# Patient Record
Sex: MALE | Race: WHITE | NOT HISPANIC OR LATINO | Employment: FULL TIME | URBAN - METROPOLITAN AREA
[De-identification: names, ages, dates, MRNs, and addresses within clinical notes are randomized per-mention and may not be internally consistent; named-entity substitution may affect disease eponyms.]

---

## 2021-11-22 ENCOUNTER — TRANSCRIBE ORDERS (OUTPATIENT)
Dept: PHYSICAL THERAPY | Facility: CLINIC | Age: 56
End: 2021-11-22

## 2021-11-22 DIAGNOSIS — S76.011A HIP STRAIN, RIGHT, INITIAL ENCOUNTER: Primary | ICD-10-CM

## 2021-11-23 ENCOUNTER — TREATMENT (OUTPATIENT)
Dept: PHYSICAL THERAPY | Facility: CLINIC | Age: 56
End: 2021-11-23

## 2021-11-23 DIAGNOSIS — S76.011D STRAIN OF RIGHT HIP, SUBSEQUENT ENCOUNTER: Primary | ICD-10-CM

## 2021-11-23 PROCEDURE — 97161 PT EVAL LOW COMPLEX 20 MIN: CPT | Performed by: PHYSICAL THERAPIST

## 2021-11-23 PROCEDURE — 97110 THERAPEUTIC EXERCISES: CPT | Performed by: PHYSICAL THERAPIST

## 2021-11-23 NOTE — PROGRESS NOTES
Physical Therapy Initial Evaluation and Plan of Care      Subjective Evaluation    History of Present Illness  Date of onset: 2021  Mechanism of injury: Patient injured his right hip at work.  He was doing deliveries and using a lift gate.  Was standing on the gate, unloading pipe, stepped off with his right LE and slipped on gravel.  Patient came down on his right side with the load he was lifting and felt a pop in the right hip.  No history of right hip issues.      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    Palpation     Additional Palpation Details  Minimal TTP to the right lateral hip region.    Active Range of Motion     Right Hip   Flexion: Right hip active flexion: WNL.   Abduction: Right hip active abduction: WNL.   Adduction: Right hip active adduction: WNL.     Additional Active Range of Motion Details  AROM of the lumbar spine WNL    Strength/Myotome Testing     Right Hip   Planes of Motion   Flexion: 5  Abduction: 5  Adduction: 5    Additional Strength Details  Right Knee Extension MMT 5/5  Right Ankle DF 5/5    Tests     Right Hip   Negative ZACHARY.           Assessment & Plan     Assessment    Assessment details: Patient presents with c/o pain, TTP and an antalgic gait pattern which is limiting his ability to perform full job duties.    Barriers to therapy: none  Prognosis: good  Prognosis details: STG's   1)  Independent with HEP  2)  Decrease pain by 50% or more  3)  No TTP present    LTG's   1)  Independent with HEP progression  2)  Decrease pain by 75% or more  3)  Patient to ambulate with a normal gait  pattern  4)  Patient to lift floor to waist up to 50 lbs      Plan  Therapy options: will be seen for skilled therapy services  Planned therapy interventions: strengthening, stretching, therapeutic activities and home exercise program  Treatment plan discussed with: patient        Manual Therapy:    0     mins  16260;  Therapeutic Exercise:    17     mins  76974;    Neuromuscular Beni:    0    mins  01079;    Therapeutic Activity:     0     mins  90084;     Gait Trainin     mins  63190;     Ultrasound:     0     mins  15586;    Work Hardening           0      mins 81000  Iontophoresis               0   mins 61529    Timed Treatment:   17   mins   Total Treatment:     30   mins    PT SIGNATURE: Flaquito Pearce, PT   DATE TREATMENT INITIATED: 2021    Initial Certification  Certification Period: 2022  I certify that the therapy services are furnished while this patient is under my care.  The services outlined above are required by this patient, and will be reviewed every 90 days.     PHYSICIAN: Alix Lawson PA-C      DATE:     Please sign and return via fax to [phone].. Thank you, [facility].

## 2021-11-29 ENCOUNTER — TREATMENT (OUTPATIENT)
Dept: PHYSICAL THERAPY | Facility: CLINIC | Age: 56
End: 2021-11-29

## 2021-11-29 DIAGNOSIS — S76.011D STRAIN OF RIGHT HIP, SUBSEQUENT ENCOUNTER: Primary | ICD-10-CM

## 2021-11-29 PROCEDURE — 97110 THERAPEUTIC EXERCISES: CPT | Performed by: PHYSICAL THERAPIST

## 2021-11-29 PROCEDURE — 97530 THERAPEUTIC ACTIVITIES: CPT | Performed by: PHYSICAL THERAPIST

## 2021-11-29 NOTE — PROGRESS NOTES
Physical Therapy Daily Progress Note              Subjective  Patient reports that the hip is doing ok.  Continues to feel some pressure when he walks.  Currently denies pain.    Objective   See Exercise, Manual, and Modality Logs for complete treatment.       Assessment/Plan  Subjective reports continue to be good with regard to pain.  Added open and closed chain strengthening exercises, which the patient tolerated without a significant increase in pain.  No significant deficits persist at this time (AROM is WNL for the hip and strength is 5/5).                   Manual Therapy:    0     mins  56071;  Therapeutic Exercise:    23     mins  80670;     Neuromuscular Beni:    0    mins  17014;    Therapeutic Activity:     10     mins  53120;     Gait Trainin     mins  27857;     Ultrasound:     0     mins  43341;    Work Hardening           0      mins 12196  Iontophoresis               0   mins 32006    Timed Treatment:   33   mins   Total Treatment:     33   mins    Flaquito Pearce, PT  Physical Therapist

## 2021-12-28 ENCOUNTER — DOCUMENTATION (OUTPATIENT)
Dept: PHYSICAL THERAPY | Facility: CLINIC | Age: 56
End: 2021-12-28

## 2022-10-14 ENCOUNTER — TRANSFERRED RECORDS (OUTPATIENT)
Dept: HEALTH INFORMATION MANAGEMENT | Facility: CLINIC | Age: 57
End: 2022-10-14

## 2022-12-18 ENCOUNTER — HOSPITAL ENCOUNTER (EMERGENCY)
Facility: HOSPITAL | Age: 57
Discharge: HOME | End: 2022-12-18
Attending: EMERGENCY MEDICINE | Admitting: EMERGENCY MEDICINE

## 2022-12-18 ENCOUNTER — APPOINTMENT (OUTPATIENT)
Dept: GENERAL RADIOLOGY | Facility: HOSPITAL | Age: 57
End: 2022-12-18

## 2022-12-18 VITALS
DIASTOLIC BLOOD PRESSURE: 75 MMHG | SYSTOLIC BLOOD PRESSURE: 109 MMHG | OXYGEN SATURATION: 96 % | TEMPERATURE: 98.6 F | HEART RATE: 59 BPM | RESPIRATION RATE: 18 BRPM

## 2022-12-18 DIAGNOSIS — S39.012A STRAIN OF LUMBAR REGION, INITIAL ENCOUNTER: Primary | ICD-10-CM

## 2022-12-18 PROCEDURE — 96374 THER/PROPH/DIAG INJ IV PUSH: CPT

## 2022-12-18 PROCEDURE — 96375 TX/PRO/DX INJ NEW DRUG ADDON: CPT

## 2022-12-18 PROCEDURE — 72110 X-RAY EXAM L-2 SPINE 4/>VWS: CPT

## 2022-12-18 PROCEDURE — 25010000002 ONDANSETRON PER 1 MG: Performed by: EMERGENCY MEDICINE

## 2022-12-18 PROCEDURE — 99283 EMERGENCY DEPT VISIT LOW MDM: CPT

## 2022-12-18 PROCEDURE — 72170 X-RAY EXAM OF PELVIS: CPT

## 2022-12-18 PROCEDURE — 25010000002 HYDROMORPHONE 1 MG/ML SOLUTION: Performed by: EMERGENCY MEDICINE

## 2022-12-18 RX ORDER — ONDANSETRON 2 MG/ML
4 INJECTION INTRAMUSCULAR; INTRAVENOUS ONCE
Status: COMPLETED | OUTPATIENT
Start: 2022-12-18 | End: 2022-12-18

## 2022-12-18 RX ORDER — METHYLPREDNISOLONE 4 MG/1
TABLET ORAL
Qty: 21 TABLET | Refills: 0 | Status: SHIPPED | OUTPATIENT
Start: 2022-12-18

## 2022-12-18 RX ORDER — SODIUM CHLORIDE 0.9 % (FLUSH) 0.9 %
10 SYRINGE (ML) INJECTION AS NEEDED
Status: DISCONTINUED | OUTPATIENT
Start: 2022-12-18 | End: 2022-12-19 | Stop reason: HOSPADM

## 2022-12-18 RX ORDER — HYDROCODONE BITARTRATE AND ACETAMINOPHEN 5; 325 MG/1; MG/1
1 TABLET ORAL EVERY 6 HOURS PRN
Qty: 12 TABLET | Refills: 0 | Status: SHIPPED | OUTPATIENT
Start: 2022-12-18 | End: 2022-12-21

## 2022-12-18 RX ORDER — CYCLOBENZAPRINE HCL 5 MG
5 TABLET ORAL 3 TIMES DAILY PRN
Qty: 15 TABLET | Refills: 0 | Status: SHIPPED | OUTPATIENT
Start: 2022-12-18 | End: 2022-12-23

## 2022-12-18 RX ADMIN — ONDANSETRON 4 MG: 2 INJECTION INTRAMUSCULAR; INTRAVENOUS at 20:35

## 2022-12-18 RX ADMIN — HYDROMORPHONE HYDROCHLORIDE 1 MG: 1 INJECTION, SOLUTION INTRAMUSCULAR; INTRAVENOUS; SUBCUTANEOUS at 20:35

## 2022-12-19 ENCOUNTER — TRANSCRIBE ORDERS (OUTPATIENT)
Dept: PHYSICAL THERAPY | Facility: CLINIC | Age: 57
End: 2022-12-19

## 2022-12-19 DIAGNOSIS — M54.50 ACUTE LOW BACK PAIN, UNSPECIFIED BACK PAIN LATERALITY, UNSPECIFIED WHETHER SCIATICA PRESENT: Primary | ICD-10-CM

## 2022-12-19 NOTE — ED NOTES
Patient from home via [facility]. Patient reports lower back pain that radiates down left leg. Has history of sciatica. Patient bent over with a 60 lb box on Thursday, felt a pinch in his lower back. States he has been working through the pain, but today he was not able to walk. Patient denies numbness, tingling, or loss of bladder/ bowels.

## 2022-12-19 NOTE — ED PROVIDER NOTES
EMERGENCY DEPARTMENT ENCOUNTER    Room Number:  36/36  Date seen:  12/18/2022  PCP: Provider, No Known  Historian: Patient, wife      HPI:  Chief Complaint: Back pain  A complete HPI/ROS/PMH/PSH/SH/FH are unobtainable due to: None  Context: Tristin Sparrow is a 57 y.o. male who presents to the ED c/o back pain.  Onset Thursday.  He states that he was caring about 60 pounds on his shoulder.  He leaned over to put it down when he felt sudden onset pain to his back.  Pain is sharp and in the low back.  It is constant.  Significantly worsens with movement.  Nonradiating.    No fevers. No history of illicit IV drug use. No chronic steroid use. No anticoagulants. No history of malignancy. No saddle anesthesia or bowel or bladder changes.            PAST MEDICAL HISTORY  Active Ambulatory Problems     Diagnosis Date Noted   • No Active Ambulatory Problems     Resolved Ambulatory Problems     Diagnosis Date Noted   • No Resolved Ambulatory Problems     No Additional Past Medical History         PAST SURGICAL HISTORY  No past surgical history on file.      FAMILY HISTORY  No family history on file.      SOCIAL HISTORY  Social History     Socioeconomic History   • Marital status:          ALLERGIES  Patient has no known allergies.        REVIEW OF SYSTEMS  Review of Systems     All systems reviewed and negative except for those discussed in HPI.       PHYSICAL EXAM  ED Triage Vitals [12/18/22 1928]   Temp Heart Rate Resp BP SpO2   98.6 °F (37 °C) 74 18 133/76 98 %      Temp src Heart Rate Source Patient Position BP Location FiO2 (%)   -- -- -- -- --       Physical Exam      GENERAL: no acute distress  HENT: nares patent  EYES: no scleral icterus  CV: regular rhythm, normal rate  RESPIRATORY: normal effort  ABDOMEN: soft, nontender  MUSCULOSKELETAL: no deformity no T or L-spine tenderness  NEURO: alert, moves all extremities, follows commands  5/5 strength to hip flexion, knee extension/flexion, dorsiflexion,  plantarflexion, and EHL  2+ patellar reflexes bilaterally  SILT at bilateral superficial peroneal, deep peroneal, sural, and saphenous nerves  PSYCH:  calm, cooperative  SKIN: warm, dry, no rash to the back    Vital signs and nursing notes reviewed.          LAB RESULTS  No results found for this or any previous visit (from the past 24 hour(s)).    Ordered the above labs and reviewed the results.        RADIOLOGY  XR Spine Lumbar Complete 4+VW, XR Pelvis 1 or 2 View    Result Date: 12/18/2022  LUMBAR SPINE, PELVIS  HISTORY: Low back pain, pelvic pain.  LUMBAR SPINE: AP, LATERAL, BILATERAL OBLIQUE, SPOT LUMBOSACRAL: There is degenerative disc disease with mild endplate spurring. Vertebral body and disc space heights are preserved. There is facet arthritis greatest at L4-5 and L5-S1. There is no evidence for fracture or acute abnormality. A minimal scoliotic curvature is convex to the left at L2-3.  AP PELVIS: There are mild degenerative changes at both hips. Within the lateral aspect of the left acetabular roof there is an 8 mm degenerative subchondral cyst. Just above the right lesser trochanter there is a 1 cm chronic ossification associated with iliopsoas tendon insertion. There is no evidence for fracture or acute abnormality of either hip.      1. Mild changes of degenerative disc disease with endplate spurring in the lumbar spine. Facet arthritis lower lumbar spine. 2. Mild arthritic changes at both hips. No evidence for fracture or acute abnormality of the lumbar spine or pelvis.        Ordered the above noted radiological studies. Reviewed by me in PACS.            PROCEDURES  Procedures          MEDICATIONS GIVEN IN ER  Medications   sodium chloride 0.9 % flush 10 mL (has no administration in time range)   HYDROmorphone (DILAUDID) injection 1 mg (1 mg Intravenous Given 12/18/22 2035)   ondansetron (ZOFRAN) injection 4 mg (4 mg Intravenous Given 12/18/22 2035)               MEDICAL DECISION MAKING, PROGRESS,  and CONSULTS    All labs have been independently reviewed by me.  All radiology studies have been reviewed by me and discussed with radiologist dictating the report.   EKG's independently viewed and interpreted by me.  Discussion below represents my analysis of pertinent findings related to patient's condition, differential diagnosis, treatment plan and final disposition.      Additional sources:  - Discussed/ obtained information from independent historians:  wife        Orders placed during this visit:  Orders Placed This Encounter   Procedures   • XR Spine Lumbar Complete 4+VW   • XR Pelvis 1 or 2 View   • Insert Peripheral IV         Additional orders considered but not ordered:  I considered ordering MRI of the spine given his back pain.  However, he has no red flag features to his exam or history.  I also considered ordering CT scan of his abdomen.  However, he has no abdominal pain.  Abdomen is nontender.  I have low suspicion for ureteral stone.  Has no urinary symptoms.  Pain is very consistent with musculoskeletal etiology.        Differential diagnosis:    Lumbosacral strain, sciatica, spinal cord compression, cauda equina syndrome.  I have lower suspicion for AAA or ureteral stone or UTI.      Independent interpretation of labs, radiology studies, and discussions with consultants:       I interpreted x-rays myself.  X-ray of the spine and pelvis shows degenerative change.  No fracture.    Pain is much more well controlled after getting IV narcotic medication.  I discussed return precautions with the patient and wife.  We discussed follow-up and outpatient management strategies.           PPE: The patient wore a mask throughout the entire encounter. I wore a well-fitting mask.    DIAGNOSIS  Final diagnoses:   Strain of lumbar region, initial encounter         DISPOSITION  DISCHARGE    FOLLOW-UP  [facility] Emergency Department  [address] [facility]  [address] [phone]  Go to   If symptoms worsen    Rafi Vuong IV, MD  [address]  [place] [id number] [phone]    Schedule an appointment as soon as possible for a visit in 1 week           Medication List      New Prescriptions    cyclobenzaprine 5 MG tablet  Commonly known as: FLEXERIL  Take 1 tablet by mouth 3 (Three) Times a Day As Needed for Muscle Spasms for up to 5 days.     HYDROcodone-acetaminophen 5-325 MG per tablet  Commonly known as: NORCO  Take 1 tablet by mouth Every 6 (Six) Hours As Needed for Severe Pain for up to 3 days.     methylPREDNISolone 4 MG dose pack  Commonly known as: MEDROL  Take as directed on package instructions.           Where to Get Your Medications      These medications were sent to [id number] - [place], [place] - [address] AT [facility] - [phone]  - [phone]   [address] [address]    Phone: [phone]   · cyclobenzaprine 5 MG tablet  · HYDROcodone-acetaminophen 5-325 MG per tablet  · methylPREDNISolone 4 MG dose pack             Latest Documented Vital Signs:  As of 22:11 EST  BP- 133/76 HR- 74 Temp- 98.6 °F (37 °C) O2 sat- 98%      --    Please note that portions of this were completed with a voice recognition program.       Note Disclaimer: At [facility], we believe that sharing information builds trust and better relationships. You are receiving this note because you are receiving care at [facility] or recently visited. It is possible you will see health information before a provider has talked with you about it. This kind of information can be easy to misunderstand. To help you fully understand what it means for your health, we urge you to discuss this note with your provider.       Rayo Ambriz II, MD  12/18/22 8385

## 2022-12-21 ENCOUNTER — TREATMENT (OUTPATIENT)
Dept: PHYSICAL THERAPY | Facility: CLINIC | Age: 57
End: 2022-12-21

## 2022-12-21 DIAGNOSIS — S39.012D STRAIN OF LUMBAR REGION, SUBSEQUENT ENCOUNTER: Primary | ICD-10-CM

## 2022-12-21 PROCEDURE — 97112 NEUROMUSCULAR REEDUCATION: CPT | Performed by: PHYSICAL THERAPIST

## 2022-12-21 PROCEDURE — 97110 THERAPEUTIC EXERCISES: CPT | Performed by: PHYSICAL THERAPIST

## 2022-12-21 PROCEDURE — 97161 PT EVAL LOW COMPLEX 20 MIN: CPT | Performed by: PHYSICAL THERAPIST

## 2022-12-21 NOTE — PROGRESS NOTES
Physical Therapy Initial Evaluation and Plan of Care  [address] [place] [address], [address]  [place] [address]    Patient: Tristin Sparrow   : 1965  Diagnosis/ICD-10 Code:  Strain of lumbar region, subsequent encounter [S39.012D]  Referring practitioner: Lionel Mars PA-C  Date of Initial Visit: 2022  Today's Date: 2022  Patient seen for 1 session         Visit Diagnoses:    ICD-10-CM ICD-9-CM   1. 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  He bent over to set down some supplies.  Reports that he felt the pain as he stood up.  Went to the ER, then to [facility] the next day.      Patient Occupation: [employer]   Precautions and Work Restrictions: currently offPain  Current pain ratin  At worst pain rating: 10  Location: left lumbar spine  Quality: dull ache and sharp  Relieving factors: ice (home TENS unit)  Aggravating factors: movement and lifting  Progression: improved             Objective          Postural Observations    Additional Postural Observation Details  Guarded sit to stand.  Patient ambulates with a minimal antalgic gait pattern.    Palpation     Additional Palpation Details  No TTP present to the lumbar spine.    Active Range of Motion     Lumbar   Flexion: Active lumbar flexion: WNL.   Extension: Active lumbar extension: about 10. with pain  Left lateral flexion: Active left lumbar lateral flexion: about 10. with pain  Right lateral flexion: Active right lumbar lateral flexion: about 20.     Strength/Myotome Testing     Left Hip   Planes of Motion   Flexion: 4+  Abduction: 4+  Adduction: 4+    Right Hip   Planes of Motion   Flexion: 4-  Abduction: 4+  Adduction: 4+    Left Ankle/Foot   Dorsiflexion: 4+    Right Ankle/Foot    Dorsiflexion: 4+    Tests     Additional Tests Details  - ZACHARY bilaterally          Assessment & Plan     Assessment  Impairments: abnormal gait, abnormal or restricted ROM, activity intolerance, impaired physical strength, lacks appropriate home exercise program and pain with function    Assessment details: Patient presents with c/o pain, limited AROM, decreased strength and an antalgic gait pattern which is limiting his ability to perform full job duties.    Barriers to therapy: none  Prognosis: good  Prognosis details: STG's to be met by 2 weeks  1)  Independent with HEP  2)  Decrease pain by 50% or more  3)  AROM WNL for the lumbar spine without pain  4)  Normal sit to stand  5)  Normal gait patern    LTG's to be met by 4 weeks  1)  Independent with HEP progression  2)  Decrease pain by 75% or more  3)  Increase strength for the LE to 4+/5 or more  4)  No TTP present  5)  Patient to lift floor to waist up to 50 lbs      Plan  Therapy options: will be seen for skilled therapy services  Planned therapy interventions: strengthening, stretching, therapeutic activities, home exercise program, gait training and neuromuscular re-education  Frequency: 3x week  Duration in weeks: 4  Treatment plan discussed with: patient            Timed:         Manual Therapy:    0     mins  14024;     Therapeutic Exercise:    16     mins  71449;     Neuromuscular Beni:    8    mins  29416;    Therapeutic Activity:     0     mins  94368;     Gait Trainin     mins  48619;     Ultrasound:     0     mins  64953;          Un-Timed:  Electrical Stimulation:    0     mins  97453 ( );    Low Eval     14     Mins  67010  Mod Eval     0     Mins  10960  High Eval                       0     Mins  96752        Timed Treatment:   24   mins   Total Treatment:     38   mins          PT: Flaquito Pearce, PT     [place] License [id number]  Electronically signed by Flaquito Pearce PT, 22, 6:58 AM EST    Certification Period:  12/21/2022 thru 3/20/2023  I certify that the therapy services are furnished while this patient is under my care.  The services outlined above are required by this patient, and will be reviewed every 90 days.    Lionel Mars Pa-c  [address] [address]   NPI: [id number]      Flaquito Pearce, PT   License number: [id number]        Physician Signature:__________________________________________________    PHYSICIAN: Lionel Mars PA-C      DATE:     Please sign and return via fax to [email or web address] . Thank you, [facility].

## 2022-12-28 ENCOUNTER — TREATMENT (OUTPATIENT)
Dept: PHYSICAL THERAPY | Facility: CLINIC | Age: 57
End: 2022-12-28

## 2022-12-28 DIAGNOSIS — S39.012D STRAIN OF LUMBAR REGION, SUBSEQUENT ENCOUNTER: Primary | ICD-10-CM

## 2022-12-28 PROCEDURE — 97112 NEUROMUSCULAR REEDUCATION: CPT | Performed by: PHYSICAL THERAPIST

## 2022-12-28 PROCEDURE — 97110 THERAPEUTIC EXERCISES: CPT | Performed by: PHYSICAL THERAPIST

## 2022-12-28 NOTE — PROGRESS NOTES
Physical Therapy Daily Treatment Note  [address], [address]  [place] [address]      Patient: Tristin Sparrow   : 1965  Referring practitioner: No ref. provider found  Date of Initial Visit: Type: THERAPY  Noted: 2022  Today's Date: 2022  Patient seen for 2 sessions       Visit Diagnoses:    ICD-10-CM ICD-9-CM   1. Strain of lumbar region, subsequent encounter  S39.012D V58.89     847.2           Subjective   Patient reports that the back is a whole lot better, currently denies pain.    Objective   See Exercise, Manual, and Modality Logs for complete treatment.       Assessment/Plan  Subjective reports are improved from the previous visit.  Continued with the KT secondary to the patient noting an improvement with it on.  Patient tolerated the progression of ROM and strengthening exercises without visual or verbal reports of pain.      Timed:         Manual Therapy:    0     mins  41755;     Therapeutic Exercise:    27     mins  05436;    Neuromuscular Beni:    8    mins  38227;    Therapeutic Activity:     0     mins  44968;     Gait Training      0    mins  03709;  Work Conditioning     0   mins  43255       Untimed:  Electrical Stimulation:    0     mins  01369 ( );      Timed Treatment:   35   mins   Total Treatment:     35   mins    Flaquito Pearce, PT  [place] License: [id number]

## 2022-12-29 ENCOUNTER — TREATMENT (OUTPATIENT)
Dept: PHYSICAL THERAPY | Facility: CLINIC | Age: 57
End: 2022-12-29

## 2022-12-29 DIAGNOSIS — S39.012D STRAIN OF LUMBAR REGION, SUBSEQUENT ENCOUNTER: Primary | ICD-10-CM

## 2022-12-29 PROCEDURE — 97110 THERAPEUTIC EXERCISES: CPT | Performed by: PHYSICAL THERAPIST

## 2022-12-29 NOTE — PROGRESS NOTES
Physical Therapy Daily Treatment Note  [address], [address]  [place] [address]      Patient: Tristin Sparrow   : 1965  Referring practitioner: No ref. provider found  Date of Initial Visit: Type: THERAPY  Noted: 2022  Today's Date: 2022  Patient seen for 3 sessions       Visit Diagnoses:    ICD-10-CM ICD-9-CM   1. Strain of lumbar region, subsequent encounter  S39.012D V58.89     847.2         Subjective   Patient reports that the back is a little stiff, reports no pain.    Objective   See Exercise, Manual, and Modality Logs for complete treatment.       Assessment/Plan  Subjective reports of pain are improved with regard to pain.  Patient reported some discomfort with the DUYEN, but had on c/o pain otherwise.  Held the KT secondary to it still being on from the previous visit.      Timed:         Manual Therapy:    0     mins  59836;     Therapeutic Exercise:    29     mins  81723;     Neuromuscular Beni:    0    mins  55782;    Therapeutic Activity:     0     mins  71621;     Gait Training      0    mins  30276;  Work Conditioning     0   mins  54623       Untimed:  Electrical Stimulation:    0     mins  12576 ( );      Timed Treatment:   29   mins   Total Treatment:     29   mins    Flaquito Pearce, PT  [place] License: [id number]

## 2023-01-03 ENCOUNTER — TREATMENT (OUTPATIENT)
Dept: PHYSICAL THERAPY | Facility: CLINIC | Age: 58
End: 2023-01-03
Payer: OTHER MISCELLANEOUS

## 2023-01-03 DIAGNOSIS — S39.012D STRAIN OF LUMBAR REGION, SUBSEQUENT ENCOUNTER: Primary | ICD-10-CM

## 2023-01-03 PROCEDURE — 97110 THERAPEUTIC EXERCISES: CPT | Performed by: PHYSICAL THERAPIST

## 2023-01-03 PROCEDURE — 97112 NEUROMUSCULAR REEDUCATION: CPT | Performed by: PHYSICAL THERAPIST

## 2023-01-03 NOTE — PROGRESS NOTES
Physical Therapy Daily Treatment Note  [address] [facility], [address]  [place] [address]      Patient: Tristin Sparrow   : 1965  Referring practitioner: Lionel Mars PA-C  Date of Initial Visit: Type: THERAPY  Noted: 2022  Today's Date: 1/3/2023  Patient seen for 4 sessions       Visit Diagnoses:    ICD-10-CM ICD-9-CM   1. Strain of lumbar region, subsequent encounter  S39.012D V58.89     847.2           Subjective   Patient reports that the back is doing good, currently rates the pain at 1/10.    Objective   See Exercise, Manual, and Modality Logs for complete treatment.       Assessment/Plan  Subjective reports of pain remain low.  Continued with the KT secondary to the patient reporting an improvement with it on.  Patient reported some pain with DUYEN, but had no reports of pain otherwise.      Timed:         Manual Therapy:    0     mins  98039;     Therapeutic Exercise:    23     mins  58733;    Neuromuscular Beni:    8    mins  44925;    Therapeutic Activity:     0     mins  74126;     Gait Training      0    mins  16613;  Work Conditioning     0   mins  74422       Untimed:  Electrical Stimulation:    0     mins  46402 ( );      Timed Treatment:   31   mins   Total Treatment:     31   mins    Flaquito Pearce, PT  [place] License: [id number]

## 2023-01-31 ENCOUNTER — DOCUMENTATION (OUTPATIENT)
Dept: PHYSICAL THERAPY | Facility: CLINIC | Age: 58
End: 2023-01-31
Payer: COMMERCIAL

## 2023-05-25 ENCOUNTER — MEDICAL CORRESPONDENCE (OUTPATIENT)
Dept: HEALTH INFORMATION MANAGEMENT | Facility: CLINIC | Age: 58
End: 2023-05-25

## 2023-10-25 ENCOUNTER — TRANSFERRED RECORDS (OUTPATIENT)
Dept: HEALTH INFORMATION MANAGEMENT | Facility: CLINIC | Age: 58
End: 2023-10-25

## 2023-10-25 LAB — EJECTION FRACTION: 54 %

## 2024-04-26 ENCOUNTER — MEDICAL CORRESPONDENCE (OUTPATIENT)
Dept: HEALTH INFORMATION MANAGEMENT | Facility: CLINIC | Age: 59
End: 2024-04-26

## 2024-05-01 ENCOUNTER — TRANSCRIBE ORDERS (OUTPATIENT)
Dept: OTHER | Age: 59
End: 2024-05-01

## 2024-05-01 ENCOUNTER — TELEPHONE (OUTPATIENT)
Dept: TRANSPLANT | Facility: CLINIC | Age: 59
End: 2024-05-01

## 2024-05-01 DIAGNOSIS — J84.9 ILD (INTERSTITIAL LUNG DISEASE) (H): Primary | ICD-10-CM

## 2024-05-01 NOTE — TELEPHONE ENCOUNTER
Please call MALLORIE [facility] # [phone].  OK to leave voice message;  Malka also stated, ' she is leaving work soon, ok to call tomorrow.'

## 2024-05-01 NOTE — TELEPHONE ENCOUNTER
Returned call to Malka pulmonary RN Ana Delong.  Malka confirmed patient to be seen by transplant team only, Dr. Jules will continue to manage patient's ILD.  Patient is aware he is being referred to the lung transplant team at the [facility].  Verified transplant coordinator would send referral to [facility] intake team and have someone reach out to him within the next 1-2 business days.

## 2024-05-02 ENCOUNTER — REFERRAL (OUTPATIENT)
Dept: TRANSPLANT | Facility: CLINIC | Age: 59
End: 2024-05-02

## 2024-05-02 DIAGNOSIS — J84.9 ILD (INTERSTITIAL LUNG DISEASE) (H): Primary | ICD-10-CM

## 2024-05-02 NOTE — LETTER
Travon Cartwright  [address] [facility] [facility] [address]    Dear Travon,    Thank you for your interest in the Transplant Center at [facility]. We look forward to being a part of your care team and assisting you through the transplant process.    As we discussed, your transplant coordinator is Daxa Diego (Lung).  You may call your coordinator at any time with questions or concerns.  Your first scheduled call will be on 5/8 between 10:00-12:00.  If this needs to change, call [phone].    Please complete the following.    Fill out and return the enclosed forms  Authorization for Electronic Communication  Authorization to Discuss Protected Health Information  Authorization for Release of Protected Health Information    Sign up for:  re3Dt, access to your electronic medical record (see enclosed pamphlet)  [employer], a transplant education website       My Transplant Place   You can use these tools to learn more about your transplant, communicate with your care team, and track your medical details    Sincerely,  Solid Organ Transplant  [facility]    cc: Referring Physician

## 2024-05-06 VITALS — BODY MASS INDEX: 25.05 KG/M2 | WEIGHT: 175 LBS | HEIGHT: 70 IN

## 2024-05-06 NOTE — TELEPHONE ENCOUNTER
SOT LUNG INTAKE    Intake completed with Juan J  May 6, 2024    [facility]    Referring Provider:Prasanna Jules MD  Primary Care Provider: Enio Thomas PA-C    Specialist: none  Source/Facility:[facility]  Diagnosis: ILD    Critical History     Previous transplants no  Smoking/nicotine use history: not current  Alcohol use history: no misuse   Drug use history: no  Cancer history: no  Cardiac history: no  Diabetes: yes, on insulin  Biopsy: no  Oxygen use at rest: 4 lpm with activity: 4 lpm     BMI: 25    Comments    Is patient in a group home/assisted living? no  Does patient have a guardian? no    Referral intake process completed.  Patient is aware that after financial approval is received, medical records will be requested.   Patient confirmed for a callback from transplant coordinator on 5/8/24. (within 2 weeks)      Patient verbal consent given to access medical records and documents outside of [facility] through [facility]. yes    Confirmed coordinator will discuss evaluation process in more detail at the time of their call.   Patient is aware of the need to arrange age appropriate cancer screening, vaccinations, and dental care.  Reminded patient to complete questionnaire, complete medical records release, and review packet prior to evaluation visit .  Assessed patient for special needs (ie--wheelchair, assistance, guardian, and ):  no   Patient instructed to call [phone] with questions.      Surgery Consult Clinic Note      RE: Demi Narayan  : 1941  RICHIE: 2017      Chief Complaint:  Heart burn    History of Present Illness:  Mrs. Narayan is a very pleasant 76 year old year old female who I am seeing at the request of Dr. Mackay for evaluation of reflux and for consideration for EGD.  Long standing problem with reflux.  Admits to change in voice, difficulty swallowing pills and excessive phlegm.  Endorses a daily upper abdominal burning/cramping pain when she wakes in the morning that improves after she takes her PPI.  Drinks 3-4 cups of coffee/day.  1 glass a wine a night.  Denies tobacco products.  Doesn't lay down after meals and doesn't eat large meals.  Abdominal surgeries include partial colectomy for diverticulitis. She underwent CT abdomen pelvis for this abdominal pain and it was commented that there appeared to be gastric antral thickening.  She specifically denies fever, chills, nausea, vomiting, chest pain, shortness of breath or palpitations.      Medical history:  Past Medical History:   Diagnosis Date     Chronic/recurrent back pain 2011     Diverticulitis      Diverticulitis, recurrent 2002    bowel resection     Dyslipidemia 2006     Fibrocystic breast disease 2011     Gastro-oesophageal reflux disease      GERD 2/10/2012     Hiatal hernia 2/10/2012     Hormone replacement therapy, postmenopausal 2011     Osteoarthritis        Surgical history:  Past Surgical History:   Procedure Laterality Date     ------------OTHER-------------      colonoscopy with biopsy - diverticulitis, hemorrhoids     ------------OTHER-------------  1994    sigmoidoscopy - abdominal pain, diverticula     ABDOMEN SURGERY      colon removed 2nd to diverticulitis     APPENDECTOMY       ARTHROSCOPY SHOULDER  2013    Procedure: ARTHROSCOPY SHOULDER;  Right Shoulder Arthroscopy Sub-Acromial Decompression Rotator Cuff Repair;  Surgeon: Lenny Trammell MD;  Location:  [facility]     [facility]     Diverticulitis      bowel resection     EGD with biopsy  2011     ENDOSCOPY UPPER WITH PANCREATIC STIMULATION       ENDOSCOPY UPPER, COLONOSCOPY, COMBINED  7/18/2014    Procedure: COMBINED ENDOSCOPY UPPER, COLONOSCOPY;  Surgeon: Israel Feliciano MD;  Location: [facility] OR     ENT SURGERY      tonsilectomy     GYN SURGERY      hysterectomy with bladder and rectal repair     ORTHOPEDIC SURGERY  11/20/2013    right rotator cuff surgery     TVH with ovarian preservation         Family history:  Family History   Problem Relation Age of Onset     CEREBROVASCULAR DISEASE Father      CVA     HEART DISEASE Father      heart disease     Hypertension Father      Myocardial Infarction Father      myocardial infarction     CEREBROVASCULAR DISEASE Mother      CVA     DIABETES Mother      HEART DISEASE Mother      heart disease     Hypertension Mother      HEART DISEASE Brother      heart disease     Hypertension Brother        Medications:  Current Outpatient Prescriptions   Medication Sig Dispense Refill     traMADol (ULTRAM) 50 MG tablet TAKE ONE TO TWO TABLETS BY MOUTH EVERY 6 TO 8 HOURS AS NEEDED 120 tablet 0     estradiol (ESTRACE) 0.5 MG tablet Take 1 tablet (0.5 mg) by mouth daily 90 tablet 1     Pantoprazole Sodium (PROTONIX PO) Take 40 mg by mouth every morning (before breakfast)       niacin 500 MG tablet Take 1 tablet by mouth daily.       aspirin 325 MG tablet Take 325 mg by mouth At Bedtime.       GLUCOSAMINE SULFATE PO Take  by mouth daily.       Multiple Vitamins-Minerals (MULTIVITAL PO) Take 1 tablet by mouth daily.       Calcium Carbonate-Vitamin D (CALCIUM + D PO) Take 600 mg by mouth.       Omega-3 Fatty Acids (OMEGA-3 FISH OIL PO) Take  by mouth daily.       ciprofloxacin (CIPRO) 500 MG tablet TAKE TWO TABLETS BY MOUTH EVERY DAY AS NEEDED FRO DIVERTICULITIS (Patient not taking: Reported on 8/14/2017) 20 tablet 0     Allergies:  The  patienthas No Known Allergies.  .  Social history:  Social History   Substance Use Topics     Smoking status: Former Smoker     Types: Cigarettes     Quit date: 5/6/1968     Smokeless tobacco: Never Used      Comment: no passive smoke exposure     Alcohol use 0.5 oz/week     1 Glasses of wine per week      Comment: daily with dinner     Marital status: .    Review of Systems:    Constitutional: Negative for fever, chills and weight loss.   HENT: Negative for ear pain, nosebleeds, congestion, sore throat, tinnitus and ear discharge.    Eyes: Negative for blurred vision, double vision, photophobia and pain.   Respiratory: Negative for cough, hemoptysis, shortness of breath, wheezing and stridor.    Cardiovascular: Negative for chest pain, palpitations and orthopnea.   Gastrointestinal: Negative for heartburn, nausea, vomiting, abdominal pain and blood in stool.   Genitourinary: Negative for urgency, frequency and hematuria.   Musculoskeletal: Negative for myalgias, back pain and joint pain.   Neurological: Negative for tingling, speech change and headaches.   Endo/Heme/Allergies: Does not bruise/bleed easily.   Psychiatric/Behavioral: Negative for depression, suicidal ideas and hallucinations. The patient is not nervous/anxious.    Physical Examination:  /73 (BP Location: Right arm, Patient Position: Chair, Cuff Size: Adult Regular)  Pulse 79  Temp 96.8  F (36  C) (Tympanic)  Resp 18  Ht 5' (1.524 m)  Wt 114 lb (51.7 kg)  SpO2 97%  BMI 22.26 kg/m2  General: AAOx4, NAD, WN/WD, ambulating without assistance  HEENT:NCAT, EOMI, PERRL Sclerae anicteric; Trachea mideline, no JVD  Chest:   Clear to auscultation bilaterally.  Cardiac: S1S2 , regular rate and rhythm without additional sounds  Abdomen: Soft, ND/NT no rebound, no guarding  Extremities: Cursory exam unremarkable.  Skin: Warm, dry, < 2 sec cap refill  Neuro: CN 2-12 grossly intact, no focal deficit, GCS 15  Psych: happy, calm, asks appropriate  questions      Assessment/Plan:  #1 GERD  #2 Change in voice     The indications, risks, benefits and technical aspects of esophagogastroduodenoscopy were reviewed with her questions asked and answered.  Antral biopsy for histologic examination will be performed and the place of H. pylori in gastritis was discussed.  Preoperative preparation, npo after midnight preceding the date was discussed and a request made to hold aspirin containing agents one week prior to ameliorate antiplatelet effect.  Questions asked and answered - will proceed based on scheduling availability.          Dr Alvarado  [facility]  [address] [place] [address], [address]  [place] [address]    Referring Provider:  [place] [address]     Primary Care Provider:  Virgil Mackay

## 2024-05-08 ENCOUNTER — TELEPHONE (OUTPATIENT)
Dept: TRANSPLANT | Facility: CLINIC | Age: 59
End: 2024-05-08

## 2024-05-08 NOTE — TELEPHONE ENCOUNTER
Sent the following email to Urbano per their request:    Hi Urbano,    I apologize for the delay in getting this out to you! Attached are the following for your review:  Two documents that provide a good overview of lung transplant and are specific to our program,  What You Need to Know About Lung Transplant  and  Adult Guide to Lung Transplant   Document summarizing the major medications used to prevent rejection and infection in lung transplant recipients  Document with information about visiting the Clinics and Surgery Center in [place], where the Transplant Clinic is located  Link to pulmonary rehab exercises from the Pulmonary Fibrosis Foundation: [email or web address]    Please message me back with any questions here in email or via [employer] once set up. If you have any difficulties with [employer], our [employer] helpline is available to troubleshoot at [phone]. You can reach me for transplant-related questions at [phone], option 5.    The Transplant Clinic is located at [address] [place] [address] in [place], on the third floor. Your new patient appointment is scheduled with Dr. Tucker for 5/14 at 3:40 pm.  As we spoke about on the phone today, I will confirm with our finance team that the prior authorization is on file on our end and let you know if there are any issues between now and then. Otherwise, please reach out with any questions, and we look forward to meeting you next week at the Transplant Clinic!    Take care,    Daxa Diego RN BSN PHN  Lung Transplant Coordinator    [facility]  Solid Organ Transplant  [address] [place] [address]  [address]  [place] [address]  [email or web address]  Office: [phone]  Fax: [phone]  Gender pronouns: she/her  Employed by [facility] [employer]      CONFIDENTIALITY NOTICE: The information transmitted in this e-mail is  "intended only for the person or entity to which it is addressed and may contain confidential and/or privileged material, including \"protected health information.\" If you are not the intended recipient, you are hereby notified that any review, retransmission, dissemination, distribution, or copying of this message is strictly prohibited. If you have received this communication in error, please destroy and delete this message from any computer and contact us immediately by return e-mail.    Attachments included:  Adult Guide to Lung Transplant  What You Need to Know About Lung and Heart-Lung Transplant  Transplant Med Info    Health [facility] Map  "

## 2024-05-08 NOTE — TELEPHONE ENCOUNTER
"SOT LUNG INTAKE    May 8, 2024    Travon Cartwright  [id number]  Referring Provider: Prasanna Jules MD   Source/Facility: [facility]  Established with PCP? Enio Thomas PA-C   Referral packet and welcome letter received? Will check spam folder  Preferred communication medium? Email, will set up MyChart    Diagnosis: ILD, suspected IPF  59 year old    Height: 5'10\"  Weight: 176 lb  BMI: 25.37  Weight loss or gain past year? Steady, no major changes  Appetite: decreased lately  Swallowing difficulties? denies    Social History:    Social History     Socioeconomic History    Marital status: Single     Spouse name: Not on file    Number of children: Not on file    Years of education: Not on file    Highest education level: Not on file   Occupational History    Not on file   Tobacco Use    Smoking status: Former     Types: Cigarettes    Smokeless tobacco: Never   Substance and Sexual Activity    Alcohol use: Not Currently     Comment: not since 2017    Drug use: Never    Sexual activity: Not on file   Other Topics Concern    Not on file   Social History Narrative    Not on file     Social Determinants of Health     Financial Resource Strain: Not on File (8/26/2019)    Received from [employer]     Financial Resource Strain     Financial Resource Strain: 0   Food Insecurity: Not on File (8/26/2019)    Received from [employer]     Food Insecurity     Food: 0   Transportation Needs: Not on File (8/26/2019)    Received from [employer]     Transportation Needs     Transportation: 0   Physical Activity: Not on File (8/26/2019)    Received from [employer]     Physical Activity     Physical Activity: 0   Stress: Not on File (8/26/2019)    Received from [employer]     Stress     Stress: 0   Social Connections: Not on File (8/26/2019)    Received from [employer]     Social Connections     Social Connections and Isolation: 0   Interpersonal Safety: Not on file   Housing Stability: Not on File (8/26/2019)    Received from [employer]     Housing Stability     Housing: " "0       Smoking History: former, 20 years on and off, less than a pack per day  Last Used? 2019    Tobacco Use: none  Last Used? N/a    Cannabis Use: none  Last Used? N/a    Street Drug Use: none  Last Used? N/a    ETOH Use: no  Last Used? Many years ago  History of heavy use or abuse? no  Drink of choice? N/a    - Discussed transplant requirement of pre and post transplant abstinence with monitoring of peth testing with patient.    Second-hand Smoke exposure: none    Family History:    Mother- \"healthy as can be,\" high blood pressure   Father- lung cancer (smoker)  M Grandmother- PAD late 70s  M Grandfather- lung cancer (smoker)  P Grandmother- \"old age\"  P Grandfather- \"old age\"  Siblings- one sister, no medical problems  Children- no  Aunts/Uncles/Cousins- no  Did you updated family history in Epic? Yes     Past Medical History  Pulmonary Manifestations date: 2-2.5 years ago approximately   Details: increased SOB, reduced activity tolerance were initial symptoms. Saw a local [place] doctor initially who ordered a lung CT which is how ILD was diagnosed. Sent to Dr. Jules, no meds started as symptoms and imaging were stable. Between 2nd and 3rd follow-up, symptoms and imaging worsened so started medications and oxygen.      Diabetes: yes, on insulin. Diagnosed 3-4 years ago. Working on better BG control currently.  Coronary Artery Disease: no  Hypertension: no   HLD: no  Previous transfusion(s): no  History of Cancer: no   GERD: no  Bleeding/Clotting/HIT Disorders: no  GI/ history: no GI, no     Pain: no  Narcotic/pain med use: no    Other Past Medical History:   Hepatic steatosis: referred to [facility] for consult 6/2023, suspect NAFLD or FERRARI, low fib-4 score per notes. Intermittently, chronic, mildly elevated liver enzymes dating back to 2019; liver US 2017 showing hepatic steatosis        Surgical History   Lung Biopsy: no  Pneumothorax: no  Chest Surgery: no    No past surgical history on " file.    PRE-SCREENING MENTAL HEALTH QUESTIONNAIRE FOR LUNG TXP REFERRALS  1.) Have you ever received a mental health diagnosis? no   If so, what was that diagnosis? no  2.) Do you have any concerns about your mental health, such as    Depression: no   Anxiety: no  3.) Have you ever been prescribed medications to help treat a mental health diagnosis? no  4.) Are you currently taking medications? no   If not currently taking, when? N/a   What medications? N/a   Who prescribed these medications? N/a   Why was this medication prescribed? N/a  5.) Have you ever been hospitalized for any psychiatric reasons? no   If so, when? N/a   If so, why? N/a  6.) Have you ever been suicidal? no   If so, how long ago was that? N/a  7.) Are you currently seeing a therapist or other mental health provider? no  8.) Is there anything specific you do to help care for your mental health? Social support    Medications  No current outpatient medications on file.     No current facility-administered medications for this visit.     Blood thinner hx: none  Prednisone hx: none - has never tried   How many courses/bursts in the past year estimated? none  Antibiotic hx: no prophylactic dosing, infrequent use, no history of allergic reactions    Allergies  No known allergies      Pulmonary Tests and Status  PFT's  Date: 4/26/24  FVC 2.06, 47%  FEV1 1.58, 47%  DLCO 8.96, 32%  Date: 9/25/23  FVC 2.31, 50%  FEV1 2.07, 58%  DLCO 11.73, 42%  Date: 4/24/23  FVC 2.32, 49%  FEV1 2.01, 56%  DLCO 15.53, 56%    ABG's: none    6 Minute Walk: no results found  Recommended liter flow with activity? 4  Are they using the recommended liter flow? Only after activity, not during If not, why not? Inconvenience of wearing while doing activities    Oxygen use   At rest: 4L - low 90s at rest   Sleep: 4L   With Activity: 4L after activity but not during -gets down to low 80s with activity   Date of O2 initiation: 6 months ago    Oxygen Company: [employer]  [place]  [place] continuous or POC? POC    Sleep Study: no    BiPAP/CPAP: no    Pulmonary Rehab: not locally within 100 miles   Date: n/a  Location: n/a    Current activity: active with ADLs, enjoys taking walks  Exercise intentionally? Yes       Current activity:  Basic ADLs    Feeding/Swallowing: no concerns. Decreased appetite.  Maintaining continence: no concerns  Putting on clothing: independent, double the length of time d/t shortness of breath  Bathing: independent, double the length of time d/t shortness of breath    Instrumental ADLs    Managing Finances: independent  Handling Transportation: yes  Shopping: electric scooter cart or pushes cart but really slowly  Preparing meals: yes  Using telephone & communication devices: independent  Internet access in the house: yes  Email used: yes  Managing medications: independent     Can they...  Vacuum? no  Change bedsheets? no  Walk up and down flight of stairs? no  What is something that is particularly difficult in their day to day because of breathing? Bending over to pick anything up, walking      Hospitalizations in prior 12 months  No hospitalization      Diagnostic Tests/Imaging  Heart cath: none    Stress Test: 5/28/19, [facility]:   Interpretation Summary   Contrast stress echo is negative for inducible ischemia.   Treadmill stress echocardiogram was obtained for symptoms of atypical chest pain, dyspnea on exertion.   Above average exercise tolerance (exercised 9:31 min Zain protocol, stage III, 10.6 METS) and stopped due to fatigue. Duke treadmill score +9.5 (low risk).   Achieved adequate workload with normal hemodynamic response to stress.   No chest pain symptoms were noted with stress or recovery.   Baseline EKG with normal sinus rhythm and no ST-T changes with stress.   Baseline echo with normal left ventricular systolic function and normal hyperdynamic function with stress. No regional wall motion abnormalities with stress.   No evidence of  inducible ischemia.   Normal resting echocardiogram.   No prior for comparison.     ECHO: 10/25/23, [place]:  Final Impressions   1. Normal left ventricular chamber size, no regional wall motion abnormalities, calculated 2-D linear ejection fraction 54%.   2. Mildly enlarged right ventricular chamber size, normal systolic function, estimated right ventricular systolic pressure 28 mmHg (right atrial pressure of 5 mmHg).   3. No  significant valvular heart disease.   4. No  pericardial effusion.     Chest CT: 4/26/24, [facility]  IMPRESSION:   1. Increasing subpleural honeycombing and fibrotic changes since previous exam raising the possibility of UIP superimposed on IPF. No pleural effusion or pleural thickening.   2. Cardiomegaly with arteriosclerotic thoracic aorta.   3. Cholecystolithiasis.   4. Small hiatal hernia containing the cardia of the stomach.     Ct Abdomen: none  DEXA: none  Upper GI: none    MRI: none    Primary Care  Established with a PCP? Yes  PSA: up to date, 0.1, 5/2/24  Colonoscopy: 2/5/16 at [facility], no polyps, 10 year repeat recommended.  Dental: up to date, had a lot of work done recently  COVID vaccine: Due for booster. Moderna monovalent 4/12/22, 5/13/21, 4/14/21.  Prevnar 20: 7/13/22  Influenza: 11/14/23  TDAP: 6/2/17  Shingrix: not found  Hep A/B: not found    Labs:  Kidney function:  Liver function: Hx hepatic steatosis, suspect NAFLD or FERRARI, low fib-4 score per notes. Intermittently, chronic, mildly elevated liver enzymes dating back to 2019; liver US 2017 showing hepatic steatosis. Most recent labs 5/2/24: Alk phos 103, T bili 0.7, AST 39, ALT 61.  A1AT: no results found  Rheumatology: [facility]  -CK 73  -LEONORA Ab Negative; addended to be positive for Antinuclear Antibodies Cytoplasmic Pattern, specifically cytoplasmic reticular/AMA pattern  -PR3 negative  -MPO negative  -SS-A negative  -SS-B negative  -ANTONIA SM IgG negative  -RNP negative  -SCL70 negative  -DELIA-1 negative  -CCP negative  -RF  negative  -UA without protein or RBCs  -HP panel - weak positive to pigeon DE antigen  -MyoMarker panel - weakly positive anti-Ku antibodies     Cystic Fibrosis: no  Cultures: none  Produce daily sputum? Yes, in AM. Dry sinus blood in it sometimes, otherwise milky white.      Psycho-Social Assessment  Spouse/Significant Other/Partner: none    Support System: [employer]   Location: [place], [place]   Distance from [facility]: 2.5 hours  -Discussed caregiver requirement of 3 months post hospital discharge within 50 miles with patient briefly.  Do they anticipate difficulty with this requirement? Not likely - they feel they can likely pull together adequate support    Employment Status: disability  (Full-time, part-time, disabled, etc.)  Occupation: property management  Work history:   Toxic Substance Exposure: none  (Factory work, asbestos, pesticides, dust, etc.)    Home Environment: no known mold issues.   (Apartment, house, stairs, mold, etc.)  Can they live on one floor if need be? yes  Pets/Birds: no history of pet birds.     Home Health care utilized: none    Financial Concerns: costs have been manageable    Learning Assessment  Primary language: English   required: no  Preferred learning style: Listening, Reading, Demonstration, Pictures/video      Notes/Plan: NPT with Dr. Tucker 5/14 at 3:40pm pending insurance clearance   Eligible for NPT? yes  Images in PACS? No, requested    Misc Concerns: NAFLD/FERRARI

## 2024-05-10 PROBLEM — E11.9 TYPE 2 DIABETES MELLITUS WITHOUT COMPLICATIONS (H): Status: ACTIVE | Noted: 2019-05-28

## 2024-05-10 PROBLEM — R74.8 ABNORMAL LEVELS OF OTHER SERUM ENZYMES: Status: ACTIVE | Noted: 2017-06-02

## 2024-05-10 PROBLEM — J84.9 INTERSTITIAL PULMONARY DISEASE (H): Chronic | Status: ACTIVE | Noted: 2023-09-25

## 2024-05-10 PROBLEM — J43.8 OTHER EMPHYSEMA (H): Chronic | Status: ACTIVE | Noted: 2022-10-05

## 2024-05-13 PROCEDURE — 99358 PROLONG SERVICE W/O CONTACT: CPT | Performed by: INTERNAL MEDICINE

## 2024-05-13 NOTE — PROGRESS NOTES
[facility]  Transplant Clinic - Initial Visit -  May 14, 2024         Assessment and Plan:   Travon Cartwright is a 59 year old with history of ILD, suspected IPF  who is seen today for evaluation for lung transplantation.    1) Evaluation for lung transplantation: He currently is on RA at rest and uses about 3-4L with exertion, he has good family support, and his PFTs have fallen significantly in the last year with progression (based on reports) in his CT findings. He has evidence of mildly positive anti Ku antibodies but no e/o myopathy by symptom report and no evidence of elevated aldolase or CK. He was accompanied by his sister Mine today, who moved from [place] to help take care of him. He's been incredibly active in the past and still tries to remain so. He has been unable to participate in pulm rehab due to his location. He and his sister have read and watched several videos/testimonies about lung transplant already and were prepared for today's visit mentally and emotionally. HE is just about to start on an antifibrotic but has not trialed on previously. He has some mild weight loss occurring over 2 years but stable in the last 6 months. He does have diabetes which is somewhat controlled,  but he has been struggling to obtain a CGM for the last year. He and his sister are very motivated to start the transplant process.      In addition to a complete history and physical, I discussed transplant at length with the patient.  We reviewed the risks and benefits of transplantation.  Our discussion included the recent survival statistics.  I discussed rejection, including hyperacute, acute, chronic and antibody mediated.  I discussed the need for surveillance biopsies.  I reviewed the standard immunosuppression and typical side effects, including renal failure.  I discussed the increased risk of infection and the use of prophylactic antibiotics. I  reviewed the increased risk of malignancy. I discussed the listing system, including the Composite Allocation Score.  I discussed the typical operative and postoperative course.  I reviewed the usual clinic followup.  I explained to the patient that they are required to stay in the [facility] for three months following transplantation and that they will need to have someone accompanying them during that time.  I discussed the importance of strict medication and clinic adherence after transplantation. I discussed the requirement for abstinence from alcohol during evaluation, waiting time and following transplantation. The patient had a number of questions which were answered to their apparent satisfaction.          Concerns: lifelong tremors that have worsened, mild deconditioning, weight loss, hx of NAFLD?     ILD: Working on getting CT images over in PACS. He's starting his antifibrotic soon.   - Appropriate timing for referral and transplant evaluation  - Invited for full evaluation    Lifelong tremors: He's had a history of lifelong tremors which have worsened in the last 1-2 years. While it doesn't sound necessarily like a progressive motor disorder we would like to have him fully evaluated by neurology during his evaluation.   - Neuro referral- preferable a movement disorder specialist     DM II:  He was diagnosed with diabetes about 3-4 years ago and feels like it is not as well controlled as it could be. We discussed the risks associated with prolonged steroid use and the steroid burst used peritransplant. He thinks his most recent A1c was about 7.0 down from 12.1.   - Refer to endocrinology, interested in CGM    Hepatic Steatosis: He has had intermittently elevated LFTs since 2019, had a liver US in 2017 showing hepatic steatosis. GI felt he was low likelihood advanced fibrosis.   - Will refer to hepatology for evaluation in anticipation of transplant evaluation    Coordinator/MD: Johnathon/Garrett    RTC:    Influenza and other vaccinations:   Annual dermatology visit:  Colonoscopy: 2/5/16 at [facility], no polyps, 10 year repeat recommended  PSA: UTD 5/2/24 (0.1)  Dental: Up to date, significant work done recently  COVID: Due for booster  Prevnar 20: 7/13/22  Influenza: 11/14/23  TDAP: 6/2/17  Shingrix: not found  Hep A/B: not found      I personally spent 90 minutes in documentation, the interview and exam, and review of the chart/labs/imaging on May 14, 2024 not including time spent interpreting spirometry.    Approximately 40 minutes of non face-to-face time were spent in review of the patient's medical record on 5/13/24.  This included review of previous: clinic visits, hospital records, lab results, imaging studies, and procedural documentation.  The findings from this review are summarized in the above note.      Carey Tucker MD  [facility]   Pulmonary Transplant   Pre Transplant Coordinator:   Ph: [phone]          Problem List:     Evaluation for lung transplantation  ILD  4L at rest (low 90s), sleep 4L, with activity uses 4L after, but gets down to low 80s with activity, started on oxygen in late 2023/early 2024  Diabetes for last 3-4 years  On insulin, trulicity weekly, metformin  Hepatic Steatosis  Referred to [facility] for consult 6/2023, suspect NAFLD or FERRARI. Chronically intermittently elevatedl iver enzymes since 2019, liver US 2017 showing hepatic steatosis        History of Present Illness:   Travon Cartwright is a 59 year old with history of ILD (suspected IPF) who is seen today for evaluation for lung transplantation.  He was born 3 months premature.   He thinks he first started feeling dyspneic in the last 3-4 years. He noticed when chopping firewood, he would  a log and get more sob. He has not been able to tolerate any cold air for the last several years. He has noticed a significant increase in ALVARADO, has noticed his oxygen  "sats down to 80s. Usually uses 4L with sleep and 4L with activity. He can't do more than 5 steps without getting severely out of breath. He's lost about 15 lbs in the last year unintentionally, likely started even before his trulicity. His weight has not dropped significantly in the last 6 months. No pulmonary rehab within 100 miles of him. He does have a cough at baseline and with exertion with some occasional post tussive emesis. Inhalers do not seem to help with the cough. Phlegm is fairly minimal.   Denies fevers/chills/sweats. No history of any cancer or skin cancer. Has always had tremors, his whole life. He has noticed an increase in his tremors in the last 6 months, to the point where he spills his food.     No previous history of bleeding/clotting disorders. No history of allergic reactions to medications. No mental health issues in the past.             Review of Systems:   Please see HPI, otherwise the complete 10 point ROS is negative.           Past Medical and Surgical History:     Past Medical History:   Diagnosis Date    Hepatic steatosis 2017    Noted on ultrasound     No past surgical history on file.  No previous Lung biopsy, pneumothorax, or chest surgery      Family History:     Family History   Problem Relation Age of Onset    Hypertension Mother     Lung Cancer Father         former smoker    No Known Problems Sister     No Known Problems Maternal Grandmother     Lung Cancer Maternal Grandfather         former smoker    No Known Problems Paternal Grandmother     No Known Problems Paternal Grandfather      Mother- \"healthy as can be,\" high blood pressure   Father- lung cancer (smoker)  M Grandmother- PAD late 70s  M Grandfather- lung cancer (smoker)  P Grandmother- \"old age\"  P Grandfather- \"old age\"  Siblings- one sister, no medical problems  Children- no  Aunts/Uncles/Cousins- no       Social History:   Tobacco: Former, 20 years on and off, <1ppd. 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  Exposures: None to asbestos, birds, hot tubs, welding, sandblasting  Social Support:  Sister betty  Lives in [place], [place] (2.5 hours from [facility])      Social History     Socioeconomic History    Marital status: Single   Tobacco Use    Smoking status: Former     Types: Cigarettes    Smokeless tobacco: Never   Substance and Sexual Activity    Alcohol use: Not Currently     Comment: not since 2017    Drug use: Never     Social Determinants of Health     Financial Resource Strain: Not on File (2019)    Received from [facility]     Financial Resource Strain     Financial Resource Strain: 0   Food Insecurity: Not on File (2019)    Received from [facility]     Food Insecurity     Food: 0   Transportation Needs: Not on File (2019)    Received from [facility]     Transportation Needs     Transportation: 0   Physical Activity: Not on File (2019)    Received from [facility]     Physical Activity     Physical Activity: 0   Stress: Not on File (2019)    Received from [facility]     Stress     Stress: 0   Social Connections: Not on File (2019)    Received from [facility]     Social Connections     Social Connections and Isolation: 0   Housing Stability: Not on File (2019)    Received from [facility]     Housing Stability     Housin            Medications:   Meds were reviewed and updated with the patient at today's appointment  No outpatient encounter medications on file as of 2024.     No facility-administered encounter medications on file as of 2024.               Physical Exam:   /73 (BP Location: Right arm, Patient Position: Chair, Cuff Size: Adult Regular)   Pulse 80   Temp 97.6  F (36.4  C) (Oral)   Wt 77.2 kg (170 lb 3.2 oz)   SpO2 95%   BMI 24.42 kg/m   on room air    GENERAL: alert, NAD  HEENT: NCAT, EOMI, no scleral icterus, oral mucosa moist and without  lesions  Neck: no cervical or supraclavicular adenopathy  Lungs: equal air entry, fine inspiratory crackles in the bases bilaterally and laterally  CV: RRR, S1S2, no murmurs noted  Abdomen: normoactive BS, soft, non tender  Neuro: AAO X 3  Psychiatric: normal affect, good eye contact  Skin: no rash, jaundice or lesions on limited exam  Extremities: Slight clubbing, no cyanosis or edema.  No digital edema, no synovitis or joint swelling.  No ulcers, skin thickening or fissure.         Data:   All laboratory and imaging data reviewed.      No results found for this or any previous visit (from the past 168 hour(s)).    PFT's  Date: 4/26/24  FVC 2.06, 47%  FEV1 1.58, 47%  DLCO 8.96, 32%  Date: 9/25/23  FVC 2.31, 50%  FEV1 2.07, 58%  DLCO 11.73, 42%  Date: 4/24/23  FVC 2.32, 49%  FEV1 2.01, 56%  DLCO 15.53, 56%    Date Place TLC (%) FVC (%) FEV1 (%) FEV1/FVC DLCO (%) Note                                                             6MWT Distance:  Serology:  CMV:  EBV:  HSV:     Micro:    Labs  A1AT: no results found  Rheumatology: [facility]  -CK 73  -LEONORA Ab Negative; addended to be positive for Antinuclear Antibodies Cytoplasmic Pattern, specifically cytoplasmic reticular/AMA pattern  -PR3 negative  -MPO negative  -SS-A negative  -SS-B negative  -ANTONIA SM IgG negative  -RNP negative  -SCL70 negative  -DELIA-1 negative  -CCP negative  -RF negative  -UA without protein or RBCs  -HP panel - weak positive to pigeon DE antigen  -MyoMarker panel - weakly positive anti-Ku antibodies            Imaging:   [facility]  IMPRESSION:   1. Increasing subpleural honeycombing and fibrotic changes since previous exam raising the possibility of UIP superimposed on IPF. No pleural effusion or pleural thickening.   2. Cardiomegaly with arteriosclerotic thoracic aorta.   3. Cholecystolithiasis.   4. Small hiatal hernia containing the cardia of the stomach.              Cardiac:   Stress Test: 5/28/19, [facility]:   Interpretation  Summary   Contrast stress echo is negative for inducible ischemia.   Treadmill stress echocardiogram was obtained for symptoms of atypical chest pain, dyspnea on exertion.   Above average exercise tolerance (exercised 9:31 min Zain protocol, stage III, 10.6 METS) and stopped due to fatigue. Duke treadmill score +9.5 (low risk).   Achieved adequate workload with normal hemodynamic response to stress.   No chest pain symptoms were noted with stress or recovery.   Baseline EKG with normal sinus rhythm and no ST-T changes with stress.   Baseline echo with normal left ventricular systolic function and normal hyperdynamic function with stress. No regional wall motion abnormalities with stress.   No evidence of inducible ischemia.   Normal resting echocardiogram.   No prior for comparison.      ECHO: 10/25/23, [place]:  Final Impressions   1. Normal left ventricular chamber size, no regional wall motion abnormalities, calculated 2-D linear ejection fraction 54%.   2. Mildly enlarged right ventricular chamber size, normal systolic function, estimated right ventricular systolic pressure 28 mmHg (right atrial pressure of 5 mmHg).   3. No  significant valvular heart disease.   4. No  pericardial effusion.       Most Recent TTE:    Stress Test/Angiogram:    RHC:          GI:   EGD  [facility]   DEXA:

## 2024-05-14 ENCOUNTER — OFFICE VISIT (OUTPATIENT)
Dept: TRANSPLANT | Facility: CLINIC | Age: 59
End: 2024-05-14
Attending: INTERNAL MEDICINE
Payer: COMMERCIAL

## 2024-05-14 ENCOUNTER — TELEPHONE (OUTPATIENT)
Dept: TRANSPLANT | Facility: CLINIC | Age: 59
End: 2024-05-14
Payer: COMMERCIAL

## 2024-05-14 VITALS
OXYGEN SATURATION: 95 % | WEIGHT: 170.2 LBS | TEMPERATURE: 97.6 F | DIASTOLIC BLOOD PRESSURE: 73 MMHG | HEART RATE: 80 BPM | SYSTOLIC BLOOD PRESSURE: 112 MMHG | BODY MASS INDEX: 24.42 KG/M2

## 2024-05-14 DIAGNOSIS — R05.3 CHRONIC COUGH: Primary | ICD-10-CM

## 2024-05-14 DIAGNOSIS — J84.9 ILD (INTERSTITIAL LUNG DISEASE) (H): ICD-10-CM

## 2024-05-14 PROCEDURE — 99205 OFFICE O/P NEW HI 60 MIN: CPT | Performed by: INTERNAL MEDICINE

## 2024-05-14 PROCEDURE — 99417 PROLNG OP E/M EACH 15 MIN: CPT | Performed by: INTERNAL MEDICINE

## 2024-05-14 PROCEDURE — G0463 HOSPITAL OUTPT CLINIC VISIT: HCPCS | Performed by: INTERNAL MEDICINE

## 2024-05-14 RX ORDER — PIRFENIDONE 267 MG/1
267 CAPSULE ORAL 3 TIMES DAILY
COMMUNITY
End: 2024-06-07

## 2024-05-14 RX ORDER — BUDESONIDE AND FORMOTEROL FUMARATE DIHYDRATE 160; 4.5 UG/1; UG/1
2 AEROSOL RESPIRATORY (INHALATION)
Status: ON HOLD | COMMUNITY
End: 2024-08-15

## 2024-05-14 RX ORDER — BENZONATATE 100 MG/1
200 CAPSULE ORAL 3 TIMES DAILY PRN
Qty: 180 CAPSULE | Refills: 3 | Status: SHIPPED | OUTPATIENT
Start: 2024-05-14 | End: 2024-06-18

## 2024-05-14 RX ORDER — ALBUTEROL SULFATE 90 UG/1
2 AEROSOL, METERED RESPIRATORY (INHALATION) 4 TIMES DAILY
COMMUNITY
Start: 2022-10-05 | End: 2024-06-18

## 2024-05-14 RX ORDER — METFORMIN HCL 500 MG
500 TABLET, EXTENDED RELEASE 24 HR ORAL 2 TIMES DAILY WITH MEALS
Status: ON HOLD | COMMUNITY
Start: 2024-05-02 | End: 2024-08-28

## 2024-05-14 RX ORDER — DULAGLUTIDE 1.5 MG/.5ML
3 INJECTION, SOLUTION SUBCUTANEOUS
COMMUNITY
Start: 2023-11-17 | End: 2024-06-07

## 2024-05-14 RX ORDER — DULAGLUTIDE 0.75 MG/.5ML
0.75 INJECTION, SOLUTION SUBCUTANEOUS
COMMUNITY
Start: 2023-09-27 | End: 2024-06-07

## 2024-05-14 RX ORDER — ATORVASTATIN CALCIUM 20 MG/1
1 TABLET, FILM COATED ORAL DAILY
Status: ON HOLD | COMMUNITY
Start: 2023-04-25 | End: 2024-08-26

## 2024-05-14 RX ORDER — LOSARTAN POTASSIUM 50 MG/1
1 TABLET ORAL DAILY
Status: ON HOLD | COMMUNITY
Start: 2023-04-25 | End: 2024-08-26

## 2024-05-14 RX ORDER — DULAGLUTIDE 3 MG/.5ML
3 INJECTION, SOLUTION SUBCUTANEOUS WEEKLY
Status: ON HOLD | COMMUNITY
Start: 2024-04-22 | End: 2024-08-26

## 2024-05-14 ASSESSMENT — PAIN SCALES - GENERAL: PAINLEVEL: NO PAIN (0)

## 2024-05-14 NOTE — LETTER
5/14/2024         RE: Traovn Cartwright  [address] [facility] [address]        Dear Colleague,    Thank you for referring your patient, Travon Cartwright, to the [facility] TRANSPLANT [facility]IC. Please see a copy of my visit note below.    [facility]  Transplant Clinic - Initial Visit -  May 14, 2024         Assessment and Plan:   Travon Cartwright is a 59 year old with history of ILD, suspected IPF  who is seen today for evaluation for lung transplantation.    1) Evaluation for lung transplantation: He currently is on RA at rest and uses about 3-4L with exertion, he has good family support, and his PFTs have fallen significantly in the last year with progression (based on reports) in his CT findings. He has evidence of mildly positive anti Ku antibodies but no e/o myopathy by symptom report and no evidence of elevated aldolase or CK. He was accompanied by his sister Mine today, who moved from [place] to help take care of him. He's been incredibly active in the past and still tries to remain so. He has been unable to participate in pulm rehab due to his location. He and his sister have read and watched several videos/testimonies about lung transplant already and were prepared for today's visit mentally and emotionally. HE is just about to start on an antifibrotic but has not trialed on previously. He has some mild weight loss occurring over 2 years but stable in the last 6 months. He does have diabetes which is somewhat controlled,  but he has been struggling to obtain a CGM for the last year. He and his sister are very motivated to start the transplant process.      In addition to a complete history and physical, I discussed transplant at length with the patient.  We reviewed the risks and benefits of transplantation.  Our discussion included the recent survival statistics.  I discussed rejection, including hyperacute, acute,  chronic and antibody mediated.  I discussed the need for surveillance biopsies.  I reviewed the standard immunosuppression and typical side effects, including renal failure.  I discussed the increased risk of infection and the use of prophylactic antibiotics. I reviewed the increased risk of malignancy. I discussed the listing system, including the Composite Allocation Score.  I discussed the typical operative and postoperative course.  I reviewed the usual clinic followup.  I explained to the patient that they are required to stay in the [facility] for three months following transplantation and that they will need to have someone accompanying them during that time.  I discussed the importance of strict medication and clinic adherence after transplantation. I discussed the requirement for abstinence from alcohol during evaluation, waiting time and following transplantation. The patient had a number of questions which were answered to their apparent satisfaction.          Concerns: lifelong tremors that have worsened, mild deconditioning, weight loss, hx of NAFLD?     ILD: Working on getting CT images over in PACS. He's starting his antifibrotic soon.   - Appropriate timing for referral and transplant evaluation  - Invited for full evaluation    Lifelong tremors: He's had a history of lifelong tremors which have worsened in the last 1-2 years. While it doesn't sound necessarily like a progressive motor disorder we would like to have him fully evaluated by neurology during his evaluation.   - Neuro referral- preferable a movement disorder specialist     DM II:  He was diagnosed with diabetes about 3-4 years ago and feels like it is not as well controlled as it could be. We discussed the risks associated with prolonged steroid use and the steroid burst used peritransplant. He thinks his most recent A1c was about 7.0 down from 12.1.   - Refer to endocrinology, interested in CGM    Hepatic Steatosis: He has had  intermittently elevated LFTs since 2019, had a liver US in 2017 showing hepatic steatosis. GI felt he was low likelihood advanced fibrosis.   - Will refer to hepatology for evaluation in anticipation of transplant evaluation    Coordinator/MD: Johnathon/Garrett    RTC:   Influenza and other vaccinations:   Annual dermatology visit:  Colonoscopy: 2/5/16 at [facility], no polyps, 10 year repeat recommended  PSA: UTD 5/2/24 (0.1)  Dental: Up to date, significant work done recently  COVID: Due for booster  Prevnar 20: 7/13/22  Influenza: 11/14/23  TDAP: 6/2/17  Shingrix: not found  Hep A/B: not found      I personally spent 90 minutes in documentation, the interview and exam, and review of the chart/labs/imaging on May 14, 2024 not including time spent interpreting spirometry.    Approximately 40 minutes of non face-to-face time were spent in review of the patient's medical record on 5/13/24.  This included review of previous: clinic visits, hospital records, lab results, imaging studies, and procedural documentation.  The findings from this review are summarized in the above note.      Carey Tucker MD  [facility]  [facility]   Pulmonary Transplant   Pre Transplant Coordinator:   Ph: [phone]          Problem List:     Evaluation for lung transplantation  ILD  4L at rest (low 90s), sleep 4L, with activity uses 4L after, but gets down to low 80s with activity, started on oxygen in late 2023/early 2024  Diabetes for last 3-4 years  On insulin, trulicity weekly, metformin  Hepatic Steatosis  Referred to [facility] GI for consult 6/2023, suspect NAFLD or FERRARI. Chronically intermittently elevatedl iver enzymes since 2019, liver US 2017 showing hepatic steatosis        History of Present Illness:   Travon Cartwright is a 59 year old with history of ILD (suspected IPF) who is seen today for evaluation for lung transplantation.  He was born 3 months premature.   He thinks he first  "started feeling dyspneic in the last 3-4 years. He noticed when chopping firewood, he would  a log and get more sob. He has not been able to tolerate any cold air for the last several years. He has noticed a significant increase in ALVARADO, has noticed his oxygen sats down to 80s. Usually uses 4L with sleep and 4L with activity. He can't do more than 5 steps without getting severely out of breath. He's lost about 15 lbs in the last year unintentionally, likely started even before his trulicity. His weight has not dropped significantly in the last 6 months. No pulmonary rehab within 100 miles of him. He does have a cough at baseline and with exertion with some occasional post tussive emesis. Inhalers do not seem to help with the cough. Phlegm is fairly minimal.   Denies fevers/chills/sweats. No history of any cancer or skin cancer. Has always had tremors, his whole life. He has noticed an increase in his tremors in the last 6 months, to the point where he spills his food.     No previous history of bleeding/clotting disorders. No history of allergic reactions to medications. No mental health issues in the past.             Review of Systems:   Please see HPI, otherwise the complete 10 point ROS is negative.           Past Medical and Surgical History:     Past Medical History:   Diagnosis Date     Hepatic steatosis 2017    Noted on ultrasound     No past surgical history on file.  No previous Lung biopsy, pneumothorax, or chest surgery      Family History:     Family History   Problem Relation Age of Onset     Hypertension Mother      Lung Cancer Father         former smoker     No Known Problems Sister      No Known Problems Maternal Grandmother      Lung Cancer Maternal Grandfather         former smoker     No Known Problems Paternal Grandmother      No Known Problems Paternal Grandfather      Mother- \"healthy as can be,\" high blood pressure   Father- lung cancer (smoker)  M Grandmother- PAD late 70s  M " "Grandfather- lung cancer (smoker)  P Grandmother- \"old age\"  P Grandfather- \"old age\"  Siblings- one sister, no medical problems  Children- no  Aunts/Uncles/Cousins- no       Social History:   Tobacco: Former, 20 years on and off, <1ppd. 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  Exposures: None to asbestos, birds, hot tubs, welding, sandblasting  Social Support:  Sister betty  Lives in [place], [place] (2.5 hours from [facility])      Social History     Socioeconomic History     Marital status: Single   Tobacco Use     Smoking status: Former     Types: Cigarettes     Smokeless tobacco: Never   Substance and Sexual Activity     Alcohol use: Not Currently     Comment: not since 2017     Drug use: Never     Social Determinants of Health     Financial Resource Strain: Not on File (2019)    Received from [facility]     Financial Resource Strain      Financial Resource Strain: 0   Food Insecurity: Not on File (2019)    Received from [facility]     Food Insecurity      Food: 0   Transportation Needs: Not on File (2019)    Received from [facility]     Transportation Needs      Transportation: 0   Physical Activity: Not on File (2019)    Received from [facility]     Physical Activity      Physical Activity: 0   Stress: Not on File (2019)    Received from [facility]     Stress      Stress: 0   Social Connections: Not on File (2019)    Received from [facility]     Social Connections      Social Connections and Isolation: 0   Housing Stability: Not on File (2019)    Received from [facility]     Housing Stability      Housin            Medications:   Meds were reviewed and updated with the patient at today's appointment  No outpatient encounter medications on file as of 2024.     No facility-administered encounter medications on file as of 2024.               Physical Exam:   /73 " (BP Location: Right arm, Patient Position: Chair, Cuff Size: Adult Regular)   Pulse 80   Temp 97.6  F (36.4  C) (Oral)   Wt 77.2 kg (170 lb 3.2 oz)   SpO2 95%   BMI 24.42 kg/m   on room air    GENERAL: alert, NAD  HEENT: NCAT, EOMI, no scleral icterus, oral mucosa moist and without lesions  Neck: no cervical or supraclavicular adenopathy  Lungs: equal air entry, fine inspiratory crackles in the bases bilaterally and laterally  CV: RRR, S1S2, no murmurs noted  Abdomen: normoactive BS, soft, non tender  Neuro: AAO X 3  Psychiatric: normal affect, good eye contact  Skin: no rash, jaundice or lesions on limited exam  Extremities: Slight clubbing, no cyanosis or edema.  No digital edema, no synovitis or joint swelling.  No ulcers, skin thickening or fissure.         Data:   All laboratory and imaging data reviewed.      No results found for this or any previous visit (from the past 168 hour(s)).    PFT's  Date: 4/26/24  FVC 2.06, 47%  FEV1 1.58, 47%  DLCO 8.96, 32%  Date: 9/25/23  FVC 2.31, 50%  FEV1 2.07, 58%  DLCO 11.73, 42%  Date: 4/24/23  FVC 2.32, 49%  FEV1 2.01, 56%  DLCO 15.53, 56%    Date Place TLC (%) FVC (%) FEV1 (%) FEV1/FVC DLCO (%) Note                                                             6MWT Distance:  Serology:  CMV:  EBV:  HSV:     Micro:    Labs  A1AT: no results found  Rheumatology: [facility]  -CK 73  -LEONORA Ab Negative; addended to be positive for Antinuclear Antibodies Cytoplasmic Pattern, specifically cytoplasmic reticular/AMA pattern  -PR3 negative  -MPO negative  -SS-A negative  -SS-B negative  -ANTONIA SM IgG negative  -RNP negative  -SCL70 negative  -DELIA-1 negative  -CCP negative  -RF negative  -UA without protein or RBCs  -HP panel - weak positive to pigeon DE antigen  -MyoMarker panel - weakly positive anti-Ku antibodies            Imaging:   Chest CT: 4/26/24,   IMPRESSION:   1. Increasing subpleural honeycombing and fibrotic changes since previous exam raising the possibility of  UIP superimposed on IPF. No pleural effusion or pleural thickening.   2. Cardiomegaly with arteriosclerotic thoracic aorta.   3. Cholecystolithiasis.   4. Small hiatal hernia containing the cardia of the stomach.              Cardiac:   Stress Test: 5/28/19, [facility]:   Interpretation Summary   Contrast stress echo is negative for inducible ischemia.   Treadmill stress echocardiogram was obtained for symptoms of atypical chest pain, dyspnea on exertion.   Above average exercise tolerance (exercised 9:31 min Zain protocol, stage III, 10.6 METS) and stopped due to fatigue. Duke treadmill score +9.5 (low risk).   Achieved adequate workload with normal hemodynamic response to stress.   No chest pain symptoms were noted with stress or recovery.   Baseline EKG with normal sinus rhythm and no ST-T changes with stress.   Baseline echo with normal left ventricular systolic function and normal hyperdynamic function with stress. No regional wall motion abnormalities with stress.   No evidence of inducible ischemia.   Normal resting echocardiogram.   No prior for comparison.      ECHO: 10/25/23, [place]:  Final Impressions   1. Normal left ventricular chamber size, no regional wall motion abnormalities, calculated 2-D linear ejection fraction 54%.   2. Mildly enlarged right ventricular chamber size, normal systolic function, estimated right ventricular systolic pressure 28 mmHg (right atrial pressure of 5 mmHg).   3. No  significant valvular heart disease.   4. No  pericardial effusion.       Most Recent TTE:    Stress Test/Angiogram:    RHC:          GI:   EGD  [facility]   DEXA:                        Again, thank you for allowing me to participate in the care of your patient.        Sincerely,        Carey Tucker MD

## 2024-05-14 NOTE — NURSING NOTE
Chief Complaint   Patient presents with    RECHECK     Pre txp.      Vitals:    05/14/24 1539   BP: 112/73   BP Location: Right arm   Patient Position: Chair   Cuff Size: Adult Regular   Pulse: 80   Temp: 97.6  F (36.4  C)   TempSrc: Oral   SpO2: 95%   Weight: 77.2 kg (170 lb 3.2 oz)       BP Readings from Last 3 Encounters:   05/14/24 112/73       /73 (BP Location: Right arm, Patient Position: Chair, Cuff Size: Adult Regular)   Pulse 80   Temp 97.6  F (36.4  C) (Oral)   Wt 77.2 kg (170 lb 3.2 oz)   SpO2 95%   BMI 24.42 kg/m       Sunshine Heymalex

## 2024-05-14 NOTE — PATIENT INSTRUCTIONS
"Transplant Clinic Discharge Instructions    It was nice to see you today.     Your \"to do\" list following today's appointment:  Consider trying Ayr gel to help with dryness in nasal passages. This is a thick saline gel that helps coat the lining of your nose to protect it. You can find this at [employer], [employer], [employer], or most pharmacies.   Try out some different Boost or Ensure products to see which you like best. With a decreased appetite, these can be easy to get down and add a lot of calories and protein which will help with maintaining your strength.    Pulmonary Rehab: Please remember to stay active.  Continue exercises learned in pulmonary rehab or continue participating in pulmonary rehab, if able. We encourage you to try and be active on days that you are not in pulmonary rehab as well. Walking is a great form of exercise. We encourage you to continue light weights as well to maintain muscle mass. Your transplant coordinator will look into virtual options through [employer].     Pasted below is the website for some pulmonary rehab exercise and education videos produced by the pulmonary fibrosis foundation we highly recommend as a tool for some helpful exercises in improving and sustaining your physical conditioning.     [email or web address].org/understanding-pff/treatment-options/pulmonary-rehabilitation-toolkit    Weight Management: Body mass index is 24.42 kg/m .  Goal BMI greater than 18, less than 30 for lung transplant.     Medication changes: start tessalon perles to see if these help with cough    Follow Up/Next appointment: RNCC will contact you about evaluation schedul    Please remember to stay up to date with your primary care requirements including:                Annual check-ups with primary care physician   Mammogram   Pap smear (as appropriate for women)    PSA (for men over 50)   Dental visits   Annual flu shots/ immunizations as needed   Colonoscopy (patients over 50).     Thank-you for " allowing us to participate in your care.    Please contact your transplant coordinator with any questions, concerns or updates (change in medications, illness, hospital admission, change in oxygen needs, change in insurance coverage, change of phone number or address, etc).    Thoracic Transplant Office phone [phone], fax [phone]    Office Hours 8:30 - 5:00 pm  For after-hours urgent issues, please dial [phone] and ask the  to page the Thoracic Transplant Coordinator On-Call.      If assistance is needed with access to your [employer] account, please contact the [employer] help line at [phone]

## 2024-05-14 NOTE — TELEPHONE ENCOUNTER
LVM requesting call back regarding moving up appointment this afternoon. Provided call back number.    Addendum:  Travon returned my call. He is agreeable to earlier 3:00PM appointment time. Appointment updated, he will call with any questions or concerns prior.

## 2024-05-14 NOTE — NURSING NOTE
LUNG TRANSPLANT NEW PATIENT VISIT   Transplant Nurse Coordinator Note  Travon Cartwright  1965    170 lbs 3.2 oz  Body mass index is 24.42 kg/m .    Patient accompanied by: Patient came to NPT appointment with sister Mine.     Current activity level: limited by shortness of breath, walks around quite a bit at resort and when in [place] in winter     ADLs: independent, unable to do household tasks     Pulmonary Rehab: has not attended; no programs within 100 miles of patient's home.  Karnofsky Score: 70%    PFT:   Date: 4/26/24  FVC 2.06, 47%  FEV1 1.58, 47%  DLCO 8.96, 32%  Date: 9/25/23  FVC 2.31, 50%  FEV1 2.07, 58%  DLCO 11.73, 42%  Date: 4/24/23  FVC 2.32, 49%  FEV1 2.01, 56%  DLCO 15.53, 56%  6MW: has not completed previously  Labs: in care everywhere; note mildly positive anti-Ku antibodies, and intermittently elevated liver enzymes. NAFLD/FERRARI on imaging.  CT Scan: 4/26/24, [facility]  IMPRESSION:   1. Increasing subpleural honeycombing and fibrotic changes since previous exam raising the possibility of UIP superimposed on IPF. No pleural effusion or pleural thickening.   2. Cardiomegaly with arteriosclerotic thoracic aorta.   3. Cholecystolithiasis.   4. Small hiatal hernia containing the cardia of the stomach.   Echo: 10/25/23, [place]:  Final Impressions   1. Normal left ventricular chamber size, no regional wall motion abnormalities, calculated 2-D linear ejection fraction 54%.   2. Mildly enlarged right ventricular chamber size, normal systolic function, estimated right ventricular systolic pressure 28 mmHg (right atrial pressure of 5 mmHg).   3. No significant valvular heart disease.   4. No pericardial effusion.      Current oxygen use:   Rest room air up to 4L (leaves on sometimes at activity flow level around the home)  Activity 4L after activity but not during -gets down to low 80s with activity   NOC 4L       Assisted ventilation: none    Diabetic status:  yes, on insulin. Diagnosed 3-4 years  ago. Working on better BG control currently   Prednisone: none  GERD: denies  Smoking: former, quit 2019. 20 years on and off, less than a pack per day   Drug use: denies  ETOH: no current use or past heavy use, minimally drinking prio  Mental Health concerns: denies     Primary Care:   Established with a PCP provider: yes  Colonoscopy: up to date, 2016, repeat 10 years (2026)  Dental: up to date  PSA: up to date, 0.1, 5/2/24   Vaccinations:  Pneumococcal: not found  Prevnar 20: 7/13/22   Hep A/B: not found  Shingrix: not found  Tdap: 6/2/17  COVID: Due for booster. Moderna monovalent 4/12/22, 5/13/21, 4/14/21   Flu:11/14/23   RSV: not found    Patient status/transplant tab updated.     Misc/Potential Concerns: history of hepatic steatosis. Nutrition status/recent weight loss, conditioning.     MD Recommendations: proceed with evaluation. See motor specialist/neurologist with [facility]. See hepatology for transplant clearance with US abd prior.    Plan: schedule for evaluation.

## 2024-05-15 DIAGNOSIS — Z01.818 ENCOUNTER FOR PRE-TRANSPLANT EVALUATION FOR LUNG TRANSPLANT: ICD-10-CM

## 2024-05-15 DIAGNOSIS — K76.0 HEPATIC STEATOSIS: ICD-10-CM

## 2024-05-15 DIAGNOSIS — Z76.82 ORGAN TRANSPLANT CANDIDATE: ICD-10-CM

## 2024-05-15 DIAGNOSIS — R25.1 TREMOR: ICD-10-CM

## 2024-05-15 DIAGNOSIS — R07.9 CHEST PAIN, UNSPECIFIED: ICD-10-CM

## 2024-05-15 DIAGNOSIS — J84.9 ILD (INTERSTITIAL LUNG DISEASE) (H): Primary | ICD-10-CM

## 2024-05-15 DIAGNOSIS — R93.89 ABNORMAL CT SCAN: ICD-10-CM

## 2024-05-22 ENCOUNTER — TELEPHONE (OUTPATIENT)
Dept: TRANSPLANT | Facility: CLINIC | Age: 59
End: 2024-05-22
Payer: COMMERCIAL

## 2024-05-22 NOTE — TELEPHONE ENCOUNTER
Patient Call: General  Route to LPN    Reason for call: Catia patient's sister following up,with coordinator regarding some appointments that is needing to be scheduled.    Call back needed? Yes    Return Call Needed  Same as documented in contacts section  When to return call?: Same day: Route High Priority

## 2024-05-23 PROBLEM — R25.1 TREMOR: Status: ACTIVE | Noted: 2024-05-23

## 2024-05-23 RX ORDER — PIRFENIDONE 267 MG/1
TABLET, FILM COATED ORAL
COMMUNITY
Start: 2024-05-10 | End: 2024-06-07

## 2024-05-23 RX ORDER — PIRFENIDONE 801 MG/1
801 TABLET, FILM COATED ORAL 2 TIMES DAILY
Status: ON HOLD | COMMUNITY
Start: 2024-06-12 | End: 2024-08-26

## 2024-05-23 NOTE — PROGRESS NOTES
Diagnosis/Summary/Recommendations:    PATIENT: Travon Cartwright  59 year old male     : 1965    RL: 2024   MRN: [id number]  [address] [facility] [address]  Mobile Phone  [phone]  Email  [email or web address]    Sister Catia Crook granted proxy access  [phone]       Assessment:  (R25.1) Tremor  (primary encounter diagnosis)  Tremor since high school  Left handed  Mother and father were left handed.   Shoot with left hand  Tremor does not impact his ADLs  He has some problems threading nut on bolt  Has some problems with feeding.     Review of diagnosis    Essential tremor     Avoidance of dopamine blockers   Not taking    Motor complication review   N/a    Review of Impulse control disorders   N/a    Review of surgical or medication options   reviewed    Gait/Balance/Falls   No falls    Exercise/Therapy performed/offered   Tries to exercise    Cognitive/Driving   Denies     Mood   Working on disability   Lives by himself   Property management/retail  Grew up in [place]  Sister nearby in [place]  No mood issues.     Hallucinations/delusions   Denies     Sleep   Denies  No night time behavior    Bladder/Renal/Prostate/Gyn/Other   Nocturia 1/noc  Drinks a lot of water.     GI/Constipation/GERD   Will see mandy la to discuss liver status.   No constipation and may have diarrhea from metformin.     Hepatic Steatosis: He has had intermittently elevated LFTs since 2019, had a liver US in 2017 showing hepatic steatosis. GI felt he was low likelihood advanced fibrosis.   hepatology for evaluation in anticipation of transplant evaluation    ENDO/Lipid/DM/Bone density/Thyroid  Type 2 diabetes mellitus   TSH was normal today   A1c 8.7 on 2024  More recent was better than that possible 7.9    Cardio/heart/Hyper or Hypotensive   Hypertension  On medications    Vision/Dry Eyes/Cataracts/Glaucoma/Macular   No  problems    Heme/Anticoagulation/Antiplatelet/Anemia/Other  No problems.     ENT/Resp  No loss of smell  No covid   Interstitial lung disease  Emphysema  Awaiting lung transplant    June 18, 2024    FINDINGS:  The hemidiaphragms move symmetrically in all manners of tested  breathing. Diffuse interstitial opacities.  IMPRESSION:  Negative sniff test.    Skin/Cancer/Seborrhea/other  No skin cancer    Musculoskeletal/Pain/Headache  denies    Other:      Medications            Albuterol inhaler no       Atorvastatin lipitor 20mg  1       Benzonatate tessalon 100mg no       Dulaglutide trulicity 3mg  - dose not available below       Empagliflozin jardiance 10mg  1       Insulin glargine lantus 15u   15 u    Losartan cozaar 50mg  1       Metformin glucophage XR 500mg 24hr 1   1    Pirfenidone 801mg esbriet lungs antifibrotid 1 1  1    Symbicort 160 4.5 varies   varies    Trulicity 1.5  X2 wk                         Plan:    Essential tremor   He has no rigidity or bradykinesia and has normal voice volume  He has no rbd or loss of smell.   There is no family history of tremor  He has a mild to moderate postural and action tremor of his UEs and a bit in his leg with activity.   Discussed the diagnosis    Set up his sister for chiquis with his consent.     Coding statement:   Medical Decision Making:  #  Chronic progressive medical conditions addressed  - see above --   Review and/or interpretation of unique test or documentation from a provider outside of neurology yes   Independent historian provided additional details  yes I  Prescription drug management and review of potential side effects and/or monitoring for side effects  -- see above ---  Health impacted by social determinants of health  no    I have reviewed the note as documented above.  This accurately captures the substance of my conversation with the patient and total time spent preparing for visit, executing visit and completing visit on the day of the visit:   30 minutes.  The portion of this total time included face to face time 25 minutes    The longitudinal plan of care for Travon Cartwright was addressed during this visit. Due to the added complexity in care, I will continue to support Travon Cartwright in the subsequent management of this condition(s) and with the ongoing continuity of care of this condition(s).      Irving Fox MD     ______________________________________    Last visit date and details:         Travon Cartwright is a 59 year old with history of ILD, suspected IPF  who is seen today for evaluation for lung transplantation.     1) Evaluation for lung transplantation: He currently is on RA at rest and uses about 3-4L with exertion, he has good family support, and his PFTs have fallen significantly in the last year with progression (based on reports) in his CT findings. He has evidence of mildly positive anti Ku antibodies but no e/o myopathy by symptom report and no evidence of elevated aldolase or CK. He was accompanied by his sister Mine today, who moved from [place] to help take care of him. He's been incredibly active in the past and still tries to remain so. He has been unable to participate in pulm rehab due to his location. He and his sister have read and watched several videos/testimonies about lung transplant already and were prepared for today's visit mentally and emotionally. HE is just about to start on an antifibrotic but has not trialed on previously. He has some mild weight loss occurring over 2 years but stable in the last 6 months. He does have diabetes which is somewhat controlled,  but he has been struggling to obtain a CGM for the last year. He and his sister are very motivated to start the transplant process.       In addition to a complete history and physical, I discussed transplant at length with the patient.  We reviewed the risks and benefits of transplantation.  Our discussion included the recent survival statistics.  I  discussed rejection, including hyperacute, acute, chronic and antibody mediated.  I discussed the need for surveillance biopsies.  I reviewed the standard immunosuppression and typical side effects, including renal failure.  I discussed the increased risk of infection and the use of prophylactic antibiotics. I reviewed the increased risk of malignancy. I discussed the listing system, including the Composite Allocation Score.  I discussed the typical operative and postoperative course.  I reviewed the usual clinic followup.  I explained to the patient that they are required to stay in the [facility] for three months following transplantation and that they will need to have someone accompanying them during that time.  I discussed the importance of strict medication and clinic adherence after transplantation. I discussed the requirement for abstinence from alcohol during evaluation, waiting time and following transplantation. The patient had a number of questions which were answered to their apparent satisfaction.             Concerns: lifelong tremors that have worsened, mild deconditioning, weight loss, hx of NAFLD?      ILD: Working on getting CT images over in PACS. He's starting his antifibrotic soon.   - Appropriate timing for referral and transplant evaluation  - Invited for full evaluation     Lifelong tremors: He's had a history of lifelong tremors which have worsened in the last 1-2 years. While it doesn't sound necessarily like a progressive motor disorder we would like to have him fully evaluated by neurology during his evaluation.   - Neuro referral- preferable a movement disorder specialist      DM II:  He was diagnosed with diabetes about 3-4 years ago and feels like it is not as well controlled as it could be. We discussed the risks associated with prolonged steroid use and the steroid burst used peritransplant. He thinks his most recent A1c was about 7.0 down from 12.1.   - Refer to endocrinology,  interested in CGM     Hepatic Steatosis: He has had intermittently elevated LFTs since 2019, had a liver US in 2017 showing hepatic steatosis. GI felt he was low likelihood advanced fibrosis.   - Will refer to hepatology for evaluation in anticipation of transplant evaluation     Coordinator/MD: Johnathon/Garrett     RTC:   Influenza and other vaccinations:   Annual dermatology visit:  Colonoscopy: 2/5/16 at [facility], no polyps, 10 year repeat recommended  PSA: UTD 5/2/24 (0.1)  Dental: Up to date, significant work done recently  COVID: Due for booster  Prevnar 20: 7/13/22  Influenza: 11/14/23  TDAP: 6/2/17  Shingrix: not found  Hep A/B: not found        I personally spent 90 minutes in documentation, the interview and exam, and review of the chart/labs/imaging on May 14, 2024 not including time spent interpreting spirometry.    Approximately 40 minutes of non face-to-face time were spent in review of the patient's medical record on 5/13/24.  This included review of previous: clinic visits, hospital records, lab results, imaging studies, and procedural documentation.  The findings from this review are summarized in the above note.        Carey Tucker MD  [facility]   Pulmonary Transplant   Pre Transplant Coordinator:   Ph: [phone]            Problem List:      Evaluation for lung transplantation  ILD  4L at rest (low 90s), sleep 4L, with activity uses 4L after, but gets down to low 80s with activity, started on oxygen in late 2023/early 2024  Diabetes for last 3-4 years  On insulin, trulicity weekly, metformin  Hepatic Steatosis  Referred to [facility] for consult 6/2023, suspect NAFLD or FERRARI. Chronically intermittently elevatedl iver enzymes since 2019, liver US 2017 showing hepatic steatosis         History of Present Illness:   Travon Cartwright is a 59 year old with history of ILD (suspected IPF) who is seen today for evaluation for lung  transplantation.  He was born 3 months premature.   He thinks he first started feeling dyspneic in the last 3-4 years. He noticed when chopping firewood, he would  a log and get more sob. He has not been able to tolerate any cold air for the last several years. He has noticed a significant increase in ALVARADO, has noticed his oxygen sats down to 80s. Usually uses 4L with sleep and 4L with activity. He can't do more than 5 steps without getting severely out of breath. He's lost about 15 lbs in the last year unintentionally, likely started even before his trulicity. His weight has not dropped significantly in the last 6 months. No pulmonary rehab within 100 miles of him. He does have a cough at baseline and with exertion with some occasional post tussive emesis. Inhalers do not seem to help with the cough. Phlegm is fairly minimal.   Denies fevers/chills/sweats. No history of any cancer or skin cancer. Has always had tremors, his whole life. He has noticed an increase in his tremors in the last 6 months, to the point where he spills his food.      No previous history of bleeding/clotting disorders. No history of allergic reactions to medications. No mental health issues in the past.              Review of Systems:   Please see HPI, otherwise the complete 10 point ROS is negative.           Past Medical and Surgical History:      Past Medical History        Past Medical History:   Diagnosis Date    Hepatic steatosis 2017     Noted on ultrasound         Past Surgical History   No past surgical history on file.     No previous Lung biopsy, pneumothorax, or chest surgery      Family History:      Family History         Family History   Problem Relation Age of Onset    Hypertension Mother      Lung Cancer Father           former smoker    No Known Problems Sister      No Known Problems Maternal Grandmother      Lung Cancer Maternal Grandfather           former smoker    No Known Problems Paternal Grandmother      No  "Known Problems Paternal Grandfather           Mother- \"healthy as can be,\" high blood pressure   Father- lung cancer (smoker)  M Grandmother- PAD late 70s  M Grandfather- lung cancer (smoker)  P Grandmother- \"old age\"  P Grandfather- \"old age\"  Siblings- one sister, no medical problems  Children- no  Aunts/Uncles/Cousins- no       Social History:   Tobacco: Former, 20 years on and off, <1ppd. 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  Exposures: None to asbestos, birds, hot tubs, welding, sandblasting  Social Support:  Sister betty  Lives in [place], [place] (2.5 hours from [facility])        Social History            Socioeconomic History    Marital status: Single   Tobacco Use    Smoking status: Former       Types: Cigarettes    Smokeless tobacco: Never   Substance and Sexual Activity    Alcohol use: Not Currently       Comment: not since 2017    Drug use: Never      Social Determinants of Health           Financial Resource Strain: Not on File (2019)     Received from [facility]      Financial Resource Strain      Financial Resource Strain: 0   Food Insecurity: Not on File (2019)     Received from [facility]      Food Insecurity      Food: 0   Transportation Needs: Not on File (2019)     Received from [facility]      Transportation Needs      Transportation: 0   Physical Activity: Not on File (2019)     Received from [facility]      Physical Activity      Physical Activity: 0   Stress: Not on File (2019)     Received from [facility]      Stress      Stress: 0   Social Connections: Not on File (2019)     Received from [facility]      Social Connections      Social Connections and Isolation: 0   Housing Stability: Not on File (2019)     Received from [facility]      Housing Stability      Housin              Medications:   Meds were reviewed and updated with the patient at today's " appointment  Encounter Medications   No outpatient encounter medications on file as of 5/14/2024.      No facility-administered encounter medications on file as of 5/14/2024.                    Physical Exam:   /73 (BP Location: Right arm, Patient Position: Chair, Cuff Size: Adult Regular)   Pulse 80   Temp 97.6  F (36.4  C) (Oral)   Wt 77.2 kg (170 lb 3.2 oz)   SpO2 95%   BMI 24.42 kg/m   on room air     GENERAL: alert, NAD  HEENT: NCAT, EOMI, no scleral icterus, oral mucosa moist and without lesions  Neck: no cervical or supraclavicular adenopathy  Lungs: equal air entry, fine inspiratory crackles in the bases bilaterally and laterally  CV: RRR, S1S2, no murmurs noted  Abdomen: normoactive BS, soft, non tender  Neuro: AAO X 3  Psychiatric: normal affect, good eye contact  Skin: no rash, jaundice or lesions on limited exam  Extremities: Slight clubbing, no cyanosis or edema.  No digital edema, no synovitis or joint swelling.  No ulcers, skin thickening or fissure.          Data:   All laboratory and imaging data reviewed.       Recent Results   No results found for this or any previous visit (from the past 168 hour(s)).        PFT's  Date: 4/26/24  FVC 2.06, 47%  FEV1 1.58, 47%  DLCO 8.96, 32%  Date: 9/25/23  FVC 2.31, 50%  FEV1 2.07, 58%  DLCO 11.73, 42%  Date: 4/24/23  FVC 2.32, 49%  FEV1 2.01, 56%  DLCO 15.53, 56%     Date Place TLC (%) FVC (%) FEV1 (%) FEV1/FVC DLCO (%) Note                                                                                                              6MWT Distance:  Serology:  CMV:  EBV:  HSV:      Micro:     Labs  A1AT: no results found  Rheumatology: [facility]  -CK 73  -LEONORA Ab Negative; addended to be positive for Antinuclear Antibodies Cytoplasmic Pattern, specifically cytoplasmic reticular/AMA pattern  -PR3 negative  -MPO negative  -SS-A negative  -SS-B negative  -ANTONIA SM IgG negative  -RNP negative  -SCL70 negative  -DELIA-1 negative  -CCP negative  -RF negative  -UA  without protein or RBCs  -HP panel - weak positive to pigeon DE antigen  -MyoMarker panel - weakly positive anti-Ku antibodies              Imaging:   Chest CT: 4/26/24, [facility]  IMPRESSION:   1. Increasing subpleural honeycombing and fibrotic changes since previous exam raising the possibility of UIP superimposed on IPF. No pleural effusion or pleural thickening.   2. Cardiomegaly with arteriosclerotic thoracic aorta.   3. Cholecystolithiasis.   4. Small hiatal hernia containing the cardia of the stomach.                 Cardiac:   Stress Test: 5/28/19, [facility]:   Interpretation Summary   Contrast stress echo is negative for inducible ischemia.   Treadmill stress echocardiogram was obtained for symptoms of atypical chest pain, dyspnea on exertion.   Above average exercise tolerance (exercised 9:31 min Zain protocol, stage III, 10.6 METS) and stopped due to fatigue. Duke treadmill score +9.5 (low risk).   Achieved adequate workload with normal hemodynamic response to stress.   No chest pain symptoms were noted with stress or recovery.   Baseline EKG with normal sinus rhythm and no ST-T changes with stress.   Baseline echo with normal left ventricular systolic function and normal hyperdynamic function with stress. No regional wall motion abnormalities with stress.   No evidence of inducible ischemia.   Normal resting echocardiogram.   No prior for comparison.      ECHO: 10/25/23, [place]:  Final Impressions   1. Normal left ventricular chamber size, no regional wall motion abnormalities, calculated 2-D linear ejection fraction 54%.   2. Mildly enlarged right ventricular chamber size, normal systolic function, estimated right ventricular systolic pressure 28 mmHg (right atrial pressure of 5 mmHg).   3. No  significant valvular heart disease.   4. No  pericardial effusion.         Most Recent TTE:     Stress Test/Angiogram:     RHC:            GI:   EGD  [facility]   DEXA:                                       Nursing Note  Daxa Diego, RN (Registered Nurse)   LUNG TRANSPLANT NEW PATIENT VISIT   Transplant Nurse Coordinator Note  Travon Cartwright  1965     170 lbs 3.2 oz  Body mass index is 24.42 kg/m .     Patient accompanied by: Patient came to NPT appointment with sister Mine.     Current activity level: limited by shortness of breath, walks around quite a bit at resort and when in [place] in winter     ADLs: independent, unable to do household tasks     Pulmonary Rehab: has not attended; no programs within 100 miles of patient's home.  Karnofsky Score: 70%     PFT:   Date: 4/26/24  FVC 2.06, 47%  FEV1 1.58, 47%  DLCO 8.96, 32%  Date: 9/25/23  FVC 2.31, 50%  FEV1 2.07, 58%  DLCO 11.73, 42%  Date: 4/24/23  FVC 2.32, 49%  FEV1 2.01, 56%  DLCO 15.53, 56%  6MW: has not completed previously  Labs: in care everywhere; note mildly positive anti-Ku antibodies, and intermittently elevated liver enzymes. NAFLD/FERRARI on imaging.  CT Scan: 4/26/24, [facility]  IMPRESSION:   1. Increasing subpleural honeycombing and fibrotic changes since previous exam raising the possibility of UIP superimposed on IPF. No pleural effusion or pleural thickening.   2. Cardiomegaly with arteriosclerotic thoracic aorta.   3. Cholecystolithiasis.   4. Small hiatal hernia containing the cardia of the stomach.   Echo: 10/25/23, [place]:  Final Impressions   1. Normal left ventricular chamber size, no regional wall motion abnormalities, calculated 2-D linear ejection fraction 54%.   2. Mildly enlarged right ventricular chamber size, normal systolic function, estimated right ventricular systolic pressure 28 mmHg (right atrial pressure of 5 mmHg).   3. No significant valvular heart disease.   4. No pericardial effusion.      Current oxygen use:   Rest room air up to 4L (leaves on sometimes at activity flow level around the home)  Activity 4L after activity but not during -gets down to low 80s with activity   NOC 4L       Assisted ventilation:  none     Diabetic status:  yes, on insulin. Diagnosed 3-4 years ago. Working on better BG control currently   Prednisone: none  GERD: denies  Smoking: former, quit 2019. 20 years on and off, less than a pack per day   Drug use: denies  ETOH: no current use or past heavy use, minimally drinking prio  Mental Health concerns: denies     Primary Care:   Established with a PCP provider: yes  Colonoscopy: up to date, 2016, repeat 10 years (2026)  Dental: up to date  PSA: up to date, 0.1, 5/2/24   Vaccinations:  Pneumococcal: not found  Prevnar 20: 7/13/22   Hep A/B: not found  Shingrix: not found  Tdap: 6/2/17  COVID: Due for booster. Moderna monovalent 4/12/22, 5/13/21, 4/14/21   Flu:11/14/23   RSV: not found     Patient status/transplant tab updated.      Misc/Potential Concerns: history of hepatic steatosis. Nutrition status/recent weight loss, conditioning.     MD Recommendations: proceed with evaluation. See motor specialist/neurologist with [facility]. See hepatology for transplant clearance with US abd prior.     Plan: schedule for evaluation.        Nursing Note  Lizabeth Armenta (Technician)  Expand All Collapse All       Chief Complaint   Patient presents with    RECHECK       Pre txp.       Vitals       Vitals:     05/14/24 1539   BP: 112/73   BP Location: Right arm   Patient Position: Chair   Cuff Size: Adult Regular   Pulse: 80   Temp: 97.6  F (36.4  C)   TempSrc: Oral   SpO2: 95%   Weight: 77.2 kg (170 lb 3.2 oz)                BP Readings from Last 3 Encounters:   05/14/24 112/73         /73 (BP Location: Right arm, Patient Position: Chair, Cuff Size: Adult Regular)   Pulse 80   Temp 97.6  F (36.4  C) (Oral)   Wt 77.2 kg (170 lb 3.2 oz)   SpO2 95%   BMI 24.42 kg/m        Lizabeth Armenta                     ______________________________________      Patient was asked about 14 Review of systems including changes in vision (dry eyes, double vision), hearing, heart, lungs, musculoskeletal, depression,  anxiety, snoring, RBD, insomnia, urinary frequency, urinary urgency, constipation, swallowing problems, hematological, ID, allergies, skin problems: seborrhea, endocrinological: thyroid, diabetes, cholesterol; balance, weight changes, and other neurological problems and these were not significant at this time except for   Patient Active Problem List   Diagnosis    Abnormal levels of other serum enzymes    Interstitial pulmonary disease (H)    Other emphysema (H)    Other specified anxiety disorders    Type 2 diabetes mellitus without complications (H)    Tremor        No Known Allergies  No past surgical history on file.  Past Medical History:   Diagnosis Date    Hepatic steatosis 2017    Noted on ultrasound    Tremor 05/23/2024     Social History     Socioeconomic History    Marital status: Single     Spouse name: Not on file    Number of children: Not on file    Years of education: Not on file    Highest education level: Not on file   Occupational History    Not on file   Tobacco Use    Smoking status: Former     Types: Cigarettes    Smokeless tobacco: Never   Substance and Sexual Activity    Alcohol use: Not Currently     Comment: not since 2017    Drug use: Never    Sexual activity: Not on file   Other Topics Concern    Not on file   Social History Narrative    Not on file     Social Determinants of Health     Financial Resource Strain: Not on File (8/26/2019)    Received from [facility]     Financial Resource Strain     Financial Resource Strain: 0   Food Insecurity: Not on File (8/26/2019)    Received from [facility]     Food Insecurity     Food: 0   Transportation Needs: Not on File (8/26/2019)    Received from [facility]     Transportation Needs     Transportation: 0   Physical Activity: Not on File (8/26/2019)    Received from [facility]     Physical Activity     Physical Activity: 0   Stress: Not on File (8/26/2019)    Received from [facility]     Stress     Stress: 0   Social Connections: Not on File (8/26/2019)    [facility] from  [facility]     Social Connections     Social Connections and Isolation: 0   Interpersonal Safety: Not on file   Housing Stability: Not on File (2019)    Received from [facility]     Housing Stability     Housin       Drug and lactation database from the [place] [employer] of Medicine:  http://toxnet.nlm.nih.gov/cgi-bin/sis/htmlgen?LACT      B/P: Data Unavailable, T: Data Unavailable, P: Data Unavailable, R: Data Unavailable 0 lbs 0 oz  There were no vitals taken for this visit., There is no height or weight on file to calculate BMI.  Medications and Vitals not listed above were documented in the cart and reviewed by me.     Current Outpatient Medications   Medication Sig Dispense Refill    Pirfenidone 267 MG TABS Take 1 Tablet (267 mg) by mouth three times a day for 7 days, THEN 2 Tablets (534 mg) three times a day for 7 days, THEN 3 Tablets (801 mg) three times a day for 16 days.      albuterol (PROAIR HFA/PROVENTIL HFA/VENTOLIN HFA) 108 (90 Base) MCG/ACT inhaler Inhale 2 puffs into the lungs 4 times daily      atorvastatin (LIPITOR) 20 MG tablet Take 1 tablet by mouth daily      benzonatate (TESSALON) 100 MG capsule Take 2 capsules (200 mg) by mouth 3 times daily as needed for cough 180 capsule 3    Dulaglutide (TRULICITY) 3 MG/0.5ML SOPN Inject 3 mg Subcutaneous once a week      insulin aspart (NOVOPEN ECHO) 100 UNIT/ML cartridge Inject 90 Units Subcutaneous 3 times daily (with meals)      insulin glargine (LANTUS PEN) 100 UNIT/ML pen Inject 15 Units Subcutaneous at bedtime      losartan (COZAAR) 50 MG tablet Take 1 tablet by mouth daily      metFORMIN (GLUCOPHAGE XR) 500 MG 24 hr tablet Take 500 mg by mouth 2 times daily (with meals)      pirfenidone (ESBRIET) 267 MG capsule Take 267 mg by mouth 3 times daily Start with 267mg if tolerated then move to 534mg if tolerated then move to 801mg      [START ON 2024] Pirfenidone 801 MG TABS Take 801 mg by mouth 3 times daily      SYMBICORT 160-4.5  MCG/ACT Inhaler Inhale 2 puffs into the lungs two times daily      TRULICITY 0.75 MG/0.5ML pen Inject 0.75 mg Subcutaneous every 7 days      TRULICITY 1.5 MG/0.5ML pen Inject 1.5 mg Subcutaneous every 7 days           Irving Fox MD

## 2024-05-31 NOTE — TELEPHONE ENCOUNTER
RECORDS RECEIVED FROM: Internal    REASON FOR VISIT: J84.9 (ICD-10-CM) - ILD (interstitial lung disease) (H)  Z01.818 (ICD-10-CM) - Encounter for pre-transplant evaluation for lung transplant  Z76.82 (ICD-10-CM) - Organ transplant candidate  R25.1 (ICD-10-CM) - Tremor   PROVIDER: Irving Fox MD   DATE OF APPT: 6/18/24 @ 2:30 am    NOTES (FOR ALL VISITS) STATUS DETAILS   OFFICE NOTE from referring provider Internal 5/14/24 Carey Tucker MD @[facility]     MEDICATION LIST Internal    IMAGING  (FOR ALL VISITS)     CT (HEAD, NECK, SPINE) Received [facility]  4/24/24 CT Chest  8/24/23 CT Chest  4/19/23 CT Chest

## 2024-06-04 NOTE — CONFIDENTIAL NOTE
DIAGNOSIS:    ILD (interstitial lung disease) (H)   Encounter for pre-transplant evaluation for lung transplant   Organ transplant candidate      Appt Date:  06.25.2024     NOTES STATUS DETAILS   OFFICE NOTE from referring provider Internal 05.15.2024 Carey Tucker MD    OFFICE NOTES from other specialists     DISCHARGE SUMMARY from hospital     MEDICATION LIST Internal / CE    LIVER BIOSPY (IF APPLICABLE)      PATHOLOGY REPORTS      IMAGING     ENDOSCOPY (IF AVAILABLE)     COLONOSCOPY (IF AVAILABLE)     ULTRASOUND LIVER     CT OF ABDOMEN     MRI OF LIVER     FIBROSCAN, US ELASTOGRAPHY, FIBROSIS SCAN, MR ELASTOGRAPHY     LABS     HEPATIC PANEL (LIVER PANEL) Care Everywhere 04.26.2024   BASIC METABOLIC PANEL Care Everywhere 04.25.2023   COMPLETE METABOLIC PANEL Care Everywhere 07.13.2022   COMPLETE BLOOD COUNT (CBC) Care Everywhere 07.13.2022   INTERNATIONAL NORMALIZED RATIO (INR)     HEPATITIS C ANTIBODY     HEPATITIS C VIRAL LOAD/PCR     HEPATITIS C GENOTYPE     HEPATITIS B SURFACE ANTIGEN     HEPATITIS B SURFACE ANTIBODY     HEPATITIS B DNA QUANT LEVEL     HEPATITIS B CORE ANTIBODY

## 2024-06-05 ENCOUNTER — TRANSFERRED RECORDS (OUTPATIENT)
Dept: HEALTH INFORMATION MANAGEMENT | Facility: CLINIC | Age: 59
End: 2024-06-05
Payer: COMMERCIAL

## 2024-06-06 ENCOUNTER — TELEPHONE (OUTPATIENT)
Dept: ENDOCRINOLOGY | Facility: CLINIC | Age: 59
End: 2024-06-06
Payer: COMMERCIAL

## 2024-06-06 NOTE — TELEPHONE ENCOUNTER
According to appt note, pt is well controlled and just wanted a CGM, pt can see primary or Diabetes educator for this.

## 2024-06-07 ENCOUNTER — OFFICE VISIT (OUTPATIENT)
Dept: ENDOCRINOLOGY | Facility: CLINIC | Age: 59
End: 2024-06-07
Payer: COMMERCIAL

## 2024-06-07 VITALS
OXYGEN SATURATION: 93 % | HEART RATE: 110 BPM | DIASTOLIC BLOOD PRESSURE: 81 MMHG | SYSTOLIC BLOOD PRESSURE: 125 MMHG | BODY MASS INDEX: 24.42 KG/M2 | WEIGHT: 170.2 LBS

## 2024-06-07 DIAGNOSIS — E11.9 TYPE 2 DIABETES MELLITUS WITHOUT COMPLICATION, WITH LONG-TERM CURRENT USE OF INSULIN (H): Primary | ICD-10-CM

## 2024-06-07 DIAGNOSIS — Z79.4 TYPE 2 DIABETES MELLITUS WITHOUT COMPLICATION, WITH LONG-TERM CURRENT USE OF INSULIN (H): Primary | ICD-10-CM

## 2024-06-07 PROCEDURE — 99204 OFFICE O/P NEW MOD 45 MIN: CPT | Performed by: NURSE PRACTITIONER

## 2024-06-07 NOTE — PROGRESS NOTES
"[facility] ENDOCRINOLOGY    Diabetes Note 6/7/2024    Travon Cartwright, 1965, [id number]          Reason for visit      1. Type 2 diabetes mellitus without complication, with long-term current use of insulin (H)        HPI     Travon Cartwright is a very pleasant 59 year old old male who presents for follow up.  SUMMARY:    Juan J is here today to establish care for DM 2.  He is here with his sister, who is a retired NP. He is a rather complicated patient. He is awaiting a Lung transplant secondary to Interstitial Lung disease.     He was diagnosed in 2020 with Diabetes. His most recent A1c was 8.7.  He is using the Dexcom CGM, which he was unable to download today, however I was able to see his TIR, which was 38%, Above, 62%, no hypoglycemia was recorded and his ave BG was 200.     He has a hx of non-compliance and not watching his BG. However, it was his idea to get a CGM, for which he \"fought\" and is finding it helpful.     Postprandial readings are high.    His current medication regimen includes Trulicity, 3 mg, weekly.  He was taking Ozempic for a while, and likely had an availability issue. He is having no problems with the Trulicity. He is taking 30 units of Lantus daily. He was taking it in a split dose, but stopped doing that because it was easier to remember this way. He is also taking Metformin XR, 500 mg BID.         Blood glucose data:      Past Medical History     Patient Active Problem List   Diagnosis    Abnormal levels of other serum enzymes    Interstitial pulmonary disease (H)    Other emphysema (H)    Other specified anxiety disorders    Type 2 diabetes mellitus without complications (H)    Tremor        Family History       family history includes Hypertension in his mother; Lung Cancer in his father and maternal grandfather; No Known Problems in his maternal grandmother, paternal grandfather, paternal grandmother, and sister.    Social History      reports that he has quit smoking. " "His smoking use included cigarettes. He has never used smokeless tobacco. He reports that he does not currently use alcohol. He reports that he does not use drugs.      Review of Systems     Patient has no polyuria or polydipsia, no chest pain, dyspnea or TIA's, no numbness, tingling or pain in extremities  Remainder negative except as noted in HPI.    Vital Signs     /81 (BP Location: Left arm, Patient Position: Sitting, Cuff Size: Adult Regular)   Pulse 110   Wt 77.2 kg (170 lb 3.2 oz)   SpO2 93%   BMI 24.42 kg/m    Wt Readings from Last 3 Encounters:   06/07/24 77.2 kg (170 lb 3.2 oz)   05/14/24 77.2 kg (170 lb 3.2 oz)   05/06/24 79.4 kg (175 lb)       Physical Exam     Constitutional:  Well developed, Well nourished  HENT:  Normocephalic,   Neck: Thyroid normal, No lymph nodes, Supple  Eyes:  PERRL, Conjunctiva pink  Respiratory:  Normal breath sounds, mild respiratory distress  Cardiovascular: tachycardic , Normal rhythm, No murmurs  GI:  Bowel sounds normal, Soft, No tenderness  Musculoskeletal:  No gross deformity or lesions, normal dorsalis pedis pulses  Skin: No acanthosis nigricans, lipoatrophy or lipodystrophy  Neurologic:  Alert & oriented x 3, nonfocal  Psychiatric:  Affect, Mood, Insight appropriate      Assessment     1. Type 2 diabetes mellitus without complication, with long-term current use of insulin (H)        Plan       Juan J needs some Prandial support. I would like to avoid insulin as it just adds more complexity to the mix. His current Renal function is good, and Jardiance will be a good choice. Discussed method of action and expectations of control. He knows to continue the rest of his medications, and to decrease his Lantus if he is having lows.     I will see him back in 3 months. He is hopeful that he will have his new lungs by then.           Sara Reilly NP  [facility]  6/7/2024  1:19 PM        Lab Results     No results found for: \"HGBA1C\", \"CREATININE\", " "\"MICROALBUR\"    No results found for: \"CHOL\", \"HDL\", \"TRIG\", \"CHOLHDL\"    [unfilled]      Current Medications     Outpatient Medications Prior to Visit   Medication Sig Dispense Refill    atorvastatin (LIPITOR) 20 MG tablet Take 1 tablet by mouth daily      Dulaglutide (TRULICITY) 3 MG/0.5ML SOPN Inject 3 mg Subcutaneous once a week      insulin glargine (LANTUS PEN) 100 UNIT/ML pen Inject 15 Units Subcutaneous at bedtime      losartan (COZAAR) 50 MG tablet Take 1 tablet by mouth daily      metFORMIN (GLUCOPHAGE XR) 500 MG 24 hr tablet Take 500 mg by mouth 2 times daily (with meals)      [START ON 6/12/2024] Pirfenidone 801 MG TABS Take 801 mg by mouth 3 times daily      SYMBICORT 160-4.5 MCG/ACT Inhaler Inhale 2 puffs into the lungs two times daily      albuterol (PROAIR HFA/PROVENTIL HFA/VENTOLIN HFA) 108 (90 Base) MCG/ACT inhaler Inhale 2 puffs into the lungs 4 times daily (Patient not taking: Reported on 6/7/2024)      benzonatate (TESSALON) 100 MG capsule Take 2 capsules (200 mg) by mouth 3 times daily as needed for cough (Patient not taking: Reported on 6/7/2024) 180 capsule 3    insulin aspart (NOVOPEN ECHO) 100 UNIT/ML cartridge Inject 90 Units Subcutaneous 3 times daily (with meals)      pirfenidone (ESBRIET) 267 MG capsule Take 267 mg by mouth 3 times daily Start with 267mg if tolerated then move to 534mg if tolerated then move to 801mg      TRULICITY 0.75 MG/0.5ML pen Inject 0.75 mg Subcutaneous every 7 days      TRULICITY 1.5 MG/0.5ML pen Inject 1.5 mg Subcutaneous every 7 days       No facility-administered medications prior to visit.           "

## 2024-06-07 NOTE — LETTER
"6/7/2024      Travon Cartwright  [address] [facility] [address]      Dear Colleague,    Thank you for referring your patient, Travon Cartwright, to the [facility] [place]. Please see a copy of my visit note below.    [facility]    Diabetes Note 6/7/2024    Travon Cartwright, 1965, [id number]          Reason for visit      1. Type 2 diabetes mellitus without complication, with long-term current use of insulin (H)        HPI     Travon Cartwright is a very pleasant 59 year old old male who presents for follow up.  SUMMARY:    Juan J is here today to establish care for DM 2.  He is here with his sister, who is a retired NP. He is a rather complicated patient. He is awaiting a Lung transplant secondary to Interstitial Lung disease.     He was diagnosed in 2020 with Diabetes. His most recent A1c was 8.7.  He is using the Dexcom CGM, which he was unable to download today, however I was able to see his TIR, which was 38%, Above, 62%, no hypoglycemia was recorded and his ave BG was 200.     He has a hx of non-compliance and not watching his BG. However, it was his idea to get a CGM, for which he \"fought\" and is finding it helpful.     Postprandial readings are high.    His current medication regimen includes Trulicity, 3 mg, weekly.  He was taking Ozempic for a while, and likely had an availability issue. He is having no problems with the Trulicity. He is taking 30 units of Lantus daily. He was taking it in a split dose, but stopped doing that because it was easier to remember this way. He is also taking Metformin XR, 500 mg BID.         Blood glucose data:      Past Medical History     Patient Active Problem List   Diagnosis     Abnormal levels of other serum enzymes     Interstitial pulmonary disease (H)     Other emphysema (H)     Other specified anxiety disorders     Type 2 diabetes mellitus without complications (H)     Tremor        Family History "       family history includes Hypertension in his mother; Lung Cancer in his father and maternal grandfather; No Known Problems in his maternal grandmother, paternal grandfather, paternal grandmother, and sister.    Social History      reports that he has quit smoking. His smoking use included cigarettes. He has never used smokeless tobacco. He reports that he does not currently use alcohol. He reports that he does not use drugs.      Review of Systems     Patient has no polyuria or polydipsia, no chest pain, dyspnea or TIA's, no numbness, tingling or pain in extremities  Remainder negative except as noted in HPI.    Vital Signs     /81 (BP Location: Left arm, Patient Position: Sitting, Cuff Size: Adult Regular)   Pulse 110   Wt 77.2 kg (170 lb 3.2 oz)   SpO2 93%   BMI 24.42 kg/m    Wt Readings from Last 3 Encounters:   06/07/24 77.2 kg (170 lb 3.2 oz)   05/14/24 77.2 kg (170 lb 3.2 oz)   05/06/24 79.4 kg (175 lb)       Physical Exam     Constitutional:  Well developed, Well nourished  HENT:  Normocephalic,   Neck: Thyroid normal, No lymph nodes, Supple  Eyes:  PERRL, Conjunctiva pink  Respiratory:  Normal breath sounds, mild respiratory distress  Cardiovascular: tachycardic , Normal rhythm, No murmurs  GI:  Bowel sounds normal, Soft, No tenderness  Musculoskeletal:  No gross deformity or lesions, normal dorsalis pedis pulses  Skin: No acanthosis nigricans, lipoatrophy or lipodystrophy  Neurologic:  Alert & oriented x 3, nonfocal  Psychiatric:  Affect, Mood, Insight appropriate      Assessment     1. Type 2 diabetes mellitus without complication, with long-term current use of insulin (H)        Plan       Juan J needs some Prandial support. I would like to avoid insulin as it just adds more complexity to the mix. His current Renal function is good, and Jardiance will be a good choice. Discussed method of action and expectations of control. He knows to continue the rest of his medications, and to decrease his  "Lantus if he is having lows.     I will see him back in 3 months. He is hopeful that he will have his new lungs by then.           Sara Reilly NP  [facility]  6/7/2024  1:19 PM        Lab Results     No results found for: \"HGBA1C\", \"CREATININE\", \"MICROALBUR\"    No results found for: \"CHOL\", \"HDL\", \"TRIG\", \"CHOLHDL\"    [unfilled]      Current Medications     Outpatient Medications Prior to Visit   Medication Sig Dispense Refill     atorvastatin (LIPITOR) 20 MG tablet Take 1 tablet by mouth daily       Dulaglutide (TRULICITY) 3 MG/0.5ML SOPN Inject 3 mg Subcutaneous once a week       insulin glargine (LANTUS PEN) 100 UNIT/ML pen Inject 15 Units Subcutaneous at bedtime       losartan (COZAAR) 50 MG tablet Take 1 tablet by mouth daily       metFORMIN (GLUCOPHAGE XR) 500 MG 24 hr tablet Take 500 mg by mouth 2 times daily (with meals)       [START ON 6/12/2024] Pirfenidone 801 MG TABS Take 801 mg by mouth 3 times daily       SYMBICORT 160-4.5 MCG/ACT Inhaler Inhale 2 puffs into the lungs two times daily       albuterol (PROAIR HFA/PROVENTIL HFA/VENTOLIN HFA) 108 (90 Base) MCG/ACT inhaler Inhale 2 puffs into the lungs 4 times daily (Patient not taking: Reported on 6/7/2024)       benzonatate (TESSALON) 100 MG capsule Take 2 capsules (200 mg) by mouth 3 times daily as needed for cough (Patient not taking: Reported on 6/7/2024) 180 capsule 3     insulin aspart (NOVOPEN ECHO) 100 UNIT/ML cartridge Inject 90 Units Subcutaneous 3 times daily (with meals)       pirfenidone (ESBRIET) 267 MG capsule Take 267 mg by mouth 3 times daily Start with 267mg if tolerated then move to 534mg if tolerated then move to 801mg       TRULICITY 0.75 MG/0.5ML pen Inject 0.75 mg Subcutaneous every 7 days       TRULICITY 1.5 MG/0.5ML pen Inject 1.5 mg Subcutaneous every 7 days       No facility-administered medications prior to visit.           Again, thank you for allowing me to participate in the care of your patient.  "       Sincerely,        Sara Reilly NP

## 2024-06-13 ENCOUNTER — VIRTUAL VISIT (OUTPATIENT)
Dept: TRANSPLANT | Facility: CLINIC | Age: 59
End: 2024-06-13
Attending: INTERNAL MEDICINE
Payer: COMMERCIAL

## 2024-06-13 DIAGNOSIS — Z01.818 ENCOUNTER FOR PRE-TRANSPLANT EVALUATION FOR LUNG TRANSPLANT: ICD-10-CM

## 2024-06-13 DIAGNOSIS — J84.9 ILD (INTERSTITIAL LUNG DISEASE) (H): ICD-10-CM

## 2024-06-13 DIAGNOSIS — Z76.82 ORGAN TRANSPLANT CANDIDATE: ICD-10-CM

## 2024-06-13 NOTE — PROGRESS NOTES
Pre-procedure instructions - Coronary Angiogram  Patient Education    Your arrival time is 8:30am (lung perfusion scan prior to cath lab).  Location is [address] Waiting Room  Please plan on being at the hospital all day.  At any time, emergencies and/or urgent cases may come up which could delay the start of your procedure.    Pre-procedure instructions - Coronary Angiogram  Shower in the evening before or the morning of the procedure  No solid food for 8 hours prior and nothing to drink 2 hours prior to arrival time  You can take your morning medications (except for diabetic and blood thinners) with sips of water.  Take 325 mg of Aspirin the night before your procedure and 81mg the morning of procedure.  You will need to arrange a ride to drop you off and , as you will be unable to drive home. Prior to discharge you may be required to lay flat for approximately 2-6 hours in the recovery unit to ensure proper clotting of the artery. Please note: You cannot take an Uber/Taxi/etc unless you are accompanied by someone.              Diabetic Medication Instructions  Hold oral diabetic medication in morning of your procedure and for 48 hours after IV contrast is given  Typical instructions for insulin diabetic medication holding are below. However, please reach out to your Primary Care Provider or Endocrinologist for specific instructions  DO NOT take any oral diabetic medication, short-acting diabetes medications/insulin, humalog or regular insulin the morning of your test  Take   dose of long-acting insulin (Lantus, Levemir) the day of your test  Remember to bring your glucometer and insulin with you to take after your test if needed  GLP-1 Agonists Instructions  DO NOT take injectable GLP-1 agonists semaglutide (Ozempic, Wegovy), dulaglutide (Trulicity), exenatide ER (Bydureon), tirzepatide (Mounjaro), or oral  semaglutide (Rybelsus) for 7 days prior your procedure  Hold once daily injectable GLP-1 agonists exenatide (Byetta), liraglutide (Saxenda, Victoza), lixisenatide (Soligua) the day before and day of your procedure                  Anticoagulation Medication Instructions   NA  Write N/A if not currently taking    You will need to follow up with one of our cardiology APPs 1-2 weeks after your procedure. If you need help scheduling or rescheduling your appointment, please call [phone]

## 2024-06-13 NOTE — PROGRESS NOTES
"Patient, his mother, and sisters Mine and Tianna attended transplant class virtually, content per Pre-Lung Transplant Education videos. They were attentive, stated understanding, and asked good questions. Relevant handouts provided via [employer].   Verified that patient has received the following items:    \"Questions and Answers for Transplant Candidates and Families about Multiple Listing Waiting Time Transfer\"     One-Year Survival Rates for Heart and Lung Transplant  for [facility].      Reviewed the following documents with the patient:    \"Guidelines for Being on the Transplant List\".    \"What You Need to Know about a Lung and Heart-Lung Transplant .        Required signatures obtained via [employer] and forms will be scanned to EMR.  Addressed patient questions and concerns regarding transplant.    Discussed donor offers including donors who meet risk behavior criteria, hepatitis C-exposed donors, and DCD donors.     Discussed physician and coordinator management pre to post lung transplant.     HLA results to be obtained with evaluation lab draw next week.  Discussed PRA monitoring with patient.     Will review with Lung Transplant Team for transplant candidacy when evaluation complete.      Patient and family were encouraged to use the [employer] playlist to continue to view videos to reinforce education.      Learning Assessment  Primary language: English   required: no  Preferred learning style: Listening, Reading, Demonstration, Pictures/video  Barriers to learning: No barriers noted      "

## 2024-06-17 DIAGNOSIS — K76.0 METABOLIC DYSFUNCTION-ASSOCIATED STEATOTIC LIVER DISEASE (MASLD): Primary | ICD-10-CM

## 2024-06-17 LAB
ABO/RH(D): NORMAL
ANTIBODY SCREEN: NEGATIVE
SPECIMEN EXPIRATION DATE: NORMAL

## 2024-06-18 ENCOUNTER — ANCILLARY PROCEDURE (OUTPATIENT)
Dept: GENERAL RADIOLOGY | Facility: CLINIC | Age: 59
End: 2024-06-18
Attending: INTERNAL MEDICINE
Payer: COMMERCIAL

## 2024-06-18 ENCOUNTER — OFFICE VISIT (OUTPATIENT)
Dept: PULMONOLOGY | Facility: CLINIC | Age: 59
End: 2024-06-18
Attending: INTERNAL MEDICINE
Payer: COMMERCIAL

## 2024-06-18 ENCOUNTER — ANCILLARY PROCEDURE (OUTPATIENT)
Dept: CT IMAGING | Facility: CLINIC | Age: 59
End: 2024-06-18
Attending: INTERNAL MEDICINE
Payer: COMMERCIAL

## 2024-06-18 ENCOUNTER — LAB (OUTPATIENT)
Dept: LAB | Facility: CLINIC | Age: 59
End: 2024-06-18
Attending: INTERNAL MEDICINE
Payer: COMMERCIAL

## 2024-06-18 ENCOUNTER — PRE VISIT (OUTPATIENT)
Dept: NEUROLOGY | Facility: CLINIC | Age: 59
End: 2024-06-18

## 2024-06-18 ENCOUNTER — OFFICE VISIT (OUTPATIENT)
Dept: NEUROLOGY | Facility: CLINIC | Age: 59
End: 2024-06-18
Attending: INTERNAL MEDICINE
Payer: COMMERCIAL

## 2024-06-18 VITALS
RESPIRATION RATE: 16 BRPM | OXYGEN SATURATION: 95 % | HEART RATE: 84 BPM | SYSTOLIC BLOOD PRESSURE: 114 MMHG | DIASTOLIC BLOOD PRESSURE: 74 MMHG

## 2024-06-18 DIAGNOSIS — Z76.82 ORGAN TRANSPLANT CANDIDATE: ICD-10-CM

## 2024-06-18 DIAGNOSIS — J84.9 ILD (INTERSTITIAL LUNG DISEASE) (H): ICD-10-CM

## 2024-06-18 DIAGNOSIS — Z01.818 ENCOUNTER FOR PRE-TRANSPLANT EVALUATION FOR LUNG TRANSPLANT: ICD-10-CM

## 2024-06-18 DIAGNOSIS — K76.0 METABOLIC DYSFUNCTION-ASSOCIATED STEATOTIC LIVER DISEASE (MASLD): ICD-10-CM

## 2024-06-18 DIAGNOSIS — R25.1 TREMOR: Primary | ICD-10-CM

## 2024-06-18 LAB
6 MIN WALK (FT): 870 FT
6 MIN WALK (M): 265 M
A1 AB TITR SERPL: 256 {TITER}
A1 AB TITR SERPL: 64 {TITER}
ABO/RH(D): NORMAL
ALBUMIN SERPL BCG-MCNC: 4.3 G/DL (ref 3.5–5.2)
ALBUMIN UR-MCNC: NEGATIVE MG/DL
ALP SERPL-CCNC: 100 U/L (ref 40–150)
ALT SERPL W P-5'-P-CCNC: 29 U/L (ref 0–70)
AMYLASE SERPL-CCNC: 60 U/L (ref 28–100)
ANION GAP SERPL CALCULATED.3IONS-SCNC: 12 MMOL/L (ref 7–15)
ANTIBODY TITER IGM SCREEN: NEGATIVE
APPEARANCE UR: CLEAR
APTT PPP: 31 SECONDS (ref 22–38)
AST SERPL W P-5'-P-CCNC: 25 U/L (ref 0–45)
B IGG TITR SERPL: 64 {TITER}
B IGM TITR SERPL: 32 {TITER}
BACTERIA SPT CULT: NORMAL
BASE EXCESS BLDV CALC-SCNC: -0.2 MMOL/L (ref -3–3)
BASOPHILS # BLD AUTO: 0.1 10E3/UL (ref 0–0.2)
BASOPHILS NFR BLD AUTO: 1 %
BILIRUB SERPL-MCNC: 0.3 MG/DL
BILIRUB UR QL STRIP: NEGATIVE
BUN SERPL-MCNC: 10.8 MG/DL (ref 8–23)
CALCIUM SERPL-MCNC: 9.9 MG/DL (ref 8.8–10.2)
CHLORIDE SERPL-SCNC: 104 MMOL/L (ref 98–107)
CHOLEST SERPL-MCNC: 105 MG/DL
COLOR UR AUTO: ABNORMAL
CREAT SERPL-MCNC: 0.74 MG/DL (ref 0.67–1.17)
DEPRECATED HCO3 PLAS-SCNC: 25 MMOL/L (ref 22–29)
EGFRCR SERPLBLD CKD-EPI 2021: >90 ML/MIN/1.73M2
EOSINOPHIL # BLD AUTO: 0.4 10E3/UL (ref 0–0.7)
EOSINOPHIL NFR BLD AUTO: 5 %
ERYTHROCYTE [DISTWIDTH] IN BLOOD BY AUTOMATED COUNT: 12.9 % (ref 10–15)
FASTING STATUS PATIENT QL REPORTED: ABNORMAL
FASTING STATUS PATIENT QL REPORTED: ABNORMAL
GLUCOSE SERPL-MCNC: 126 MG/DL (ref 70–99)
GLUCOSE UR STRIP-MCNC: >=1000 MG/DL
GRAM STAIN RESULT: NORMAL
HAV AB SER QL IA: NONREACTIVE
HBA1C MFR BLD: 8.4 %
HBV CORE AB SERPL QL IA: NONREACTIVE
HBV SURFACE AB SERPL IA-ACNC: <3.5 M[IU]/ML
HBV SURFACE AB SERPL IA-ACNC: NONREACTIVE M[IU]/ML
HBV SURFACE AG SERPL QL IA: NONREACTIVE
HCO3 BLDV-SCNC: 26 MMOL/L (ref 21–28)
HCT VFR BLD AUTO: 50.4 % (ref 40–53)
HCV AB SERPL QL IA: NONREACTIVE
HDLC SERPL-MCNC: 50 MG/DL
HGB BLD-MCNC: 16.9 G/DL (ref 13.3–17.7)
HGB UR QL STRIP: NEGATIVE
HIV 1+2 AB+HIV1 P24 AG SERPL QL IA: NONREACTIVE
HOLD SPECIMEN: NORMAL
IMM GRANULOCYTES # BLD: 0.1 10E3/UL
IMM GRANULOCYTES NFR BLD: 1 %
INR PPP: 1.19 (ref 0.85–1.15)
KETONES UR STRIP-MCNC: NEGATIVE MG/DL
LDLC SERPL CALC-MCNC: 20 MG/DL
LEUKOCYTE ESTERASE UR QL STRIP: NEGATIVE
LYMPHOCYTES # BLD AUTO: 1.9 10E3/UL (ref 0.8–5.3)
LYMPHOCYTES NFR BLD AUTO: 23 %
Lab: NORMAL
MAGNESIUM SERPL-MCNC: 2.1 MG/DL (ref 1.7–2.3)
MCH RBC QN AUTO: 29.8 PG (ref 26.5–33)
MCHC RBC AUTO-ENTMCNC: 33.5 G/DL (ref 31.5–36.5)
MCV RBC AUTO: 89 FL (ref 78–100)
MONOCYTES # BLD AUTO: 0.7 10E3/UL (ref 0–1.3)
MONOCYTES NFR BLD AUTO: 9 %
NEUTROPHILS # BLD AUTO: 5.2 10E3/UL (ref 1.6–8.3)
NEUTROPHILS NFR BLD AUTO: 61 %
NITRATE UR QL: NEGATIVE
NONHDLC SERPL-MCNC: 55 MG/DL
NRBC # BLD AUTO: 0 10E3/UL
NRBC BLD AUTO-RTO: 0 /100
O2/TOTAL GAS SETTING VFR VENT: 21 %
OXYHGB MFR BLDV: 69 % (ref 70–75)
PCO2 BLDV: 44 MM HG (ref 40–50)
PERFORMING LABORATORY: NORMAL
PH BLDV: 7.37 [PH] (ref 7.32–7.43)
PH UR STRIP: 5.5 [PH] (ref 5–7)
PHOSPHATE SERPL-MCNC: 3.5 MG/DL (ref 2.5–4.5)
PLATELET # BLD AUTO: 275 10E3/UL (ref 150–450)
PO2 BLDV: 37 MM HG (ref 25–47)
POTASSIUM SERPL-SCNC: 4.1 MMOL/L (ref 3.4–5.3)
PREALB SERPL-MCNC: 23.2 MG/DL (ref 20–40)
PROT SERPL-MCNC: 7.5 G/DL (ref 6.4–8.3)
RBC # BLD AUTO: 5.68 10E6/UL (ref 4.4–5.9)
SAO2 % BLDV: 69.9 % (ref 70–75)
SODIUM SERPL-SCNC: 141 MMOL/L (ref 135–145)
SP GR UR STRIP: 1.02 (ref 1–1.03)
SPECIMEN EXPIRATION DATE: NORMAL
SPECIMEN EXPIRATION DATE: NORMAL
TEST NAME: NORMAL
TRANSFERRIN SERPL-MCNC: 239 MG/DL (ref 200–360)
TRIGL SERPL-MCNC: 174 MG/DL
TSH SERPL DL<=0.005 MIU/L-ACNC: 1.17 UIU/ML (ref 0.3–4.2)
UROBILINOGEN UR STRIP-MCNC: NORMAL MG/DL
VIT D+METAB SERPL-MCNC: 22 NG/ML (ref 20–50)
WBC # BLD AUTO: 8.3 10E3/UL (ref 4–11)

## 2024-06-18 PROCEDURE — G2211 COMPLEX E/M VISIT ADD ON: HCPCS | Performed by: PSYCHIATRY & NEUROLOGY

## 2024-06-18 PROCEDURE — 74177 CT ABD & PELVIS W/CONTRAST: CPT | Performed by: RADIOLOGY

## 2024-06-18 PROCEDURE — 82955 ASSAY OF G6PD ENZYME: CPT | Mod: 90 | Performed by: PATHOLOGY

## 2024-06-18 PROCEDURE — 84433 ASY THIOPURIN S-MTHYLTRNSFRS: CPT | Mod: 90 | Performed by: PATHOLOGY

## 2024-06-18 PROCEDURE — 80061 LIPID PANEL: CPT | Performed by: PATHOLOGY

## 2024-06-18 PROCEDURE — 84443 ASSAY THYROID STIM HORMONE: CPT | Performed by: PATHOLOGY

## 2024-06-18 PROCEDURE — 82784 ASSAY IGA/IGD/IGG/IGM EACH: CPT | Performed by: INTERNAL MEDICINE

## 2024-06-18 PROCEDURE — 85025 COMPLETE CBC W/AUTO DIFF WBC: CPT | Performed by: PATHOLOGY

## 2024-06-18 PROCEDURE — 94618 PULMONARY STRESS TESTING: CPT | Performed by: INTERNAL MEDICINE

## 2024-06-18 PROCEDURE — 87340 HEPATITIS B SURFACE AG IA: CPT | Performed by: INTERNAL MEDICINE

## 2024-06-18 PROCEDURE — 86665 EPSTEIN-BARR CAPSID VCA: CPT | Performed by: INTERNAL MEDICINE

## 2024-06-18 PROCEDURE — 76000 FLUOROSCOPY <1 HR PHYS/QHP: CPT | Mod: GC | Performed by: RADIOLOGY

## 2024-06-18 PROCEDURE — 86706 HEP B SURFACE ANTIBODY: CPT | Performed by: INTERNAL MEDICINE

## 2024-06-18 PROCEDURE — 71045 X-RAY EXAM CHEST 1 VIEW: CPT | Performed by: RADIOLOGY

## 2024-06-18 PROCEDURE — 82805 BLOOD GASES W/O2 SATURATION: CPT | Performed by: PATHOLOGY

## 2024-06-18 PROCEDURE — 81378 HLA I & II TYPING HR: CPT | Performed by: INTERNAL MEDICINE

## 2024-06-18 PROCEDURE — 36415 COLL VENOUS BLD VENIPUNCTURE: CPT | Performed by: PATHOLOGY

## 2024-06-18 PROCEDURE — 80307 DRUG TEST PRSMV CHEM ANLYZR: CPT | Mod: 90 | Performed by: PATHOLOGY

## 2024-06-18 PROCEDURE — 86803 HEPATITIS C AB TEST: CPT | Performed by: INTERNAL MEDICINE

## 2024-06-18 PROCEDURE — 84466 ASSAY OF TRANSFERRIN: CPT | Performed by: INTERNAL MEDICINE

## 2024-06-18 PROCEDURE — 87116 MYCOBACTERIA CULTURE: CPT | Mod: 90 | Performed by: PATHOLOGY

## 2024-06-18 PROCEDURE — 86704 HEP B CORE ANTIBODY TOTAL: CPT | Performed by: INTERNAL MEDICINE

## 2024-06-18 PROCEDURE — 86850 RBC ANTIBODY SCREEN: CPT

## 2024-06-18 PROCEDURE — 82306 VITAMIN D 25 HYDROXY: CPT | Performed by: INTERNAL MEDICINE

## 2024-06-18 PROCEDURE — 85610 PROTHROMBIN TIME: CPT | Performed by: PATHOLOGY

## 2024-06-18 PROCEDURE — 86833 HLA CLASS II HIGH DEFIN QUAL: CPT | Performed by: INTERNAL MEDICINE

## 2024-06-18 PROCEDURE — 86481 TB AG RESPONSE T-CELL SUSP: CPT | Performed by: INTERNAL MEDICINE

## 2024-06-18 PROCEDURE — 86708 HEPATITIS A ANTIBODY: CPT | Performed by: INTERNAL MEDICINE

## 2024-06-18 PROCEDURE — 86900 BLOOD TYPING SEROLOGIC ABO: CPT

## 2024-06-18 PROCEDURE — 84134 ASSAY OF PREALBUMIN: CPT | Performed by: INTERNAL MEDICINE

## 2024-06-18 PROCEDURE — 86832 HLA CLASS I HIGH DEFIN QUAL: CPT | Performed by: INTERNAL MEDICINE

## 2024-06-18 PROCEDURE — 82150 ASSAY OF AMYLASE: CPT | Performed by: PATHOLOGY

## 2024-06-18 PROCEDURE — 99203 OFFICE O/P NEW LOW 30 MIN: CPT | Performed by: PSYCHIATRY & NEUROLOGY

## 2024-06-18 PROCEDURE — 83735 ASSAY OF MAGNESIUM: CPT | Performed by: PATHOLOGY

## 2024-06-18 PROCEDURE — 86696 HERPES SIMPLEX TYPE 2 TEST: CPT | Performed by: INTERNAL MEDICINE

## 2024-06-18 PROCEDURE — 86644 CMV ANTIBODY: CPT | Performed by: INTERNAL MEDICINE

## 2024-06-18 PROCEDURE — 86886 COOMBS TEST INDIRECT TITER: CPT

## 2024-06-18 PROCEDURE — 91200 LIVER ELASTOGRAPHY: CPT | Mod: 26 | Performed by: PHYSICIAN ASSISTANT

## 2024-06-18 PROCEDURE — 85730 THROMBOPLASTIN TIME PARTIAL: CPT | Performed by: PATHOLOGY

## 2024-06-18 PROCEDURE — 73522 X-RAY EXAM HIPS BI 3-4 VIEWS: CPT | Performed by: RADIOLOGY

## 2024-06-18 PROCEDURE — 87206 SMEAR FLUORESCENT/ACID STAI: CPT | Mod: 90 | Performed by: PATHOLOGY

## 2024-06-18 PROCEDURE — 84100 ASSAY OF PHOSPHORUS: CPT | Performed by: PATHOLOGY

## 2024-06-18 PROCEDURE — 86777 TOXOPLASMA ANTIBODY: CPT | Performed by: INTERNAL MEDICINE

## 2024-06-18 PROCEDURE — 71047 X-RAY EXAM CHEST 3 VIEWS: CPT | Performed by: RADIOLOGY

## 2024-06-18 PROCEDURE — 82785 ASSAY OF IGE: CPT | Performed by: INTERNAL MEDICINE

## 2024-06-18 PROCEDURE — 83036 HEMOGLOBIN GLYCOSYLATED A1C: CPT | Performed by: INTERNAL MEDICINE

## 2024-06-18 PROCEDURE — 87205 SMEAR GRAM STAIN: CPT | Performed by: INTERNAL MEDICINE

## 2024-06-18 PROCEDURE — 72100 X-RAY EXAM L-S SPINE 2/3 VWS: CPT | Performed by: STUDENT IN AN ORGANIZED HEALTH CARE EDUCATION/TRAINING PROGRAM

## 2024-06-18 PROCEDURE — 99000 SPECIMEN HANDLING OFFICE-LAB: CPT | Performed by: PATHOLOGY

## 2024-06-18 PROCEDURE — 86787 VARICELLA-ZOSTER ANTIBODY: CPT | Performed by: INTERNAL MEDICINE

## 2024-06-18 PROCEDURE — 80053 COMPREHEN METABOLIC PANEL: CPT | Performed by: PATHOLOGY

## 2024-06-18 PROCEDURE — 82787 IGG 1 2 3 OR 4 EACH: CPT | Performed by: INTERNAL MEDICINE

## 2024-06-18 PROCEDURE — 71260 CT THORAX DX C+: CPT | Performed by: RADIOLOGY

## 2024-06-18 PROCEDURE — 72070 X-RAY EXAM THORAC SPINE 2VWS: CPT | Performed by: STUDENT IN AN ORGANIZED HEALTH CARE EDUCATION/TRAINING PROGRAM

## 2024-06-18 PROCEDURE — 81003 URINALYSIS AUTO W/O SCOPE: CPT | Mod: XU | Performed by: PATHOLOGY

## 2024-06-18 PROCEDURE — G0480 DRUG TEST DEF 1-7 CLASSES: HCPCS | Mod: 90 | Performed by: PATHOLOGY

## 2024-06-18 RX ORDER — IOPAMIDOL 755 MG/ML
87 INJECTION, SOLUTION INTRAVASCULAR ONCE
Status: COMPLETED | OUTPATIENT
Start: 2024-06-18 | End: 2024-06-18

## 2024-06-18 RX ORDER — DULAGLUTIDE 1.5 MG/.5ML
3 INJECTION, SOLUTION SUBCUTANEOUS
Status: ON HOLD | COMMUNITY
Start: 2024-06-18 | End: 2024-08-15

## 2024-06-18 RX ORDER — ASPIRIN 325 MG
TABLET, DELAYED RELEASE (ENTERIC COATED) ORAL
Status: ON HOLD | COMMUNITY
End: 2024-08-15

## 2024-06-18 RX ADMIN — IOPAMIDOL 87 ML: 755 INJECTION, SOLUTION INTRAVASCULAR at 15:52

## 2024-06-18 ASSESSMENT — PAIN SCALES - GENERAL: PAINLEVEL: NO PAIN (0)

## 2024-06-18 NOTE — LETTER
2024       RE: Travon Cartwright  [address]  [facility] [address]     Dear Colleague,    Thank you for referring your patient, Travon Cartwright, to the [facility]. Please see a copy of my visit note below.          Diagnosis/Summary/Recommendations:    PATIENT: Travon Cartwright  59 year old male     : 1965    RL: 2024   MRN: [id number]  [address]  [facility] [address]  Mobile Phone  [phone]  Email  [email or web address]    Assessment:  (R25.1) Tremor  (primary encounter diagnosis)  Tremor since high school  Left handed  Mother and father were left handed.   Shoot with left hand  Tremor does not impact his ADLs  He has some problems threading nut on bolt  Has some problems with feeding.     Review of diagnosis    Essential tremor     Avoidance of dopamine blockers   Not taking    Motor complication review   N/a    Review of Impulse control disorders   N/a    Review of surgical or medication options   reviewed    Gait/Balance/Falls   No falls    Exercise/Therapy performed/offered   Tries to exercise    Cognitive/Driving   Denies     Mood   Working on disability   Lives by himself   Property management/retail  Grew up in [place]  Sister nearby in [place]  No mood issues.     Hallucinations/delusions   Denies     Sleep   Denies  No night time behavior    Bladder/Renal/Prostate/Gyn/Other   Nocturia 1/noc  Drinks a lot of water.     GI/Constipation/GERD   Will see mandy la to discuss liver status.   No constipation and may have diarrhea from metformin.     Hepatic Steatosis: He has had intermittently elevated LFTs since 2019, had a liver US in 2017 showing hepatic steatosis. GI felt he was low likelihood advanced fibrosis.   hepatology for evaluation in anticipation of transplant evaluation    ENDO/Lipid/DM/Bone density/Thyroid  Type 2 diabetes mellitus   TSH was  normal today   A1c 8.7 on 4/22/2024  More recent was better than that possible 7.9    Cardio/heart/Hyper or Hypotensive   Hypertension  On medications    Vision/Dry Eyes/Cataracts/Glaucoma/Macular   No problems    Heme/Anticoagulation/Antiplatelet/Anemia/Other  No problems.     ENT/Resp  No loss of smell  No covid   Interstitial lung disease  Emphysema  Awaiting lung transplant    June 18, 2024    FINDINGS:  The hemidiaphragms move symmetrically in all manners of tested  breathing. Diffuse interstitial opacities.  IMPRESSION:  Negative sniff test.    Skin/Cancer/Seborrhea/other  No skin cancer    Musculoskeletal/Pain/Headache  denies    Other:      Medications            Albuterol inhaler no       Atorvastatin lipitor 20mg  1       Benzonatate tessalon 100mg no       Dulaglutide trulicity week       Empagliflozin jardiance 10mg  1       Insulin glargine lantus 15u   15 u    Losartan cozaar 50mg  1       Metformin glucophage XR 500mg 24hr 1   1    Pirfenidone 801mg esbriet lungs antifibrotid 1 1  1    Symbicort 160 4.5 varies   varies    Trulicity 1.5  X2 wk                         Plan:    Essential tremor   He has no rigidity or bradykinesia and has normal voice volume  He has no rbd or loss of smell.   There is no family history of tremor  He has a mild to moderate postural and action tremor of his UEs and a bit in his leg with activity.   Discussed the diagnosis    Set up his sister for chiquis with his consent.     Coding statement:   Medical Decision Making:  #  Chronic progressive medical conditions addressed  - see above --   Review and/or interpretation of unique test or documentation from a provider outside of neurology yes   Independent historian provided additional details  yes I  Prescription drug management and review of potential side effects and/or monitoring for side effects  -- see above ---  Health impacted by social determinants of health  no    I have reviewed the note as documented above.  This  accurately captures the substance of my conversation with the patient and total time spent preparing for visit, executing visit and completing visit on the day of the visit:  30 minutes.  The portion of this total time included face to face time 25 minutes    The longitudinal plan of care for Travon Cartwright was addressed during this visit. Due to the added complexity in care, I will continue to support Travon Cartwright in the subsequent management of this condition(s) and with the ongoing continuity of care of this condition(s).      Irving Fox MD     ______________________________________    Last visit date and details:         Travon Cartwright is a 59 year old with history of ILD, suspected IPF  who is seen today for evaluation for lung transplantation.     1) Evaluation for lung transplantation: He currently is on RA at rest and uses about 3-4L with exertion, he has good family support, and his PFTs have fallen significantly in the last year with progression (based on reports) in his CT findings. He has evidence of mildly positive anti Ku antibodies but no e/o myopathy by symptom report and no evidence of elevated aldolase or CK. He was accompanied by his sister Mine today, who moved from [place] to help take care of him. He's been incredibly active in the past and still tries to remain so. He has been unable to participate in pulm rehab due to his location. He and his sister have read and watched several videos/testimonies about lung transplant already and were prepared for today's visit mentally and emotionally. HE is just about to start on an antifibrotic but has not trialed on previously. He has some mild weight loss occurring over 2 years but stable in the last 6 months. He does have diabetes which is somewhat controlled,  but he has been struggling to obtain a CGM for the last year. He and his sister are very motivated to start the transplant process.       In addition to a complete history and  physical, I discussed transplant at length with the patient.  We reviewed the risks and benefits of transplantation.  Our discussion included the recent survival statistics.  I discussed rejection, including hyperacute, acute, chronic and antibody mediated.  I discussed the need for surveillance biopsies.  I reviewed the standard immunosuppression and typical side effects, including renal failure.  I discussed the increased risk of infection and the use of prophylactic antibiotics. I reviewed the increased risk of malignancy. I discussed the listing system, including the Composite Allocation Score.  I discussed the typical operative and postoperative course.  I reviewed the usual clinic followup.  I explained to the patient that they are required to stay in the [facility] for three months following transplantation and that they will need to have someone accompanying them during that time.  I discussed the importance of strict medication and clinic adherence after transplantation. I discussed the requirement for abstinence from alcohol during evaluation, waiting time and following transplantation. The patient had a number of questions which were answered to their apparent satisfaction.             Concerns: lifelong tremors that have worsened, mild deconditioning, weight loss, hx of NAFLD?      ILD: Working on getting CT images over in PACS. He's starting his antifibrotic soon.   - Appropriate timing for referral and transplant evaluation  - Invited for full evaluation     Lifelong tremors: He's had a history of lifelong tremors which have worsened in the last 1-2 years. While it doesn't sound necessarily like a progressive motor disorder we would like to have him fully evaluated by neurology during his evaluation.   - Neuro referral- preferable a movement disorder specialist      DM II:  He was diagnosed with diabetes about 3-4 years ago and feels like it is not as well controlled as it could be. We discussed the  risks associated with prolonged steroid use and the steroid burst used peritransplant. He thinks his most recent A1c was about 7.0 down from 12.1.   - Refer to endocrinology, interested in CGM     Hepatic Steatosis: He has had intermittently elevated LFTs since 2019, had a liver US in 2017 showing hepatic steatosis. GI felt he was low likelihood advanced fibrosis.   - Will refer to hepatology for evaluation in anticipation of transplant evaluation     Coordinator/MD: Johnathon/Garrett     RTC:   Influenza and other vaccinations:   Annual dermatology visit:  Colonoscopy: 2/5/16 at [facility], no polyps, 10 year repeat recommended  PSA: UTD 5/2/24 (0.1)  Dental: Up to date, significant work done recently  COVID: Due for booster  Prevnar 20: 7/13/22  Influenza: 11/14/23  TDAP: 6/2/17  Shingrix: not found  Hep A/B: not found        I personally spent 90 minutes in documentation, the interview and exam, and review of the chart/labs/imaging on May 14, 2024 not including time spent interpreting spirometry.    Approximately 40 minutes of non face-to-face time were spent in review of the patient's medical record on 5/13/24.  This included review of previous: clinic visits, hospital records, lab results, imaging studies, and procedural documentation.  The findings from this review are summarized in the above note.        Carey Tucker MD  [facility]   Pulmonary Transplant   Pre Transplant Coordinator:   Ph: [phone]            Problem List:      Evaluation for lung transplantation  ILD  4L at rest (low 90s), sleep 4L, with activity uses 4L after, but gets down to low 80s with activity, started on oxygen in late 2023/early 2024  Diabetes for last 3-4 years  On insulin, trulicity weekly, metformin  Hepatic Steatosis  Referred to [facility] for consult 6/2023, suspect NAFLD or FERRARI. Chronically intermittently elevatedl iver enzymes since 2019, liver US 2017 showing hepatic  steatosis         History of Present Illness:   Travon Cartwright is a 59 year old with history of ILD (suspected IPF) who is seen today for evaluation for lung transplantation.  He was born 3 months premature.   He thinks he first started feeling dyspneic in the last 3-4 years. He noticed when chopping firewood, he would  a log and get more sob. He has not been able to tolerate any cold air for the last several years. He has noticed a significant increase in ALVARADO, has noticed his oxygen sats down to 80s. Usually uses 4L with sleep and 4L with activity. He can't do more than 5 steps without getting severely out of breath. He's lost about 15 lbs in the last year unintentionally, likely started even before his trulicity. His weight has not dropped significantly in the last 6 months. No pulmonary rehab within 100 miles of him. He does have a cough at baseline and with exertion with some occasional post tussive emesis. Inhalers do not seem to help with the cough. Phlegm is fairly minimal.   Denies fevers/chills/sweats. No history of any cancer or skin cancer. Has always had tremors, his whole life. He has noticed an increase in his tremors in the last 6 months, to the point where he spills his food.      No previous history of bleeding/clotting disorders. No history of allergic reactions to medications. No mental health issues in the past.              Review of Systems:   Please see HPI, otherwise the complete 10 point ROS is negative.           Past Medical and Surgical History:      Past Medical History        Past Medical History:   Diagnosis Date    Hepatic steatosis 2017     Noted on ultrasound         Past Surgical History   No past surgical history on file.     No previous Lung biopsy, pneumothorax, or chest surgery      Family History:      Family History         Family History   Problem Relation Age of Onset    Hypertension Mother      Lung Cancer Father           former smoker    No Known Problems  "Sister      No Known Problems Maternal Grandmother      Lung Cancer Maternal Grandfather           former smoker    No Known Problems Paternal Grandmother      No Known Problems Paternal Grandfather           Mother- \"healthy as can be,\" high blood pressure   Father- lung cancer (smoker)  M Grandmother- PAD late 70s  M Grandfather- lung cancer (smoker)  P Grandmother- \"old age\"  P Grandfather- \"old age\"  Siblings- one sister, no medical problems  Children- no  Aunts/Uncles/Cousins- no       Social History:   Tobacco: Former, 20 years on and off, <1ppd. 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  Exposures: None to asbestos, birds, hot tubs, welding, sandblasting  Social Support:  Sister betty  Lives in [place], [place] ([place]5 hours from [facility])        Social History            Socioeconomic History    Marital status: Single   Tobacco Use    Smoking status: Former       Types: Cigarettes    Smokeless tobacco: Never   Substance and Sexual Activity    Alcohol use: Not Currently       Comment: not since 2017    Drug use: Never      Social Determinants of Health           Financial Resource Strain: Not on File (8/26/2019)     Received from [email or web address]      Financial Resource Strain      Financial Resource Strain: 0   Food Insecurity: Not on File (8/26/2019)     Received from [email or web address]      Food Insecurity      Food: 0   Transportation Needs: Not on File (8/26/2019)     Received from [email or web address]      Transportation Needs      Transportation: 0   Physical Activity: Not on File (8/26/2019)     Received from [email or web address]      Physical Activity      Physical Activity: 0   Stress: Not on File (8/26/2019)     Received from [email or web address]      Stress      Stress: 0   Social Connections: Not on File (8/26/2019)     Received from [email or web address]      Social Connections      Social Connections and Isolation: 0   Housing Stability: Not on File " (2019)     Received from [facility]      Housin              Medications:   Meds were reviewed and updated with the patient at today's appointment  Encounter Medications   No outpatient encounter medications on file as of 2024.      No facility-administered encounter medications on file as of 2024.                    Physical Exam:   /73 (BP Location: Right arm, Patient Position: Chair, Cuff Size: Adult Regular)   Pulse 80   Temp 97.6  F (36.4  C) (Oral)   Wt 77.2 kg (170 lb 3.2 oz)   SpO2 95%   BMI 24.42 kg/m   on room air     GENERAL: alert, NAD  HEENT: NCAT, EOMI, no scleral icterus, oral mucosa moist and without lesions  Neck: no cervical or supraclavicular adenopathy  Lungs: equal air entry, fine inspiratory crackles in the bases bilaterally and laterally  CV: RRR, S1S2, no murmurs noted  Abdomen: normoactive BS, soft, non tender  Neuro: AAO X 3  Psychiatric: normal affect, good eye contact  Skin: no rash, jaundice or lesions on limited exam  Extremities: Slight clubbing, no cyanosis or edema.  No digital edema, no synovitis or joint swelling.  No ulcers, skin thickening or fissure.          Data:   All laboratory and imaging data reviewed.       Recent Results   No results found for this or any previous visit (from the past 168 hour(s)).        PFT's  Date: 24  FVC 2.06, 47%  FEV1 1.58, 47%  DLCO 8.96, 32%  Date: 23  FVC 2.31, 50%  FEV1 2.07, 58%  DLCO 11.73, 42%  Date: 23  FVC 2.32, 49%  FEV1 2.01, 56%  DLCO 15.53, 56%     Date Place TLC (%) FVC (%) FEV1 (%) FEV1/FVC DLCO (%) Note                                                                                                              6MWT Distance:  Serology:  CMV:  EBV:  HSV:      Micro:     Labs  A1AT: no results found  Rheumatology: [facility]  -CK 73  -LEONORA Ab Negative; addended to be positive for Antinuclear Antibodies Cytoplasmic Pattern, specifically cytoplasmic reticular/AMA pattern  -PR3  negative  -MPO negative  -SS-A negative  -SS-B negative  -ANTONIA SM IgG negative  -RNP negative  -SCL70 negative  -DELIA-1 negative  -CCP negative  -RF negative  -UA without protein or RBCs  -HP panel - weak positive to pigeon DE antigen  -MyoMarker panel - weakly positive anti-Ku antibodies              Imaging:   Chest CT: 4/26/24, [facility]  IMPRESSION:   1. Increasing subpleural honeycombing and fibrotic changes since previous exam raising the possibility of UIP superimposed on IPF. No pleural effusion or pleural thickening.   2. Cardiomegaly with arteriosclerotic thoracic aorta.   3. Cholecystolithiasis.   4. Small hiatal hernia containing the cardia of the stomach.                 Cardiac:   Stress Test: 5/28/19, [facility]:   Interpretation Summary   Contrast stress echo is negative for inducible ischemia.   Treadmill stress echocardiogram was obtained for symptoms of atypical chest pain, dyspnea on exertion.   Above average exercise tolerance (exercised 9:31 min Zain protocol, stage III, 10.6 METS) and stopped due to fatigue. Duke treadmill score +9.5 (low risk).   Achieved adequate workload with normal hemodynamic response to stress.   No chest pain symptoms were noted with stress or recovery.   Baseline EKG with normal sinus rhythm and no ST-T changes with stress.   Baseline echo with normal left ventricular systolic function and normal hyperdynamic function with stress. No regional wall motion abnormalities with stress.   No evidence of inducible ischemia.   Normal resting echocardiogram.   No prior for comparison.      ECHO: 10/25/23, [facility]:  Final Impressions   1. Normal left ventricular chamber size, no regional wall motion abnormalities, calculated 2-D linear ejection fraction 54%.   2. Mildly enlarged right ventricular chamber size, normal systolic function, estimated right ventricular systolic pressure 28 mmHg (right atrial pressure of 5 mmHg).   3. No  significant valvular heart disease.   4. No   pericardial effusion.         Most Recent TTE:     Stress Test/Angiogram:     RHC:            GI:   EGD  [facility]   DEXA:                                      Nursing Note  Daxa Diego, RN (Registered Nurse)   LUNG TRANSPLANT NEW PATIENT VISIT   Transplant Nurse Coordinator Note  Travon Cartwright  1965     170 lbs 3.2 oz  Body mass index is 24.42 kg/m .     Patient accompanied by: Patient came to NPT appointment with sister Mine.     Current activity level: limited by shortness of breath, walks around quite a bit at resort and when in [place] in winter     ADLs: independent, unable to do household tasks     Pulmonary Rehab: has not attended; no programs within 100 miles of patient's home.  Karnofsky Score: 70%     PFT:   Date: 4/26/24  FVC 2.06, 47%  FEV1 1.58, 47%  DLCO 8.96, 32%  Date: 9/25/23  FVC 2.31, 50%  FEV1 2.07, 58%  DLCO 11.73, 42%  Date: 4/24/23  FVC 2.32, 49%  FEV1 2.01, 56%  DLCO 15.53, 56%  6MW: has not completed previously  Labs: in care everywhere; note mildly positive anti-Ku antibodies, and intermittently elevated liver enzymes. NAFLD/FERRARI on imaging.  CT Scan: 4/26/24, [facility]  IMPRESSION:   1. Increasing subpleural honeycombing and fibrotic changes since previous exam raising the possibility of UIP superimposed on IPF. No pleural effusion or pleural thickening.   2. Cardiomegaly with arteriosclerotic thoracic aorta.   3. Cholecystolithiasis.   4. Small hiatal hernia containing the cardia of the stomach.   Echo: 10/25/23, [facility]:  Final Impressions   1. Normal left ventricular chamber size, no regional wall motion abnormalities, calculated 2-D linear ejection fraction 54%.   2. Mildly enlarged right ventricular chamber size, normal systolic function, estimated right ventricular systolic pressure 28 mmHg (right atrial pressure of 5 mmHg).   3. No significant valvular heart disease.   4. No pericardial effusion.      Current oxygen use:   Rest room air up to 4L (leaves  on sometimes at activity flow level around the home)  Activity 4L after activity but not during -gets down to low 80s with activity   NOC 4L       Assisted ventilation: none     Diabetic status:  yes, on insulin. Diagnosed 3-4 years ago. Working on better BG control currently   Prednisone: none  GERD: denies  Smoking: former, quit 2019. 20 years on and off, less than a pack per day   Drug use: denies  ETOH: no current use or past heavy use, minimally drinking prio  Mental Health concerns: denies     Primary Care:   Established with a PCP provider: yes  Colonoscopy: up to date, 2016, repeat 10 years (2026)  Dental: up to date  PSA: up to date, 0.1, 5/2/24   Vaccinations:  Pneumococcal: not found  Prevnar 20: 7/13/22   Hep A/B: not found  Shingrix: not found  Tdap: 6/2/17  COVID: Due for booster. Moderna monovalent 4/12/22, 5/13/21, 4/14/21   Flu:11/14/23   RSV: not found     Patient status/transplant tab updated.      Misc/Potential Concerns: history of hepatic steatosis. Nutrition status/recent weight loss, conditioning.     MD Recommendations: proceed with evaluation. See motor specialist/neurologist with eval. See hepatology for transplant clearance with US abd prior.     Plan: schedule for evaluation.        Nursing Note  Lizabeth Armenta (Technician)  Expand All Collapse All       Chief Complaint   Patient presents with    RECHECK       Pre txp.       Vitals       Vitals:     05/14/24 1539   BP: 112/73   BP Location: Right arm   Patient Position: Chair   Cuff Size: Adult Regular   Pulse: 80   Temp: 97.6  F (36.4  C)   TempSrc: Oral   SpO2: 95%   Weight: 77.2 kg (170 lb 3.2 oz)                BP Readings from Last 3 Encounters:   05/14/24 112/73         /73 (BP Location: Right arm, Patient Position: Chair, Cuff Size: Adult Regular)   Pulse 80   Temp 97.6  F (36.4  C) (Oral)   Wt 77.2 kg (170 lb 3.2 oz)   SpO2 95%   BMI 24.42 kg/m        Lizabeth Armenta                      ______________________________________      Patient was asked about 14 Review of systems including changes in vision (dry eyes, double vision), hearing, heart, lungs, musculoskeletal, depression, anxiety, snoring, RBD, insomnia, urinary frequency, urinary urgency, constipation, swallowing problems, hematological, ID, allergies, skin problems: seborrhea, endocrinological: thyroid, diabetes, cholesterol; balance, weight changes, and other neurological problems and these were not significant at this time except for   Patient Active Problem List   Diagnosis    Abnormal levels of other serum enzymes    Interstitial pulmonary disease (H)    Other emphysema (H)    Other specified anxiety disorders    Type 2 diabetes mellitus without complications (H)    Tremor        No Known Allergies  No past surgical history on file.  Past Medical History:   Diagnosis Date    Hepatic steatosis 2017    Noted on ultrasound    Tremor 05/23/2024     Social History     Socioeconomic History    Marital status: Single     Spouse name: Not on file    Number of children: Not on file    Years of education: Not on file    Highest education level: Not on file   Occupational History    Not on file   Tobacco Use    Smoking status: Former     Types: Cigarettes    Smokeless tobacco: Never   Substance and Sexual Activity    Alcohol use: Not Currently     Comment: not since 2017    Drug use: Never    Sexual activity: Not on file   Other Topics Concern    Not on file   Social History Narrative    Not on file     Social Determinants of Health     Financial Resource Strain: Not on File (8/26/2019)    Received from [employer]     Financial Resource Strain     Financial Resource Strain: 0   Food Insecurity: Not on File (8/26/2019)    Received from [employer] [facility] [employer] [facility] [employer]     Food Insecurity     Food: 0   Transportation Needs: Not on File (8/26/2019)    Received from [employer] [facility] [employer]     Transportation Needs     Transportation: 0   Physical Activity: Not on File (8/26/2019)     Received from [facility]     Physical Activity     Physical Activity: 0   Stress: Not on File (2019)    Received from [employer]     Stress     Stress: 0   Social Connections: Not on File (2019)    Received from [facility]     Social Connections     Social Connections and Isolation: 0   Interpersonal Safety: Not on file   Housing Stability: Not on File (2019)    Received from [facility]     Housing Stability     Housin       Drug and lactation database from the [place] [employer] of Medicine:  http://toxnet.nlm.nih.gov/cgi-bin/sis/htmlgen?LACT      B/P: Data Unavailable, T: Data Unavailable, P: Data Unavailable, R: Data Unavailable 0 lbs 0 oz  There were no vitals taken for this visit., There is no height or weight on file to calculate BMI.  Medications and Vitals not listed above were documented in the cart and reviewed by me.     Current Outpatient Medications   Medication Sig Dispense Refill    Pirfenidone 267 MG TABS Take 1 Tablet (267 mg) by mouth three times a day for 7 days, THEN 2 Tablets (534 mg) three times a day for 7 days, THEN 3 Tablets (801 mg) three times a day for 16 days.      albuterol (PROAIR HFA/PROVENTIL HFA/VENTOLIN HFA) 108 (90 Base) MCG/ACT inhaler Inhale 2 puffs into the lungs 4 times daily      atorvastatin (LIPITOR) 20 MG tablet Take 1 tablet by mouth daily      benzonatate (TESSALON) 100 MG capsule Take 2 capsules (200 mg) by mouth 3 times daily as needed for cough 180 capsule 3    Dulaglutide (TRULICITY) 3 MG/0.5ML SOPN Inject 3 mg Subcutaneous once a week      insulin aspart (NOVOPEN ECHO) 100 UNIT/ML cartridge Inject 90 Units Subcutaneous 3 times daily (with meals)      insulin glargine (LANTUS PEN) 100 UNIT/ML pen Inject 15 Units Subcutaneous at bedtime      losartan (COZAAR) 50 MG tablet Take 1 tablet by mouth daily      metFORMIN (GLUCOPHAGE XR) 500 MG 24 hr tablet Take 500 mg by mouth 2 times daily (with meals)      pirfenidone (ESBRIET) 267 MG capsule Take 267 mg by  mouth 3 times daily Start with 267mg if tolerated then move to 534mg if tolerated then move to 801mg      [START ON 6/12/2024] Pirfenidone 801 MG TABS Take 801 mg by mouth 3 times daily      SYMBICORT 160-4.5 MCG/ACT Inhaler Inhale 2 puffs into the lungs two times daily      TRULICITY 0.75 MG/0.5ML pen Inject 0.75 mg Subcutaneous every 7 days      TRULICITY 1.5 MG/0.5ML pen Inject 1.5 mg Subcutaneous every 7 days           Irving Fox MD

## 2024-06-18 NOTE — PROGRESS NOTES
Travon Cartwright comes into clinic today at the request of Dr. Carey Tucker Ordering Provider for 6 minute walk      Antonino Katz RRT

## 2024-06-18 NOTE — DISCHARGE INSTRUCTIONS

## 2024-06-18 NOTE — PATIENT INSTRUCTIONS
ment:  (R25.1) Tremor  (primary encounter diagnosis)  Tremor since high school  Left handed  Mother and father were left handed.   Shoot with left hand  Tremor does not impact his ADLs  He has some problems threading nut on bolt  Has some problems with feeding.     Review of diagnosis    Essential tremor     Avoidance of dopamine blockers   Not taking    Motor complication review   N/a    Review of Impulse control disorders   N/a    Review of surgical or medication options   reviewed    Gait/Balance/Falls   No falls    Exercise/Therapy performed/offered   Tries to exercise    Cognitive/Driving   Denies     Mood   Working on disability   Lives by himself   Property management/retail  Grew up in [place]  Sister nearby in [place]  No mood issues.     Hallucinations/delusions   Denies     Sleep   Denies  No night time behavior    Bladder/Renal/Prostate/Gyn/Other   Nocturia 1/noc  Drinks a lot of water.     GI/Constipation/GERD   Will see mandy la to discuss liver status.   No constipation and may have diarrhea from metformin.     Hepatic Steatosis: He has had intermittently elevated LFTs since 2019, had a liver US in 2017 showing hepatic steatosis. GI felt he was low likelihood advanced fibrosis.   hepatology for evaluation in anticipation of transplant evaluation    ENDO/Lipid/DM/Bone density/Thyroid  Type 2 diabetes mellitus   TSH was normal today   A1c 8.7 on 4/22/2024  More recent was better than that possible 7.9    Cardio/heart/Hyper or Hypotensive   Hypertension  On medications    Vision/Dry Eyes/Cataracts/Glaucoma/Macular   No problems    Heme/Anticoagulation/Antiplatelet/Anemia/Other  No problems.     ENT/Resp  No loss of smell  No covid   Interstitial lung disease  Emphysema  Awaiting lung transplant    June 18, 2024    FINDINGS:  The hemidiaphragms move symmetrically in all manners of tested  breathing. Diffuse interstitial opacities.  IMPRESSION:  Negative sniff  test.    Skin/Cancer/Seborrhea/other  No skin cancer    Musculoskeletal/Pain/Headache  denies    Other:      Medications            Albuterol inhaler no       Atorvastatin lipitor 20mg  1       Benzonatate tessalon 100mg no       Dulaglutide trulicity week       Empagliflozin jardiance 10mg  1       Insulin glargine lantus 15u   15 u    Losartan cozaar 50mg  1       Metformin glucophage XR 500mg 24hr 1   1    Pirfenidone 801mg esbriet lungs antifibrotid 1 1  1    Symbicort 160 4.5 varies   varies    Trulicity 1.5  X2 wk                         Plan:    Essential tremor   He has no rigidity or bradykinesia and has normal voice volume  He has no rbd or loss of smell.   There is no family history of tremor  He has a mild to moderate postural and action tremor of his UEs and a bit in his leg with activity.   Discussed the diagnosis    Set up his sister for chiquis with his consent.

## 2024-06-19 LAB
CMV IGG SERPL IA-ACNC: >10 U/ML
CMV IGG SERPL IA-ACNC: ABNORMAL
EBV VCA IGG SER IA-ACNC: >750 U/ML
EBV VCA IGG SER IA-ACNC: POSITIVE
FIO2-PRE: 52 %
G6PD RBC-CCNT: 12.7 U/G HB
GAMMA INTERFERON BACKGROUND BLD IA-ACNC: 0 IU/ML
HSV1 IGG SERPL QL IA: 46.8 INDEX
HSV1 IGG SERPL QL IA: ABNORMAL
HSV2 IGG SERPL QL IA: 0.07 INDEX
HSV2 IGG SERPL QL IA: ABNORMAL
IGA SERPL-MCNC: 207 MG/DL (ref 84–499)
IGE SERPL-ACNC: 47 KU/L (ref 0–114)
IGG SERPL-MCNC: 1009 MG/DL (ref 610–1616)
IGM SERPL-MCNC: 116 MG/DL (ref 35–242)
M TB IFN-G BLD-IMP: NEGATIVE
M TB IFN-G CD4+ BCKGRND COR BLD-ACNC: 10 IU/ML
MITOGEN IGNF BCKGRD COR BLD-ACNC: 0.01 IU/ML
MITOGEN IGNF BCKGRD COR BLD-ACNC: 0.01 IU/ML
QUANTIFERON MITOGEN: 10 IU/ML
QUANTIFERON NIL TUBE: 0 IU/ML
QUANTIFERON TB1 TUBE: 0.01 IU/ML
QUANTIFERON TB2 TUBE: 0.01
T GONDII IGG SER QL: <3 IU/ML (ref 0–7.1)
VZV IGG SER QL IA: 3972 INDEX
VZV IGG SER QL IA: POSITIVE

## 2024-06-19 NOTE — PROGRESS NOTES
[facility] SOLID ORGAN TRANSPLANT  OUTPATIENT MNT: LUNG TRANSPLANT EVALUATION    Current BMI: 25.3 (HT 70 in,  lbs/80 kg)- data from 4/26/24    BMI guideline for lung transplant up to a BMI of 35    Frailty Assessment-- Frail (3/5 points)- unintended wt loss, exhaustion, low   Handgrip Strength: 22    Reference:  Score of 0-2 = Not Frail  Score of 3-5 = Frail     FraiLT-- Pre frail  (LFI: 4.21)  [email or web address]  Reference Range  Robust <3.2  Pre Frail 3.2-4.3  Frail >4.4    Recommendations:  - Pt would benefit from ongoing physical activity  - Continue with protein drink daily at minimum to help maintain weight      TIME SPENT: 15 minutes  VISIT TYPE: Initial   REFERRING PHYSICIAN: Garrett   PT ACCOMPANIED BY: his sister & foster sister     History of previous txp: none     NUTRITION ASSESSMENT  H/o DM II (x3-4 yrs). Pt checks BG via Dexcom G7.   Trulicity, metformin, jardiance, lantus 30 units/day    Reports new addition of Jardiance has helped reduce BG remarkably. Recently saw Endo 6/7/24.  Lab Results   Component Value Date    A1C 8.4 06/18/2024     - Meal prep & grocery shopping: pt's mom   - Previous RD education: not asked   - Appetite: fair   - Food allergies/intolerances: no  - Issues chewing or swallowing: no  - N/V/D/C: diarrhea from meds   - Acid reflux/symptoms: no  - Food access concerns: not asked     Vitamins, Supplements, Pertinent Meds: none  Herbal Medicines/Supplements: none   Protein Supplements: 1 protein drink per day- new as of a few weeks ago    Weight hx: down to 165 lbs (prev 180 lbs)- ?stable   Wt Readings from Last 10 Encounters:   06/07/24 77.2 kg (170 lb 3.2 oz)   05/14/24 77.2 kg (170 lb 3.2 oz)   05/06/24 79.4 kg (175 lb)   8/2023- 179 lbs   4/2023- 180 lbs    PHYSICAL ACTIVITY   No rehab locally based on where he lives  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     NUTRITION INTERVENTION   Nutrition education provided:  Reviewed current diet. Continue with protein shake with goal to maintain weight and build back some muscle. Unsure if any routine activity would be realistic for patient/ doable right now, but discussed importance of this.   Reviewed DM management.     Reviewed post txp diet guidelines in brief (will review in further detail post txp):  (1) Review of proper food safety measures d/t immunosuppressant therapy post-op and increased risk for food-borne illness    (2) Avoid the following post txp d/t risk for rejection, unknown effects on the organs, and/or potential interactions with immunosuppressants:  - Herbal, Chinese, holistic, chiropractic, natural, alternative medicines and supplements  - Detoxes and cleanses  - Weight loss pills  - Protein powders or other products with extracts or herbs (ie green tea extract)    (3) Med regimen and possible side effects    Patient Understanding: Pt verbalized understanding of education provided.  Expected Engagement: Good  Follow-Up Plans: PRN     NUTRITION GOALS  No nutrition goals identified at this time     Katerina Rivas RD, LD, CCTD

## 2024-06-20 ENCOUNTER — ALLIED HEALTH/NURSE VISIT (OUTPATIENT)
Dept: TRANSPLANT | Facility: CLINIC | Age: 59
End: 2024-06-20
Attending: INTERNAL MEDICINE
Payer: COMMERCIAL

## 2024-06-20 ENCOUNTER — ANCILLARY PROCEDURE (OUTPATIENT)
Dept: CARDIOLOGY | Facility: CLINIC | Age: 59
End: 2024-06-20
Attending: INTERNAL MEDICINE
Payer: COMMERCIAL

## 2024-06-20 ENCOUNTER — ANCILLARY PROCEDURE (OUTPATIENT)
Dept: ULTRASOUND IMAGING | Facility: CLINIC | Age: 59
End: 2024-06-20
Attending: INTERNAL MEDICINE
Payer: COMMERCIAL

## 2024-06-20 DIAGNOSIS — Z76.82 ORGAN TRANSPLANT CANDIDATE: ICD-10-CM

## 2024-06-20 DIAGNOSIS — J84.9 ILD (INTERSTITIAL LUNG DISEASE) (H): ICD-10-CM

## 2024-06-20 DIAGNOSIS — Z01.818 ENCOUNTER FOR PRE-TRANSPLANT EVALUATION FOR LUNG TRANSPLANT: ICD-10-CM

## 2024-06-20 DIAGNOSIS — K76.0 HEPATIC STEATOSIS: ICD-10-CM

## 2024-06-20 LAB
LABCORP INTERFACED MISCELLANEOUS TEST RESULT: NORMAL
LVEF ECHO: NORMAL

## 2024-06-20 PROCEDURE — 76700 US EXAM ABDOM COMPLETE: CPT | Mod: GC | Performed by: STUDENT IN AN ORGANIZED HEALTH CARE EDUCATION/TRAINING PROGRAM

## 2024-06-20 PROCEDURE — 93306 TTE W/DOPPLER COMPLETE: CPT | Performed by: INTERNAL MEDICINE

## 2024-06-20 PROCEDURE — 93975 VASCULAR STUDY: CPT | Mod: GC | Performed by: STUDENT IN AN ORGANIZED HEALTH CARE EDUCATION/TRAINING PROGRAM

## 2024-06-20 RX ORDER — ACYCLOVIR 200 MG/1
9 CAPSULE ORAL ONCE
Status: ACTIVE | OUTPATIENT
Start: 2024-06-20

## 2024-06-20 RX ORDER — ACYCLOVIR 200 MG/1
9 CAPSULE ORAL ONCE
OUTPATIENT
Start: 2024-06-20 | End: 2024-06-20

## 2024-06-20 NOTE — PROGRESS NOTES
Patient of Dr. Tucker seen in clinic for psychosocial assessment.   70 minutes spent with patient. 100% of visit consisted of counseling related to issues surrounding ILD and lung transplant.    Psychosocial Assessment   Name: Travon Cartwright     MRN:  [id number]        Patient Name / Age / Race: Travon Cartwright /59 year old /   Source of Information: Patient and Sisters, Mine and Tianna   : [facility]   Interview Date: 2024   Reason for Referral: Lung Transplant     Current Living Situation   Location ([facility]/State): [address] [place] [id number]   With Whom: alone   Type of Home: single family. 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    Working Phone? Yes    Three Pronged Outlet? N/a   Distance from Hospital: 3 hours   Need to Relocate Post Surgery? Yes    Discussed? 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     Education: High school + vocational   Wren? No   Income: Savings --  currently applying for SSDI, but has had no income in 16 years   Insurance: [employer]   Name of Private Insurance: N/a   Medication Coverage: Through [employer].  Low or no co-pays    In current situation, pt. can afford medication and supply costs:  Yes    Other Financial Concerns/Issues: If patient receives SSDI, maybe eligible for Medicare soon as he has been unable to work due to illness for 3 years. Need to ascertain if [employer] will continue with Medicare or if he will need a Medicare supplement and PDP.       Family/Social Support   Marital Status: single (never )   Partner Name: N/a   Length of Time : N/a   Partner s Employment: N/a   Relationship: N/a   Children: none   Parents: Father .   Mother, Mely (86) alive and well.  Runs the resort he lives on.     Siblings: Sister Mine Crook.  Also lives at the resort.  chris Wynn  sister, lives in [facility]   Other Family or Friends Close by: Repeat yearly guests at the resort.     Support System: available, helpful   Primary Support Person: Mine   Issues: Mine is retired NP.  Available to be patient's primary caregiver.      Activities/Functional Ability   Current Level: ambulatory and independent with ADL's,    Daily Activities: Unable to do house or yard work.  Can't walk great distances.     Transportation: self      Medical Status   Patient s understanding of need for surgical intervention: Feels like it the only option he has.  Very focused on positive aspects of transplant.  Reluctant to discuss risks.  'i know I am a good candidate.  Those things won't happen to me.'   Advanced Directive? No    Details: would want sister Mine to be decision maker.  Encouraged him to complete HCD.  Provided with copy.  Explained next of kin policy should he not complete.    Adherence History: States he has been adherent.    Ability to Adhere to Complex Medical Regime: No concerns.       Behavioral   Chemical Dependency Issues: No hx of alcohol abuse.  2 DUIs.  Quit 7 years ago. No treatment.  Quit cold turkey.  No relapse. Audit=0.  No other drug use.     Smoker? Yes   quit 7 years ago.  Was never a 'pack a day' or even 'half a pack day'' smoker.  Smoked a few cigarettes daily.     Psychiatric impairment: No   negative mental health history.  Denies depression, anxiety any hx of mental health services.  PHQ-9=0.  ROBERT-7=0   Coping Style/Strategies: Denies stress   Recent Legal History: No   Teaching Completed During Assessment Related To Transplant: Housing and relocation needs post surgery.  Caregiver needs post surgery.  Financial issues related to surgery.  Risks of alcohol use post surgery.  Common psychosocial stressors pre/post surgery.  Support group availability.   Psychosocial Risks Reviewed Related To Transplant: Increased stress related to your emotional, family, social,  employment, or financial situation.  Affect on work and/or disability benefits.  Affect on future health and life insurance.  Outcome expectations may not be met.  Mental Health risks: anxiety, depression, PTSD, guilt, grief and chronic fatigue.     Observed Behavior   Well informed? Yes  Angry? No   Process info well? Yes  Hostile? No   Evasive? No Oriented X3? Yes    Cautious/Suspicious? No Motivated? Yes    Appropriate Behavior? Yes  Depressed? No   Appropriate Affect? Yes  Anxious? No   Interview Observations: Answers questions asked without providing additional info.  Rather simplified view of transplant.  SLUMS=24.  Indicating mild cognitive impairment.       Selection Criteria Met   Plan for Support Yes    Chemical Dependence Yes    Smoking Yes    Mental Health Yes    Adequate Finances Yes      Risk Issues:     None identified.     Final Evaluation/Assessment:     Patient very focused on only positive aspects of transplant, but otherwise a good candidate.  He has good support from his sister.  No financial or insurance concerns currently.  No psychiatric or CD issues.      Patient understands risks and benefits of Lung Transplant: Yes     Recommendation:    good    Signature: DEDRICK Salazar     Title: Licensed Independent Clinical                Interview Date: June 20, 2024

## 2024-06-21 ENCOUNTER — HOSPITAL ENCOUNTER (OUTPATIENT)
Facility: CLINIC | Age: 59
Discharge: HOME | End: 2024-06-21
Attending: INTERNAL MEDICINE | Admitting: INTERNAL MEDICINE
Payer: COMMERCIAL

## 2024-06-21 ENCOUNTER — APPOINTMENT (OUTPATIENT)
Dept: MEDSURG UNIT | Facility: CLINIC | Age: 59
End: 2024-06-21
Attending: INTERNAL MEDICINE
Payer: COMMERCIAL

## 2024-06-21 ENCOUNTER — HOSPITAL ENCOUNTER (OUTPATIENT)
Dept: NUCLEAR MEDICINE | Facility: CLINIC | Age: 59
Setting detail: NUCLEAR MEDICINE
Discharge: HOME | End: 2024-06-21
Attending: INTERNAL MEDICINE
Payer: COMMERCIAL

## 2024-06-21 VITALS
OXYGEN SATURATION: 100 % | HEART RATE: 81 BPM | WEIGHT: 160.5 LBS | TEMPERATURE: 97.4 F | BODY MASS INDEX: 22.98 KG/M2 | RESPIRATION RATE: 20 BRPM | DIASTOLIC BLOOD PRESSURE: 72 MMHG | SYSTOLIC BLOOD PRESSURE: 108 MMHG | HEIGHT: 70 IN

## 2024-06-21 DIAGNOSIS — Z01.818 ENCOUNTER FOR PRE-TRANSPLANT EVALUATION FOR LUNG TRANSPLANT: ICD-10-CM

## 2024-06-21 DIAGNOSIS — R93.89 ABNORMAL CT SCAN: ICD-10-CM

## 2024-06-21 DIAGNOSIS — Z76.82 ORGAN TRANSPLANT CANDIDATE: ICD-10-CM

## 2024-06-21 DIAGNOSIS — R07.9 CHEST PAIN, UNSPECIFIED: ICD-10-CM

## 2024-06-21 DIAGNOSIS — J84.9 ILD (INTERSTITIAL LUNG DISEASE) (H): ICD-10-CM

## 2024-06-21 DIAGNOSIS — I25.10 CORONARY ARTERY DISEASE INVOLVING NATIVE HEART WITHOUT ANGINA PECTORIS, UNSPECIFIED VESSEL OR LESION TYPE: Primary | ICD-10-CM

## 2024-06-21 PROBLEM — Z98.890 STATUS POST CORONARY ANGIOGRAM: Status: ACTIVE | Noted: 2024-06-21

## 2024-06-21 LAB
A*: NORMAL
A*LOCUS SEROLOGIC EQUIVALENT: 2
A*LOCUS: NORMAL
A*SEROLOGIC EQUIVALENT: 24
ABTEST METHOD: NORMAL
ANION GAP SERPL CALCULATED.3IONS-SCNC: 9 MMOL/L (ref 7–15)
B*: NORMAL
B*LOCUS SEROLOGIC EQUIVALENT: 39
B*LOCUS: NORMAL
B*SEROLOGIC EQUIVALENT: 51
BUN SERPL-MCNC: 9.8 MG/DL (ref 8–23)
BW-1: NORMAL
BW-2: NORMAL
C*: NORMAL
C*LOCUS SEROLOGIC EQUIVALENT: 7
C*LOCUS: NORMAL
C*SEROLOGIC EQUIVALENT: 14
CALCIUM SERPL-MCNC: 9.6 MG/DL (ref 8.8–10.2)
CHLORIDE SERPL-SCNC: 103 MMOL/L (ref 98–107)
COTININE SERPL-MCNC: <5 NG/ML
CREAT SERPL-MCNC: 0.76 MG/DL (ref 0.67–1.17)
DEPRECATED HCO3 PLAS-SCNC: 25 MMOL/L (ref 22–29)
DPA1*: NORMAL
DPB1*: NORMAL
DQA1*LOCUS: NORMAL
DQB1*LOCUS SEROLOGIC EQUIVALENT: 4
DQB1*LOCUS: NORMAL
DRB1*LOCUS SEROLOGIC EQUIVALENT: 8
DRB1*LOCUS: NORMAL
DRSSO TEST METHOD: NORMAL
EGFRCR SERPLBLD CKD-EPI 2021: >90 ML/MIN/1.73M2
ERYTHROCYTE [DISTWIDTH] IN BLOOD BY AUTOMATED COUNT: 13.1 % (ref 10–15)
GLUCOSE BLDC GLUCOMTR-MCNC: 100 MG/DL (ref 70–99)
GLUCOSE BLDC GLUCOMTR-MCNC: 74 MG/DL (ref 70–99)
GLUCOSE SERPL-MCNC: 89 MG/DL (ref 70–99)
HCT VFR BLD AUTO: 48.9 % (ref 40–53)
HGB BLD-MCNC: 16.2 G/DL (ref 13.3–17.7)
HGB BLD-MCNC: 16.6 G/DL (ref 13.3–17.7)
IGG SERPL-MCNC: 1029 MG/DL (ref 610–1616)
IGG1 SER-MCNC: 515 MG/DL (ref 382–929)
IGG2 SER-MCNC: 308 MG/DL (ref 242–700)
IGG3 SER-MCNC: 91 MG/DL (ref 22–176)
IGG4 SER-MCNC: 29 MG/DL (ref 4–86)
INR PPP: 1.18 (ref 0.85–1.15)
LABORATORY REPORT: NORMAL
MCH RBC QN AUTO: 30.9 PG (ref 26.5–33)
MCHC RBC AUTO-ENTMCNC: 33.9 G/DL (ref 31.5–36.5)
MCV RBC AUTO: 91 FL (ref 78–100)
NICOTINE SERPL-MCNC: <5 NG/ML
PETH INTERPRETATION: NORMAL
PLATELET # BLD AUTO: 248 10E3/UL (ref 150–450)
PLPETH BLD-MCNC: <10 NG/ML
POPETH BLD-MCNC: <10 NG/ML
POTASSIUM SERPL-SCNC: 4.3 MMOL/L (ref 3.4–5.3)
RBC # BLD AUTO: 5.38 10E6/UL (ref 4.4–5.9)
SODIUM SERPL-SCNC: 137 MMOL/L (ref 135–145)
SUBCLASSES, PERCENT: 92 %
TPMT BLD-CCNC: 31 U/ML
WBC # BLD AUTO: 8.4 10E3/UL (ref 4–11)

## 2024-06-21 PROCEDURE — 250N000009 HC RX 250: Performed by: INTERNAL MEDICINE

## 2024-06-21 PROCEDURE — 85018 HEMOGLOBIN: CPT | Mod: 91

## 2024-06-21 PROCEDURE — 258N000003 HC RX IP 258 OP 636: Mod: JZ | Performed by: INTERNAL MEDICINE

## 2024-06-21 PROCEDURE — 93005 ELECTROCARDIOGRAM TRACING: CPT

## 2024-06-21 PROCEDURE — 93456 R HRT CORONARY ARTERY ANGIO: CPT | Performed by: INTERNAL MEDICINE

## 2024-06-21 PROCEDURE — 36415 COLL VENOUS BLD VENIPUNCTURE: CPT | Performed by: INTERNAL MEDICINE

## 2024-06-21 PROCEDURE — 78580 LUNG PERFUSION IMAGING: CPT | Mod: 26 | Performed by: RADIOLOGY

## 2024-06-21 PROCEDURE — 250N000011 HC RX IP 250 OP 636: Performed by: INTERNAL MEDICINE

## 2024-06-21 PROCEDURE — 82962 GLUCOSE BLOOD TEST: CPT

## 2024-06-21 PROCEDURE — 250N000011 HC RX IP 250 OP 636: Mod: JZ | Performed by: INTERNAL MEDICINE

## 2024-06-21 PROCEDURE — 99152 MOD SED SAME PHYS/QHP 5/>YRS: CPT | Mod: GC | Performed by: INTERNAL MEDICINE

## 2024-06-21 PROCEDURE — 272N000001 HC OR GENERAL SUPPLY STERILE: Performed by: INTERNAL MEDICINE

## 2024-06-21 PROCEDURE — 999N000134 HC STATISTIC PP CARE STAGE 3

## 2024-06-21 PROCEDURE — C1751 CATH, INF, PER/CENT/MIDLINE: HCPCS | Performed by: INTERNAL MEDICINE

## 2024-06-21 PROCEDURE — 343N000001 HC RX 343: Performed by: INTERNAL MEDICINE

## 2024-06-21 PROCEDURE — C1726 CATH, BAL DIL, NON-VASCULAR: HCPCS | Performed by: INTERNAL MEDICINE

## 2024-06-21 PROCEDURE — 999N000054 HC STATISTIC EKG NON-CHARGEABLE

## 2024-06-21 PROCEDURE — 99153 MOD SED SAME PHYS/QHP EA: CPT | Performed by: INTERNAL MEDICINE

## 2024-06-21 PROCEDURE — A9540 TC99M MAA: HCPCS | Performed by: INTERNAL MEDICINE

## 2024-06-21 PROCEDURE — C1894 INTRO/SHEATH, NON-LASER: HCPCS | Performed by: INTERNAL MEDICINE

## 2024-06-21 PROCEDURE — 999N000142 HC STATISTIC PROCEDURE PREP ONLY

## 2024-06-21 PROCEDURE — 85610 PROTHROMBIN TIME: CPT | Performed by: INTERNAL MEDICINE

## 2024-06-21 PROCEDURE — 78580 LUNG PERFUSION IMAGING: CPT

## 2024-06-21 PROCEDURE — 93456 R HRT CORONARY ARTERY ANGIO: CPT | Mod: 26 | Performed by: INTERNAL MEDICINE

## 2024-06-21 PROCEDURE — 80048 BASIC METABOLIC PNL TOTAL CA: CPT | Performed by: INTERNAL MEDICINE

## 2024-06-21 PROCEDURE — 85027 COMPLETE CBC AUTOMATED: CPT | Performed by: INTERNAL MEDICINE

## 2024-06-21 PROCEDURE — 99152 MOD SED SAME PHYS/QHP 5/>YRS: CPT | Performed by: INTERNAL MEDICINE

## 2024-06-21 RX ORDER — ASPIRIN 325 MG
325 TABLET ORAL ONCE
Status: DISCONTINUED | OUTPATIENT
Start: 2024-06-21 | End: 2024-06-21 | Stop reason: HOSPADM

## 2024-06-21 RX ORDER — NALOXONE HYDROCHLORIDE 0.4 MG/ML
0.2 INJECTION, SOLUTION INTRAMUSCULAR; INTRAVENOUS; SUBCUTANEOUS
Status: DISCONTINUED | OUTPATIENT
Start: 2024-06-21 | End: 2024-06-21 | Stop reason: HOSPADM

## 2024-06-21 RX ORDER — POTASSIUM CHLORIDE 750 MG/1
40 TABLET, EXTENDED RELEASE ORAL
Status: DISCONTINUED | OUTPATIENT
Start: 2024-06-21 | End: 2024-06-21 | Stop reason: HOSPADM

## 2024-06-21 RX ORDER — FENTANYL CITRATE 50 UG/ML
25 INJECTION, SOLUTION INTRAMUSCULAR; INTRAVENOUS
Status: DISCONTINUED | OUTPATIENT
Start: 2024-06-21 | End: 2024-06-21 | Stop reason: HOSPADM

## 2024-06-21 RX ORDER — POTASSIUM CHLORIDE 750 MG/1
20 TABLET, EXTENDED RELEASE ORAL
Status: DISCONTINUED | OUTPATIENT
Start: 2024-06-21 | End: 2024-06-21 | Stop reason: HOSPADM

## 2024-06-21 RX ORDER — SODIUM CHLORIDE 9 MG/ML
INJECTION, SOLUTION INTRAVENOUS CONTINUOUS
Status: DISCONTINUED | OUTPATIENT
Start: 2024-06-21 | End: 2024-06-21 | Stop reason: HOSPADM

## 2024-06-21 RX ORDER — NALOXONE HYDROCHLORIDE 0.4 MG/ML
0.4 INJECTION, SOLUTION INTRAMUSCULAR; INTRAVENOUS; SUBCUTANEOUS
Status: DISCONTINUED | OUTPATIENT
Start: 2024-06-21 | End: 2024-06-21 | Stop reason: HOSPADM

## 2024-06-21 RX ORDER — FLUMAZENIL 0.1 MG/ML
0.2 INJECTION, SOLUTION INTRAVENOUS
Status: DISCONTINUED | OUTPATIENT
Start: 2024-06-21 | End: 2024-06-21 | Stop reason: HOSPADM

## 2024-06-21 RX ORDER — HEPARIN SODIUM 1000 [USP'U]/ML
INJECTION, SOLUTION INTRAVENOUS; SUBCUTANEOUS
Status: DISCONTINUED | OUTPATIENT
Start: 2024-06-21 | End: 2024-06-21 | Stop reason: HOSPADM

## 2024-06-21 RX ORDER — OXYCODONE HYDROCHLORIDE 10 MG/1
10 TABLET ORAL EVERY 4 HOURS PRN
Status: DISCONTINUED | OUTPATIENT
Start: 2024-06-21 | End: 2024-06-21 | Stop reason: HOSPADM

## 2024-06-21 RX ORDER — FENTANYL CITRATE 50 UG/ML
INJECTION, SOLUTION INTRAMUSCULAR; INTRAVENOUS
Status: DISCONTINUED | OUTPATIENT
Start: 2024-06-21 | End: 2024-06-21 | Stop reason: HOSPADM

## 2024-06-21 RX ORDER — LIDOCAINE 40 MG/G
CREAM TOPICAL
Status: DISCONTINUED | OUTPATIENT
Start: 2024-06-21 | End: 2024-06-21 | Stop reason: HOSPADM

## 2024-06-21 RX ORDER — ASPIRIN 81 MG/1
243 TABLET, CHEWABLE ORAL ONCE
Status: DISCONTINUED | OUTPATIENT
Start: 2024-06-21 | End: 2024-06-21 | Stop reason: HOSPADM

## 2024-06-21 RX ORDER — ATROPINE SULFATE 0.1 MG/ML
0.5 INJECTION INTRAVENOUS
Status: DISCONTINUED | OUTPATIENT
Start: 2024-06-21 | End: 2024-06-21 | Stop reason: HOSPADM

## 2024-06-21 RX ORDER — IOPAMIDOL 755 MG/ML
INJECTION, SOLUTION INTRAVASCULAR
Status: DISCONTINUED | OUTPATIENT
Start: 2024-06-21 | End: 2024-06-21 | Stop reason: HOSPADM

## 2024-06-21 RX ORDER — OXYCODONE HYDROCHLORIDE 5 MG/1
5 TABLET ORAL EVERY 4 HOURS PRN
Status: DISCONTINUED | OUTPATIENT
Start: 2024-06-21 | End: 2024-06-21 | Stop reason: HOSPADM

## 2024-06-21 RX ADMIN — SODIUM CHLORIDE: 9 INJECTION, SOLUTION INTRAVENOUS at 09:53

## 2024-06-21 RX ADMIN — KIT FOR THE PREPARATION OF TECHNETIUM TC 99M ALBUMIN AGGREGATED 6 MILLICURIE: 2.5 INJECTION, POWDER, FOR SOLUTION INTRAVENOUS at 08:20

## 2024-06-21 ASSESSMENT — ACTIVITIES OF DAILY LIVING (ADL)
ADLS_ACUITY_SCORE: 35

## 2024-06-21 ASSESSMENT — ANXIETY QUESTIONNAIRES
6. BECOMING EASILY ANNOYED OR IRRITABLE: NOT AT ALL
IF YOU CHECKED OFF ANY PROBLEMS ON THIS QUESTIONNAIRE, HOW DIFFICULT HAVE THESE PROBLEMS MADE IT FOR YOU TO DO YOUR WORK, TAKE CARE OF THINGS AT HOME, OR GET ALONG WITH OTHER PEOPLE: NOT DIFFICULT AT ALL
3. WORRYING TOO MUCH ABOUT DIFFERENT THINGS: NOT AT ALL
2. NOT BEING ABLE TO STOP OR CONTROL WORRYING: NOT AT ALL
GAD7 TOTAL SCORE: 0
7. FEELING AFRAID AS IF SOMETHING AWFUL MIGHT HAPPEN: NOT AT ALL
1. FEELING NERVOUS, ANXIOUS, OR ON EDGE: NOT AT ALL
5. BEING SO RESTLESS THAT IT IS HARD TO SIT STILL: NOT AT ALL
GAD7 TOTAL SCORE: 0

## 2024-06-21 ASSESSMENT — PATIENT HEALTH QUESTIONNAIRE - PHQ9
5. POOR APPETITE OR OVEREATING: NOT AT ALL
SUM OF ALL RESPONSES TO PHQ QUESTIONS 1-9: 0

## 2024-06-21 NOTE — DISCHARGE INSTRUCTIONS
Going Home after A Right Heart Catheterization  &  Coronary Angiogram (Cardiac)  ______________________________________________      After you go home:  Have an adult stay with you for 24 hours.  Drink plenty of fluids.  You may eat your normal diet, unless your doctor tells you otherwise.  For 24 hours:  Relax and take it easy.  Do NOT smoke.  Do NOT make any important or legal decisions.  Do NOT drive or operate machines at home or at work.  Do NOT drink alcohol.  Remove the Band-Aid after 24 hours. If there is minor oozing, apply another Band-aid and remove it after 12 hours.  For 2 days, do NOT have sex or do any heavy exercise.  Do NOT take a bath, or use a hot tub or pool for at least 3 days. You may shower after 24 hours.    Care for right neck site:   Monitor neck site for bleeding, swelling, or voice changes. If you notice bleeding or swelling immediately apply pressure to the site and call number below to speak with Cardiology Fellow.  If you experience any changes in your breathing you should call your doctor immediately or come to the closest Emergency Department.  Do not drive yourself.      Care of wrist or arm site:  It is normal to have soreness at the puncture site and mild tingling in your hand for up to 3 days.  For 2 days, do not use your hand or arm to support your weight (such as rising from a chair) or bend your wrist (such as lifting a garage door).  For 2 days, do not lift more than 5 pounds or exercise your arm (tennis, golf or bowling).    If you start bleeding from the site in your arm:  Sit down and press firmly on the site with your fingers for 10 minutes. Call your doctor as soon as you can.  If the bleeding stops, sit still and keep your wrist straight for 2 hours.    Medicines:  If you have started taking Plavix or Effient, do not stop taking it until you talk to your heart doctor (cardiologist).  If you are on metformin (Glucophage), do not restart it until you have blood tests  (within 2 to 3 days after discharge). When your doctor tells you it is safe, you may restart the metformin.  If you have stopped any other medicines, check with your nurse or provider about when to restart them.    Call 911 right away if you have bleeding that is heavy or does not stop.    Call your doctor if:  You have a large or growing hard lump around the site.  The site is red, swollen, hot or tender.  Blood or fluid is draining from the site.  You have chills or a fever greater than 101 F (38 C).  Your leg or arm feels numb or cool.  You have hives, a rash or unusual itching.      [facility] [place]:  [phone] (7 days a week)

## 2024-06-21 NOTE — PRE-PROCEDURE
GENERAL PRE-PROCEDURE:   Procedure:  Coronary angiogram with possible intervention, right heart catheterization  Date/Time:  6/21/2024 11:27 AM    Written consent obtained?: Yes    Risks and benefits: Risks, benefits and alternatives were discussed    Consent given by:  Patient  Patient states understanding of procedure being performed: Yes    Patient's understanding of procedure matches consent: Yes    Procedure consent matches procedure scheduled: Yes    Expected level of sedation:  Moderate  Appropriately NPO:  Yes  ASA Class:  3  Mallampati  :  Grade 3- soft palate visible, posterior pharyngeal wall not visible  Lungs:  Lungs clear with good breath sounds bilaterally  Heart:  Normal heart sounds and rate  History & Physical reviewed:  History and physical reviewed and no updates needed  Statement of review:  I have reviewed the lab findings, diagnostic data, medications, and the plan for sedation

## 2024-06-21 NOTE — PROGRESS NOTES
D/I/A: Pt roomed on 3C in bay 32.  Arrived via litter and accompanied by RN off monitor.  VSSA.  Rhythm upon arrival SR on monitor.  Denies pain or sob.  Reviewed activity restrictions and when to notify RN, ie-changes to breathing or increased chest pressure or chest pain.  CCL access:   R internal jugular (site cdi/sheath was pulled in CCL).  L radial access with TR band. 12cc of air (to start deflating TR band after an hr).  P: Continue to monitor status.  Discharge to home once meeting criteria.

## 2024-06-21 NOTE — PROGRESS NOTES
Pt arrived to 2a for a cors pci, W/U for lung transplant. Alert and oriented, VSS, uses 4L oxygen at home. No c/o pain. Awaiting to be consented. Labs are resulted from 6/18. FBS this morning is 100. Educated pt to inform the nurse if he becomes symptomatic if his BS drops low. Will continue to monitor the patient. Pt stated he received contrast dye twice this week and did not hold his metformin this morning. Will inform MD. Groins prepped, wrists prepped, pulses marked. Took asa 325mg this morning at home. LD of truclicity was 6/11. Sister Catia alex retired NP is at bedside with patient.

## 2024-06-24 LAB
ATRIAL RATE - MUSE: 72 BPM
DIASTOLIC BLOOD PRESSURE - MUSE: NORMAL MMHG
INTERPRETATION ECG - MUSE: NORMAL
P AXIS - MUSE: 7 DEGREES
PR INTERVAL - MUSE: 132 MS
QRS DURATION - MUSE: 104 MS
QT - MUSE: 404 MS
QTC - MUSE: 442 MS
R AXIS - MUSE: -11 DEGREES
SYSTOLIC BLOOD PRESSURE - MUSE: NORMAL MMHG
T AXIS - MUSE: -2 DEGREES
VENTRICULAR RATE- MUSE: 72 BPM

## 2024-06-25 ENCOUNTER — VIRTUAL VISIT (OUTPATIENT)
Dept: GASTROENTEROLOGY | Facility: CLINIC | Age: 59
End: 2024-06-25
Attending: INTERNAL MEDICINE
Payer: COMMERCIAL

## 2024-06-25 ENCOUNTER — PRE VISIT (OUTPATIENT)
Dept: GASTROENTEROLOGY | Facility: CLINIC | Age: 59
End: 2024-06-25
Payer: COMMERCIAL

## 2024-06-25 DIAGNOSIS — J84.9 ILD (INTERSTITIAL LUNG DISEASE) (H): ICD-10-CM

## 2024-06-25 DIAGNOSIS — Z76.82 ORGAN TRANSPLANT CANDIDATE: ICD-10-CM

## 2024-06-25 DIAGNOSIS — Z01.818 ENCOUNTER FOR PRE-TRANSPLANT EVALUATION FOR LUNG TRANSPLANT: ICD-10-CM

## 2024-06-25 PROCEDURE — 99204 OFFICE O/P NEW MOD 45 MIN: CPT | Mod: 95 | Performed by: STUDENT IN AN ORGANIZED HEALTH CARE EDUCATION/TRAINING PROGRAM

## 2024-06-25 NOTE — LETTER
6/25/2024      Travon Cartwright  [address]  [facility] [address]      Dear Colleague,    Thank you for referring your patient, Travon Cartwright, to the [facility] [place]. Please see a copy of my visit note below.      [facility] New Patient Visit    Date of Visit: June 25, 2024    Reason for referral: Evaluate liver disease as a part of lung transplant evaluation    Subjective: Mr. Cartwright is a 59 year old man with a history of ILD suspected IPF undergoing lung transplant evaluation who presents for evaluation of his liver disease.    He has a history of diabetes.  Most recent A1c was elevated 8.4, has been more elevated in the past.     No alcohol for 3-5 years. Prior would be a social drinking.     He recently saw GI at [facility] after he was found to have a positive antimitochondrial antibody.  On review of his labs, his alkaline phosphatase has always been normal, but he had has elevated elevations in his AST and ALT.  LEONORA was negative. He had an ultrasound that showed hepatic steatosis in 2017.    ROS: 14 point ROS negative except for positives noted in HPI.    PMHx:  Past Medical History:   Diagnosis Date    Hepatic steatosis 2017    Noted on ultrasound    Tremor 05/23/2024   IPF/ILD  DM    PSHx:  Past Surgical History:   Procedure Laterality Date    COLONOSCOPY         FamHx:  Family History   Problem Relation Age of Onset    Hypertension Mother     Lung Cancer Father         former smoker    Other - See Comments Sister         daughter 26 years    No Known Problems Maternal Grandmother     Lung Cancer Maternal Grandfather         former smoker    No Known Problems Paternal Grandmother     No Known Problems Paternal Grandfather      No family history of liver disease, liver cancer    SocHx:  Social History     Socioeconomic History    Marital status: Single     Spouse name: Not on file    Number of children: Not on file    Years of education:  Not on file    Highest education level: Not on file   Occupational History    Not on file   Tobacco Use    Smoking status: Former     Types: Cigarettes    Smokeless tobacco: Never   Substance and Sexual Activity    Alcohol use: Not Currently     Comment: not since 2017    Drug use: Never    Sexual activity: Not on file   Other Topics Concern    Not on file   Social History Narrative    Not on file     Social Determinants of Health     Financial Resource Strain: Not on File (2019)    Received from [facility]     Financial Resource Strain     Financial Resource Strain: 0   Food Insecurity: Not on File (2019)    Received from [facility]     Food Insecurity     Food: 0   Transportation Needs: Not on File (2019)    Received from [facility]     Transportation Needs     Transportation: 0   Physical Activity: Not on File (2019)    Received from [facility]     Physical Activity     Physical Activity: 0   Stress: Not on File (2019)    Received from [facility]     Stress     Stress: 0   Social Connections: Not on File (2019)    Received from [facility]     Social Connections     Social Connections and Isolation: 0   Interpersonal Safety: Not on file   Housing Stability: Not on File (2019)    Received from [facility]     Housing Stability     Housin       Medications:  Current Outpatient Medications   Medication Sig Dispense Refill    aspirin (ASA) 325 MG EC tablet 325mg on day of heart cath      atorvastatin (LIPITOR) 20 MG tablet Take 1 tablet by mouth daily      Continuous Glucose Sensor (DEXCOM G7 SENSOR) MISC Change every 10 days. 1 each 0    Dulaglutide (TRULICITY) 3 MG/0.5ML SOPN Inject 3 mg Subcutaneous once a week      empagliflozin (JARDIANCE) 10 MG TABS tablet Take 1 tablet (10 mg) by mouth daily 90 tablet 1    insulin glargine (LANTUS PEN) 100 UNIT/ML pen Inject 15 Units Subcutaneous at bedtime      losartan (COZAAR) 50 MG tablet Take 1 tablet by mouth daily      metFORMIN (GLUCOPHAGE XR) 500 MG 24 hr tablet  Take 500 mg by mouth 2 times daily (with meals)      Pirfenidone 801 MG TABS Take 801 mg by mouth 3 times daily      SYMBICORT 160-4.5 MCG/ACT Inhaler Inhale 2 puffs into the lungs two times daily      TRULICITY 1.5 MG/0.5ML pen Inject 3 mg Subcutaneous every 7 days       No current facility-administered medications for this visit.     Facility-Administered Medications Ordered in Other Visits   Medication Dose Route Frequency Provider Last Rate Last Admin    sodium chloride bacteriostatic 0.9 % flush 9 mL  9 mL Intravenous Once NICOLA Pearce MD         No OTCs, herbals    Allergies:  No Known Allergies    Objective:  There were no vitals taken for this visit.  Constitutional: pleasant man in NAD  Eyes: non icteric  Respiratory: Normal respiratory excursion, wearing oxygen  MSK: normal range of motion of visualized extremities  Abd: Non distended  Skin: No jaundice  Psychiatric: normal mood and orientation    Labs:  Last Comprehensive Metabolic Panel:  Sodium   Date Value Ref Range Status   06/21/2024 137 135 - 145 mmol/L Final     Comment:     Reference intervals for this test were updated on 09/26/2023 to more accurately reflect our healthy population. There may be differences in the flagging of prior results with similar values performed with this method. Interpretation of those prior results can be made in the context of the updated reference intervals.      Potassium   Date Value Ref Range Status   06/21/2024 4.3 3.4 - 5.3 mmol/L Final     Chloride   Date Value Ref Range Status   06/21/2024 103 98 - 107 mmol/L Final     Carbon Dioxide (CO2)   Date Value Ref Range Status   06/21/2024 25 22 - 29 mmol/L Final     Anion Gap   Date Value Ref Range Status   06/21/2024 9 7 - 15 mmol/L Final     Glucose   Date Value Ref Range Status   06/21/2024 89 70 - 99 mg/dL Final     GLUCOSE BY METER POCT   Date Value Ref Range Status   06/21/2024 74 70 - 99 mg/dL Final     Urea Nitrogen   Date Value Ref Range Status    06/21/2024 9.8 8.0 - 23.0 mg/dL Final     Creatinine   Date Value Ref Range Status   06/21/2024 0.76 0.67 - 1.17 mg/dL Final     GFR Estimate   Date Value Ref Range Status   06/21/2024 >90 >60 mL/min/1.73m2 Final     Comment:     eGFR calculated using 2021 CKD-EPI equation.     Calcium   Date Value Ref Range Status   06/21/2024 9.6 8.8 - 10.2 mg/dL Final     Bilirubin Total   Date Value Ref Range Status   06/18/2024 0.3 <=1.2 mg/dL Final     Alkaline Phosphatase   Date Value Ref Range Status   06/18/2024 100 40 - 150 U/L Final     ALT   Date Value Ref Range Status   06/18/2024 29 0 - 70 U/L Final     Comment:     Reference intervals for this test were updated on 6/12/2023 to more accurately reflect our healthy population. There may be differences in the flagging of prior results with similar values performed with this method. Interpretation of those prior results can be made in the context of the updated reference intervals.       AST   Date Value Ref Range Status   06/18/2024 25 0 - 45 U/L Final     Comment:     Reference intervals for this test were updated on 6/12/2023 to more accurately reflect our healthy population. There may be differences in the flagging of prior results with similar values performed with this method. Interpretation of those prior results can be made in the context of the updated reference intervals.       Lab Results   Component Value Date    WBC 8.4 06/21/2024     Lab Results   Component Value Date    RBC 5.38 06/21/2024     Lab Results   Component Value Date    HGB 16.2 06/21/2024    HGB 16.6 06/21/2024     Lab Results   Component Value Date    HCT 48.9 06/21/2024     Lab Results   Component Value Date    MCV 91 06/21/2024     Lab Results   Component Value Date    MCH 30.9 06/21/2024     Lab Results   Component Value Date    MCHC 33.9 06/21/2024     Lab Results   Component Value Date    RDW 13.1 06/21/2024     Lab Results   Component Value Date     06/21/2024       INR   Date  "Value Ref Range Status   06/21/2024 1.18 (H) 0.85 - 1.15 Final        MELD 3.0: 8 at 6/18/2024 12:12 PM  MELD-Na: 8 at 6/18/2024 12:12 PM  Calculated from:  Serum Creatinine: 0.74 mg/dL (Using min of 1 mg/dL) at 6/18/2024 12:12 PM  Serum Sodium: 141 mmol/L (Using max of 137 mmol/L) at 6/18/2024 12:12 PM  Total Bilirubin: 0.3 mg/dL (Using min of 1 mg/dL) at 6/18/2024 12:12 PM  Serum Albumin: 4.3 g/dL (Using max of 3.5 g/dL) at 6/18/2024 12:12 PM  INR(ratio): 1.19 at 6/18/2024 12:12 PM  Age at listing (hypothetical): 59 years  Sex: Male at 6/18/2024 12:12 PM    Previous work-up:   Lab Results   Component Value Date    HEPBANG Nonreactive 06/18/2024    HBCAB Nonreactive 06/18/2024    AUSAB <3.50 06/18/2024    HCVAB Nonreactive 06/18/2024     06/18/2024    TSH 1.17 06/18/2024    CHOL 105 06/18/2024    HDL 50 06/18/2024    LDL 20 06/18/2024    TRIG 174 (H) 06/18/2024    A1C 8.4 (H) 06/18/2024    POPETH <10 06/18/2024    PLPETH <10 06/18/2024      No results found for: \"SPECDES\", \"LDRESULTS\"    FIB-4 Calculation: 1.14 at 6/21/2024 11:16 AM  Calculated from:  SGOT/AST: 25 U/L at 6/18/2024 12:12 PM  SGPT/ALT: 29 U/L at 6/18/2024 12:12 PM  Platelets: 248 10e3/uL at 6/21/2024 11:16 AM  Age: 59 years    Imaging:    Fibrosis Scan 6/2024    HISTORY:     FERRARI/NAFLD     A series of at least 10 Vibration Controlled Transient Elastography (VCTE) measurements were performed by placing the XL probe over the center of the liver parenchyma and mechanically inducing a 50 Hertz shear wave.     Each resulting VCTE measurement was analyzed to determine shear wave propagation speed and calculate the equivalent liver stiffness.     All measurements were reviewed by the  and physician for technical accuracy. Data variability across the acquired measurements was quantified with IQR/Median Percentage.     FINDINGS:     The median liver stiffness was 6.0 kPa with IQR/Median percentage of 10 %.     The measure CAP ultrasound " attenuation rate value was 300 dB/m.     IMPRESSION:       1. Estimated liver fibrosis is Stage 0-1   2. Steatosis Grade S3       RUQ US 6/2024    Findings:  Liver: Liver measures 14.4 cm. Poor visualization of the left lobe due  to poor ability to tolerate breath-hold. Increased hepatic  echogenicity.      Extrahepatic portal vein flow is antegrade at 26 cm/s.  Right portal vein flow is antegrade, measuring 18.6 cm/s.  Left portal vein flow is antegrade, measuring 7.6 cm/s.     Flow in the hepatic artery is towards the liver and:  71 centimeter per second peak systolic  0.67 resistive index.      Overlying bowel gas obscures the splenic vein. The left, middle, and  right hepatic veins are patent with flow towards the IVC. The IVC is  patent with flow towards the heart.   The visualized aorta is not  dilated.      Gallbladder: There is no wall thickening, pericholecystic fluid,  positive sonographic Duffy's sign or evidence for cholelithiasis     Bile Ducts: Both the intra- and extrahepatic biliary system are of  normal caliber.  The common bile duct measures 0.3 cm.     Pancreas: Pancreas not visualized.     Kidneys: Both kidneys are of normal echotexture, without mass or  hydronephrosis.   Renal lengths: right- 12.5 cm, left- 13.4 cm.     Spleen: The spleen measures 13 cm in length.     Fluid: No evidence of ascites or pleural effusions.                                                                      Impression:      1. Patent hepatic vasculature with Doppler evaluation.  2. Borderline splenomegaly.  3. Limited evaluation of the liver with the left lobe obscured. Slight  increased hepatic parenchymal echogenicity which may be seen with  parenchymal disease including steatosis.      Independently reviewed labs and imaging.     Assessment/Plan: Mr. Cartwright is a 59 year old man with a history of ILD suspected IPF undergoing lung transplant evaluation who presents for evaluation of his liver disease.    He has  "hepatic steatosis on imaging and has a history of elevated AST and ALT consistent with metabolic dysfunction associated steatotic liver disease.  He has risk factors for this including diabetes which has been poorly controlled in the past, despite his relatively low BMI.  He does not drink alcohol currently.  Hepatitis B and C testing is negative.  He incidentally had an antimitochondrial antibody checked.  This was positive, but on review of all of his liver enzymes he has never had an elevated alkaline phosphatase c/w PBC.  I do not think that he has primary biliary cholangitis.  I would recommend checking his alkaline phosphatase on an annual basis and if it is noted to be elevated, would consider starting ursodiol.    He does not have evidence of advanced fibrosis or cirrhosis based on his fib 4 score and his fibrosis scan test.  Based on this I do not think he has significant liver disease that would increase his risk for complications after lung transplant.  Discussed the treatment of metabolic liver disease is controlled diabetes.  He has \"lean\" MASLD with his BMI, would not restrict his caloric intake but focus on improving his diabetes.  Recommend annual calculation of FIB-4 to assess for progression of fibrosis.     No orders of the defined types were placed in this encounter.      RTC PRN    Benita Acosta MD MS  Hepatology/Liver Transplant  [facility]        Again, thank you for allowing me to participate in the care of your patient.        Sincerely,        Benita Acosta MD  "

## 2024-06-25 NOTE — PROGRESS NOTES
Virtual Visit Details    Type of service:  Video Visit   Video Start Time: 11:36 AM  Video End Time:11:46 AM    Originating Location (pt. Location): Home  Distant Location (provider location):  On-site  Platform used for Video Visit: [facility] New Patient Visit    Date of Visit: June 25, 2024    Reason for referral: Evaluate liver disease as a part of lung transplant evaluation    Subjective: Mr. Cartwright is a 59 year old man with a history of ILD suspected IPF undergoing lung transplant evaluation who presents for evaluation of his liver disease.    He has a history of diabetes.  Most recent A1c was elevated 8.4, has been more elevated in the past.     No alcohol for 3-5 years. Prior would be a social drinking.     He recently saw GI at [facility] after he was found to have a positive antimitochondrial antibody.  On review of his labs, his alkaline phosphatase has always been normal, but he had has elevated elevations in his AST and ALT.  LEONORA was negative. He had an ultrasound that showed hepatic steatosis in 2017.    ROS: 14 point ROS negative except for positives noted in HPI.    PMHx:  Past Medical History:   Diagnosis Date    Hepatic steatosis 2017    Noted on ultrasound    Tremor 05/23/2024   IPF/ILD  DM    PSHx:  Past Surgical History:   Procedure Laterality Date    COLONOSCOPY         FamHx:  Family History   Problem Relation Age of Onset    Hypertension Mother     Lung Cancer Father         former smoker    Other - See Comments Sister         daughter 26 years    No Known Problems Maternal Grandmother     Lung Cancer Maternal Grandfather         former smoker    No Known Problems Paternal Grandmother     No Known Problems Paternal Grandfather      No family history of liver disease, liver cancer    SocHx:  Social History     Socioeconomic History    Marital status: Single     Spouse name: Not on file    Number of children: Not on file    Years of education: Not on file     Highest education level: Not on file   Occupational History    Not on file   Tobacco Use    Smoking status: Former     Types: Cigarettes    Smokeless tobacco: Never   Substance and Sexual Activity    Alcohol use: Not Currently     Comment: not since 2017    Drug use: Never    Sexual activity: Not on file   Other Topics Concern    Not on file   Social History Narrative    Not on file     Social Determinants of Health     Financial Resource Strain: Not on File (2019)    Received from [facility]     Financial Resource Strain     Financial Resource Strain: 0   Food Insecurity: Not on File (2019)    Received from [facility]     Food Insecurity     Food: 0   Transportation Needs: Not on File (2019)    Received from [facility]     Transportation Needs     Transportation: 0   Physical Activity: Not on File (2019)    Received from [facility]     Physical Activity     Physical Activity: 0   Stress: Not on File (2019)    Received from [facility]     Stress     Stress: 0   Social Connections: Not on File (2019)    Received from [facility]     Social Connections     Social Connections and Isolation: 0   Interpersonal Safety: Not on file   Housing Stability: Not on File (2019)    Received from [facility]     Housing Stability     Housin       Medications:  Current Outpatient Medications   Medication Sig Dispense Refill    aspirin (ASA) 325 MG EC tablet 325mg on day of heart cath      atorvastatin (LIPITOR) 20 MG tablet Take 1 tablet by mouth daily      Continuous Glucose Sensor (DEXCOM G7 SENSOR) MISC Change every 10 days. 1 each 0    Dulaglutide (TRULICITY) 3 MG/0.5ML SOPN Inject 3 mg Subcutaneous once a week      empagliflozin (JARDIANCE) 10 MG TABS tablet Take 1 tablet (10 mg) by mouth daily 90 tablet 1    insulin glargine (LANTUS PEN) 100 UNIT/ML pen Inject 15 Units Subcutaneous at bedtime      losartan (COZAAR) 50 MG tablet Take 1 tablet by mouth daily      metFORMIN (GLUCOPHAGE XR) 500 MG 24 hr tablet Take 500 mg by  mouth 2 times daily (with meals)      Pirfenidone 801 MG TABS Take 801 mg by mouth 3 times daily      SYMBICORT 160-4.5 MCG/ACT Inhaler Inhale 2 puffs into the lungs two times daily      TRULICITY 1.5 MG/0.5ML pen Inject 3 mg Subcutaneous every 7 days       No current facility-administered medications for this visit.     Facility-Administered Medications Ordered in Other Visits   Medication Dose Route Frequency Provider Last Rate Last Admin    sodium chloride bacteriostatic 0.9 % flush 9 mL  9 mL Intravenous Once NICOLA Pearce MD         No OTCs, herbals    Allergies:  No Known Allergies    Objective:  There were no vitals taken for this visit.  Constitutional: pleasant man in NAD  Eyes: non icteric  Respiratory: Normal respiratory excursion, wearing oxygen  MSK: normal range of motion of visualized extremities  Abd: Non distended  Skin: No jaundice  Psychiatric: normal mood and orientation    Labs:  Last Comprehensive Metabolic Panel:  Sodium   Date Value Ref Range Status   06/21/2024 137 135 - 145 mmol/L Final     Comment:     Reference intervals for this test were updated on 09/26/2023 to more accurately reflect our healthy population. There may be differences in the flagging of prior results with similar values performed with this method. Interpretation of those prior results can be made in the context of the updated reference intervals.      Potassium   Date Value Ref Range Status   06/21/2024 4.3 3.4 - 5.3 mmol/L Final     Chloride   Date Value Ref Range Status   06/21/2024 103 98 - 107 mmol/L Final     Carbon Dioxide (CO2)   Date Value Ref Range Status   06/21/2024 25 22 - 29 mmol/L Final     Anion Gap   Date Value Ref Range Status   06/21/2024 9 7 - 15 mmol/L Final     Glucose   Date Value Ref Range Status   06/21/2024 89 70 - 99 mg/dL Final     GLUCOSE BY METER POCT   Date Value Ref Range Status   06/21/2024 74 70 - 99 mg/dL Final     Urea Nitrogen   Date Value Ref Range Status   06/21/2024 9.8 8.0 -  23.0 mg/dL Final     Creatinine   Date Value Ref Range Status   06/21/2024 0.76 0.67 - 1.17 mg/dL Final     GFR Estimate   Date Value Ref Range Status   06/21/2024 >90 >60 mL/min/1.73m2 Final     Comment:     eGFR calculated using 2021 CKD-EPI equation.     Calcium   Date Value Ref Range Status   06/21/2024 9.6 8.8 - 10.2 mg/dL Final     Bilirubin Total   Date Value Ref Range Status   06/18/2024 0.3 <=1.2 mg/dL Final     Alkaline Phosphatase   Date Value Ref Range Status   06/18/2024 100 40 - 150 U/L Final     ALT   Date Value Ref Range Status   06/18/2024 29 0 - 70 U/L Final     Comment:     Reference intervals for this test were updated on 6/12/2023 to more accurately reflect our healthy population. There may be differences in the flagging of prior results with similar values performed with this method. Interpretation of those prior results can be made in the context of the updated reference intervals.       AST   Date Value Ref Range Status   06/18/2024 25 0 - 45 U/L Final     Comment:     Reference intervals for this test were updated on 6/12/2023 to more accurately reflect our healthy population. There may be differences in the flagging of prior results with similar values performed with this method. Interpretation of those prior results can be made in the context of the updated reference intervals.       Lab Results   Component Value Date    WBC 8.4 06/21/2024     Lab Results   Component Value Date    RBC 5.38 06/21/2024     Lab Results   Component Value Date    HGB 16.2 06/21/2024    HGB 16.6 06/21/2024     Lab Results   Component Value Date    HCT 48.9 06/21/2024     Lab Results   Component Value Date    MCV 91 06/21/2024     Lab Results   Component Value Date    MCH 30.9 06/21/2024     Lab Results   Component Value Date    MCHC 33.9 06/21/2024     Lab Results   Component Value Date    RDW 13.1 06/21/2024     Lab Results   Component Value Date     06/21/2024       INR   Date Value Ref Range Status  "  06/21/2024 1.18 (H) 0.85 - 1.15 Final        MELD 3.0: 8 at 6/18/2024 12:12 PM  MELD-Na: 8 at 6/18/2024 12:12 PM  Calculated from:  Serum Creatinine: 0.74 mg/dL (Using min of 1 mg/dL) at 6/18/2024 12:12 PM  Serum Sodium: 141 mmol/L (Using max of 137 mmol/L) at 6/18/2024 12:12 PM  Total Bilirubin: 0.3 mg/dL (Using min of 1 mg/dL) at 6/18/2024 12:12 PM  Serum Albumin: 4.3 g/dL (Using max of 3.5 g/dL) at 6/18/2024 12:12 PM  INR(ratio): 1.19 at 6/18/2024 12:12 PM  Age at listing (hypothetical): 59 years  Sex: Male at 6/18/2024 12:12 PM    Previous work-up:   Lab Results   Component Value Date    HEPBANG Nonreactive 06/18/2024    HBCAB Nonreactive 06/18/2024    AUSAB <3.50 06/18/2024    HCVAB Nonreactive 06/18/2024     06/18/2024    TSH 1.17 06/18/2024    CHOL 105 06/18/2024    HDL 50 06/18/2024    LDL 20 06/18/2024    TRIG 174 (H) 06/18/2024    A1C 8.4 (H) 06/18/2024    POPETH <10 06/18/2024    PLPETH <10 06/18/2024      No results found for: \"SPECDES\", \"LDRESULTS\"    FIB-4 Calculation: 1.14 at 6/21/2024 11:16 AM  Calculated from:  SGOT/AST: 25 U/L at 6/18/2024 12:12 PM  SGPT/ALT: 29 U/L at 6/18/2024 12:12 PM  Platelets: 248 10e3/uL at 6/21/2024 11:16 AM  Age: 59 years    Imaging:    Fibrosis Scan 6/2024    HISTORY:     FERRARI/NAFLD     A series of at least 10 Vibration Controlled Transient Elastography (VCTE) measurements were performed by placing the XL probe over the center of the liver parenchyma and mechanically inducing a 50 Hertz shear wave.     Each resulting VCTE measurement was analyzed to determine shear wave propagation speed and calculate the equivalent liver stiffness.     All measurements were reviewed by the  and physician for technical accuracy. Data variability across the acquired measurements was quantified with IQR/Median Percentage.     FINDINGS:     The median liver stiffness was 6.0 kPa with IQR/Median percentage of 10 %.     The measure CAP ultrasound attenuation rate value was 300 " dB/m.     IMPRESSION:       1. Estimated liver fibrosis is Stage 0-1   2. Steatosis Grade S3       RUQ US 6/2024    Findings:  Liver: Liver measures 14.4 cm. Poor visualization of the left lobe due  to poor ability to tolerate breath-hold. Increased hepatic  echogenicity.      Extrahepatic portal vein flow is antegrade at 26 cm/s.  Right portal vein flow is antegrade, measuring 18.6 cm/s.  Left portal vein flow is antegrade, measuring 7.6 cm/s.     Flow in the hepatic artery is towards the liver and:  71 centimeter per second peak systolic  0.67 resistive index.      Overlying bowel gas obscures the splenic vein. The left, middle, and  right hepatic veins are patent with flow towards the IVC. The IVC is  patent with flow towards the heart.   The visualized aorta is not  dilated.      Gallbladder: There is no wall thickening, pericholecystic fluid,  positive sonographic Duffy's sign or evidence for cholelithiasis     Bile Ducts: Both the intra- and extrahepatic biliary system are of  normal caliber.  The common bile duct measures 0.3 cm.     Pancreas: Pancreas not visualized.     Kidneys: Both kidneys are of normal echotexture, without mass or  hydronephrosis.   Renal lengths: right- 12.5 cm, left- 13.4 cm.     Spleen: The spleen measures 13 cm in length.     Fluid: No evidence of ascites or pleural effusions.                                                                      Impression:      1. Patent hepatic vasculature with Doppler evaluation.  2. Borderline splenomegaly.  3. Limited evaluation of the liver with the left lobe obscured. Slight  increased hepatic parenchymal echogenicity which may be seen with  parenchymal disease including steatosis.      Independently reviewed labs and imaging.     Assessment/Plan: Mr. Cartwright is a 59 year old man with a history of ILD suspected IPF undergoing lung transplant evaluation who presents for evaluation of his liver disease.    He has hepatic steatosis on imaging and  "has a history of elevated AST and ALT consistent with metabolic dysfunction associated steatotic liver disease.  He has risk factors for this including diabetes which has been poorly controlled in the past, despite his relatively low BMI.  He does not drink alcohol currently.  Hepatitis B and C testing is negative.  He incidentally had an antimitochondrial antibody checked.  This was positive, but on review of all of his liver enzymes he has never had an elevated alkaline phosphatase c/w PBC.  I do not think that he has primary biliary cholangitis.  I would recommend checking his alkaline phosphatase on an annual basis and if it is noted to be elevated, would consider starting ursodiol.    He does not have evidence of advanced fibrosis or cirrhosis based on his fib 4 score and his fibrosis scan test.  Based on this I do not think he has significant liver disease that would increase his risk for complications after lung transplant.  Discussed the treatment of metabolic liver disease is controlled diabetes.  He has \"lean\" MASLD with his BMI, would not restrict his caloric intake but focus on improving his diabetes.  Recommend annual calculation of FIB-4 to assess for progression of fibrosis.     No orders of the defined types were placed in this encounter.      RTC PRN    Benita Acosta MD MS  Hepatology/Liver Transplant  [facility]      "

## 2024-06-25 NOTE — NURSING NOTE
Is the patient currently in the state of [place]? YES    Visit mode:VIDEO    If the visit is dropped, the patient can be reconnected by: VIDEO VISIT: Text to cell phone:   Telephone Information:   Mobile [phone]       Will anyone else be joining the visit? Yes, patient's sister will be with patient  (If patient encounters technical issues they should call [phone])    How would you like to obtain your AVS? MyChart    Are changes needed to the allergy or medication list? No    Are refills needed on medications prescribed by this physician? NO    Reason for visit: Consult    Jalyn BEASLEY

## 2024-06-28 LAB
PROTOCOL CUTOFF: NORMAL
SA 1  COMMENTS: NORMAL
SA 1 CELL: NORMAL
SA 1 TEST METHOD: NORMAL
SA 2 CELL: NORMAL
SA 2 COMMENTS: NORMAL
SA 2 TEST METHOD: NORMAL
SA1 HI RISK ABY: NORMAL
SA1 MOD RISK ABY: NORMAL
SA2 HI RISK ABY: NORMAL
SA2 MOD RISK ABY: NORMAL
UNACCEPTABLE ANTIGENS: NORMAL
UNOS CPRA: 0

## 2024-07-02 ENCOUNTER — DOCUMENTATION ONLY (OUTPATIENT)
Dept: OTHER | Facility: CLINIC | Age: 59
End: 2024-07-02
Payer: COMMERCIAL

## 2024-07-03 ENCOUNTER — PATIENT OUTREACH (OUTPATIENT)
Dept: GASTROENTEROLOGY | Facility: CLINIC | Age: 59
End: 2024-07-03
Payer: COMMERCIAL

## 2024-07-03 NOTE — LETTER
PHYSICIAN ORDERS      DATE & TIME ISSUED: July 3, 2024 4:51 PM  PATIENT NAME: Travon Cartwright   : 1965     [facility] MR# [if applicable]: [id number]     DIAGNOSIS:  Organ Transplant candidate Z76.82 and Pulmonary Fibrosis J84.10  Updated oxygen prescription based on 6 minute walk test (result and clinical note from most recent face-to-face visit faxed separately).    Oxygen for Home Use (DME):  Frequency of use: Continuous  Duration of use: Lifelong  Portability required?: Yes  Evaluate for conserving device?: Yes  Flow rate: 4-5LPM at rest, 8LPM with activity, 4LPM with sleep      Any questions please call: Daxa NOBLES, Lung transplant coordinator at [phone].            Carey Tucker MD  Pulmonary Disease        No

## 2024-07-03 NOTE — PROGRESS NOTES
Updated oxygen prescription sent based on 6MW 6/18 along with 6MW result and Dr. Tucker's clinical note. Callback number provided for questions. [facility] fax: [phone]

## 2024-07-09 ENCOUNTER — OFFICE VISIT (OUTPATIENT)
Dept: GASTROENTEROLOGY | Facility: CLINIC | Age: 59
End: 2024-07-09
Payer: COMMERCIAL

## 2024-07-09 VITALS
OXYGEN SATURATION: 99 % | TEMPERATURE: 97.2 F | BODY MASS INDEX: 22.9 KG/M2 | RESPIRATION RATE: 20 BRPM | SYSTOLIC BLOOD PRESSURE: 110 MMHG | WEIGHT: 160 LBS | DIASTOLIC BLOOD PRESSURE: 74 MMHG | HEART RATE: 78 BPM | HEIGHT: 70 IN

## 2024-07-09 DIAGNOSIS — Z01.818 ENCOUNTER FOR PRE-TRANSPLANT EVALUATION FOR LUNG TRANSPLANT: ICD-10-CM

## 2024-07-09 DIAGNOSIS — Z76.82 ORGAN TRANSPLANT CANDIDATE: ICD-10-CM

## 2024-07-09 DIAGNOSIS — J84.9 ILD (INTERSTITIAL LUNG DISEASE) (H): ICD-10-CM

## 2024-07-09 PROCEDURE — 91010 ESOPHAGUS MOTILITY STUDY: CPT | Performed by: INTERNAL MEDICINE

## 2024-07-09 PROCEDURE — 91038 ESOPH IMPED FUNCT TEST > 1HR: CPT | Performed by: INTERNAL MEDICINE

## 2024-07-09 ASSESSMENT — PAIN SCALES - GENERAL: PAINLEVEL: NO PAIN (0)

## 2024-07-09 NOTE — PROGRESS NOTES
Non-Invasive GI Procedure Visit    Travon Cartwright is a 59 year old male with history of    ILD (interstitial lung disease) (H)  Encounter for pre-transplant evaluation for lung transplant  Organ transplant candidate.   Patient stated reason for procedure: Lung Transplant Evaluation      COVID-19 PCR Results           No data to display              COVID-19 Antibody Results, Testing for Immunity           No data to display                Pre-Procedure Assessment  Patient presents to clinic today for Esophageal Manometry Study with pH    Referring Provider: Dr Tucker  Patient has not undergone previous endoscopy.    Does patient report taking a PPI (omeprazole, pantoprazole, rabeprazole, lansoprazole, esomeprazole, dexlansoprazole)? No  Does patient report taking a H2 blocker (ranitidine, or famotidine)? No  Does patient report taking opioids? No  Patient reported that last food and/or drink was last consumed 6 hours ago.  Esophageal Questionnaire(s) Completed: Yes -   Esophageal Questionnaire(s)    BEDQ Questionnaire      7/9/2024     2:00 PM   BEDQ Questionnaire: How Often Have You Had the Following?   Trouble eating solid food (meat, bread, vegetables) 0   Trouble eating soft foods (yogurt, jello, pudding) 0   Trouble swallowing liquids 0   Pain while swallowing 0   Coughing or choking while swallowing foods or liquids 0   Total Score: 0         7/9/2024     2:00 PM   BEDQ Questionnaire: Discomfort/Pain Ratings   Eating solid food (meat, bread, vegetables) 0   Eating soft foods (yogurt, jello, pudding) 0   Drinking liquid 0   Total Score: 0       Eckardt Questionnaire      7/9/2024     2:00 PM   Eckardt Questionnaire   Dysphagia 0   Regurgitation 0   Retrosternal Pain 0   Weight Loss (kg) 0   Total Score:  0       Promis 10 Questionnaire      7/9/2024     2:00 PM   PROMIS 10 FLOWSHEET DATA   In general, would you say your health is: 1   In general, would you say your quality of life is: 1   In general, how  "would you rate your physical health? 1   In general, how would you rate your mental health, including your mood and your ability to think? 2   In general, how would you rate your satisfaction with your social activities and relationships? 2   In general, please rate how well you carry out your usual social activities and roles. (This includes activities at home, at work and in your community, and responsibilities as a parent, child, spouse, employee, friend, etc.) 2   To what extent are you able to carry out your everyday physical activities such as walking, climbing stairs, carrying groceries, or moving a chair? 2   In the past 7 days, how often have you been bothered by emotional problems such as feeling anxious, depressed, or irritable? 1   In the past 7 days, how would you rate your fatigue on average? 3   In the past 7 days, how would you rate your pain on average, where 0 means no pain, and 10 means worst imaginable pain? 0   Mental health question re-calculation - no clinical value 5   Physical health question re-calculation - no clinical value 3   Pain question re-calculation - no clinical value 5   Global Mental Health Score 10   Global Physical Health Score 11   PROMIS TOTAL - SUBSCORES 21   .    Patient Hx  Patient's history, medications and allergies were reviewed.     Height: 5' 10\"   Weight: 160 lbs 0 oz    Patient Active Problem List    Diagnosis Date Noted    Chest pain, unspecified 06/21/2024     Priority: Medium    ILD (interstitial lung disease) (H) 06/21/2024     Priority: Medium    Abnormal CT scan 06/21/2024     Priority: Medium    Status post coronary angiogram 06/21/2024     Priority: Medium    Organ transplant candidate 06/21/2024     Priority: Medium    Encounter for pre-transplant evaluation for lung transplant 06/21/2024     Priority: Medium    Tremor 05/23/2024     Priority: Medium    Interstitial pulmonary disease (H) 09/25/2023     Priority: Medium    Other emphysema (H) 10/05/2022     " Priority: Medium    Type 2 diabetes mellitus without complications (H) 05/28/2019     Priority: Medium    Abnormal levels of other serum enzymes 06/02/2017     Priority: Medium    Other specified anxiety disorders 09/14/2016     Priority: Medium      Prior to Admission medications    Medication Sig Start Date End Date Taking? Authorizing Provider   aspirin (ASA) 325 MG EC tablet 325mg on day of heart cath    Reported, Patient   atorvastatin (LIPITOR) 20 MG tablet Take 1 tablet by mouth daily 4/25/23   Reported, Patient   Continuous Glucose Sensor (DEXCOM G7 SENSOR) MISC Change every 10 days. 6/20/24   Sara Reilly NP   Dulaglutide (TRULICITY) 3 MG/0.5ML SOPN Inject 3 mg Subcutaneous once a week 4/22/24   Reported, Patient   empagliflozin (JARDIANCE) 10 MG TABS tablet Take 1 tablet (10 mg) by mouth daily 6/7/24   Sara Reilly NP   insulin glargine (LANTUS PEN) 100 UNIT/ML pen Inject 15 Units Subcutaneous at bedtime 4/26/23   Reported, Patient   losartan (COZAAR) 50 MG tablet Take 1 tablet by mouth daily 4/25/23   Reported, Patient   metFORMIN (GLUCOPHAGE XR) 500 MG 24 hr tablet Take 500 mg by mouth 2 times daily (with meals) 5/2/24   Reported, Patient   Pirfenidone 801 MG TABS Take 801 mg by mouth 3 times daily 6/12/24   Reported, Patient   SYMBICORT 160-4.5 MCG/ACT Inhaler Inhale 2 puffs into the lungs two times daily    Reported, Patient   TRULICITY 1.5 MG/0.5ML pen Inject 3 mg Subcutaneous every 7 days 6/18/24   Reported, Patient     No Known Allergies  Past Medical History:   Diagnosis Date    Hepatic steatosis 2017    Noted on ultrasound    Tremor 05/23/2024     Past Surgical History:   Procedure Laterality Date    COLONOSCOPY      CV CORONARY ANGIOGRAM N/A 6/21/2024    Procedure: Coronary Angiogram;  Surgeon: Gabriel Julian MD;  Location:  [facility]    CV RIGHT HEART CATH MEASUREMENTS RECORDED N/A 6/21/2024    Procedure: Right Heart Catheterization;  Surgeon: Gabriel Julian MD;   Location:  [facility] CATH LAB     Family History   Problem Relation Age of Onset    Hypertension Mother     Lung Cancer Father         former smoker    Other - See Comments Sister         daughter 26 years    No Known Problems Maternal Grandmother     Lung Cancer Maternal Grandfather         former smoker    No Known Problems Paternal Grandmother     No Known Problems Paternal Grandfather      Social History     Tobacco Use    Smoking status: Former     Types: Cigarettes    Smokeless tobacco: Never   Substance Use Topics    Alcohol use: Not Currently     Comment: not since 2017        Pre-Procedure Education & Consent  Procedure education was provided to: Patient  Teaching method: Explanation  Barriers to learning: No Barrier    Patient indicated understanding of pre-procedure instruction and appropriate consent was obtained and documented.    ____________________________________________________________________    Post-Procedure Documentation: GI Esophageal Manometry with 24 Hour Ph    Manometry catheter was placed via right nare to 53 cm and normal saline swallows given per protocol. Manometry catheter was removed at the end of test and the pH cathter was placed via right nare to 30 cm.      Diary and discharge instructions given to patient and patent instructed to return tomorrow for catheter removal.    Notification of pending test results sent to provider for interpretation. Please reference scanned document for final interpretation of results. Patient will follow up with referring provider for test results.    Chelsea Morrison RN on 7/9/2024 at 2:25 PM

## 2024-07-09 NOTE — PATIENT INSTRUCTIONS
Esophogeal Manometry with pH  1. Resume regular diet.  2. You may have a bloody nose or sore throat after the procedure.  3. You had a 24-hour probe placed, return the next day to have the probe removed.  Removal will take 5 minutes.  4. If you have questions call [phone] from 7:00am-5:00pm.  For afterhours questions call GI doctor on call at [phone].

## 2024-07-10 ENCOUNTER — ALLIED HEALTH/NURSE VISIT (OUTPATIENT)
Dept: GASTROENTEROLOGY | Facility: CLINIC | Age: 59
End: 2024-07-10
Payer: COMMERCIAL

## 2024-07-10 ENCOUNTER — OFFICE VISIT (OUTPATIENT)
Dept: CARDIOLOGY | Facility: CLINIC | Age: 59
End: 2024-07-10
Attending: INTERNAL MEDICINE
Payer: COMMERCIAL

## 2024-07-10 VITALS
DIASTOLIC BLOOD PRESSURE: 76 MMHG | BODY MASS INDEX: 22.9 KG/M2 | HEART RATE: 87 BPM | SYSTOLIC BLOOD PRESSURE: 112 MMHG | OXYGEN SATURATION: 97 % | WEIGHT: 159.6 LBS

## 2024-07-10 DIAGNOSIS — Z76.82 ORGAN TRANSPLANT CANDIDATE: ICD-10-CM

## 2024-07-10 DIAGNOSIS — Z01.818 ENCOUNTER FOR PRE-TRANSPLANT EVALUATION FOR LUNG TRANSPLANT: ICD-10-CM

## 2024-07-10 DIAGNOSIS — J84.9 ILD (INTERSTITIAL LUNG DISEASE) (H): ICD-10-CM

## 2024-07-10 DIAGNOSIS — Z76.82 ORGAN TRANSPLANT CANDIDATE: Primary | ICD-10-CM

## 2024-07-10 PROCEDURE — 99207 PR NO CHARGE NURSE ONLY: CPT

## 2024-07-10 PROCEDURE — 99205 OFFICE O/P NEW HI 60 MIN: CPT | Performed by: SURGERY

## 2024-07-10 PROCEDURE — G0463 HOSPITAL OUTPT CLINIC VISIT: HCPCS | Performed by: SURGERY

## 2024-07-10 ASSESSMENT — PAIN SCALES - GENERAL: PAINLEVEL: NO PAIN (0)

## 2024-07-10 NOTE — PROGRESS NOTES
Cardiothoracic surgery lung transplant consultation      Travon Cartwright MRN# [id number]   YOB: 1965 Age: 59 year old   Date of Service: July 10, 2024         Reason for consult: Travon Cartwright is a 59 year old  male with life threatening end stage lung disease due to interstitial lung disease who is undergoing evaluation for lung transplant.           Assessment and Plan:   Mr. Travon Cartwright is a 59-year-old man with life-threatening, end-stage lung disease due to interstitial lung disease with pulmonary fibrosis. I recommend lung transplantation. I discussed the technical details of the procedure with the patient including the possibility of single versus double lung transplant, as well as the possibility of a sternotomy incision, bilateral anterolateral thoracotomy with transverse sternotomy (clamshell), and thoracotomy incisions. In his case, I recommend a bilateral lung transplant, and this could be done through a clamshell or sternotomy incision.    I also discussed the possibility of pre- or post-operative extracorporeal membrane oxygenation. I think preoperative ECMO is a reasonable consideration.      I discussed donor issues including donor selection, donor circulatory death (DCD) donors, and brain dead donors, as well as PHS risk and hepatitis C positive donors. I also explained donor lung preservation with cold static storage and warm ex vivo lung perfusion.    I also explained the expected postoperative course and recovery, including the likelihood of a prolonged hospitalization. The patient understands the risks and benefits of the procedure. The risks include but are not limited to bleeding, infection including opportunistic infection, stroke, primary graft dysfunction, respiratory failure possibly requiring prolonged ventilation and tracheostomy, lifelong need for immunosuppression, acute or chronic rejection, renal failure, hepatic insufficiency, visceral or limb ischemia, and  death. The patient understands these risks and wishes to undergo the operation. After discussion in multidisciplinary lung transplant conference, listing may be finalized.             Chief Complaint:   Travon Cartwright is a 59 year old male who complains of chronic dyspnea.           History of Present Illness:   This patient is a 59 year old male who presents to discuss the option of lung transplant for his ILD  with suspected IPF. He complains of feeling dyspneic in the last 3-4 years. He noticed when chopping firewood, he would  a log or two and get more dyspneic than he ever had previously. He has noticed a significant increase in dyspnea and his oxygen saturations have been down to 80s. He usually uses 4L with sleep and 4L with activity. He has lost about 15 lbs in the last year unintentionally.     He has type 2 diabetes mellitus and is on insulin for a few years.              Past Medical History:     Past Medical History:   Diagnosis Date    Hepatic steatosis 2017    Noted on ultrasound    Tremor 05/23/2024             Past Surgical History:     Past Surgical History:   Procedure Laterality Date    COLONOSCOPY      CV CORONARY ANGIOGRAM N/A 6/21/2024    Procedure: Coronary Angiogram;  Surgeon: Gabriel Julian MD;  Location:  [facility]    CV RIGHT HEART CATH MEASUREMENTS RECORDED N/A 6/21/2024    Procedure: Right Heart Catheterization;  Surgeon: Gabriel Julian MD;  [facility]               Social History:     Social History     Tobacco Use    Smoking status: Former     Types: Cigarettes    Smokeless tobacco: Never   Substance Use Topics    Alcohol use: Not Currently     Comment: not since 2017             Family History:     Family History   Problem Relation Age of Onset    Hypertension Mother     Lung Cancer Father         former smoker    Other - See Comments Sister         daughter 26 years    No Known Problems Maternal Grandmother     Lung Cancer  Maternal Grandfather         former smoker    No Known Problems Paternal Grandmother     No Known Problems Paternal Grandfather              Immunizations:     Immunization History   Administered Date(s) Administered    COVID-19 Monovalent 18+ (Moderna) 04/14/2021, 05/13/2021, 04/12/2022    Flu, Unspecified 01/11/2016, 04/06/2017, 11/29/2017    Influenza (IIV3) PF 01/04/2012    Influenza Vaccine >6 months,quad, PF 11/14/2023    Influenza,INJ,MDCK,PF,Quad >6mo(Flucelvax) 10/07/2021, 10/05/2022    Pneumococcal 20 valent Conjugate (Prevnar 20) 07/13/2022    TDAP (Adacel,Boostrix) 01/04/2012, 06/02/2017             Allergies:   No Known Allergies          Medications:     Current Outpatient Medications   Medication Sig Dispense Refill    aspirin (ASA) 325 MG EC tablet 325mg on day of heart cath      atorvastatin (LIPITOR) 20 MG tablet Take 1 tablet by mouth daily      Continuous Glucose Sensor (DEXCOM G7 SENSOR) MISC Change every 10 days. 1 each 0    Dulaglutide (TRULICITY) 3 MG/0.5ML SOPN Inject 3 mg Subcutaneous once a week      empagliflozin (JARDIANCE) 10 MG TABS tablet Take 1 tablet (10 mg) by mouth daily 90 tablet 1    insulin glargine (LANTUS PEN) 100 UNIT/ML pen Inject 15 Units Subcutaneous at bedtime      losartan (COZAAR) 50 MG tablet Take 1 tablet by mouth daily      metFORMIN (GLUCOPHAGE XR) 500 MG 24 hr tablet Take 500 mg by mouth 2 times daily (with meals)      Pirfenidone 801 MG TABS Take 801 mg by mouth 3 times daily      SYMBICORT 160-4.5 MCG/ACT Inhaler Inhale 2 puffs into the lungs two times daily      TRULICITY 1.5 MG/0.5ML pen Inject 3 mg Subcutaneous every 7 days       No current facility-administered medications for this visit.     Facility-Administered Medications Ordered in Other Visits   Medication Dose Route Frequency Provider Last Rate Last Admin    sodium chloride bacteriostatic 0.9 % flush 9 mL  9 mL Intravenous Once NICOLA Pearce MD                 Review of Systems:     The 10 point  Review of Systems is negative other than noted in the HPI            Physical Exam:   Vitals were reviewed      BP: 112/76 Pulse: 87     SpO2: 97 %      Constitutional:   awake, alert, cooperative, no apparent distress, and appears stated age     Eyes:   Lids and lashes normal, pupils equal, round and reactive to light, extra ocular muscles intact, sclera clear, conjunctiva normal     ENT:   normocephalic, without obvious abnormality     Neck:   supple, symmetrical, trachea midline     Lungs:   no increased work of breathing, good air exchange, and no retractions     Cardiovascular:   regular rate and rhythm     Abdomen:   non-distended     Musculoskeletal:   no lower extremity pitting edema present  there is no redness, warmth, or swelling of the joints  full range of motion noted  motor strength is 5 out of 5 all extremities bilaterally     Neurologic:   Mental Status Exam:  Level of Alertness:   awake  Orientation:   person, place, time  Memory:   normal  Cranial Nerves:  cranial nerves II-XII are grossly intact  Motor Exam:  moves all extremities well and symmetrically     Neuropsychiatric:   General: normal, calm, and normal eye contact  Level of consciousness: alert / normal  Affect: normal     Skin:   no bruising or bleeding, normal skin color, texture, turgor, and no redness, warmth, or swelling          Data:   All laboratory data reviewed  All cardiac studies reviewed by me.  All imaging studies reviewed by me.    CT CHEST:  1. UIP pattern interstitial fibrosis. No suspicious pulmonary  findings.  2. Stable mediastinal/hilar lymphadenopathy, likely reactive but  technically indeterminate.  3. Borderline cardiomegaly.  4. Mild splenomegaly.  5. Colonic diverticulosis.  6. Cholelithiasis.    LIVER FIBROSIS SCAN:  1. Estimated liver fibrosis is Stage 0-1   2. Steatosis Grade S3       NUC MED LUNG PERF:  Quantitative evaluation shows 64% contribution of the right lung as  compared to 36% contribution of the  left lung.    TRANSTHORACIC ECHOCARDIOGRAM:  Left ventricular size, wall motion and function are normal. The ejection  fraction is 60-65%.  Right ventricular function, chamber size, wall motion, and thickness are  normal.  The atrial septum is intact as assessed by color Doppler and agitated saline  bubble study .  The inferior vena cava was normal in size with preserved respiratory  variability. No pericardial effusion is present.       CARDIAC CATHETERIZATION:   Left Anterior Descending   Prox LAD lesion is 45% stenosed.

## 2024-07-10 NOTE — NURSING NOTE
24hr pH probe removed without issue.  Monitor and diary retrieved for data upload.  Sent to reading provider: Dr David Armenta

## 2024-07-10 NOTE — LETTER
7/10/2024      RE: Travon Cartwright  [address]  [facility] [address]       Dear Colleague,    Thank you for the opportunity to participate in the care of your patient, Travon Cartwright, at the [facility]. Please see a copy of my visit note below.    Cardiothoracic surgery lung transplant consultation      Travon Cartwright MRN# [id number]   YOB: 1965 Age: 59 year old   Date of Service: July 10, 2024         Reason for consult: Travon Cartwright is a 59 year old  male with life threatening end stage lung disease due to interstitial lung disease who is undergoing evaluation for lung transplant.           Assessment and Plan:   Mr. Travon Cartwright is a 59-year-old man with life-threatening, end-stage lung disease due to interstitial lung disease with pulmonary fibrosis. I recommend lung transplantation. I discussed the technical details of the procedure with the patient including the possibility of single versus double lung transplant, as well as the possibility of a sternotomy incision, bilateral anterolateral thoracotomy with transverse sternotomy (clamshell), and thoracotomy incisions. In his case, I recommend a bilateral lung transplant, and this could be done through a clamshell or sternotomy incision.    I also discussed the possibility of pre- or post-operative extracorporeal membrane oxygenation. I think preoperative ECMO is a reasonable consideration.      I discussed donor issues including donor selection, donor circulatory death (DCD) donors, and brain dead donors, as well as PHS risk and hepatitis C positive donors. I also explained donor lung preservation with cold static storage and warm ex vivo lung perfusion.    I also explained the expected postoperative course and recovery, including the likelihood of a prolonged hospitalization. The patient understands the risks and benefits of the  procedure. The risks include but are not limited to bleeding, infection including opportunistic infection, stroke, primary graft dysfunction, respiratory failure possibly requiring prolonged ventilation and tracheostomy, lifelong need for immunosuppression, acute or chronic rejection, renal failure, hepatic insufficiency, visceral or limb ischemia, and death. The patient understands these risks and wishes to undergo the operation. After discussion in multidisciplinary lung transplant conference, listing may be finalized.             Chief Complaint:   Travon Cartwright is a 59 year old male who complains of chronic dyspnea.           History of Present Illness:   This patient is a 59 year old male who presents to discuss the option of lung transplant for his ILD  with suspected IPF. He complains of feeling dyspneic in the last 3-4 years. He noticed when chopping firewood, he would  a log or two and get more dyspneic than he ever had previously. He has noticed a significant increase in dyspnea and his oxygen saturations have been down to 80s. He usually uses 4L with sleep and 4L with activity. He has lost about 15 lbs in the last year unintentionally.     He has type 2 diabetes mellitus and is on insulin for a few years.              Past Medical History:     Past Medical History:   Diagnosis Date    Hepatic steatosis 2017    Noted on ultrasound    Tremor 05/23/2024             Past Surgical History:     Past Surgical History:   Procedure Laterality Date    COLONOSCOPY      CV CORONARY ANGIOGRAM N/A 6/21/2024    Procedure: Coronary Angiogram;  Surgeon: Gabriel Julian MD;  Location:  [facility]ATH [facility]    CV RIGHT HEART CATH MEASUREMENTS RECORDED N/A 6/21/2024    Procedure: Right Heart Catheterization;  Surgeon: Gabriel Julian MD;  Location:  HEART CARDI[facility] CATH LAB               Social History:     Social History     Tobacco Use    Smoking status: Former     Types: Cigarettes    Smokeless tobacco:  Never   Substance Use Topics    Alcohol use: Not Currently     Comment: not since 2017             Family History:     Family History   Problem Relation Age of Onset    Hypertension Mother     Lung Cancer Father         former smoker    Other - See Comments Sister         daughter 26 years    No Known Problems Maternal Grandmother     Lung Cancer Maternal Grandfather         former smoker    No Known Problems Paternal Grandmother     No Known Problems Paternal Grandfather              Immunizations:     Immunization History   Administered Date(s) Administered    COVID-19 Monovalent 18+ (Moderna) 04/14/2021, 05/13/2021, 04/12/2022    Flu, Unspecified 01/11/2016, 04/06/2017, 11/29/2017    Influenza (IIV3) PF 01/04/2012    Influenza Vaccine >6 months,quad, PF 11/14/2023    Influenza,INJ,MDCK,PF,Quad >6mo(Flucelvax) 10/07/2021, 10/05/2022    Pneumococcal 20 valent Conjugate (Prevnar 20) 07/13/2022    TDAP (Adacel,Boostrix) 01/04/2012, 06/02/2017             Allergies:   No Known Allergies          Medications:     Current Outpatient Medications   Medication Sig Dispense Refill    aspirin (ASA) 325 MG EC tablet 325mg on day of heart cath      atorvastatin (LIPITOR) 20 MG tablet Take 1 tablet by mouth daily      Continuous Glucose Sensor (DEXCOM G7 SENSOR) MISC Change every 10 days. 1 each 0    Dulaglutide (TRULICITY) 3 MG/0.5ML SOPN Inject 3 mg Subcutaneous once a week      empagliflozin (JARDIANCE) 10 MG TABS tablet Take 1 tablet (10 mg) by mouth daily 90 tablet 1    insulin glargine (LANTUS PEN) 100 UNIT/ML pen Inject 15 Units Subcutaneous at bedtime      losartan (COZAAR) 50 MG tablet Take 1 tablet by mouth daily      metFORMIN (GLUCOPHAGE XR) 500 MG 24 hr tablet Take 500 mg by mouth 2 times daily (with meals)      Pirfenidone 801 MG TABS Take 801 mg by mouth 3 times daily      SYMBICORT 160-4.5 MCG/ACT Inhaler Inhale 2 puffs into the lungs two times daily      TRULICITY 1.5 MG/0.5ML pen Inject 3 mg Subcutaneous every  7 days       No current facility-administered medications for this visit.     Facility-Administered Medications Ordered in Other Visits   Medication Dose Route Frequency Provider Last Rate Last Admin    sodium chloride bacteriostatic 0.9 % flush 9 mL  9 mL Intravenous Once NICOLA Pearce MD                 Review of Systems:     The 10 point Review of Systems is negative other than noted in the HPI            Physical Exam:   Vitals were reviewed      BP: 112/76 Pulse: 87     SpO2: 97 %      Constitutional:   awake, alert, cooperative, no apparent distress, and appears stated age     Eyes:   Lids and lashes normal, pupils equal, round and reactive to light, extra ocular muscles intact, sclera clear, conjunctiva normal     ENT:   normocephalic, without obvious abnormality     Neck:   supple, symmetrical, trachea midline     Lungs:   no increased work of breathing, good air exchange, and no retractions     Cardiovascular:   regular rate and rhythm     Abdomen:   non-distended     Musculoskeletal:   no lower extremity pitting edema present  there is no redness, warmth, or swelling of the joints  full range of motion noted  motor strength is 5 out of 5 all extremities bilaterally     Neurologic:   Mental Status Exam:  Level of Alertness:   awake  Orientation:   person, place, time  Memory:   normal  Cranial Nerves:  cranial nerves II-XII are grossly intact  Motor Exam:  moves all extremities well and symmetrically     Neuropsychiatric:   General: normal, calm, and normal eye contact  Level of consciousness: alert / normal  Affect: normal     Skin:   no bruising or bleeding, normal skin color, texture, turgor, and no redness, warmth, or swelling          Data:   All laboratory data reviewed  All cardiac studies reviewed by me.  All imaging studies reviewed by me.    CT CHEST:  1. UIP pattern interstitial fibrosis. No suspicious pulmonary  findings.  2. Stable mediastinal/hilar lymphadenopathy, likely reactive  but  technically indeterminate.  3. Borderline cardiomegaly.  4. Mild splenomegaly.  5. Colonic diverticulosis.  6. Cholelithiasis.    LIVER FIBROSIS SCAN:  1. Estimated liver fibrosis is Stage 0-1   2. Steatosis Grade S3       NUC MED LUNG PERF:  Quantitative evaluation shows 64% contribution of the right lung as  compared to 36% contribution of the left lung.    TRANSTHORACIC ECHOCARDIOGRAM:  Left ventricular size, wall motion and function are normal. The ejection  fraction is 60-65%.  Right ventricular function, chamber size, wall motion, and thickness are  normal.  The atrial septum is intact as assessed by color Doppler and agitated saline  bubble study .  The inferior vena cava was normal in size with preserved respiratory  variability. No pericardial effusion is present.       CARDIAC CATHETERIZATION:   Left Anterior Descending   Prox LAD lesion is 45% stenosed.            Please do not hesitate to contact me if you have any questions/concerns.     Sincerely,     Sam Mathur MD

## 2024-07-10 NOTE — NURSING NOTE
Chief Complaint   Patient presents with    New Patient     New CV Sx        Vitals were taken, medications reconciled.    Rajesh Cervantes, Clinic Assistant   3:18 PM

## 2024-07-12 ENCOUNTER — OFFICE VISIT (OUTPATIENT)
Dept: PALLIATIVE CARE | Facility: CLINIC | Age: 59
End: 2024-07-12
Attending: INTERNAL MEDICINE
Payer: COMMERCIAL

## 2024-07-12 ENCOUNTER — OFFICE VISIT (OUTPATIENT)
Dept: CARDIAC REHAB | Facility: CLINIC | Age: 59
End: 2024-07-12
Attending: INTERNAL MEDICINE
Payer: COMMERCIAL

## 2024-07-12 VITALS
HEART RATE: 79 BPM | WEIGHT: 158 LBS | RESPIRATION RATE: 26 BRPM | DIASTOLIC BLOOD PRESSURE: 74 MMHG | TEMPERATURE: 98.2 F | OXYGEN SATURATION: 92 % | SYSTOLIC BLOOD PRESSURE: 116 MMHG | BODY MASS INDEX: 22.67 KG/M2

## 2024-07-12 DIAGNOSIS — J84.9 ILD (INTERSTITIAL LUNG DISEASE) (H): Primary | ICD-10-CM

## 2024-07-12 DIAGNOSIS — Z76.82 ORGAN TRANSPLANT CANDIDATE: ICD-10-CM

## 2024-07-12 DIAGNOSIS — Z01.818 ENCOUNTER FOR PRE-TRANSPLANT EVALUATION FOR LUNG TRANSPLANT: ICD-10-CM

## 2024-07-12 DIAGNOSIS — Z71.89 ADVANCE CARE PLANNING: ICD-10-CM

## 2024-07-12 DIAGNOSIS — J84.9 ILD (INTERSTITIAL LUNG DISEASE) (H): ICD-10-CM

## 2024-07-12 PROCEDURE — G0238 OTH RESP PROC, INDIV: HCPCS

## 2024-07-12 PROCEDURE — 99497 ADVNCD CARE PLAN 30 MIN: CPT | Mod: 25 | Performed by: INTERNAL MEDICINE

## 2024-07-12 PROCEDURE — 99204 OFFICE O/P NEW MOD 45 MIN: CPT | Mod: 25 | Performed by: INTERNAL MEDICINE

## 2024-07-12 PROCEDURE — G0463 HOSPITAL OUTPT CLINIC VISIT: HCPCS | Mod: 25 | Performed by: INTERNAL MEDICINE

## 2024-07-12 ASSESSMENT — PAIN SCALES - GENERAL: PAINLEVEL: NO PAIN (0)

## 2024-07-12 NOTE — PROGRESS NOTES
Palliative Care Outpatient Clinic      Patient ID:  Medical - He has ILD/IPF    Social - Lives in [facility] [place] he helps manage; sister Mine is a caregiver. Extensive SH noted in 6/20/24  note.    Care Planning - +HCD on file, names Mine his sister as his agent      History:  History gathered today from: patient, family/loved ones, medical chart; Mine is with him    I reviewed the patient's medical history in the chart and confirmed key details today with them; summarized above.  I reviewed with them the various roles of our palliative care program; qol support, sx management, mood/coping/counseling, and care planning.  The patient was referred with a focus on preparedness planning--pending lung transplant.     I discussed with them the role of palliative care in seeing patients who are being evaluated for lung transplantation.  We talked about preparedness planning.      I had an extensive discussion about what the complications can look like sometimes after transplantation.  This included chronic ventilator dependence and needing tracheostomy and long-term ventilation including sometimes LTAC placement, other serious comorbidities including becoming severely debilitated after transplant, often including multiple infections as well, leading to the patient being unable to return to living independently in the community, but instead spending time in facilities, in and out of the hospital and going back and forth between hospital and rehab facilities, without making any sort of real recovery (to being able to live outside of a facility), and this going on for many months or even over a year.  When this happens, occasionally patients do make good a recovery eventually long-term, but most often patients end up dying without ever having being able to return to independent living in the community.     Discussed with them that when this happens sometimes we can talk with the patient him or  herself about what to do but other times we are really relying on family decision makers which can make these difficult decisions, of when enough is enough, and what sort of burdensome treatments someone would be willing to go through for a low but really uncertain chance of good recovery.    He and Mine have recently spoken about these matters as they completed his HCD recently.  She helped make decisions for their father who  several years ago and so has a sense of what it's like.  Travon indicates if he had severe brain injury; or complications which lead to him being permanently and fully dependent in his ADLs he'd not want to be kept alive in those circumstances. He thinks he could adapt to less disability.  Mine asks Qs about what happens if someone doesn't want to continue on 2/2 devastating complications after transplant & we discussed that.     PE: /74   Pulse 79   Temp 98.2  F (36.8  C) (Oral)   Resp 26   Wt 71.7 kg (158 lb)   SpO2 92%   BMI 22.67 kg/m     Wt Readings from Last 3 Encounters:   24 71.7 kg (158 lb)   07/10/24 72.4 kg (159 lb 9.6 oz)   24 72.6 kg (160 lb)     Alert NAD      Data reviewed:  I reviewed recent labs and imaging, my comments:  Cr 0.74  CBC nl    CT  IMPRESSION:   1. UIP pattern interstitial fibrosis. No suspicious pulmonary  findings.  2. Stable mediastinal/hilar lymphadenopathy, likely reactive but  technically indeterminate.  3. Borderline cardiomegaly.  4. Mild splenomegaly.  5. Colonic diverticulosis.  6. Cholelithiasis.      Impression & Recommendations:  58 yo with IPF/ILD, being evaluated for lung transplantation    Reviewed with them the role of our program.  He seems globally well informed and realistic about the transplant.    ----------  In addition to the clinical activities described above in this note, with the patient's permission I discussed with him and Mine care planning.  We discussed preparedness planning for rare but  devastating complications as above; surrogate decision making.  Over 20 min today were spent in care planning discussions today.   ----------      Thank you for involving us in the patient's care.   Dez Mendoza MD / Palliative Medicine / Text me via [employer]  This note may have been composed with voice recognition software and there may be mistranscriptions.

## 2024-07-12 NOTE — PROGRESS NOTES
OUTPATIENT PRE-LUNG TRANSPLANT EXERCISE EVALUATION???????????????????????????????????????????   ?   Medical Diagnosis: ILD  Pulmonologist: Carey Tucker MD  Current Signs and Symptoms: ALVARADO, cough, hypoxemia?    ?   Living Environment:   Living Arrangement: House    Number of stairs to enter home: 3 with a railing    Comment: 13 stairs inside home with a railing    ADL Limitations: independent at a slower pace and with rest breaks ?      Occupation: Unknown    Social Status: Single   ?   Current Home Exercise:   Type of Exercise: None?    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 Discussed the difference between physical activity and aerobic exercise for heart and lung health.        Oxygen Usage:   Supplemental Oxygen Needed: Yes  Oxygen at Rest: 5-6 LPM   Oxygen with Activity:?5-6 LPM  Oxygen at night: 5-6 LPM    Comment: Discussed importance of O2 adherence with exercise and titrating to maintain spO2 > 88%.    ?   Modalities Performed:?LE strengthening, UE muscle conditioning, Stretching    ?   Exercise Prescription (Aerobic Exercise):   Mode: Walking?   Duration/Time: 3-5 min bouts; goal of 20 min/day   Comment: Discussed importance of increasing time before intensity.    Frequency: 3-5 d/week     Progression of Exercise:?0.1-0.5 METs/week   Oxygen Titration with Exercise:?>88%   ?   Exercise Prescription (Muscle Conditioning):   Mode:?Upper and lower body muscle conditioning exercises   Frequency (days per week): 2-3 d/week   Weights: bodyweight or with water bottles, soup cans, etc.   Reps: 10-15   Sets: 1-3   Progress to:?Increase by 1-2 pounds when 2-3 sets of 10-15 repetitions are no longer a challenge   ?   Patient Education:?PLB, Work Simplification/Energy Conservation techniques, Home Exercise Program,?Aerobika use and cleaning, Use of PLB with ADLs, Use of Inhaler with Spacer    ?   Outpatient Pulmonary Rehab/Respiratory Care Services:   Pt recommended to attend:  Unable to d/t location    Location:?NA   Comments:?Patient has not been performing home exercise. Pt is recommended to attend rehab program to assist in implementing exercise routine and assist with oxygen titration with progressive exercise in preparation for possible lung transplant. Pt/staff discussed attendance to Cardiac/Pulmonary rehab program to assist pt in progressing exercise in monitored setting. Pt has been unable to attend pulmonary rehab d/t his location and will be implementing recommendations at home. Pt/staff reviewed recommendations of aerobic exercise, muscle conditioning, and stretching. Patient would benefit from skilled therapy and rehab program to monitor cardiopulmonary response to exercise, assist in implementing continued breathing techniques, and establishment of home exercise program. He is motivated to implement home exercise recommendations independently as he is unable to participate in rehab.      45 min spent 1:1 with patient instructing breathing techniques with proper feedback, providing education on inhaler use & aerobika, exercise principles and progression, and assessing tolerance and performance of ADLs.

## 2024-07-12 NOTE — LETTER
7/12/2024       RE: Travon Cartwright  [address]  [place] [address]     Dear Colleague,    Thank you for referring your patient, Travon Cartwright, to the [facility] at [facility]. Please see a copy of my visit note below.    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 Extensive SH noted in 6/20/24  note.    Care Planning - +HCD on file, names Mine his sister as his agent      History:  History gathered today from: patient, family/loved ones, medical chart; Mine is with him    I reviewed the patient's medical history in the chart and confirmed key details today with them; summarized above.  I reviewed with them the various roles of our palliative care program; qol support, sx management, mood/coping/counseling, and care planning.  The patient was referred with a focus on preparedness planning--pending lung transplant.     I discussed with them the role of palliative care in seeing patients who are being evaluated for lung transplantation.  We talked about preparedness planning.      I had an extensive discussion about what the complications can look like sometimes after transplantation.  This included chronic ventilator dependence and needing tracheostomy and long-term ventilation including sometimes LTAC placement, other serious comorbidities including becoming severely debilitated after transplant, often including multiple infections as well, leading to the patient being unable to return to living independently in the community, but instead spending time in facilities, in and out of the hospital and going back and forth between hospital and rehab facilities, without making any sort of real recovery (to being able to live outside of a facility), and this going on for many months or  even over a year.  When this happens, occasionally patients do make good a recovery eventually long-term, but most often patients end up dying without ever having being able to return to independent living in the community.     Discussed with them that when this happens sometimes we can talk with the patient him or herself about what to do but other times we are really relying on family decision makers which can make these difficult decisions, of when enough is enough, and what sort of burdensome treatments someone would be willing to go through for a low but really uncertain chance of good recovery.    He and Mine have recently spoken about these matters as they completed his HCD recently.  She helped make decisions for their father who  several years ago and so has a sense of what it's like.  Travon indicates if he had severe brain injury; or complications which lead to him being permanently and fully dependent in his ADLs he'd not want to be kept alive in those circumstances. He thinks he could adapt to less disability.  Mine asks Qs about what happens if someone doesn't want to continue on 2/2 devastating complications after transplant & we discussed that.     PE: /74   Pulse 79   Temp 98.2  F (36.8  C) (Oral)   Resp 26   Wt 71.7 kg (158 lb)   SpO2 92%   BMI 22.67 kg/m     Wt Readings from Last 3 Encounters:   24 71.7 kg (158 lb)   07/10/24 72.4 kg (159 lb 9.6 oz)   24 72.6 kg (160 lb)     Alert NAD      Data reviewed:  I reviewed recent labs and imaging, my comments:  Cr 0.74  CBC nl    CT  IMPRESSION:   1. UIP pattern interstitial fibrosis. No suspicious pulmonary  findings.  2. Stable mediastinal/hilar lymphadenopathy, likely reactive but  technically indeterminate.  3. Borderline cardiomegaly.  4. Mild splenomegaly.  5. Colonic diverticulosis.  6. Cholelithiasis.      Impression & Recommendations:  60 yo with IPF/ILD, being evaluated for lung transplantation    Reviewed with  them the role of our program.  He seems globally well informed and realistic about the transplant.    ----------  In addition to the clinical activities described above in this note, with the patient's permission I discussed with him and Mine care planning.  We discussed preparedness planning for rare but devastating complications as above; surrogate decision making.  Over 20 min today were spent in care planning discussions today.   ----------      Thank you for involving us in the patient's care.   Dez Mendoza MD / Palliative Medicine / Text me via Nt[employer]  This note may have been composed with voice recognition software and there may be mistranscriptions.

## 2024-07-16 ENCOUNTER — VIRTUAL VISIT (OUTPATIENT)
Dept: TRANSPLANT | Facility: CLINIC | Age: 59
End: 2024-07-16
Attending: INTERNAL MEDICINE
Payer: COMMERCIAL

## 2024-07-16 DIAGNOSIS — J84.9 ILD (INTERSTITIAL LUNG DISEASE) (H): ICD-10-CM

## 2024-07-16 DIAGNOSIS — Z76.82 ORGAN TRANSPLANT CANDIDATE: ICD-10-CM

## 2024-07-16 DIAGNOSIS — Z01.818 ENCOUNTER FOR PRE-TRANSPLANT EVALUATION FOR LUNG TRANSPLANT: ICD-10-CM

## 2024-07-16 NOTE — PROGRESS NOTES
CLOSURE VISIT    Met with Juan J, sister Mine, and mother Mely at completion of the evaluation testing and reviewed labs, imagining, and procedure results.     Noted: completed hepatology and neurology consults, no further workup recommended and no contraindications to transplant. DEXA scan will be completed 7/17 at [facility].       PRA:  PRA results show:  UNOS cPRA 0%, Total cPRA 64%    06/18/24 12:12   SA1 Hi Risk Amparo None   SA1 Mod Risk Amparo A:43   SA2 Hi Risk Amparo None   SA2 Mod Risk Amparo DRw:52   Protocol Cutoff Standard Lung >3000 mfi cumulative   Unacceptable Antigen None >3000   UNOS cPRA 0     Will continue to monitor PRA results every 3 months when listed for lung transplant.   - Requested Lab kits be sent to patient for local lab draws: Yes    Confirmed that pH study results will be reviewed when available.    Right Heart Catheterization/ Angiogram: 6/21/24  RA mean of 2 mmHg and V wave of 3 mmhg  RV 44/2 mmHg  PA 45/15/26 mmHg  PCWP 6 mmHg with V wave of 6 mmHg  TD CO/CI 4.9/2.6   PVR 4 BENITES  SVR 1408 dynes/sec/cm-5  PA sat 74%   Hgb 16.2 g/dL       Per CAD policy for lung transplant patient WILL/ WILL NOT require carotid/LE ultrasounds?: not required    Sputum culture collected: yes, in progress  FIT test:  n/a, colonoscopy up to date  Diabetic status: T2DM on insulin for a few years. Recently started jardiance with reported improvement in BG control.    Updated Patient Status Tab with following Information:     Physical Status:   Karnofsky Score: 70%  Physical Capacity: Limited mobility    Demographics/Socioeconomic Status:   Highest Education: [employer]  Working for Income: SSDI  Primary Insurance: Commercial/Private insurance   [place] Citizen: Yes  Patient ethnicity: White   Patient Ethnicity/Race UNOS: White unspecified    Primary Care: Reminded patient it their responsibility to stay current with all primary cares.   BMI:  Colonoscopy: 2/5/16 at [facility], no polyps, 10 year repeat recommended   PSA: up  to date, 0.1, 5/2/24   Dental: up to date, dentist: Dr. Holly (?), [place]  Immunizations:   COVID vaccine: Due for booster. Moderna monovalent 4/12/22, 5/13/21, 4/14/21.  Prevnar 20: 7/13/22  Influenza: 11/14/23  TDAP: 6/2/17  Shingrix: not found  Hep A/B: not found       Oxygen use: Rest 6  Activity 8 Sleep:6  Current oxygen equipment: home concentrator, tanks for portability (E tanks)  Prednisone use: none  Ventilation status: none  Prior chest surgery?: none    ETOH intake: negative    Pulmonary rehab: has not attended; no programs within 100 miles  My Chart: active                   Care Everywhere: active    OTC Medications/Herbal Supplements: no    Reinforced need for staying locally with full time caregiver for 3 months after transplant.  Encouraged patient and family to review [email or web address] for education reinforcement.    Will contact Juan J with transplant team recommendation following committee review.

## 2024-07-17 ENCOUNTER — TRANSFERRED RECORDS (OUTPATIENT)
Dept: HEALTH INFORMATION MANAGEMENT | Facility: CLINIC | Age: 59
End: 2024-07-17
Payer: COMMERCIAL

## 2024-07-18 ENCOUNTER — DOCUMENTATION ONLY (OUTPATIENT)
Dept: TRANSPLANT | Facility: CLINIC | Age: 59
End: 2024-07-18

## 2024-07-25 ENCOUNTER — TELEPHONE (OUTPATIENT)
Dept: TRANSPLANT | Facility: CLINIC | Age: 59
End: 2024-07-25
Payer: COMMERCIAL

## 2024-07-25 DIAGNOSIS — Z01.818 ENCOUNTER FOR PRE-TRANSPLANT EVALUATION FOR LUNG TRANSPLANT: ICD-10-CM

## 2024-07-25 DIAGNOSIS — J84.9 ILD (INTERSTITIAL LUNG DISEASE) (H): Primary | ICD-10-CM

## 2024-07-25 DIAGNOSIS — Z76.82 ORGAN TRANSPLANT CANDIDATE: ICD-10-CM

## 2024-07-25 NOTE — TELEPHONE ENCOUNTER
"Received insurance approval to list for lung transplant from [facility].   Confirmed that Juan J is ready to proceed with listing for lung transplant.  Verified blood type as O NEG per transplant office protocol.   Listed in UNOS for bilateral lung transplant as was recommended by the lung transplant team.  UNOS Diagnosis: IIP: Idiopathic Pulmonary Fibrosis    Data used for listing:  Ht: 5'10\"  Wt: 160 lb  Date: 7/9/24  Data source: clinic vitals  Oxygen requirements: Rest 6L    Activity 8L    Sleep  6L (clinical note 7/16/24)  Diabetic status: T2DM, on insulin (clinical note 7/16/24)  Functional status: requires some assistance (clinical note 7/16/24)  Prior malignancies: none  Smoking quit date: 2019  Intubated at time of listing: no  On ECMO at time of listing: no  Prior cardiac surgery: none  CBC:   Latest Reference Range & Units 06/21/24 11:16   Hemoglobin 13.3 - 17.7 g/dL 16.6   Hematocrit 40.0 - 53.0 % 48.9     VBG:    Latest Reference Range & Units 06/18/24 12:12   Ph Venous 7.32 - 7.43  7.37   PCO2 Venous 40 - 50 mm Hg 44     Cr:    Latest Reference Range & Units 06/21/24 11:16   Creatinine 0.67 - 1.17 mg/dL 0.76     T Bili:    Latest Reference Range & Units 06/18/24 12:12   Bilirubin Total <=1.2 mg/dL 0.3     PFT: 6/5/24, 12:33, [facility]:    6MW:    Latest Reference Range & Units 06/18/24 00:00   6 min walk (FT) 1,601 ft 870     RHC : 6/21/24  BSA 1.89 m2  /73/90 mmHg  RA mean of 2 mmHg and V wave of 3 mmhg  RV 44/2 mmHg  PA 45/15/26 mmHg  PCWP 6 mmHg with V wave of 6 mmHg  TD CO/CI 4.9/2.6   PVR 4 BENITES  SVR 1408 dynes/sec/cm-5  PA sat 74%   Hgb 16.2 g/dL   IMMUNOLOGY:    06/18/24 12:12   SA1 Test Method SA EDTA FCS   SA1 Cell Class I   SA1 Hi Risk Amparo None   SA1 Mod Risk Amparo A:43   SA2 Test Method SA EDTA FCS   SA2 Cell Class II   SA2 Hi Risk Amparo None   SA2 Mod Risk Amparo DRw:52   Protocol Cutoff Standard Lung >3000 mfi cumulative   Unacceptable Antigen None >3000   UNOS cPRA 0     CXR sent to Drs. " "Kevan and Eugene for review.  Lung Measurements: R 16.43  L 20.57  D 31.37    UNOS max donor height: 72\"     UNOS min donor height: 62\"  Based on patient height of 5'10\" (70\").     Verified in UNOS by Steve Jiang.  Based on updated donor size parameter guidelines, lung transplant candidates are now listed in UNet with donor height parameters by diagnosis as follows:    Donor Size Criteria:   Max Donor Height   COPD: 12 in above candidate's height  Restrictive Disease (i.e., Sarcoid, ILD, IPF, HP): 2 in above candidate's height  CPFE:  8 in above candidate's height  Pulmonary Hypertension: 6 in above candidate's height   Bronchiectasis/CF: 8 in above candidate's height    Minimum Donor Height  COPD: Equal to candidate's height  Restrictive Lung Diseases (i.e., Sarcoid, ILD, IPF, HP):  8 in below candidate's height  CPFE: 2 in below candidate's height  Pulmonary Hypertension: 6 in below candidate's height  Bronchiectasis/CF: Equal to candidate's height       Contacted patient to confirm listing and verified that PAULO score is  [phone].   Juan J will require a virtual crossmatch, and is aware that we will continue to monitor HLA antibodies with quarterly PRA levels.    Reminded Juan J that the call may come at any time and that he will need to be ready when called. Juan J plans to drive 2.5 hours to [facility].  Juan J is aware that he should have medications and oxygen ready to bring to the hospital.    Discussed contacting coordinator for change in medical status such as treatment with steroids or antibiotics, need for hospitalization, or new blood transfusion; and with any travel plans.  Juan J is aware that he will need to be seen a minimum of every 6 months at [facility].  Next clinic appointment is scheduled on 10/1/24 with Dr. Knight.    Documentation/Notifications:   Notified surgeons and coordinators on call of new listing.   Wait list notification letter sent to Juan J and referring and primary care physicians: Yes  Critical " Elements at the Time of Listing Note completed?: Yes  Notification sent to Immunology listing pool: Yes  Notification sent to PFR?: Yes  Notifications sent to transplant social work?: Yes   Transplant : [facility]  Confirmed Social Support ppw completed: Yes  Updated transplant tab phase status: Yes  Notification sent to Cardiothoracic LPN for UNOs data verification?: Yes  Verified regulatory ppw uploaded prior to listing: Yes  Hep C Donor discussed: yes, surgeon visit with Dr. Mathur 7/10 and transplant education 6/13 Consent Sent: Yes  Consent signed: Yes

## 2024-07-25 NOTE — LETTER
2024    Travon Cartwright  [address]  [place] [address]      Dear Mr. Cartwright,    This letter is sent to confirm that you have completed your transplant work-up and you are a candidate in the lung transplant program at the [facility].  You were placed on the lung active waitlist on 24.      When you are active on the waitlist and an organ becomes available, a coordinator will need to speak to you immediately.  You could be contacted at any time during the day or night as an organ could become available at any time.  Please make certain our office always has your current telephone numbers and address.      Items we will need from you:    We have received approval from your insurance company for the transplant procedure.  It is critical that you notify us if there is any change in your insurance.  It is also important that you familiarize yourself with the details of your specific insurance policy.  Our patient  is available to assist you if you should have any questions regarding your coverage.    An ALA or PRA blood sample may need to be sent here every 3 to 6 months to match you with  donors or any potential living donors.  If you need this testing, special mailing boxes (called mailers) will be sent to you directly from the Outreach Department.  You should take the physician order form and the  to your home laboratory when it is time to for this testing to be done.  Additional mailers can be obtained by calling the Transplant Office and asking to speak to a lung .    During this waiting period, we may request additional periodic laboratory tests with your primary physician.  It will be your responsibility to remind your physician to forward your results to the Transplant Office.    We need to be kept informed of any changes in your medical condition such as:    changes in your  medications,   significant changes in your health  significant infections (such as pneumonia or abscesses)  blood transfusions  any condition which requires hospitalization  any surgery    Remember to complete any routine cancer screening tests required before your transplant.  This includes colonoscopy; prostate screening for men, and mammogram and gynecologic testing for women, as well as dental work.  Your primary care clinic can assist you with arranging for these exams.  Remind your caregivers to forward copies of the records and final reports.      We want you to know that our program has physician and surgeon coverage 24 hours a day, 365 days a year. In addition, our transplant surgeons and physicians will not be on call for two or more transplant programs more than 30 miles apart unless the circumstances have been reviewed and approved by the United Network for Organ Sharing (UNOS) Membership and Professional Standards Committee (MPSC). Finally, our primary physician and primary surgeons are not designated as the primary surgeon or primary physician at more than 1 transplant hospital. If this coverage changes or there are substantial program changes, you will be notified in writing by letter.     Attached is a letter from UNOS that describes the services and information offered to patients by UNOS and the Organ Procurement and Transplantation Network (OPTN).    We appreciate having had the opportunity to participate in your care.  If you have questions, please feel free to call the Transplant Office at [phone] or [phone].      Sincerely,      Solid Organ Transplant  [facility], [facility]      Enclosures: OS Letter  cc: Care Team                  The Organ Procurement and Transplantation Network Toll-free patient services line:  [phone]  Your resource for organ transplant information    Staffed 8:30  am - 5:00 pm ET Monday - Friday Leave a message 24/7 to receive a call back    The Organ Procurement and Transplantation Network (OPTN) is the national transplant system. It makes the policies that decide how donated organs are matched to patients waiting for a transplant. The OPTN:    Makes sure donated organs get matched to people on the transplant waiting list  Tells people about the donation and transplant processes  Makes sure that the public knows about the need for more organ and tissue donations    The OPTN has a free patient services line that you can call to:  Get more information about:  Organ donation and organ transplants  Donation and transplant policies  Get an information kit with:  A list of transplant hospitals  Waiting list information  Talk about any questions you may have about your transplant hospital or organ procurement organization. The staff will do their best to help you or point you to others who may help.  Find out how you can volunteer with the OPTN and help shape transplant policy     The patient services line number is: 3-122-825-6989    Patient services line staff CANNOT answer questions about your own medical care, including:  Waiting list status  Test results  Medical records  You will need to call your transplant hospital for this information.    The following websites have more information about transplantation and donation:    OPTN: https://optn.transplant.hrsa.gov/    For potential living donors and transplant recipients:    Living with transplant: https://www.transplantliving.org/    Living donation process: https://optn.transplant.hrsa.gov/living-donation/    Financial assistance: https://www.livingdonorassistance.org/    Transplantation data: https://www.srtr.org/    Organ donation: https://www.organdonor.gov/    Volunteer with the OPTN: https://optn.transplant.hrsa.gov/get-involved/

## 2024-07-28 ENCOUNTER — HEALTH MAINTENANCE LETTER (OUTPATIENT)
Age: 59
End: 2024-07-28

## 2024-08-13 LAB
ACID FAST STAIN (ARUP): NORMAL

## 2024-08-14 ENCOUNTER — APPOINTMENT (OUTPATIENT)
Dept: GENERAL RADIOLOGY | Facility: CLINIC | Age: 59
End: 2024-08-14
Payer: COMMERCIAL

## 2024-08-14 ENCOUNTER — HOSPITAL ENCOUNTER (INPATIENT)
Facility: CLINIC | Age: 59
LOS: 13 days | Discharge: HOME | End: 2024-08-28
Attending: STUDENT IN AN ORGANIZED HEALTH CARE EDUCATION/TRAINING PROGRAM | Admitting: STUDENT IN AN ORGANIZED HEALTH CARE EDUCATION/TRAINING PROGRAM
Payer: COMMERCIAL

## 2024-08-14 ENCOUNTER — HOSPITAL ENCOUNTER (INPATIENT)
Facility: CLINIC | Age: 59
Setting detail: SURGERY ADMIT
End: 2024-08-14
Attending: STUDENT IN AN ORGANIZED HEALTH CARE EDUCATION/TRAINING PROGRAM | Admitting: STUDENT IN AN ORGANIZED HEALTH CARE EDUCATION/TRAINING PROGRAM
Payer: COMMERCIAL

## 2024-08-14 ENCOUNTER — ORGAN (OUTPATIENT)
Dept: TRANSPLANT | Facility: CLINIC | Age: 59
End: 2024-08-14

## 2024-08-14 ENCOUNTER — ANESTHESIA EVENT (OUTPATIENT)
Dept: SURGERY | Facility: CLINIC | Age: 59
End: 2024-08-14
Payer: COMMERCIAL

## 2024-08-14 DIAGNOSIS — J84.9 ILD (INTERSTITIAL LUNG DISEASE) (H): ICD-10-CM

## 2024-08-14 DIAGNOSIS — Z79.4 TYPE 2 DIABETES MELLITUS WITHOUT COMPLICATION, WITH LONG-TERM CURRENT USE OF INSULIN (H): ICD-10-CM

## 2024-08-14 DIAGNOSIS — Z76.82 AWAITING ORGAN TRANSPLANT: Primary | ICD-10-CM

## 2024-08-14 DIAGNOSIS — D84.9 IMMUNOSUPPRESSION (H): ICD-10-CM

## 2024-08-14 DIAGNOSIS — Z01.818 ENCOUNTER FOR PRE-TRANSPLANT EVALUATION FOR LUNG TRANSPLANT: ICD-10-CM

## 2024-08-14 DIAGNOSIS — R73.9 STEROID-INDUCED HYPERGLYCEMIA: ICD-10-CM

## 2024-08-14 DIAGNOSIS — Z94.2 S/P LUNG TRANSPLANT (H): Primary | ICD-10-CM

## 2024-08-14 DIAGNOSIS — F41.8 OTHER SPECIFIED ANXIETY DISORDERS: ICD-10-CM

## 2024-08-14 DIAGNOSIS — T38.0X5A STEROID-INDUCED HYPERGLYCEMIA: ICD-10-CM

## 2024-08-14 DIAGNOSIS — E11.9 TYPE 2 DIABETES MELLITUS WITHOUT COMPLICATION, WITH LONG-TERM CURRENT USE OF INSULIN (H): ICD-10-CM

## 2024-08-14 DIAGNOSIS — Z76.82 LUNG TRANSPLANT PLANNED: ICD-10-CM

## 2024-08-14 DIAGNOSIS — E83.42 HYPOMAGNESEMIA: ICD-10-CM

## 2024-08-14 LAB
ABO/RH(D): NORMAL
ANION GAP SERPL CALCULATED.3IONS-SCNC: 14 MMOL/L (ref 7–15)
ANTIBODY SCREEN: NEGATIVE
BASOPHILS # BLD AUTO: 0.1 10E3/UL (ref 0–0.2)
BASOPHILS NFR BLD AUTO: 1 %
BUN SERPL-MCNC: 12 MG/DL (ref 8–23)
CALCIUM SERPL-MCNC: 9.9 MG/DL (ref 8.8–10.4)
CHLORIDE SERPL-SCNC: 102 MMOL/L (ref 98–107)
CREAT SERPL-MCNC: 0.7 MG/DL (ref 0.67–1.17)
EGFRCR SERPLBLD CKD-EPI 2021: >90 ML/MIN/1.73M2
EOSINOPHIL # BLD AUTO: 0.3 10E3/UL (ref 0–0.7)
EOSINOPHIL NFR BLD AUTO: 3 %
ERYTHROCYTE [DISTWIDTH] IN BLOOD BY AUTOMATED COUNT: 12.5 % (ref 10–15)
GLUCOSE BLDC GLUCOMTR-MCNC: 140 MG/DL (ref 70–99)
GLUCOSE SERPL-MCNC: 165 MG/DL (ref 70–99)
HCO3 SERPL-SCNC: 21 MMOL/L (ref 22–29)
HCT VFR BLD AUTO: 51.6 % (ref 40–53)
HGB BLD-MCNC: 17.1 G/DL (ref 13.3–17.7)
IMM GRANULOCYTES # BLD: 0.1 10E3/UL
IMM GRANULOCYTES NFR BLD: 1 %
LYMPHOCYTES # BLD AUTO: 2.1 10E3/UL (ref 0.8–5.3)
LYMPHOCYTES NFR BLD AUTO: 22 %
MAGNESIUM SERPL-MCNC: 2 MG/DL (ref 1.7–2.3)
MCH RBC QN AUTO: 29.6 PG (ref 26.5–33)
MCHC RBC AUTO-ENTMCNC: 33.1 G/DL (ref 31.5–36.5)
MCV RBC AUTO: 89 FL (ref 78–100)
MONOCYTES # BLD AUTO: 0.9 10E3/UL (ref 0–1.3)
MONOCYTES NFR BLD AUTO: 10 %
NEUTROPHILS # BLD AUTO: 6.1 10E3/UL (ref 1.6–8.3)
NEUTROPHILS NFR BLD AUTO: 63 %
NRBC # BLD AUTO: 0 10E3/UL
NRBC BLD AUTO-RTO: 0 /100
PHOSPHATE SERPL-MCNC: 3.7 MG/DL (ref 2.5–4.5)
PLATELET # BLD AUTO: 267 10E3/UL (ref 150–450)
POTASSIUM SERPL-SCNC: 3.6 MMOL/L (ref 3.4–5.3)
RBC # BLD AUTO: 5.78 10E6/UL (ref 4.4–5.9)
SODIUM SERPL-SCNC: 137 MMOL/L (ref 135–145)
SPECIMEN EXPIRATION DATE: NORMAL
WBC # BLD AUTO: 9.6 10E3/UL (ref 4–11)

## 2024-08-14 PROCEDURE — 82784 ASSAY IGA/IGD/IGG/IGM EACH: CPT

## 2024-08-14 PROCEDURE — 86696 HERPES SIMPLEX TYPE 2 TEST: CPT

## 2024-08-14 PROCEDURE — 83735 ASSAY OF MAGNESIUM: CPT

## 2024-08-14 PROCEDURE — 86316 IMMUNOASSAY TUMOR OTHER: CPT | Performed by: INTERNAL MEDICINE

## 2024-08-14 PROCEDURE — 86644 CMV ANTIBODY: CPT

## 2024-08-14 PROCEDURE — 86900 BLOOD TYPING SEROLOGIC ABO: CPT

## 2024-08-14 PROCEDURE — 86704 HEP B CORE ANTIBODY TOTAL: CPT

## 2024-08-14 PROCEDURE — 71046 X-RAY EXAM CHEST 2 VIEWS: CPT

## 2024-08-14 PROCEDURE — 36415 COLL VENOUS BLD VENIPUNCTURE: CPT

## 2024-08-14 PROCEDURE — 87536 HIV-1 QUANT&REVRSE TRNSCRPJ: CPT

## 2024-08-14 PROCEDURE — 93005 ELECTROCARDIOGRAM TRACING: CPT

## 2024-08-14 PROCEDURE — 87522 HEPATITIS C REVRS TRNSCRPJ: CPT

## 2024-08-14 PROCEDURE — 71046 X-RAY EXAM CHEST 2 VIEWS: CPT | Mod: 26 | Performed by: RADIOLOGY

## 2024-08-14 PROCEDURE — 87521 HEPATITIS C PROBE&RVRS TRNSC: CPT

## 2024-08-14 PROCEDURE — 86833 HLA CLASS II HIGH DEFIN QUAL: CPT

## 2024-08-14 PROCEDURE — 86825 HLA X-MATH NON-CYTOTOXIC: CPT

## 2024-08-14 PROCEDURE — 87389 HIV-1 AG W/HIV-1&-2 AB AG IA: CPT

## 2024-08-14 PROCEDURE — 84100 ASSAY OF PHOSPHORUS: CPT

## 2024-08-14 PROCEDURE — 80048 BASIC METABOLIC PNL TOTAL CA: CPT

## 2024-08-14 PROCEDURE — 86901 BLOOD TYPING SEROLOGIC RH(D): CPT

## 2024-08-14 PROCEDURE — 250N000012 HC RX MED GY IP 250 OP 636 PS 637

## 2024-08-14 PROCEDURE — 87517 HEPATITIS B DNA QUANT: CPT

## 2024-08-14 PROCEDURE — 82248 BILIRUBIN DIRECT: CPT

## 2024-08-14 PROCEDURE — 86706 HEP B SURFACE ANTIBODY: CPT

## 2024-08-14 PROCEDURE — 85610 PROTHROMBIN TIME: CPT

## 2024-08-14 PROCEDURE — 87340 HEPATITIS B SURFACE AG IA: CPT

## 2024-08-14 PROCEDURE — 85025 COMPLETE CBC W/AUTO DIFF WBC: CPT

## 2024-08-14 PROCEDURE — 86665 EPSTEIN-BARR CAPSID VCA: CPT

## 2024-08-14 PROCEDURE — 86803 HEPATITIS C AB TEST: CPT

## 2024-08-14 PROCEDURE — 82962 GLUCOSE BLOOD TEST: CPT

## 2024-08-14 PROCEDURE — 86832 HLA CLASS I HIGH DEFIN QUAL: CPT

## 2024-08-14 PROCEDURE — 85730 THROMBOPLASTIN TIME PARTIAL: CPT

## 2024-08-14 RX ORDER — LIDOCAINE 40 MG/G
CREAM TOPICAL
Status: DISCONTINUED | OUTPATIENT
Start: 2024-08-14 | End: 2024-08-15

## 2024-08-14 RX ORDER — DEXTROSE MONOHYDRATE 25 G/50ML
25-50 INJECTION, SOLUTION INTRAVENOUS
Status: DISCONTINUED | OUTPATIENT
Start: 2024-08-14 | End: 2024-08-15

## 2024-08-14 RX ORDER — NICOTINE POLACRILEX 4 MG
15-30 LOZENGE BUCCAL
Status: DISCONTINUED | OUTPATIENT
Start: 2024-08-14 | End: 2024-08-14

## 2024-08-14 RX ORDER — DEXTROSE MONOHYDRATE 25 G/50ML
25-50 INJECTION, SOLUTION INTRAVENOUS
Status: DISCONTINUED | OUTPATIENT
Start: 2024-08-14 | End: 2024-08-14

## 2024-08-14 RX ORDER — NICOTINE POLACRILEX 4 MG
15-30 LOZENGE BUCCAL
Status: DISCONTINUED | OUTPATIENT
Start: 2024-08-14 | End: 2024-08-15

## 2024-08-14 RX ADMIN — INSULIN GLARGINE 30 UNITS: 100 INJECTION, SOLUTION SUBCUTANEOUS at 23:58

## 2024-08-14 ASSESSMENT — ACTIVITIES OF DAILY LIVING (ADL)
ADLS_ACUITY_SCORE: 35
ADLS_ACUITY_SCORE: 33

## 2024-08-14 NOTE — TELEPHONE ENCOUNTER
BILATERAL LUNG donor was identified by Rima Floyd and reviewed with Dr. Mathur.  The organ was accepted and pt was called in for transplant.      Donor UNOS ID [id number] and Match ID [id number] confirmed with Dr. Mathur     Donor and recipient blood type reviewed and found to be IDENTICAL       Recipient with HLA antibodies: NO  Crossmatch required   Prospective: NO   Virtual: YES - Neg. No DSA  Crossmatch reviewed with Dr. Knox, immunology staff on call and deemed negative based on organ specific protocol.     Donor specific antibodies absent notified Dr. Mathur/Dr. Mulvihill.    Pt was contacted AT HOME     Verified pt has not had any blood transfusions since their last PRA sample on 06.18.24     Donor DOES NOT meet criteria to be classified as PHS INCREASED RISK; Pt instructed to remain NPO for pre-op prep.    Instructed to bring medications, oxygen, or equipment.    Pt instructed to come to the [facility] ER.     Pt on Coumadin: NO   Intervention:     Pt has had a positive COVID test: NO              Date of last positive COVID test:       ABO/CMV/EBV status note:   UNOS donor ID [id number]  Donor blood type is O: Verified by donor records   Recipient blood type is O: Verified by blood bank [facility].   Donor CMV status is negative. Verified by donor records.   Recipient CMV status is positive/. Verified in [facility] lab results.   Donor EBV status is negative. Verified by donor records.   Recipient EBV status is positive. Verified in [facility] lab results.   Recipient HSV status is positive. Verified in [facility] lab results.   Donor Toxoplasma status is positive. Verified by donor records.  Recipient Toxoplasma status is negative. Verified in [facility] lab results.  Recipient VZV status is positive. Verified in [facility] lab results

## 2024-08-15 ENCOUNTER — APPOINTMENT (OUTPATIENT)
Dept: GENERAL RADIOLOGY | Facility: CLINIC | Age: 59
End: 2024-08-15
Attending: SURGERY
Payer: COMMERCIAL

## 2024-08-15 ENCOUNTER — ANESTHESIA (OUTPATIENT)
Dept: SURGERY | Facility: CLINIC | Age: 59
End: 2024-08-15
Payer: COMMERCIAL

## 2024-08-15 LAB
ALBUMIN SERPL BCG-MCNC: 2.2 G/DL (ref 3.5–5.2)
ALBUMIN SERPL BCG-MCNC: 4.3 G/DL (ref 3.5–5.2)
ALBUMIN UR-MCNC: NEGATIVE MG/DL
ALLEN'S TEST: ABNORMAL
ALP SERPL-CCNC: 61 U/L (ref 40–150)
ALP SERPL-CCNC: 89 U/L (ref 40–150)
ALT SERPL W P-5'-P-CCNC: 22 U/L (ref 0–70)
ALT SERPL W P-5'-P-CCNC: 26 U/L (ref 0–70)
ANION GAP SERPL CALCULATED.3IONS-SCNC: 11 MMOL/L (ref 7–15)
ANION GAP SERPL CALCULATED.3IONS-SCNC: 14 MMOL/L (ref 7–15)
ANION GAP SERPL CALCULATED.3IONS-SCNC: 14 MMOL/L (ref 7–15)
APPEARANCE UR: CLEAR
APTT PPP: 32 SECONDS (ref 22–38)
APTT PPP: 36 SECONDS (ref 22–38)
APTT PPP: 42 SECONDS (ref 22–38)
AST SERPL W P-5'-P-CCNC: 24 U/L (ref 0–45)
AST SERPL W P-5'-P-CCNC: 79 U/L (ref 0–45)
ATRIAL RATE - MUSE: 101 BPM
BACTERIA SPEC CULT: NORMAL
BACTERIA SPEC CULT: NORMAL
BACTERIA SPT CULT: NORMAL
BASE EXCESS BLDA CALC-SCNC: -1.9 MMOL/L (ref -3–3)
BASE EXCESS BLDA CALC-SCNC: -2.5 MMOL/L (ref -3–3)
BASE EXCESS BLDA CALC-SCNC: -2.9 MMOL/L (ref -3–3)
BASE EXCESS BLDA CALC-SCNC: -3.1 MMOL/L (ref -3–3)
BASE EXCESS BLDA CALC-SCNC: -3.7 MMOL/L (ref -3–3)
BASE EXCESS BLDA CALC-SCNC: -3.7 MMOL/L (ref -3–3)
BASE EXCESS BLDA CALC-SCNC: -4 MMOL/L (ref -3–3)
BASE EXCESS BLDA CALC-SCNC: -4 MMOL/L (ref -3–3)
BASE EXCESS BLDA CALC-SCNC: -4.1 MMOL/L (ref -3–3)
BASE EXCESS BLDA CALC-SCNC: -4.2 MMOL/L (ref -3–3)
BASE EXCESS BLDA CALC-SCNC: -4.5 MMOL/L (ref -3–3)
BASE EXCESS BLDA CALC-SCNC: -6.4 MMOL/L (ref -3–3)
BASE EXCESS BLDA CALC-SCNC: -6.8 MMOL/L (ref -3–3)
BASE EXCESS BLDV CALC-SCNC: -2.7 MMOL/L (ref -3–3)
BASE EXCESS BLDV CALC-SCNC: -5.7 MMOL/L (ref -3–3)
BASOPHILS # BLD AUTO: 0.1 10E3/UL (ref 0–0.2)
BASOPHILS NFR BLD AUTO: 1 %
BILIRUB DIRECT SERPL-MCNC: <0.2 MG/DL (ref 0–0.3)
BILIRUB SERPL-MCNC: 0.4 MG/DL
BILIRUB SERPL-MCNC: 1.4 MG/DL
BILIRUB UR QL STRIP: NEGATIVE
BLD PROD TYP BPU: NORMAL
BLOOD COMPONENT TYPE: NORMAL
BUN SERPL-MCNC: 10.7 MG/DL (ref 8–23)
BUN SERPL-MCNC: 11.9 MG/DL (ref 8–23)
BUN SERPL-MCNC: 12.9 MG/DL (ref 8–23)
CA-I BLD-MCNC: 3.5 MG/DL (ref 4.4–5.2)
CA-I BLD-MCNC: 3.6 MG/DL (ref 4.4–5.2)
CA-I BLD-MCNC: 3.8 MG/DL (ref 4.4–5.2)
CA-I BLD-MCNC: 3.9 MG/DL (ref 4.4–5.2)
CA-I BLD-MCNC: 4 MG/DL (ref 4.4–5.2)
CA-I BLD-MCNC: 4 MG/DL (ref 4.4–5.2)
CA-I BLD-MCNC: 4.1 MG/DL (ref 4.4–5.2)
CA-I BLD-MCNC: 4.2 MG/DL (ref 4.4–5.2)
CA-I BLD-MCNC: 4.8 MG/DL (ref 4.4–5.2)
CA-I BLD-MCNC: 4.9 MG/DL (ref 4.4–5.2)
CA-I BLD-MCNC: 5 MG/DL (ref 4.4–5.2)
CALCIUM SERPL-MCNC: 7.8 MG/DL (ref 8.8–10.4)
CALCIUM SERPL-MCNC: 9.6 MG/DL (ref 8.8–10.4)
CALCIUM SERPL-MCNC: 9.9 MG/DL (ref 8.8–10.4)
CHLORIDE SERPL-SCNC: 104 MMOL/L (ref 98–107)
CHLORIDE SERPL-SCNC: 106 MMOL/L (ref 98–107)
CHLORIDE SERPL-SCNC: 107 MMOL/L (ref 98–107)
CLOT INIT KAOL IND TO POST HEP NEUT TRTO: 1 {RATIO}
CLOT INIT KAOL IND TO POST HEP NEUT TRTO: 1.1 {RATIO}
CLOT INIT KAOLIN IND BLD US: 119 SEC (ref 113–166)
CLOT INIT KAOLIN IND BLD US: 126 SEC (ref 113–166)
CLOT INIT KAOLIN IND P HEP NEUT BLD US: 119 SEC (ref 103–153)
CLOT INIT KAOLIN IND P HEP NEUT BLD US: 120 SEC (ref 103–153)
CLOT STIFF PLT CONT BLD CALC: 16 HPA (ref 11.9–29.8)
CLOT STIFF PLT CONT BLD CALC: 23 HPA (ref 11.9–29.8)
CLOT STIFF TF IND P HEP NEUT BLD US: 17.2 HPA (ref 13–33.2)
CLOT STIFF TF IND P HEP NEUT BLD US: 25.3 HPA (ref 13–33.2)
CLOT STIFF TF IND+IIB-IIIA INH P HEP NEU: 1.2 HPA (ref 1–3.7)
CLOT STIFF TF IND+IIB-IIIA INH P HEP NEU: 2.3 HPA (ref 1–3.7)
CMV IGG SERPL IA-ACNC: >10 U/ML
CMV IGG SERPL IA-ACNC: ABNORMAL
CODING SYSTEM: NORMAL
COHGB MFR BLD: 100 % (ref 96–97)
COLOR UR AUTO: ABNORMAL
CREAT SERPL-MCNC: 0.55 MG/DL (ref 0.67–1.17)
CREAT SERPL-MCNC: 0.62 MG/DL (ref 0.67–1.17)
CREAT SERPL-MCNC: 0.65 MG/DL (ref 0.67–1.17)
DIASTOLIC BLOOD PRESSURE - MUSE: NORMAL MMHG
EBV VCA IGG SER IA-ACNC: >750 U/ML
EBV VCA IGG SER IA-ACNC: POSITIVE
EGFRCR SERPLBLD CKD-EPI 2021: >90 ML/MIN/1.73M2
EOSINOPHIL # BLD AUTO: 0.3 10E3/UL (ref 0–0.7)
EOSINOPHIL NFR BLD AUTO: 4 %
ERYTHROCYTE [DISTWIDTH] IN BLOOD BY AUTOMATED COUNT: 12.5 % (ref 10–15)
ERYTHROCYTE [DISTWIDTH] IN BLOOD BY AUTOMATED COUNT: 12.6 % (ref 10–15)
ERYTHROCYTE [DISTWIDTH] IN BLOOD BY AUTOMATED COUNT: 12.7 % (ref 10–15)
ERYTHROCYTE [DISTWIDTH] IN BLOOD BY AUTOMATED COUNT: 12.7 % (ref 10–15)
FIBRINOGEN PPP-MCNC: 177 MG/DL (ref 170–510)
GLUCOSE BLD-MCNC: 124 MG/DL (ref 70–99)
GLUCOSE BLD-MCNC: 130 MG/DL (ref 70–99)
GLUCOSE BLD-MCNC: 139 MG/DL (ref 70–99)
GLUCOSE BLD-MCNC: 149 MG/DL (ref 70–99)
GLUCOSE BLD-MCNC: 151 MG/DL (ref 70–99)
GLUCOSE BLD-MCNC: 157 MG/DL (ref 70–99)
GLUCOSE BLD-MCNC: 177 MG/DL (ref 70–99)
GLUCOSE BLD-MCNC: 179 MG/DL (ref 70–99)
GLUCOSE BLD-MCNC: 180 MG/DL (ref 70–99)
GLUCOSE BLD-MCNC: 189 MG/DL (ref 70–99)
GLUCOSE BLD-MCNC: 191 MG/DL (ref 70–99)
GLUCOSE BLD-MCNC: 203 MG/DL (ref 70–99)
GLUCOSE BLD-MCNC: 204 MG/DL (ref 70–99)
GLUCOSE BLDC GLUCOMTR-MCNC: 139 MG/DL (ref 70–99)
GLUCOSE BLDC GLUCOMTR-MCNC: 145 MG/DL (ref 70–99)
GLUCOSE BLDC GLUCOMTR-MCNC: 174 MG/DL (ref 70–99)
GLUCOSE BLDC GLUCOMTR-MCNC: 175 MG/DL (ref 70–99)
GLUCOSE BLDC GLUCOMTR-MCNC: 180 MG/DL (ref 70–99)
GLUCOSE BLDC GLUCOMTR-MCNC: 189 MG/DL (ref 70–99)
GLUCOSE SERPL-MCNC: 123 MG/DL (ref 70–99)
GLUCOSE SERPL-MCNC: 132 MG/DL (ref 70–99)
GLUCOSE SERPL-MCNC: 145 MG/DL (ref 70–99)
GLUCOSE UR STRIP-MCNC: >=2000 MG/DL
GRAM STAIN RESULT: NORMAL
HBV CORE AB SERPL QL IA: NONREACTIVE
HBV SURFACE AB SERPL IA-ACNC: <3.5 M[IU]/ML
HBV SURFACE AB SERPL IA-ACNC: NONREACTIVE M[IU]/ML
HBV SURFACE AG SERPL QL IA: NONREACTIVE
HCO3 BLD-SCNC: 23 MMOL/L (ref 21–28)
HCO3 BLDA-SCNC: 20 MMOL/L (ref 21–28)
HCO3 BLDA-SCNC: 20 MMOL/L (ref 21–28)
HCO3 BLDA-SCNC: 22 MMOL/L (ref 21–28)
HCO3 BLDA-SCNC: 23 MMOL/L (ref 21–28)
HCO3 BLDA-SCNC: 24 MMOL/L (ref 21–28)
HCO3 BLDA-SCNC: 25 MMOL/L (ref 21–28)
HCO3 BLDA-SCNC: 26 MMOL/L (ref 21–28)
HCO3 BLDV-SCNC: 22 MMOL/L (ref 21–28)
HCO3 BLDV-SCNC: 24 MMOL/L (ref 21–28)
HCO3 SERPL-SCNC: 21 MMOL/L (ref 22–29)
HCO3 SERPL-SCNC: 22 MMOL/L (ref 22–29)
HCO3 SERPL-SCNC: 23 MMOL/L (ref 22–29)
HCT VFR BLD AUTO: 35.8 % (ref 40–53)
HCT VFR BLD AUTO: 44.2 % (ref 40–53)
HCT VFR BLD AUTO: 50.9 % (ref 40–53)
HCT VFR BLD AUTO: 51.8 % (ref 40–53)
HCV AB SERPL QL IA: NONREACTIVE
HCV RNA SERPL NAA+PROBE-ACNC: NOT DETECTED IU/ML
HGB BLD-MCNC: 11.6 G/DL (ref 13.3–17.7)
HGB BLD-MCNC: 11.7 G/DL (ref 13.3–17.7)
HGB BLD-MCNC: 12 G/DL (ref 13.3–17.7)
HGB BLD-MCNC: 12.1 G/DL (ref 13.3–17.7)
HGB BLD-MCNC: 12.3 G/DL (ref 13.3–17.7)
HGB BLD-MCNC: 12.4 G/DL (ref 13.3–17.7)
HGB BLD-MCNC: 12.8 G/DL (ref 13.3–17.7)
HGB BLD-MCNC: 13.8 G/DL (ref 13.3–17.7)
HGB BLD-MCNC: 13.9 G/DL (ref 13.3–17.7)
HGB BLD-MCNC: 14.1 G/DL (ref 13.3–17.7)
HGB BLD-MCNC: 14.1 G/DL (ref 13.3–17.7)
HGB BLD-MCNC: 14.2 G/DL (ref 13.3–17.7)
HGB BLD-MCNC: 14.3 G/DL (ref 13.3–17.7)
HGB BLD-MCNC: 14.5 G/DL (ref 13.3–17.7)
HGB BLD-MCNC: 16.6 G/DL (ref 13.3–17.7)
HGB BLD-MCNC: 17.1 G/DL (ref 13.3–17.7)
HGB BLD-MCNC: 17.2 G/DL (ref 13.3–17.7)
HGB UR QL STRIP: NEGATIVE
HIV 1+2 AB+HIV1 P24 AG SERPL QL IA: NONREACTIVE
HIV1 RNA # PLAS NAA DL=20: NOT DETECTED COPIES/ML
HOLD SPECIMEN: NORMAL
HSV1 IGG SERPL QL IA: 55.1 INDEX
HSV1 IGG SERPL QL IA: ABNORMAL
HSV2 IGG SERPL QL IA: 0.08 INDEX
HSV2 IGG SERPL QL IA: ABNORMAL
IMM GRANULOCYTES # BLD: 0 10E3/UL
IMM GRANULOCYTES NFR BLD: 0 %
INR PPP: 1.07 (ref 0.85–1.15)
INR PPP: 1.46 (ref 0.85–1.15)
INR PPP: 1.74 (ref 0.85–1.15)
INTERPRETATION ECG - MUSE: NORMAL
ISSUE DATE AND TIME: NORMAL
KETONES UR STRIP-MCNC: NEGATIVE MG/DL
LACTATE BLD-SCNC: 1 MMOL/L (ref 0.7–2)
LACTATE BLD-SCNC: 1.9 MMOL/L (ref 0.7–2)
LACTATE BLD-SCNC: 1.9 MMOL/L (ref 0.7–2)
LACTATE BLD-SCNC: 2.4 MMOL/L (ref 0.7–2)
LACTATE BLD-SCNC: 2.4 MMOL/L (ref 0.7–2)
LACTATE BLD-SCNC: 3.3 MMOL/L (ref 0.7–2)
LACTATE BLD-SCNC: 3.8 MMOL/L (ref 0.7–2)
LACTATE BLD-SCNC: 4.2 MMOL/L (ref 0.7–2)
LACTATE BLD-SCNC: 4.3 MMOL/L (ref 0.7–2)
LACTATE BLD-SCNC: 4.3 MMOL/L (ref 0.7–2)
LACTATE BLD-SCNC: 4.5 MMOL/L (ref 0.7–2)
LACTATE SERPL-SCNC: 4 MMOL/L (ref 0.7–2)
LACTATE SERPL-SCNC: 4.1 MMOL/L (ref 0.7–2)
LEUKOCYTE ESTERASE UR QL STRIP: NEGATIVE
LYMPHOCYTES # BLD AUTO: 2.2 10E3/UL (ref 0.8–5.3)
LYMPHOCYTES NFR BLD AUTO: 23 %
MAGNESIUM SERPL-MCNC: 2 MG/DL (ref 1.7–2.3)
MAGNESIUM SERPL-MCNC: 2.1 MG/DL (ref 1.7–2.3)
MAGNESIUM SERPL-MCNC: 2.2 MG/DL (ref 1.7–2.3)
MCH RBC QN AUTO: 29.4 PG (ref 26.5–33)
MCH RBC QN AUTO: 29.4 PG (ref 26.5–33)
MCH RBC QN AUTO: 29.6 PG (ref 26.5–33)
MCH RBC QN AUTO: 29.7 PG (ref 26.5–33)
MCHC RBC AUTO-ENTMCNC: 32.4 G/DL (ref 31.5–36.5)
MCHC RBC AUTO-ENTMCNC: 32.6 G/DL (ref 31.5–36.5)
MCHC RBC AUTO-ENTMCNC: 32.8 G/DL (ref 31.5–36.5)
MCHC RBC AUTO-ENTMCNC: 33 G/DL (ref 31.5–36.5)
MCV RBC AUTO: 90 FL (ref 78–100)
MCV RBC AUTO: 92 FL (ref 78–100)
MONOCYTES # BLD AUTO: 1 10E3/UL (ref 0–1.3)
MONOCYTES NFR BLD AUTO: 11 %
MRSA DNA SPEC QL NAA+PROBE: NEGATIVE
NEUTROPHILS # BLD AUTO: 5.7 10E3/UL (ref 1.6–8.3)
NEUTROPHILS NFR BLD AUTO: 61 %
NITRATE UR QL: NEGATIVE
NRBC # BLD AUTO: 0 10E3/UL
NRBC BLD AUTO-RTO: 0 /100
O2/TOTAL GAS SETTING VFR VENT: 100 %
O2/TOTAL GAS SETTING VFR VENT: 68 %
O2/TOTAL GAS SETTING VFR VENT: 78 %
O2/TOTAL GAS SETTING VFR VENT: 80 %
O2/TOTAL GAS SETTING VFR VENT: 85 %
O2/TOTAL GAS SETTING VFR VENT: 85 %
O2/TOTAL GAS SETTING VFR VENT: 87 %
O2/TOTAL GAS SETTING VFR VENT: 92 %
OXYHGB MFR BLDA: 98 % (ref 92–100)
OXYHGB MFR BLDA: 98 % (ref 92–100)
OXYHGB MFR BLDV: 75 % (ref 70–75)
P AXIS - MUSE: 30 DEGREES
PCO2 BLD: 42 MM HG (ref 35–45)
PCO2 BLDA: 42 MM HG (ref 35–45)
PCO2 BLDA: 44 MM HG (ref 35–45)
PCO2 BLDA: 45 MM HG (ref 35–45)
PCO2 BLDA: 46 MM HG (ref 35–45)
PCO2 BLDA: 46 MM HG (ref 35–45)
PCO2 BLDA: 48 MM HG (ref 35–45)
PCO2 BLDA: 50 MM HG (ref 35–45)
PCO2 BLDA: 53 MM HG (ref 35–45)
PCO2 BLDA: 54 MM HG (ref 35–45)
PCO2 BLDV: 46 MM HG (ref 40–50)
PCO2 BLDV: 50 MM HG (ref 40–50)
PEEP: 8 CM H2O
PH BLD: 7.34 [PH] (ref 7.35–7.45)
PH BLDA: 7.26 [PH] (ref 7.35–7.45)
PH BLDA: 7.27 [PH] (ref 7.35–7.45)
PH BLDA: 7.27 [PH] (ref 7.35–7.45)
PH BLDA: 7.29 [PH] (ref 7.35–7.45)
PH BLDA: 7.29 [PH] (ref 7.35–7.45)
PH BLDA: 7.3 [PH] (ref 7.35–7.45)
PH BLDA: 7.3 [PH] (ref 7.35–7.45)
PH BLDA: 7.31 [PH] (ref 7.35–7.45)
PH BLDA: 7.32 [PH] (ref 7.35–7.45)
PH BLDA: 7.32 [PH] (ref 7.35–7.45)
PH BLDV: 7.25 [PH] (ref 7.32–7.43)
PH BLDV: 7.32 [PH] (ref 7.32–7.43)
PH UR STRIP: 5 [PH] (ref 5–7)
PHOSPHATE SERPL-MCNC: 3 MG/DL (ref 2.5–4.5)
PHOSPHATE SERPL-MCNC: 3.8 MG/DL (ref 2.5–4.5)
PHOSPHATE SERPL-MCNC: 4.1 MG/DL (ref 2.5–4.5)
PLATELET # BLD AUTO: 216 10E3/UL (ref 150–450)
PLATELET # BLD AUTO: 233 10E3/UL (ref 150–450)
PLATELET # BLD AUTO: 273 10E3/UL (ref 150–450)
PLATELET # BLD AUTO: 278 10E3/UL (ref 150–450)
PO2 BLD: 155 MM HG (ref 80–105)
PO2 BLDA: 118 MM HG (ref 80–105)
PO2 BLDA: 186 MM HG (ref 80–105)
PO2 BLDA: 210 MM HG (ref 80–105)
PO2 BLDA: 323 MM HG (ref 80–105)
PO2 BLDA: 325 MM HG (ref 80–105)
PO2 BLDA: 328 MM HG (ref 80–105)
PO2 BLDA: 347 MM HG (ref 80–105)
PO2 BLDA: 353 MM HG (ref 80–105)
PO2 BLDA: 354 MM HG (ref 80–105)
PO2 BLDA: 425 MM HG (ref 80–105)
PO2 BLDA: 520 MM HG (ref 80–105)
PO2 BLDA: 92 MM HG (ref 80–105)
PO2 BLDV: 43 MM HG (ref 25–47)
PO2 BLDV: 58 MM HG (ref 25–47)
POTASSIUM BLD-SCNC: 3.7 MMOL/L (ref 3.4–5.3)
POTASSIUM BLD-SCNC: 3.9 MMOL/L (ref 3.4–5.3)
POTASSIUM BLD-SCNC: 4 MMOL/L (ref 3.4–5.3)
POTASSIUM BLD-SCNC: 4.2 MMOL/L (ref 3.4–5.3)
POTASSIUM BLD-SCNC: 4.2 MMOL/L (ref 3.4–5.3)
POTASSIUM BLD-SCNC: 4.3 MMOL/L (ref 3.4–5.3)
POTASSIUM BLD-SCNC: 4.4 MMOL/L (ref 3.4–5.3)
POTASSIUM BLD-SCNC: 4.5 MMOL/L (ref 3.4–5.3)
POTASSIUM BLD-SCNC: 4.6 MMOL/L (ref 3.4–5.3)
POTASSIUM BLD-SCNC: 4.6 MMOL/L (ref 3.4–5.3)
POTASSIUM SERPL-SCNC: 3.7 MMOL/L (ref 3.4–5.3)
POTASSIUM SERPL-SCNC: 4.5 MMOL/L (ref 3.4–5.3)
POTASSIUM SERPL-SCNC: 4.6 MMOL/L (ref 3.4–5.3)
PR INTERVAL - MUSE: 150 MS
PROT SERPL-MCNC: 3.8 G/DL (ref 6.4–8.3)
PROT SERPL-MCNC: 7.4 G/DL (ref 6.4–8.3)
QRS DURATION - MUSE: 90 MS
QT - MUSE: 344 MS
QTC - MUSE: 446 MS
R AXIS - MUSE: -8 DEGREES
RBC # BLD AUTO: 3.9 10E6/UL (ref 4.4–5.9)
RBC # BLD AUTO: 4.94 10E6/UL (ref 4.4–5.9)
RBC # BLD AUTO: 5.65 10E6/UL (ref 4.4–5.9)
RBC # BLD AUTO: 5.77 10E6/UL (ref 4.4–5.9)
RBC URINE: <1 /HPF
SA TARGET DNA: NEGATIVE
SAO2 % BLDA: 100 % (ref 96–97)
SAO2 % BLDA: 97 % (ref 96–97)
SAO2 % BLDA: 98 % (ref 92–100)
SAO2 % BLDA: 99 % (ref 96–97)
SAO2 % BLDA: >100 % (ref 96–97)
SAO2 % BLDA: >100 % (ref 96–97)
SAO2 % BLDV: 76.7 % (ref 70–75)
SAO2 % BLDV: 87 % (ref 70–75)
SARS-COV-2 RNA RESP QL NAA+PROBE: NEGATIVE
SODIUM BLD-SCNC: 139 MMOL/L (ref 135–145)
SODIUM BLD-SCNC: 139 MMOL/L (ref 135–145)
SODIUM BLD-SCNC: 141 MMOL/L (ref 135–145)
SODIUM BLD-SCNC: 142 MMOL/L (ref 135–145)
SODIUM BLD-SCNC: 142 MMOL/L (ref 135–145)
SODIUM BLD-SCNC: 143 MMOL/L (ref 135–145)
SODIUM BLD-SCNC: 143 MMOL/L (ref 135–145)
SODIUM SERPL-SCNC: 139 MMOL/L (ref 135–145)
SODIUM SERPL-SCNC: 141 MMOL/L (ref 135–145)
SODIUM SERPL-SCNC: 142 MMOL/L (ref 135–145)
SP GR UR STRIP: 1.02 (ref 1–1.03)
SYSTOLIC BLOOD PRESSURE - MUSE: NORMAL MMHG
T AXIS - MUSE: 45 DEGREES
UNIT ABO/RH: NORMAL
UNIT NUMBER: NORMAL
UNIT STATUS: NORMAL
UNIT TYPE ISBT: 6200
UROBILINOGEN UR STRIP-MCNC: NORMAL MG/DL
VENTRICULAR RATE- MUSE: 101 BPM
WBC # BLD AUTO: 22.5 10E3/UL (ref 4–11)
WBC # BLD AUTO: 25.6 10E3/UL (ref 4–11)
WBC # BLD AUTO: 7.2 10E3/UL (ref 4–11)
WBC # BLD AUTO: 9.3 10E3/UL (ref 4–11)
WBC URINE: 1 /HPF

## 2024-08-15 PROCEDURE — 258N000003 HC RX IP 258 OP 636

## 2024-08-15 PROCEDURE — 250N000011 HC RX IP 250 OP 636: Performed by: STUDENT IN AN ORGANIZED HEALTH CARE EDUCATION/TRAINING PROGRAM

## 2024-08-15 PROCEDURE — 250N000011 HC RX IP 250 OP 636: Performed by: REGISTERED NURSE

## 2024-08-15 PROCEDURE — 82310 ASSAY OF CALCIUM: CPT

## 2024-08-15 PROCEDURE — 74018 RADEX ABDOMEN 1 VIEW: CPT | Mod: 26 | Performed by: RADIOLOGY

## 2024-08-15 PROCEDURE — 82962 GLUCOSE BLOOD TEST: CPT

## 2024-08-15 PROCEDURE — P9059 PLASMA, FRZ BETWEEN 8-24HOUR: HCPCS

## 2024-08-15 PROCEDURE — 87077 CULTURE AEROBIC IDENTIFY: CPT | Performed by: STUDENT IN AN ORGANIZED HEALTH CARE EDUCATION/TRAINING PROGRAM

## 2024-08-15 PROCEDURE — 272N000002 HC OR SUPPLY OTHER OPNP: Performed by: STUDENT IN AN ORGANIZED HEALTH CARE EDUCATION/TRAINING PROGRAM

## 2024-08-15 PROCEDURE — 87635 SARS-COV-2 COVID-19 AMP PRB: CPT

## 2024-08-15 PROCEDURE — 85730 THROMBOPLASTIN TIME PARTIAL: CPT | Performed by: SURGERY

## 2024-08-15 PROCEDURE — 0BYM0Z0 TRANSPLANTATION OF BILATERAL LUNGS, ALLOGENEIC, OPEN APPROACH: ICD-10-PCS | Performed by: STUDENT IN AN ORGANIZED HEALTH CARE EDUCATION/TRAINING PROGRAM

## 2024-08-15 PROCEDURE — 87205 SMEAR GRAM STAIN: CPT | Performed by: STUDENT IN AN ORGANIZED HEALTH CARE EDUCATION/TRAINING PROGRAM

## 2024-08-15 PROCEDURE — 250N000011 HC RX IP 250 OP 636

## 2024-08-15 PROCEDURE — 999N000185 HC STATISTIC TRANSPORT TIME EA 15 MIN

## 2024-08-15 PROCEDURE — 410N000004: Performed by: STUDENT IN AN ORGANIZED HEALTH CARE EDUCATION/TRAINING PROGRAM

## 2024-08-15 PROCEDURE — 36415 COLL VENOUS BLD VENIPUNCTURE: CPT

## 2024-08-15 PROCEDURE — 94002 VENT MGMT INPAT INIT DAY: CPT

## 2024-08-15 PROCEDURE — 812N000008 HC ACQUISITION LUNG CADAVER

## 2024-08-15 PROCEDURE — 250N000013 HC RX MED GY IP 250 OP 250 PS 637

## 2024-08-15 PROCEDURE — 82805 BLOOD GASES W/O2 SATURATION: CPT

## 2024-08-15 PROCEDURE — C1760 CLOSURE DEV, VASC: HCPCS | Performed by: STUDENT IN AN ORGANIZED HEALTH CARE EDUCATION/TRAINING PROGRAM

## 2024-08-15 PROCEDURE — 85018 HEMOGLOBIN: CPT | Performed by: SURGERY

## 2024-08-15 PROCEDURE — 87102 FUNGUS ISOLATION CULTURE: CPT | Performed by: STUDENT IN AN ORGANIZED HEALTH CARE EDUCATION/TRAINING PROGRAM

## 2024-08-15 PROCEDURE — 02B70ZK EXCISION OF LEFT ATRIAL APPENDAGE, OPEN APPROACH: ICD-10-PCS | Performed by: STUDENT IN AN ORGANIZED HEALTH CARE EDUCATION/TRAINING PROGRAM

## 2024-08-15 PROCEDURE — 999N000248 HC STATISTIC IV INSERT WITH US BY RN

## 2024-08-15 PROCEDURE — 32854 LUNG TRANSPLANT WITH BYPASS: CPT | Mod: GC | Performed by: STUDENT IN AN ORGANIZED HEALTH CARE EDUCATION/TRAINING PROGRAM

## 2024-08-15 PROCEDURE — 250N000012 HC RX MED GY IP 250 OP 636 PS 637: Performed by: SURGERY

## 2024-08-15 PROCEDURE — 83605 ASSAY OF LACTIC ACID: CPT

## 2024-08-15 PROCEDURE — 250N000009 HC RX 250

## 2024-08-15 PROCEDURE — 87070 CULTURE OTHR SPECIMN AEROBIC: CPT

## 2024-08-15 PROCEDURE — 85018 HEMOGLOBIN: CPT

## 2024-08-15 PROCEDURE — 84132 ASSAY OF SERUM POTASSIUM: CPT | Performed by: SURGERY

## 2024-08-15 PROCEDURE — 87205 SMEAR GRAM STAIN: CPT

## 2024-08-15 PROCEDURE — 258N000003 HC RX IP 258 OP 636: Performed by: STUDENT IN AN ORGANIZED HEALTH CARE EDUCATION/TRAINING PROGRAM

## 2024-08-15 PROCEDURE — 84100 ASSAY OF PHOSPHORUS: CPT | Performed by: SURGERY

## 2024-08-15 PROCEDURE — 250N000009 HC RX 250: Performed by: STUDENT IN AN ORGANIZED HEALTH CARE EDUCATION/TRAINING PROGRAM

## 2024-08-15 PROCEDURE — 85396 CLOTTING ASSAY WHOLE BLOOD: CPT

## 2024-08-15 PROCEDURE — 5A15A2G EXTRACORPOREAL OXYGENATION, MEMBRANE, PERIPHERAL VENO-ARTERIAL, INTRAOPERATIVE: ICD-10-PCS | Performed by: STUDENT IN AN ORGANIZED HEALTH CARE EDUCATION/TRAINING PROGRAM

## 2024-08-15 PROCEDURE — 85730 THROMBOPLASTIN TIME PARTIAL: CPT | Performed by: STUDENT IN AN ORGANIZED HEALTH CARE EDUCATION/TRAINING PROGRAM

## 2024-08-15 PROCEDURE — 83735 ASSAY OF MAGNESIUM: CPT

## 2024-08-15 PROCEDURE — 82330 ASSAY OF CALCIUM: CPT

## 2024-08-15 PROCEDURE — 82805 BLOOD GASES W/O2 SATURATION: CPT | Performed by: SURGERY

## 2024-08-15 PROCEDURE — 250N000024 HC ISOFLURANE, PER MIN: Performed by: STUDENT IN AN ORGANIZED HEALTH CARE EDUCATION/TRAINING PROGRAM

## 2024-08-15 PROCEDURE — 999N000141 HC STATISTIC PRE-PROCEDURE NURSING ASSESSMENT: Performed by: STUDENT IN AN ORGANIZED HEALTH CARE EDUCATION/TRAINING PROGRAM

## 2024-08-15 PROCEDURE — 88309 TISSUE EXAM BY PATHOLOGIST: CPT | Mod: 26 | Performed by: PATHOLOGY

## 2024-08-15 PROCEDURE — 87641 MR-STAPH DNA AMP PROBE: CPT | Performed by: SURGERY

## 2024-08-15 PROCEDURE — 99291 CRITICAL CARE FIRST HOUR: CPT | Mod: GC | Performed by: SURGERY

## 2024-08-15 PROCEDURE — 81001 URINALYSIS AUTO W/SCOPE: CPT

## 2024-08-15 PROCEDURE — 272N000085 HC PACK CELL SAVER CSP: Performed by: STUDENT IN AN ORGANIZED HEALTH CARE EDUCATION/TRAINING PROGRAM

## 2024-08-15 PROCEDURE — 93005 ELECTROCARDIOGRAM TRACING: CPT

## 2024-08-15 PROCEDURE — 85018 HEMOGLOBIN: CPT | Performed by: STUDENT IN AN ORGANIZED HEALTH CARE EDUCATION/TRAINING PROGRAM

## 2024-08-15 PROCEDURE — 999N000065 XR ABDOMEN PORT 1 VIEW

## 2024-08-15 PROCEDURE — 94799 UNLISTED PULMONARY SVC/PX: CPT

## 2024-08-15 PROCEDURE — 85027 COMPLETE CBC AUTOMATED: CPT

## 2024-08-15 PROCEDURE — 87252 VIRUS INOCULATION TISSUE: CPT | Performed by: STUDENT IN AN ORGANIZED HEALTH CARE EDUCATION/TRAINING PROGRAM

## 2024-08-15 PROCEDURE — 258N000003 HC RX IP 258 OP 636: Performed by: REGISTERED NURSE

## 2024-08-15 PROCEDURE — 84100 ASSAY OF PHOSPHORUS: CPT

## 2024-08-15 PROCEDURE — 250N000009 HC RX 250: Performed by: REGISTERED NURSE

## 2024-08-15 PROCEDURE — 85610 PROTHROMBIN TIME: CPT | Performed by: SURGERY

## 2024-08-15 PROCEDURE — 999N000157 HC STATISTIC RCP TIME EA 10 MIN

## 2024-08-15 PROCEDURE — 999N000065 XR CHEST PORT 1 VIEW

## 2024-08-15 PROCEDURE — 87206 SMEAR FLUORESCENT/ACID STAI: CPT | Performed by: STUDENT IN AN ORGANIZED HEALTH CARE EDUCATION/TRAINING PROGRAM

## 2024-08-15 PROCEDURE — 88309 TISSUE EXAM BY PATHOLOGIST: CPT | Mod: TC | Performed by: STUDENT IN AN ORGANIZED HEALTH CARE EDUCATION/TRAINING PROGRAM

## 2024-08-15 PROCEDURE — 250N000012 HC RX MED GY IP 250 OP 636 PS 637

## 2024-08-15 PROCEDURE — 83605 ASSAY OF LACTIC ACID: CPT | Performed by: SURGERY

## 2024-08-15 PROCEDURE — 258N000003 HC RX IP 258 OP 636: Performed by: SURGERY

## 2024-08-15 PROCEDURE — 85384 FIBRINOGEN ACTIVITY: CPT | Performed by: STUDENT IN AN ORGANIZED HEALTH CARE EDUCATION/TRAINING PROGRAM

## 2024-08-15 PROCEDURE — 84132 ASSAY OF SERUM POTASSIUM: CPT

## 2024-08-15 PROCEDURE — 82947 ASSAY GLUCOSE BLOOD QUANT: CPT

## 2024-08-15 PROCEDURE — 410N000003 HC PER-PERFUSION 1ST 30 MIN: Performed by: STUDENT IN AN ORGANIZED HEALTH CARE EDUCATION/TRAINING PROGRAM

## 2024-08-15 PROCEDURE — 80048 BASIC METABOLIC PNL TOTAL CA: CPT

## 2024-08-15 PROCEDURE — 85610 PROTHROMBIN TIME: CPT | Performed by: STUDENT IN AN ORGANIZED HEALTH CARE EDUCATION/TRAINING PROGRAM

## 2024-08-15 PROCEDURE — 278N000051 HC OR IMPLANT GENERAL: Performed by: STUDENT IN AN ORGANIZED HEALTH CARE EDUCATION/TRAINING PROGRAM

## 2024-08-15 PROCEDURE — 33268 EXCL LAA OPN OTH PX ANY METH: CPT | Mod: GC | Performed by: STUDENT IN AN ORGANIZED HEALTH CARE EDUCATION/TRAINING PROGRAM

## 2024-08-15 PROCEDURE — 0BJ08ZZ INSPECTION OF TRACHEOBRONCHIAL TREE, VIA NATURAL OR ARTIFICIAL OPENING ENDOSCOPIC: ICD-10-PCS | Performed by: STUDENT IN AN ORGANIZED HEALTH CARE EDUCATION/TRAINING PROGRAM

## 2024-08-15 PROCEDURE — 83735 ASSAY OF MAGNESIUM: CPT | Performed by: SURGERY

## 2024-08-15 PROCEDURE — 200N000002 HC R&B ICU UMMC

## 2024-08-15 PROCEDURE — 370N000017 HC ANESTHESIA TECHNICAL FEE, PER MIN: Performed by: STUDENT IN AN ORGANIZED HEALTH CARE EDUCATION/TRAINING PROGRAM

## 2024-08-15 PROCEDURE — 3E043XZ INTRODUCTION OF VASOPRESSOR INTO CENTRAL VEIN, PERCUTANEOUS APPROACH: ICD-10-PCS | Performed by: SURGERY

## 2024-08-15 PROCEDURE — 272N000088 HC PUMP APP ADULT PERFUSION: Performed by: STUDENT IN AN ORGANIZED HEALTH CARE EDUCATION/TRAINING PROGRAM

## 2024-08-15 PROCEDURE — 5A1221Z PERFORMANCE OF CARDIAC OUTPUT, CONTINUOUS: ICD-10-PCS | Performed by: STUDENT IN AN ORGANIZED HEALTH CARE EDUCATION/TRAINING PROGRAM

## 2024-08-15 PROCEDURE — 250N000011 HC RX IP 250 OP 636: Performed by: SURGERY

## 2024-08-15 PROCEDURE — 272N000001 HC OR GENERAL SUPPLY STERILE: Performed by: STUDENT IN AN ORGANIZED HEALTH CARE EDUCATION/TRAINING PROGRAM

## 2024-08-15 PROCEDURE — 360N000079 HC SURGERY LEVEL 6, PER MIN: Performed by: STUDENT IN AN ORGANIZED HEALTH CARE EDUCATION/TRAINING PROGRAM

## 2024-08-15 PROCEDURE — 99223 1ST HOSP IP/OBS HIGH 75: CPT | Mod: 24 | Performed by: INTERNAL MEDICINE

## 2024-08-15 PROCEDURE — 84295 ASSAY OF SERUM SODIUM: CPT

## 2024-08-15 PROCEDURE — 71045 X-RAY EXAM CHEST 1 VIEW: CPT | Mod: 26 | Performed by: RADIOLOGY

## 2024-08-15 PROCEDURE — 250N000013 HC RX MED GY IP 250 OP 250 PS 637: Performed by: SURGERY

## 2024-08-15 DEVICE — LAA EXCLUSION SYSTEM, ACHV40
Type: IMPLANTABLE DEVICE | Site: HEART | Status: FUNCTIONAL
Brand: ATRICLIP® FLEX-V® LAA EXCLUSION SYSTEM

## 2024-08-15 RX ORDER — DEXTROSE MONOHYDRATE 25 G/50ML
25-50 INJECTION, SOLUTION INTRAVENOUS
Status: DISCONTINUED | OUTPATIENT
Start: 2024-08-15 | End: 2024-08-28 | Stop reason: HOSPADM

## 2024-08-15 RX ORDER — CHLORHEXIDINE GLUCONATE ORAL RINSE 1.2 MG/ML
15 SOLUTION DENTAL EVERY 12 HOURS
Status: DISCONTINUED | OUTPATIENT
Start: 2024-08-15 | End: 2024-08-19

## 2024-08-15 RX ORDER — TACROLIMUS 0.5 MG/1
1 CAPSULE ORAL
Status: DISCONTINUED | OUTPATIENT
Start: 2024-08-15 | End: 2024-08-19

## 2024-08-15 RX ORDER — ONDANSETRON 2 MG/ML
4 INJECTION INTRAMUSCULAR; INTRAVENOUS EVERY 6 HOURS PRN
Status: DISCONTINUED | OUTPATIENT
Start: 2024-08-15 | End: 2024-08-28 | Stop reason: HOSPADM

## 2024-08-15 RX ORDER — PROPOFOL 10 MG/ML
INJECTION, EMULSION INTRAVENOUS
Status: COMPLETED
Start: 2024-08-15 | End: 2024-08-15

## 2024-08-15 RX ORDER — OXYCODONE HYDROCHLORIDE 10 MG/1
10 TABLET ORAL EVERY 4 HOURS PRN
Status: DISCONTINUED | OUTPATIENT
Start: 2024-08-15 | End: 2024-08-19

## 2024-08-15 RX ORDER — NOREPINEPHRINE BITARTRATE 0.02 MG/ML
INJECTION, SOLUTION INTRAVENOUS CONTINUOUS PRN
Status: DISCONTINUED | OUTPATIENT
Start: 2024-08-15 | End: 2024-08-15

## 2024-08-15 RX ORDER — LIDOCAINE HYDROCHLORIDE 20 MG/ML
INJECTION, SOLUTION INFILTRATION; PERINEURAL PRN
Status: DISCONTINUED | OUTPATIENT
Start: 2024-08-15 | End: 2024-08-15

## 2024-08-15 RX ORDER — DEXTROSE MONOHYDRATE 100 MG/ML
INJECTION, SOLUTION INTRAVENOUS CONTINUOUS PRN
Status: DISCONTINUED | OUTPATIENT
Start: 2024-08-15 | End: 2024-08-20

## 2024-08-15 RX ORDER — ALBUTEROL SULFATE 0.83 MG/ML
2.5 SOLUTION RESPIRATORY (INHALATION)
Status: DISCONTINUED | OUTPATIENT
Start: 2024-08-16 | End: 2024-08-15

## 2024-08-15 RX ORDER — SULFAMETHOXAZOLE AND TRIMETHOPRIM 400; 80 MG/1; MG/1
1 TABLET ORAL DAILY
Status: DISCONTINUED | OUTPATIENT
Start: 2024-08-23 | End: 2024-08-16

## 2024-08-15 RX ORDER — CALCIUM CHLORIDE 100 MG/ML
INJECTION INTRAVENOUS; INTRAVENTRICULAR PRN
Status: DISCONTINUED | OUTPATIENT
Start: 2024-08-15 | End: 2024-08-15

## 2024-08-15 RX ORDER — VANCOMYCIN HYDROCHLORIDE 1 G/200ML
1000 INJECTION, SOLUTION INTRAVENOUS
Status: COMPLETED | OUTPATIENT
Start: 2024-08-15 | End: 2024-08-15

## 2024-08-15 RX ORDER — CEFTAZIDIME 1 G/1
1 INJECTION, POWDER, FOR SOLUTION INTRAMUSCULAR; INTRAVENOUS SEE ADMIN INSTRUCTIONS
Status: DISCONTINUED | OUTPATIENT
Start: 2024-08-15 | End: 2024-08-15

## 2024-08-15 RX ORDER — EPINEPHRINE IN 0.9 % SOD CHLOR 5 MG/250ML
.01-.3 PLASTIC BAG, INJECTION (ML) INTRAVENOUS CONTINUOUS
Status: DISCONTINUED | OUTPATIENT
Start: 2024-08-15 | End: 2024-08-15

## 2024-08-15 RX ORDER — ACYCLOVIR 200 MG/5ML
400 SUSPENSION ORAL 2 TIMES DAILY
Status: DISCONTINUED | OUTPATIENT
Start: 2024-08-23 | End: 2024-08-16

## 2024-08-15 RX ORDER — EPINEPHRINE 0.1 MG/ML
INJECTION INTRAVENOUS PRN
Status: DISCONTINUED | OUTPATIENT
Start: 2024-08-15 | End: 2024-08-15

## 2024-08-15 RX ORDER — PROPOFOL 10 MG/ML
INJECTION, EMULSION INTRAVENOUS PRN
Status: DISCONTINUED | OUTPATIENT
Start: 2024-08-15 | End: 2024-08-15

## 2024-08-15 RX ORDER — MYCOPHENOLATE MOFETIL 250 MG/1
1000 CAPSULE ORAL 2 TIMES DAILY
Status: DISCONTINUED | OUTPATIENT
Start: 2024-08-15 | End: 2024-08-28 | Stop reason: HOSPADM

## 2024-08-15 RX ORDER — HYDROMORPHONE HYDROCHLORIDE 1 MG/ML
0.4 INJECTION, SOLUTION INTRAMUSCULAR; INTRAVENOUS; SUBCUTANEOUS
Status: DISCONTINUED | OUTPATIENT
Start: 2024-08-15 | End: 2024-08-19

## 2024-08-15 RX ORDER — SODIUM CHLORIDE, SODIUM GLUCONATE, SODIUM ACETATE, POTASSIUM CHLORIDE AND MAGNESIUM CHLORIDE 526; 502; 368; 37; 30 MG/100ML; MG/100ML; MG/100ML; MG/100ML; MG/100ML
250 INJECTION, SOLUTION INTRAVENOUS EVERY 5 MIN PRN
Status: DISCONTINUED | OUTPATIENT
Start: 2024-08-15 | End: 2024-08-15

## 2024-08-15 RX ORDER — ONDANSETRON 4 MG/1
4 TABLET, ORALLY DISINTEGRATING ORAL EVERY 6 HOURS PRN
Status: DISCONTINUED | OUTPATIENT
Start: 2024-08-15 | End: 2024-08-28 | Stop reason: HOSPADM

## 2024-08-15 RX ORDER — NYSTATIN 100000/ML
1000000 SUSPENSION, ORAL (FINAL DOSE FORM) ORAL 4 TIMES DAILY
Status: DISCONTINUED | OUTPATIENT
Start: 2024-08-15 | End: 2024-08-17

## 2024-08-15 RX ORDER — HEPARIN SODIUM 1000 [USP'U]/ML
INJECTION, SOLUTION INTRAVENOUS; SUBCUTANEOUS PRN
Status: DISCONTINUED | OUTPATIENT
Start: 2024-08-15 | End: 2024-08-15

## 2024-08-15 RX ORDER — SODIUM CHLORIDE, SODIUM GLUCONATE, SODIUM ACETATE, POTASSIUM CHLORIDE AND MAGNESIUM CHLORIDE 526; 502; 368; 37; 30 MG/100ML; MG/100ML; MG/100ML; MG/100ML; MG/100ML
250 INJECTION, SOLUTION INTRAVENOUS ONCE
Status: DISCONTINUED | OUTPATIENT
Start: 2024-08-15 | End: 2024-08-15

## 2024-08-15 RX ORDER — NALOXONE HYDROCHLORIDE 0.4 MG/ML
0.2 INJECTION, SOLUTION INTRAMUSCULAR; INTRAVENOUS; SUBCUTANEOUS
Status: DISCONTINUED | OUTPATIENT
Start: 2024-08-15 | End: 2024-08-28 | Stop reason: HOSPADM

## 2024-08-15 RX ORDER — ACETYLCYSTEINE 200 MG/ML
2 SOLUTION ORAL; RESPIRATORY (INHALATION) 4 TIMES DAILY
Status: DISCONTINUED | OUTPATIENT
Start: 2024-08-15 | End: 2024-08-28 | Stop reason: HOSPADM

## 2024-08-15 RX ORDER — VANCOMYCIN HYDROCHLORIDE 1 G/200ML
1000 INJECTION, SOLUTION INTRAVENOUS SEE ADMIN INSTRUCTIONS
Status: DISCONTINUED | OUTPATIENT
Start: 2024-08-15 | End: 2024-08-15

## 2024-08-15 RX ORDER — HYDROMORPHONE HYDROCHLORIDE 1 MG/ML
0.2 INJECTION, SOLUTION INTRAMUSCULAR; INTRAVENOUS; SUBCUTANEOUS
Status: DISCONTINUED | OUTPATIENT
Start: 2024-08-15 | End: 2024-08-19

## 2024-08-15 RX ORDER — LIDOCAINE 40 MG/G
CREAM TOPICAL
Status: DISCONTINUED | OUTPATIENT
Start: 2024-08-15 | End: 2024-08-15

## 2024-08-15 RX ORDER — PROTAMINE SULFATE 10 MG/ML
INJECTION, SOLUTION INTRAVENOUS PRN
Status: DISCONTINUED | OUTPATIENT
Start: 2024-08-15 | End: 2024-08-15

## 2024-08-15 RX ORDER — NOREPINEPHRINE BITARTRATE 0.06 MG/ML
.01-.6 INJECTION, SOLUTION INTRAVENOUS CONTINUOUS
Status: DISCONTINUED | OUTPATIENT
Start: 2024-08-15 | End: 2024-08-15

## 2024-08-15 RX ORDER — GABAPENTIN 100 MG/1
100 CAPSULE ORAL 3 TIMES DAILY
Status: DISCONTINUED | OUTPATIENT
Start: 2024-08-15 | End: 2024-08-19

## 2024-08-15 RX ORDER — NALOXONE HYDROCHLORIDE 0.4 MG/ML
0.4 INJECTION, SOLUTION INTRAMUSCULAR; INTRAVENOUS; SUBCUTANEOUS
Status: DISCONTINUED | OUTPATIENT
Start: 2024-08-15 | End: 2024-08-28 | Stop reason: HOSPADM

## 2024-08-15 RX ORDER — CEFTAZIDIME 1 G/1
1 INJECTION, POWDER, FOR SOLUTION INTRAMUSCULAR; INTRAVENOUS
Status: DISCONTINUED | OUTPATIENT
Start: 2024-08-15 | End: 2024-08-15

## 2024-08-15 RX ORDER — LIDOCAINE 40 MG/G
CREAM TOPICAL
Status: DISCONTINUED | OUTPATIENT
Start: 2024-08-15 | End: 2024-08-28 | Stop reason: HOSPADM

## 2024-08-15 RX ORDER — SULFAMETHOXAZOLE AND TRIMETHOPRIM 200; 40 MG/5ML; MG/5ML
10 SUSPENSION ORAL DAILY
Status: DISCONTINUED | OUTPATIENT
Start: 2024-08-23 | End: 2024-08-16

## 2024-08-15 RX ORDER — PROPOFOL 10 MG/ML
INJECTION, EMULSION INTRAVENOUS CONTINUOUS PRN
Status: DISCONTINUED | OUTPATIENT
Start: 2024-08-15 | End: 2024-08-15

## 2024-08-15 RX ORDER — PROCHLORPERAZINE MALEATE 5 MG
10 TABLET ORAL EVERY 6 HOURS PRN
Status: DISCONTINUED | OUTPATIENT
Start: 2024-08-15 | End: 2024-08-28 | Stop reason: HOSPADM

## 2024-08-15 RX ORDER — ATORVASTATIN CALCIUM 20 MG/1
20 TABLET, FILM COATED ORAL DAILY
Status: DISCONTINUED | OUTPATIENT
Start: 2024-08-16 | End: 2024-08-17

## 2024-08-15 RX ORDER — MYCOPHENOLATE MOFETIL 250 MG/1
1000 CAPSULE ORAL ONCE
Status: COMPLETED | OUTPATIENT
Start: 2024-08-15 | End: 2024-08-15

## 2024-08-15 RX ORDER — PROPOFOL 10 MG/ML
5-75 INJECTION, EMULSION INTRAVENOUS CONTINUOUS
Status: DISCONTINUED | OUTPATIENT
Start: 2024-08-15 | End: 2024-08-19

## 2024-08-15 RX ORDER — PREDNISOLONE SODIUM PHOSPHATE 15 MG/5ML
30 SOLUTION ORAL DAILY
Status: DISCONTINUED | OUTPATIENT
Start: 2024-08-17 | End: 2024-08-22

## 2024-08-15 RX ORDER — FIBRINOGEN (HUMAN) 700-1300MG
1150 KIT INTRAVENOUS
Status: DISCONTINUED | OUTPATIENT
Start: 2024-08-15 | End: 2024-08-15

## 2024-08-15 RX ORDER — LEVALBUTEROL INHALATION SOLUTION 1.25 MG/3ML
1.25 SOLUTION RESPIRATORY (INHALATION) 4 TIMES DAILY
Status: DISCONTINUED | OUTPATIENT
Start: 2024-08-15 | End: 2024-08-28 | Stop reason: HOSPADM

## 2024-08-15 RX ORDER — NICOTINE POLACRILEX 4 MG
15-30 LOZENGE BUCCAL
Status: DISCONTINUED | OUTPATIENT
Start: 2024-08-15 | End: 2024-08-28 | Stop reason: HOSPADM

## 2024-08-15 RX ORDER — CEFTAZIDIME 2 G/1
2 INJECTION, POWDER, FOR SOLUTION INTRAVENOUS EVERY 8 HOURS
Status: COMPLETED | OUTPATIENT
Start: 2024-08-16 | End: 2024-08-17

## 2024-08-15 RX ORDER — VANCOMYCIN HYDROCHLORIDE 1 G/200ML
1000 INJECTION, SOLUTION INTRAVENOUS EVERY 12 HOURS
Status: COMPLETED | OUTPATIENT
Start: 2024-08-16 | End: 2024-08-17

## 2024-08-15 RX ORDER — METHOCARBAMOL 750 MG/1
750 TABLET, FILM COATED ORAL EVERY 6 HOURS PRN
Status: DISCONTINUED | OUTPATIENT
Start: 2024-08-15 | End: 2024-08-20

## 2024-08-15 RX ORDER — POLYETHYLENE GLYCOL 3350 17 G/17G
17 POWDER, FOR SOLUTION ORAL DAILY
Status: DISCONTINUED | OUTPATIENT
Start: 2024-08-16 | End: 2024-08-19

## 2024-08-15 RX ORDER — FENTANYL CITRATE 50 UG/ML
INJECTION, SOLUTION INTRAMUSCULAR; INTRAVENOUS PRN
Status: DISCONTINUED | OUTPATIENT
Start: 2024-08-15 | End: 2024-08-15

## 2024-08-15 RX ORDER — NOREPINEPHRINE BITARTRATE 0.06 MG/ML
.01-.6 INJECTION, SOLUTION INTRAVENOUS CONTINUOUS
Status: DISCONTINUED | OUTPATIENT
Start: 2024-08-15 | End: 2024-08-19

## 2024-08-15 RX ORDER — BISACODYL 10 MG
10 SUPPOSITORY, RECTAL RECTAL DAILY PRN
Status: DISCONTINUED | OUTPATIENT
Start: 2024-08-18 | End: 2024-08-20

## 2024-08-15 RX ORDER — FIBRINOGEN (HUMAN) 700-1300MG
2 KIT INTRAVENOUS ONCE
Status: DISCONTINUED | OUTPATIENT
Start: 2024-08-15 | End: 2024-08-15

## 2024-08-15 RX ORDER — MYCOPHENOLATE MOFETIL 200 MG/ML
1000 POWDER, FOR SUSPENSION ORAL 2 TIMES DAILY
Status: DISCONTINUED | OUTPATIENT
Start: 2024-08-15 | End: 2024-08-25

## 2024-08-15 RX ORDER — ACYCLOVIR 200 MG/1
400 CAPSULE ORAL 2 TIMES DAILY
Status: DISCONTINUED | OUTPATIENT
Start: 2024-08-23 | End: 2024-08-16

## 2024-08-15 RX ORDER — ACETAMINOPHEN 325 MG/1
975 TABLET ORAL EVERY 8 HOURS
Status: DISCONTINUED | OUTPATIENT
Start: 2024-08-15 | End: 2024-08-18

## 2024-08-15 RX ORDER — CALCIUM CARBONATE/VITAMIN D3 600 MG-10
1 TABLET ORAL 2 TIMES DAILY WITH MEALS
Status: DISCONTINUED | OUTPATIENT
Start: 2024-08-23 | End: 2024-08-16

## 2024-08-15 RX ORDER — METHYLPREDNISOLONE SODIUM SUCCINATE 125 MG/2ML
125 INJECTION, POWDER, LYOPHILIZED, FOR SOLUTION INTRAMUSCULAR; INTRAVENOUS EVERY 8 HOURS
Status: COMPLETED | OUTPATIENT
Start: 2024-08-15 | End: 2024-08-16

## 2024-08-15 RX ORDER — ACETAMINOPHEN 325 MG/1
650 TABLET ORAL EVERY 4 HOURS PRN
Status: DISCONTINUED | OUTPATIENT
Start: 2024-08-18 | End: 2024-08-28 | Stop reason: HOSPADM

## 2024-08-15 RX ORDER — AMOXICILLIN 250 MG
1 CAPSULE ORAL 2 TIMES DAILY
Status: DISCONTINUED | OUTPATIENT
Start: 2024-08-15 | End: 2024-08-17

## 2024-08-15 RX ORDER — OXYCODONE HYDROCHLORIDE 5 MG/1
5 TABLET ORAL EVERY 4 HOURS PRN
Status: DISCONTINUED | OUTPATIENT
Start: 2024-08-15 | End: 2024-08-19

## 2024-08-15 RX ORDER — NITROGLYCERIN 10 MG/100ML
INJECTION INTRAVENOUS PRN
Status: DISCONTINUED | OUTPATIENT
Start: 2024-08-15 | End: 2024-08-15

## 2024-08-15 RX ORDER — SODIUM CHLORIDE, SODIUM GLUCONATE, SODIUM ACETATE, POTASSIUM CHLORIDE AND MAGNESIUM CHLORIDE 526; 502; 368; 37; 30 MG/100ML; MG/100ML; MG/100ML; MG/100ML; MG/100ML
INJECTION, SOLUTION INTRAVENOUS CONTINUOUS PRN
Status: DISCONTINUED | OUTPATIENT
Start: 2024-08-15 | End: 2024-08-15

## 2024-08-15 RX ADMIN — VASOPRESSIN 2 UNITS/HR: 20 INJECTION, SOLUTION INTRAMUSCULAR; SUBCUTANEOUS at 17:19

## 2024-08-15 RX ADMIN — METHYLPREDNISOLONE SODIUM SUCCINATE 125 MG: 125 INJECTION, POWDER, FOR SOLUTION INTRAMUSCULAR; INTRAVENOUS at 22:15

## 2024-08-15 RX ADMIN — SODIUM CHLORIDE 1.25 G/HR: 900 INJECTION, SOLUTION INTRAVENOUS at 14:32

## 2024-08-15 RX ADMIN — SENNOSIDES AND DOCUSATE SODIUM 1 TABLET: 8.6; 5 TABLET ORAL at 22:55

## 2024-08-15 RX ADMIN — GABAPENTIN 100 MG: 100 CAPSULE ORAL at 22:55

## 2024-08-15 RX ADMIN — PROPOFOL 30 MCG/KG/MIN: 10 INJECTION, EMULSION INTRAVENOUS at 19:07

## 2024-08-15 RX ADMIN — NOREPINEPHRINE BITARTRATE 6.4 MCG: 1 INJECTION, SOLUTION, CONCENTRATE INTRAVENOUS at 18:29

## 2024-08-15 RX ADMIN — FENTANYL CITRATE 150 MCG: 50 INJECTION INTRAMUSCULAR; INTRAVENOUS at 18:34

## 2024-08-15 RX ADMIN — PROPOFOL 70 MG: 10 INJECTION, EMULSION INTRAVENOUS at 12:43

## 2024-08-15 RX ADMIN — FENTANYL CITRATE 100 MCG: 50 INJECTION INTRAMUSCULAR; INTRAVENOUS at 14:29

## 2024-08-15 RX ADMIN — EPINEPHRINE 10 MCG: 0.1 INJECTION INTRACARDIAC; INTRAVENOUS at 14:17

## 2024-08-15 RX ADMIN — PROTAMINE SULFATE 20 MG: 10 INJECTION, SOLUTION INTRAVENOUS at 18:02

## 2024-08-15 RX ADMIN — SODIUM CHLORIDE, SODIUM GLUCONATE, SODIUM ACETATE, POTASSIUM CHLORIDE AND MAGNESIUM CHLORIDE: 526; 502; 368; 37; 30 INJECTION, SOLUTION INTRAVENOUS at 18:10

## 2024-08-15 RX ADMIN — HYDROMORPHONE HYDROCHLORIDE 0.5 MG: 1 INJECTION, SOLUTION INTRAMUSCULAR; INTRAVENOUS; SUBCUTANEOUS at 20:00

## 2024-08-15 RX ADMIN — CALCIUM CHLORIDE INJECTION 0.5 G: 100 INJECTION, SOLUTION INTRAVENOUS at 19:22

## 2024-08-15 RX ADMIN — PROPOFOL 75 MCG/KG/MIN: 10 INJECTION, EMULSION INTRAVENOUS at 21:30

## 2024-08-15 RX ADMIN — PHENYLEPHRINE HYDROCHLORIDE 100 MCG: 10 INJECTION INTRAVENOUS at 14:03

## 2024-08-15 RX ADMIN — CALCIUM CHLORIDE INJECTION 0.25 G: 100 INJECTION, SOLUTION INTRAVENOUS at 18:24

## 2024-08-15 RX ADMIN — TACROLIMUS 1 MG: 1 CAPSULE ORAL at 22:47

## 2024-08-15 RX ADMIN — FENTANYL CITRATE 50 MCG: 50 INJECTION INTRAMUSCULAR; INTRAVENOUS at 12:36

## 2024-08-15 RX ADMIN — Medication 50 MCG/HR: at 21:59

## 2024-08-15 RX ADMIN — HEPARIN SODIUM 3000 UNITS: 1000 INJECTION INTRAVENOUS; SUBCUTANEOUS at 14:38

## 2024-08-15 RX ADMIN — SODIUM CHLORIDE, POTASSIUM CHLORIDE, SODIUM LACTATE AND CALCIUM CHLORIDE 500 ML: 600; 310; 30; 20 INJECTION, SOLUTION INTRAVENOUS at 23:40

## 2024-08-15 RX ADMIN — Medication 50 MG: at 16:22

## 2024-08-15 RX ADMIN — INSULIN HUMAN 1.5 UNITS/HR: 1 INJECTION, SOLUTION INTRAVENOUS at 22:13

## 2024-08-15 RX ADMIN — CHLORHEXIDINE GLUCONATE 0.12% ORAL RINSE 15 ML: 1.2 LIQUID ORAL at 22:15

## 2024-08-15 RX ADMIN — INSULIN HUMAN 2 UNITS/HR: 1 INJECTION, SOLUTION INTRAVENOUS at 14:49

## 2024-08-15 RX ADMIN — PHENYLEPHRINE HYDROCHLORIDE 100 MCG: 10 INJECTION INTRAVENOUS at 13:00

## 2024-08-15 RX ADMIN — CALCIUM CHLORIDE INJECTION 0.25 G: 100 INJECTION, SOLUTION INTRAVENOUS at 18:20

## 2024-08-15 RX ADMIN — SODIUM CHLORIDE, SODIUM GLUCONATE, SODIUM ACETATE, POTASSIUM CHLORIDE AND MAGNESIUM CHLORIDE: 526; 502; 368; 37; 30 INJECTION, SOLUTION INTRAVENOUS at 12:30

## 2024-08-15 RX ADMIN — HEPARIN SODIUM 7000 UNITS: 1000 INJECTION INTRAVENOUS; SUBCUTANEOUS at 14:28

## 2024-08-15 RX ADMIN — PHENYLEPHRINE HYDROCHLORIDE 100 MCG: 10 INJECTION INTRAVENOUS at 13:38

## 2024-08-15 RX ADMIN — NOREPINEPHRINE BITARTRATE 6.4 MCG: 1 INJECTION, SOLUTION, CONCENTRATE INTRAVENOUS at 14:07

## 2024-08-15 RX ADMIN — PHENYLEPHRINE HYDROCHLORIDE 100 MCG: 10 INJECTION INTRAVENOUS at 12:54

## 2024-08-15 RX ADMIN — NOREPINEPHRINE BITARTRATE 12.8 MCG: 1 INJECTION, SOLUTION, CONCENTRATE INTRAVENOUS at 18:45

## 2024-08-15 RX ADMIN — NOREPINEPHRINE BITARTRATE 6.4 MCG: 1 INJECTION, SOLUTION, CONCENTRATE INTRAVENOUS at 14:10

## 2024-08-15 RX ADMIN — NOREPINEPHRINE BITARTRATE 6.4 MCG: 1 INJECTION, SOLUTION, CONCENTRATE INTRAVENOUS at 14:04

## 2024-08-15 RX ADMIN — CALCIUM CHLORIDE INJECTION 0.5 G: 100 INJECTION, SOLUTION INTRAVENOUS at 19:25

## 2024-08-15 RX ADMIN — ACETAMINOPHEN 975 MG: 325 TABLET ORAL at 22:55

## 2024-08-15 RX ADMIN — CALCIUM CHLORIDE INJECTION 0.25 G: 100 INJECTION, SOLUTION INTRAVENOUS at 18:26

## 2024-08-15 RX ADMIN — Medication 30 MG: at 13:38

## 2024-08-15 RX ADMIN — MYCOPHENOLATE MOFETIL 1000 MG: 200 POWDER, FOR SUSPENSION ORAL at 22:46

## 2024-08-15 RX ADMIN — SODIUM CHLORIDE 20 MG: 9 INJECTION, SOLUTION INTRAVENOUS at 13:52

## 2024-08-15 RX ADMIN — SUGAMMADEX 200 MG: 100 INJECTION, SOLUTION INTRAVENOUS at 19:49

## 2024-08-15 RX ADMIN — NOREPINEPHRINE BITARTRATE 12.8 MCG: 1 INJECTION, SOLUTION, CONCENTRATE INTRAVENOUS at 19:03

## 2024-08-15 RX ADMIN — CEFTAZIDIME 1 G: 1 INJECTION, POWDER, FOR SOLUTION INTRAMUSCULAR; INTRAVENOUS at 17:42

## 2024-08-15 RX ADMIN — SODIUM CHLORIDE 7.5 G: 900 INJECTION, SOLUTION INTRAVENOUS at 13:33

## 2024-08-15 RX ADMIN — FENTANYL CITRATE 150 MCG: 50 INJECTION INTRAMUSCULAR; INTRAVENOUS at 13:48

## 2024-08-15 RX ADMIN — INSULIN ASPART 1 UNITS: 100 INJECTION, SOLUTION INTRAVENOUS; SUBCUTANEOUS at 08:00

## 2024-08-15 RX ADMIN — Medication 50 MG: at 12:46

## 2024-08-15 RX ADMIN — PROTAMINE SULFATE 20 MG: 10 INJECTION, SOLUTION INTRAVENOUS at 18:04

## 2024-08-15 RX ADMIN — EPINEPHRINE 10 MCG: 0.1 INJECTION INTRACARDIAC; INTRAVENOUS at 14:09

## 2024-08-15 RX ADMIN — SODIUM CHLORIDE 1000 MG: 9 INJECTION, SOLUTION INTRAVENOUS at 02:41

## 2024-08-15 RX ADMIN — VANCOMYCIN HYDROCHLORIDE 1000 MG: 1 INJECTION, SOLUTION INTRAVENOUS at 13:38

## 2024-08-15 RX ADMIN — Medication 20 MG: at 13:48

## 2024-08-15 RX ADMIN — SODIUM CHLORIDE, SODIUM GLUCONATE, SODIUM ACETATE, POTASSIUM CHLORIDE AND MAGNESIUM CHLORIDE: 526; 502; 368; 37; 30 INJECTION, SOLUTION INTRAVENOUS at 19:16

## 2024-08-15 RX ADMIN — CEFTAZIDIME 1 G: 1 INJECTION, POWDER, FOR SOLUTION INTRAMUSCULAR; INTRAVENOUS at 13:34

## 2024-08-15 RX ADMIN — Medication 0.03 MCG/KG/MIN: at 12:58

## 2024-08-15 RX ADMIN — PROTAMINE SULFATE 15 MG: 10 INJECTION, SOLUTION INTRAVENOUS at 18:06

## 2024-08-15 RX ADMIN — MIDAZOLAM 2 MG: 1 INJECTION INTRAMUSCULAR; INTRAVENOUS at 12:24

## 2024-08-15 RX ADMIN — NYSTATIN 1000000 UNITS: 100000 SUSPENSION ORAL at 22:56

## 2024-08-15 RX ADMIN — PROPOFOL 40 MG: 10 INJECTION, EMULSION INTRAVENOUS at 18:34

## 2024-08-15 RX ADMIN — PHENYLEPHRINE HYDROCHLORIDE 100 MCG: 10 INJECTION INTRAVENOUS at 12:47

## 2024-08-15 RX ADMIN — LIDOCAINE HYDROCHLORIDE 80 MG: 20 INJECTION, SOLUTION INFILTRATION; PERINEURAL at 12:43

## 2024-08-15 RX ADMIN — PROTAMINE SULFATE 20 MG: 10 INJECTION, SOLUTION INTRAVENOUS at 18:00

## 2024-08-15 RX ADMIN — NITROGLYCERIN 25 MCG: 10 INJECTION INTRAVENOUS at 18:03

## 2024-08-15 RX ADMIN — CALCIUM CHLORIDE INJECTION 0.25 G: 100 INJECTION, SOLUTION INTRAVENOUS at 18:22

## 2024-08-15 RX ADMIN — FENTANYL CITRATE 50 MCG: 50 INJECTION INTRAMUSCULAR; INTRAVENOUS at 12:43

## 2024-08-15 RX ADMIN — MYCOPHENOLATE MOFETIL 1000 MG: 250 CAPSULE ORAL at 02:41

## 2024-08-15 RX ADMIN — SODIUM CHLORIDE 500 MG: 9 INJECTION, SOLUTION INTRAVENOUS at 13:38

## 2024-08-15 RX ADMIN — SODIUM CHLORIDE, POTASSIUM CHLORIDE, SODIUM LACTATE AND CALCIUM CHLORIDE 500 ML: 600; 310; 30; 20 INJECTION, SOLUTION INTRAVENOUS at 22:39

## 2024-08-15 ASSESSMENT — COLUMBIA-SUICIDE SEVERITY RATING SCALE - C-SSRS
6. HAVE YOU EVER DONE ANYTHING, STARTED TO DO ANYTHING, OR PREPARED TO DO ANYTHING TO END YOUR LIFE?: NO
2. HAVE YOU ACTUALLY HAD ANY THOUGHTS OF KILLING YOURSELF IN THE PAST MONTH?: NO

## 2024-08-15 ASSESSMENT — VISUAL ACUITY: OU: BASELINE

## 2024-08-15 ASSESSMENT — LIFESTYLE VARIABLES: TOBACCO_USE: 1

## 2024-08-15 NOTE — ANESTHESIA PROCEDURE NOTES
Airway       Patient location during procedure: OR       Procedure Start/Stop Times: 8/15/2024 12:49 PM  Staff -        Anesthesiologist:  Stepan Adames MD       Resident/Fellow: Joanne Rosales MD       CRNA: Angelo Dexter APRN CRNA       Other Anesthesia Staff: Sally Spear       Performed By: A[place]  Consent for Airway        Urgency: elective  Indications and Patient Condition       Indications for airway management: luis-procedural       Induction type:intravenous       Mask difficulty assessment: 2 - vent by mask + OA or adjuvant +/- NMBA    Final Airway Details       Final airway type: endotracheal airway       Successful airway: ETT - double lumen left  Endotracheal Airway Details        Cuffed: yes       Cuff volume (mL): 7       Successful intubation technique: video laryngoscopy       VL Blade Size: MAC 4       Grade View of Cords: 1       Placement verification comments: (Fiberoptic confirmation)       Adjucts: stylet       Position: Center       Measured from: gums/teeth       Secured at (cm): 31       Bite block used: None       ETT Double lumen (fr): 39    Post intubation assessment        Placement verified by: capnometry, equal breath sounds and chest rise        Number of attempts at approach: 1       Number of other approaches attempted: 0       Secured with: tape       Ease of procedure: easy       Dentition: Intact and Unchanged    Medication(s) Administered   Medication Administration Time: 8/15/2024 12:49 PM

## 2024-08-15 NOTE — PHARMACY-ADMISSION MEDICATION HISTORY
Pharmacist Admission Medication History    Admission medication history is complete. The information provided in this note is only as accurate as the sources available at the time of the update.    Information Source(s): Patient, Family member, and CareEverywhere/SureScripts via in-person    Pertinent Information:   1) Patient reports taking Lantus as 30 Units at bedtime (script says to use 15 Units twice daily)  2) Patient does not use Lantus on Mondays when he takes his Trulicity  3) Patient reports pirfenidone is 3 times daily, but he is only taking twice daily (skips noon dose). Per MTM visit note 8/14, he was unable to continue with 801 mg tabs three times daily due to diarrhea/GI side effects.    Changes made to PTA medication list:  Added: None  Deleted: aspirin, Symbicort (has not used in a long time), Trulicity (duplicate)  Changed: Lantus 15 Units to 30 Units at bedtime, pirfenidone TID to BID    Allergies reviewed with patient and updates made in EHR: yes    Medication History Completed By: Lamar Osman RPH 8/15/2024 9:36 AM    PTA Med List   Medication Sig Last Dose    atorvastatin (LIPITOR) 20 MG tablet Take 1 tablet by mouth daily 8/14/2024 at am    Dulaglutide (TRULICITY) 3 MG/0.5ML SOPN Inject 3 mg Subcutaneous once a week 8/12/2024 at pm    empagliflozin (JARDIANCE) 10 MG TABS tablet Take 1 tablet (10 mg) by mouth daily 8/13/2024 at pm    insulin glargine (LANTUS PEN) 100 UNIT/ML pen Inject 30 Units subcutaneously at bedtime 8/13/2024 at hs    losartan (COZAAR) 50 MG tablet Take 1 tablet by mouth daily 8/13/2024 at am    metFORMIN (GLUCOPHAGE XR) 500 MG 24 hr tablet Take 500 mg by mouth 2 times daily (with meals) 8/13/2024 at pm    Pirfenidone 801 MG TABS Take 801 mg by mouth 2 times daily 8/13/2024 at pm

## 2024-08-15 NOTE — H&P
[facility]    History and Physical - CVTS (Cardiothroacic Surgery) Service       Date of Admission:  8/14/2024   on * No surgery found *  Assessment & Plan: Surgery   Mr. Travon Cartwright is a 59 year old male with history of Interstitial Lung Disease, T2DM, hepatic steatosis, and bilateral hand tremor who presents for possible bilateral lung transplantation.     - NPO   - Labs, CXR, workup for BLT pre-op   - Lantus 30u at bedtime  - BG checks, with medium sliding scale insulin available     The patient's care was discussed with the Chief Resident/Fellow.    Swapna Desir MD  [facility]  All communications related to this note should be expressed to resident/EMILY on call for this team at the time of your communication.  ______________________________________________________________________    Chief Complaint   ILD    History is obtained from the patient and patient's wife     History of Present Illness   Travon Cartwright is a 59 year old male with history of Interstitial Lung Disease, T2DM, hepatic steatosis, and bilateral hand tremor.     Patient reports history of ILD spanning last 3 years, progressively worsening. Reports SOB at rest, which has significantly impacted his daily activity level. Takes fibrotic medications regularly and follows with his pulmonologist twice per year. No new cough or respiratory symptoms concerning for sickness (no rhinitis, sore throat) or known exposure to covid. No sick contacts at home.    Patient reports history of T2DM, on medications and takes as scheduled. Lantus, empagliflozin, trulicity, and metformin. Recent Hgb A1c 8.4 6/18/2024 . Patient also reports taking lipitor, which he started taking when diagnosed with diabetes.     No history of CAD, MI, arrhythmia, or stroke. At present, patient denies chest pain, palpitations, abdominal pain, headache, and lightheadedness. Aside from  ongoing tremor, denies neurological issues. Reports his tremor worsens with fine motor skills, previously worked up by neurology in assessment for lung transplant candidacy. No medications for tremor. Bilateral tremor in both hands.     No other medical history or symptoms reported by patient or patient's wife. No history of surgeries in patient's past.       Past Medical History    Past Medical History:   Diagnosis Date    Hepatic steatosis 2017    Noted on ultrasound    Tremor 2024       Past Surgical History   Past Surgical History:   Procedure Laterality Date    COLONOSCOPY      CV CORONARY ANGIOGRAM N/A 2024    Procedure: Coronary Angiogram;  Surgeon: Gabriel Julian MD;  Location:  [facility]    CV RIGHT HEART CATH MEASUREMENTS RECORDED N/A 2024    Procedure: Right Heart Catheterization;  Surgeon: Gabriel Julian MD;  Location:  [facility]       Prior to Admission Medications   Prior to Admission Medications   Prescriptions Last Dose Informant Patient Reported? Taking?   Continuous Glucose Sensor (DEXCOM G7 SENSOR) MISC   No No   Sig: Change every 10 days.   Dulaglutide (TRULICITY) 3 MG/0.5ML SOPN   Yes No   Sig: Inject 3 mg Subcutaneous once a week   Pirfenidone 801 MG TABS   Yes No   Sig: Take 801 mg by mouth 3 times daily   SYMBICORT 160-4.5 MCG/ACT Inhaler   Yes No   Sig: Inhale 2 puffs into the lungs two times daily   TRULICITY 1.5 MG/0.5ML pen   Yes No   Sig: Inject 3 mg Subcutaneous every 7 days   aspirin (ASA) 325 MG EC tablet   Yes No   Simg on day of heart cath   Patient not taking: Reported on 7/10/2024   atorvastatin (LIPITOR) 20 MG tablet   Yes No   Sig: Take 1 tablet by mouth daily   empagliflozin (JARDIANCE) 10 MG TABS tablet   No No   Sig: Take 1 tablet (10 mg) by mouth daily   insulin glargine (LANTUS PEN) 100 UNIT/ML pen   Yes No   Sig: Inject 15 Units Subcutaneous at bedtime   losartan (COZAAR) 50 MG tablet   Yes No   Sig: Take 1 tablet by  mouth daily   metFORMIN (GLUCOPHAGE XR) 500 MG 24 hr tablet   Yes No   Sig: Take 500 mg by mouth 2 times daily (with meals)      Facility-Administered Medications: None        Review of Systems    The 10 point Review of Systems is negative other than noted in the HPI or here.     Social History   I have reviewed this patient's social history and updated it with pertinent information if needed.  Social History     Tobacco Use    Smoking status: Former     Types: Cigarettes    Smokeless tobacco: Never   Substance Use Topics    Alcohol use: Not Currently     Comment: not since 2017    Drug use: Never         Family History   I have reviewed this patient's family history and updated it with pertinent information if needed.  Family History   Problem Relation Age of Onset    Hypertension Mother     Lung Cancer Father         former smoker    Other - See Comments Sister         daughter 26 years    No Known Problems Maternal Grandmother     Lung Cancer Maternal Grandfather         former smoker    No Known Problems Paternal Grandmother     No Known Problems Paternal Grandfather          Allergies   No Known Allergies     Physical Exam   Vital Signs: Temp: 98  F (36.7  C) Temp src: Oral BP: 100/74 Pulse: 99   Resp: 20 SpO2: 94 %      Weight: 0 lbs 0 ozNo intake or output data in the 24 hours ending 08/14/24 2141     Gen: Alert and conversational adult in NAD  Chest: Breathing with some effort on room air, but not requiring oxygen. Normal rate, regular rhythm and by radial palpation  Abdomen: Soft, Non-tender, Without peritoneal signs, No rebound or guarding, nondistended   Extremities: Warm well perfused and No significant edema         Data   ------------------------- PAST 24 HR DATA REVIEWED -----------------------------------------------    I have personally reviewed the following data over the past 24 hrs:    Lab Results   Component Value Date    WBC 9.6 08/14/2024     Lab Results   Component Value Date    RBC 5.78  "08/14/2024     Lab Results   Component Value Date    HGB 17.1 08/14/2024     Lab Results   Component Value Date    HCT 51.6 08/14/2024     No components found for: \"MCT\"  Lab Results   Component Value Date    MCV 89 08/14/2024     Lab Results   Component Value Date    MCH 29.6 08/14/2024     Lab Results   Component Value Date    MCHC 33.1 08/14/2024     Lab Results   Component Value Date    RDW 12.5 08/14/2024     Lab Results   Component Value Date     08/14/2024     Last Comprehensive Metabolic Panel:  Lab Results   Component Value Date     08/14/2024    POTASSIUM 3.6 08/14/2024    CHLORIDE 102 08/14/2024    CO2 21 (L) 08/14/2024    ANIONGAP 14 08/14/2024     (H) 08/14/2024    BUN 12.0 08/14/2024    CR 0.70 08/14/2024    GFRESTIMATED >90 08/14/2024    HUGO 9.9 08/14/2024       Recent Labs   Lab 08/14/24  2309 08/14/24  2218   * 165*         Imaging results reviewed over the past 24 hrs:   No results found for this or any previous visit (from the past 24 hour(s)).    "

## 2024-08-15 NOTE — ANESTHESIA PROCEDURE NOTES
Arterial Line Procedure Note    Pre-Procedure   Staff -        Performed By: fellow       Location: OR       Pre-Anesthestic Checklist: patient identified, IV checked, risks and benefits discussed, informed consent, monitors and equipment checked, pre-op evaluation and at physician/surgeon's request  Timeout:       Correct Patient: Yes        Correct Procedure: Yes        Correct Site: Yes        Correct Position: Yes   Line Placement:   This line was placed Pre Induction starting at 8/15/2024 12:36 PM and ending at 8/15/2024 12:38 PM  Procedure   Procedure: arterial line       Laterality: left       Insertion Site: radial.  Sterile Prep        Standard elements of sterile barrier followed       Skin prep: Chloraprep  Insertion/Injection        Technique: ultrasound guided and Seldinger Technique        1. Ultrasound was used to evaluate the access site.       2. Artery evaluated via ultrasound for patency/adequacy.       3. Using real-time ultrasound the needle/catheter was observed entering the artery/vein.       5. The visualized structures were anatomically normal.       6. There were no apparent abnormal pathologic findings.       Catheter Type/Size: 20 G, 12 cm  Narrative         Secured by: suture       Tegaderm dressing used.       Complications: None apparent,        Arterial waveform: Yes        IBP within 10% of NIBP: Yes

## 2024-08-15 NOTE — OP NOTE
Division of Cardiothoracic Surgery - OPERATIVE NOTE    DIAGNOSIS: End-stage lung disease    PROCEDURE: Bilateral Orthotopic Lung Transplant via clamshell incision, on central VA ECMO    Bilateral Orthotopic Lung Transplant via clamshell incision, on central VA ECMO (25734)   Backbench standard preparation of cadaver donor lung allograft prior to transplantation, including dissection of allograft from surrounding soft tissues to prepare pulmonary venous/atrial cuff, pulmonary artery, and bronchus (97790)  Exclusion of left atrial appendage, open, performed at the time of other sternotomy (82703)    DATE OF SURGERY: 8/15/2024  SURGEON: Mulvihill, Michael, MD  CO-SURGEON: Le Ortiz MD  FELLOW(S): Aury Vaughan    ESTIMATED BLOOD LOSS: 500mL    ATTESTATION: I, Michael Mulvihill, attending co-surgeon, was scrubbed and present for the entire operative procedure. Dr. Ortiz and CELIA jointly performed the procedure and all of the critical components. Due to the complexity of the procedure and the critical illness of the patient, co-surgeons were required by medical necessity. It should be noted that although there was resident in the operative suite, they didn't have sufficient training and/or expertise in this particular procedure. Specifically, Dr. Ortiz performed the L lung explant, L donor lung implantation, and backbench preparation of the allograft    Specimens:       ID Type Source Tests Collected by Time Destination   1 : Native Left Lung Tissue Lung, Left SURGICAL PATHOLOGY EXAM Mulvihill, Michael, MD 8/15/2024  3:23 PM     2 : Native Right Lung Tissue Lung, Right SURGICAL PATHOLOGY EXAM Mulvihill, Michael, MD 8/15/2024  3:24 PM     A : Donor Left Lung Washing Washings Lung, Left AFB CULTURE AND STAIN NON BLOOD, GRAM STAIN, FUNGAL OR YEAST CULTURE ROUTINE, VIRAL CULTURE RESPIRATORY, RESPIRATORY AEROBIC BACTERIAL CULTURE Mulvihill, Michael, MD 8/15/2024  2:42 PM     B : Recipient Left Lung Washings Washings Lung, Left  AFB CULTURE AND STAIN NON BLOOD, GRAM STAIN, FUNGAL OR YEAST CULTURE ROUTINE, VIRAL CULTURE RESPIRATORY, RESPIRATORY AEROBIC BACTERIAL CULTURE Mulvihill, Michael,          IMPLANTS:    Implant Name Type Inv. Item Serial No.  Lot No. LRB No. Used Action   AtriCure AtriClip Flex V 40 Other   [employer] [id number] N/A 1 Implanted        INDICATIONS:  Mr. Cartwright is a 59-year-old man with rapidly progressively worsening interstitial lung disease.  He has undergone multidisciplinary evaluation by the lung transplant team and is found to be an appropriate candidate for lung transplant.  A suitable donor is available.  He understands the risks and benefits of the procedure.       OPERATIVE FINDINGS:  There were no pleural-pleural adhesions.  There was a moderate amount of bilateral hilar lymphadenopathy.  After the completion of each bronchial anastomosis, flexible fiberoptic bronchoscopy was performed and demonstrated intact patent anastomoses.  The patient was ventilating and oxygenating well at the end of the procedure.  Hemostasis was noted at the end of the procedure. He weaned from cardiopulmonary bypass on moderate dose inotropic support.     The patient was brought to the operating room and was placed in a supine position with arms elevated above the head. After adequate general anesthesia had been obtained and appropriate monitoring lines inserted, a double-lumen endotracheal tube endotracheal tube was inserted.     The chest and abdomen were prepped and draped in a sterile fashion. A surgical time out with the OR team was performed. Bilateral fermín-transternal thoracotomies were performed and the chest was entered through the right fourth intercostal space. The sternum was divided and both internal mammary arteries were ligated and divided.     We then made the decision to perform the implantation with the use of the cardiopulmonary bypass circuit in a VA ECMO configuration. The patient was heparinized  and the ascending aorta and right atrium were cannulated. The cannulas were deaired and connected to a VA ECMO circuit. ECMO flow was initiated and increased to 100% of calculated flow for the patient's weight.     Due to the high incidence of postoperative atrial fibrillation in this population and the complexities of anticoagulating after a massive transplant surgery, I elected to excise the left atrial appendage. The apex of the heart was retracted and the left atrial appendage was closed at its base with an atriclip product.    Dissection was undertaken to explant the LEFT lung first. A left native lung pneumonectomy was performed as follows:  The left inferior pulmonary vein was divided with electrocautery.  The left superior and inferior pulmonary veins were dissected out circumferentially and ligated and divided with endoscopic staplers.  A Derra clamp was placed proximally on the left pulmonary artery and this was divided distally at the point of takeoff of the anteroapical trunk.  Once lymphatics were dissected off of the left main stem bronchus, the left main stem bronchus was divided sharply with a #10 blade scalpel.  Hemostasis of the bronchiolar arteries was achieved with Hemoclips.  The left native lung was delivered from the field as a specimen.  The left pulmonary vein stumps were grasped with Allis clamps and retracted laterally.  The pericardium was opened circumferentially around the sewing cuff and the ligament of Tao was divided with electrocautery.     The donor lungs had arrived and the following was confirmed: Donor UNOS ID # [id number] and donor blood group O. I attest that I participated with visual verification as documented in the ABO verification time-out that the organ with OPTN Donor ID [id number] and ABO O has been verified and compatible for this recipient, Traovn Cartwright, ABO O NEG. This verification occurred after the organ arrival in the operating room and prior to  implantation.     An extensive back table dissection of the donor allografts was performed to prepare for implantation. Dissection to lengthen the venous cuff and pulmomary artery were also performed. Specimens from each bronchus were sent for culture. The attached pericardium and the lymphatics were divided in the midline.  The left atrial cuff was divided in the midline.  The main pulmonary artery was divided along the raphe.  The left main stem bronchus was divided at the level of kelly.  The right lung was then prepared by  the right pulmonary artery from the atrial cuff with sharp dissection and the bronchus was divided 2 tracheal rings distal to the takeoff of the right upper lobe bronchus.      The donor left lung was brought into left pleural space in appropriate orientation.  The left bronchial anastomosis was performed in end-to-end fashion. The cartilaginous portion of the donor bronchus was intussuscepted into the recipient bronchus using end-to-end running sutures of 4-0 prolene. A Marya clamp was applied proximally across the left atrium and the left pulmonary vein stumps were resected sharply and the venotomies were connected sharply to create a left atrial sewing cuff.  This was sewn in an end-to-end fashion to the donor left pulmonary vein sewing cuff with 4-0 Prolene.  The posterior wall was imbricated while the anterior wall was run in continuous fashion.  The left pulmonary artery anastomosis was performed in end-to-end fashion using running 5-0 Prolene. The anastomoses were deaired by releasing the Derra clamp on the pulmonary artery to confirm flow through the pulmonary artery anastomosis.  The Marya clamp was released on the left atrium for further de-airing during a period of reduced ECMO flow before tying down the sutures.    The total ischemic time for the left lung was 251 minutes. The warm ischemic time was 44 minutes.     We proceeded with right lung implantation. The right  inferior pulmonary ligament was divided with electrocautery.  Hilar dissection was carried out around the right side circumferentially.  The right superior, middle and inferior pulmonary veins were ligated and divided with a vascular load of an endoscopic stapler.  The pulmonary artery was dissected out circumferentially, and a Derra clamp was placed as far proximally as possible.  The pulmonary artery was divided distally at the point of takeoff of the anterior apical trunk.  The lymphatics surrounding the right main stem bronchus were ligated with Hemoclips and divided distally with electrocautery.  The right main stem bronchus was divided with a #10 blade scalpel.  Hemostasis of the bronchiolar arteries was achieved with Hemoclips.  The pulmonary vein stumps were grasped and retracted laterally.  The pericardium was opened circumferentially around the pulmonary veins.     The donor right lung was brought into the right pleural space in appropriate orientation.  The bronchial anastomosis was performed in end-to-end fashion. The cartilaginous portion of the donor bronchus was intussuscepted into the recipient bronchus using end-to-end running sutures of 4-0 prolene. A Marya clamp was applied proximally across the left atrial cuff.  The pulmonary veins stumps were resected sharply and connected to form a sewing cuff.  The pulmonary vein/left atrial sewing cuff anastomosis was then sewn in an end-to-end fashion using running 5-0 Prolene, taking care to imbricate the posterior wall while the anterior wall was completed in running continuous fashion. The right pulmonary artery anastomosis was performed in end-to-end fashion using running 5-0 Prolene. This anastomosis was deaired by releasing the Derra clamp on the pulmonary artery and then the Marya clamp before tying down the sutures. Prior to completing these anastomoses, Solu-Medrol IV was administered and the pulmonary artery clamp was opened flushing any air from  the lung with ECMO flow reduced. After adequate venting, the anastomoses were completed and both the left atrial and right pulmonary artery clamps were removed. Hemostasis was confirmed.     The total ischemic time for the right lung was 335 minutes. The warm ischemic time was 40 minutes.    Both lungs were ventilated and the patient remained stable. VA ECMO flow was then slowly weaned and the patient remained stable with excellent saturations on moderate inotropic support and inhaled nitric oxide. ECMO was discontinued and the heart decannulated. Heparin was reversed with protamine. Hemostasis was assured. The wound was irrigated with sterile chlorhexadine solution. Two left and two right pleural chest tubes and a pericardial tube were placed and brought out through separate stab wound incisions. The sternum was approximated using stainless steel wires. The ribs were approximated with intercostal sutures of PDS. The remainder of the incisions was repaired in layers and a sterile dressing was applied. The double-lumen of the endotracheal tube was removed and replaced with a single-lumen endotracheal tube.    Fiberoptic bronchoscopy was performed which revealed widely patent bilateral anastomoses. Residual bloody secretions were lavaged clear. There was no evidence of reperfusion injury. The patient was transported to the CVICU in stable condition.    I was present and scrubbed throughout the entire procedure.    Michael Mulvihill, MD  8/15/2024 @ 12:21 PM

## 2024-08-15 NOTE — H&P
CV ICU H&P      ASSESSMENT: Travon Cartwright is a 59 year old male with PMH of  ILD on 6L oxygen at baseline, T2DM, hepatic steatosis and bilateral hand tremor now s/p lung transplant via clamshell incision with Dr Mulvihill on 8/15/24.     Required 220 cell saver, 450 whole blood, 6 L crystalloid.   Access: left radial A-line, RIJ, 2PIV. Reversed.   Pre procedure Echo LVEF 60-65%.    PLAN:  Neuro/ pain/ sedation:  # Acute postoperative pain  - Scheduled: tylenol   - PRN: oxycodone, dilaudid, robaxin    #Sedation/anagelsia   - Gtt: Propofol gtt   Fentanyl infusion with IVP as needed   - RASS goals 0 to -1.   - RAPS consult for Pain catheters when closer to extubation       Pulmonary care:   #  ILD emphysema on 6L O2 s/p BL lung transplant (8/15)    # Post operative ventilator support   FiO2 (%): 85 %, Resp: 20, Vent Mode: CMV/AC, Resp Rate (Set): 16 breaths/min, Tidal Volume (Set, mL): 420 mL, PEEP (cm H2O): 8 cmH2O, Resp Rate (Set): 16 breaths/min, Tidal Volume (Set, mL): 420 mL, PEEP (cm H2O): 8 cmH2O   - Goal SpO2 > 92%  - Ventilator bundle. HOB elevation.   - Encourage IS q15-30 minutes when awake.  - inhaled Nitric oxide 20.     Cardiovascular:    # Cardiogenic shock  # Hx HTN,  on pta Losartan  - hold pta medications for now   - shock multifactorial-->hypovolemic, distributive   - NE, Vaso gtts for inotropic and pressor support, wean as able  - Monitor hemodynamic status. Goal MAP >65, SBP <140  - Trend serial lactate levels      GI care / Nutrition:   # Concern for for protein malnutrition  - NPO for now   - Nutrition consult   - will need NJ feeding tube  - PPI for GI prophylaxis  - Bowel regimen: MiraLAX, senna    Renal / Fluids / Electrolytes:   BL creat appears to be ~ 0.62-0.65   - Strict I/O, daily weights  - Avoid/limit nephrotoxins as able  - Replete lytes PRN per protocol    Endocrine:    # Stress hyperglycemia  #  Type 2 DM, on pta Injectable, lantus, metformin, Lipitor and  Jardiance   - hgb  A1C 8.4%   - Insulin gtt. Goal BG <180 for optimal healing    ID / Antibiotics:  # Stress induced leukocytosis  # Immunosuppression due for lung transplant   # Immunoprophylaxis for lung transplant   - To complete perioperative regimen per transplant protocol.   - will defer changes to immunosuppression to Transplant Pulmonary team   PPX:  Acyclovir, amphotericin B, Ceftazidime, Nystatin, SMX-TMP, vancomycin      Heme:     # Acute blood loss anemia  # Acute blood loss thrombocytopenia  # Coagulopathy due to surgical blood loss   - Continue to monitor  - CBC   - Hgb goal > 7.0  - S/p 1 FFP    MSK / Skin:  # Sternotomy and Surgical Incision  # Weakness and deconditioning    - Sternal precautions  - Postoperative incision management per protocol  - PT/OT/CR    General cares and Prophylaxis:     - Mechanical ppx for DVT  - Chemical DVT ppx: start SQH tomorrow when okay with Surgical team.   - PPI  - Bowel regimen: PPI, MiraLAX and  senna    Lines / Tubes / Drains:  - ETT  - RIJ CVC, PA catheter  - Arterial Line  - CTs x5  - Goodrich  - NG    Disposition:  - CVICU    Patient seen, findings and plan discussed with CVICU staff.    Kit Hodgson MD      Clinically Significant Risk Factors Present on Admission          # Hypocalcemia: Lowest iCa = 3.5 mg/dL in last 2 days, will monitor and replace as appropriate      # Coagulation Defect: INR = 1.46 (Ref range: 0.85 - 1.15) and/or PTT = 42 Seconds (Ref range: 22 - 38 Seconds), will monitor for bleeding    # Hypertension: Home medication list includes antihypertensive(s)        # DMII: A1C = 8.4 % (Ref range: <5.7 %) within past 6 months                   ====================================    HPI:  Travon Cartwright is a 59 year old male with history of Interstitial Lung Disease with baseline oxygen dependence-6L , T2DM, hepatic steatosis, and bilateral hand tremor now s/p bilateral lung transplant with Dr. Gay.        PAST MEDICAL HISTORY:   Past Medical History:    Diagnosis Date    Hepatic steatosis 2017    Noted on ultrasound    Tremor 05/23/2024       PAST SURGICAL HISTORY:   Past Surgical History:   Procedure Laterality Date    COLONOSCOPY      CV CORONARY ANGIOGRAM N/A 6/21/2024    Procedure: Coronary Angiogram;  Surgeon: Gabriel Julian MD;  Location:  [facility]    CV RIGHT HEART CATH MEASUREMENTS RECORDED N/A 6/21/2024    Procedure: Right Heart Catheterization;  Surgeon: Gabriel Julian MD;  Location: [facility]       FAMILY HISTORY:   Family History   Problem Relation Age of Onset    Hypertension Mother     Lung Cancer Father         former smoker    Other - See Comments Sister         daughter 26 years    No Known Problems Maternal Grandmother     Lung Cancer Maternal Grandfather         former smoker    No Known Problems Paternal Grandmother     No Known Problems Paternal Grandfather        SOCIAL HISTORY:   Social History     Tobacco Use    Smoking status: Former     Types: Cigarettes    Smokeless tobacco: Never   Substance Use Topics    Alcohol use: Not Currently     Comment: not since 2017         OBJECTIVE:  1. VITAL SIGNS:   Temp:  [97.4  F (36.3  C)-98  F (36.7  C)] 98  F (36.7  C)  Pulse:  [91-99] 91  Resp:  [20] 20  BP: ()/(58-85) 94/58  Cuff Mean (mmHg):  [95] 95  FiO2 (%):  [85 %] 85 %  SpO2:  [90 %-100 %] 100 %    FiO2 (%): 85 %, Resp: 20, Vent Mode: CMV/AC, Resp Rate (Set): 16 breaths/min, Tidal Volume (Set, mL): 420 mL, PEEP (cm H2O): 8 cmH2O, Resp Rate (Set): 16 breaths/min, Tidal Volume (Set, mL): 420 mL, PEEP (cm H2O): 8 cmH2O      2. INTAKE/ OUTPUT:   I/O last 3 completed shifts:  In: -   Out: 150 [Urine:150]      3. PHYSICAL EXAMINATION:   General: Chemically sedated  HEENT: PERRLA. ETT in place   Neuro: sedated   Resp: Mechanical BBS, lungs clear and coarse, no wheezes, rales or rhonchi.   Chest tubes y'ed together   CV: S1, S2, RRR, no m/r/g   Abdomen: Soft, non-distended, non-tender  Extremities: warm and  well perfused, calves soft and compressible. Pulses palpable      4. INVESTIGATIONS:   Arterial Blood Gases   Recent Labs   Lab 08/15/24  2021 08/15/24  1919 08/15/24  1838 08/15/24  1819   PH 7.34* 7.31* 7.30* 7.31*   PCO2 42 44 46* 44   PO2 155* 118* 92 210*   HCO3 23 22 23 22     Complete Blood Count   Recent Labs   Lab 08/15/24  2020 08/15/24  1919 08/15/24  1838 08/15/24  1820 08/15/24  1324 08/15/24  0501 08/14/24  2338   WBC 25.6*  --   --  22.5*  --  7.2 9.3   HGB 14.5 12.8* 13.9 11.6*   < > 16.6 17.1     --   --  216  --  273 278    < > = values in this interval not displayed.     Basic Metabolic Panel  Recent Labs   Lab 08/15/24  2024 08/15/24  1919 08/15/24  1838 08/15/24  1819 08/15/24  1758 08/15/24  0720 08/15/24  0501 08/15/24  0308 08/14/24  2338 08/14/24  2309 08/14/24  2218   NA  --  142 141 143 143   < > 139  --  141  --  137   POTASSIUM  --  4.2 3.9 3.9 3.7   < > 4.5  --  3.7  --  3.6   CHLORIDE  --   --   --   --   --   --  106  --  104  --  102   CO2  --   --   --   --   --   --  22  --  23  --  21*   BUN  --   --   --   --   --   --  12.9  --  11.9  --  12.0   CR  --   --   --   --   --   --  0.62*  --  0.65*  --  0.70   * 124* 130* 139* 149*   < > 132*   < > 123*   < > 165*    < > = values in this interval not displayed.     Liver Function Tests  Recent Labs   Lab 08/15/24  2020 08/15/24  1820 08/14/24  2338   AST  --   --  24   ALT  --   --  22   ALKPHOS  --   --  89   BILITOTAL  --   --  0.4   ALBUMIN  --   --  4.3   INR 1.46* 1.74* 1.07     Pancreatic Enzymes  No lab results found in last 7 days.  Coagulation Profile  Recent Labs   Lab 08/15/24  2020 08/15/24  1820 08/14/24  2338   INR 1.46* 1.74* 1.07   PTT 42* 36 32         5. RADIOLOGY:   Recent Results (from the past 24 hour(s))   XR Chest 2 Views    Narrative    EXAM: XR CHEST 2 VIEWS  8/14/2024 11:47 PM      HISTORY: pre op lung transplant    COMPARISON: 6/18/2024    FINDINGS: Two views of the chest. Trachea is midline.  Stable cardiac  silhouette. Low lung volumes with unchanged reticular fibrotic  opacities. Chronic blunting of the costophrenic sulci. No  pneumothorax.      Impression    IMPRESSION: Sequelae of interstitial lung disease with chronic  fibrotic opacities of the lungs. No appreciable acute airspace disease  identified.    I have personally reviewed the examination and initial interpretation  and I agree with the findings.    FADY MCDONNELL MD         SYSTEM ID:  [id number]       =========================================

## 2024-08-15 NOTE — ANESTHESIA PROCEDURE NOTES
Airway       Patient location during procedure: OR       Procedure Start/Stop Times: 8/15/2024 5:39 PM  Staff -        Anesthesiologist:  Stepan Adames MD       Resident/Fellow: Asif Dangelo MD       Performed By: resident and with residents       Procedure performed by resident/fellow/CRNA in presence of a teaching physician.    Consent for Airway        Urgency: elective  Indications and Patient Condition       Indications for airway management: luis-procedural (Tube exchange from NICKY to 8.0 in lung transplant)         Mask difficulty assessment: 0 - not attempted    Final Airway Details       Final airway type: endotracheal airway       Successful airway: ETT - single  Endotracheal Airway Details        ETT size (mm): 8.0       Cuffed: yes       Successful intubation technique: video laryngoscopy       VL Blade Size: Glidescope 4       Grade View of Cords: 2       Intubation device: cook exchange catheter.       Position: Right       Measured from: gums/teeth       Secured at (cm): 25       Bite block used: None    Post intubation assessment        Placement verified by: capnometry, equal breath sounds and chest rise        Number of attempts at approach: 1       Number of other approaches attempted: 0       Secured with: silk tape       Ease of procedure: easy       Dentition: Intact and Unchanged    Medication(s) Administered   Medication Administration Time: 8/15/2024 5:39 PM

## 2024-08-15 NOTE — ANESTHESIA PROCEDURE NOTES
Acute Normovolemic Hemodilution Note    Pre-Procedure   Staff -        Anesthesiologist:  Stepan Adames MD       Resident/Fellow: Joanne Rosales MD       Performed By: fellow       Location: In OR after induction       Pre-Anesthestic Checklist: patient identified, IV checked, site marked, risks and benefits discussed, informed consent, monitors and equipment checked, pre-op evaluation and at physician/surgeon's request   Procedure: Normovolemic Hemodilution  Date/Time of Collection:  8/15/2024 1:30 PM  Date/Time of Expiration:8/15/2024 1:45 PM  Volume Collected: 450 ml   Provider Collecting: Joanne Rosales MD

## 2024-08-15 NOTE — PROGRESS NOTES
Cardiovascular Surgery Progress Note  08/15/2024         Assessment and Plan:     Travon Cartwright is a 59 year old male with a PMH of Interstitial Lung Disease, T2DM, hepatic steatosis, and bilateral hand tremor. He is admitted to our service in anticipation of possible bilateral sequential lung transplantation.     Cardiovascular:   Hypertension  No arrhythmia, HD stable overnight  - hold PTA atorvastatin, losartan    Pulmonary:  Interstitial lung disease with pulmonary fibrosis  Lung transplant candidate  - Saturating well on RA  - Pre-op immunosuppression ordered  - Hold PTA pirfenidone  - No sub for PTA symbicort as patient does not actually use at home     / Renal:  - No hx of renal disease. Most recent creatinine 0.62, adequate UOP   - Pre-op weight 152 lbs lbs  - Appears euvolemic     GI / FEN:   - NPO in anticipation of surgery   - hepatic enzymes WNL    Endocrine:  Diabetes mellitus type II  Last Hgb A1C 8.4 (6/18/24)  - hold PTA dulaglutide, empagliflozin, metformin  - while awaiting surgery: Lantus 30 units at bedtime, medium SSI    Infectious Disease:  - Afebrile, no signs or symptoms of infection    Hematology:   Hgb 16.6; Plt 273, no signs or symptoms of active bleeding    MSK/Skin:  Essential tremor  - PT/OT not indicated at this time    Disposition:   - Admitted to  on 8/14 for anticipated BSLT  - Pre-operative orders in place, no changes this A.M. to plan.    Medically Ready for Discharge: Anticipated in 5+ Days    Clinically Significant Risk Factors Present on Admission                # Drug Induced Platelet Defect: home medication list includes an antiplatelet medication   # Hypertension: Home medication list includes antihypertensive(s)        # DMII: A1C = 8.4 % (Ref range: <5.7 %) within past 6 months            Capri Suárez PA-C  Cardiothoracic Surgery  Pager [phone]    7:11 AM August 15, 2024      Interval History:     No overnight events.  No acute questions or concerns about  "lung transplantation.   Breathing comfortably on room air.   Tolerating NPO status.          Physical Exam:   Blood pressure 109/71, pulse 99, temperature 97.4  F (36.3  C), temperature source Oral, resp. rate 20, height 1.778 m (5' 10\"), weight 69.1 kg (152 lb 5.4 oz), SpO2 92%.  Vitals:    08/15/24 0000   Weight: 69.1 kg (152 lb 5.4 oz)     Gen: NAD, sister at bedside  Neuro: no focal deficits, A&O X4  CV: RRR, normal S1 S2, no murmurs, rubs or gallops  Pulm: fine crackles bilaterally, no wheezing or rhonchi appreciated on my exam, unlabored breathing on RA  Abd: non-distended  Ext: no peripheral edema         Data:    Imaging:  reviewed recent imaging, no acute concerns  XR Chest 2 Views  RESIDENT PRELIMINARY INTERPRETATION  IMPRESSION: Sequelae of interstitial lung disease with chronic  fibrotic opacities of the lungs. No appreciable acute airspace disease  identified.        Labs:  BMP  Recent Labs   Lab 08/15/24  0501 08/15/24  0308 08/14/24  2338 08/14/24  2309 08/14/24 2218     --  141  --  137   POTASSIUM 4.5  --  3.7  --  3.6   CHLORIDE 106  --  104  --  102   HUGO 9.6  --  9.9  --  9.9   CO2 22  --  23  --  21*   BUN 12.9  --  11.9  --  12.0   CR 0.62*  --  0.65*  --  0.70   * 139* 123* 140* 165*     CBC  Recent Labs   Lab 08/15/24  0501 08/14/24  2338 08/14/24  2218   WBC 7.2 9.3 9.6   RBC 5.65 5.77 5.78   HGB 16.6 17.1 17.1   HCT 50.9 51.8 51.6   MCV 90 90 89   MCH 29.4 29.6 29.6   MCHC 32.6 33.0 33.1   RDW 12.5 12.6 12.5    278 267     INR  Recent Labs   Lab 08/14/24  2338   INR 1.07      Hepatic Panel  Recent Labs   Lab 08/14/24  2338   AST 24   ALT 22   ALKPHOS 89   BILITOTAL 0.4   ALBUMIN 4.3     GLUCOSE:   Recent Labs   Lab 08/15/24  0501 08/15/24  0308 08/14/24  2338 08/14/24  2309 08/14/24  2218   * 139* 123* 140* 165*       "

## 2024-08-15 NOTE — CONSULTS
Pulmonary Medicine  Cystic Fibrosis - Lung Transplant Team  Initial Consultation  August 15, 2024      Patient: Travon Cartwright  MRN: [id number]  : 1965 (age 59 year old)  Transplant: 8/15/2024 (Lung), POD#1  Admission date: 2024  Primary Care Provider: Dwayne Thomsa    Assessment & Plan:     Travon Cartwright is a 59 year old male with a PMH diabetes mellitus, hepatosteatosis, and essential tremor.  Pt. is now s/p BSLT on 8/15/2024 with Dr. Mulvihill.  Surgery was uncomplicated and done on pump.  The patient is currently POD #1 in the CVICU.    S/p bilateral sequential lung transplant (BSLT) for idiopathic pulmonary fibrosis:  Acute on chronic hypoxic respiratory failure: Patient is currently on pressors, oxygenating well (P:F 200-300).   - Nebs: levalbuterol and Mucomyst QID  - Aggressive pulmonary toilet with chest physiotherapy QID (addition of Aerobika and incentive spirometry once extubated)  - DSA at one week (ordered ) then one month post-transplant (additionally per protocol)  - Ammonia monitoring qMTh (screening for hyperammonemia post-lung transplant) with ureaplasma PCR ordered POD #7 () per protocol   - Ventilator management per ICU team; agree with weaning Stu today as able (20ppm -> 10ppm this morning). [Donor IBW 70kg]. Agree with CVICU goal net even for the next 24 hours, will likely recommend diuresis starting tomorrow.  - s/p inspection bronchoscopy today; our team will perform bronchoscopy on day of anticipated extubation  - Anesthesia to place epidural catheters prior to anticipated extubation per our routine, defer today  - Chest tubes managed by surgical team   - Chest tube 1 - pleural, to suction   - Chest tube 2 - pleural, to suction   - Chest tube 3 - pleural, to suction   - Chest tube 4 - pleural, to suction   - Chest tube 5 - pericardial, to suction   - Daily CXR while chest tubes remain  - Post-pyloric feeding tube placement as soon able for enteral  nutrition, trickle feed rate only (max 20 ml/hr) with gastric access   - SLP consult as pt. nears extubation for swallowing evaluation and FEES prior to PO  - Await explant pathology    Immunosuppression:  Induction therapy with high dose IV steroid intraoperatively and basiliximab (POD #0 and #4 [8/19], ordered).   - Tacrolimus 1 mg SL BID.  Goal tacrolimus level 8-12. Tacro levels daily. Continuing SL dosing with daily tacrolimus levels pending return of bowel function post-op.  See below for initial levels and dosing trends:  - MMF 1000 mg BID  - Methylprednisolone 125 mg x 3 doses, then prednisolone 30 mg daily with taper per lung transplant protocol:  Date Daily Dose (mg)   8/17/2024 30   8/24/2024 25   8/31/2024 20   9/21/2024 15   10/12/2024 10   11/2/2024 5     Prophylaxis:   - Bactrim for PJP ppx started POD #1 due to donor toxoplasma IgG+  - VGCV for CMV ppx (ordered as below), CMV monitoring per protocol (after completion of ppx course, additionally prn)  - Ampho B nebs twice weekly for antifungal ppx through discharge, then will stop  - Nystatin for oral candidiasis ppx, 6 month course - holding while on micafungin  - [id number] q24h for prophylaxis  - See below for serologies and viral ppx:   Donor Recipient Intervention   CMV status - + Valganciclovir POD #8-90   EBV status - + EBV monthly   HSV status N/A + none      Primary graft dysfunction (per ISHLT guidelines):  See chart below:   POD #0  (~0 hours) POD #1  (~24 hours) POD #2   (~48 hours) POD#3   (~72 hours)   Date 8/15 8/16 * *   Time 1446 1301     Intubated Y Y Y/N Y/N   PaO2 425 149     FiO2 80 60     P/F Ratio 531 248     PGD Grade   (0=mild, 3=severe) 1 2     ECMO N N Y/N Y/N   Inhaled NO/Flolan Y Y Y/N Y/N   ISHLT PGD Scoring    Grade 0 - PaO2/FiO2 >300 and normal chest radiograph (absence of diffuse allograft infiltrates)  Grade 1 - PaO2/FiO2 >300 and diffuse allograft infiltrates on chest radiograph  Grade 2 - PaO2/FiO2 between 200  and 300  Grade 3 - PaO2/FiO2 <200 and/or on ECMO  Grade U (Ungradable) - on ECMO and normal chest radiograph (absence of diffuse allograft infiltrates)     ID: Prior history of infection/colonization with: n/a  - IgG at one month (ordered 9/15)  - Donor cultures ([facility]) NGTD; ([facility]) NGTD ([facility] personally reviewed today)   - Recipient cultures NGTD  - Continue IV ceftaz/vancomycin for 48h per protocol, will adjust antibiotic plan based on results of the above cultures    PHS risk criteria donor:  Additional labs required post-transplant (between 4-8 weeks post-op): Hepatitis B, Hepatitis C, and HIV by JOVANY ([id number], ordered POD #30, ordered for 9/15).    Hepatosteatosis:  Seen by Dr. Acosta prior to transplant, does not have significant liver disease that would increase his risk for lung transplant complications. FIB-4 1.14. Fibroscan showed estimated liver fibrosis stage 0-1, steatosis grade 3. Recommended annual testing of alkaline phosphatase and annual calculation of FIB-4.     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 Proceed with J feeds.     Diabetes mellitus: Prior to transplant was on Jardiance, dulaglutide, glargine, metformin. All currently held; insulin gtt per primary team.    We appreciate the excellent care provided by the CVTS and CVICU teams.  Recommendations communicated via in person rounding and this note.  Will continue to follow along closely, please do not hesitate to call with any questions or concerns.    Patient seen and staffed with Dr. Knight.    Mallorie Hoover MD  Internal Medicine-Pediatrics  Pulmonary/Critical Care Fellow - PGY7/FL2  [facility]  P: [phone]     Chief Complaint:     S/p BSLT    History of Present Illness:     History obtained from chart.    Juan J Cartwright is a 59-year-old M with a history of idiopathic pulmonary fibrosis, diabetes, hepatosteatosis, and essential tremor initially listed for lung transplant 7/25/2024.  Prior to  transplant, he was on 6 L home oxygen chronically.  He was called and admitted to the hospital 8/14/2024 in anticipation of bilateral sequential lung transplant, which proceeded on cardiopulmonary bypass and without complication on 8/5/2024.  He was admitted to CVICU for postoperative cares.    Review of Systems:     Complete ROS as above, otherwise severely limited by sedation and intubation.    Medical and Surgical History:     Past Medical History:   Diagnosis Date    Hepatic steatosis 2017    Noted on ultrasound    S/P lung transplant (H) 08/15/2024    Tremor 05/23/2024     Past Surgical History:   Procedure Laterality Date    BRONCHOSCOPY  8/16/2024    COLONOSCOPY      CV CORONARY ANGIOGRAM N/A 6/21/2024    Procedure: Coronary Angiogram;  Surgeon: Gabriel Julian MD;  Location:  HEART CARDIAC CATH LAB    CV RIGHT HEART CATH MEASUREMENTS RECORDED N/A 6/21/2024    Procedure: Right Heart Catheterization;  Surgeon: Gabriel Julian MD;  Location:  HEART CARDIAC CATH LAB    TRANSPLANT LUNG RECIPIENT SINGLE X2 Bilateral 8/15/2024    Procedure: Clamshell Incision, On Central Venoarterial Extracorporeal Membranous Oxygenation, Bilateral Lung Transplant Recipient, Left atrial appendage ligation, Intraoperative Flexible Bronchoscopy by Anesthesia;  Surgeon: Mulvihill, Michael, MD;  Location:  OR     Social and Family History:     Social History     Socioeconomic History    Marital status: Single     Spouse name: Not on file    Number of children: Not on file    Years of education: Not on file    Highest education level: Not on file   Occupational History    Not on file   Tobacco Use    Smoking status: Former     Types: Cigarettes    Smokeless tobacco: Never   Substance and Sexual Activity    Alcohol use: Not Currently     Comment: not since 2017    Drug use: Never    Sexual activity: Not on file   Other Topics Concern    Not on file   Social History Narrative    Not on file     Social Determinants of Health      Financial Resource Strain: Not on File (2019)    Received from [facility]    Financial Resource Strain     Financial Resource Strain: 0   Food Insecurity: Not on File (2019)    Received from [facility]    Food Insecurity     Food: 0   Transportation Needs: Not on File (2019)    Received from [facility]    Transportation Needs     Transportation: 0   Physical Activity: Not on File (2019)    Received from [facility]    Physical Activity     Physical Activity: 0   Stress: Not on File (2019)    Received from [facility]    Stress     Stress: 0   Social Connections: Not on File (2019)    Received from [facility]    Social Connections     Social Connections and Isolation: 0   Interpersonal Safety: Not on file   Housing Stability: Not on File (2019)    Received from [facility]    Housing Stability     [place]     Family History   Problem Relation Age of Onset    Hypertension Mother     Lung Cancer Father         former smoker    Other - See Comments Sister         daughter 26 years    No Known Problems Maternal Grandmother     Lung Cancer Maternal Grandfather         former smoker    No Known Problems Paternal Grandmother     No Known Problems Paternal Grandfather      Allergies and Home Medications:   No Known Allergies  Medications Prior to Admission   Medication Sig Dispense Refill Last Dose    atorvastatin (LIPITOR) 20 MG tablet Take 1 tablet by mouth daily   2024 at am    Dulaglutide (TRULICITY) 3 MG/0.5ML SOPN Inject 3 mg Subcutaneous once a week   2024 at pm    empagliflozin (JARDIANCE) 10 MG TABS tablet Take 1 tablet (10 mg) by mouth daily 90 tablet 1 2024 at pm    insulin glargine (LANTUS PEN) 100 UNIT/ML pen Inject 30 Units subcutaneously at bedtime   2024 at hs    losartan (COZAAR) 50 MG tablet Take 1 tablet by mouth daily   2024 at am    metFORMIN (GLUCOPHAGE XR) 500 MG 24 hr tablet Take 500 mg by mouth 2 times daily (with meals)   2024 at pm    Pirfenidone 801 MG TABS Take  801 mg by mouth 2 times daily   8/13/2024 at pm    Continuous Glucose Sensor (DEXCOM G7 SENSOR) MISC Change every 10 days. 1 each 0      Current Scheduled Meds  Current Facility-Administered Medications   Medication Dose Route Frequency Provider Last Rate Last Admin    acetaminophen (TYLENOL) tablet 975 mg  975 mg Oral or Feeding Tube Q8H Marty Jeffers MD   975 mg at 08/16/24 0824    acetylcysteine (MUCOMYST) 20 % nebulizer solution 2 mL  2 mL Nebulization 4x Daily Marty Jeffers MD   2 mL at 08/16/24 1141    [START ON 8/23/2024] acyclovir (ZOVIRAX) capsule 400 mg  400 mg Oral or NG Tube BID Marty Jeffers MD        Or    [START ON 8/23/2024] acyclovir (ZOVIRAX) suspension 400 mg  400 mg Oral or NG Tube BID Marty Jeffers MD        amphotericin B (FUNGIZONE) 10 mg/2 mL inhalation solution 10 mg  10 mg Nebulization 2 times daily Marty Jeffers MD        atorvastatin (LIPITOR) tablet 20 mg  20 mg Oral or Feeding Tube Daily Marty Jeffers MD   20 mg at 08/16/24 0938    [START ON 8/19/2024] basiliximab (SIMULECT) 20 mg in sodium chloride 0.9 % 50 mL infusion  20 mg Intravenous Once Marty Jeffers MD        [START ON 8/23/2024] calcium carbonate-vitamin D (CALTRATE) 600-10 MG-MCG per tablet 1 tablet  1 tablet Oral or Feeding Tube BID Vivi Bowser, [facility]        cefTAZidime (FORTAZ) 2 g vial to attach to  ml bag for ADULTS or NS 50 ml bag for PEDS  2 g Intravenous Q8H Marty Jeffers MD   2 g at 08/16/24 0937    chlorhexidine (PERIDEX) 0.12 % solution 15 mL  15 mL Mouth/Throat Q12H Kit Hodsgon MD   15 mL at 08/16/24 0824    gabapentin (NEURONTIN) capsule 100 mg  100 mg Oral or Feeding Tube TID Marty Jeffers MD   100 mg at 08/16/24 1355    [START ON 8/17/2024] heparin ANTICOAGULANT injection 5,000 Units  5,000 Units Subcutaneous Q8H Truman, Barry L, DAYRON        levalbuterol (XOPENEX) neb solution 1.25 mg  1.25 mg Nebulization 4x Daily Marty Jeffers MD   1.25 mg at 08/16/24 1141    micafungin (MYCAMINE) 100 mg in  sodium chloride 0.9 % 100 mL intermittent infusion  100 mg Intravenous Q24H William Jones PA-C 100 mL/hr at 08/16/24 1016 100 mg at 08/16/24 1016    multivitamins w/minerals liquid 15 mL  15 mL Per Feeding Tube Daily William Jones PA-C   15 mL at 08/16/24 1239    mycophenolate (GENERIC EQUIVALENT) capsule 1,000 mg  1,000 mg Oral BID Marty Jeffers MD        Or    mycophenolate (CELLCEPT BRAND) suspension 1,000 mg  1,000 mg Oral or NG Tube BID Marty Jeffers MD   1,000 mg at 08/16/24 0825    nystatin (MYCOSTATIN) suspension 1,000,000 Units  1,000,000 Units Mouth/Throat 4x Daily Marty Jeffers MD   1,000,000 Units at 08/16/24 1239    [START ON 8/17/2024] pantoprazole (PROTONIX) 2 mg/mL suspension 40 mg  40 mg Oral or Feeding Tube [facility] Vivi Bowser, [facility]        polyethylene glycol (MIRALAX) Packet 17 g  17 g Oral or Feeding Tube Daily Marty Jeffers MD   17 g at 08/16/24 0824    [START ON 8/17/2024] prednisoLONE (ORAPRED) 15 MG/5 ML solution 30 mg  30 mg Oral or NG Tube Daily Marty Jeffers MD        Prosource TF20 [employer] Compatibl EN LIQD (PROSOURCE TF20) packet 60 mL  1 packet Per Feeding Tube BID William Jones PA-C        senna-docusate (SENOKOT-S/PERICOLACE) 8.6-50 MG per tablet 1 tablet  1 tablet Oral or Feeding Tube BID Marty Jeffers MD   1 tablet at 08/16/24 0824    sodium chloride (PF) 0.9% PF flush 3 mL  3 mL Intracatheter Q8H Marty Jeffers MD   3 mL at 08/16/24 1239    sulfamethoxazole-trimethoprim (BACTRIM/SEPTRA) suspension 80 mg  10 mL Oral or NG Tube Daily William Jones PA-C   80 mg at 08/16/24 0938    Or    sulfamethoxazole-trimethoprim (BACTRIM) 400-80 MG per tablet 1 tablet  1 tablet Oral or NG Tube Daily William Jones PA-C        tacrolimus (GENERIC EQUIVALENT) PO capsule for SUBLINGUAL use 1 mg  1 mg Sublingual BID IS Marty Jeffers MD   1 mg at 08/16/24 0826    vancomycin (VANCOCIN) 1,000 mg in 200 mL dextrose intermittent infusion  1,000 mg Intravenous Q12H Marty Jeffers  mL/hr at  08/16/24 1355 1,000 mg at 08/16/24 1355      Current PRN Meds  Current Facility-Administered Medications   Medication Dose Route Frequency Provider Last Rate Last Admin    [START ON 8/18/2024] acetaminophen (TYLENOL) tablet 650 mg  650 mg Oral or Feeding Tube Q4H PRN Marty Jeffers MD        [START ON 8/18/2024] bisacodyl (DULCOLAX) suppository 10 mg  10 mg Rectal Daily PRN Marty Jeffers MD        dextrose 10% infusion   Intravenous Continuous PRN William Jones PA-C        dextrose 10% infusion   Intravenous Continuous PRN Kit Hodgson MD        glucose gel 15-30 g  15-30 g Oral Q15 Min PRN Kit Hodgson MD        Or    dextrose 50 % injection 25-50 mL  25-50 mL Intravenous Q15 Min PRN Kit Hodgson MD        Or    glucagon injection 1 mg  1 mg Subcutaneous Q15 Min PRN Kit Hodgson MD        HYDROmorphone (PF) (DILAUDID) injection 0.2 mg  0.2 mg Intravenous Q2H PRN Marty Jeffers MD        Or    HYDROmorphone (PF) (DILAUDID) injection 0.4 mg  0.4 mg Intravenous Q2H PRN Marty Jeffers MD        lactated ringers BOLUS 500 mL  500 mL Intravenous Q15 Min PRN Marty Jeffers MD   500 mL at 08/16/24 0644    lidocaine (LMX4) kit   Topical Q1H PRN Marty Jefefrs MD        lidocaine 1 % 0.1-1 mL  0.1-1 mL Other Q1H PRN Marty Jeffers MD        [START ON 8/17/2024] magnesium hydroxide (MILK OF MAGNESIA) suspension 30 mL  30 mL Oral or Feeding Tube Daily PRN Marty Jeffers MD        propofol (DIPRIVAN) bolus from bag or syringe pump  10 mg Intravenous Q15 Min PRN Kit Hodgson MD        And    Medication Instruction   Does not apply Continuous PRN Kit Hodgson MD        methocarbamol (ROBAXIN) tablet 750 mg  750 mg Oral or Feeding Tube Q6H PRN Marty Jeffers MD        metoclopramide (REGLAN) injection 10 mg  10 mg Intravenous Once PRN William Jones PA-C        naloxone (NARCAN) injection 0.2 mg  0.2 mg Intravenous Q2 Min PRN Mulvihill, Michael, MD        Or    naloxone (NARCAN) injection  "0.4 mg  0.4 mg Intravenous Q2 Min PRN Mulvihill, Michael, MD        Or    naloxone (NARCAN) injection 0.2 mg  0.2 mg Intramuscular Q2 Min PRN Mulvihill, Michael, MD        Or    naloxone (NARCAN) injection 0.4 mg  0.4 mg Intramuscular Q2 Min PRN Mulvihill, Michael, MD        ondansetron (ZOFRAN ODT) ODT tab 4 mg  4 mg Oral Q6H PRN Marty Jeffers MD        Or    ondansetron (ZOFRAN) injection 4 mg  4 mg Intravenous Q6H PRN Marty Jeffers MD        oxyCODONE (ROXICODONE) tablet 5 mg  5 mg Oral or Feeding Tube Q4H PRN Marty Jeffers MD        Or    oxyCODONE IR (ROXICODONE) tablet 10 mg  10 mg Oral or Feeding Tube Q4H PRN Marty Jeffers MD        prochlorperazine (COMPAZINE) injection 10 mg  10 mg Intravenous Q6H PRN Marty Jeffers MD        Or    prochlorperazine (COMPAZINE) tablet 10 mg  10 mg Oral or Feeding Tube Q6H PRN Marty Jeffers MD        Reason beta blocker order not selected   Does not apply DOES NOT GO TO Marty Parker MD        sodium chloride (PF) 0.9% PF flush 3 mL  3 mL Intracatheter q1 min prn Marty Jeffers MD            Physical Exam:     All notes, images, and labs from past 24 hours (at minimum) were reviewed.    Vital signs:  Temp: 97.7  F (36.5  C)   BP: 94/58 Pulse: 83   Resp: 17 SpO2: 98 % O2 Device: Mechanical Ventilator   Height: 177.8 cm (5' 10\") Weight: 79.6 kg (175 lb 7.8 oz)      Intake/Output Summary (Last 24 hours) at 8/16/2024 1559  Last data filed at 8/16/2024 1500  Gross per 24 hour   Intake 44026.63 ml   Output 6760 ml   Net 4973.63 ml     GENERAL: Patient is sedated and intubated in the ICU.  No acute distress.  NOSE: Without deformity, bleeding or discharge.   NECK: [place] introducer in place.  Dressing is clean/dry/intact.  LUNGS: No increased work of breathing, no dyssynchrony noted on my exam.  Decent air movement in all anterior lung fields, some coarseness noted bilaterally.  HEART: Regular rate and rhythm.  ABDOMEN: Soft, nontender and nondistended. hepatosplenomegaly and " no hernias are noted.  EXTREMITIES: Diffuse anasarca noted.  SKIN: No rashes. No jaundice.    Results:     LABS    CMP:   Recent Labs   Lab 08/16/24  1305 08/16/24  1238 08/16/24  0949 08/16/24  0812 08/16/24  0616 08/16/24  0419 08/15/24  2024 08/15/24  2020 08/15/24  1919 08/15/24  1838 08/15/24  0720 08/15/24  0501 08/15/24  0308 08/14/24  2338   NA  --   --   --   --   --  138  --  142 142 141   < > 139  --  141   POTASSIUM  --   --   --   --   --  3.8  --  4.6 4.2 3.9   < > 4.5  --  3.7   CHLORIDE  --   --   --   --   --  105  --  107  --   --   --  106  --  104   CO2  --   --   --   --   --  24  --  21*  --   --   --  22  --  23   ANIONGAP  --   --   --   --   --  9  --  14  --   --   --  11  --  14   * 162* 170* 157*   < > 199*   < > 145* 124* 130*   < > 132*   < > 123*   BUN  --   --   --   --   --  12.7  --  10.7  --   --   --  12.9  --  11.9   CR  --   --   --   --   --  0.62*  --  0.55*  --   --   --  0.62*  --  0.65*   GFRESTIMATED  --   --   --   --   --  >90  --  >90  --   --   --  >90  --  >90   HUGO  --   --   --   --   --  7.9*  --  7.8*  --   --   --  9.6  --  9.9   MAG  --   --   --   --   --  2.0  --  2.2  --   --   --  2.0  --  2.1   PHOS  --   --   --   --   --  3.6  --  4.1  --   --   --  3.0  --  3.8   PROTTOTAL  --   --   --   --   --   --   --  3.8*  --   --   --   --   --  7.4   ALBUMIN  --   --   --   --   --   --   --  2.2*  --   --   --   --   --  4.3   BILITOTAL  --   --   --   --   --   --   --  1.4*  --   --   --   --   --  0.4   ALKPHOS  --   --   --   --   --   --   --  61  --   --   --   --   --  89   AST  --   --   --   --   --   --   --  79*  --   --   --   --   --  24   ALT  --   --   --   --   --   --   --  26  --   --   --   --   --  22    < > = values in this interval not displayed.     CBC:   Recent Labs   Lab 08/16/24  0419 08/15/24  2020 08/15/24  1919 08/15/24  1838 08/15/24  1820 08/15/24  1324 08/15/24  0501   WBC 16.4* 25.6*  --   --  22.5*  --  7.2   RBC 4.77  "4.94  --   --  3.90*  --  5.65   HGB 14.0 14.5 12.8* 13.9 11.6*   < > 16.6   HCT 43.0 44.2  --   --  35.8*  --  50.9   MCV 90 90  --   --  92  --  90   MCH 29.4 29.4  --   --  29.7  --  29.4   MCHC 32.6 32.8  --   --  32.4  --  32.6   RDW 13.0 12.7  --   --  12.7  --  12.5    233  --   --  216  --  273    < > = values in this interval not displayed.       INR:   Recent Labs   Lab 08/15/24  2020 08/15/24  1820 08/14/24  2338   INR 1.46* 1.74* 1.07       Glucose:   Recent Labs   Lab 08/16/24  1305 08/16/24  1238 08/16/24  0949 08/16/24  0812 08/16/24  0616 08/16/24  0419   * 162* 170* 157* 175* 199*       Blood Gas:   Recent Labs   Lab 08/16/24  1301 08/16/24  1005 08/16/24  0804 08/16/24  0419   PHV  --  7.36 7.39 7.37   PCO2V  --  48 45 45   PO2V  --  44 38 38   HCO3V  --  27 27 26   EUGENIE  --  0.9 1.6 0.6   O2PER 60 60 60 65       Culture Data No results for input(s): \"CULT\" in the last 168 hours.    Virology Data: No results found for: \"INFLUA\", \"FLUAH1\", \"FLUAH3\", \"DH1158\", \"IFLUB\", \"RSVA\", \"RSVB\", \"PIV1\", \"PIV2\", \"PIV3\", \"HMPV\", \"HRVS\", \"ADVBE\", \"ADVC\"    Historical CMV results (last 3 of prior testing):  No results found for: \"CMVQNT\"  No results found for: \"CMVLOG\"    Urine Studies    Recent Labs   Lab Test 08/15/24  0017 06/18/24  1216   URINEPH 5.0 5.5   NITRITE Negative Negative   LEUKEST Negative Negative   WBCU 1  --        Most Recent [place] Pulmonary Function Testing (FVC/FEV1 only)  No results found for: \"20002\"  No results found for: \"20003\"  No results found for: \"20015\"  No results found for: \"20016\"    IMAGING    Recent Results (from the past 48 hour(s))   XR Chest 2 Views    Narrative    EXAM: XR CHEST 2 VIEWS  8/14/2024 11:47 PM      HISTORY: pre op lung transplant    COMPARISON: 6/18/2024    FINDINGS: Two views of the chest. Trachea is midline. Stable cardiac  silhouette. Low lung volumes with unchanged reticular fibrotic  opacities. Chronic blunting of the costophrenic sulci. " No  pneumothorax.      Impression    IMPRESSION: Sequelae of interstitial lung disease with chronic  fibrotic opacities of the lungs. No appreciable acute airspace disease  identified.    I have personally reviewed the examination and initial interpretation  and I agree with the findings.    FADY MCDONNELL MD         SYSTEM ID:  [id number]

## 2024-08-15 NOTE — ANESTHESIA PREPROCEDURE EVALUATION
Anesthesia Pre-Procedure Evaluation    Patient: Travon Cartwright   MRN: [id number] : 1965        Procedure : Procedure(s):  Transplant lung recipient single x2          Past Medical History:   Diagnosis Date    Hepatic steatosis 2017    Noted on ultrasound    Tremor 2024      Past Surgical History:   Procedure Laterality Date    COLONOSCOPY      CV CORONARY ANGIOGRAM N/A 2024    Procedure: Coronary Angiogram;  Surgeon: Gabriel Julian MD;  [facility]: [facility]    CV RIGHT HEART CATH MEASUREMENTS RECORDED N/A 2024    Procedure: Right Heart Catheterization;  Surgeon: Gabriel Julian MD;  [facility]      No Known Allergies   Social History     Tobacco Use    Smoking status: Former     Types: Cigarettes    Smokeless tobacco: Never   Substance Use Topics    Alcohol use: Not Currently     Comment: not since       Wt Readings from Last 1 Encounters:   24 71.7 kg (158 lb)        Anesthesia Evaluation   Pt has had prior anesthetic. Type: MAC.    No history of anesthetic complications       ROS/MED HX  ENT/Pulmonary: Comment:   ILD, Emphysema   6L O2 at baseline  Spirometry shows an FEV1 of 1.67 which is 46% predicted an FVC of 1.92 which  is 41% predicted for an FEV1/FVC ratio of 87%.  Lung volumes show an RV of  1.15 which is 50% predicted and a TLC of 3.17 which is 45% predicted for an  RV/TLC ratio of 36%.  DLCO is 20% predicted and when corrected for alveolar  volume is 45% predicted.        (+)                tobacco use (15-pack-year history of smoking, quit in 2019), Past use,                       Neurologic:  - neg neurologic ROS     Cardiovascular:     (+) Dyslipidemia hypertension- -  CAD -  - -                                 Previous cardiac testing   Echo: Date: 2024 Results:  Interpretation Summary  Left ventricular size, wall motion and function are normal. The ejection fraction is 60-65%.  Right ventricular function, chamber  size, wall motion, and thickness are normal.  The atrial septum is intact as assessed by color Doppler and agitated saline bubble study .  The inferior vena cava was normal in size with preserved respiratory variability. No pericardial effusion is present.     There is no prior study for direct comparison.  ___________________________________________________________________    Left Ventricle  Left ventricular size, wall motion and function are normal. The ejection fraction is 60-65%. Grade I or early diastolic dysfunction. No regional wall motion abnormalities are seen.     Right Ventricle  Right ventricular function, chamber size, wall motion, and thickness are normal.     Atria  Both atria appear normal. The atrial septum is intact as assessed by color Doppler and agitated saline bubble study .     Mitral Valve  The mitral valve is normal. Trace mitral insufficiency is present.     Aortic Valve  Aortic valve is normal in structure and function. The aortic valve is tricuspid. No aortic regurgitation is present.     Tricuspid Valve  The tricuspid valve is normal. Trace tricuspid insufficiency is present. The peak velocity of the tricuspid regurgitant jet is not obtainable.     Pulmonic Valve  The pulmonic valve is normal.     Vessels  The aorta root is normal. The inferior vena cava was normal in size with preserved respiratory variability.     Pericardium  No pericardial effusion is present.  Stress Test:  Date: Results:    ECG Reviewed:  Date: 8/14/2024 Results:  Sinus tachycardia   Possible Left atrial enlargement   Left ventricular hypertrophy ( Romhilt-Alaniz )     Cath:  Date: 6/21/2024 Results:  RIGHT HEART CATHETERIZATION:  BSA 1.89 m2  /73/90 mmHg  RA mean of 2 mmHg and V wave of 3 mmhg  RV 44/2 mmHg  PA 45/15/26 mmHg  PCWP 6 mmHg with V wave of 6 mmHg  TD CO/CI 4.9/2.6   PVR 4 BENITES  SVR 1408 dynes/sec/cm-5  PA sat 74%   Hgb 16.2 g/dL   Right sided filling pressures are normal. Left sided filling  pressures are normal. Mild elevated pulmonary hypertension. Normal cardiac output level.     LEFT HEART CATHETERIZATION  Non-obstructive CAD.  Prox LAD lesion is 45% stenosed.      METS/Exercise Tolerance:     Hematologic:       Musculoskeletal:       GI/Hepatic:     (+)             liver disease,       Renal/Genitourinary:       Endo:     (+)  type II DM, Last HgA1c: 8.4, date: 6/18/2024, Using insulin, - not using insulin pump.                Psychiatric/Substance Use:       Infectious Disease:       Malignancy:       Other:            Physical Exam    Airway        Mallampati: I   TM distance: > 3 FB   Neck ROM: full   Mouth opening: > 3 cm    Respiratory Devices and Support         Dental     Comment: Patient reports no loose or chipped teeth        Cardiovascular          Rhythm and rate: regular and normal     Pulmonary           breath sounds clear to auscultation           OUTSIDE LABS:  CBC:   Lab Results   Component Value Date    WBC 7.2 08/15/2024    WBC 9.3 08/14/2024    HGB 16.6 08/15/2024    HGB 17.1 08/14/2024    HCT 50.9 08/15/2024    HCT 51.8 08/14/2024     08/15/2024     08/14/2024     BMP:   Lab Results   Component Value Date     08/15/2024     08/14/2024    POTASSIUM 4.5 08/15/2024    POTASSIUM 3.7 08/14/2024    CHLORIDE 106 08/15/2024    CHLORIDE 104 08/14/2024    CO2 22 08/15/2024    CO2 23 08/14/2024    BUN 12.9 08/15/2024    BUN 11.9 08/14/2024    CR 0.62 (L) 08/15/2024    CR 0.65 (L) 08/14/2024     (H) 08/15/2024     (H) 08/15/2024     COAGS:   Lab Results   Component Value Date    PTT 32 08/14/2024    INR 1.07 08/14/2024     HEPATIC:   Lab Results   Component Value Date    ALBUMIN 4.3 08/14/2024    PROTTOTAL 7.4 08/14/2024    ALT 22 08/14/2024    AST 24 08/14/2024    ALKPHOS 89 08/14/2024    BILITOTAL 0.4 08/14/2024     OTHER:   Lab Results   Component Value Date    A1C 8.4 (H) 06/18/2024    HUGO 9.6 08/15/2024    PHOS 3.0 08/15/2024    MAG 2.0  08/15/2024    AMYLASE 60 06/18/2024    TSH 1.17 06/18/2024       Anesthesia Plan    ASA Status:  4    NPO Status:  NPO Appropriate    Anesthesia Type: General.     - Airway: ETT      Maintenance: Balanced.   Techniques and Equipment:     - Lines/Monitors: CVP, Central Line, Arterial Line, BIS, NIRS, PAC     - Blood: Blood in Room, Cell Saver, PRBC, FFP, PLT, Cryo, T&C     - Drips/Meds: Norepi, Vasopressin, Epinephrine     Consents          - Extended Intubation/Ventilatory Support Discussed: Yes.      - Patient is DNR/DNI Status: No     Use of blood products discussed: Yes.     - Discussed with: Patient.     - Consented: consented to blood products     Postoperative Care    Pain management: IV analgesics, Multi-modal analgesia, Oral pain medications.        Comments:    Other Comments: Dental documentation is based on patient self-reporting for any loose or chipped teeth. Any obvious visual dental abnormalities seen in the airway exam is also documented.    Risks of anesthesia was discussed with the patient, that include risk of sore throat and hoarse voice that should be temporary on the order of days, risk of oral mucosa injury (eg. lip and tongue) , and a very rare risk of dental injury requiring repair.    Additional risks of anesthesia was discussed with the patient, including heart attack, stroke, blood clots, respiratory issues, and cardiac arrest.    Risk of invasive lines were discussed, including, bleeding, bruising, soft tissue injury including vascular and nerve, and thrombus. I additionally discussed that the central line is associated with a small risk of pneumothorax.     Risks of positional injury was discussed, including nerve compression and stretching leading to numbness, paresthesias, and even motor weakness that is rarely long-term and that effort would be taken to minimize this from occurring.     Patient was given opportunity to ask questions and express concerns, which were then all  addressed.             Joanne Rosales MD    I have reviewed the pertinent notes and labs in the chart from the past 30 days and (re)examined the patient.  Any updates or changes from those notes are reflected in this note.              # DMII: A1C = 8.4 % (Ref range: <5.7 %) within past 6 months

## 2024-08-15 NOTE — ANESTHESIA PROCEDURE NOTES
Central Line/PA Catheter Placement    Pre-Procedure   Staff -        Performed By: fellow       Location: OR       Pre-Anesthestic Checklist: patient identified, IV checked, site marked, risks and benefits discussed, informed consent, monitors and equipment checked, pre-op evaluation and at physician/surgeon's request  Timeout:       Correct Patient: Yes        Correct Procedure: Yes        Correct Site: Yes        Correct Position: Yes        Correct Laterality: Yes   Line Placement:   This line was placed Post Induction    Procedure   Procedure: central line       Laterality: right       Insertion Site: internal jugular.       Patient Position: Trendelenburg  Sterile Prep        All elements of maximal sterile barrier technique followed       Patient Prep/Sterile Barriers: draped, hand hygiene, gloves , hat , mask , draped, gown, sterile gel and probe cover  Insertion/Injection        Technique: ultrasound guided and Seldinger Technique        1. Ultrasound was used to evaluate the access site.       2. Vein evaluated via ultrasound for patency/adequacy.       3. Using real-time ultrasound the needle/catheter was observed entering the artery/vein.       Introducer Type: 9 Fr, 2-lumen MAC        Type: PA/CVC with Introducer       PA Catheter Type: CCO         Appropriate RV, RA and PA waveforms noted:  Yes            Balloon down at end of the procedure:   Narrative         Secured by: suture       Tegaderm and Biopatch dressing used.       Complications: None apparent,        blood aspirated from all lumens,        All lumens flushed: Yes       Verification method: Ultrasound       Tip termination: right atrium   Comments:  PA catheter was placed in the right atrium per request of surgeon. Will plan to advance after procedure.

## 2024-08-16 ENCOUNTER — APPOINTMENT (OUTPATIENT)
Dept: GENERAL RADIOLOGY | Facility: CLINIC | Age: 59
End: 2024-08-16
Attending: STUDENT IN AN ORGANIZED HEALTH CARE EDUCATION/TRAINING PROGRAM
Payer: COMMERCIAL

## 2024-08-16 ENCOUNTER — APPOINTMENT (OUTPATIENT)
Dept: GENERAL RADIOLOGY | Facility: CLINIC | Age: 59
End: 2024-08-16
Attending: SURGERY
Payer: COMMERCIAL

## 2024-08-16 PROBLEM — R07.9 CHEST PAIN, UNSPECIFIED: Status: RESOLVED | Noted: 2024-06-21 | Resolved: 2024-08-16

## 2024-08-16 PROBLEM — Z01.818 ENCOUNTER FOR PRE-TRANSPLANT EVALUATION FOR LUNG TRANSPLANT: Status: RESOLVED | Noted: 2024-06-21 | Resolved: 2024-08-16

## 2024-08-16 PROBLEM — Z94.2 S/P LUNG TRANSPLANT (H): Status: ACTIVE | Noted: 2024-08-15

## 2024-08-16 PROBLEM — Z76.82 ORGAN TRANSPLANT CANDIDATE: Status: RESOLVED | Noted: 2024-06-21 | Resolved: 2024-08-16

## 2024-08-16 PROBLEM — Z76.82 LUNG TRANSPLANT PLANNED: Status: RESOLVED | Noted: 2024-08-14 | Resolved: 2024-08-16

## 2024-08-16 LAB
ALLEN'S TEST: ABNORMAL
ANION GAP SERPL CALCULATED.3IONS-SCNC: 9 MMOL/L (ref 7–15)
BACTERIA SPEC CULT: NORMAL
BACTERIA SPEC CULT: NORMAL
BASE EXCESS BLDA CALC-SCNC: -0.2 MMOL/L (ref -3–3)
BASE EXCESS BLDA CALC-SCNC: -0.8 MMOL/L (ref -3–3)
BASE EXCESS BLDA CALC-SCNC: -1.6 MMOL/L (ref -3–3)
BASE EXCESS BLDA CALC-SCNC: 2.4 MMOL/L (ref -3–3)
BASE EXCESS BLDA CALC-SCNC: 3.3 MMOL/L (ref -3–3)
BASE EXCESS BLDA CALC-SCNC: 4.1 MMOL/L (ref -3–3)
BASE EXCESS BLDV CALC-SCNC: -0.8 MMOL/L (ref -3–3)
BASE EXCESS BLDV CALC-SCNC: 0.6 MMOL/L (ref -3–3)
BASE EXCESS BLDV CALC-SCNC: 0.9 MMOL/L (ref -3–3)
BASE EXCESS BLDV CALC-SCNC: 1.6 MMOL/L (ref -3–3)
BASOPHILS # BLD AUTO: 0 10E3/UL (ref 0–0.2)
BASOPHILS NFR BLD AUTO: 0 %
BUN SERPL-MCNC: 12.7 MG/DL (ref 8–23)
CA-I BLD-MCNC: 4.6 MG/DL (ref 4.4–5.2)
CALCIUM SERPL-MCNC: 7.9 MG/DL (ref 8.8–10.4)
CHLORIDE SERPL-SCNC: 105 MMOL/L (ref 98–107)
COHGB MFR BLD: 98.4 % (ref 96–97)
COHGB MFR BLD: 98.8 % (ref 96–97)
COHGB MFR BLD: 99.6 % (ref 96–97)
COHGB MFR BLD: 99.6 % (ref 96–97)
COHGB MFR BLD: 99.8 % (ref 96–97)
COHGB MFR BLD: 99.9 % (ref 96–97)
CREAT SERPL-MCNC: 0.62 MG/DL (ref 0.67–1.17)
DONOR IDENTIFICATION: NORMAL
DSA COMMENTS: NORMAL
DSA PRESENT: NO
DSA TEST METHOD: NORMAL
EGFRCR SERPLBLD CKD-EPI 2021: >90 ML/MIN/1.73M2
EOSINOPHIL # BLD AUTO: 0 10E3/UL (ref 0–0.7)
EOSINOPHIL NFR BLD AUTO: 0 %
ERYTHROCYTE [DISTWIDTH] IN BLOOD BY AUTOMATED COUNT: 13 % (ref 10–15)
GLUCOSE BLDC GLUCOMTR-MCNC: 153 MG/DL (ref 70–99)
GLUCOSE BLDC GLUCOMTR-MCNC: 157 MG/DL (ref 70–99)
GLUCOSE BLDC GLUCOMTR-MCNC: 160 MG/DL (ref 70–99)
GLUCOSE BLDC GLUCOMTR-MCNC: 161 MG/DL (ref 70–99)
GLUCOSE BLDC GLUCOMTR-MCNC: 162 MG/DL (ref 70–99)
GLUCOSE BLDC GLUCOMTR-MCNC: 169 MG/DL (ref 70–99)
GLUCOSE BLDC GLUCOMTR-MCNC: 170 MG/DL (ref 70–99)
GLUCOSE BLDC GLUCOMTR-MCNC: 172 MG/DL (ref 70–99)
GLUCOSE BLDC GLUCOMTR-MCNC: 172 MG/DL (ref 70–99)
GLUCOSE BLDC GLUCOMTR-MCNC: 173 MG/DL (ref 70–99)
GLUCOSE BLDC GLUCOMTR-MCNC: 174 MG/DL (ref 70–99)
GLUCOSE BLDC GLUCOMTR-MCNC: 175 MG/DL (ref 70–99)
GLUCOSE BLDC GLUCOMTR-MCNC: 180 MG/DL (ref 70–99)
GLUCOSE BLDC GLUCOMTR-MCNC: 183 MG/DL (ref 70–99)
GLUCOSE SERPL-MCNC: 199 MG/DL (ref 70–99)
GRAM STAIN RESULT: NORMAL
GRAM STAIN RESULT: NORMAL
HBV DNA SERPL NAA+PROBE-ACNC: NOT DETECTED IU/ML
HBV DNA SERPL QL NAA+PROBE: NORMAL
HCO3 BLD-SCNC: 23 MMOL/L (ref 21–28)
HCO3 BLD-SCNC: 24 MMOL/L (ref 21–28)
HCO3 BLD-SCNC: 25 MMOL/L (ref 21–28)
HCO3 BLD-SCNC: 27 MMOL/L (ref 21–28)
HCO3 BLD-SCNC: 28 MMOL/L (ref 21–28)
HCO3 BLD-SCNC: 29 MMOL/L (ref 21–28)
HCO3 BLDV-SCNC: 25 MMOL/L (ref 21–28)
HCO3 BLDV-SCNC: 26 MMOL/L (ref 21–28)
HCO3 BLDV-SCNC: 27 MMOL/L (ref 21–28)
HCO3 BLDV-SCNC: 27 MMOL/L (ref 21–28)
HCO3 SERPL-SCNC: 24 MMOL/L (ref 22–29)
HCT VFR BLD AUTO: 43 % (ref 40–53)
HCV RNA SERPL QL NAA+PROBE: NORMAL
HGB BLD-MCNC: 14 G/DL (ref 13.3–17.7)
HIV1+2 RNA SERPL QL NAA+PROBE: NORMAL
IGG SERPL-MCNC: 973 MG/DL (ref 610–1616)
IMM GRANULOCYTES # BLD: 0.1 10E3/UL
IMM GRANULOCYTES NFR BLD: 1 %
KOH PREPARATION: NORMAL
KOH PREPARATION: NORMAL
LACTATE SERPL-SCNC: 2.4 MMOL/L (ref 0.7–2)
LACTATE SERPL-SCNC: 2.7 MMOL/L (ref 0.7–2)
LACTATE SERPL-SCNC: 3.5 MMOL/L (ref 0.7–2)
LACTATE SERPL-SCNC: 3.9 MMOL/L (ref 0.7–2)
LACTATE SERPL-SCNC: 3.9 MMOL/L (ref 0.7–2)
LACTATE SERPL-SCNC: 4 MMOL/L (ref 0.7–2)
LACTATE SERPL-SCNC: 4.1 MMOL/L (ref 0.7–2)
LACTATE SERPL-SCNC: 4.3 MMOL/L (ref 0.7–2)
LACTATE SERPL-SCNC: 4.4 MMOL/L (ref 0.7–2)
LACTATE SERPL-SCNC: 4.4 MMOL/L (ref 0.7–2)
LACTATE SERPL-SCNC: 4.8 MMOL/L (ref 0.7–2)
LYMPHOCYTES # BLD AUTO: 1 10E3/UL (ref 0.8–5.3)
LYMPHOCYTES NFR BLD AUTO: 6 %
Lab: NORMAL
MAGNESIUM SERPL-MCNC: 2 MG/DL (ref 1.7–2.3)
MCH RBC QN AUTO: 29.4 PG (ref 26.5–33)
MCHC RBC AUTO-ENTMCNC: 32.6 G/DL (ref 31.5–36.5)
MCV RBC AUTO: 90 FL (ref 78–100)
MONOCYTES # BLD AUTO: 1.1 10E3/UL (ref 0–1.3)
MONOCYTES NFR BLD AUTO: 7 %
NEUTROPHILS # BLD AUTO: 14.2 10E3/UL (ref 1.6–8.3)
NEUTROPHILS NFR BLD AUTO: 86 %
NRBC # BLD AUTO: 0 10E3/UL
NRBC BLD AUTO-RTO: 0 /100
O2/TOTAL GAS SETTING VFR VENT: 50 %
O2/TOTAL GAS SETTING VFR VENT: 50 %
O2/TOTAL GAS SETTING VFR VENT: 60 %
O2/TOTAL GAS SETTING VFR VENT: 65 %
O2/TOTAL GAS SETTING VFR VENT: 65 %
O2/TOTAL GAS SETTING VFR VENT: 75 %
O2/TOTAL GAS SETTING VFR VENT: 75 %
ORGAN: NORMAL
OXYHGB MFR BLDV: 72 % (ref 70–75)
OXYHGB MFR BLDV: 72 % (ref 70–75)
OXYHGB MFR BLDV: 76 % (ref 70–75)
OXYHGB MFR BLDV: 78 % (ref 70–75)
PCO2 BLD: 38 MM HG (ref 35–45)
PCO2 BLD: 39 MM HG (ref 35–45)
PCO2 BLD: 39 MM HG (ref 35–45)
PCO2 BLD: 41 MM HG (ref 35–45)
PCO2 BLD: 42 MM HG (ref 35–45)
PCO2 BLD: 42 MM HG (ref 35–45)
PCO2 BLDV: 44 MM HG (ref 40–50)
PCO2 BLDV: 45 MM HG (ref 40–50)
PCO2 BLDV: 45 MM HG (ref 40–50)
PCO2 BLDV: 48 MM HG (ref 40–50)
PEEP: 8 CM H2O
PERFORMING LABORATORY: NORMAL
PH BLD: 7.39 [PH] (ref 7.35–7.45)
PH BLD: 7.4 [PH] (ref 7.35–7.45)
PH BLD: 7.4 [PH] (ref 7.35–7.45)
PH BLD: 7.44 [PH] (ref 7.35–7.45)
PH BLD: 7.44 [PH] (ref 7.35–7.45)
PH BLD: 7.45 [PH] (ref 7.35–7.45)
PH BLDV: 7.36 [PH] (ref 7.32–7.43)
PH BLDV: 7.36 [PH] (ref 7.32–7.43)
PH BLDV: 7.37 [PH] (ref 7.32–7.43)
PH BLDV: 7.39 [PH] (ref 7.32–7.43)
PHOSPHATE SERPL-MCNC: 3.6 MG/DL (ref 2.5–4.5)
PLATELET # BLD AUTO: 182 10E3/UL (ref 150–450)
PO2 BLD: 109 MM HG (ref 80–105)
PO2 BLD: 113 MM HG (ref 80–105)
PO2 BLD: 118 MM HG (ref 80–105)
PO2 BLD: 120 MM HG (ref 80–105)
PO2 BLD: 144 MM HG (ref 80–105)
PO2 BLD: 149 MM HG (ref 80–105)
PO2 BLDV: 38 MM HG (ref 25–47)
PO2 BLDV: 38 MM HG (ref 25–47)
PO2 BLDV: 43 MM HG (ref 25–47)
PO2 BLDV: 44 MM HG (ref 25–47)
POTASSIUM SERPL-SCNC: 3.8 MMOL/L (ref 3.4–5.3)
RBC # BLD AUTO: 4.77 10E6/UL (ref 4.4–5.9)
SA 1  COMMENTS: NORMAL
SA 1 CELL: NORMAL
SA 1 TEST METHOD: NORMAL
SA 2 CELL: NORMAL
SA 2 COMMENTS: NORMAL
SA 2 TEST METHOD: NORMAL
SA1 HI RISK ABY: NORMAL
SA1 MOD RISK ABY: NORMAL
SA2 HI RISK ABY: NORMAL
SA2 MOD RISK ABY: NORMAL
SAO2 % BLDA: 96 % (ref 92–100)
SAO2 % BLDA: 98 % (ref 92–100)
SAO2 % BLDV: 72.9 % (ref 70–75)
SAO2 % BLDV: 73 % (ref 70–75)
SAO2 % BLDV: 77.9 % (ref 70–75)
SAO2 % BLDV: 79.5 % (ref 70–75)
SODIUM SERPL-SCNC: 138 MMOL/L (ref 135–145)
SPECIMEN STATUS: NORMAL
TACROLIMUS BLD-MCNC: 2.8 UG/L (ref 5–15)
TEST NAME: NORMAL
TME LAST DOSE: ABNORMAL H
TME LAST DOSE: ABNORMAL H
UNACCEPTABLE ANTIGENS: NORMAL
UNOS CPRA: 0
WBC # BLD AUTO: 16.4 10E3/UL (ref 4–11)

## 2024-08-16 PROCEDURE — 85025 COMPLETE CBC W/AUTO DIFF WBC: CPT | Performed by: SURGERY

## 2024-08-16 PROCEDURE — 0B958ZZ DRAINAGE OF RIGHT MIDDLE LOBE BRONCHUS, VIA NATURAL OR ARTIFICIAL OPENING ENDOSCOPIC: ICD-10-PCS | Performed by: INTERNAL MEDICINE

## 2024-08-16 PROCEDURE — 250N000011 HC RX IP 250 OP 636: Performed by: STUDENT IN AN ORGANIZED HEALTH CARE EDUCATION/TRAINING PROGRAM

## 2024-08-16 PROCEDURE — 71045 X-RAY EXAM CHEST 1 VIEW: CPT | Mod: 26 | Performed by: RADIOLOGY

## 2024-08-16 PROCEDURE — 31624 DX BRONCHOSCOPE/LAVAGE: CPT

## 2024-08-16 PROCEDURE — 99291 CRITICAL CARE FIRST HOUR: CPT | Mod: 24 | Performed by: PHYSICIAN ASSISTANT

## 2024-08-16 PROCEDURE — 83605 ASSAY OF LACTIC ACID: CPT

## 2024-08-16 PROCEDURE — 0B9B8ZZ DRAINAGE OF LEFT LOWER LOBE BRONCHUS, VIA NATURAL OR ARTIFICIAL OPENING ENDOSCOPIC: ICD-10-PCS | Performed by: INTERNAL MEDICINE

## 2024-08-16 PROCEDURE — 250N000013 HC RX MED GY IP 250 OP 250 PS 637: Performed by: SURGERY

## 2024-08-16 PROCEDURE — 87305 ASPERGILLUS AG IA: CPT | Performed by: STUDENT IN AN ORGANIZED HEALTH CARE EDUCATION/TRAINING PROGRAM

## 2024-08-16 PROCEDURE — 94640 AIRWAY INHALATION TREATMENT: CPT

## 2024-08-16 PROCEDURE — 80048 BASIC METABOLIC PNL TOTAL CA: CPT

## 2024-08-16 PROCEDURE — 87070 CULTURE OTHR SPECIMN AEROBIC: CPT | Performed by: STUDENT IN AN ORGANIZED HEALTH CARE EDUCATION/TRAINING PROGRAM

## 2024-08-16 PROCEDURE — 250N000012 HC RX MED GY IP 250 OP 636 PS 637: Performed by: SURGERY

## 2024-08-16 PROCEDURE — 82805 BLOOD GASES W/O2 SATURATION: CPT

## 2024-08-16 PROCEDURE — 250N000011 HC RX IP 250 OP 636: Performed by: SURGERY

## 2024-08-16 PROCEDURE — 250N000009 HC RX 250: Performed by: SURGERY

## 2024-08-16 PROCEDURE — 250N000009 HC RX 250

## 2024-08-16 PROCEDURE — 87210 SMEAR WET MOUNT SALINE/INK: CPT | Performed by: STUDENT IN AN ORGANIZED HEALTH CARE EDUCATION/TRAINING PROGRAM

## 2024-08-16 PROCEDURE — 82330 ASSAY OF CALCIUM: CPT

## 2024-08-16 PROCEDURE — 258N000003 HC RX IP 258 OP 636: Performed by: STUDENT IN AN ORGANIZED HEALTH CARE EDUCATION/TRAINING PROGRAM

## 2024-08-16 PROCEDURE — 87205 SMEAR GRAM STAIN: CPT | Performed by: STUDENT IN AN ORGANIZED HEALTH CARE EDUCATION/TRAINING PROGRAM

## 2024-08-16 PROCEDURE — 94668 MNPJ CHEST WALL SBSQ: CPT

## 2024-08-16 PROCEDURE — 87798 DETECT AGENT NOS DNA AMP: CPT | Performed by: SURGERY

## 2024-08-16 PROCEDURE — 44500 INTRO GASTROINTESTINAL TUBE: CPT | Performed by: DIETITIAN, REGISTERED

## 2024-08-16 PROCEDURE — 87102 FUNGUS ISOLATION CULTURE: CPT | Performed by: STUDENT IN AN ORGANIZED HEALTH CARE EDUCATION/TRAINING PROGRAM

## 2024-08-16 PROCEDURE — 250N000011 HC RX IP 250 OP 636

## 2024-08-16 PROCEDURE — 31645 BRNCHSC W/THER ASPIR 1ST: CPT | Mod: GC | Performed by: INTERNAL MEDICINE

## 2024-08-16 PROCEDURE — 80197 ASSAY OF TACROLIMUS: CPT | Performed by: SURGERY

## 2024-08-16 PROCEDURE — 250N000009 HC RX 250: Performed by: STUDENT IN AN ORGANIZED HEALTH CARE EDUCATION/TRAINING PROGRAM

## 2024-08-16 PROCEDURE — 71045 X-RAY EXAM CHEST 1 VIEW: CPT

## 2024-08-16 PROCEDURE — 999N000157 HC STATISTIC RCP TIME EA 10 MIN

## 2024-08-16 PROCEDURE — 87563 M. GENITALIUM AMP PROBE: CPT | Performed by: STUDENT IN AN ORGANIZED HEALTH CARE EDUCATION/TRAINING PROGRAM

## 2024-08-16 PROCEDURE — 250N000013 HC RX MED GY IP 250 OP 250 PS 637

## 2024-08-16 PROCEDURE — 250N000013 HC RX MED GY IP 250 OP 250 PS 637: Performed by: STUDENT IN AN ORGANIZED HEALTH CARE EDUCATION/TRAINING PROGRAM

## 2024-08-16 PROCEDURE — 83735 ASSAY OF MAGNESIUM: CPT | Performed by: SURGERY

## 2024-08-16 PROCEDURE — 200N000002 HC R&B ICU UMMC

## 2024-08-16 PROCEDURE — 84100 ASSAY OF PHOSPHORUS: CPT | Performed by: SURGERY

## 2024-08-16 PROCEDURE — 94667 MNPJ CHEST WALL 1ST: CPT

## 2024-08-16 PROCEDURE — 94799 UNLISTED PULMONARY SVC/PX: CPT

## 2024-08-16 PROCEDURE — 94003 VENT MGMT INPAT SUBQ DAY: CPT

## 2024-08-16 PROCEDURE — 74018 RADEX ABDOMEN 1 VIEW: CPT | Mod: 26 | Performed by: STUDENT IN AN ORGANIZED HEALTH CARE EDUCATION/TRAINING PROGRAM

## 2024-08-16 PROCEDURE — 87206 SMEAR FLUORESCENT/ACID STAI: CPT | Performed by: STUDENT IN AN ORGANIZED HEALTH CARE EDUCATION/TRAINING PROGRAM

## 2024-08-16 PROCEDURE — 83605 ASSAY OF LACTIC ACID: CPT | Performed by: PHYSICIAN ASSISTANT

## 2024-08-16 PROCEDURE — 0B968ZZ DRAINAGE OF RIGHT LOWER LOBE BRONCHUS, VIA NATURAL OR ARTIFICIAL OPENING ENDOSCOPIC: ICD-10-PCS | Performed by: INTERNAL MEDICINE

## 2024-08-16 PROCEDURE — 258N000003 HC RX IP 258 OP 636: Performed by: SURGERY

## 2024-08-16 PROCEDURE — 94640 AIRWAY INHALATION TREATMENT: CPT | Mod: 76

## 2024-08-16 PROCEDURE — 74018 RADEX ABDOMEN 1 VIEW: CPT

## 2024-08-16 RX ORDER — DEXMEDETOMIDINE HYDROCHLORIDE 4 UG/ML
.1-1.2 INJECTION, SOLUTION INTRAVENOUS CONTINUOUS
Status: DISCONTINUED | OUTPATIENT
Start: 2024-08-16 | End: 2024-08-19

## 2024-08-16 RX ORDER — SULFAMETHOXAZOLE AND TRIMETHOPRIM 400; 80 MG/1; MG/1
1 TABLET ORAL DAILY
Status: DISCONTINUED | OUTPATIENT
Start: 2024-08-16 | End: 2024-08-28 | Stop reason: HOSPADM

## 2024-08-16 RX ORDER — SULFAMETHOXAZOLE AND TRIMETHOPRIM 200; 40 MG/5ML; MG/5ML
10 SUSPENSION ORAL DAILY
Status: DISCONTINUED | OUTPATIENT
Start: 2024-08-16 | End: 2024-08-25

## 2024-08-16 RX ORDER — AMINO ACIDS/PROTEIN HYDROLYS 11G-40/45
1 LIQUID IN PACKET (ML) ORAL 2 TIMES DAILY
Status: DISCONTINUED | OUTPATIENT
Start: 2024-08-16 | End: 2024-08-26

## 2024-08-16 RX ORDER — LIDOCAINE HYDROCHLORIDE 20 MG/ML
5 SOLUTION OROPHARYNGEAL ONCE
Status: COMPLETED | OUTPATIENT
Start: 2024-08-16 | End: 2024-08-16

## 2024-08-16 RX ORDER — METOCLOPRAMIDE HYDROCHLORIDE 5 MG/ML
10 INJECTION INTRAMUSCULAR; INTRAVENOUS
Status: ACTIVE | OUTPATIENT
Start: 2024-08-16 | End: 2024-08-17

## 2024-08-16 RX ORDER — VALGANCICLOVIR HYDROCHLORIDE 50 MG/ML
900 POWDER, FOR SOLUTION ORAL DAILY
Status: DISCONTINUED | OUTPATIENT
Start: 2024-08-23 | End: 2024-08-25

## 2024-08-16 RX ORDER — CALCIUM CARBONATE/VITAMIN D3 600 MG-10
1 TABLET ORAL 2 TIMES DAILY
Status: DISCONTINUED | OUTPATIENT
Start: 2024-08-23 | End: 2024-08-28 | Stop reason: HOSPADM

## 2024-08-16 RX ORDER — GUAIFENESIN 600 MG/1
15 TABLET, EXTENDED RELEASE ORAL DAILY
Status: DISCONTINUED | OUTPATIENT
Start: 2024-08-16 | End: 2024-08-25

## 2024-08-16 RX ORDER — HEPARIN SODIUM 5000 [USP'U]/.5ML
5000 INJECTION, SOLUTION INTRAVENOUS; SUBCUTANEOUS EVERY 8 HOURS
Status: DISCONTINUED | OUTPATIENT
Start: 2024-08-17 | End: 2024-08-17

## 2024-08-16 RX ORDER — DEXTROSE MONOHYDRATE 100 MG/ML
INJECTION, SOLUTION INTRAVENOUS CONTINUOUS PRN
Status: DISCONTINUED | OUTPATIENT
Start: 2024-08-16 | End: 2024-08-28 | Stop reason: HOSPADM

## 2024-08-16 RX ADMIN — ACETYLCYSTEINE 2 ML: 200 SOLUTION ORAL; RESPIRATORY (INHALATION) at 00:57

## 2024-08-16 RX ADMIN — ACETAMINOPHEN 975 MG: 325 TABLET ORAL at 15:12

## 2024-08-16 RX ADMIN — ACETAMINOPHEN 975 MG: 325 TABLET ORAL at 08:24

## 2024-08-16 RX ADMIN — Medication 10 MG: at 19:49

## 2024-08-16 RX ADMIN — ATORVASTATIN CALCIUM 20 MG: 20 TABLET, FILM COATED ORAL at 09:38

## 2024-08-16 RX ADMIN — SODIUM CHLORIDE, POTASSIUM CHLORIDE, SODIUM LACTATE AND CALCIUM CHLORIDE 500 ML: 600; 310; 30; 20 INJECTION, SOLUTION INTRAVENOUS at 00:43

## 2024-08-16 RX ADMIN — SENNOSIDES AND DOCUSATE SODIUM 1 TABLET: 8.6; 5 TABLET ORAL at 19:53

## 2024-08-16 RX ADMIN — ACETAMINOPHEN 975 MG: 325 TABLET ORAL at 23:07

## 2024-08-16 RX ADMIN — PROPOFOL 30 MCG/KG/MIN: 10 INJECTION, EMULSION INTRAVENOUS at 06:58

## 2024-08-16 RX ADMIN — CEFTAZIDIME 2 G: 2 INJECTION, POWDER, FOR SOLUTION INTRAVENOUS at 09:37

## 2024-08-16 RX ADMIN — CEFTAZIDIME 2 G: 2 INJECTION, POWDER, FOR SOLUTION INTRAVENOUS at 02:07

## 2024-08-16 RX ADMIN — PROPOFOL 50 MCG/KG/MIN: 10 INJECTION, EMULSION INTRAVENOUS at 12:06

## 2024-08-16 RX ADMIN — NYSTATIN 1000000 UNITS: 100000 SUSPENSION ORAL at 08:25

## 2024-08-16 RX ADMIN — Medication 60 ML: at 19:53

## 2024-08-16 RX ADMIN — PROPOFOL 35 MCG/KG/MIN: 10 INJECTION, EMULSION INTRAVENOUS at 16:26

## 2024-08-16 RX ADMIN — MICAFUNGIN SODIUM 100 MG: 50 INJECTION, POWDER, LYOPHILIZED, FOR SOLUTION INTRAVENOUS at 10:16

## 2024-08-16 RX ADMIN — CEFTAZIDIME 2 G: 2 INJECTION, POWDER, FOR SOLUTION INTRAVENOUS at 19:52

## 2024-08-16 RX ADMIN — PANTOPRAZOLE SODIUM 40 MG: 40 INJECTION, POWDER, FOR SOLUTION INTRAVENOUS at 08:24

## 2024-08-16 RX ADMIN — ACETYLCYSTEINE 2 ML: 200 SOLUTION ORAL; RESPIRATORY (INHALATION) at 11:41

## 2024-08-16 RX ADMIN — CHLORHEXIDINE GLUCONATE 0.12% ORAL RINSE 15 ML: 1.2 LIQUID ORAL at 19:53

## 2024-08-16 RX ADMIN — LEVALBUTEROL HYDROCHLORIDE 1.25 MG: 1.25 SOLUTION RESPIRATORY (INHALATION) at 11:41

## 2024-08-16 RX ADMIN — GABAPENTIN 100 MG: 100 CAPSULE ORAL at 13:55

## 2024-08-16 RX ADMIN — Medication 15 ML: at 12:39

## 2024-08-16 RX ADMIN — ACETYLCYSTEINE 2 ML: 200 SOLUTION ORAL; RESPIRATORY (INHALATION) at 19:49

## 2024-08-16 RX ADMIN — METHYLPREDNISOLONE SODIUM SUCCINATE 125 MG: 125 INJECTION, POWDER, FOR SOLUTION INTRAMUSCULAR; INTRAVENOUS at 05:25

## 2024-08-16 RX ADMIN — POLYETHYLENE GLYCOL 3350 17 G: 17 POWDER, FOR SOLUTION ORAL at 08:24

## 2024-08-16 RX ADMIN — SODIUM CHLORIDE, POTASSIUM CHLORIDE, SODIUM LACTATE AND CALCIUM CHLORIDE 500 ML: 600; 310; 30; 20 INJECTION, SOLUTION INTRAVENOUS at 02:42

## 2024-08-16 RX ADMIN — LEVALBUTEROL HYDROCHLORIDE 1.25 MG: 1.25 SOLUTION RESPIRATORY (INHALATION) at 16:55

## 2024-08-16 RX ADMIN — LEVALBUTEROL HYDROCHLORIDE 1.25 MG: 1.25 SOLUTION RESPIRATORY (INHALATION) at 00:57

## 2024-08-16 RX ADMIN — CHLORHEXIDINE GLUCONATE 0.12% ORAL RINSE 15 ML: 1.2 LIQUID ORAL at 08:24

## 2024-08-16 RX ADMIN — LEVALBUTEROL HYDROCHLORIDE 1.25 MG: 1.25 SOLUTION RESPIRATORY (INHALATION) at 19:49

## 2024-08-16 RX ADMIN — SENNOSIDES AND DOCUSATE SODIUM 1 TABLET: 8.6; 5 TABLET ORAL at 08:24

## 2024-08-16 RX ADMIN — SULFAMETHOXAZOLE AND TRIMETHOPRIM 80 MG: 200; 40 SUSPENSION ORAL at 09:38

## 2024-08-16 RX ADMIN — SODIUM CHLORIDE, POTASSIUM CHLORIDE, SODIUM LACTATE AND CALCIUM CHLORIDE 500 ML: 600; 310; 30; 20 INJECTION, SOLUTION INTRAVENOUS at 06:44

## 2024-08-16 RX ADMIN — ACETYLCYSTEINE 2 ML: 200 SOLUTION ORAL; RESPIRATORY (INHALATION) at 08:24

## 2024-08-16 RX ADMIN — TACROLIMUS 1 MG: 1 CAPSULE ORAL at 19:53

## 2024-08-16 RX ADMIN — NYSTATIN 1000000 UNITS: 100000 SUSPENSION ORAL at 12:39

## 2024-08-16 RX ADMIN — GABAPENTIN 100 MG: 100 CAPSULE ORAL at 19:53

## 2024-08-16 RX ADMIN — MYCOPHENOLATE MOFETIL 1000 MG: 200 POWDER, FOR SUSPENSION ORAL at 19:53

## 2024-08-16 RX ADMIN — MYCOPHENOLATE MOFETIL 1000 MG: 200 POWDER, FOR SUSPENSION ORAL at 08:25

## 2024-08-16 RX ADMIN — VANCOMYCIN HYDROCHLORIDE 1000 MG: 1 INJECTION, SOLUTION INTRAVENOUS at 02:43

## 2024-08-16 RX ADMIN — GABAPENTIN 100 MG: 100 CAPSULE ORAL at 08:27

## 2024-08-16 RX ADMIN — LIDOCAINE HYDROCHLORIDE 5 ML: 20 SOLUTION ORAL at 10:31

## 2024-08-16 RX ADMIN — LEVALBUTEROL HYDROCHLORIDE 1.25 MG: 1.25 SOLUTION RESPIRATORY (INHALATION) at 08:24

## 2024-08-16 RX ADMIN — PROPOFOL 35 MCG/KG/MIN: 10 INJECTION, EMULSION INTRAVENOUS at 23:07

## 2024-08-16 RX ADMIN — INSULIN HUMAN 5 UNITS/HR: 1 INJECTION, SOLUTION INTRAVENOUS at 23:23

## 2024-08-16 RX ADMIN — Medication 3 UNITS/HR: at 01:19

## 2024-08-16 RX ADMIN — METHYLPREDNISOLONE SODIUM SUCCINATE 125 MG: 125 INJECTION, POWDER, FOR SOLUTION INTRAMUSCULAR; INTRAVENOUS at 13:55

## 2024-08-16 RX ADMIN — VANCOMYCIN HYDROCHLORIDE 1000 MG: 1 INJECTION, SOLUTION INTRAVENOUS at 13:55

## 2024-08-16 RX ADMIN — TACROLIMUS 1 MG: 1 CAPSULE ORAL at 08:26

## 2024-08-16 RX ADMIN — ACETYLCYSTEINE 2 ML: 200 SOLUTION ORAL; RESPIRATORY (INHALATION) at 16:55

## 2024-08-16 RX ADMIN — PROPOFOL 35 MCG/KG/MIN: 10 INJECTION, EMULSION INTRAVENOUS at 00:47

## 2024-08-16 ASSESSMENT — ACTIVITIES OF DAILY LIVING (ADL)
ADLS_ACUITY_SCORE: 51

## 2024-08-16 NOTE — PROCEDURES
Small Bowel Feeding Tube Placement Assessment  Reason for Feeding Tube Placement: Enteral access for nutrition and medication administration   Cortrak Start Time: 1030   Cortrak End Time: 1130  Medicine Delivered During Procedure: 2% viscous lidocaine gel  Placement Successful: Yes, presumed post-pyloric per Cortrak pending AXR verification    Procedure Complications: Repeated kinking and coiling of FT, difficulty passing midline. Once past midline, able to maneuver post-pyloric  Final Placement Ziggy at exit of nare 105 cm  Face to Face time with patient: 75 minutes total with set up, clean up, explanation and answering questions from pt's sister present in room    Bridle Placement:   Reason for bridle placement: Securement of nasoenteric FT   Medicine delivered during procedure: lidocaine gel   Procedure: Successful  Location of top of clip on FT: @ 107 cm marker   Condition of nose/skin at time of bridle placement: Unremarkable   Face to Face time with patient: <5 minutes.    Dulce Maria Oconnor RD, LD  Available on Vocera - can search by name or unit Dietitian  **Clinical Nutrition is no longer available via pager

## 2024-08-16 NOTE — PROGRESS NOTES
CLINICAL NUTRITION SERVICES - ASSESSMENT NOTE     Nutrition Prescription    RECOMMENDATIONS FOR MDs/PROVIDERS TO ORDER:  ADAT once extubated    Malnutrition Status:    Moderate malnutrition in the context of chronic illness    Recommendations already ordered by Registered Dietitian (RD):  EN support via NDT (pending AXR verification):   Osmolite 1.5 Aneesh (or equivalent) @ goal of  60ml/hr  (1440ml/day) + 1 pkt ProSource TF20 BID (2 pkts) provides: 2320 kcals (34 kcal/kg), 130 g PRO (1.9 g pro/kg), 1097 ml free H20, 293 g CHO, and 0 g fiber daily.   - Once FT verified post-pyloric and ok to use per CVICU, initiate @ 10 mL/hr and advance by 10 mL q8hr as tolerated to goal rate.   - Do not advance unless K+ >/= 3, Mg++ >/=1.5, and phos >/=1.9  - 30 mL q4hr fluid flushes for tube patency. Additional fluids and/or adjustments per MD.   - Multivitamin/mineral (15 mL/day via FT) to help ensure micronutrient needs being met with suspected hypermetabolic demands and potential interruptions to TF infusions.     Future/Additional Recommendations:  Monitor tolerance and lytes with advancement to goal TF rate     REASON FOR ASSESSMENT  Travon Cartwright is a/an 59 year old male assessed by the dietitian for Provider Order - Registered Dietitian to Assess and Order TF per Medical Nutrition Therapy Protocol + Cortrak FT placement  -Per Pulmonary Transplant team during CVICU rounds - if gastric access, ok for 10-20 mL/hr with no advancement; if post-pyloric access, ok to start at 10 mL/hr and slow advancement to goal rate    NUTRITION HISTORY  Pt recently assessed by outpatient SOT RD on 6/20/2024. Per that RD note:  H/o DM II (x3-4 yrs). Pt checks BG via Dexcom G7.   Trulicity, metformin, jardiance, lantus 30 units/day    Reports new addition of Jardiance has helped reduce BG remarkably. Recently saw Endo 6/7/24.        Lab Results   Component Value Date     A1C 8.4 06/18/2024      - Meal prep & grocery shopping: pt's mom   -  Previous RD education: not asked   - Appetite: fair   - Food allergies/intolerances: no  - Issues chewing or swallowing: no  - N/V/D/C: diarrhea from meds   - Acid reflux/symptoms: no  - Food access concerns: not asked      Vitamins, Supplements, Pertinent Meds: none  Herbal Medicines/Supplements: none   Protein Supplements: 1 protein drink per day- new as of a few weeks ago     Weight hx: down to 165 lbs (prev 180 lbs)- ?stable       Wt Readings from Last 10 Encounters:   06/07/24 77.2 kg (170 lb 3.2 oz)   05/14/24 77.2 kg (170 lb 3.2 oz)   05/06/24 79.4 kg (175 lb)   8/2023- 179 lbs   4/2023- 180 lbs     PHYSICAL ACTIVITY   No rehab locally based on where he lives  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    Met with pt at bedside. Pt intubated, sedated but pt's sister at bedside shares he had been eating less with worsening lung function since last winter and unable to keep up with higher kcal requirements with increased work of breathing. In addition, about a couple months ago, a new anti-fibrotic medication he started caused N/V/D and he was eating minimally up until a month ago when appetite improved and he was actually eating really well. She mentions sometimes he'd have coughing fits in the morning and he'd throw up what appeared to be dinner from night prior and was largely undigested. Fortunately, she shares his nutrition over the past month was dramatically improved. 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 Their 86 year old  "mother prepares dinner for the family every night and they eat together.    CURRENT NUTRITION ORDERS  Diet: NPO    LABS  Labs reviewed  Electrolytes  Potassium (mmol/L)   Date Value   08/16/2024 3.8   08/15/2024 4.6     Potassium POCT (mmol/L)   Date Value   08/15/2024 4.2   08/15/2024 3.9   08/15/2024 3.9     Phosphorus (mg/dL)   Date Value   08/16/2024 3.6   08/15/2024 4.1   08/15/2024 3.0   08/14/2024 3.8   08/14/2024 3.7    Blood Glucose  Glucose (mg/dL)   Date Value   08/16/2024 199 (H)   08/15/2024 145 (H)     GLUCOSE BY METER POCT (mg/dL)   Date Value   08/16/2024 175 (H)   08/16/2024 183 (H)   08/16/2024 161 (H)   08/16/2024 172 (H)   08/15/2024 180 (H)     Glucose Whole Blood POCT (mg/dL)   Date Value   08/15/2024 124 (H)   08/15/2024 130 (H)   08/15/2024 139 (H)     Hemoglobin A1C (%)   Date Value   06/18/2024 8.4 (H)    Inflammatory Markers  WBC Count (10e3/uL)   Date Value   08/16/2024 16.4 (H)   08/15/2024 25.6 (H)   08/15/2024 22.5 (H)     Albumin (g/dL)   Date Value   08/15/2024 2.2 (L)   08/14/2024 4.3   06/18/2024 4.3      Magnesium (mg/dL)   Date Value   08/16/2024 2.0   08/15/2024 2.2   08/15/2024 2.0     Sodium (mmol/L)   Date Value   08/16/2024 138   08/15/2024 142   08/15/2024 139     Sodium POCT (mmol/L)   Date Value   08/15/2024 142   08/15/2024 141   08/15/2024 143    Renal  Urea Nitrogen (mg/dL)   Date Value   08/16/2024 12.7   08/15/2024 10.7   08/15/2024 12.9     Creatinine (mg/dL)   Date Value   08/16/2024 0.62 (L)   08/15/2024 0.55 (L)   08/15/2024 0.62 (L)     Additional  Triglycerides (mg/dL)   Date Value   06/18/2024 174 (H)     Ketones Urine (mg/dL)   Date Value   08/15/2024 Negative        MEDICATIONS  Medications reviewed  -Caltrate BID  -Miralax  -Senna-docusate BID  -Insulin gtt  -Propofol gtt  -Norepi gtt    ANTHROPOMETRICS  Height: 177.8 cm (5' 10\")  Most Recent Weight: 79.6 kg (175 lb 7.8 oz)    IBW: 75.5 kg   BMI: 25.18 kg/m2; Overweight BMI 25-29.9  Weight History: ~10.6% " wt loss over past ~3 months  Wt Readings from Last 20 Encounters:   08/15/24 79.6 kg (175 lb 7.8 oz)   07/12/24 71.7 kg (158 lb)   07/10/24 72.4 kg (159 lb 9.6 oz)   07/09/24 72.6 kg (160 lb)   06/21/24 72.8 kg (160 lb 8 oz)   06/07/24 77.2 kg (170 lb 3.2 oz)   05/14/24 77.2 kg (170 lb 3.2 oz)   05/06/24 79.4 kg (175 lb)   Dosing Weight: 69 kg (actual, based on driest/lowest wt this admit of 69.1 kg on 8/15)    ASSESSED NUTRITION NEEDS  Estimated Energy Needs: 0130-5525 kcals/day (30 - 35 kcals/kg)  Justification: Increased needs post-op transplant  Estimated Protein Needs: 104-138 grams protein/day (1.5 - 2 grams of pro/kg)  Justification: Increased needs post-op transplant, critical care status  Estimated Fluid Needs: Per provider pending fluid status    PHYSICAL FINDINGS  See malnutrition section below.  -ETT  -Hair, skin, nails appear WNL  -Clamshell incision s/p lung transplant with NPWT     MALNUTRITION  % Intake: Decreased intake does not meet criteria  % Weight Loss: > 7.5% in 3 months (severe)  Subcutaneous Fat Loss: None observed  Muscle Loss: Temporal:  Mild and Upper leg (quadricep, hamstring):  Mild  Fluid Accumulation/Edema: Does not meet criteria (trace anasarca)  Malnutrition Diagnosis: Moderate malnutrition in the context of chronic illness    NUTRITION DIAGNOSIS  Inadequate protein-energy intake related to NPO status in setting of intubation as evidenced by pt currently meeting 0% minimum assessed needs (not accounting for kcal from lipid/dextrose-containing medications).    INTERVENTIONS  Implementation  -Nutrition Education (pt's sister): Provided education on RD role, role of FT placement, EN support. Provided brief verbal overview of post-transplant diet to answer questions.   -Collaboration with other providers: CVICU rounds  -Enteral Nutrition - Initiate, ok to adv TF rate if post-pyloric  -Feeding tube flush  -Multivitamin/mineral supplement therapy     Goals  Once TF at goal rate, total avg  nutritional intake to meet a minimum of 30 kcal/kg and 1.5 g PRO/kg daily (per dosing wt 69 kg).     Monitoring/Evaluation  Progress toward goals will be monitored and evaluated per protocol.  Dulce Maria Oconnor RD, LD  Available on Vocera - can search by name or unit Dietitian  **Clinical Nutrition is no longer available via pager

## 2024-08-16 NOTE — PHARMACY-TRANSPLANT NOTE
Pharmacy Transplant note     59 year old male  s/p lung transplant on 8/15/24 for Interstitial lung disease.      Planned immunosuppression regimen:    INDUCTION immunosuppression: Basiliximab 20 mg IV on POD 0 and POD 4    MAINTENANCE immunosuppression with mycophenolate mofetil, prednisone, and tacrolimus titrated to a goal trough level of 8-12 mcg/L for the first year post-op.    Perioperative antimicrobial prophylaxis includes:   Antibiotics: Vancomycin + Ceftazidime  Antifungal coverage with Amphotericin B inhalation and Micafungin    Opportunistic pathogen prophylaxis includes: Trimethoprim/Sulfamethoxazole and acyclovir and Nystatin suspension.    Patient is not enrolled in medication study.    Patient with planned immunosuppression and prophylaxis as above.  Pharmacy will monitor for medication interactions and immunosuppression levels in conjunction with the team. Medication therapy needs for discharge planning will continue to be addressed throughout the current admission via multidisciplinary rounds and order review. Pharmacy will make recommendations as appropriate.    Vivi Bowser RPH on 8/16/2024 at 8:48 AM

## 2024-08-16 NOTE — PROGRESS NOTES
Pt received a bilateral lung transplant on 08/15/2024 donor ID [id number], removed from [facility] transplant waitlist.

## 2024-08-16 NOTE — ANESTHESIA CARE TRANSFER NOTE
Patient: Travon Cartwright    Procedure: Procedure(s):  Clamshell Incision, On Central Venoarterial Extracorporeal Membranous Oxygenation, Bilateral Lung Transplant Recipient, Left atrial appendage ligation, Intraoperative Flexible Bronchoscopy by Anesthesia       Diagnosis: Interstitial lung disease (H) [J84.9]  Diagnosis Additional Information: No value filed.    Anesthesia Type:   General     Note:    Oropharynx: endotracheal tube in place and ventilatory support  Level of Consciousness: iatrogenic sedation      Independent Airway: airway patency satisfactory and stable  Dentition: dentition unchanged  Vital Signs Stable: post-procedure vital signs reviewed and stable  Report to RN Given: handoff report given  Patient transferred to: ICU    ICU Handoff: Call for PAUSE to initiate/utilize ICU HANDOFF, Identified Patient, Identified Responsible Provider, Reviewed the Pertinent Medical History, Discussed Surgical Course, Reviewed Intra-OP Anesthesia Management and Issues during Anesthesia, Set Expectations for Post Procedure Period and Allowed Opportunity for Questions and Acknowledgement of Understanding      Vitals:  Vitals Value Taken Time   BP     Temp     Pulse     Resp     SpO2         Electronically Signed By: Asif Dangelo MD  August 15, 2024  8:26 PM

## 2024-08-16 NOTE — PLAN OF CARE
SLP: Clinical swallow evaluation orders received s/p lung transplant, post-operative day 2-3. Pt POD 1, pt remains intubated. Will hold and initiate evaluation as appropriate.

## 2024-08-16 NOTE — PLAN OF CARE
Goal Outcome Evaluation:  .  Labs/Protocols: LA has ranged from 4.8 - 3.5, team aware and currently trending downwards  Neuro: Rass-1/-3, -1 follows commands, and shakes head to y/n questions. Rass -3 to pain  Cardiac: SR 70's-90's, MAP>65. With Levo @ 0.06  Respiratory: CMV 50%/16/420/8, Nitric @ 3, weaned down from 20  GI/: NJ placed and tube feeds started at 10 mL, goal is 60 mL, increase by 10 mL Q8 hrs. Goodrich remains in place with good urine output, no BM  Skin: Clamshell incision, 5 CT's and lesion on lip.  LDAs: Lt Art line, Lt PIV, RT MAC (swan pulled today, PA cath clotted), 5 Ct - Lt pleural x2, [place] x1 and Rt pleural x2, NJ @ 105, OG @ 70 and Goodrich  GTTs:   Levo @ 0.06 mcg/mL  Prop @ 35 mcg/mL  Fentanyl @ 50 mcg/mL  Insulin @ 3 units/mL  Diet: Tube Feed started at 10 mL/hr, increase Q8 to reach goal of 60 mL/hr.  Activity: Bedrest    Plan: Wean nitric off and continue weaning down FiO2. Follow plan of care and notify MD of any changes.        Plan of Care Reviewed With: patient    Overall Patient Progress: improvingOverall Patient Progress: improving    Outcome Evaluation: Weaning Nitric and FIO2.

## 2024-08-16 NOTE — BRIEF OP NOTE
[facility]    Brief Operative Note    Pre-operative diagnosis: Interstitial lung disease (H) [J84.9]  Post-operative diagnosis Same as pre-operative diagnosis    Procedure: Clamshell Incision, On Central Venoarterial Extracorporeal Membranous Oxygenation, Bilateral Lung Transplant Recipient, Left atrial appendage ligation, Intraoperative Flexible Bronchoscopy by Anesthesia, Bilateral - Chest    Surgeon: Surgeons and Role:     * Mulvihill, Michael, MD - Primary     * Le Donald MD - Assisting     * Marty Jeffers MD - Fellow - Assisting     * Diego Vaughan MD - Fellow - Assisting  Anesthesia: General with Block   Estimated Blood Loss: 1000 ml    Drains: 1 angled R pleural, 1 straight R pleural, 1 med, 1 straight L pleural, 1 angled L pleural  Specimens:   ID Type Source Tests Collected by Time Destination   1 : Native Left Lung Tissue Lung, Left SURGICAL PATHOLOGY EXAM Mulvihill, Michael, MD 8/15/2024  3:23 PM    2 : Native Right Lung Tissue Lung, Right SURGICAL PATHOLOGY EXAM Mulvihill, Michael, MD 8/15/2024  3:24 PM    A : Donor Left Lung Washing Washings Lung, Left AFB CULTURE AND STAIN NON BLOOD, GRAM STAIN, FUNGAL OR YEAST CULTURE ROUTINE, VIRAL CULTURE RESPIRATORY, RESPIRATORY AEROBIC BACTERIAL CULTURE Mulvihill, Michael, MD 8/15/2024  2:42 PM    B : Recipient Left Lung Washings Washings Lung, Left AFB CULTURE AND STAIN NON BLOOD, GRAM STAIN, FUNGAL OR YEAST CULTURE ROUTINE, VIRAL CULTURE RESPIRATORY, RESPIRATORY AEROBIC BACTERIAL CULTURE Mulvihill, Michael, MD 8/15/2024  2:55 PM      Findings:   See op note .  Complications: None.  Implants:   Implant Name Type Inv. Item Serial No.  Lot No. LRB No. Used Action   AtriCure AtriClip Flex V 40 Other   [employer] [id number] N/A 1 Implanted           Marty Jeffers MD  Cardiothoracic Surgery Fellow  Pager: [phone]

## 2024-08-16 NOTE — PLAN OF CARE
Admitted/transferred from: [facility] OR  Reason for admission/transfer: Lung transplant post op  2 RN skin assessment: completed by Kavita Chacon RN and Keven Brantley RN  Result of skin assessment and interventions/actions: No changes  Height, weight, drug calc weight: Done  Patient belongings (see Flowsheet)  MDRO education added to care planN/A    Pt arrived from OR at 2030. On 0.08 levo and 2 of vaso. Initial lactic 4.0. PRN order for LR bolus for SVV greater than 14. 3L LR given for SVV in the 20s. MD  and surgeon aware that lactic stable around 4.3. Plan to monitor lactic closely and hold off on more fluid until day shift.     Neuro: Sedated with propofol and fent. LAUREANO, follows commands when sedation lightened.     Cardiac: Weaned down pressors, now on 0.02 of levo. SVV 18-26 despite 3L LR. MD and surgeon aware. CVP 5 this am, MD aware. SHONNA q4 hrs, CI around 2.3. CT x5 with 1.2L of serosang fluid out, team aware. Hgb stable at 14.0.    Resp: CMV 16/420/8/50%. Red, thick secretions for ETT. Course lung sounds. Gianfranco @ 20    : Goodrich with 150mL/hr.    GI: OGT @ 70 to LIS. Insulin drip    Skin: clamshell with wound vac.    Lines: Periph x1. RIJ MAC with swan @ 53.    Drips: Fent @ 50, Prop @30, levo @ 0.02, insulin   @1.5  ?     Goal Outcome Evaluation:      Plan of Care Reviewed With: patient    Overall Patient Progress: improvingOverall Patient Progress: improving    Outcome Evaluation: weaning pressors, weaning FIO2

## 2024-08-16 NOTE — PROCEDURES
Procedure:     Bronchoscopy for inspection; w/ bronchial washings    Procedure : Mallorie Hoover    Attending physicians: Fili Knight        Indication:     S/p BSLT         Consent:     Verbal consent obtained from the sister (Mine).     Risks, benefits and alternatives discussed. Additional risks are related to conscious sedation/general anesthesia involve bradycardia, arrhythmia, hypotension.    Mine is in agreement to proceed with the procedure.       Pre-medication:     The patient is on mechanical ventilation and on drips of propofol and fentanyl.    Lidocaine 1%: through the ETT 3cc        Procedure Summary:     Time out was performed.     The bronchoscope inserted through the ETT.    Airway examination:  Upper airway structures, vocal cords (anatomy and function) were not inspected because patient is intubated.    Exam of trachea and bronchus of the right and left bronchial tree to the sub-segmental level revealed no endobronchial lesion. Anastomoses appear intact. Airway walls had some diffuse mild edema, with blunting of secondary kelly.     LEFT anastomosis      RIGHT anastomosis      Procedure:  Therapeutic suctioning performed mainly in the LLL, where there were some non-obstructing scant mucoid secretions. Scant mucoid secretions also suctioned out of RML and RLL.     The patient tolerated the procedure well without undue discomfort, hypotension or arrhythmia. The procedure was performed in the ICU and vital sign parameters were monitored.        Complications:     No immediate complications.    Mallorie Hoover MD  Internal Medicine-Pediatrics  Pulmonary/Critical Care Fellow - PGY7/FL2  [facility]  P: [phone]

## 2024-08-16 NOTE — ANESTHESIA POSTPROCEDURE EVALUATION
Patient: Travon Cartwright    Procedure: Procedure(s):  Clamshell Incision, On Central Venoarterial Extracorporeal Membranous Oxygenation, Bilateral Lung Transplant Recipient, Left atrial appendage ligation, Intraoperative Flexible Bronchoscopy by Anesthesia       Anesthesia Type:  General    Note:  Disposition: ICU            ICU Sign Out: Anesthesiologist/ICU physician sign out WAS performed   Postop Pain Control: Uneventful            Sign Out: Well controlled pain   PONV: No   Neuro/Psych: Uneventful            Sign Out: PLANNED postop sedation   Airway/Respiratory: Uneventful            Sign Out: AIRWAY IN SITU/Resp. Support               Airway in situ/Resp. Support: ETT                 Reason: Planned Pre-op   CV/Hemodynamics: Uneventful            Sign Out: Acceptable CV status; No obvious hypovolemia; No obvious fluid overload   Other NRE:    DID A NON-ROUTINE EVENT OCCUR?     Event details/Postop Comments:  S/P lung tx, uncomplicated intraoperative course  On minimal support with Vasopressors  PA pressures normal on NO  No coagulopathy           Last vitals:  Vitals:    08/15/24 0711 08/15/24 1027 08/15/24 1033   BP: 117/85 116/84    Pulse: 94 99    Resp: 20 20 20   Temp: 36.4  C (97.5  F) 36.7  C (98  F)    SpO2: 90%         Electronically Signed By: Soila Bella MD  August 15, 2024  8:23 PM

## 2024-08-16 NOTE — PHARMACY-CONSULT NOTE
Pharmacy Tube Feeding Consult    Medication reviewed for administration by feeding tube and for potential food/drug interactions.    Recommendation: No changes are needed at this time.     Pharmacy will continue to follow as new medications are ordered.    Vivi Bowser RPH on 8/16/2024 at 9:23 AM

## 2024-08-16 NOTE — OP NOTE
OPERATIVE REPORT    I am dictating this operate report as a co-surgeon. Please see separate dictation from Dr Mulvihill. Two staff surgeons were required given the high complexity of the case and since we had no qualified resident or fellow available. I specifically performed the left pneumonectomy, left lung allograft implantation and the back table preparation of the organ donor, and assisted Dr Mulvihill with the rest of the operation.         BRIEF DESCRIPTION OF OPERATION:  We performed a timeout and made a clamshell incision. Once the organ arrived to the recipient hospital we gave heparin and cannulated centrally for modified cardiopulmonary bypass (VA ECMO).     We started with the left side. The pulmonary veins were dissected out and divided with a stapler. The left main pulmonary artery was dissected out and a Darra clamp applied to the base and divided distally. The [place] Jake catheter was pulled back. The peribronchial tissue was divided with ligasure and the left main bronchus was divided at the bifurcation. The left lung was resected and sent to pathology.     We confirmed hemostasis in the hilum and performed a pericardial hilar release.     Back table preparation of organ donor: We  the two lungs in the midline, and divided the bronchus 2 cartilaginous rings from the bifurcation. The pulmonary artery and the pulmonary vein cuff were dissected from surrounding tissue.      The LEFT lung was brought to the field and was implanted in the following fashion. The bronchial anastomosis was done end-to-end with running 4-0 prolene. We placed a K clamp at the base of the pulmonary veins. The staple lines were excised and the 2 openings were connected creating a common sewing ring. The left atrium-pulmonary vein anastomosis was done with a running 4-0 prolene imbricating the posterior wall. Lastly, the pulmonary artery was anastomosed in an end-to-end fashion with 5-0 prolene. We deaired through the  pulmonary vein anastomosis and confirmed hemostasis which was excellent.      The right lung was resected and the allograft implanted in a similar fashion. This was performed by Dr Mulvihill.      We started ventilation, gave calcium and confirmed Hb and electrolytes within normal limits. We weaned from modified CPB without difficulty. We came off on small doses of vasopressors and inotropes. We performed a bronchoscopy and cleared all secretions. We irrigated the surgical field and confirmed hemostasis.      At this time I left the operating room and Dr Mulvihill continued with the operation.        Le Tan MD

## 2024-08-16 NOTE — PROGRESS NOTES
CV ICU Progress note      ASSESSMENT: Travon Cartwright is a 59 year old male with PMH of  ILD, with chronic hypoxic respiratory failure on 6L oxygen at baseline, T2DM, hepatic steatosis and bilateral hand tremor now s/p lung transplant via clamshell incision with Dr Mulvihill on 8/15/24.     Today:   - 4L IV fluids given overnight.   - Possible diuresis this afternoon, will reassess need  - Wean nitric to 10 today, if oxygenating well this afternoon can continue slow wea  - Place post pyloric feeding tube, start trickle feeds and advance diet as tolerated.  - Start bactrim today based on donor.  - Micafungin started today.    PLAN:  Neuro/ pain/ sedation:  # Acute postoperative pain  - Scheduled: tylenol and Gabapentin  - PRN: oxycodone, dilaudid, robaxin  - RAPS team to be consulted when closer to extubation  - Fentanyl gtt    #Sedation  - Gtt: Propofol gtt and precedex gtt if needed  - RASS goals 0 to -1.   - RAPS to be consulted for pain catheters when closer to extubation     Pulmonary:  #  ILD emphysema on 6L O2 s/p BL lung transplant (8/15)    # Post operative ventilator support   FiO2 (%): 60 %, Resp: 17, Vent Mode: CMV/AC, Resp Rate (Set): 16 breaths/min, Tidal Volume (Set, mL): 420 mL, PEEP (cm H2O): 8 cmH2O, Resp Rate (Set): 16 breaths/min, Tidal Volume (Set, mL): 420 mL, PEEP (cm H2O): 8 cmH2O   - Goal SpO2 > 92%  - Ventilator bundle. HOB elevation.   - Encourage IS q15-30 minutes when awake.  - iNitric oxide 20 to 10, slow wean as able.  - Scheduled Mucomyst, Xopenex  - Methylprednisolone 125 q8hrs x3 doses. Prednisone 30 daily to start tomorrow.    Cardiovascular:    # Shock, multifactorial, hypovolemic, distributive  # Hx HTN  # Hx of HLD  - NE, Vaso gtts for inotropic and pressor support, wean as able  - Monitor hemodynamic status. Goal MAP >65, SBP <140  - Continue Atorvastatin 20 daily  - Holding home PTA losartan    GI care / Nutrition:   # Moderate protein calorie malnutrition  # Hepatic  steatosis  - NPO for now   - Nutrition consulted, appreciate recommendations. Will plan for post pyloric feeding tube placement today.  - PPI for GI prophylaxis  - Bowel regimen: MiraLAX, senna    Renal / Fluids / Electrolytes:   #Lactic acidosis, improving  BL creat appears to be ~ 0.62-0.65   - Strict I/O, daily weights, Avoid/limit nephrotoxins as able  - Replete lytes PRN per protocol  - Will consider diuretics this afternoon    Endocrine:    # Stress hyperglycemia  # Type 2 diabetes mellitus  - Hgb A1C 8.4%   - Insulin gtt for now.  - Goal BG <180 for optimal healing  - Holding home PTA metformin, Jardiance, Lantus 30 at bedtime    ID / Antibiotics:  # Stress induced leukocytosis  # Immunosuppression due for lung transplant   # Immunoprophylaxis for lung transplant   - To complete perioperative regimen per transplant protocol.   - will defer changes to immunosuppression to Transplant Pulmonary team    - Mycophenolate, Basiliximab (Simulect), Tacrolimus   - Amphotericin B nebs   - Micafungin   - Vancomycin and Ceftazidime (08/16)   - Nystatin    - Acyclovir to start 08/23  --Bactrim to start early on 08/16 based on donor results.    Heme:     # Acute blood loss anemia, resolved  # Acute blood loss thrombocytopenia  # Coagulopathy  - Continue to monitor, Hgb goal > 7.0    MSK / Skin:  # Sternotomy and Surgical Incision  # Weakness and deconditioning    - Postoperative incision management per protocol and sternal precautions  - PT/OT/CR    General cares and Prophylaxis:     - DVT: Mechanical and SQH tomorrow  - GI: PPI    Lines / Tubes / Drains:  - ETT  - RIJ CVC, PA catheter  - Arterial Line  - CTs x5  - Goodrich  - Plan for NJ placement today  - OG tube    Disposition: CVICU    Patient seen, findings and plan discussed with CVICU staff. 65 minutes of discontinuous critical care time spent excluding any procedures performed same day.     Barry Laughlin PA-C      Clinically Significant Risk Factors Present on  "Admission          # Hypocalcemia: Lowest iCa = 3.5 mg/dL in last 2 days, will monitor and replace as appropriate  # Hypercalcemia: corrected calcium is >10.1, will monitor as appropriate    # Hypoalbuminemia: Lowest albumin = 2.2 g/dL at 8/15/2024  8:20 PM, will monitor as appropriate  # Coagulation Defect: INR = 1.46 (Ref range: 0.85 - 1.15) and/or PTT = 42 Seconds (Ref range: 22 - 38 Seconds), will monitor for bleeding    # Hypertension: Home medication list includes antihypertensive(s)   # Circulatory Shock: required vasopressors within past 24 hours         # DMII: A1C = 8.4 % (Ref range: <5.7 %) within past 6 months    # Overweight: Estimated body mass index is 25.18 kg/m  as calculated from the following:    Height as of this encounter: 1.778 m (5' 10\").    Weight as of this encounter: 79.6 kg (175 lb 7.8 oz).                  ====================================    Interval Events:  - Patient agitated when sedation turned off. On nitric at 20 so will plan for slow wean depending on oxygenation. Lactate down trending, received 4L of IV fluids overnight. Will reassess this afternoon for possible diuresis.       OBJECTIVE:  1. VITAL SIGNS:   Temp:  [97.7  F (36.5  C)-98  F (36.7  C)] 97.7  F (36.5  C)  Pulse:  [] 77  Resp:  [16-42] 17  BP: ()/(58-84) 94/58  Cuff Mean (mmHg):  [95] 95  MAP:  [55 mmHg-275 mmHg] 76 mmHg  Arterial Line BP: ()/() 104/60  FiO2 (%):  [60 %-85 %] 60 %  SpO2:  [97 %-100 %] 100 %    FiO2 (%): 60 %, Resp: 17, Vent Mode: CMV/AC, Resp Rate (Set): 16 breaths/min, Tidal Volume (Set, mL): 420 mL, PEEP (cm H2O): 8 cmH2O, Resp Rate (Set): 16 breaths/min, Tidal Volume (Set, mL): 420 mL, PEEP (cm H2O): 8 cmH2O      2. INTAKE/ OUTPUT:   I/O last 3 completed shifts:  In: 7384.67 [I.V.:6214.67; Other:670; IV Piggyback:500]  Out: 3815 [Urine:3215; Chest Tube:600]      3. PHYSICAL EXAMINATION:   General: intubated and sedated.   HEENT: PERRLA. ETT in place   Neuro: sedated, when " sedation off agitated  Resp: Mechanical BBS,coarse, diminished left, no wheezes, rales or rhonchi.   Chest tubes y'ed together, serosanguinous output.  CV: S1, S2, RRR, no m/r/g   Abdomen: Soft, non-distended, non-tender  Extremities: warm and well perfused, calves soft and compressible. Pulses palpable      4. INVESTIGATIONS:   Arterial Blood Gases   Recent Labs   Lab 08/16/24  0410 08/16/24  0031 08/15/24  2021 08/15/24  1919   PH 7.40 7.40 7.34* 7.31*   PCO2 39 38 42 44   PO2 118* 109* 155* 118*   HCO3 24 23 23 22     Complete Blood Count   Recent Labs   Lab 08/16/24  0419 08/15/24  2020 08/15/24  1919 08/15/24  1838 08/15/24  1820 08/15/24  1324 08/15/24  0501   WBC 16.4* 25.6*  --   --  22.5*  --  7.2   HGB 14.0 14.5 12.8* 13.9 11.6*   < > 16.6    233  --   --  216  --  273    < > = values in this interval not displayed.     Basic Metabolic Panel  Recent Labs   Lab 08/16/24  0812 08/16/24  0616 08/16/24  0419 08/16/24  0416 08/15/24  2024 08/15/24  2020 08/15/24  1919 08/15/24  1838 08/15/24  0720 08/15/24  0501 08/15/24  0308 08/14/24  2338   NA  --   --  138  --   --  142 142 141   < > 139  --  141   POTASSIUM  --   --  3.8  --   --  4.6 4.2 3.9   < > 4.5  --  3.7   CHLORIDE  --   --  105  --   --  107  --   --   --  106  --  104   CO2  --   --  24  --   --  21*  --   --   --  22  --  23   BUN  --   --  12.7  --   --  10.7  --   --   --  12.9  --  11.9   CR  --   --  0.62*  --   --  0.55*  --   --   --  0.62*  --  0.65*   * 175* 199* 183*   < > 145* 124* 130*   < > 132*   < > 123*    < > = values in this interval not displayed.     Liver Function Tests  Recent Labs   Lab 08/15/24  2020 08/15/24  1820 08/14/24  2338   AST 79*  --  24   ALT 26  --  22   ALKPHOS 61  --  89   BILITOTAL 1.4*  --  0.4   ALBUMIN 2.2*  --  4.3   INR 1.46* 1.74* 1.07     Pancreatic Enzymes  No lab results found in last 7 days.  Coagulation Profile  Recent Labs   Lab 08/15/24  2020 08/15/24  1820 08/14/24  2338   INR  1.46* 1.74* 1.07   PTT 42* 36 32         5. RADIOLOGY:   Recent Results (from the past 24 hour(s))   XR Chest Port 1 View    Narrative    EXAM: XR CHEST PORT 1 VIEW 8/15/2024 9:02 PM      HISTORY: ETT placement s/p bilateral lung transplant, left atrial  appendage ligation, and flexible bronchoscopy.    COMPARISON: Chest radiograph 8/14/2024.     TECHNIQUE: Frontal view of the chest.    FINDINGS: Endotracheal tube projects over the mid/lower thoracic  trachea approximately 2.7 cm above the kelly. Right internal jugular  [place]-Jake catheter with tip projecting over the main pulmonary artery.  Bilateral apically directed and bibasilar chest tubes in place.  Sternotomy wiring present. Left atrial appendage clip present.  Postoperative changes of bilateral lung transplantation. Patchy  opacities in the left greater than right lung fields. No appreciable  pneumothorax. No significant pleural effusion. Small amount of  subcutaneous emphysema bilaterally.      Impression    IMPRESSION:   Postoperative changes of recent lung transplantation. Endotracheal  tube projects over the mid/lower thoracic trachea. Left greater than  right patchy airspace opacities present. No appreciable pneumothorax,  bilateral chest tubes in place. Small amount of subcutaneous emphysema  bilaterally.    I have personally reviewed the examination and initial interpretation  and I agree with the findings.    FADY MCDONNELL MD         SYSTEM ID:  [id number]   XR Abdomen Port 1 View    Narrative    EXAMINATION:  XR ABDOMEN PORT 1 VIEW 8/15/2024 9:05 PM     COMPARISON: CT chest abdomen and pelvis 6/18/2024.    HISTORY: OGT placement    TECHNIQUE: Frontal view of the abdomen.    FINDINGS: Enteric tube tip projecting at approximately the midline in  the distal stomach, sidehole projecting over the gastric body.  Postoperative changes of the chest following bilateral lung  transplantation and left atrial appendage ligation. The small  intestine and colon  are not distended. No abnormal soft tissue  densities.  No free air, pneumatosis or portal venous gas.       Impression    IMPRESSION: Enteric tube tip and sidehole projected over the stomach.  Nonobstructive bowel gas pattern. Recent postoperative changes of the  chest.    I have personally reviewed the examination and initial interpretation  and I agree with the findings.    FADY MCDONNELL MD         SYSTEM ID:  [id number]   XR Chest Port 1 View    Impression    RESIDENT PRELIMINARY INTERPRETATION  IMPRESSION:   1. Trace right-sided pneumothorax.  2. Increased retrocardiac consolidative opacities, likely representing  atelectasis in the postoperative state. Additional left greater than  right mixed pulmonary opacities are similar to prior.       =========================================

## 2024-08-16 NOTE — PROGRESS NOTES
"Bronchoscopy Risk Assessment Guidelines      A. Patient symptoms to consider when assessing pulmonary TB risk are:    I. Cough greater than 3 weeks; and fever, hemoptysis, pleuritic chest    pain, weight loss greater than 10 lbs, night sweats, fatigue, infiltrates on    upper lobes or superior segments of lower lobes, cavitation on chest    x-ray.   B. Patient risk factors to consider when assessing pulmonary TB risk are:    I. Exposure to known TB case, foreign-born persons (within 5 years of    arrival to [place]), residence in a crowded setting (correctional facility,     long-term care center, etc.), persons with HIV or immunosuppression.    Patients with symptoms and risk factors should generally be considered \"suspect risk\" and bronchoscopies should be performed in airborne precautions.    This patient has NO KNOWN RISK of Tuberculosis (proceed with bronchoscopy)    Specimens sent: yes  Complications: None  Scope used: #[id number] Adult  Attending Physician: Dr. Eugene Rosales, RRT on 8/16/2024 at 9:35 AM  "

## 2024-08-16 NOTE — PROGRESS NOTES
Final Crossmatch   Final crossmatch results for UNOS ID [id number] and recipient sample date 08/14/2024 and 06/18/2024  were both T cell and B cell, allo and auto negative.  DSA not noted in sample 06/18/2024.  PRA pending from sample date 08/14/2024.

## 2024-08-17 ENCOUNTER — APPOINTMENT (OUTPATIENT)
Dept: GENERAL RADIOLOGY | Facility: CLINIC | Age: 59
End: 2024-08-17
Attending: SURGERY
Payer: COMMERCIAL

## 2024-08-17 LAB
ABO/RH(D): NORMAL
ACID FAST STAIN (ARUP): NORMAL
ALLEN'S TEST: ABNORMAL
ANION GAP SERPL CALCULATED.3IONS-SCNC: 6 MMOL/L (ref 7–15)
ANTIBODY SCREEN: NEGATIVE
ATRIAL RATE - MUSE: 92 BPM
BACTERIA SPEC CULT: ABNORMAL
BACTERIA SPEC CULT: NORMAL
BASE EXCESS BLDA CALC-SCNC: 4.8 MMOL/L (ref -3–3)
BASOPHILS # BLD AUTO: 0 10E3/UL (ref 0–0.2)
BASOPHILS NFR BLD AUTO: 0 %
BUN SERPL-MCNC: 13.4 MG/DL (ref 8–23)
CALCIUM SERPL-MCNC: 8 MG/DL (ref 8.8–10.4)
CHLORIDE SERPL-SCNC: 108 MMOL/L (ref 98–107)
COHGB MFR BLD: 99.9 % (ref 96–97)
CREAT SERPL-MCNC: 0.58 MG/DL (ref 0.67–1.17)
DIASTOLIC BLOOD PRESSURE - MUSE: NORMAL MMHG
EGFRCR SERPLBLD CKD-EPI 2021: >90 ML/MIN/1.73M2
EOSINOPHIL # BLD AUTO: 0 10E3/UL (ref 0–0.7)
EOSINOPHIL NFR BLD AUTO: 0 %
ERYTHROCYTE [DISTWIDTH] IN BLOOD BY AUTOMATED COUNT: 13.2 % (ref 10–15)
GLUCOSE BLDC GLUCOMTR-MCNC: 131 MG/DL (ref 70–99)
GLUCOSE BLDC GLUCOMTR-MCNC: 132 MG/DL (ref 70–99)
GLUCOSE BLDC GLUCOMTR-MCNC: 133 MG/DL (ref 70–99)
GLUCOSE BLDC GLUCOMTR-MCNC: 136 MG/DL (ref 70–99)
GLUCOSE BLDC GLUCOMTR-MCNC: 144 MG/DL (ref 70–99)
GLUCOSE BLDC GLUCOMTR-MCNC: 148 MG/DL (ref 70–99)
GLUCOSE BLDC GLUCOMTR-MCNC: 150 MG/DL (ref 70–99)
GLUCOSE BLDC GLUCOMTR-MCNC: 156 MG/DL (ref 70–99)
GLUCOSE BLDC GLUCOMTR-MCNC: 161 MG/DL (ref 70–99)
GLUCOSE BLDC GLUCOMTR-MCNC: 164 MG/DL (ref 70–99)
GLUCOSE BLDC GLUCOMTR-MCNC: 171 MG/DL (ref 70–99)
GLUCOSE SERPL-MCNC: 142 MG/DL (ref 70–99)
HCO3 BLD-SCNC: 29 MMOL/L (ref 21–28)
HCO3 SERPL-SCNC: 27 MMOL/L (ref 22–29)
HCT VFR BLD AUTO: 38.3 % (ref 40–53)
HGB BLD-MCNC: 12.4 G/DL (ref 13.3–17.7)
IMM GRANULOCYTES # BLD: 0.1 10E3/UL
IMM GRANULOCYTES NFR BLD: 1 %
INTERPRETATION ECG - MUSE: NORMAL
LACTATE SERPL-SCNC: 2.2 MMOL/L (ref 0.7–2)
LYMPHOCYTES # BLD AUTO: 0.6 10E3/UL (ref 0.8–5.3)
LYMPHOCYTES NFR BLD AUTO: 4 %
MAGNESIUM SERPL-MCNC: 2 MG/DL (ref 1.7–2.3)
MCH RBC QN AUTO: 28.9 PG (ref 26.5–33)
MCHC RBC AUTO-ENTMCNC: 32.4 G/DL (ref 31.5–36.5)
MCV RBC AUTO: 89 FL (ref 78–100)
MONOCYTES # BLD AUTO: 1.5 10E3/UL (ref 0–1.3)
MONOCYTES NFR BLD AUTO: 10 %
NEUTROPHILS # BLD AUTO: 13.6 10E3/UL (ref 1.6–8.3)
NEUTROPHILS NFR BLD AUTO: 85 %
NRBC # BLD AUTO: 0 10E3/UL
NRBC BLD AUTO-RTO: 0 /100
O2/TOTAL GAS SETTING VFR VENT: 50 %
P AXIS - MUSE: 49 DEGREES
PCO2 BLD: 41 MM HG (ref 35–45)
PEEP: 8 CM H2O
PH BLD: 7.46 [PH] (ref 7.35–7.45)
PHOSPHATE SERPL-MCNC: 2 MG/DL (ref 2.5–4.5)
PLATELET # BLD AUTO: 194 10E3/UL (ref 150–450)
PO2 BLD: 151 MM HG (ref 80–105)
POTASSIUM SERPL-SCNC: 3.4 MMOL/L (ref 3.4–5.3)
POTASSIUM SERPL-SCNC: 3.4 MMOL/L (ref 3.4–5.3)
POTASSIUM SERPL-SCNC: 3.9 MMOL/L (ref 3.4–5.3)
PR INTERVAL - MUSE: 134 MS
QRS DURATION - MUSE: 78 MS
QT - MUSE: 368 MS
QTC - MUSE: 455 MS
R AXIS - MUSE: 55 DEGREES
RBC # BLD AUTO: 4.29 10E6/UL (ref 4.4–5.9)
SAO2 % BLDA: 98 % (ref 92–100)
SODIUM SERPL-SCNC: 141 MMOL/L (ref 135–145)
SPECIMEN EXPIRATION DATE: NORMAL
SYSTOLIC BLOOD PRESSURE - MUSE: NORMAL MMHG
T AXIS - MUSE: 22 DEGREES
TACROLIMUS BLD-MCNC: 2.9 UG/L (ref 5–15)
TME LAST DOSE: ABNORMAL H
TME LAST DOSE: ABNORMAL H
VENTRICULAR RATE- MUSE: 92 BPM
WBC # BLD AUTO: 15.8 10E3/UL (ref 4–11)

## 2024-08-17 PROCEDURE — 94668 MNPJ CHEST WALL SBSQ: CPT

## 2024-08-17 PROCEDURE — 250N000011 HC RX IP 250 OP 636: Performed by: PHYSICIAN ASSISTANT

## 2024-08-17 PROCEDURE — 84100 ASSAY OF PHOSPHORUS: CPT | Performed by: SURGERY

## 2024-08-17 PROCEDURE — 250N000013 HC RX MED GY IP 250 OP 250 PS 637: Performed by: PHYSICIAN ASSISTANT

## 2024-08-17 PROCEDURE — 83605 ASSAY OF LACTIC ACID: CPT | Performed by: PHYSICIAN ASSISTANT

## 2024-08-17 PROCEDURE — 250N000013 HC RX MED GY IP 250 OP 250 PS 637

## 2024-08-17 PROCEDURE — 80197 ASSAY OF TACROLIMUS: CPT | Performed by: SURGERY

## 2024-08-17 PROCEDURE — 999N000157 HC STATISTIC RCP TIME EA 10 MIN

## 2024-08-17 PROCEDURE — 83735 ASSAY OF MAGNESIUM: CPT | Performed by: SURGERY

## 2024-08-17 PROCEDURE — 250N000012 HC RX MED GY IP 250 OP 636 PS 637: Performed by: SURGERY

## 2024-08-17 PROCEDURE — 71045 X-RAY EXAM CHEST 1 VIEW: CPT | Mod: 26 | Performed by: RADIOLOGY

## 2024-08-17 PROCEDURE — 99233 SBSQ HOSP IP/OBS HIGH 50: CPT | Mod: 24 | Performed by: INTERNAL MEDICINE

## 2024-08-17 PROCEDURE — 200N000002 HC R&B ICU UMMC

## 2024-08-17 PROCEDURE — 94640 AIRWAY INHALATION TREATMENT: CPT

## 2024-08-17 PROCEDURE — 250N000009 HC RX 250: Performed by: SURGERY

## 2024-08-17 PROCEDURE — 85025 COMPLETE CBC W/AUTO DIFF WBC: CPT | Performed by: SURGERY

## 2024-08-17 PROCEDURE — 250N000009 HC RX 250: Performed by: PHYSICIAN ASSISTANT

## 2024-08-17 PROCEDURE — 250N000011 HC RX IP 250 OP 636: Performed by: STUDENT IN AN ORGANIZED HEALTH CARE EDUCATION/TRAINING PROGRAM

## 2024-08-17 PROCEDURE — 82805 BLOOD GASES W/O2 SATURATION: CPT

## 2024-08-17 PROCEDURE — 250N000009 HC RX 250

## 2024-08-17 PROCEDURE — 250N000013 HC RX MED GY IP 250 OP 250 PS 637: Performed by: STUDENT IN AN ORGANIZED HEALTH CARE EDUCATION/TRAINING PROGRAM

## 2024-08-17 PROCEDURE — 86850 RBC ANTIBODY SCREEN: CPT | Performed by: STUDENT IN AN ORGANIZED HEALTH CARE EDUCATION/TRAINING PROGRAM

## 2024-08-17 PROCEDURE — 258N000003 HC RX IP 258 OP 636: Performed by: STUDENT IN AN ORGANIZED HEALTH CARE EDUCATION/TRAINING PROGRAM

## 2024-08-17 PROCEDURE — 71045 X-RAY EXAM CHEST 1 VIEW: CPT

## 2024-08-17 PROCEDURE — 250N000011 HC RX IP 250 OP 636

## 2024-08-17 PROCEDURE — 94640 AIRWAY INHALATION TREATMENT: CPT | Mod: 76

## 2024-08-17 PROCEDURE — 250N000011 HC RX IP 250 OP 636: Performed by: SURGERY

## 2024-08-17 PROCEDURE — 99291 CRITICAL CARE FIRST HOUR: CPT | Mod: 24 | Performed by: PHYSICIAN ASSISTANT

## 2024-08-17 PROCEDURE — 84132 ASSAY OF SERUM POTASSIUM: CPT | Performed by: STUDENT IN AN ORGANIZED HEALTH CARE EDUCATION/TRAINING PROGRAM

## 2024-08-17 PROCEDURE — 94799 UNLISTED PULMONARY SVC/PX: CPT

## 2024-08-17 PROCEDURE — 80048 BASIC METABOLIC PNL TOTAL CA: CPT | Performed by: PHYSICIAN ASSISTANT

## 2024-08-17 PROCEDURE — 86900 BLOOD TYPING SEROLOGIC ABO: CPT | Performed by: STUDENT IN AN ORGANIZED HEALTH CARE EDUCATION/TRAINING PROGRAM

## 2024-08-17 PROCEDURE — 250N000013 HC RX MED GY IP 250 OP 250 PS 637: Performed by: SURGERY

## 2024-08-17 PROCEDURE — 94003 VENT MGMT INPAT SUBQ DAY: CPT

## 2024-08-17 RX ORDER — POTASSIUM CHLORIDE 1.5 G/1.58G
40 POWDER, FOR SOLUTION ORAL ONCE
Status: COMPLETED | OUTPATIENT
Start: 2024-08-17 | End: 2024-08-17

## 2024-08-17 RX ORDER — HEPARIN SODIUM 5000 [USP'U]/.5ML
5000 INJECTION, SOLUTION INTRAVENOUS; SUBCUTANEOUS EVERY 8 HOURS
Status: COMPLETED | OUTPATIENT
Start: 2024-08-17 | End: 2024-08-17

## 2024-08-17 RX ORDER — AMOXICILLIN 250 MG
2 CAPSULE ORAL 2 TIMES DAILY
Status: DISCONTINUED | OUTPATIENT
Start: 2024-08-17 | End: 2024-08-20

## 2024-08-17 RX ORDER — FUROSEMIDE 10 MG/ML
40 INJECTION INTRAMUSCULAR; INTRAVENOUS ONCE
Status: COMPLETED | OUTPATIENT
Start: 2024-08-17 | End: 2024-08-17

## 2024-08-17 RX ORDER — ROSUVASTATIN CALCIUM 10 MG/1
10 TABLET, COATED ORAL DAILY
Status: DISCONTINUED | OUTPATIENT
Start: 2024-08-17 | End: 2024-08-28 | Stop reason: HOSPADM

## 2024-08-17 RX ORDER — ROSUVASTATIN CALCIUM 20 MG/1
20 TABLET, COATED ORAL DAILY
Status: DISCONTINUED | OUTPATIENT
Start: 2024-08-17 | End: 2024-08-17

## 2024-08-17 RX ADMIN — ACETAMINOPHEN 975 MG: 325 TABLET ORAL at 15:33

## 2024-08-17 RX ADMIN — PROPOFOL 35 MCG/KG/MIN: 10 INJECTION, EMULSION INTRAVENOUS at 10:13

## 2024-08-17 RX ADMIN — PROPOFOL 25 MCG/KG/MIN: 10 INJECTION, EMULSION INTRAVENOUS at 21:19

## 2024-08-17 RX ADMIN — Medication 60 ML: at 09:43

## 2024-08-17 RX ADMIN — ACETYLCYSTEINE 2 ML: 200 SOLUTION ORAL; RESPIRATORY (INHALATION) at 19:42

## 2024-08-17 RX ADMIN — HEPARIN SODIUM 5000 UNITS: 5000 INJECTION, SOLUTION INTRAVENOUS; SUBCUTANEOUS at 21:19

## 2024-08-17 RX ADMIN — LEVALBUTEROL HYDROCHLORIDE 1.25 MG: 1.25 SOLUTION RESPIRATORY (INHALATION) at 16:36

## 2024-08-17 RX ADMIN — VANCOMYCIN HYDROCHLORIDE 1000 MG: 1 INJECTION, SOLUTION INTRAVENOUS at 01:50

## 2024-08-17 RX ADMIN — ACETAMINOPHEN 975 MG: 325 TABLET ORAL at 09:37

## 2024-08-17 RX ADMIN — LEVALBUTEROL HYDROCHLORIDE 1.25 MG: 1.25 SOLUTION RESPIRATORY (INHALATION) at 19:42

## 2024-08-17 RX ADMIN — GABAPENTIN 100 MG: 100 CAPSULE ORAL at 15:33

## 2024-08-17 RX ADMIN — Medication 10 MG: at 20:36

## 2024-08-17 RX ADMIN — VANCOMYCIN HYDROCHLORIDE 1000 MG: 1 INJECTION, SOLUTION INTRAVENOUS at 15:33

## 2024-08-17 RX ADMIN — Medication 60 ML: at 20:23

## 2024-08-17 RX ADMIN — CHLORHEXIDINE GLUCONATE 0.12% ORAL RINSE 15 ML: 1.2 LIQUID ORAL at 20:21

## 2024-08-17 RX ADMIN — GABAPENTIN 100 MG: 100 CAPSULE ORAL at 20:22

## 2024-08-17 RX ADMIN — Medication 10 MG: at 19:42

## 2024-08-17 RX ADMIN — POTASSIUM CHLORIDE 40 MEQ: 1.5 POWDER, FOR SOLUTION ORAL at 11:42

## 2024-08-17 RX ADMIN — ACETYLCYSTEINE 2 ML: 200 SOLUTION ORAL; RESPIRATORY (INHALATION) at 16:36

## 2024-08-17 RX ADMIN — Medication 10 MG: at 04:48

## 2024-08-17 RX ADMIN — SULFAMETHOXAZOLE AND TRIMETHOPRIM 80 MG: 200; 40 SUSPENSION ORAL at 09:37

## 2024-08-17 RX ADMIN — FUROSEMIDE 40 MG: 10 INJECTION, SOLUTION INTRAVENOUS at 09:39

## 2024-08-17 RX ADMIN — LEVALBUTEROL HYDROCHLORIDE 1.25 MG: 1.25 SOLUTION RESPIRATORY (INHALATION) at 07:57

## 2024-08-17 RX ADMIN — TACROLIMUS 1 MG: 1 CAPSULE ORAL at 18:54

## 2024-08-17 RX ADMIN — Medication 75 MCG/HR: at 09:36

## 2024-08-17 RX ADMIN — CEFTAZIDIME 2 G: 2 INJECTION, POWDER, FOR SOLUTION INTRAVENOUS at 18:53

## 2024-08-17 RX ADMIN — ACETYLCYSTEINE 2 ML: 200 SOLUTION ORAL; RESPIRATORY (INHALATION) at 12:16

## 2024-08-17 RX ADMIN — INSULIN HUMAN 6 UNITS/HR: 1 INJECTION, SOLUTION INTRAVENOUS at 20:47

## 2024-08-17 RX ADMIN — LEVALBUTEROL HYDROCHLORIDE 1.25 MG: 1.25 SOLUTION RESPIRATORY (INHALATION) at 12:16

## 2024-08-17 RX ADMIN — CEFTAZIDIME 2 G: 2 INJECTION, POWDER, FOR SOLUTION INTRAVENOUS at 02:59

## 2024-08-17 RX ADMIN — DEXMEDETOMIDINE HYDROCHLORIDE IN SODIUM CHLORIDE 0.5 MCG/KG/HR: 4 INJECTION INTRAVENOUS at 10:13

## 2024-08-17 RX ADMIN — TACROLIMUS 1 MG: 1 CAPSULE ORAL at 09:43

## 2024-08-17 RX ADMIN — HEPARIN SODIUM 5000 UNITS: 5000 INJECTION, SOLUTION INTRAVENOUS; SUBCUTANEOUS at 06:03

## 2024-08-17 RX ADMIN — SENNOSIDES AND DOCUSATE SODIUM 2 TABLET: 8.6; 5 TABLET ORAL at 20:22

## 2024-08-17 RX ADMIN — POLYETHYLENE GLYCOL 3350 17 G: 17 POWDER, FOR SOLUTION ORAL at 09:37

## 2024-08-17 RX ADMIN — Medication 40 MG: at 09:43

## 2024-08-17 RX ADMIN — MYCOPHENOLATE MOFETIL 1000 MG: 200 POWDER, FOR SUSPENSION ORAL at 09:43

## 2024-08-17 RX ADMIN — DEXMEDETOMIDINE HYDROCHLORIDE IN SODIUM CHLORIDE 0.5 MCG/KG/HR: 4 INJECTION INTRAVENOUS at 20:48

## 2024-08-17 RX ADMIN — ROSUVASTATIN CALCIUM 10 MG: 10 TABLET, FILM COATED ORAL at 11:42

## 2024-08-17 RX ADMIN — POTASSIUM & SODIUM PHOSPHATES POWDER PACK 280-160-250 MG 1 PACKET: 280-160-250 PACK at 09:37

## 2024-08-17 RX ADMIN — CHLORHEXIDINE GLUCONATE 0.12% ORAL RINSE 15 ML: 1.2 LIQUID ORAL at 09:39

## 2024-08-17 RX ADMIN — MICAFUNGIN SODIUM 100 MG: 50 INJECTION, POWDER, LYOPHILIZED, FOR SOLUTION INTRAVENOUS at 10:13

## 2024-08-17 RX ADMIN — POTASSIUM CHLORIDE 40 MEQ: 1.5 POWDER, FOR SOLUTION ORAL at 06:03

## 2024-08-17 RX ADMIN — PROPOFOL 40 MCG/KG/MIN: 10 INJECTION, EMULSION INTRAVENOUS at 05:10

## 2024-08-17 RX ADMIN — Medication 10 MG: at 08:00

## 2024-08-17 RX ADMIN — POTASSIUM & SODIUM PHOSPHATES POWDER PACK 280-160-250 MG 1 PACKET: 280-160-250 PACK at 15:33

## 2024-08-17 RX ADMIN — POTASSIUM & SODIUM PHOSPHATES POWDER PACK 280-160-250 MG 1 PACKET: 280-160-250 PACK at 06:04

## 2024-08-17 RX ADMIN — Medication 15 ML: at 11:42

## 2024-08-17 RX ADMIN — SENNOSIDES AND DOCUSATE SODIUM 2 TABLET: 8.6; 5 TABLET ORAL at 09:37

## 2024-08-17 RX ADMIN — ACETYLCYSTEINE 2 ML: 200 SOLUTION ORAL; RESPIRATORY (INHALATION) at 07:57

## 2024-08-17 RX ADMIN — MYCOPHENOLATE MOFETIL 1000 MG: 200 POWDER, FOR SUSPENSION ORAL at 20:21

## 2024-08-17 RX ADMIN — CEFTAZIDIME 2 G: 2 INJECTION, POWDER, FOR SOLUTION INTRAVENOUS at 09:44

## 2024-08-17 RX ADMIN — PREDNISOLONE 30 MG: 15 SOLUTION ORAL at 09:43

## 2024-08-17 RX ADMIN — GABAPENTIN 100 MG: 100 CAPSULE ORAL at 09:37

## 2024-08-17 ASSESSMENT — ACTIVITIES OF DAILY LIVING (ADL)
ADLS_ACUITY_SCORE: 47
ADLS_ACUITY_SCORE: 47
ADLS_ACUITY_SCORE: 51
ADLS_ACUITY_SCORE: 47

## 2024-08-17 NOTE — PROGRESS NOTES
CV ICU Progress note      ASSESSMENT: Travon Cartwright is a 59 year old male with PMH of  ILD, with chronic hypoxic respiratory failure on 6L oxygen at baseline, T2DM, hepatic steatosis and bilateral hand tremor now s/p lung transplant via clamshell incision with Dr Mulvihill on 8/15/24.     Today  - RAPS consult today for possible catheter placement tomorrow AM if ready for extubation  - IV lasix 40 IV.   - Goal NET negative 1L  - Work to pressure support today    PLAN:  Neuro/ pain/ sedation:  # Acute postoperative pain  - Scheduled: tylenol   - PRN: oxycodone, dilaudid, robaxin  - RAPS team consult today for possible catheter placement tomorrow.  - Fentanyl gtt    #Sedation  - Gtt: Propofol gtt and precedex gtt if needed  - RAPS to be consulted for pain catheters when closer to extubation     Pulmonary:  # ILD emphysema on 6L O2 s/p BL lung transplant (8/15)    # Post operative ventilator support   FiO2 (%): 50 %, Resp: 17, Vent Mode: CMV/AC, Resp Rate (Set): 16 breaths/min, Tidal Volume (Set, mL): 420 mL, PEEP (cm H2O): 8 cmH2O, Resp Rate (Set): 16 breaths/min, Tidal Volume (Set, mL): 420 mL, PEEP (cm H2O): 8 cmH2O   - Goal SpO2 > 92%  - Ventilator bundle. HOB elevation.   - Encourage IS q15-30 minutes when awake.  - Scheduled Mucomyst, Xopenex  - Received Methylprednisolone 125 q8hrs x3 doses, now on Prednisone 30 daily  - PST today    Cardiovascular:    # Shock, multifactorial, hypovolemic, distributive  # Hx HTN  # Hx of HLD  - Monitor hemodynamic status. Goal MAP >65, SBP <140  - Continue crestor 20 daily.  - Holding home PTA losartan    GI care / Nutrition:   # Moderate protein calorie malnutrition  # Hepatic steatosis  - Nutrition consulted, appreciate recommendations.   - NJ for TF, advancing to goal currently.   - PPI for GI prophylaxis  - Bowel regimen: MiraLAX, senna    Renal / Fluids / Electrolytes:   #Lactic acidosis, improving  BL creat appears to be ~ 0.62-0.65   - Strict I/O, daily weights,  Avoid/limit nephrotoxins as able  - Replete lytes PRN per protocol  - IV lasix 40 x1 today.  - Goal NET negative -1L    Endocrine:    # Stress hyperglycemia  # Type 2 diabetes mellitus  - Hgb A1C 8.4%   - Insulin gtt for now.  - Goal BG <180 for optimal healing  - Holding home PTA metformin, Jardiance, Lantus 30 at bedtime    ID / Antibiotics:  # Stress induced leukocytosis  # Immunosuppression due for lung transplant   # Immunoprophylaxis for lung transplant   - To complete perioperative regimen per transplant protocol.   - will defer changes to immunosuppression to Transplant Pulmonary team    - Mycophenolate, Basiliximab (Simulect), Tacrolimus   - Amphotericin B nebs   - Micafungin   - Vancomycin and Ceftazidime (08/16)   - Valcyte to start 08/23  - Bactrim to started 08/16 based on donor results.    Heme:     # Acute blood loss anemia, resolved  # Acute blood loss thrombocytopenia  # Coagulopathy  - Continue to monitor, Hgb goal > 7.0    MSK / Skin:  # Sternotomy and Surgical Incision  # Weakness and deconditioning    - Postoperative incision management per protocol and sternal precautions  - PT/OT/CR    General cares and Prophylaxis:     - DVT: Mechanical and SQH   - GI: PPI    Lines / Tubes / Drains:  - ETT  - RIJ CVC  - Arterial Line  - CTs x5  - Goodrich  - NJ tube  - OG tube    Disposition: CVICU    Patient seen, findings and plan discussed with CVICU staff. 65 minutes of discontinuous critical care time spent excluding any procedures performed same day.     Barry Laughlin PA-C      Clinically Significant Risk Factors          # Hypocalcemia: Lowest iCa = 3.5 mg/dL in last 2 days, will monitor and replace as appropriate     # Hypoalbuminemia: Lowest albumin = 2.2 g/dL at 8/15/2024  8:20 PM, will monitor as appropriate  # Coagulation Defect: INR = 1.46 (Ref range: 0.85 - 1.15) and/or PTT = 42 Seconds (Ref range: 22 - 38 Seconds), will monitor for bleeding             # DMII: A1C = 8.4 % (Ref range: <5.7 %)  "within past 6 months, PRESENT ON ADMISSION  # Overweight: Estimated body mass index is 25.15 kg/m  as calculated from the following:    Height as of this encounter: 1.778 m (5' 10\").    Weight as of this encounter: 79.5 kg (175 lb 4.3 oz)., PRESENT ON ADMISSION  # Moderate Malnutrition: based on nutrition assessment, PRESENT ON ADMISSION               ====================================    Interval Events:  Patient weaned off nitric, weaning FiO2. Pressors off this AM. Plan to pressure support today. Intermittently agitated and mitts placed overnight.  NJ tube placed and confirmed, TF advancing to goals.      OBJECTIVE:  1. VITAL SIGNS:   Temp:  [96.6  F (35.9  C)-97.9  F (36.6  C)] 97  F (36.1  C)  Pulse:  [74-99] 77  Resp:  [17] 17  MAP:  [61 mmHg-87 mmHg] 76 mmHg  Arterial Line BP: ()/(50-67) 108/59  FiO2 (%):  [50 %-60 %] 50 %  SpO2:  [96 %-100 %] 99 %    FiO2 (%): 50 %, Resp: 17, Vent Mode: CMV/AC, Resp Rate (Set): 16 breaths/min, Tidal Volume (Set, mL): 420 mL, PEEP (cm H2O): 8 cmH2O, Resp Rate (Set): 16 breaths/min, Tidal Volume (Set, mL): 420 mL, PEEP (cm H2O): 8 cmH2O      2. INTAKE/ OUTPUT:   I/O last 3 completed shifts:  In: 2662.58 [I.V.:1652.58; NG/GT:700]  Out: 3820 [Urine:2230; Emesis/NG output:600; Chest Tube:990]      3. PHYSICAL EXAMINATION:   General: intubated but awakes to voice with intermittent agitation  HEENT: PERRLA. ETT in place   Neuro: agitated, moves all extremities, following commands  Resp: Coarse breath sounds right, diminished lower left  Chest tubes y'ed together, serosanguinous output.  CV: S1, S2, RRR, no m/r/g, no lower extremity swelling  Abdomen: Soft, non-distended, non-tender  Extremities: warm and well perfused, calves soft and compressible. Pulses palpable      4. INVESTIGATIONS:   Arterial Blood Gases   Recent Labs   Lab 08/17/24  0352 08/16/24  2320 08/16/24  1924 08/16/24  1604   PH 7.46* 7.45 7.44 7.44   PCO2 41 42 41 39   PO2 151* 120* 113* 144*   HCO3 29* 29* " 28 27     Complete Blood Count   Recent Labs   Lab 08/17/24  0351 08/16/24  0419 08/15/24  2020 08/15/24  1919 08/15/24  1838 08/15/24  1820   WBC 15.8* 16.4* 25.6*  --   --  22.5*   HGB 12.4* 14.0 14.5 12.8*   < > 11.6*    182 233  --   --  216    < > = values in this interval not displayed.     Basic Metabolic Panel  Recent Labs   Lab 08/17/24  0654 08/17/24  0601 08/17/24  0353 08/17/24  0351 08/16/24  0616 08/16/24  0419 08/15/24  2024 08/15/24  2020 08/15/24  1919 08/15/24  0720 08/15/24  0501   NA  --   --   --  141  --  138  --  142 142   < > 139   POTASSIUM  --   --   --  3.4  --  3.8  --  4.6 4.2   < > 4.5   CHLORIDE  --   --   --  108*  --  105  --  107  --   --  106   CO2  --   --   --  27  --  24  --  21*  --   --  22   BUN  --   --   --  13.4  --  12.7  --  10.7  --   --  12.9   CR  --   --   --  0.58*  --  0.62*  --  0.55*  --   --  0.62*   * 133* 136* 142*   < > 199*   < > 145* 124*   < > 132*    < > = values in this interval not displayed.     Liver Function Tests  Recent Labs   Lab 08/15/24  2020 08/15/24  1820 08/14/24  2338   AST 79*  --  24   ALT 26  --  22   ALKPHOS 61  --  89   BILITOTAL 1.4*  --  0.4   ALBUMIN 2.2*  --  4.3   INR 1.46* 1.74* 1.07     Pancreatic Enzymes  No lab results found in last 7 days.  Coagulation Profile  Recent Labs   Lab 08/15/24  2020 08/15/24  1820 08/14/24  2338   INR 1.46* 1.74* 1.07   PTT 42* 36 32         5. RADIOLOGY:   Recent Results (from the past 24 hour(s))   XR Abdomen Port 1 View    Narrative    EXAMINATION:  XR ABDOMEN PORT 1 VIEW 8/16/2024     COMPARISON: 8/15/2024    HISTORY: Verify small bowel feeding tube bedside placement.    TECHNIQUE: One frontal supine view of the abdomen.    FINDINGS:   Feeding tube tip projects over the distal duodenum. Gastric tube tip  and sidehole are within the stomach. No abnormally dilated air-filled  loops of bowel. Postoperative findings in the lower chest with  multiple devices better assessed on same day  chest x-ray. Small  bilateral pleural effusions and bibasilar pulmonary opacities.      Impression    IMPRESSION:   The feeding tube tip is in the distal duodenum. Gastric tube tip and  sidehole are in the stomach.    I have personally reviewed the examination and initial interpretation  and I agree with the findings.    DIEGO ROBERTO DO         SYSTEM ID:  [id number]   XR Chest Port 1 View    Narrative    EXAM: XR CHEST PORT 1 VIEW  8/17/2024 2:15 AM      HISTORY: Post-Op Lung    COMPARISON: 8/16/2024    FINDINGS: Single view of the chest. Clamshell sternotomy wires. Left  atrial appendage clip. Feeding and gastric tubes course outside the  field of view. Stable bilateral chest tubes x4. [place] catheter has  been removed. Right IJ sheath with tip projecting over the right  innominate vein. Distal end of the pericardial drain is not  visualized, but appears to be in a similar position when compared to  prior.    Trachea is midline. Ill-defined cardiac silhouette. Increased  retrocardiac consolidation with new silhouetting of the left  hemidiaphragm. Mildly improved left upper lobe airspace opacities.  Trace right-sided pneumothorax is no longer visualized.      Impression    IMPRESSION:   1. Increased retrocardiac/left basilar opacities, likely atelectasis.  Left-sided airspace opacities otherwise appear mildly improved from  prior.   2. Trace right-sided pneumothorax is not definitively visualized.  3. [place]Jake catheter has been removed. Otherwise stable support  devices.    I have personally reviewed the examination and initial interpretation  and I agree with the findings.    JAZZMINE VELOZ MD         SYSTEM ID:  [id number]       =========================================    Anesthesia Type: 1% lidocaine with epinephrine

## 2024-08-17 NOTE — PROGRESS NOTES
Pulmonary Medicine  Cystic Fibrosis - Lung Transplant Team    Patient: Travon Cartwright  MRN: [id number]  : 1965 (age 59 year old)  Transplant: 8/15/2024 (Lung), POD#2  Admission date: 2024  Primary Care Provider: Dwayne Thomas    Assessment & Plan:     Travon Cartwright is a 59 year old male with a PMH diabetes mellitus, hepatosteatosis, and essential tremor.  Pt. is now s/p BSLT on 8/15/2024 with Dr. Mulvihill.  Surgery was uncomplicated and done on pump.      Recommendations:    Ventilator support weaning per CVICU-Agree with SBT as tolerated  Daily CXR  Follow bronchoscopy cultures from -NGTD  Chest tubes per CVTS  Start cautious diuresis, aim for I/O -1 liter for 24 hours  Continue TF, ensure BM  IS: Daily tacro levels. No changes today.  PPX: As below.   Consult RAPS for pain catheter for tomorrow AM    Fili Knight MD FCCP  Associate Professor of Medicine  Division of Pulmonary, Allergy & Critical Care   [place] [facility]  [facility]      S/p bilateral sequential lung transplant (BSLT) 8/15/24 for idiopathic pulmonary fibrosis:  Acute on chronic hypoxic respiratory failure (post operative):     - Mechanical ventilatory support wean per CVICU team  - Nebs: levalbuterol and Mucomyst QID  - Aggressive pulmonary toilet with chest physiotherapy QID (addition of Aerobika and incentive spirometry once extubated)  - DSA at one week (ordered ) then one month post-transplant (additionally per protocol)  - Ammonia monitoring qMTh (screening for hyperammonemia post-lung transplant) with ureaplasma PCR ordered POD #7 () per protocol   - Follow bronchoscopy studies from - NGTD  - Anesthesia to place epidural catheters prior to anticipated extubation per our routine, defer today  - Chest tubes managed by surgical team  - Daily CXR while chest tubes remain-today with persistent left side infiltrates  - Consult RAPS team for anticipated  extubation tomorrow  - Advance  TF to goal (post pyloric FT +)  - SLP consult as pt. nears extubation for swallowing evaluation and FEES prior to PO  - Await explant pathology    Immunosuppression:  Induction therapy with high dose IV steroid intraoperatively and basiliximab (POD #0 and #4 [8/19], ordered).   - Tacrolimus 1 mg SL BID.  Goal tacrolimus level 8-12. Tacro levels daily. Continuing SL dosing with daily tacrolimus levels pending return of bowel function post-op.    - MMF 1000 mg BID  - Methylprednisolone 125 mg x 3 doses, then prednisolone 30 mg daily with taper per lung transplant protocol:  Date Daily Dose (mg)   8/17/2024 30   8/24/2024 25   8/31/2024 20   9/21/2024 15   10/12/2024 10   11/2/2024 5     Prophylaxis:     - Bactrim for PJP ppx started POD #1 due to donor toxoplasma IgG+  - VGCV for CMV ppx (ordered as below), CMV monitoring per protocol (after completion of ppx course, additionally prn)  - Ampho B nebs twice weekly for antifungal ppx through discharge, then will stop  - Nystatin for oral candidiasis ppx, 6 month course - holding while on micafungin  - [id number] q24h for prophylaxis x 10 days  - See below for serologies and viral ppx:   Donor Recipient Intervention   CMV status - + Valganciclovir POD #8-90   EBV status - + EBV monthly   HSV status N/A + none      Primary graft dysfunction (per ISHLT guidelines):  See chart below:   POD #0  (~0 hours) POD #1  (~24 hours) POD #2   (~48 hours) POD#3   (~72 hours)   Date 8/15 8/16 8/17 *   Time 1446 1301 0352    Intubated Y Y Y Y/N   PaO2 425 149 151    FiO2 80 60 50    P/F Ratio 531 248 302    PGD Grade   (0=mild, 3=severe) 1 2 1    ECMO N N N Y/N   Inhaled NO/Flolan Y Y N Y/N   ISHLT PGD Scoring    Grade 0 - PaO2/FiO2 >300 and normal chest radiograph (absence of diffuse allograft infiltrates)  Grade 1 - PaO2/FiO2 >300 and diffuse allograft infiltrates on chest radiograph  Grade 2 - PaO2/FiO2 between 200 and 300  Grade 3 - PaO2/FiO2  <200 and/or on ECMO  Grade U (Ungradable) - on ECMO and normal chest radiograph (absence of diffuse allograft infiltrates)     ID: Prior history of infection/colonization with: n/a  - IgG at one month (ordered 9/15)  - Donor cultures ([facility]) NGTD; ([facility]) NGTD ([facility] personally reviewed today)   - Recipient cultures NGTD  - Continue IV ceftaz/vancomycin for 48h per protocol, will adjust antibiotic plan based on results of the above cultures    PHS risk criteria donor:  Additional labs required post-transplant (between 4-8 weeks post-op): Hepatitis B, Hepatitis C, and HIV by JOVANY ([id number], ordered POD #30, ordered for 9/15).      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 Proceed with J feeds. No need for NPO for 6 weeks.    Diabetes mellitus: Prior to transplant was on Jardiance, dulaglutide, glargine, metformin. All currently held; insulin gtt per primary team.    We appreciate the excellent care provided by the CVTS and CVICU teams.  Recommendations communicated via in person rounding and this note.  Will continue to follow along closely, please do not hesitate to call with any questions or concerns.           History of Present Illness:     History obtained from chart.    Juan J Cartwright is a 59-year-old M with a history of idiopathic pulmonary fibrosis, diabetes, hepatosteatosis, and essential tremor initially listed for lung transplant 7/25/2024.  Prior to transplant, he was on 6 L home oxygen chronically.  He was called and admitted to the hospital 8/14/2024 in anticipation of bilateral sequential lung transplant, which proceeded on cardiopulmonary bypass and without complication on 8/5/2024.  He was admitted to CVICU for postoperative cares.    August 17, 2024 : No AEON. Off pressors. Following commands but intermittently agitated. Per RN, moving all extremities.     Review of Systems:     Complete ROS as above, otherwise severely limited by sedation and intubation.    Medical and  Surgical History:     Past Medical History:   Diagnosis Date    Hepatic steatosis 2017    Noted on ultrasound    S/P lung transplant (H) 08/15/2024    Tremor 05/23/2024     Past Surgical History:   Procedure Laterality Date    BRONCHOSCOPY  8/16/2024    COLONOSCOPY      CV CORONARY ANGIOGRAM N/A 6/21/2024    Procedure: Coronary Angiogram;  Surgeon: Gabriel Julian MD;  Location:  HEART CARDIAC CATH LAB    CV RIGHT HEART CATH MEASUREMENTS RECORDED N/A 6/21/2024    Procedure: Right Heart Catheterization;  Surgeon: Gabriel Julian MD;  Location:  HEART CARDIAC CATH LAB    TRANSPLANT LUNG RECIPIENT SINGLE X2 Bilateral 8/15/2024    Procedure: Clamshell Incision, On Central Venoarterial Extracorporeal Membranous Oxygenation, Bilateral Lung Transplant Recipient, Left atrial appendage ligation, Intraoperative Flexible Bronchoscopy by Anesthesia;  Surgeon: Mulvihill, Michael, MD;  Location:  OR     Social and Family History:     Social History     Socioeconomic History    Marital status: Single     Spouse name: Not on file    Number of children: Not on file    Years of education: Not on file    Highest education level: Not on file   Occupational History    Not on file   Tobacco Use    Smoking status: Former     Types: Cigarettes    Smokeless tobacco: Never   Substance and Sexual Activity    Alcohol use: Not Currently     Comment: not since 2017    Drug use: Never    Sexual activity: Not on file   Other Topics Concern    Not on file   Social History Narrative    Not on file     Social Determinants of Health     Financial Resource Strain: Not on File (8/26/2019)    Received from [facility]    Financial Resource Strain     Financial Resource Strain: 0   Food Insecurity: Not on File (8/26/2019)    Received from [facility]    Food Insecurity     Food: 0   Transportation Needs: Not on File (8/26/2019)    Received from [facility]    Transportation Needs     Transportation: 0   Physical Activity: Not on File (8/26/2019)    Received from  [facility]    Physical Activity     Physical Activity: 0   Stress: Not on File (2019)    Received from [facility]    Stress     Stress: 0   Social Connections: Not on File (2019)    Received from [facility]    Social Connections     Social Connections and Isolation: 0   Interpersonal Safety: Not on file   Housing Stability: Not on File (2019)    Received from [employer]    Housing Stability     [place]ousin     Family History   Problem Relation Age of Onset    Hypertension Mother     Lung Cancer Father         former smoker    Other - See Comments Sister         daughter 26 years    No Known Problems Maternal Grandmother     Lung Cancer Maternal Grandfather         former smoker    No Known Problems Paternal Grandmother     No Known Problems Paternal Grandfather      Allergies and Home Medications:   No Known Allergies  Medications Prior to Admission   Medication Sig Dispense Refill Last Dose    atorvastatin (LIPITOR) 20 MG tablet Take 1 tablet by mouth daily   2024 at am    Dulaglutide (TRULICITY) 3 MG/0.5ML SOPN Inject 3 mg Subcutaneous once a week   2024 at pm    empagliflozin (JARDIANCE) 10 MG TABS tablet Take 1 tablet (10 mg) by mouth daily 90 tablet 1 2024 at pm    insulin glargine (LANTUS PEN) 100 UNIT/ML pen Inject 30 Units subcutaneously at bedtime   2024 at hs    losartan (COZAAR) 50 MG tablet Take 1 tablet by mouth daily   2024 at am    metFORMIN (GLUCOPHAGE XR) 500 MG 24 hr tablet Take 500 mg by mouth 2 times daily (with meals)   2024 at pm    Pirfenidone 801 MG TABS Take 801 mg by mouth 2 times daily   2024 at pm    Continuous Glucose Sensor (DEXCOM G7 SENSOR) MISC Change every 10 days. 1 each 0      Current Scheduled Meds  Current Facility-Administered Medications   Medication Dose Route Frequency Provider Last Rate Last Admin    acetaminophen (TYLENOL) tablet 975 mg  975 mg Oral or Feeding Tube Q8H Marty Jeffers MD   975 mg at 24 0937    acetylcysteine (MUCOMYST) 20 %  nebulizer solution 2 mL  2 mL Nebulization 4x Daily Marty Jeffers MD   2 mL at 08/17/24 0757    amphotericin B (FUNGIZONE) 10 mg/2 mL inhalation solution 10 mg  10 mg Nebulization 2 times daily Marty Jeffers MD   10 mg at 08/17/24 0800    [START ON 8/19/2024] basiliximab (SIMULECT) 20 mg in sodium chloride 0.9 % 50 mL infusion  20 mg Intravenous Once Marty Jeffers MD        [START ON 8/23/2024] calcium carbonate-vitamin D (CALTRATE) 600-10 MG-MCG per tablet 1 tablet  1 tablet Oral or Feeding Tube BID Vivi Bowser, [facility]        cefTAZidime (FORTAZ) 2 g vial to attach to  ml bag for ADULTS or NS 50 ml bag for PEDS  2 g Intravenous Q8H Marty Jeffers MD   2 g at 08/17/24 0944    chlorhexidine (PERIDEX) 0.12 % solution 15 mL  15 mL Mouth/Throat Q12H Kit Hodgson MD   15 mL at 08/17/24 0939    gabapentin (NEURONTIN) capsule 100 mg  100 mg Oral or Feeding Tube TID Marty Jeffers MD   100 mg at 08/17/24 0937    heparin ANTICOAGULANT injection 5,000 Units  5,000 Units Subcutaneous Q8H Barry Laughlin PA-C   5,000 Units at 08/17/24 0603    levalbuterol (XOPENEX) neb solution 1.25 mg  1.25 mg Nebulization 4x Daily Marty Jeffers MD   1.25 mg at 08/17/24 0757    micafungin (MYCAMINE) 100 mg in sodium chloride 0.9 % 100 mL intermittent infusion  100 mg Intravenous Q24H William Jones PA-C 100 mL/hr at 08/17/24 1013 100 mg at 08/17/24 1013    multivitamins w/minerals liquid 15 mL  15 mL Per Feeding Tube Daily William Jones PA-C   15 mL at 08/17/24 1142    mycophenolate (GENERIC EQUIVALENT) capsule 1,000 mg  1,000 mg Oral BID Marty Jeffers MD        Or    mycophenolate (CELLCEPT BRAND) suspension 1,000 mg  1,000 mg Oral or NG Tube BID Marty Jeffers MD   1,000 mg at 08/17/24 0943    pantoprazole (PROTONIX) 2 mg/mL suspension 40 mg  40 mg Oral or Feeding Tube [facility] Vivi Bowser, [facility]   40 mg at 08/17/24 0943    polyethylene glycol (MIRALAX) Packet 17 g  17 g Oral or Feeding Tube Daily Marty Jeffers MD   17 g at  08/17/24 0937    potassium & sodium phosphates (NEUTRA-PHOS) Packet 1 packet  1 packet Oral or Feeding Tube Q4H Mulvihill, Michael, MD   1 packet at 08/17/24 0937    prednisoLONE (ORAPRED) 15 MG/5 ML solution 30 mg  30 mg Oral or NG Tube Daily Marty Jeffers MD   30 mg at 08/17/24 0943    Prosource TF20 ENfit Compatibl EN LIQD (PROSOURCE TF20) packet 60 mL  1 packet Per Feeding Tube BID William Jones PA-C   60 mL at 08/17/24 0943    rosuvastatin (CRESTOR) tablet 10 mg  10 mg Oral Daily Barry Laughlin PA-C   10 mg at 08/17/24 1142    senna-docusate (SENOKOT-S/PERICOLACE) 8.6-50 MG per tablet 2 tablet  2 tablet Oral or Feeding Tube BID Barry Laughlin PA-C   2 tablet at 08/17/24 0937    sodium chloride (PF) 0.9% PF flush 3 mL  3 mL Intracatheter Q8H Marty Jeffers MD   3 mL at 08/16/24 1954    sulfamethoxazole-trimethoprim (BACTRIM/SEPTRA) suspension 80 mg  10 mL Oral or NG Tube Daily William Jones PA-C   80 mg at 08/17/24 0937    Or    sulfamethoxazole-trimethoprim (BACTRIM) 400-80 MG per tablet 1 tablet  1 tablet Oral or NG Tube Daily William Jones PA-C        tacrolimus (GENERIC EQUIVALENT) PO capsule for SUBLINGUAL use 1 mg  1 mg Sublingual BID IS Marty Jeffers MD   1 mg at 08/17/24 0943    [START ON 8/23/2024] valGANciclovir (VALCYTE) solution 900 mg  900 mg Oral or Feeding Tube Daily Mallorie Hoover MD        vancomycin (VANCOCIN) 1,000 mg in 200 mL dextrose intermittent infusion  1,000 mg Intravenous Q12H Marty Jeffers  mL/hr at 08/17/24 0150 1,000 mg at 08/17/24 0150      Current PRN Meds  Current Facility-Administered Medications   Medication Dose Route Frequency Provider Last Rate Last Admin    [START ON 8/18/2024] acetaminophen (TYLENOL) tablet 650 mg  650 mg Oral or Feeding Tube Q4H PRN Marty Jeffers MD        [START ON 8/18/2024] bisacodyl (DULCOLAX) suppository 10 mg  10 mg Rectal Daily PRN Marty Jeffers MD        dextrose 10% infusion   Intravenous Continuous PRN William Jones PA-C         dextrose 10% infusion   Intravenous Continuous PRN Kit Hodgson MD        glucose gel 15-30 g  15-30 g Oral Q15 Min PRN Kit Hodgson MD        Or    dextrose 50 % injection 25-50 mL  25-50 mL Intravenous Q15 Min PRN Kit Hodgson MD        Or    glucagon injection 1 mg  1 mg Subcutaneous Q15 Min PRN Kit Hodgson MD        HYDROmorphone (PF) (DILAUDID) injection 0.2 mg  0.2 mg Intravenous Q2H PRN Marty Jeffers MD        Or    HYDROmorphone (PF) (DILAUDID) injection 0.4 mg  0.4 mg Intravenous Q2H PRN Marty Jeffers MD        lactated ringers BOLUS 500 mL  500 mL Intravenous Q15 Min PRN Marty Jeffers MD   500 mL at 08/16/24 0644    lidocaine (LMX4) kit   Topical Q1H PRN Marty Jeffers MD        lidocaine 1 % 0.1-1 mL  0.1-1 mL Other Q1H PRN Marty Jeffers MD        magnesium hydroxide (MILK OF MAGNESIA) suspension 30 mL  30 mL Oral or Feeding Tube Daily PRN Marty Jeffers MD        propofol (DIPRIVAN) bolus from bag or syringe pump  10 mg Intravenous Q15 Min PRN Kit Hodgson MD   10 mg at 08/17/24 0448    And    Medication Instruction   Does not apply Continuous PRN Kit Hodgson MD        methocarbamol (ROBAXIN) tablet 750 mg  750 mg Oral or Feeding Tube Q6H PRN Marty Jeffers MD        naloxone (NARCAN) injection 0.2 mg  0.2 mg Intravenous Q2 Min PRN Mulvihill, Michael, MD        Or    naloxone (NARCAN) injection 0.4 mg  0.4 mg Intravenous Q2 Min PRN Mulvihill, Michael, MD        Or    naloxone (NARCAN) injection 0.2 mg  0.2 mg Intramuscular Q2 Min PRN Mulvihill, Michael, MD        Or    naloxone (NARCAN) injection 0.4 mg  0.4 mg Intramuscular Q2 Min PRN Mulvihill, Michael, MD        ondansetron (ZOFRAN ODT) ODT tab 4 mg  4 mg Oral Q6H PRN Marty Jeffers MD        Or    ondansetron (ZOFRAN) injection 4 mg  4 mg Intravenous Q6H PRN Marty Jeffers MD        oxyCODONE (ROXICODONE) tablet 5 mg  5 mg Oral or Feeding Tube Q4H PRN Marty Jeffers MD        Or    oxyCODONE IR  "(ROXICODONE) tablet 10 mg  10 mg Oral or Feeding Tube Q4H PRN Marty Jeffers MD        prochlorperazine (COMPAZINE) injection 10 mg  10 mg Intravenous Q6H PRN Marty Jeffers MD        Or    prochlorperazine (COMPAZINE) tablet 10 mg  10 mg Oral or Feeding Tube Q6H PRN Marty Jeffers MD        Reason beta blocker order not selected   Does not apply DOES NOT GO TO Marty Parker MD        sodium chloride (PF) 0.9% PF flush 3 mL  3 mL Intracatheter q1 min prn Marty Jeffers MD            Physical Exam:     All notes, images, and labs from past 24 hours (at minimum) were reviewed.    Vital signs:  Temp: 99.5  F (37.5  C) Temp src: Bladder   Pulse: 90   Resp: 22 SpO2: 93 % O2 Device: Mechanical Ventilator   Height: 177.8 cm (5' 10\") Weight: 79.5 kg (175 lb 4.3 oz)      Intake/Output Summary (Last 24 hours) at 8/16/2024 1559  Last data filed at 8/16/2024 1500  Gross per 24 hour   Intake 98808.63 ml   Output 6760 ml   Net 4973.63 ml     GENERAL: Patient is sedated and intubated in the ICU.  No acute distress.  NOSE: Without deformity, bleeding or discharge.   NECK: [place]-J[place] introducer in place.  Dressing is clean/dry/intact.  LUNGS: No increased work of breathing, no dyssynchrony noted on my exam.  Decent air movement in all anterior lung fields, some coarseness noted bilaterally.  HEART: Regular rate and rhythm.  ABDOMEN: Soft, nontender and nondistended. hepatosplenomegaly and no hernias are noted.  EXTREMITIES: Diffuse anasarca noted.  SKIN: No rashes. No jaundice.    Results:     LABS    CMP:   Recent Labs   Lab 08/17/24  0932 08/17/24  0925 08/17/24  0654 08/17/24  0601 08/17/24  0353 08/17/24  0351 08/16/24  0616 08/16/24  0419 08/15/24  2024 08/15/24  2020 08/15/24  1919 08/15/24  0720 08/15/24  0501 08/15/24  0308 08/14/24  7119   NA  --   --   --   --   --  141  --  138  --  142 142   < > 139  --  141   POTASSIUM  --  3.4  --   --   --  3.4  --  3.8  --  4.6 4.2   < > 4.5  --  3.7   CHLORIDE  --   --   --   --   -- "  108*  --  105  --  107  --   --  106  --  104   CO2  --   --   --   --   --  27  --  24  --  21*  --   --  22  --  23   ANIONGAP  --   --   --   --   --  6*  --  9  --  14  --   --  11  --  14   *  --  131* 133* 136* 142*   < > 199*   < > 145* 124*   < > 132*   < > 123*   BUN  --   --   --   --   --  13.4  --  12.7  --  10.7  --   --  12.9  --  11.9   CR  --   --   --   --   --  0.58*  --  0.62*  --  0.55*  --   --  0.62*  --  0.65*   GFRESTIMATED  --   --   --   --   --  >90  --  >90  --  >90  --   --  >90  --  >90   HUGO  --   --   --   --   --  8.0*  --  7.9*  --  7.8*  --   --  9.6  --  9.9   MAG  --   --   --   --   --  2.0  --  2.0  --  2.2  --   --  2.0  --  2.1   PHOS  --   --   --   --   --  2.0*  --  3.6  --  4.1  --   --  3.0  --  3.8   PROTTOTAL  --   --   --   --   --   --   --   --   --  3.8*  --   --   --   --  7.4   ALBUMIN  --   --   --   --   --   --   --   --   --  2.2*  --   --   --   --  4.3   BILITOTAL  --   --   --   --   --   --   --   --   --  1.4*  --   --   --   --  0.4   ALKPHOS  --   --   --   --   --   --   --   --   --  61  --   --   --   --  89   AST  --   --   --   --   --   --   --   --   --  79*  --   --   --   --  24   ALT  --   --   --   --   --   --   --   --   --  26  --   --   --   --  22    < > = values in this interval not displayed.     CBC:   Recent Labs   Lab 08/17/24  0351 08/16/24  0419 08/15/24  2020 08/15/24  1919 08/15/24  1838 08/15/24  1820   WBC 15.8* 16.4* 25.6*  --   --  22.5*   RBC 4.29* 4.77 4.94  --   --  3.90*   HGB 12.4* 14.0 14.5 12.8*   < > 11.6*   HCT 38.3* 43.0 44.2  --   --  35.8*   MCV 89 90 90  --   --  92   MCH 28.9 29.4 29.4  --   --  29.7   MCHC 32.4 32.6 32.8  --   --  32.4   RDW 13.2 13.0 12.7  --   --  12.7    182 233  --   --  216    < > = values in this interval not displayed.       INR:   Recent Labs   Lab 08/15/24  2020 08/15/24  1820 08/14/24  2338   INR 1.46* 1.74* 1.07       Glucose:   Recent Labs   Lab 08/17/24  0932  "08/17/24  0654 08/17/24  0601 08/17/24  0353 08/17/24  0351 08/17/24  0216   * 131* 133* 136* 142* 161*       Blood Gas:   Recent Labs   Lab 08/17/24  0352 08/16/24  2320 08/16/24  1924 08/16/24  1301 08/16/24  1005 08/16/24  0804 08/16/24  0419   PHV  --   --   --   --  7.36 7.39 7.37   PCO2V  --   --   --   --  48 45 45   PO2V  --   --   --   --  44 38 38   HCO3V  --   --   --   --  27 27 26   EUGENIE  --   --   --   --  0.9 1.6 0.6   O2PER 50 50 50   < > 60 60 65    < > = values in this interval not displayed.       Culture Data No results for input(s): \"CULT\" in the last 168 hours.    Virology Data: No results found for: \"INFLUA\", \"FLUAH1\", \"FLUAH3\", \"JH1609\", \"IFLUB\", \"RSVA\", \"RSVB\", \"PIV1\", \"PIV2\", \"PIV3\", \"HMPV\", \"HRVS\", \"ADVBE\", \"ADVC\"    Historical CMV results (last 3 of prior testing):  No results found for: \"CMVQNT\"  No results found for: \"CMVLOG\"    Urine Studies    Recent Labs   Lab Test 08/15/24  0017 06/18/24  1216   URINEPH 5.0 5.5   NITRITE Negative Negative   LEUKEST Negative Negative   WBCU 1  --      IMAGING    Recent Results (from the past 48 hour(s))   XR Chest 2 Views    Narrative    EXAM: XR CHEST 2 VIEWS  8/14/2024 11:47 PM      HISTORY: pre op lung transplant    COMPARISON: 6/18/2024    FINDINGS: Two views of the chest. Trachea is midline. Stable cardiac  silhouette. Low lung volumes with unchanged reticular fibrotic  opacities. Chronic blunting of the costophrenic sulci. No  pneumothorax.      Impression    IMPRESSION: Sequelae of interstitial lung disease with chronic  fibrotic opacities of the lungs. No appreciable acute airspace disease  identified.    I have personally reviewed the examination and initial interpretation  and I agree with the findings.    FADY MCDONNELL MD         SYSTEM ID:  [id number]       "

## 2024-08-17 NOTE — PROGRESS NOTES
Major Shift Events:  Sedated with propofol and fentanyl. Restless upon awakening, following commands and moves all extremities. PERRL. Increased restlessness this morning, putting hand on ETT, NJ tube, and IV lines and putting leg over edge of bed. CTICU provider updated, order for mitts placed and put on patient. SR, HR 70-80's. MAP goal > 65, able to titrate down to 0.03 norepinephrine. CMV 50%/16/420/8. Nitric weaned down to 1 during shift. Lactic continues to downtrend, 2.2 by AM. Five chest tube to suction, right pleurals with ongoing intermittent air leak. Small amounts of serosanguinous output. NJ with tube feeds advanced per orders, currently at 30 mL/hr. OG to LIS, 200 mL output during shift. Goodrich intact with good urine output. Clamshell incision with wound vac at 125 mmHg. Insulin gtt adjusted to meet BG goal 100-150. Potassium and phosphorus replaced per protocol.    Plan: Discontinue nitric and wean vent settings. PS trial and extubate when appropriate.    For vital signs and complete assessments, please see documentation flowsheets.

## 2024-08-17 NOTE — PROGRESS NOTES
Donor Culture Results:    Preliminary positive donor blood/urine/sputum culture results have been uploaded into [facility]. Donor ID [id number].  Dr. Wright notified of results.

## 2024-08-18 ENCOUNTER — APPOINTMENT (OUTPATIENT)
Dept: GENERAL RADIOLOGY | Facility: CLINIC | Age: 59
End: 2024-08-18
Attending: SURGERY
Payer: COMMERCIAL

## 2024-08-18 LAB
ACID FAST STAIN (ARUP): NORMAL
ACID FAST STAIN (ARUP): NORMAL
ALLEN'S TEST: ABNORMAL
ANION GAP SERPL CALCULATED.3IONS-SCNC: 7 MMOL/L (ref 7–15)
APTT PPP: 34 SECONDS (ref 22–38)
BACTERIA SPEC CULT: NO GROWTH
BASE EXCESS BLDA CALC-SCNC: 7.1 MMOL/L (ref -3–3)
BASOPHILS # BLD AUTO: 0 10E3/UL (ref 0–0.2)
BASOPHILS NFR BLD AUTO: 0 %
BUN SERPL-MCNC: 14.3 MG/DL (ref 8–23)
CALCIUM SERPL-MCNC: 8.2 MG/DL (ref 8.8–10.4)
CHLORIDE SERPL-SCNC: 107 MMOL/L (ref 98–107)
COHGB MFR BLD: 98.2 % (ref 96–97)
CREAT SERPL-MCNC: 0.5 MG/DL (ref 0.67–1.17)
EGFRCR SERPLBLD CKD-EPI 2021: >90 ML/MIN/1.73M2
EOSINOPHIL # BLD AUTO: 0.1 10E3/UL (ref 0–0.7)
EOSINOPHIL NFR BLD AUTO: 1 %
ERYTHROCYTE [DISTWIDTH] IN BLOOD BY AUTOMATED COUNT: 13.2 % (ref 10–15)
GALACTOMANNAN AG BAL QL: NEGATIVE
GALACTOMANNAN AG SPEC IA-ACNC: 0.08
GLUCOSE BLDC GLUCOMTR-MCNC: 109 MG/DL (ref 70–99)
GLUCOSE BLDC GLUCOMTR-MCNC: 113 MG/DL (ref 70–99)
GLUCOSE BLDC GLUCOMTR-MCNC: 117 MG/DL (ref 70–99)
GLUCOSE BLDC GLUCOMTR-MCNC: 122 MG/DL (ref 70–99)
GLUCOSE BLDC GLUCOMTR-MCNC: 123 MG/DL (ref 70–99)
GLUCOSE BLDC GLUCOMTR-MCNC: 125 MG/DL (ref 70–99)
GLUCOSE BLDC GLUCOMTR-MCNC: 131 MG/DL (ref 70–99)
GLUCOSE BLDC GLUCOMTR-MCNC: 134 MG/DL (ref 70–99)
GLUCOSE BLDC GLUCOMTR-MCNC: 135 MG/DL (ref 70–99)
GLUCOSE BLDC GLUCOMTR-MCNC: 136 MG/DL (ref 70–99)
GLUCOSE BLDC GLUCOMTR-MCNC: 142 MG/DL (ref 70–99)
GLUCOSE BLDC GLUCOMTR-MCNC: 143 MG/DL (ref 70–99)
GLUCOSE BLDC GLUCOMTR-MCNC: 171 MG/DL (ref 70–99)
GLUCOSE BLDC GLUCOMTR-MCNC: 95 MG/DL (ref 70–99)
GLUCOSE BLDC GLUCOMTR-MCNC: 96 MG/DL (ref 70–99)
GLUCOSE SERPL-MCNC: 149 MG/DL (ref 70–99)
HCO3 BLD-SCNC: 32 MMOL/L (ref 21–28)
HCO3 SERPL-SCNC: 28 MMOL/L (ref 22–29)
HCT VFR BLD AUTO: 35.9 % (ref 40–53)
HGB BLD-MCNC: 11.8 G/DL (ref 13.3–17.7)
IMM GRANULOCYTES # BLD: 0.1 10E3/UL
IMM GRANULOCYTES NFR BLD: 1 %
LYMPHOCYTES # BLD AUTO: 0.5 10E3/UL (ref 0.8–5.3)
LYMPHOCYTES NFR BLD AUTO: 5 %
MAGNESIUM SERPL-MCNC: 2 MG/DL (ref 1.7–2.3)
MAGNESIUM SERPL-MCNC: 2.1 MG/DL (ref 1.7–2.3)
MCH RBC QN AUTO: 29.6 PG (ref 26.5–33)
MCHC RBC AUTO-ENTMCNC: 32.9 G/DL (ref 31.5–36.5)
MCV RBC AUTO: 90 FL (ref 78–100)
MONOCYTES # BLD AUTO: 0.8 10E3/UL (ref 0–1.3)
MONOCYTES NFR BLD AUTO: 7 %
NEUTROPHILS # BLD AUTO: 9.2 10E3/UL (ref 1.6–8.3)
NEUTROPHILS NFR BLD AUTO: 86 %
NRBC # BLD AUTO: 0 10E3/UL
NRBC BLD AUTO-RTO: 0 /100
O2/TOTAL GAS SETTING VFR VENT: 40 %
PCO2 BLD: 44 MM HG (ref 35–45)
PEEP: 8 CM H2O
PH BLD: 7.47 [PH] (ref 7.35–7.45)
PHOSPHATE SERPL-MCNC: 1.7 MG/DL (ref 2.5–4.5)
PHOSPHATE SERPL-MCNC: 2.2 MG/DL (ref 2.5–4.5)
PLATELET # BLD AUTO: 139 10E3/UL (ref 150–450)
PO2 BLD: 90 MM HG (ref 80–105)
POTASSIUM SERPL-SCNC: 3.5 MMOL/L (ref 3.4–5.3)
POTASSIUM SERPL-SCNC: 4.1 MMOL/L (ref 3.4–5.3)
RBC # BLD AUTO: 3.99 10E6/UL (ref 4.4–5.9)
SAO2 % BLDA: 96 % (ref 92–100)
SODIUM SERPL-SCNC: 142 MMOL/L (ref 135–145)
TACROLIMUS BLD-MCNC: 5.7 UG/L (ref 5–15)
TME LAST DOSE: NORMAL H
TME LAST DOSE: NORMAL H
WBC # BLD AUTO: 10.6 10E3/UL (ref 4–11)

## 2024-08-18 PROCEDURE — 84100 ASSAY OF PHOSPHORUS: CPT | Performed by: SURGERY

## 2024-08-18 PROCEDURE — 94640 AIRWAY INHALATION TREATMENT: CPT

## 2024-08-18 PROCEDURE — 250N000013 HC RX MED GY IP 250 OP 250 PS 637: Performed by: PHYSICIAN ASSISTANT

## 2024-08-18 PROCEDURE — 999N000253 HC STATISTIC WEANING TRIALS

## 2024-08-18 PROCEDURE — 83735 ASSAY OF MAGNESIUM: CPT | Performed by: PHYSICIAN ASSISTANT

## 2024-08-18 PROCEDURE — 85025 COMPLETE CBC W/AUTO DIFF WBC: CPT | Performed by: SURGERY

## 2024-08-18 PROCEDURE — 250N000012 HC RX MED GY IP 250 OP 636 PS 637: Performed by: PHYSICIAN ASSISTANT

## 2024-08-18 PROCEDURE — 250N000013 HC RX MED GY IP 250 OP 250 PS 637

## 2024-08-18 PROCEDURE — 258N000003 HC RX IP 258 OP 636: Performed by: STUDENT IN AN ORGANIZED HEALTH CARE EDUCATION/TRAINING PROGRAM

## 2024-08-18 PROCEDURE — 82805 BLOOD GASES W/O2 SATURATION: CPT | Performed by: SURGERY

## 2024-08-18 PROCEDURE — 74018 RADEX ABDOMEN 1 VIEW: CPT

## 2024-08-18 PROCEDURE — 250N000013 HC RX MED GY IP 250 OP 250 PS 637: Performed by: STUDENT IN AN ORGANIZED HEALTH CARE EDUCATION/TRAINING PROGRAM

## 2024-08-18 PROCEDURE — 250N000009 HC RX 250: Performed by: SURGERY

## 2024-08-18 PROCEDURE — 84100 ASSAY OF PHOSPHORUS: CPT | Performed by: STUDENT IN AN ORGANIZED HEALTH CARE EDUCATION/TRAINING PROGRAM

## 2024-08-18 PROCEDURE — 94668 MNPJ CHEST WALL SBSQ: CPT

## 2024-08-18 PROCEDURE — 250N000012 HC RX MED GY IP 250 OP 636 PS 637: Performed by: SURGERY

## 2024-08-18 PROCEDURE — 80048 BASIC METABOLIC PNL TOTAL CA: CPT | Performed by: PHYSICIAN ASSISTANT

## 2024-08-18 PROCEDURE — 250N000009 HC RX 250: Performed by: PHYSICIAN ASSISTANT

## 2024-08-18 PROCEDURE — 200N000002 HC R&B ICU UMMC

## 2024-08-18 PROCEDURE — 250N000011 HC RX IP 250 OP 636

## 2024-08-18 PROCEDURE — 74018 RADEX ABDOMEN 1 VIEW: CPT | Mod: 26 | Performed by: RADIOLOGY

## 2024-08-18 PROCEDURE — 94003 VENT MGMT INPAT SUBQ DAY: CPT

## 2024-08-18 PROCEDURE — 94640 AIRWAY INHALATION TREATMENT: CPT | Mod: 76

## 2024-08-18 PROCEDURE — 250N000009 HC RX 250

## 2024-08-18 PROCEDURE — 80197 ASSAY OF TACROLIMUS: CPT | Performed by: SURGERY

## 2024-08-18 PROCEDURE — 99233 SBSQ HOSP IP/OBS HIGH 50: CPT | Mod: 24 | Performed by: INTERNAL MEDICINE

## 2024-08-18 PROCEDURE — 71045 X-RAY EXAM CHEST 1 VIEW: CPT

## 2024-08-18 PROCEDURE — 250N000011 HC RX IP 250 OP 636: Performed by: SURGERY

## 2024-08-18 PROCEDURE — 250N000009 HC RX 250: Performed by: STUDENT IN AN ORGANIZED HEALTH CARE EDUCATION/TRAINING PROGRAM

## 2024-08-18 PROCEDURE — 250N000011 HC RX IP 250 OP 636: Performed by: STUDENT IN AN ORGANIZED HEALTH CARE EDUCATION/TRAINING PROGRAM

## 2024-08-18 PROCEDURE — 71045 X-RAY EXAM CHEST 1 VIEW: CPT | Mod: 26 | Performed by: RADIOLOGY

## 2024-08-18 PROCEDURE — 250N000013 HC RX MED GY IP 250 OP 250 PS 637: Performed by: SURGERY

## 2024-08-18 PROCEDURE — 999N000157 HC STATISTIC RCP TIME EA 10 MIN

## 2024-08-18 PROCEDURE — 84132 ASSAY OF SERUM POTASSIUM: CPT | Performed by: PHYSICIAN ASSISTANT

## 2024-08-18 PROCEDURE — 250N000011 HC RX IP 250 OP 636: Performed by: PHYSICIAN ASSISTANT

## 2024-08-18 PROCEDURE — 99291 CRITICAL CARE FIRST HOUR: CPT | Mod: 24 | Performed by: PHYSICIAN ASSISTANT

## 2024-08-18 PROCEDURE — 83735 ASSAY OF MAGNESIUM: CPT | Performed by: SURGERY

## 2024-08-18 PROCEDURE — 271N000002 HC RX 271: Performed by: STUDENT IN AN ORGANIZED HEALTH CARE EDUCATION/TRAINING PROGRAM

## 2024-08-18 PROCEDURE — 85730 THROMBOPLASTIN TIME PARTIAL: CPT

## 2024-08-18 RX ORDER — MAGNESIUM SULFATE HEPTAHYDRATE 40 MG/ML
2 INJECTION, SOLUTION INTRAVENOUS ONCE
Status: COMPLETED | OUTPATIENT
Start: 2024-08-18 | End: 2024-08-18

## 2024-08-18 RX ORDER — POTASSIUM CHLORIDE 1.5 G/1.58G
20 POWDER, FOR SOLUTION ORAL ONCE
Status: COMPLETED | OUTPATIENT
Start: 2024-08-18 | End: 2024-08-18

## 2024-08-18 RX ORDER — HEPARIN SODIUM 5000 [USP'U]/.5ML
5000 INJECTION, SOLUTION INTRAVENOUS; SUBCUTANEOUS EVERY 8 HOURS
Status: COMPLETED | OUTPATIENT
Start: 2024-08-18 | End: 2024-08-18

## 2024-08-18 RX ORDER — POTASSIUM PHOS IN 0.9 % NACL 15MMOL/250
15 PLASTIC BAG, INJECTION (ML) INTRAVENOUS ONCE
Status: COMPLETED | OUTPATIENT
Start: 2024-08-18 | End: 2024-08-18

## 2024-08-18 RX ORDER — VANCOMYCIN HYDROCHLORIDE
1500 EVERY 12 HOURS
Status: DISCONTINUED | OUTPATIENT
Start: 2024-08-18 | End: 2024-08-19

## 2024-08-18 RX ORDER — LIDOCAINE HYDROCHLORIDE 20 MG/ML
5 SOLUTION OROPHARYNGEAL
Status: CANCELLED | OUTPATIENT
Start: 2024-08-18

## 2024-08-18 RX ORDER — FUROSEMIDE 10 MG/ML
40 INJECTION INTRAMUSCULAR; INTRAVENOUS ONCE
Status: COMPLETED | OUTPATIENT
Start: 2024-08-18 | End: 2024-08-18

## 2024-08-18 RX ORDER — ACETAMINOPHEN 325 MG/1
975 TABLET ORAL EVERY 8 HOURS
Status: DISCONTINUED | OUTPATIENT
Start: 2024-08-18 | End: 2024-08-28 | Stop reason: HOSPADM

## 2024-08-18 RX ADMIN — MAGNESIUM SULFATE HEPTAHYDRATE 2 G: 2 INJECTION, SOLUTION INTRAVENOUS at 04:50

## 2024-08-18 RX ADMIN — Medication 75 MCG/HR: at 11:22

## 2024-08-18 RX ADMIN — GABAPENTIN 100 MG: 100 CAPSULE ORAL at 08:02

## 2024-08-18 RX ADMIN — PROPOFOL 20 MCG/KG/MIN: 10 INJECTION, EMULSION INTRAVENOUS at 14:23

## 2024-08-18 RX ADMIN — SODIUM PHOSPHATE, MONOBASIC, MONOHYDRATE AND SODIUM PHOSPHATE, DIBASIC ANHYDROUS 9 MMOL: 142; 276 INJECTION, SOLUTION INTRAVENOUS at 14:44

## 2024-08-18 RX ADMIN — GABAPENTIN 100 MG: 100 CAPSULE ORAL at 19:42

## 2024-08-18 RX ADMIN — OXYCODONE HYDROCHLORIDE 10 MG: 10 TABLET ORAL at 21:59

## 2024-08-18 RX ADMIN — ACETYLCYSTEINE 2 ML: 200 SOLUTION ORAL; RESPIRATORY (INHALATION) at 12:35

## 2024-08-18 RX ADMIN — ACETYLCYSTEINE 2 ML: 200 SOLUTION ORAL; RESPIRATORY (INHALATION) at 20:52

## 2024-08-18 RX ADMIN — HYDROMORPHONE HYDROCHLORIDE 0.2 MG: 1 INJECTION, SOLUTION INTRAMUSCULAR; INTRAVENOUS; SUBCUTANEOUS at 21:17

## 2024-08-18 RX ADMIN — INSULIN HUMAN 5 UNITS/HR: 1 INJECTION, SOLUTION INTRAVENOUS at 14:23

## 2024-08-18 RX ADMIN — POTASSIUM PHOSPHATE, MONOBASIC POTASSIUM PHOSPHATE, DIBASIC 15 MMOL: 224; 236 INJECTION, SOLUTION, CONCENTRATE INTRAVENOUS at 06:02

## 2024-08-18 RX ADMIN — SULFAMETHOXAZOLE AND TRIMETHOPRIM 1 TABLET: 400; 80 TABLET ORAL at 08:02

## 2024-08-18 RX ADMIN — LEVALBUTEROL HYDROCHLORIDE 1.25 MG: 1.25 SOLUTION RESPIRATORY (INHALATION) at 08:32

## 2024-08-18 RX ADMIN — Medication 15 ML: at 12:14

## 2024-08-18 RX ADMIN — Medication 40 MG: at 08:04

## 2024-08-18 RX ADMIN — ACETAMINOPHEN 975 MG: 325 TABLET ORAL at 08:02

## 2024-08-18 RX ADMIN — HYDROMORPHONE HYDROCHLORIDE 0.2 MG: 1 INJECTION, SOLUTION INTRAMUSCULAR; INTRAVENOUS; SUBCUTANEOUS at 19:50

## 2024-08-18 RX ADMIN — TACROLIMUS 1 MG: 1 CAPSULE ORAL at 19:29

## 2024-08-18 RX ADMIN — MYCOPHENOLATE MOFETIL 1000 MG: 200 POWDER, FOR SUSPENSION ORAL at 19:43

## 2024-08-18 RX ADMIN — MICAFUNGIN SODIUM 100 MG: 50 INJECTION, POWDER, LYOPHILIZED, FOR SOLUTION INTRAVENOUS at 13:26

## 2024-08-18 RX ADMIN — LEVALBUTEROL HYDROCHLORIDE 1.25 MG: 1.25 SOLUTION RESPIRATORY (INHALATION) at 20:52

## 2024-08-18 RX ADMIN — Medication 60 ML: at 08:06

## 2024-08-18 RX ADMIN — ACETYLCYSTEINE 2 ML: 200 SOLUTION ORAL; RESPIRATORY (INHALATION) at 08:32

## 2024-08-18 RX ADMIN — Medication: at 18:58

## 2024-08-18 RX ADMIN — Medication: at 19:09

## 2024-08-18 RX ADMIN — PREDNISOLONE 30 MG: 15 SOLUTION ORAL at 08:04

## 2024-08-18 RX ADMIN — METHOCARBAMOL 750 MG: 750 TABLET ORAL at 23:24

## 2024-08-18 RX ADMIN — ACETYLCYSTEINE 2 ML: 200 SOLUTION ORAL; RESPIRATORY (INHALATION) at 16:35

## 2024-08-18 RX ADMIN — Medication 10 MG: at 08:32

## 2024-08-18 RX ADMIN — HYDROMORPHONE HYDROCHLORIDE 0.2 MG: 1 INJECTION, SOLUTION INTRAMUSCULAR; INTRAVENOUS; SUBCUTANEOUS at 21:16

## 2024-08-18 RX ADMIN — POLYETHYLENE GLYCOL 3350 17 G: 17 POWDER, FOR SOLUTION ORAL at 08:01

## 2024-08-18 RX ADMIN — HEPARIN SODIUM 5000 UNITS: 5000 INJECTION, SOLUTION INTRAVENOUS; SUBCUTANEOUS at 22:20

## 2024-08-18 RX ADMIN — INSULIN GLARGINE 30 UNITS: 100 INJECTION, SOLUTION SUBCUTANEOUS at 19:43

## 2024-08-18 RX ADMIN — ACETAMINOPHEN 975 MG: 325 TABLET, FILM COATED ORAL at 23:24

## 2024-08-18 RX ADMIN — CHLORHEXIDINE GLUCONATE 0.12% ORAL RINSE 15 ML: 1.2 LIQUID ORAL at 08:02

## 2024-08-18 RX ADMIN — Medication 10 MG: at 20:52

## 2024-08-18 RX ADMIN — INSULIN GLARGINE 30 UNITS: 100 INJECTION, SOLUTION SUBCUTANEOUS at 08:01

## 2024-08-18 RX ADMIN — ROSUVASTATIN CALCIUM 10 MG: 10 TABLET, FILM COATED ORAL at 08:02

## 2024-08-18 RX ADMIN — GABAPENTIN 100 MG: 100 CAPSULE ORAL at 13:32

## 2024-08-18 RX ADMIN — Medication 60 ML: at 19:44

## 2024-08-18 RX ADMIN — PROPOFOL 20 MCG/KG/MIN: 10 INJECTION, EMULSION INTRAVENOUS at 04:27

## 2024-08-18 RX ADMIN — MYCOPHENOLATE MOFETIL 1000 MG: 200 POWDER, FOR SUSPENSION ORAL at 08:06

## 2024-08-18 RX ADMIN — DEXMEDETOMIDINE HYDROCHLORIDE IN SODIUM CHLORIDE 0.5 MCG/KG/HR: 4 INJECTION INTRAVENOUS at 18:06

## 2024-08-18 RX ADMIN — LEVALBUTEROL HYDROCHLORIDE 1.25 MG: 1.25 SOLUTION RESPIRATORY (INHALATION) at 12:35

## 2024-08-18 RX ADMIN — ACETAMINOPHEN 975 MG: 325 TABLET, FILM COATED ORAL at 16:00

## 2024-08-18 RX ADMIN — POTASSIUM CHLORIDE 20 MEQ: 1.5 POWDER, FOR SOLUTION ORAL at 04:54

## 2024-08-18 RX ADMIN — DEXMEDETOMIDINE HYDROCHLORIDE IN SODIUM CHLORIDE 0.5 MCG/KG/HR: 4 INJECTION INTRAVENOUS at 08:01

## 2024-08-18 RX ADMIN — VANCOMYCIN HYDROCHLORIDE 1500 MG: 10 INJECTION, POWDER, LYOPHILIZED, FOR SOLUTION INTRAVENOUS at 22:00

## 2024-08-18 RX ADMIN — LEVALBUTEROL HYDROCHLORIDE 1.25 MG: 1.25 SOLUTION RESPIRATORY (INHALATION) at 16:34

## 2024-08-18 RX ADMIN — SENNOSIDES AND DOCUSATE SODIUM 2 TABLET: 8.6; 5 TABLET ORAL at 19:42

## 2024-08-18 RX ADMIN — VANCOMYCIN HYDROCHLORIDE 1500 MG: 10 INJECTION, POWDER, LYOPHILIZED, FOR SOLUTION INTRAVENOUS at 10:57

## 2024-08-18 RX ADMIN — FUROSEMIDE 40 MG: 10 INJECTION, SOLUTION INTRAVENOUS at 08:02

## 2024-08-18 RX ADMIN — TACROLIMUS 1 MG: 1 CAPSULE ORAL at 08:05

## 2024-08-18 RX ADMIN — ACETAMINOPHEN 975 MG: 325 TABLET ORAL at 00:02

## 2024-08-18 RX ADMIN — SENNOSIDES AND DOCUSATE SODIUM 2 TABLET: 8.6; 5 TABLET ORAL at 08:02

## 2024-08-18 ASSESSMENT — ACTIVITIES OF DAILY LIVING (ADL)
ADLS_ACUITY_SCORE: 47

## 2024-08-18 NOTE — PLAN OF CARE
Major Shift Events:  Sedated with propofol, precedex and fentanyl. Restless upon awakening, following commands and moves all extremities. Unsecured mitts continued for safety with lines/tubes. SR, HR 70-80's. MAP goal > 65, able to titrate down to 0.03 norepinephrine. Switched back to CMV mode for overnight on vent, 40%/16/420/9. Five chest tubes to suction, no air leak. Serous/serosanguinous output. OG to LIS, 300 mL output during shift. NJ with tube feeds advanced to goal 60 mL/hr. Goodrich intact with good urine output. Clamshell incision with wound vac at 125 mmHg. Insulin gtt adjusted to meet BG goal 100-150. Potassium, magnesium and phosphorus replaced per protocol.     Plan: Place pain catheters today. Pressures support and possible extubation today.     For vital signs and complete assessments, please see documentation flowsheets.

## 2024-08-18 NOTE — CONSULTS
"Pain Service Consultation Note  [facility]      Patient Name: Travon Cartwright  MRN: [id number]   Age: 59 year old  Sex: male  Date: August 18, 2024                                       Referring Provider:  Dr Knight  Referring Service:  CT ICU team  Reason for Consultation: pain catheter s/p lung tx    Assessment/Recommendations:  59 year old male with PMH of ILD, with chronic hypoxic respiratory failure on 6L oxygen at baseline, T2DM, hepatic steatosis and bilateral hand tremor now s/p lung transplant via clamshell incision with Dr Mulvihill on 8/15/24. Consulted for catheter placement in setting of chest tube and imminent ETT extubation.    Plan:   1. Regional Anesthesia/Analgesia  -Continuous Catheter Type/Site: bilateral paravertebral (PV) T7-8  Infusate: 0.2% Ropivacaine  Programmed Intermittent Bolus (PIB) at 7 mL Q60 min via each catheter, total infusion rate of 14 mL/hr    Placed on 8/18. Plan to maintain catheter, max of 7 days    2. Anticoagulation  -Please contact Inpatient Pain Service before ordering or making any anticoagulation changes       3. Multimodal Analgesia  - per primary team    Thank you for the opportunity to participate in the care of Travon Cartwright  Pain Service will continue to follow.    Discussed with attending anesthesiologist  Primary Service Contacted with Recommendations? Yes    Please Page the Pain Team Via [facility]: \"PAIN MANAGEMENT ACUTE INPATIENT/ [facility]\"    Claudy Dykes MD  Anesthesiology Resident, CA-2, PGY-3      Chief Complaint:  Pain      History of Present Illness:  Travon Cartwright is a 59 year old male who is planned for extubation later this evening.  Patient is sedated.    Past Medical History:  Past Medical History:   Diagnosis Date    Hepatic steatosis 2017    Noted on ultrasound    S/P lung transplant (H) 08/15/2024    Tremor 05/23/2024       Family History:    Family History   Problem Relation Age of Onset    " Hypertension Mother     Lung Cancer Father         former smoker    Other - See Comments Sister         daughter 26 years    No Known Problems Maternal Grandmother     Lung Cancer Maternal Grandfather         former smoker    No Known Problems Paternal Grandmother     No Known Problems Paternal Grandfather        Social History:  Social History     Tobacco Use    Smoking status: Former     Types: Cigarettes    Smokeless tobacco: Never   Substance Use Topics    Alcohol use: Not Currently     Comment: not since 2017         Tobacco:   former  ETOH:  not currently  H/O Substance Abuse:  jaja    Review of Systems:  ROS limited d/t pt sedation    Laboratory Results:  Recent Labs   Lab Test 08/18/24  0613 08/18/24  0355 08/16/24  0419 08/15/24  2020   INR  --   --   --  1.46*   PLT  --  139*   < > 233   PTT 34  --   --  42*   BUN  --  14.3   < > 10.7    < > = values in this interval not displayed.       Allergies:  No Known Allergies    Pain Medications:  Pain Medications/Prescriber: none    Current Pain Related Medications:  Medications related to Pain Management (From now, onward)      Start     Dose/Rate Route Frequency Ordered Stop    08/18/24 0000  acetaminophen (TYLENOL) tablet 650 mg         650 mg Oral or Feeding Tube EVERY 4 HOURS PRN 08/15/24 2016      08/18/24 0000  bisacodyl (DULCOLAX) suppository 10 mg         10 mg Rectal DAILY PRN 08/15/24 2016      08/17/24 0800  senna-docusate (SENOKOT-S/PERICOLACE) 8.6-50 MG per tablet 2 tablet         2 tablet Oral or Feeding Tube 2 TIMES DAILY 08/17/24 0705      08/17/24 0000  magnesium hydroxide (MILK OF MAGNESIA) suspension 30 mL         30 mL Oral or Feeding Tube DAILY PRN 08/15/24 2016      08/16/24 0800  polyethylene glycol (MIRALAX) Packet 17 g         17 g Oral or Feeding Tube DAILY 08/15/24 2016      08/16/24 0800  dexmedeTOMIDine (PRECEDEX) 4 mcg/mL in sodium chloride 0.9 % 100 mL infusion         0.1-1.2 mcg/kg/hr × 69.1 kg (Dosing Weight)  1.7-20.7 mL/hr   "Intravenous CONTINUOUS 08/16/24 0740      08/15/24 2300  acetaminophen (TYLENOL) tablet 975 mg         975 mg Oral or Feeding Tube EVERY 8 HOURS 08/15/24 2016 08/18/24 2359    08/15/24 2300  gabapentin (NEURONTIN) capsule 100 mg         100 mg Oral or Feeding Tube 3 TIMES DAILY 08/15/24 2016      08/15/24 2130  fentaNYL (SUBLIMAZE) infusion          mcg/hr  0.5-4 mL/hr  Intravenous CONTINUOUS 08/15/24 2108      08/15/24 2030  propofol (DIPRIVAN) infusion        Placed in \"And\" Linked Group    5-75 mcg/kg/min × 69.1 kg  2.1-31.1 mL/hr  Intravenous CONTINUOUS 08/15/24 2027      08/15/24 2016  HYDROmorphone (PF) (DILAUDID) injection 0.2 mg        Placed in \"Or\" Linked Group    0.2 mg Intravenous EVERY 2 HOURS PRN 08/15/24 2016      08/15/24 2016  HYDROmorphone (PF) (DILAUDID) injection 0.4 mg        Placed in \"Or\" Linked Group    0.4 mg Intravenous EVERY 2 HOURS PRN 08/15/24 2016      08/15/24 2016  oxyCODONE (ROXICODONE) tablet 5 mg        Placed in \"Or\" Linked Group    5 mg Oral or Feeding Tube EVERY 4 HOURS PRN 08/15/24 2016      08/15/24 2016  oxyCODONE IR (ROXICODONE) tablet 10 mg        Placed in \"Or\" Linked Group    10 mg Oral or Feeding Tube EVERY 4 HOURS PRN 08/15/24 2016      08/15/24 2016  methocarbamol (ROBAXIN) tablet 750 mg         750 mg Oral or Feeding Tube EVERY 6 HOURS PRN 08/15/24 2016      08/15/24 2016  lidocaine 1 % 0.1-1 mL         0.1-1 mL Other EVERY 1 HOUR PRN 08/15/24 2016      08/15/24 2016  lidocaine (LMX4) kit          Topical EVERY 1 HOUR PRN 08/15/24 2016      08/15/24 2012  propofol (DIPRIVAN) bolus from bag or syringe pump        Placed in \"And\" Linked Group    10 mg Intravenous EVERY 15 MIN PRN 08/15/24 2027              Physical Exam:  Vitals: BP 94/58   Pulse 73   Temp 36.4  C (97.5  F)   Resp 17   Ht 1.778 m (5' 10\")   Wt 79.3 kg (174 lb 13.2 oz)   SpO2 97%   BMI 25.08 kg/m      Physical Exam:   GEN: sedated, intubated  ENT/NECK: atraumatic, lips and oral mucous " "membranes dry  RESPIRATORY: non-labored breathing, on pressure support on vent  CV: RRR  MSK: Moves all extremities purposefully  NEURO: deferred in setting of iatrogenic sedation    Please see A&P for additional details of medical decision making.    Acute Inpatient Pain Service [facility]  Hours of pain coverage 24/7   Page via [employer]- Please Page the Pain Team Via [employer]: \"PAIN MANAGEMENT ACUTE INPATIENT/ [facility]\"                 "

## 2024-08-18 NOTE — PROGRESS NOTES
CV ICU Progress note      ASSESSMENT: Travon Cartwright is a 59 year old male with PMH of  ILD, with chronic hypoxic respiratory failure on 6L oxygen at baseline, T2DM, hepatic steatosis and bilateral hand tremor now s/p lung transplant via clamshell incision with Dr Mulvihill on 8/15/24.     Today  - 40 IV lasix x1 this AM  - Goal NET -1L again today  - RAPS team consulted this AM for pain catheter placement  - Keep tube feeding at 40 for today. Recheck lytes this afternoon. Concern for refeeding syndrome  - Continue Vancomycin for 10 days. Donor positive for Staph Aureus in sputum.  - Lantus 30 BID for improved glycemic control  - PST with goal to extubate this afternoon after pain catheters have been placed.  - Resume heparin this evening after catheters in per RAPS recommendations.    PLAN:  Neuro/ pain/ sedation:  # Acute postoperative pain  - Scheduled: tylenol   - PRN: oxycodone, dilaudid, robaxin  - RAPS team consult today for possible catheter placement tomorrow.  - Fentanyl gtt    #Sedation  - Gtt: Propofol gtt and precedex gtt if needed. Has been intermittently agitated.  - RAPS for pain catheter placement today.     Pulmonary:  # ILD emphysema on 6L O2 s/p BL lung transplant (8/15)    # Post operative ventilator support   FiO2 (%): 40 %, Resp: 17, Vent Mode: CMV/AC, Resp Rate (Set): 16 breaths/min, Tidal Volume (Set, mL): 420 mL, PEEP (cm H2O): 8 cmH2O, Pressure Support (cm H2O): 5 cmH2O, Resp Rate (Set): 16 breaths/min, Tidal Volume (Set, mL): 420 mL, PEEP (cm H2O): 8 cmH2O   - Goal SpO2 > 92%  - Ventilator bundle. HOB elevation.   - Encourage IS q15-30 minutes when awake.  - Scheduled Mucomyst, Xopenex  - Received Methylprednisolone 125 q8hrs x3 doses, now on Prednisone 30 daily  - PST today with goal to extubate this afternoon after pain catheters placed    Cardiovascular:   # Shock, multifactorial, hypovolemic, distributive  # Hx HTN  # Hx of HLD  - Monitor hemodynamic status. Goal MAP >65, SBP <140  -  Continue crestor 20 daily.  - Holding home PTA losartan  - Levophed gtt, weaning as tolerated    GI care / Nutrition:   # Moderate protein calorie malnutrition  # Concern for refeeding syndrome  # Hepatic steatosis  - Nutrition consulted, appreciate recommendations.   - NJ for TF, advancing to goal currently. Will continue to watch lytes carefully. Leave TF today at 40. Follow up lytes this afternoon and advance as tolerated tomorrow.  - PPI for GI prophylaxis  - Bowel regimen: MiraLAX, senna      Renal / Fluids / Electrolytes:   # Hypophosphatemia  BL creat appears to be ~ 0.62-0.65   - Strict I/O, daily weights, Avoid/limit nephrotoxins as able  - Replete lytes PRN per protocol  - IV lasix 40 x1 today.  - Goal NET negative - 500 to 1L    Endocrine:  # Stress hyperglycemia  # Type 2 diabetes mellitus  - Hgb A1C 8.4%   - Insulin gtt for now.  - Lantus 30 BID. (On Lantus 30 daily at home)  - Goal BG <180 for optimal healing  - Holding home PTA metformin, Jardiance    ID / Antibiotics:  # Stress induced leukocytosis  # Immunosuppression due for lung transplant   # Immunoprophylaxis for lung transplant   - To complete perioperative regimen per transplant protocol.   - will defer changes to immunosuppression to Transplant Pulmonary team    - Mycophenolate, Basiliximab (Simulect), Tacrolimus   - Amphotericin B nebs   - Micafungin   - Vancomycin x 10 days (Donor with positive staph aureus)  - Bactrim to started 08/16 based on donor results.  - Completed Ceftazidime   - Valcyte to start 08/23    Heme:     # Acute blood loss anemia  # Acute blood loss thrombocytopenia  # Coagulopathy  - No s/s of bleeding  - Continue to monitor, Hgb goal > 7.0    MSK / Skin:  # Sternotomy and Surgical Incision  # Weakness and deconditioning    - Postoperative incision management per protocol and sternal precautions  - PT/OT/CR    General cares and Prophylaxis:     - DVT: Mechanical and SQH (held this AM until pain catheters placed)  - GI:  "PPI    Lines / Tubes / Drains:  - ETT  - RIJ CVC  - Arterial Line  - CTs x5  - Goodrich  - NJ tube  - OG tube    Disposition: CVICU    Patient seen, findings and plan discussed with CVICU staff. 65 minutes of discontinuous critical care time spent excluding any procedures performed same day.     Barry Laughlin PA-C      Clinically Significant Risk Factors              # Hypoalbuminemia: Lowest albumin = 2.2 g/dL at 8/15/2024  8:20 PM, will monitor as appropriate  # Coagulation Defect: INR = 1.46 (Ref range: 0.85 - 1.15) and/or PTT = 42 Seconds (Ref range: 22 - 38 Seconds), will monitor for bleeding             # DMII: A1C = 8.4 % (Ref range: <5.7 %) within past 6 months, PRESENT ON ADMISSION  # Overweight: Estimated body mass index is 25.08 kg/m  as calculated from the following:    Height as of this encounter: 1.778 m (5' 10\").    Weight as of this encounter: 79.3 kg (174 lb 13.2 oz)., PRESENT ON ADMISSION  # Moderate Malnutrition: based on nutrition assessment, PRESENT ON ADMISSION               ====================================    Interval Events:  Patient weaned off nitric, weaning FiO2. Pressors off this AM. Plan to pressure support today. Intermittently agitated and mitts placed overnight.  NJ tube placed and confirmed, TF advancing to goals.      OBJECTIVE:  1. VITAL SIGNS:   Temp:  [97  F (36.1  C)-99.7  F (37.6  C)] 97.5  F (36.4  C)  Pulse:  [] 73  Resp:  [15-34] 17  MAP:  [50 mmHg-236 mmHg] 78 mmHg  Arterial Line BP: ()/() 111/60  FiO2 (%):  [40 %] 40 %  SpO2:  [90 %-100 %] 97 %    FiO2 (%): 40 %, Resp: 17, Vent Mode: CMV/AC, Resp Rate (Set): 16 breaths/min, Tidal Volume (Set, mL): 420 mL, PEEP (cm H2O): 8 cmH2O, Pressure Support (cm H2O): 5 cmH2O, Resp Rate (Set): 16 breaths/min, Tidal Volume (Set, mL): 420 mL, PEEP (cm H2O): 8 cmH2O      2. INTAKE/ OUTPUT:   I/O last 3 completed shifts:  In: 3130.14 [I.V.:1530.14; NG/GT:820]  Out: 4115 [Urine:2745; Emesis/NG output:550; Chest " Tube:820]      3. PHYSICAL EXAMINATION:   General: intubated but awakes to voice with intermittent agitation  HEENT: PERRLA. ETT in place   Neuro: agitated, moves all extremities, following commands  Resp: Coarse breath sounds right, diminished lower left  Chest tubes y'ed together, serosanguinous output.  CV: S1, S2, RRR, no m/r/g, no lower extremity swelling  Abdomen: Soft, non-distended, non-tender  Extremities: warm and well perfused, calves soft and compressible. Pulses palpable      4. INVESTIGATIONS:   Arterial Blood Gases   Recent Labs   Lab 08/18/24  0355 08/17/24  0352 08/16/24  2320 08/16/24 1924   PH 7.47* 7.46* 7.45 7.44   PCO2 44 41 42 41   PO2 90 151* 120* 113*   HCO3 32* 29* 29* 28     Complete Blood Count   Recent Labs   Lab 08/18/24  0355 08/17/24  0351 08/16/24  0419 08/15/24  2020   WBC 10.6 15.8* 16.4* 25.6*   HGB 11.8* 12.4* 14.0 14.5   * 194 182 233     Basic Metabolic Panel  Recent Labs   Lab 08/18/24  0611 08/18/24  0514 08/18/24  0355 08/17/24  1546 08/17/24  1542 08/17/24  0932 08/17/24  0925 08/17/24  0353 08/17/24  0351 08/16/24  0616 08/16/24  0419 08/15/24  2024 08/15/24  2020   NA  --   --  142  --   --   --   --   --  141  --  138  --  142   POTASSIUM  --   --  3.5  --  3.9  --  3.4  --  3.4  --  3.8  --  4.6   CHLORIDE  --   --  107  --   --   --   --   --  108*  --  105  --  107   CO2  --   --  28  --   --   --   --   --  27  --  24  --  21*   BUN  --   --  14.3  --   --   --   --   --  13.4  --  12.7  --  10.7   CR  --   --  0.50*  --   --   --   --   --  0.58*  --  0.62*  --  0.55*   * 109* 125*  149*   < >  --    < >  --    < > 142*   < > 199*   < > 145*    < > = values in this interval not displayed.     Liver Function Tests  Recent Labs   Lab 08/15/24  2020 08/15/24  1820 08/14/24  2338   AST 79*  --  24   ALT 26  --  22   ALKPHOS 61  --  89   BILITOTAL 1.4*  --  0.4   ALBUMIN 2.2*  --  4.3   INR 1.46* 1.74* 1.07     Pancreatic Enzymes  No lab results found in  last 7 days.  Coagulation Profile  Recent Labs   Lab 08/15/24  2020 08/15/24  1820 08/14/24  2338   INR 1.46* 1.74* 1.07   PTT 42* 36 32         5. RADIOLOGY:   Recent Results (from the past 24 hour(s))   XR Chest Port 1 View    Impression    RESIDENT PRELIMINARY INTERPRETATION  IMPRESSION:   1. Stable support devices.  2. Mildly increased hazy right basilar opacities. Findings could  represent increased atelectasis. Cannot rule out a superimposed  infectious process.  3. Grossly stable mixed pulmonary opacities on the left.       =========================================

## 2024-08-18 NOTE — PLAN OF CARE
Major Shift Events:  Sedated with RASS -1 to +1. Having more pain today. Sinus rhythm. Levophed titrated to keep MAP >65. Afebrile. Pressure support 5/5 since this am. Plan is still to extubate tonight after ropivacaine comes from pharmacy. OG to LIS. Tube feeding decreased to 40 due to low phos this am. Tolerating well. Phos rechecked and replaced again. BM PTA. Goodrich with adequate UOP. Lasix x1. CT with serosang output.     Plan: Extubate this evening as able    For vital signs and complete assessments, please see documentation flowsheets.

## 2024-08-18 NOTE — PHARMACY-VANCOMYCIN DOSING SERVICE
"Pharmacy Vancomycin Initial Note  Date of Service 2024  Patient's  1965  59 year old, male    Indication: Postoperative Infection    Current estimated CrCl = Estimated Creatinine Clearance: 174 mL/min (A) (based on SCr of 0.5 mg/dL (L)).    Creatinine for last 3 days  8/15/2024:  8:20 PM Creatinine 0.55 mg/dL  2024:  4:19 AM Creatinine 0.62 mg/dL  2024:  3:51 AM Creatinine 0.58 mg/dL  2024:  3:55 AM Creatinine 0.50 mg/dL    Recent Vancomycin Level(s) for last 3 days  No results found for requested labs within last 3 days.      Vancomycin IV Administrations (past 72 hours)                     vancomycin (VANCOCIN) 1,000 mg in 200 mL dextrose intermittent infusion (mg) 1,000 mg New Bag 24 1533     1,000 mg New Bag  0150     1,000 mg New Bag 24 1355     1,000 mg New Bag  0243    vancomycin (VANCOCIN) 1,000 mg in 200 mL dextrose intermittent infusion (mg) 1,000 mg Given 08/15/24 1338                    Nephrotoxins and other renal medications (From now, onward)      Start     Dose/Rate Route Frequency Ordered Stop    24 0900  vancomycin (VANCOCIN) 1,500 mg in 0.9% NaCl 250 mL intermittent infusion         1,500 mg  166.7 mL/hr over 90 Minutes Intravenous EVERY 12 HOURS 24 0848      24 0800  amphotericin B (FUNGIZONE) 10 mg/2 mL inhalation solution 10 mg        Placed in \"And\" Linked Group    10 mg Nebulization 2 TIMES DAILY RT 08/15/24 2016      08/15/24 2300  tacrolimus (GENERIC EQUIVALENT) PO capsule for SUBLINGUAL use 1 mg         1 mg Sublingual 2 TIMES DAILY. 08/15/24 2016      08/15/24 2030  norepinephrine (LEVOPHED) 16 mg in  mL infusion MAX CONC CENTRAL LINE         0.01-0.6 mcg/kg/min × 69.1 kg  0.6-38.9 mL/hr  Intravenous CONTINUOUS 08/15/24 2027              Contrast Orders - past 72 hours (72h ago, onward)      None            [employer] Prediction of Planned Initial Vancomycin Regimen  Loading dose: N/A  Regimen: 1500 mg IV every 12 " hours.  Start time: 03:33 on 08/18/2024  Exposure target: AUC24 (range)400-600 mg/L.hr   AUC24,ss: 554 mg/L.hr  Probability of AUC24 > 400: 80 %  Ctrough,ss: 15.9 mg/L  Probability of Ctrough,ss > 20: 34 %  Probability of nephrotoxicity (Lodise WENDI 2009): 11 %          Plan:  Start vancomycin  1500 mg IV q12h.   Vancomycin monitoring method: AUC  Vancomycin therapeutic monitoring goal: 400-600 mg*h/L  Pharmacy will check vancomycin levels as appropriate in 1-3 Days.    Serum creatinine levels will be ordered daily for the first week of therapy and at least twice weekly for subsequent weeks.      Sven Holloway RP

## 2024-08-18 NOTE — PROGRESS NOTES
On call Pulm Attending Note    Donor's prelim sputum cx is positive for staph aureus from 8/13. Patient's Gr stain and resp culture from yesterday are unremarkable. CXR with atelectasis.  Patient received and completed ulis-op Vanc and ceftaz this afternoon.    No need for further antibiotics at this point. Further evaluation by the Transplant Team in the morning.    Kamryn Wright MD

## 2024-08-18 NOTE — PROGRESS NOTES
Pulmonary Medicine  Cystic Fibrosis - Lung Transplant Team    Patient: Travon Cartwright  MRN: [id number]  : 1965 (age 59 year old)  Transplant: 8/15/2024 (Lung), POD#3  Admission date: 2024  Primary Care Provider: Dwayne Thomas    Assessment & Plan:     Travon Cartwright is a 59 year old male with a PMH diabetes mellitus, hepatosteatosis, and essential tremor.  Pt. is now s/p BSLT on 8/15/2024 with Dr. Mulvihill.  Surgery was uncomplicated and done on pump.      Recommendations:    Ventilator support weaning per CVICU-Agree with SBT as tolerated today and potential extubation  Daily CXR  Follow bronchoscopy cultures from -NGTD  Chest tubes per CVTS  Continue cautious diuresis, aim for I/O negative  500 cc-1 liter for 24 hours. Avoid over-diuresis.  Continue TF-gradual titration to goal, ensure BM  IS: Daily tacro levels. No changes today. Plan for transition of tacrolimus to enteral administration tomorrow.   PPX: As below.   Abx-Resume vancomycin IV for donor staph aureus-modify based on sensitivity-plan for 10 days (total)  Consult RAPS for pain catheter placement (prior to extubation)-anticoagulation management per RAPS team    Fili Knight MD FCCP  Associate Professor of Medicine  Division of Pulmonary, Allergy & Critical Care   [facility]  [facility]      S/p bilateral sequential lung transplant (BSLT) 8/15/24 for idiopathic pulmonary fibrosis:  Acute on chronic hypoxic respiratory failure (post operative):     - Mechanical ventilatory support wean per CVICU team  - Nebs: levalbuterol and Mucomyst QID  - Aggressive pulmonary toilet with chest physiotherapy QID (addition of Aerobika and incentive spirometry once extubated)  - DSA at one week (ordered ) then one month post-transplant (additionally per protocol)  - Ammonia monitoring qMTh (screening for hyperammonemia post-lung transplant) with ureaplasma PCR ordered POD #7 ()  per protocol   - Follow bronchoscopy studies from 8/16- NGTD  - Anesthesia to place epidural catheters prior to anticipated extubation per our routine  - Chest tubes managed by surgical team  - Daily CXR while chest tubes remain-today with persistent left side infiltrates, improvement on the right  - Consult RAPS team for anticipated extubation   - Advance  TF to goal (post pyloric FT +)  - SLP consult as pt. nears extubation for swallowing evaluation and FEES prior to PO  - Await explant pathology    Immunosuppression:  Induction therapy with high dose IV steroid intraoperatively and basiliximab (POD #0 and #4 [8/19], ordered).   - Tacrolimus 1 mg SL BID.  Goal tacrolimus level 8-12. Tacro levels daily. Continuing SL dosing with daily tacrolimus levels pending return of bowel function post-op.    - MMF 1000 mg BID  - Methylprednisolone 125 mg x 3 doses, then prednisolone 30 mg daily with taper per lung transplant protocol:  Date Daily Dose (mg)   8/17/2024 30   8/24/2024 25   8/31/2024 20   9/21/2024 15   10/12/2024 10   11/2/2024 5     Prophylaxis:     - Bactrim for PJP ppx started POD #1 due to donor toxoplasma IgG+  - VGCV for CMV ppx (ordered as below), CMV monitoring per protocol (after completion of ppx course, additionally prn)  - Ampho B nebs twice weekly for antifungal ppx through discharge, then will stop  - Nystatin for oral candidiasis ppx, 6 month course - holding while on micafungin  - [id number] q24h for prophylaxis x 10 days  - See below for serologies and viral ppx:   Donor Recipient Intervention   CMV status - + Valganciclovir POD #8-90   EBV status - + EBV monthly   HSV status N/A + none      Primary graft dysfunction (per ISHLT guidelines):  See chart below:   POD #0  (~0 hours) POD #1  (~24 hours) POD #2   (~48 hours) POD#3   (~72 hours)   Date 8/15 8/16 8/17 8/18   Time 1446 1301 0352 0355   Intubated Y Y Y Y   PaO2 425 149 151 90   FiO2 80 60 50 40   P/F Ratio 531 248 302 225   PGD Grade    (0=mild, 3=severe) 1 2 1 2   ECMO N N N N   Inhaled NO/Flolan Y Y N N   ISHLT PGD Scoring    Grade 0 - PaO2/FiO2 >300 and normal chest radiograph (absence of diffuse allograft infiltrates)  Grade 1 - PaO2/FiO2 >300 and diffuse allograft infiltrates on chest radiograph  Grade 2 - PaO2/FiO2 between 200 and 300  Grade 3 - PaO2/FiO2 <200 and/or on ECMO  Grade U (Ungradable) - on ECMO and normal chest radiograph (absence of diffuse allograft infiltrates)     ID: Prior history of infection/colonization with: n/a  - IgG at one month (ordered 9/15)  - Donor cultures ([facility]) NGTD; ([facility]) NGTD ([facility] personally reviewed today) -staph aueus +  - Recipient cultures-washing from left-strep constellatus  - Abx- Completed IV ceftaz. Vancomycin resumed 8/17-plan for total 10 days ( end date 8/24)    PHS risk criteria donor:  Additional labs required post-transplant (between 4-8 weeks post-op): Hepatitis B, Hepatitis C, and HIV by JOVANY ([id number], ordered POD #30, ordered for 9/15).      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 Proceed with J feeds. No need for NPO for 6 weeks.    Diabetes mellitus: Prior to transplant was on Jardiance, dulaglutide, glargine, metformin. All currently held; insulin gtt per primary team-->transition to Lantus.     We appreciate the excellent care provided by the CVTS and CVICU teams.  Recommendations communicated via in person rounding and this note.  Will continue to follow along closely, please do not hesitate to call with any questions or concerns.           History of Present Illness:     History obtained from chart.    Juan J Cartwright is a 59-year-old M with a history of idiopathic pulmonary fibrosis, diabetes, hepatosteatosis, and essential tremor initially listed for lung transplant 7/25/2024.  Prior to transplant, he was on 6 L home oxygen chronically.  He was called and admitted to the hospital 8/14/2024 in anticipation of bilateral sequential lung transplant, which  proceeded on cardiopulmonary bypass and without complication on 8/5/2024.  He was admitted to CVICU for postoperative cares.    August 17, 2024 : No AEON. Off pressors. Following commands but intermittently agitated. Per RN, moving all extremities.     August 18, 2024 : NAEON. Agitated but following commands. On low dose levophed.     Review of Systems:     Complete ROS as above, otherwise severely limited by sedation and intubation.    Medical and Surgical History:     Past Medical History:   Diagnosis Date    Hepatic steatosis 2017    Noted on ultrasound    S/P lung transplant (H) 08/15/2024    Tremor 05/23/2024     Past Surgical History:   Procedure Laterality Date    BRONCHOSCOPY  8/16/2024    COLONOSCOPY      CV CORONARY ANGIOGRAM N/A 6/21/2024    Procedure: Coronary Angiogram;  Surgeon: Gabriel Julian MD;  Location:  HEART CARDIAC CATH LAB    CV RIGHT HEART CATH MEASUREMENTS RECORDED N/A 6/21/2024    Procedure: Right Heart Catheterization;  Surgeon: Gabriel Julian MD;  Location:  HEART CARDIAC CATH LAB    TRANSPLANT LUNG RECIPIENT SINGLE X2 Bilateral 8/15/2024    Procedure: Clamshell Incision, On Central Venoarterial Extracorporeal Membranous Oxygenation, Bilateral Lung Transplant Recipient, Left atrial appendage ligation, Intraoperative Flexible Bronchoscopy by Anesthesia;  Surgeon: Mulvihill, Michael, MD;  Location:  OR     Social and Family History:     Social History     Socioeconomic History    Marital status: Single     Spouse name: Not on file    Number of children: Not on file    Years of education: Not on file    Highest education level: Not on file   Occupational History    Not on file   Tobacco Use    Smoking status: Former     Types: Cigarettes    Smokeless tobacco: Never   Substance and Sexual Activity    Alcohol use: Not Currently     Comment: not since 2017    Drug use: Never    Sexual activity: Not on file   Other Topics Concern    Not on file   Social History Narrative    Not on file      Social Determinants of Health     Financial Resource Strain: Not on File (2019)    Received from [employer]    Financial Resource Strain     Financial Resource Strain: 0   Food Insecurity: Not on File (2019)    Received from [employer]    Food Insecurity     Food: 0   Transportation Needs: Not on File (2019)    Received from [employer]    Transportation Needs     Transportation: 0   Physical Activity: Not on File (2019)    Received from [employer]    Physical Activity     Physical Activity: 0   Stress: Not on File (2019)    Received from [employer]    Stress     Stress: 0   Social Connections: Not on File (2019)    Received from [employer]    Social Connections     Social Connections and Isolation: 0   Interpersonal Safety: Not on file   Housing Stability: Not on File (2019)    Received from [employer]    Housing Stability     Housin     Family History   Problem Relation Age of Onset    Hypertension Mother     Lung Cancer Father         former smoker    Other - See Comments Sister         daughter 26 years    No Known Problems Maternal Grandmother     Lung Cancer Maternal Grandfather         former smoker    No Known Problems Paternal Grandmother     No Known Problems Paternal Grandfather      Allergies and Home Medications:   No Known Allergies  Medications Prior to Admission   Medication Sig Dispense Refill Last Dose    atorvastatin (LIPITOR) 20 MG tablet Take 1 tablet by mouth daily   2024 at am    Dulaglutide (TRULICITY) 3 MG/0.5ML SOPN Inject 3 mg Subcutaneous once a week   2024 at pm    empagliflozin (JARDIANCE) 10 MG TABS tablet Take 1 tablet (10 mg) by mouth daily 90 tablet 1 2024 at pm    insulin glargine (LANTUS PEN) 100 UNIT/ML pen Inject 30 Units subcutaneously at bedtime   2024 at hs    losartan (COZAAR) 50 MG tablet Take 1 tablet by mouth daily   2024 at am    metFORMIN (GLUCOPHAGE XR) 500 MG 24 hr tablet Take 500 mg by mouth 2 times daily (with meals)   2024 at pm     Pirfenidone 801 MG TABS Take 801 mg by mouth 2 times daily   8/13/2024 at pm    Continuous Glucose Sensor (DEXCOM G7 SENSOR) MISC Change every 10 days. 1 each 0      Current Scheduled Meds  Current Facility-Administered Medications   Medication Dose Route Frequency Provider Last Rate Last Admin    acetaminophen (TYLENOL) tablet 975 mg  975 mg Oral or Feeding Tube Q8H Marty Jeffers MD   975 mg at 08/18/24 0802    acetylcysteine (MUCOMYST) 20 % nebulizer solution 2 mL  2 mL Nebulization 4x Daily Marty Jeffers MD   2 mL at 08/18/24 0832    amphotericin B (FUNGIZONE) 10 mg/2 mL inhalation solution 10 mg  10 mg Nebulization 2 times daily Marty Jeffers MD   10 mg at 08/18/24 0832    [START ON 8/19/2024] basiliximab (SIMULECT) 20 mg in sodium chloride 0.9 % 50 mL infusion  20 mg Intravenous Once Marty Jeffers MD        [START ON 8/23/2024] calcium carbonate-vitamin D (CALTRATE) 600-10 MG-MCG per tablet 1 tablet  1 tablet Oral or Feeding Tube BID Vivi Bowser, RP        chlorhexidine (PERIDEX) 0.12 % solution 15 mL  15 mL Mouth/Throat Q12H Kit Hodgson MD   15 mL at 08/18/24 0802    gabapentin (NEURONTIN) capsule 100 mg  100 mg Oral or Feeding Tube TID Marty Jeffers MD   100 mg at 08/18/24 0802    insulin glargine (LANTUS PEN) injection 30 Units  30 Units Subcutaneous BID Barry Laughlin PA-C   30 Units at 08/18/24 0801    levalbuterol (XOPENEX) neb solution 1.25 mg  1.25 mg Nebulization 4x Daily Marty Jeffers MD   1.25 mg at 08/18/24 0832    micafungin (MYCAMINE) 100 mg in sodium chloride 0.9 % 100 mL intermittent infusion  100 mg Intravenous Q24H William Jones PA-C 100 mL/hr at 08/17/24 1013 100 mg at 08/17/24 1013    multivitamins w/minerals liquid 15 mL  15 mL Per Feeding Tube Daily William Jones PA-C   15 mL at 08/17/24 1142    mycophenolate (GENERIC EQUIVALENT) capsule 1,000 mg  1,000 mg Oral BID Marty Jeffers MD        Or    mycophenolate (CELLCEPT BRAND) suspension 1,000 mg  1,000 mg Oral or NG  Tube BID Marty Jeffers MD   1,000 mg at 08/18/24 0806    pantoprazole (PROTONIX) 2 mg/mL suspension 40 mg  40 mg Oral or Feeding Tube [facility] Vivi Bowser, [facility]   40 mg at 08/18/24 0804    polyethylene glycol (MIRALAX) Packet 17 g  17 g Oral or Feeding Tube Daily Marty Jeffers MD   17 g at 08/18/24 0801    prednisoLONE (ORAPRED) 15 MG/5 ML solution 30 mg  30 mg Oral or NG Tube Daily Marty Jeffers MD   30 mg at 08/18/24 0804    Prosource TF20 ENfit Compatibl EN LIQD (PROSOURCE TF20) packet 60 mL  1 packet Per Feeding Tube BID William Jones PA-C   60 mL at 08/18/24 0806    rosuvastatin (CRESTOR) tablet 10 mg  10 mg Oral Daily Barry Laughlin PA-C   10 mg at 08/18/24 0802    senna-docusate (SENOKOT-S/PERICOLACE) 8.6-50 MG per tablet 2 tablet  2 tablet Oral or Feeding Tube BID Barry Laughlin PA-C   2 tablet at 08/18/24 0802    sodium chloride (PF) 0.9% PF flush 3 mL  3 mL Intracatheter Q8H Marty Jeffers MD   3 mL at 08/16/24 1954    sulfamethoxazole-trimethoprim (BACTRIM/SEPTRA) suspension 80 mg  10 mL Oral or NG Tube Daily William Jones PA-C   80 mg at 08/17/24 0937    Or    sulfamethoxazole-trimethoprim (BACTRIM) 400-80 MG per tablet 1 tablet  1 tablet Oral or NG Tube Daily William Jones PA-C   1 tablet at 08/18/24 0802    tacrolimus (GENERIC EQUIVALENT) PO capsule for SUBLINGUAL use 1 mg  1 mg Sublingual BID IS Marty Jeffers MD   1 mg at 08/18/24 0805    [START ON 8/23/2024] valGANciclovir (VALCYTE) solution 900 mg  900 mg Oral or Feeding Tube Daily Mallorie Hoover MD        vancomycin (VANCOCIN) 1,500 mg in 0.9% NaCl 250 mL intermittent infusion  1,500 mg Intravenous Q12H Mulvihill, Michael, .7 mL/hr at 08/18/24 1057 1,500 mg at 08/18/24 1057      Current PRN Meds  Current Facility-Administered Medications   Medication Dose Route Frequency Provider Last Rate Last Admin    acetaminophen (TYLENOL) tablet 650 mg  650 mg Oral or Feeding Tube Q4H PRN Marty Jeffers MD        bisacodyl (DULCOLAX)  suppository 10 mg  10 mg Rectal Daily PRN Marty Jeffers MD        dextrose 10% infusion   Intravenous Continuous PRN William Jones PA-C        dextrose 10% infusion   Intravenous Continuous PRN Kit Hodgson MD        glucose gel 15-30 g  15-30 g Oral Q15 Min PRN Kit Hodgson MD        Or    dextrose 50 % injection 25-50 mL  25-50 mL Intravenous Q15 Min PRN Kit Hodgson MD        Or    glucagon injection 1 mg  1 mg Subcutaneous Q15 Min PRN Kit Hodgson MD        HYDROmorphone (PF) (DILAUDID) injection 0.2 mg  0.2 mg Intravenous Q2H PRN Marty Jeffers MD        Or    HYDROmorphone (PF) (DILAUDID) injection 0.4 mg  0.4 mg Intravenous Q2H PRN Marty Jeffers MD        lactated ringers BOLUS 500 mL  500 mL Intravenous Q15 Min PRN Marty Jeffers MD   500 mL at 08/16/24 0644    lidocaine (LMX4) kit   Topical Q1H PRN Marty Jeffers MD        lidocaine 1 % 0.1-1 mL  0.1-1 mL Other Q1H PRN Marty Jeffers MD        magnesium hydroxide (MILK OF MAGNESIA) suspension 30 mL  30 mL Oral or Feeding Tube Daily PRN Marty Jeffers MD        propofol (DIPRIVAN) bolus from bag or syringe pump  10 mg Intravenous Q15 Min PRN Kit Hodgson MD   10 mg at 08/17/24 2036    And    Medication Instruction   Does not apply Continuous PRN Kit Hodgson MD        methocarbamol (ROBAXIN) tablet 750 mg  750 mg Oral or Feeding Tube Q6H PRN Marty Jeffers MD        naloxone (NARCAN) injection 0.2 mg  0.2 mg Intravenous Q2 Min PRN Mulvihill, Michael, MD        Or    naloxone (NARCAN) injection 0.4 mg  0.4 mg Intravenous Q2 Min PRN Mulvihill, Michael, MD        Or    naloxone (NARCAN) injection 0.2 mg  0.2 mg Intramuscular Q2 Min PRN Mulvihill, Michael, MD        Or    naloxone (NARCAN) injection 0.4 mg  0.4 mg Intramuscular Q2 Min PRN Mulvihill, Michael, MD        ondansetron (ZOFRAN ODT) ODT tab 4 mg  4 mg Oral Q6H PRN Marty Jeffers MD        Or    ondansetron (ZOFRAN) injection 4 mg  4 mg Intravenous Q6H PRN  "Marty Jeffers MD        oxyCODONE (ROXICODONE) tablet 5 mg  5 mg Oral or Feeding Tube Q4H PRN Marty Jeffers MD        Or    oxyCODONE IR (ROXICODONE) tablet 10 mg  10 mg Oral or Feeding Tube Q4H PRN Marty Jeffers MD        prochlorperazine (COMPAZINE) injection 10 mg  10 mg Intravenous Q6H PRN Marty Jeffers MD        Or    prochlorperazine (COMPAZINE) tablet 10 mg  10 mg Oral or Feeding Tube Q6H PRN Marty Jeffers MD        Reason beta blocker order not selected   Does not apply DOES NOT GO TO Marty Parker MD        sodium chloride (PF) 0.9% PF flush 3 mL  3 mL Intracatheter q1 min prn Marty Jeffers MD            Physical Exam:     All notes, images, and labs from past 24 hours (at minimum) were reviewed.    Vital signs:  Temp: 98.2  F (36.8  C) Temp src: Bladder   Pulse: 85   Resp: 18 SpO2: 93 % O2 Device: Mechanical Ventilator   Height: 177.8 cm (5' 10\") Weight: 79.3 kg (174 lb 13.2 oz)      Intake/Output Summary (Last 24 hours) at 8/16/2024 1559      Intake/Output Summary (Last 24 hours) at 8/18/2024 1148  Last data filed at 8/18/2024 1122  Gross per 24 hour   Intake 3222.56 ml   Output 5428 ml   Net -2205.44 ml        GENERAL: Patient is sedated and intubated in the ICU.  No acute distress.  NOSE: Without deformity, bleeding or discharge.   NECK: Normal  LUNGS: No increased work of breathing, no dyssynchrony noted on my exam.  Decent air movement in all anterior lung fields, some coarseness noted bilaterally. Chest tube + (no hemorrhagic output)  HEART: Regular rate and rhythm.  ABDOMEN: Soft, nontender and nondistended. hepatosplenomegaly and no hernias are noted.  EXTREMITIES: Diffuse anasarca noted.  SKIN: No rashes. No jaundice.    Results:     LABS    CMP:   Recent Labs   Lab 08/18/24  1123 08/18/24  0948 08/18/24  0817 08/18/24  0611 08/18/24  0514 08/18/24  0355 08/17/24  1546 08/17/24  1542 08/17/24  0932 08/17/24  0925 08/17/24  0353 08/17/24  0351 08/16/24  0616 08/16/24  0419 08/15/24  2024 " 08/15/24  2020 08/15/24  0308 08/14/24  2338   NA  --   --   --   --   --  142  --   --   --   --   --  141  --  138  --  142   < > 141   POTASSIUM  --   --   --   --   --  3.5  --  3.9  --  3.4  --  3.4  --  3.8  --  4.6   < > 3.7   CHLORIDE  --   --   --   --   --  107  --   --   --   --   --  108*  --  105  --  107   < > 104   CO2  --   --   --   --   --  28  --   --   --   --   --  27  --  24  --  21*   < > 23   ANIONGAP  --   --   --   --   --  7  --   --   --   --   --  6*  --  9  --  14   < > 14   * 171* 122* 143*   < > 125*  149*   < >  --    < >  --    < > 142*   < > 199*   < > 145*   < > 123*   BUN  --   --   --   --   --  14.3  --   --   --   --   --  13.4  --  12.7  --  10.7   < > 11.9   CR  --   --   --   --   --  0.50*  --   --   --   --   --  0.58*  --  0.62*  --  0.55*   < > 0.65*   GFRESTIMATED  --   --   --   --   --  >90  --   --   --   --   --  >90  --  >90  --  >90   < > >90   HUGO  --   --   --   --   --  8.2*  --   --   --   --   --  8.0*  --  7.9*  --  7.8*   < > 9.9   MAG  --   --   --   --   --  2.0  --   --   --   --   --  2.0  --  2.0  --  2.2   < > 2.1   PHOS  --   --   --   --   --  1.7*  --   --   --   --   --  2.0*  --  3.6  --  4.1   < > 3.8   PROTTOTAL  --   --   --   --   --   --   --   --   --   --   --   --   --   --   --  3.8*  --  7.4   ALBUMIN  --   --   --   --   --   --   --   --   --   --   --   --   --   --   --  2.2*  --  4.3   BILITOTAL  --   --   --   --   --   --   --   --   --   --   --   --   --   --   --  1.4*  --  0.4   ALKPHOS  --   --   --   --   --   --   --   --   --   --   --   --   --   --   --  61  --  89   AST  --   --   --   --   --   --   --   --   --   --   --   --   --   --   --  79*  --  24   ALT  --   --   --   --   --   --   --   --   --   --   --   --   --   --   --  26  --  22    < > = values in this interval not displayed.     CBC:   Recent Labs   Lab 08/18/24  0355 08/17/24  0351 08/16/24  0419 08/15/24  2020   WBC 10.6 15.8* 16.4* 25.6*  "  RBC 3.99* 4.29* 4.77 4.94   HGB 11.8* 12.4* 14.0 14.5   HCT 35.9* 38.3* 43.0 44.2   MCV 90 89 90 90   MCH 29.6 28.9 29.4 29.4   MCHC 32.9 32.4 32.6 32.8   RDW 13.2 13.2 13.0 12.7   * 194 182 233       INR:   Recent Labs   Lab 08/15/24  2020 08/15/24  1820 08/14/24  2338   INR 1.46* 1.74* 1.07       Glucose:   Recent Labs   Lab 08/18/24  1123 08/18/24  0948 08/18/24  0817 08/18/24  0611 08/18/24  0514 08/18/24  0355   * 171* 122* 143* 109* 125*  149*       Blood Gas:   Recent Labs   Lab 08/18/24  0355 08/17/24  0352 08/16/24  2320 08/16/24  1301 08/16/24  1005 08/16/24  0804 08/16/24  0419   PHV  --   --   --   --  7.36 7.39 7.37   PCO2V  --   --   --   --  48 45 45   PO2V  --   --   --   --  44 38 38   HCO3V  --   --   --   --  27 27 26   EUGENIE  --   --   --   --  0.9 1.6 0.6   O2PER 40 50 50   < > 60 60 65    < > = values in this interval not displayed.       Culture Data No results for input(s): \"CULT\" in the last 168 hours.    Virology Data: No results found for: \"INFLUA\", \"FLUAH1\", \"FLUAH3\", \"KG1075\", \"IFLUB\", \"RSVA\", \"RSVB\", \"PIV1\", \"PIV2\", \"PIV3\", \"HMPV\", \"HRVS\", \"ADVBE\", \"ADVC\"    Historical CMV results (last 3 of prior testing):  No results found for: \"CMVQNT\"  No results found for: \"CMVLOG\"    Urine Studies    Recent Labs   Lab Test 08/15/24  0017 06/18/24  1216   URINEPH 5.0 5.5   NITRITE Negative Negative   LEUKEST Negative Negative   WBCU 1  --      IMAGING    Recent Results (from the past 48 hour(s))   XR Chest 2 Views    Narrative    EXAM: XR CHEST 2 VIEWS  8/14/2024 11:47 PM      HISTORY: pre op lung transplant    COMPARISON: 6/18/2024    FINDINGS: Two views of the chest. Trachea is midline. Stable cardiac  silhouette. Low lung volumes with unchanged reticular fibrotic  opacities. Chronic blunting of the costophrenic sulci. No  pneumothorax.      Impression    IMPRESSION: Sequelae of interstitial lung disease with chronic  fibrotic opacities of the lungs. No appreciable acute airspace " disease  identified.    I have personally reviewed the examination and initial interpretation  and I agree with the findings.    FADY MCDONNELL MD         SYSTEM ID:  [id number]

## 2024-08-19 ENCOUNTER — APPOINTMENT (OUTPATIENT)
Dept: GENERAL RADIOLOGY | Facility: CLINIC | Age: 59
End: 2024-08-19
Attending: STUDENT IN AN ORGANIZED HEALTH CARE EDUCATION/TRAINING PROGRAM
Payer: COMMERCIAL

## 2024-08-19 ENCOUNTER — APPOINTMENT (OUTPATIENT)
Dept: SPEECH THERAPY | Facility: CLINIC | Age: 59
End: 2024-08-19
Attending: SURGERY
Payer: COMMERCIAL

## 2024-08-19 ENCOUNTER — APPOINTMENT (OUTPATIENT)
Dept: PHYSICAL THERAPY | Facility: CLINIC | Age: 59
End: 2024-08-19
Attending: SURGERY
Payer: COMMERCIAL

## 2024-08-19 LAB
ALBUMIN SERPL BCG-MCNC: 2.6 G/DL (ref 3.5–5.2)
ALBUMIN UR-MCNC: NEGATIVE MG/DL
ALLEN'S TEST: ABNORMAL
ALLEN'S TEST: ABNORMAL
ALP SERPL-CCNC: 76 U/L (ref 40–150)
ALT SERPL W P-5'-P-CCNC: 22 U/L (ref 0–70)
AMMONIA PLAS-SCNC: 33 UMOL/L (ref 16–60)
AMORPH CRY #/AREA URNS HPF: ABNORMAL /HPF
ANION GAP SERPL CALCULATED.3IONS-SCNC: 6 MMOL/L (ref 7–15)
APPEARANCE UR: CLEAR
AST SERPL W P-5'-P-CCNC: 47 U/L (ref 0–45)
BASE EXCESS BLDA CALC-SCNC: 4.2 MMOL/L (ref -3–3)
BASE EXCESS BLDA CALC-SCNC: 4.3 MMOL/L (ref -3–3)
BASOPHILS # BLD AUTO: 0 10E3/UL (ref 0–0.2)
BASOPHILS NFR BLD AUTO: 0 %
BILIRUB DIRECT SERPL-MCNC: 0.24 MG/DL (ref 0–0.3)
BILIRUB SERPL-MCNC: 0.4 MG/DL
BILIRUB UR QL STRIP: NEGATIVE
BUN SERPL-MCNC: 15.4 MG/DL (ref 8–23)
CALCIUM SERPL-MCNC: 8.4 MG/DL (ref 8.8–10.4)
CELL TYPE ALLO: NORMAL
CELL TYPE AUTO: NORMAL
CHANNELSHIFTALLOB1: -96
CHANNELSHIFTALLOB2: -99
CHANNELSHIFTALLOT1: -92
CHANNELSHIFTALLOT2: -108
CHANNELSHIFTAUTOB1: -82
CHANNELSHIFTAUTOB2: -81
CHANNELSHIFTAUTOT1: -53
CHANNELSHIFTAUTOT2: -50
CHLORIDE SERPL-SCNC: 106 MMOL/L (ref 98–107)
COHGB MFR BLD: 90.2 % (ref 96–97)
COHGB MFR BLD: 98.3 % (ref 96–97)
COLOR UR AUTO: ABNORMAL
COMMENT ALLOB2: NORMAL
CREAT SERPL-MCNC: 0.46 MG/DL (ref 0.67–1.17)
CROSSMATCHDATEALLO: NORMAL
CROSSMATCHDATEAUTO: NORMAL
DONOR ALLO: NORMAL
DONOR AUTO: NORMAL
DONORCELLDATE ALLO: NORMAL
DONORCELLDATE AUTO: NORMAL
EGFRCR SERPLBLD CKD-EPI 2021: >90 ML/MIN/1.73M2
EOSINOPHIL # BLD AUTO: 0.1 10E3/UL (ref 0–0.7)
EOSINOPHIL NFR BLD AUTO: 1 %
ERYTHROCYTE [DISTWIDTH] IN BLOOD BY AUTOMATED COUNT: 13.2 % (ref 10–15)
GLUCOSE BLDC GLUCOMTR-MCNC: 132 MG/DL (ref 70–99)
GLUCOSE BLDC GLUCOMTR-MCNC: 136 MG/DL (ref 70–99)
GLUCOSE BLDC GLUCOMTR-MCNC: 138 MG/DL (ref 70–99)
GLUCOSE BLDC GLUCOMTR-MCNC: 139 MG/DL (ref 70–99)
GLUCOSE BLDC GLUCOMTR-MCNC: 141 MG/DL (ref 70–99)
GLUCOSE BLDC GLUCOMTR-MCNC: 154 MG/DL (ref 70–99)
GLUCOSE BLDC GLUCOMTR-MCNC: 218 MG/DL (ref 70–99)
GLUCOSE BLDC GLUCOMTR-MCNC: 234 MG/DL (ref 70–99)
GLUCOSE SERPL-MCNC: 147 MG/DL (ref 70–99)
GLUCOSE UR STRIP-MCNC: >=1000 MG/DL
HCO3 BLD-SCNC: 28 MMOL/L (ref 21–28)
HCO3 BLD-SCNC: 28 MMOL/L (ref 21–28)
HCO3 SERPL-SCNC: 26 MMOL/L (ref 22–29)
HCT VFR BLD AUTO: 37.2 % (ref 40–53)
HGB BLD-MCNC: 11.9 G/DL (ref 13.3–17.7)
HGB UR QL STRIP: ABNORMAL
IMM GRANULOCYTES # BLD: 0.1 10E3/UL
IMM GRANULOCYTES NFR BLD: 1 %
KETONES UR STRIP-MCNC: NEGATIVE MG/DL
LEUKOCYTE ESTERASE UR QL STRIP: NEGATIVE
LYMPHOCYTES # BLD AUTO: 0.7 10E3/UL (ref 0.8–5.3)
LYMPHOCYTES NFR BLD AUTO: 7 %
M GENITALIUM DNA SPEC QL NAA+PROBE: NOT DETECTED
M HOMINIS DNA SPEC QL NAA+PROBE: NOT DETECTED
MAGNESIUM SERPL-MCNC: 1.9 MG/DL (ref 1.7–2.3)
MAYO MISC RESULT: NORMAL
MCH RBC QN AUTO: 29.6 PG (ref 26.5–33)
MCHC RBC AUTO-ENTMCNC: 32 G/DL (ref 31.5–36.5)
MCV RBC AUTO: 93 FL (ref 78–100)
MONOCYTES # BLD AUTO: 0.8 10E3/UL (ref 0–1.3)
MONOCYTES NFR BLD AUTO: 8 %
NEUTROPHILS # BLD AUTO: 8.6 10E3/UL (ref 1.6–8.3)
NEUTROPHILS NFR BLD AUTO: 83 %
NITRATE UR QL: NEGATIVE
NRBC # BLD AUTO: 0 10E3/UL
NRBC BLD AUTO-RTO: 0 /100
O2/TOTAL GAS SETTING VFR VENT: 0 %
O2/TOTAL GAS SETTING VFR VENT: 60 %
PCO2 BLD: 37 MM HG (ref 35–45)
PCO2 BLD: 38 MM HG (ref 35–45)
PH BLD: 7.48 [PH] (ref 7.35–7.45)
PH BLD: 7.49 [PH] (ref 7.35–7.45)
PH UR STRIP: 7.5 [PH] (ref 5–7)
PHOSPHATE SERPL-MCNC: 2.3 MG/DL (ref 2.5–4.5)
PLATELET # BLD AUTO: 141 10E3/UL (ref 150–450)
PO2 BLD: 52 MM HG (ref 80–105)
PO2 BLD: 93 MM HG (ref 80–105)
POS CUT OFF ALLO B: >69
POS CUT OFF ALLO T: >53
POS CUT OFF AUTO B: >69
POS CUT OFF AUTO T: >53
POTASSIUM SERPL-SCNC: 4.5 MMOL/L (ref 3.4–5.3)
PROT SERPL-MCNC: 5 G/DL (ref 6.4–8.3)
RBC # BLD AUTO: 4.02 10E6/UL (ref 4.4–5.9)
RBC URINE: 50 /HPF
RESULT ALLO B1: NORMAL
RESULT ALLO B2: NORMAL
RESULT ALLO T1: NORMAL
RESULT ALLO T2: NORMAL
RESULT AUTO B1: NORMAL
RESULT AUTO B2: NORMAL
RESULT AUTO T1: NORMAL
RESULT AUTO T2: NORMAL
SAO2 % BLDA: 89 % (ref 92–100)
SAO2 % BLDA: 96 % (ref 92–100)
SERUM DATE ALLO B1: NORMAL
SERUM DATE ALLO B2: NORMAL
SERUM DATE ALLO T1: NORMAL
SERUM DATE ALLO T2: NORMAL
SERUM DATE AUTO B1: NORMAL
SERUM DATE AUTO B2: NORMAL
SERUM DATE AUTO T1: NORMAL
SERUM DATE AUTO T2: NORMAL
SODIUM SERPL-SCNC: 138 MMOL/L (ref 135–145)
SP GR UR STRIP: 1.02 (ref 1–1.03)
SQUAMOUS EPITHELIAL: <1 /HPF
TACROLIMUS BLD-MCNC: 4.8 UG/L (ref 5–15)
TESTMETHODALLO: NORMAL
TESTMETHODAUTO: NORMAL
TME LAST DOSE: ABNORMAL H
TME LAST DOSE: ABNORMAL H
TREATMENT ALLO B1: NORMAL
TREATMENT ALLO B2: NORMAL
TREATMENT ALLO T1: NORMAL
TREATMENT ALLO T2: NORMAL
TREATMENT AUTO B1: NORMAL
TREATMENT AUTO B2: NORMAL
TREATMENT AUTO T1: NORMAL
TREATMENT AUTO T2: NORMAL
U PARVUM DNA SPEC QL NAA+PROBE: NOT DETECTED
U UREALYTICUM DNA SPEC QL NAA+PROBE: NOT DETECTED
UROBILINOGEN UR STRIP-MCNC: 2 MG/DL
VANCOMYCIN SERPL-MCNC: 7 UG/ML
WBC # BLD AUTO: 10.3 10E3/UL (ref 4–11)
WBC URINE: 1 /HPF

## 2024-08-19 PROCEDURE — 82248 BILIRUBIN DIRECT: CPT | Performed by: SURGERY

## 2024-08-19 PROCEDURE — 87040 BLOOD CULTURE FOR BACTERIA: CPT | Performed by: STUDENT IN AN ORGANIZED HEALTH CARE EDUCATION/TRAINING PROGRAM

## 2024-08-19 PROCEDURE — 93010 ELECTROCARDIOGRAM REPORT: CPT | Mod: GC | Performed by: INTERNAL MEDICINE

## 2024-08-19 PROCEDURE — 82805 BLOOD GASES W/O2 SATURATION: CPT

## 2024-08-19 PROCEDURE — 71045 X-RAY EXAM CHEST 1 VIEW: CPT

## 2024-08-19 PROCEDURE — 258N000003 HC RX IP 258 OP 636: Performed by: STUDENT IN AN ORGANIZED HEALTH CARE EDUCATION/TRAINING PROGRAM

## 2024-08-19 PROCEDURE — 93005 ELECTROCARDIOGRAM TRACING: CPT

## 2024-08-19 PROCEDURE — 80197 ASSAY OF TACROLIMUS: CPT | Performed by: SURGERY

## 2024-08-19 PROCEDURE — 250N000011 HC RX IP 250 OP 636: Performed by: STUDENT IN AN ORGANIZED HEALTH CARE EDUCATION/TRAINING PROGRAM

## 2024-08-19 PROCEDURE — 94640 AIRWAY INHALATION TREATMENT: CPT

## 2024-08-19 PROCEDURE — 250N000013 HC RX MED GY IP 250 OP 250 PS 637

## 2024-08-19 PROCEDURE — 250N000013 HC RX MED GY IP 250 OP 250 PS 637: Performed by: STUDENT IN AN ORGANIZED HEALTH CARE EDUCATION/TRAINING PROGRAM

## 2024-08-19 PROCEDURE — 99233 SBSQ HOSP IP/OBS HIGH 50: CPT | Mod: 24 | Performed by: INTERNAL MEDICINE

## 2024-08-19 PROCEDURE — 250N000009 HC RX 250: Performed by: SURGERY

## 2024-08-19 PROCEDURE — 99232 SBSQ HOSP IP/OBS MODERATE 35: CPT | Performed by: ANESTHESIOLOGY

## 2024-08-19 PROCEDURE — 82140 ASSAY OF AMMONIA: CPT | Performed by: SURGERY

## 2024-08-19 PROCEDURE — 250N000012 HC RX MED GY IP 250 OP 636 PS 637: Performed by: STUDENT IN AN ORGANIZED HEALTH CARE EDUCATION/TRAINING PROGRAM

## 2024-08-19 PROCEDURE — 258N000003 HC RX IP 258 OP 636: Performed by: SURGERY

## 2024-08-19 PROCEDURE — 84100 ASSAY OF PHOSPHORUS: CPT | Performed by: STUDENT IN AN ORGANIZED HEALTH CARE EDUCATION/TRAINING PROGRAM

## 2024-08-19 PROCEDURE — 250N000009 HC RX 250: Performed by: STUDENT IN AN ORGANIZED HEALTH CARE EDUCATION/TRAINING PROGRAM

## 2024-08-19 PROCEDURE — 71045 X-RAY EXAM CHEST 1 VIEW: CPT | Mod: 26 | Performed by: RADIOLOGY

## 2024-08-19 PROCEDURE — 80202 ASSAY OF VANCOMYCIN: CPT | Performed by: STUDENT IN AN ORGANIZED HEALTH CARE EDUCATION/TRAINING PROGRAM

## 2024-08-19 PROCEDURE — 92526 ORAL FUNCTION THERAPY: CPT | Mod: GN

## 2024-08-19 PROCEDURE — 250N000011 HC RX IP 250 OP 636: Performed by: SURGERY

## 2024-08-19 PROCEDURE — 250N000012 HC RX MED GY IP 250 OP 636 PS 637: Performed by: SURGERY

## 2024-08-19 PROCEDURE — 250N000009 HC RX 250: Performed by: PHYSICIAN ASSISTANT

## 2024-08-19 PROCEDURE — 80048 BASIC METABOLIC PNL TOTAL CA: CPT | Performed by: PHYSICIAN ASSISTANT

## 2024-08-19 PROCEDURE — 97162 PT EVAL MOD COMPLEX 30 MIN: CPT | Mod: GP

## 2024-08-19 PROCEDURE — 94667 MNPJ CHEST WALL 1ST: CPT

## 2024-08-19 PROCEDURE — 250N000013 HC RX MED GY IP 250 OP 250 PS 637: Performed by: PHYSICIAN ASSISTANT

## 2024-08-19 PROCEDURE — 250N000011 HC RX IP 250 OP 636

## 2024-08-19 PROCEDURE — 83735 ASSAY OF MAGNESIUM: CPT | Performed by: SURGERY

## 2024-08-19 PROCEDURE — 250N000013 HC RX MED GY IP 250 OP 250 PS 637: Performed by: SURGERY

## 2024-08-19 PROCEDURE — 200N000002 HC R&B ICU UMMC

## 2024-08-19 PROCEDURE — 85025 COMPLETE CBC W/AUTO DIFF WBC: CPT | Performed by: SURGERY

## 2024-08-19 PROCEDURE — 97530 THERAPEUTIC ACTIVITIES: CPT | Mod: GP

## 2024-08-19 PROCEDURE — 94668 MNPJ CHEST WALL SBSQ: CPT

## 2024-08-19 PROCEDURE — 999N000157 HC STATISTIC RCP TIME EA 10 MIN

## 2024-08-19 PROCEDURE — 250N000012 HC RX MED GY IP 250 OP 636 PS 637

## 2024-08-19 PROCEDURE — 81001 URINALYSIS AUTO W/SCOPE: CPT

## 2024-08-19 PROCEDURE — 36415 COLL VENOUS BLD VENIPUNCTURE: CPT | Performed by: STUDENT IN AN ORGANIZED HEALTH CARE EDUCATION/TRAINING PROGRAM

## 2024-08-19 PROCEDURE — 92610 EVALUATE SWALLOWING FUNCTION: CPT | Mod: GN

## 2024-08-19 PROCEDURE — 94640 AIRWAY INHALATION TREATMENT: CPT | Mod: 76

## 2024-08-19 RX ORDER — BISACODYL 10 MG
10 SUPPOSITORY, RECTAL RECTAL ONCE
Status: COMPLETED | OUTPATIENT
Start: 2024-08-19 | End: 2024-08-19

## 2024-08-19 RX ORDER — POLYETHYLENE GLYCOL 3350 17 G/17G
17 POWDER, FOR SOLUTION ORAL DAILY
Status: DISCONTINUED | OUTPATIENT
Start: 2024-08-20 | End: 2024-08-20

## 2024-08-19 RX ORDER — LABETALOL HYDROCHLORIDE 5 MG/ML
10 INJECTION, SOLUTION INTRAVENOUS EVERY 6 HOURS PRN
Status: DISCONTINUED | OUTPATIENT
Start: 2024-08-19 | End: 2024-08-20

## 2024-08-19 RX ORDER — PIPERACILLIN SODIUM, TAZOBACTAM SODIUM 4; .5 G/20ML; G/20ML
4.5 INJECTION, POWDER, LYOPHILIZED, FOR SOLUTION INTRAVENOUS EVERY 6 HOURS
Status: DISCONTINUED | OUTPATIENT
Start: 2024-08-19 | End: 2024-08-28

## 2024-08-19 RX ORDER — HYDROMORPHONE HYDROCHLORIDE 1 MG/ML
0.4 INJECTION, SOLUTION INTRAMUSCULAR; INTRAVENOUS; SUBCUTANEOUS
Status: DISCONTINUED | OUTPATIENT
Start: 2024-08-19 | End: 2024-08-20

## 2024-08-19 RX ORDER — POLYETHYLENE GLYCOL 3350 17 G/17G
17 POWDER, FOR SOLUTION ORAL 2 TIMES DAILY
Status: DISCONTINUED | OUTPATIENT
Start: 2024-08-19 | End: 2024-08-19

## 2024-08-19 RX ORDER — FUROSEMIDE 10 MG/ML
20 INJECTION INTRAMUSCULAR; INTRAVENOUS ONCE
Status: COMPLETED | OUTPATIENT
Start: 2024-08-19 | End: 2024-08-19

## 2024-08-19 RX ORDER — MAGNESIUM SULFATE HEPTAHYDRATE 40 MG/ML
2 INJECTION, SOLUTION INTRAVENOUS ONCE
Status: COMPLETED | OUTPATIENT
Start: 2024-08-19 | End: 2024-08-19

## 2024-08-19 RX ORDER — HEPARIN SODIUM 5000 [USP'U]/.5ML
5000 INJECTION, SOLUTION INTRAVENOUS; SUBCUTANEOUS EVERY 8 HOURS
Status: DISCONTINUED | OUTPATIENT
Start: 2024-08-19 | End: 2024-08-19

## 2024-08-19 RX ORDER — HYDROMORPHONE HCL IN WATER/PF 6 MG/30 ML
0.2 PATIENT CONTROLLED ANALGESIA SYRINGE INTRAVENOUS EVERY 4 HOURS PRN
Status: DISCONTINUED | OUTPATIENT
Start: 2024-08-19 | End: 2024-08-20

## 2024-08-19 RX ORDER — HYDRALAZINE HYDROCHLORIDE 20 MG/ML
10 INJECTION INTRAMUSCULAR; INTRAVENOUS EVERY 6 HOURS PRN
Status: DISCONTINUED | OUTPATIENT
Start: 2024-08-19 | End: 2024-08-20

## 2024-08-19 RX ORDER — QUETIAPINE FUMARATE 25 MG/1
25 TABLET, FILM COATED ORAL AT BEDTIME
Status: DISCONTINUED | OUTPATIENT
Start: 2024-08-19 | End: 2024-08-20

## 2024-08-19 RX ORDER — HEPARIN SODIUM 5000 [USP'U]/.5ML
5000 INJECTION, SOLUTION INTRAVENOUS; SUBCUTANEOUS EVERY 8 HOURS SCHEDULED
Status: DISCONTINUED | OUTPATIENT
Start: 2024-08-19 | End: 2024-08-28 | Stop reason: HOSPADM

## 2024-08-19 RX ORDER — FUROSEMIDE 10 MG/ML
40 INJECTION INTRAMUSCULAR; INTRAVENOUS ONCE
Status: DISCONTINUED | OUTPATIENT
Start: 2024-08-19 | End: 2024-08-19

## 2024-08-19 RX ORDER — OXYCODONE HYDROCHLORIDE 5 MG/1
5 TABLET ORAL EVERY 4 HOURS PRN
Status: DISCONTINUED | OUTPATIENT
Start: 2024-08-19 | End: 2024-08-28 | Stop reason: HOSPADM

## 2024-08-19 RX ORDER — LIDOCAINE 40 MG/G
CREAM TOPICAL
Status: DISCONTINUED | OUTPATIENT
Start: 2024-08-19 | End: 2024-08-20

## 2024-08-19 RX ORDER — QUETIAPINE FUMARATE 25 MG/1
25 TABLET, FILM COATED ORAL 2 TIMES DAILY
Status: DISCONTINUED | OUTPATIENT
Start: 2024-08-19 | End: 2024-08-19

## 2024-08-19 RX ORDER — TACROLIMUS 0.5 MG/1
1.5 CAPSULE ORAL
Status: DISCONTINUED | OUTPATIENT
Start: 2024-08-19 | End: 2024-08-21

## 2024-08-19 RX ADMIN — VANCOMYCIN HYDROCHLORIDE 1750 MG: 1 INJECTION, POWDER, LYOPHILIZED, FOR SOLUTION INTRAVENOUS at 20:35

## 2024-08-19 RX ADMIN — POTASSIUM & SODIUM PHOSPHATES POWDER PACK 280-160-250 MG 2 PACKET: 280-160-250 PACK at 12:30

## 2024-08-19 RX ADMIN — ACETAMINOPHEN 975 MG: 325 TABLET, FILM COATED ORAL at 08:44

## 2024-08-19 RX ADMIN — OXYCODONE HYDROCHLORIDE 5 MG: 5 TABLET ORAL at 04:50

## 2024-08-19 RX ADMIN — ACETYLCYSTEINE 2 ML: 200 SOLUTION ORAL; RESPIRATORY (INHALATION) at 08:49

## 2024-08-19 RX ADMIN — LEVALBUTEROL HYDROCHLORIDE 1.25 MG: 1.25 SOLUTION RESPIRATORY (INHALATION) at 20:41

## 2024-08-19 RX ADMIN — PREDNISOLONE 30 MG: 15 SOLUTION ORAL at 09:16

## 2024-08-19 RX ADMIN — QUETIAPINE FUMARATE 25 MG: 25 TABLET ORAL at 20:01

## 2024-08-19 RX ADMIN — POTASSIUM & SODIUM PHOSPHATES POWDER PACK 280-160-250 MG 2 PACKET: 280-160-250 PACK at 15:36

## 2024-08-19 RX ADMIN — INSULIN GLARGINE 30 UNITS: 100 INJECTION, SOLUTION SUBCUTANEOUS at 20:00

## 2024-08-19 RX ADMIN — Medication 40 MG: at 08:46

## 2024-08-19 RX ADMIN — ACETYLCYSTEINE 2 ML: 200 SOLUTION ORAL; RESPIRATORY (INHALATION) at 11:55

## 2024-08-19 RX ADMIN — POTASSIUM & SODIUM PHOSPHATES POWDER PACK 280-160-250 MG 2 PACKET: 280-160-250 PACK at 09:15

## 2024-08-19 RX ADMIN — Medication 10 MG: at 20:01

## 2024-08-19 RX ADMIN — ACETAMINOPHEN 975 MG: 325 TABLET, FILM COATED ORAL at 15:36

## 2024-08-19 RX ADMIN — INSULIN ASPART 1 UNITS: 100 INJECTION, SOLUTION INTRAVENOUS; SUBCUTANEOUS at 12:38

## 2024-08-19 RX ADMIN — INSULIN GLARGINE 30 UNITS: 100 INJECTION, SOLUTION SUBCUTANEOUS at 09:17

## 2024-08-19 RX ADMIN — METHOCARBAMOL 750 MG: 750 TABLET ORAL at 20:11

## 2024-08-19 RX ADMIN — ACETYLCYSTEINE 2 ML: 200 SOLUTION ORAL; RESPIRATORY (INHALATION) at 16:26

## 2024-08-19 RX ADMIN — SODIUM PHOSPHATE, MONOBASIC, MONOHYDRATE AND SODIUM PHOSPHATE, DIBASIC, ANHYDROUS 9 MMOL: 142; 276 INJECTION, SOLUTION INTRAVENOUS at 05:59

## 2024-08-19 RX ADMIN — SODIUM CHLORIDE 20 MG: 9 INJECTION, SOLUTION INTRAVENOUS at 12:28

## 2024-08-19 RX ADMIN — MYCOPHENOLATE MOFETIL 1000 MG: 250 CAPSULE ORAL at 20:01

## 2024-08-19 RX ADMIN — DEXMEDETOMIDINE HYDROCHLORIDE IN SODIUM CHLORIDE 1.2 MCG/KG/HR: 4 INJECTION INTRAVENOUS at 08:15

## 2024-08-19 RX ADMIN — LEVALBUTEROL HYDROCHLORIDE 1.25 MG: 1.25 SOLUTION RESPIRATORY (INHALATION) at 08:49

## 2024-08-19 RX ADMIN — POLYETHYLENE GLYCOL 3350 17 G: 17 POWDER, FOR SOLUTION ORAL at 09:15

## 2024-08-19 RX ADMIN — ROSUVASTATIN CALCIUM 10 MG: 10 TABLET, FILM COATED ORAL at 08:45

## 2024-08-19 RX ADMIN — Medication 10 MG: at 20:41

## 2024-08-19 RX ADMIN — ACETAMINOPHEN 650 MG: 325 TABLET ORAL at 20:11

## 2024-08-19 RX ADMIN — PIPERACILLIN AND TAZOBACTAM 4.5 G: 4; .5 INJECTION, POWDER, LYOPHILIZED, FOR SOLUTION INTRAVENOUS at 19:57

## 2024-08-19 RX ADMIN — FUROSEMIDE 20 MG: 10 INJECTION, SOLUTION INTRAVENOUS at 09:36

## 2024-08-19 RX ADMIN — HEPARIN SODIUM 5000 UNITS: 5000 INJECTION, SOLUTION INTRAVENOUS; SUBCUTANEOUS at 15:36

## 2024-08-19 RX ADMIN — VANCOMYCIN HYDROCHLORIDE 1500 MG: 10 INJECTION, POWDER, LYOPHILIZED, FOR SOLUTION INTRAVENOUS at 09:17

## 2024-08-19 RX ADMIN — HYDRALAZINE HYDROCHLORIDE 10 MG: 20 INJECTION INTRAMUSCULAR; INTRAVENOUS at 19:38

## 2024-08-19 RX ADMIN — OXYCODONE HYDROCHLORIDE 5 MG: 5 TABLET ORAL at 12:30

## 2024-08-19 RX ADMIN — MICAFUNGIN SODIUM 100 MG: 50 INJECTION, POWDER, LYOPHILIZED, FOR SOLUTION INTRAVENOUS at 14:02

## 2024-08-19 RX ADMIN — Medication 10 MG: at 08:49

## 2024-08-19 RX ADMIN — Medication 15 ML: at 12:30

## 2024-08-19 RX ADMIN — DEXMEDETOMIDINE HYDROCHLORIDE IN SODIUM CHLORIDE 1.2 MCG/KG/HR: 4 INJECTION INTRAVENOUS at 03:19

## 2024-08-19 RX ADMIN — PIPERACILLIN AND TAZOBACTAM 4.5 G: 4; .5 INJECTION, POWDER, LYOPHILIZED, FOR SOLUTION INTRAVENOUS at 15:35

## 2024-08-19 RX ADMIN — OXYCODONE HYDROCHLORIDE 5 MG: 5 TABLET ORAL at 21:08

## 2024-08-19 RX ADMIN — HEPARIN SODIUM 5000 UNITS: 5000 INJECTION, SOLUTION INTRAVENOUS; SUBCUTANEOUS at 09:15

## 2024-08-19 RX ADMIN — SENNOSIDES AND DOCUSATE SODIUM 2 TABLET: 8.6; 5 TABLET ORAL at 08:45

## 2024-08-19 RX ADMIN — Medication 60 ML: at 20:03

## 2024-08-19 RX ADMIN — TACROLIMUS 1.5 MG: 1 CAPSULE ORAL at 19:52

## 2024-08-19 RX ADMIN — Medication 60 ML: at 09:24

## 2024-08-19 RX ADMIN — LEVALBUTEROL HYDROCHLORIDE 1.25 MG: 1.25 SOLUTION RESPIRATORY (INHALATION) at 11:55

## 2024-08-19 RX ADMIN — GABAPENTIN 100 MG: 100 CAPSULE ORAL at 08:45

## 2024-08-19 RX ADMIN — ACETYLCYSTEINE 2 ML: 200 SOLUTION ORAL; RESPIRATORY (INHALATION) at 20:41

## 2024-08-19 RX ADMIN — POTASSIUM & SODIUM PHOSPHATES POWDER PACK 280-160-250 MG 2 PACKET: 280-160-250 PACK at 20:01

## 2024-08-19 RX ADMIN — MYCOPHENOLATE MOFETIL 1000 MG: 200 POWDER, FOR SUSPENSION ORAL at 09:17

## 2024-08-19 RX ADMIN — MAGNESIUM SULFATE HEPTAHYDRATE 2 G: 2 INJECTION, SOLUTION INTRAVENOUS at 04:43

## 2024-08-19 RX ADMIN — LEVALBUTEROL HYDROCHLORIDE 1.25 MG: 1.25 SOLUTION RESPIRATORY (INHALATION) at 16:25

## 2024-08-19 RX ADMIN — TACROLIMUS 1 MG: 1 CAPSULE ORAL at 09:16

## 2024-08-19 RX ADMIN — OXYCODONE HYDROCHLORIDE 10 MG: 10 TABLET ORAL at 08:45

## 2024-08-19 RX ADMIN — SULFAMETHOXAZOLE AND TRIMETHOPRIM 1 TABLET: 400; 80 TABLET ORAL at 08:44

## 2024-08-19 RX ADMIN — HEPARIN SODIUM 5000 UNITS: 5000 INJECTION, SOLUTION INTRAVENOUS; SUBCUTANEOUS at 22:51

## 2024-08-19 ASSESSMENT — ACTIVITIES OF DAILY LIVING (ADL)
ADLS_ACUITY_SCORE: 47
ADLS_ACUITY_SCORE: 47
ADLS_ACUITY_SCORE: 51
ADLS_ACUITY_SCORE: 47
ADLS_ACUITY_SCORE: 51
ADLS_ACUITY_SCORE: 47

## 2024-08-19 NOTE — PROGRESS NOTES
Major Shift Events:  Extubated at 2015 to oxymask 6L. Episode of tachypnea and increased work of breathing not long afterwards, Dr. Annmarie Rogers called to bedside. Pt endorsing pain and anxiety, administered PRN IV dilaudid and restarted precedex. Pt delirious, restless with inattention, fixation on certain topics, and hallucinations. Pt becoming more oriented by end of shift, answering orientation questions appropriately except saying it was July 2004. Precedex increased to 1.2 to help with anxiety/restlessness. Pt still calling out frequently throughout night. Pt denies pain most of shift, PPN oxycodone given twice. Paravertebral catheters intact with ropivacaine infusing. Temp up to 101.5, Dr. Rogers aware, down to 100.4 this AM. SR/ST, HR 's and up to 120's with agitation. MAP > 65 off of levophed throughout shift. Hypertensive when agitated. Trailed BiPAP twice to help with tachypnea/increased work of breathing, pt unable to tolerate for more than 2 minutes. Oxymask 5L by end of shift, O2 sats mid 90's. Respiratory rate 20-30, up to 40 when restless. Weak cough only with encouragement, nonproductive. Pt reports cotton mouth, frequent oral care and swabs given. NJ coiled in mouth after extubation. Wire used to straighten out, abdominal XR done before restarting tube feeds at 40 mL/hr. Goodrich intact with good urine output. CT x 5 to suction, serosanguinous output. Magnesium and phosphorus replaced per protocol.    Plan: Continue to monitor respiratory status and promote pulmonary hygiene. Advance activity as tolerated. Swallow study when appropriate.    For vital signs and complete assessments, please see documentation flowsheets.

## 2024-08-19 NOTE — PROGRESS NOTES
CV ICU Progress note      ASSESSMENT: Travon Cartwright is a 59 year old male with PMH of  ILD, with chronic hypoxic respiratory failure on 6L oxygen at baseline, T2DM, hepatic steatosis and bilateral hand tremor now s/p lung transplant via clamshell incision with Dr Mulvihill on 8/15/24.     Today   - Neuro: D/c Dexmedetomidine gtt and gabapentin. Ordered quetiapine BID. Sleep hygiene, reorientation, mobility. RAPS team following PV catheters.  - Pulm: Transitioned to HFNC for low sPO2. Follow ABG.  - CV: MAPS >65, off pressors  - GI: Increase TF to goal. Increase bowel regimen.  - Renal: I/O goal -1L. 20 IV lasix x1 this AM. Re-evaluate this afternoon.  - Endo: D/c insulin gtt. Continue lantus BID. Ordered low SSI  - ID: Overnight, Febrile episode x1. Ordered PAN Cx and broadened abx to include pip-tazo.  Continue Vancomycin for 10 days. Donor positive for Staph Aureus in sputum  - Heme: Restarted SQH after PV catheter placement.    PLAN:  Neuro/ pain/ sedation:  # Acute postoperative pain  - Scheduled: tylenol   - PRN: oxycodone, dilaudid, robaxin  - Paravertebral catheters per IPS (8/18 - current)    #Delirium  - CAM-ICU Positive (8/19). Delirium precautions.  - Gtt: precedex gtt to be discontinued.  - D/c gabapenin  - Sleep hygiene, reorientation, mobility. Scheduled: Melatonin  - Quetiapine 12.5mg AM, 25mg at night. EKG ordered to monitor Qtc.    Pulmonary:  # ILD emphysema on 6L O2 s/p BL lung transplant (8/15)    # Post operative ventilator support, extubated (8/18)   FiO2 (%): 60 %, Resp: 29, Vent Mode: CPAP/PS, Resp Rate (Set): 16 breaths/min, Tidal Volume (Set, mL): 420 mL, PEEP (cm H2O): 5 cmH2O, Pressure Support (cm H2O): 5 cmH2O, Resp Rate (Set): 16 breaths/min, Tidal Volume (Set, mL): 420 mL, PEEP (cm H2O): 5 cmH2O   - Goal SpO2 > HOB elevation. Follow daily CXR  - Encourage IS q15-30 minutes when awake.  - Scheduled Mucomyst, Xopenex  - S/p Methylprednisolone 125 q8hrs x3 doses, now on Prednisolone 30  daily  - Ordered ABG due to tachypnea    Cardiovascular:   # Shock, multifactorial, hypovolemic, distributive - resolved  # Hx HTN  # Hx of HLD  - Monitor hemodynamic status. Goal MAP >65, SBP <140  - Continue rosuvastatin 20 daily.  - Holding home PTA losartan    GI care / Nutrition:   # Moderate protein calorie malnutrition  # Concern for refeeding syndrome  # Hepatic steatosis  - Nutrition consulted, appreciate recommendations.   - NJ for TF, advance to goal.  - PPI for GI prophylaxis  - Bowel regimen: MiraLAX BID, senna, suppositoryx1      Renal / Fluids / Electrolytes:   # Hypophosphatemia  BL creat appears to be ~ 0.62-0.65   - Strict I/O, daily weights, Avoid/limit nephrotoxins as able  - Replete lytes PRN per protocol  - IV lasix 20 x1 today.  - Goal NET negative -1L    Endocrine:  # Stress hyperglycemia  # Type 2 diabetes mellitus  - Hgb A1C 8.4%   - Lantus 30 BID. (On Lantus 30 daily at home)  - D/c insulin gtt. Started low-sliding scale insulin   - Goal BG <180 for optimal healing  - Holding home PTA metformin, Jardiance    ID / Antibiotics:  # Stress induced leukocytosis  # Immunosuppression due for lung transplant   # Immunoprophylaxis for lung transplant   - To complete perioperative regimen per transplant protocol.   - will defer changes to immunosuppression to Transplant Pulmonary team    - Mycophenolate, Basiliximab (Simulect), Tacrolimus   - Amphotericin B nebs   - Micafungin   - Vancomycin x 10 days (Donor with positive staph aureus)  - Bactrim to started 08/16 based on donor results.  - S/p  Ceftazidime   - Valganciclovir  - Febrile episode overnight: PAN Cx ordered. Broad spectrum abx: vancomycin and Pip-tazo    Heme:     # Acute blood loss anemia  # Acute blood loss thrombocytopenia  # Coagulopathy  - No s/s of bleeding  - Continue to monitor, Hgb goal > 7.0  - AC: SQH    MSK / Skin:  # Sternotomy and Surgical Incision  # Weakness and deconditioning    - Postoperative incision management per  protocol and sternal precautions  - PT/OT/CR    General cares and Prophylaxis:     - DVT: Mechanical and SQH  - GI: PPI    Lines / Tubes / Drains:  - RIJ CVC, remove. Ordered PICC line.  - Arterial Line  - CTs x5  - Goodrich  - NJ tube    Disposition: CVICU    Kit Hodgson MD    ICU Checklist  F - Feeding: TF to be increased to goal  A - Analgesia: PV catheters per IPS  S - Sedation: N/A  T - Thromboembolic prophylaxis: SQH     H - Head of bed elevated  U - Ulcer prophylaxis: PPI  G - Glycemic control: D/c insulin gtt. Continue lantus BID. Ordered low SSI  S - SBT     B - Bowel regimen: No BM - increased bowel regimen  I - Indwelling catheter: Yes  D - De-escalation of antibiotics: No, follow PAN Cx    ====================================    Interval Events:  Patient delirious this AM. A&O x2 to name, location. Current president: Ramon. Year: 2004. CAM-ICU positive.    OBJECTIVE:  1. VITAL SIGNS:   Temp:  [97.9  F (36.6  C)-101.5  F (38.6  C)] 99.9  F (37.7  C)  Pulse:  [] 88  Resp:  [13-47] 29  MAP:  [59 mmHg-250 mmHg] 73 mmHg  Arterial Line BP: ()/(47-82) 105/56  FiO2 (%):  [50 %-60 %] 60 %  SpO2:  [88 %-99 %] 89 %    FiO2 (%): 60 %, Resp: 29, Vent Mode: CPAP/PS, Resp Rate (Set): 16 breaths/min, Tidal Volume (Set, mL): 420 mL, PEEP (cm H2O): 5 cmH2O, Pressure Support (cm H2O): 5 cmH2O, Resp Rate (Set): 16 breaths/min, Tidal Volume (Set, mL): 420 mL, PEEP (cm H2O): 5 cmH2O      2. INTAKE/ OUTPUT:   I/O last 3 completed shifts:  In: 3312.83 [I.V.:1902.83; NG/GT:410]  Out: 5805 [Urine:4375; Emesis/NG output:300; Chest Tube:1130]      3. PHYSICAL EXAMINATION:   General: Awake, A&O x2.  HEENT: PERRLA. On oxymask.   Neuro: Delirious, moves all extremities, following commands  Resp: Coarse breath sounds B/L  Chest tubes y'ed together, serosanguinous output.  CV: S1, S2, RRR, no m/r/g, no lower extremity swelling  Abdomen: Soft, non-distended, non-tender  Extremities: warm and well perfused, calves soft  and compressible. Pulses palpable      4. INVESTIGATIONS:   Arterial Blood Gases   Recent Labs   Lab 08/19/24  0954 08/18/24  0355 08/17/24  0352 08/16/24  2320   PH 7.49* 7.47* 7.46* 7.45   PCO2 37 44 41 42   PO2 52* 90 151* 120*   HCO3 28 32* 29* 29*     Complete Blood Count   Recent Labs   Lab 08/19/24  0347 08/18/24  0355 08/17/24  0351 08/16/24  0419   WBC 10.3 10.6 15.8* 16.4*   HGB 11.9* 11.8* 12.4* 14.0   * 139* 194 182     Basic Metabolic Panel  Recent Labs   Lab 08/19/24  0920 08/19/24  0558 08/19/24  0347 08/18/24  1331 08/18/24  1329 08/18/24  0514 08/18/24  0355 08/17/24  1546 08/17/24  1542 08/17/24  0353 08/17/24  0351 08/16/24  0616 08/16/24  0419   NA  --   --  138  --   --   --  142  --   --   --  141  --  138   POTASSIUM  --   --  4.5  --  4.1  --  3.5  --  3.9   < > 3.4  --  3.8   CHLORIDE  --   --  106  --   --   --  107  --   --   --  108*  --  105   CO2  --   --  26  --   --   --  28  --   --   --  27  --  24   BUN  --   --  15.4  --   --   --  14.3  --   --   --  13.4  --  12.7   CR  --   --  0.46*  --   --   --  0.50*  --   --   --  0.58*  --  0.62*   * 136* 139*  147*   < >  --    < > 125*  149*   < >  --    < > 142*   < > 199*    < > = values in this interval not displayed.     Liver Function Tests  Recent Labs   Lab 08/19/24  0347 08/15/24  2020 08/15/24  1820 08/14/24  2338   AST 47* 79*  --  24   ALT 22 26  --  22   ALKPHOS 76 61  --  89   BILITOTAL 0.4 1.4*  --  0.4   ALBUMIN 2.6* 2.2*  --  4.3   INR  --  1.46* 1.74* 1.07     Pancreatic Enzymes  No lab results found in last 7 days.  Coagulation Profile  Recent Labs   Lab 08/18/24  0613 08/15/24  2020 08/15/24  1820 08/14/24  2338   INR  --  1.46* 1.74* 1.07   PTT 34 42* 36 32         5. RADIOLOGY:   Recent Results (from the past 24 hour(s))   XR Abdomen Port 1 View    Narrative    EXAMINATION: XR ABDOMEN PORT 1 VIEW 8/18/2024 9:12 PM     COMPARISON: 8/16/2024    HISTORY: Check NG/OG tube placement    TECHNIQUE: Frontal  view of the abdomen.    FINDINGS: Feeding tube tip likely within the proximal duodenum.  Feeding tube wire within the catheter. Interval removal of gastric  tube. No abnormally dilated loops of bowel. No pneumatosis or portal  venous gas. No definite pneumoperitoneum. Postoperative changes of the  chest status post bilateral lung transplantation. Multiple chest tubes  in stable position. Elevated right hemidiaphragm with probable right  pleural effusion. Patchy opacities of left lower lobe. Please see same  day chest x-ray for better characterization of the chest.      Impression    IMPRESSION:   1. Feeding tube tip likely within the proximal duodenum with guidewire  within the catheter.  2. Nonobstructive bowel gas pattern.  3. Elevated right hemidiaphragm with probable right pleural effusion.    I have personally reviewed the examination and initial interpretation  and I agree with the findings.    FADY MCDONNELL MD         SYSTEM ID:  [id number]   XR Chest Port 1 View    Narrative    Exam: XR CHEST PORT 1 VIEW, 8/19/2024 2:32 AM    Indication: lung transplant follow-up    Comparison: X-ray chest 8/18/2024, 0147 hours.    Findings:   Frontal upright radiograph of the chest, 0103 hours. Stable surgical  changes of lung transplant. Stable left atrial appendage clip. Stable  placement of right IJ sheath, feeding tube, multiple chest tubes.    Midline trachea. Stable heart size and shape within the context of  silhouetting of the cardiac borders. Continued dense right lower lobe  opacity, mixed interstitial airspace opacity of the left lower lobe.  Likely small right pleural effusion, no significant left effusion. No  definitive pneumothorax.      Impression    Impression:   1. Stable support devices.  2. Continued dense right lower lobe opacity suspicious for  infectious/inflammatory etiology, likely with a component of pulmonary  edema as well.  3. Left basilar mixed opacity likely representing atelectasis  with  pulmonary edema.    I have personally reviewed the examination and initial interpretation  and I agree with the findings.    FADY MCDONNELL MD         SYSTEM ID:  [id number]       =========================================

## 2024-08-19 NOTE — PROGRESS NOTES
"Pain Service Progress Note  [facility]  Date: 08/19/2024       Patient Name: Travon Cartwright  MRN: [id number]  Age: 59 year old  Sex: male      Assessment:  60 y/o male with PMH of chronic respiratory failure and hepatic steatosis    Procedure: Lung Tx    Date of Surgery: 8/15/24    Date of Catheter Placement: 8/18/24    Plan/Recommendations:  1. Regional Anesthesia/Analgesia  -Continuous Catheter Type/Site: bilateral paravertebral (PV) T7-8  Infusate: 0.2% Ropivacaine  Programmed Intermittent Bolus (PIB) at 7 mL Q60 min via each catheter, total infusion rate of 14 mL/hr    Plan to maintain catheter approximately of 7 days    2. Anticoagulation  -Please contact Inpatient Pain Service before ordering or making any anticoagulation changes       3. Multimodal Analgesia  - Per primary service    Pain Service will continue to follow.    Discussed with attending anesthesiologist    Scar Pham MD  08/19/2024     Overnight Events: Extubated    Subjective:  I'm OK   Nausea: No  Vomiting: No  Pruritus: No  Symptoms of LAST: No    Pain Location:  Chest    Pain Intensity:    Pain at Rest: 2/10   Pain with Activity: 4/10  Comfort Goal: 4/10   Satisfied with your level of pain control: Yes    Diet: NPO for Medical/Clinical Reasons Except for: Meds  Adult Formula Drip Feeding: Continuous Osmolite 1.5; Nasoduodenal tube; Goal Rate: 60 mL/hr (goal) - Once FT verified post-pyloric and ok to use per CVICU, initiate @ 10 mL/hr and advance by 10 mL q8hr as tolerated to goal rate.; mL/hr; Do not adv...    Relevant Labs:  Recent Labs   Lab Test 08/19/24  0347 08/18/24  0613 08/16/24  0419 08/15/24  2020   INR  --   --   --  1.46*   *  --    < > 233   PTT  --  34  --  42*   BUN 15.4  --    < > 10.7    < > = values in this interval not displayed.       Physical Exam:  Vitals: BP 94/58   Pulse 86   Temp 100.4  F (38  C)   Resp (!) 35   Ht 1.778 m (5' 10\")   Wt 79.3 kg (174 lb 13.2 oz)   SpO2 95%  " " BMI 25.08 kg/m      Physical Exam:   Orientation:  Alert, oriented, and in no acute distress: Yes  Sedation: No    Motor Examination:  5/5 Strength in lower extremities: Yes    Catheter Site:   Catheter entry site is clean/dry/intact: Yes    Tender: No      Relevant Medications:  Current Pain Medications:  Medications related to Pain Management (From now, onward)      Start     Dose/Rate Route Frequency Ordered Stop    08/18/24 1700  ROPivacaine 0.2% in sodium chloride 0.9% PERINEURAL infusion          Perineural Continuous Nerve Block 08/18/24 1657 08/18/24 1700  ROPivacaine 0.2% in sodium chloride 0.9% PERINEURAL infusion          Perineural Continuous Nerve Block 08/18/24 1657 08/18/24 1600  acetaminophen (TYLENOL) tablet 975 mg         975 mg Oral or Feeding Tube EVERY 8 HOURS 08/18/24 1448      08/18/24 0000  acetaminophen (TYLENOL) tablet 650 mg         650 mg Oral or Feeding Tube EVERY 4 HOURS PRN 08/15/24 2016      08/18/24 0000  bisacodyl (DULCOLAX) suppository 10 mg         10 mg Rectal DAILY PRN 08/15/24 2016      08/17/24 0800  senna-docusate (SENOKOT-S/PERICOLACE) 8.6-50 MG per tablet 2 tablet         2 tablet Oral or Feeding Tube 2 TIMES DAILY 08/17/24 0705      08/17/24 0000  magnesium hydroxide (MILK OF MAGNESIA) suspension 30 mL         30 mL Oral or Feeding Tube DAILY PRN 08/15/24 2016      08/16/24 0800  polyethylene glycol (MIRALAX) Packet 17 g         17 g Oral or Feeding Tube DAILY 08/15/24 2016      08/16/24 0800  dexmedeTOMIDine (PRECEDEX) 4 mcg/mL in sodium chloride 0.9 % 100 mL infusion         0.1-1.2 mcg/kg/hr × 69.1 kg (Dosing Weight)  1.7-20.7 mL/hr  Intravenous CONTINUOUS 08/16/24 0740      08/15/24 2300  gabapentin (NEURONTIN) capsule 100 mg         100 mg Oral or Feeding Tube 3 TIMES DAILY 08/15/24 2016      08/15/24 2016  HYDROmorphone (PF) (DILAUDID) injection 0.2 mg        Placed in \"Or\" Linked Group    0.2 mg Intravenous EVERY 2 HOURS PRN 08/15/24 2016      08/15/24 " "2016  HYDROmorphone (PF) (DILAUDID) injection 0.4 mg        Placed in \"Or\" Linked Group    0.4 mg Intravenous EVERY 2 HOURS PRN 08/15/24 2016      08/15/24 2016  oxyCODONE (ROXICODONE) tablet 5 mg        Placed in \"Or\" Linked Group    5 mg Oral or Feeding Tube EVERY 4 HOURS PRN 08/15/24 2016      08/15/24 2016  oxyCODONE IR (ROXICODONE) tablet 10 mg        Placed in \"Or\" Linked Group    10 mg Oral or Feeding Tube EVERY 4 HOURS PRN 08/15/24 2016      08/15/24 2016  methocarbamol (ROBAXIN) tablet 750 mg         750 mg Oral or Feeding Tube EVERY 6 HOURS PRN 08/15/24 2016      08/15/24 2016  lidocaine 1 % 0.1-1 mL         0.1-1 mL Other EVERY 1 HOUR PRN 08/15/24 2016      08/15/24 2016  lidocaine (LMX4) kit          Topical EVERY 1 HOUR PRN 08/15/24 2016              Primary Service Contacted with Recommendations? Yes      Please see A&P for additional details of medical decision making.      Acute Inpatient Pain Service [facility]  Hours of pain coverage 24/7   Page via Amcom- Please Page the Pain Team Via Amcom: \"PAIN MANAGEMENT ACUTE INPATIENT/ [facility]\"             "

## 2024-08-19 NOTE — PROGRESS NOTES
08/19/24 1400   Appointment Info   Signing Clinician's Name / Credentials (SLP) Sandra Lynn MA CCC-SLP   General Information   Onset of Illness/Injury or Date of Surgery 08/14/24   Referring Physician Marty Jeffers MD   Pertinent History of Current Problem Travon Cartwright is a 59 year old male with a history of idiopathic pulmonary fibrosis, diabetes, hepatosteatosis, and essential tremor.  Pt is now s/p BSLT on 8/15/2024 with Dr. Mulvihill.  Surgery was uncomplicated and done on pump, extubated on 8/18/2024.  Clinical swallow evaluation completed per MD order.   Pain Assessment   Patient Currently in Pain No   Type of Evaluation   Type of Evaluation Swallow Evaluation   Oral Motor   Oral Musculature generally intact   Structural Abnormalities none present   Mucosal Quality good   Dentition (Oral Motor)   Dentition (Oral Motor) natural dentition;adequate dentition   Facial Symmetry (Oral Motor)   Facial Symmetry (Oral Motor) WNL   Lip Function (Oral Motor)   Lip Range of Motion (Oral Motor) WNL   Tongue Function (Oral Motor)   Tongue Coordination/Speed (Oral Motor) WNL   Tongue ROM (Oral Motor) WNL   Cough/Swallow/Gag Reflex (Oral Motor)   Volitional Throat Clear/Cough (Oral Motor) impaired;reduced strength   Volitional Swallow (Oral Motor) WNL   Vocal Quality/Secretion Management (Oral Motor)   Vocal Quality (Oral Motor) WNL   Secretion Management (Oral Motor) WNL   General Swallowing Observations   Past History of Dysphagia None per pt report or chart review   Respiratory Support high-flow;other (see comments)  (60% FiO2)   Current Diet/Method of Nutritional Intake (General Swallowing Observations, NIS) NPO;nasogastric tube (NG)   Swallowing Evaluation Clinical swallow evaluation   Clinical Swallow Evaluation   Feeding Assistance dependent   Clinical Swallow Evaluation Textures Trialed thin liquids   Clinical Swallow Eval: Thin Liquid Texture Trial   Mode of Presentation, Thin Liquids fed by  clinician;spoon   Volume of Liquid or Food Presented 2 ice chips, 2 tablespoons of water   Oral Phase of Swallow WFL   Pharyngeal Phase of Swallow intact   Diagnostic Statement No overt s/sx of aspiration   Esophageal Phase of Swallow   Patient reports or presents with symptoms of esophageal dysphagia No   Swallowing Recommendations   Diet Consistency Recommendations NPO   Medication Administration Recommendations, Swallowing (SLP) Non orally   Instrumental Assessment Recommendations VFSS (videofluoroscopic swallowing study)   General Therapy Interventions   Planned Therapy Interventions Dysphagia Treatment   Dysphagia treatment Modified diet education;Instruction of safe swallow strategies   Clinical Impression   Criteria for Skilled Therapeutic Interventions Met (SLP Eval) Yes, treatment indicated   SLP Diagnosis Suspect mild oropharyngeal swallowing abilities   Risks & Benefits of therapy have been explained evaluation/treatment results reviewed;care plan/treatment goals reviewed;risks/benefits reviewed;current/potential barriers reviewed;participants voiced agreement with care plan;participants included;patient   Clinical Impression Comments Clinical swallow evaluation completed per MD order.  Pt presents with suspect mild oropharyngeal swallowing abilities in setting of recent BSLT.  Oral mech remarkable for weak, unproductive cough.  Pt assessed with ice chips and thin liquids via spoon.  Oral phase and pharyngeal phases observed to be unremarkable.      D/t high risk of silent aspiration post BSLT recommend continue NPO status.  Recommend VFSS to further evaluate swallow function.  Please complete regular, excellent oral cares.  Speech to follow.   SLP Discharge Planning   SLP Discharge Recommendation Transitional Care Facility   SLP Rationale for DC Rec Pt's oropharyngeal swallowing abilities are below baseline   SLP Brief overview of current status  D/t high risk of silent aspiration post BSLT recommend  continue NPO status. Recommend VFSS to further evaluate swallow function. Please complete regular, excellent oral cares. Speech to follow.

## 2024-08-19 NOTE — PHARMACY-VANCOMYCIN DOSING SERVICE
"Pharmacy Vancomycin Note  Date of Service 2024  Patient's  1965   59 year old, male    Indication: Postoperative Infection  Day of Therapy: 4   Current vancomycin regimen:  1500 mg IV q12h  Current vancomycin monitoring method: AUC  Current vancomycin therapeutic monitoring goal: 400-600 mg*h/L    InsightRX Prediction of Current Vancomycin Regimen  Regimen: 1500 mg IV every 12 hours.  Start time: 21:17 on 2024  Exposure target: AUC24 (range)400-600 mg/L.hr   AUC24,ss: 388 mg/L.hr  Probability of AUC24 > 400: 42 %  Ctrough,ss: 7.9 mg/L  Probability of Ctrough,ss > 20: 0 %  Probability of nephrotoxicity (Lodise WENDI ): 5 %      Current estimated CrCl = Estimated Creatinine Clearance: 189.2 mL/min (A) (based on SCr of 0.46 mg/dL (L)).    Creatinine for last 3 days  2024:  3:51 AM Creatinine 0.58 mg/dL  2024:  3:55 AM Creatinine 0.50 mg/dL  2024:  3:47 AM Creatinine 0.46 mg/dL    Recent Vancomycin Levels (past 3 days)  2024:  9:53 AM Vancomycin 7.0 ug/mL    Vancomycin IV Administrations (past 72 hours)                     vancomycin (VANCOCIN) 1,500 mg in 0.9% NaCl 250 mL intermittent infusion (mg) 1,500 mg New Bag 24 0917     1,500 mg New Bag 24 2200     1,500 mg New Bag  1057    vancomycin (VANCOCIN) 1,000 mg in 200 mL dextrose intermittent infusion (mg) 1,000 mg New Bag 24 1533     1,000 mg New Bag  0150     1,000 mg New Bag 24 1355                    Nephrotoxins and other renal medications (From now, onward)      Start     Dose/Rate Route Frequency Ordered Stop    24 0900  piperacillin-tazobactam (ZOSYN) 4.5 g vial to attach to  mL bag        Note to Pharmacy: For [facility], [facility] and [facility]: For Zosyn-naive patients, use the \"Zosyn initial dose + extended infusion\" order panel.    4.5 g  over 30 Minutes Intravenous EVERY 6 HOURS 24 0907      24 0900  vancomycin (VANCOCIN) 1,500 mg in 0.9% NaCl 250 mL intermittent infusion         " "1,500 mg  166.7 mL/hr over 90 Minutes Intravenous EVERY 12 HOURS 08/18/24 0848      08/16/24 0800  amphotericin B (FUNGIZONE) 10 mg/2 mL inhalation solution 10 mg        Placed in \"And\" Linked Group    10 mg Nebulization 2 TIMES DAILY RT 08/15/24 2016      08/15/24 2300  tacrolimus (GENERIC EQUIVALENT) PO capsule for SUBLINGUAL use 1 mg         1 mg Sublingual 2 TIMES DAILY. 08/15/24 2016                 Contrast Orders - past 72 hours (72h ago, onward)      None            Interpretation of levels and current regimen:  Vancomycin level is reflective of AUC less than 400    Has serum creatinine changed greater than 50% in last 72 hours: No    Urine output:  good urine output    Renal Function: Stable    InsightRX Prediction of Planned New Vancomycin Regimen  Regimen: 1750 mg IV every 12 hours.  Start time: 23:52 on 08/19/2024  Exposure target: AUC24 (range)400-600 mg/L.hr   AUC24,ss: 453 mg/L.hr  Probability of AUC24 > 400: 78 %  Ctrough,ss: 9.4 mg/L  Probability of Ctrough,ss > 20: 0 %  Probability of nephrotoxicity (Lodise WENDI 2009): 5 %      Plan:  Increase Dose to 1750 mg IV q12h  Vancomycin monitoring method: AUC  Vancomycin therapeutic monitoring goal: 400-600 mg*h/L  Pharmacy will check vancomycin levels as appropriate in 1-3 Days.  Serum creatinine levels will be ordered daily for the first week of therapy and at least twice weekly for subsequent weeks.    Fidelina Mahmood RPH   "

## 2024-08-19 NOTE — PROGRESS NOTES
Critical Care Attending Progress Note  I, Neli Brown MD, PhD saw this patient with the resident and agree with the findings and plan of care as documented in the note. Please see separate note from today for further documentation.     I personally reviewed vital signs, medications, labs and imaging.     Assessment:   59 year old male with PMH of ILD, with chronic hypoxic respiratory failure on 6L oxygen at baseline, T2DM, hepatic steatosis and bilateral hand tremor now s/p lung transplant via clamshell incision with Dr Mulvihill on 8/15/24          Active problems and current treatments include:     Acute postop pain: B/L paravertebral blocks  Delirium: CAM-ICU positive-> delirium precautions  Respiratory insufficiency: pulm toilet  Volume: goal net negative 1L, dose furosemide  ID: fever last night, pan cx and escalate to Zosyn/Vanc, appreciate pulm tx team following along  Nutrition: TF top goal, increase bowel regimen, add suppository, speech following  Lines: RIJ-dc, zo, Cts, get PICC  DM: lantus and SSI  PPX: subcutaneous heaprin, SCDs  Family: The sister participated in rounds, all questions answered.  DISPO: CVICU        The patient is critically ill.   I personally managed the ventilator, hemodynamics, sedation, analgesia, metabolic abnormalities and nutritional status.    I agree with the assessment and plan. I spent 52 minutes exclusive of procedures evaluating and managing this patient, discussing with the consultants, and updating the patient and family.     Neli Brown MD, PhD

## 2024-08-19 NOTE — PROGRESS NOTES
Vascular Access Services Notes:    PICC insertion CANCELED for now due to pt having fevers & a pending blood culture. Care team aware & OK delaying PICC. Pt has a PIV & a right internal jugular CVC.      JANET DonnellyN, RN [facility]  Vascular Access Services  [facility] [place]  [phone]

## 2024-08-19 NOTE — PLAN OF CARE
Major shift events:  Neuro: Confused this AM but clearing more throughout day. Still has hallucinations but is aware of them. Using call light appropriately and making needs known. Denies pain. Ax2 with gait belt pivot to chair.   CV: Rate/rhythm:  SR/ST . MAP >65 off levo. Tmax 100.4. Pan cultured, zosyn started.   Pulm: HFNC 60% 35L. Weak cough. Using IS at bedside. CT x5 to suction.   GI/: NJ w/ TF at goal of 60ml/hr. Lasix given with good response. Large, loose BM x3. Sliding scale insulin.   Skin: Clamshell w/ wound vac.   Plan: Video swallow study tomorrow.

## 2024-08-19 NOTE — PROGRESS NOTES
CPT was not done on pt in the pm due to patient increased work of breathing and tachycardia after shortly being extubated. Provider ordered BIPAP to help with supporting his breathing. RT will follow up with pt.    David Benjamin RRT

## 2024-08-19 NOTE — PLAN OF CARE
Goal Outcome Evaluation:      Plan of Care Reviewed With: patient, family    Overall Patient Progress: no changeOverall Patient Progress: no change    Outcome Evaluation: patient and family will have adequate support and information throughout hospitalization

## 2024-08-19 NOTE — CONSULTS
Transplant Admission Psychosocial Assessment    Patient Name: Travon Cartwright  : 1965  Age: 59 year old  MRN: [id number]  Date of Initial Social Work Evaluation: 2024    Patient underwent lung transplant on 8/15/2024.  Met with patient and his sister Mine to update psychosocial assessment and provide education about SW role while inpatient, and to begin discussion of expectations/requirements, caregiver needs and follow up needs post-transplant.     Presenting Information   Living Situation: single family. 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 He lives alone, but other family at resort.   If not local, plans for short term stay:  hopes to stay at [facility].  On waiting list.   Previous Functional Status: independent in cares.  Family did meal prep and housekeeping. 6-8l of 02 needed.    Cultural/Language/Spiritual Considerations: none    Support System  Primary Support Person Mine milton Armaan  Other support:  foster sister, Tianna.  Mother Mely.   Plan for support in immediate post-transplant period: Mine is retired NP.  Is available to assist .      Health Care Directive  Decision Maker: patient  Alternate Decision Maker: sister Mine  Health Care Directive: Copy in Chart    Mental Health/Coping:   History of Mental Health: negative mental health history.  No current concerns.   History of Chemical Health: sober 7 years.   Current status: sober  Coping: family support  Services Needed/Recommended: none at this time.      Financial   Income: has application in for SSDI.  Can draw social security half-way at age 60 if he doesn't qualify.  Savings and family support  Impact of transplant on income: none  Insurance and medication coverage: [employer].    Financial concerns: none  Resources needed: none identified.      Education provided by SW: Social Work role inpatient setting, availability of support groups, parking information and lodging options and  availability.      Assessment and recommendations and plan:  patient and sister well known to me from lung transplant evaluation and follow up in lung transplant program. Supportive visit with patient and sister.  Patient a little confused, but recognized me. Very grateful to have received transplant so quickly, but also admits to feeling guilty about getting transplanted sooner than some he has met in the lung transplant support group.  Support and reassurance offered. Reviewed [facility] process.  [facility] on the waitlist for [facility].  Discussed potential need to look for other options should one not open up.  Sister staying with friends in [place], so does not need an apartment immediately, but would take one when available. Unknown discharge needs at this time. will continue to follow for support, counseling, education and resources.      Carol Ann Brown, [facility]  Lung Transplant   Phone: [phone]     Joaquim

## 2024-08-19 NOTE — PROGRESS NOTES
Pulmonary Medicine  Cystic Fibrosis - Lung Transplant Team    Patient: Travon Cartwright  MRN: [id number]  : 1965 (age 59 year old)  Transplant: 8/15/2024 (Lung), POD#4  Admission date: 2024  Primary Care Provider: Dwayne Thomas    Assessment & Plan:     Travon Cartwright is a 59 year old male with a history of idiopathic pulmonary fibrosis, diabetes, hepatosteatosis, and essential tremor.  Pt. is now s/p BSLT on 8/15/2024 with Dr. Mulvihill.  Surgery was uncomplicated and done on pump, extubated on 2024.    Recommendations:    Wean oxygen as tolerated, per CVICU team  Daily CXR with chest tubes in place. CVTS managing chest tubes (x5)  L bronchial washing cultures from 8/15 growing Strep constellatus; now on vanco/zosyn for fevers, which should cover this organism  Diuresis per primary team; agree with repeat 40mg IV lasix x1 today for goal of 0.5-1L net negative.   Continue advancing TFs to goal. Agree with intensifying bowel regimen.  IS: Daily tacro levels. 11hr level today was 4.8; tacro increased to 1.5mg BID today. Keep sublingual for now, pending speech eval today. Basiliximab (POD#4 dose) today.   PPX: As below.   Abx - vancomycin IV x10 days (for donor staph aureus and ongoing fevers), zosyn; pan-culture today for workup of high fevers to 101.5F last night  ES catheter in place. Keep arterial line in place for now (given fevers, tachypnea), replace R internal jugular CVC with PICC     S/p bilateral sequential lung transplant (BSLT) 8/15/24 for idiopathic pulmonary fibrosis:  Acute on chronic hypoxic respiratory failure (post operative):     - Wean oxygen as tolerated per CVICU team  - Nebs: levalbuterol and Mucomyst QID  - Aggressive pulmonary toilet with chest physiotherapy QID (addition of Aerobika and incentive spirometry now that he's extubated)  - DSA at one week (ordered ) then one month post-transplant (additionally per protocol)  - Ammonia monitoring qMTh (screening for  hyperammonemia post-lung transplant) with ureaplasma PCR ordered POD #7 (8/22) per protocol   - Follow bronchoscopy studies from 8/16- Strep constellatus in bronchial wash, should be covered with vancomycin  - ES catheter placed by anesthesia 8/18 prior to extubation  - Chest tubes managed by surgical team; daily CXRs with chest tubes in place (5 remain; 4 pleural, 1 pericardial)  - Advance TF to goal (post pyloric FT +); SLP consulted for swallow study  - Await explant pathology    Immunosuppression:  Induction therapy with high dose IV steroid intraoperatively and basiliximab (POD #0 and #4 [8/19], ordered).   - Tacrolimus 1.5 mg SL BID.  Goal tacrolimus level 8-12. Tacro levels daily. Continuing SL dosing with daily tacrolimus levels pending return of bowel function post-op.    - MMF 1000 mg BID  - Methylprednisolone 125 mg x 3 doses, then prednisolone 30 mg daily with taper per lung transplant protocol:  Date Daily Dose (mg)   8/17/2024 30   8/24/2024 25   8/31/2024 20   9/21/2024 15   10/12/2024 10   11/2/2024 5     Prophylaxis:   - Bactrim for PJP ppx started POD #1 due to donor toxoplasma IgG+  - VGCV for CMV ppx (ordered as below), CMV monitoring per protocol (after completion of ppx course, additionally prn)  - Ampho B nebs twice weekly for antifungal ppx through discharge, then will stop  - Nystatin for oral candidiasis ppx, 6 month course - holding while on micafungin  - Micofungin 100mg q24h for prophylaxis x 10 days (end date: 8/25)  - See below for serologies and viral ppx:   Donor Recipient Intervention   CMV status - + Valganciclovir POD #8-90   EBV status - + EBV monthly   HSV status N/A + none      Primary graft dysfunction (per ISHLT guidelines):  See chart below:   POD #0  (~0 hours) POD #1  (~24 hours) POD #2   (~48 hours) POD#3   (~72 hours)   Date 8/15 8/16 8/17 8/18   Time 1446 1301 0352 0355   Intubated Y Y Y Y   PaO2 425 149 151 90   FiO2 80 60 50 40   P/F Ratio 531 248 302 225   PGD Grade    (0=mild, 3=severe) 1 2 1 2   ECMO N N N N   Inhaled NO/Flolan Y Y N N   ISHLT PGD Scoring    Grade 0 - PaO2/FiO2 >300 and normal chest radiograph (absence of diffuse allograft infiltrates)  Grade 1 - PaO2/FiO2 >300 and diffuse allograft infiltrates on chest radiograph  Grade 2 - PaO2/FiO2 between 200 and 300  Grade 3 - PaO2/FiO2 <200 and/or on ECMO  Grade U (Ungradable) - on ECMO and normal chest radiograph (absence of diffuse allograft infiltrates)     ID: Prior history of infection/colonization with: n/a  - IgG at one month (ordered 9/15)  - Donor cultures ([facility]) NGTD; ([facility]) NGTD ([facility] personally reviewed today) - staph aueus +  - Recipient cultures-washing from left- strep constellatus  - Abx- Completed IV ceftaz. Vancomycin resumed 8/17-plan for total 10 days (end date 8/24)  - Repeat blood cultures 8/19 due to fevers over night; start empiric zosyn during sepsis rule out    PHS risk criteria donor:  Additional labs required post-transplant (between 4-8 weeks post-op): Hepatitis B, Hepatitis C, and HIV by JOVANY ([id number], ordered POD #30, ordered for 9/15).    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 Proceed with J feeds. No need for NPO for 6 weeks.    Diabetes mellitus: Prior to transplant was on Jardiance, dulaglutide, glargine, metformin. All currently held; insulin gtt per primary team-->transition to Lantus.     Toxic-metabolic encephalopathy: Likely ICU delirium, which is improved today compared to yesterday.  - Continue 12.5mg qAM seroquel/50mg qPM seroquel per CVICU  - PRN melatonin at bedtime     We appreciate the excellent care provided by the CVTS and CVICU teams.  Recommendations communicated via in person rounding and this note.  Will continue to follow along closely, please do not hesitate to call with any questions or concerns.    Seen and staffed with Dr. Lowery.     Mallorie Hoover MD  Internal Medicine-Pediatrics  Pulmonary/Critical Care Fellow -  PGY7/FL2  [facility]  P: [phone]    Subjective:     Patient febrile to 101.5F over night. His sister, Mine, is at bedside and supportive-- she thinks that his mental status seems to be clearing, his delirium better today compared to yesterday. Evidently, this is his first admission ever. Juan J reports that he's just rather tired today, but he denies having much by way of pain even at his surgical site.    Review of Systems:     Skin: No rashes noted; wound vac over the clamshell incision is draining serosanginous fluid in collection chamber.  Ears/Nose/Throat: No sinus pain.  Respiratory: No trouble breathing, wheezing. Denies having any chest pain.  Cardiovascular: No chest pain.  Gastrointestinal: Not yet passing gas or having BMs.  Genitourinary: Urinary catheter in place.  Neurologic: No headaches, focal neurologic symptoms.  Psychiatric: Poor sleep last night, reports fatigue today.    Medications:     Medications Prior to Admission   Medication Sig Dispense Refill Last Dose    atorvastatin (LIPITOR) 20 MG tablet Take 1 tablet by mouth daily   8/14/2024 at am    Dulaglutide (TRULICITY) 3 MG/0.5ML SOPN Inject 3 mg Subcutaneous once a week   8/12/2024 at pm    empagliflozin (JARDIANCE) 10 MG TABS tablet Take 1 tablet (10 mg) by mouth daily 90 tablet 1 8/13/2024 at pm    insulin glargine (LANTUS PEN) 100 UNIT/ML pen Inject 30 Units subcutaneously at bedtime   8/13/2024 at hs    losartan (COZAAR) 50 MG tablet Take 1 tablet by mouth daily   8/13/2024 at am    metFORMIN (GLUCOPHAGE XR) 500 MG 24 hr tablet Take 500 mg by mouth 2 times daily (with meals)   8/13/2024 at pm    Pirfenidone 801 MG TABS Take 801 mg by mouth 2 times daily   8/13/2024 at pm    Continuous Glucose Sensor (DEXCOM G7 SENSOR) MISC Change every 10 days. 1 each 0      Current Scheduled Meds  Current Facility-Administered Medications   Medication Dose Route Frequency Provider Last Rate Last Admin    acetaminophen (TYLENOL) tablet 705  mg  975 mg Oral or Feeding Tube Q8H Barry Laughlin PA-C   975 mg at 08/19/24 0844    acetylcysteine (MUCOMYST) 20 % nebulizer solution 2 mL  2 mL Nebulization 4x Daily Marty Jeffers MD   2 mL at 08/19/24 1155    amphotericin B (FUNGIZONE) 10 mg/2 mL inhalation solution 10 mg  10 mg Nebulization 2 times daily Marty Jeffers MD   10 mg at 08/19/24 0849    [START ON 8/23/2024] calcium carbonate-vitamin D (CALTRATE) 600-10 MG-MCG per tablet 1 tablet  1 tablet Oral or Feeding Tube BID Vivi Bowser, [facility]        heparin ANTICOAGULANT injection 5,000 Units  5,000 Units Subcutaneous Q8H [facility] Margie Diego APRN CNP   5,000 Units at 08/19/24 0915    insulin aspart (NovoLOG) injection (RAPID ACTING)  1-4 Units Subcutaneous Q4H Kit Hodgson MD   1 Units at 08/19/24 1238    insulin glargine (LANTUS PEN) injection 30 Units  30 Units Subcutaneous BID Barry Laughlin PA-C   30 Units at 08/19/24 0917    levalbuterol (XOPENEX) neb solution 1.25 mg  1.25 mg Nebulization 4x Daily Marty Jeffers MD   1.25 mg at 08/19/24 1155    melatonin tablet 10 mg  10 mg Oral or Feeding Tube QPM Kit Hodgson MD        micafungin (MYCAMINE) 100 mg in sodium chloride 0.9 % 100 mL intermittent infusion  100 mg Intravenous Q24H William Jones PA-C 100 mL/hr at 08/19/24 1402 100 mg at 08/19/24 1402    multivitamins w/minerals liquid 15 mL  15 mL Per Feeding Tube Daily William Jones PA-C   15 mL at 08/19/24 1230    mycophenolate (GENERIC EQUIVALENT) capsule 1,000 mg  1,000 mg Oral BID Marty Jeffers MD        Or    mycophenolate (CELLCEPT BRAND) suspension 1,000 mg  1,000 mg Oral or NG Tube BID Marty Jeffers MD   1,000 mg at 08/19/24 0917    pantoprazole (PROTONIX) 2 mg/mL suspension 40 mg  40 mg Oral or Feeding Tube QAM AC Vivi Bowser, [facility]   40 mg at 08/19/24 0846    piperacillin-tazobactam (ZOSYN) 4.5 g vial to attach to  mL bag  4.5 g Intravenous Q6H Margie Diego APRN CNP        [START ON 8/20/2024] polyethylene  glycol (MIRALAX) Packet 17 g  17 g Oral or Feeding Tube Daily Kit Hodgson MD        potassium & sodium phosphates (NEUTRA-PHOS) Packet 2 packet  2 packet Oral or Feeding Tube 4x Daily Mulvihill, Michael, MD   2 packet at 08/19/24 1230    prednisoLONE (ORAPRED) 15 MG/5 ML solution 30 mg  30 mg Oral or NG Tube Daily Marty Jeffers MD   30 mg at 08/19/24 0916    Prosource TF20 ENfit Compatibl EN LIQD (PROSOURCE TF20) packet 60 mL  1 packet Per Feeding Tube BID William Jones PA-C   60 mL at 08/19/24 0924    [START ON 8/20/2024] QUEtiapine (SEROquel) half-tab 12.5 mg  12.5 mg Oral or Feeding Tube Daily Kit Hodgson MD        QUEtiapine (SEROquel) half-tab 12.5 mg  12.5 mg Oral Once Kit Hodgson MD        QUEtiapine (SEROquel) tablet 25 mg  25 mg Oral or Feeding Tube At Bedtime Kit Hodgson MD        rosuvastatin (CRESTOR) tablet 10 mg  10 mg Oral Daily Barry Laughlin PA-C   10 mg at 08/19/24 0845    senna-docusate (SENOKOT-S/PERICOLACE) 8.6-50 MG per tablet 2 tablet  2 tablet Oral or Feeding Tube BID Barry Laughlin PA-C   2 tablet at 08/19/24 0845    sodium chloride (PF) 0.9% PF flush 3 mL  3 mL Intracatheter Q8H Marty Jeffers MD   3 mL at 08/16/24 1954    sulfamethoxazole-trimethoprim (BACTRIM/SEPTRA) suspension 80 mg  10 mL Oral or NG Tube Daily William Jones PA-C   80 mg at 08/17/24 0937    Or    sulfamethoxazole-trimethoprim (BACTRIM) 400-80 MG per tablet 1 tablet  1 tablet Oral or NG Tube Daily William Jones PA-C   1 tablet at 08/19/24 0844    tacrolimus (GENERIC EQUIVALENT) PO capsule for SUBLINGUAL use 1.5 mg  1.5 mg Sublingual BID IS Mallorie Hoover MD        [START ON 8/23/2024] valGANciclovir (VALCYTE) solution 900 mg  900 mg Oral or Feeding Tube Daily Mallorie Hoover MD        vancomycin (VANCOCIN) 1,750 mg in sodium chloride 0.9 % 250 mL intermittent infusion  1,750 mg (central catheter) Intravenous Q12H Mulvihill, Michael, MD          Current PRN Meds  Current  Facility-Administered Medications   Medication Dose Route Frequency Provider Last Rate Last Admin    acetaminophen (TYLENOL) tablet 650 mg  650 mg Oral or Feeding Tube Q4H PRN Marty Jeffers MD        bisacodyl (DULCOLAX) suppository 10 mg  10 mg Rectal Daily PRN Marty Jeffers MD        dextrose 10% infusion   Intravenous Continuous PRN William Jones PA-C        dextrose 10% infusion   Intravenous Continuous PRN Kit Hodgson MD        glucose gel 15-30 g  15-30 g Oral Q15 Min PRN Kit Hodgson MD        Or    dextrose 50 % injection 25-50 mL  25-50 mL Intravenous Q15 Min PRN Kit Hodgson MD        Or    glucagon injection 1 mg  1 mg Subcutaneous Q15 Min PRN Kit Hodgson MD        HYDROmorphone (DILAUDID) injection 0.2 mg  0.2 mg Intravenous Q4H PRN Kit Hodgson MD        Or    HYDROmorphone (PF) (DILAUDID) injection 0.4 mg  0.4 mg Intravenous Q2H PRN Kit Hodgson MD        lidocaine (LMX4) cream   Topical Q1H PRN Margie Diego APRN CNP        lidocaine (LMX4) kit   Topical Q1H PRN Marty Jeffers MD        lidocaine 1 % 0.1-1 mL  0.1-1 mL Other Q1H PRN Marty Jeffers MD        lidocaine 1 % 0.1-5 mL  0.1-5 mL Other Q1H PRN Margie Diego APRN CNP        magnesium hydroxide (MILK OF MAGNESIA) suspension 30 mL  30 mL Oral or Feeding Tube Daily PRN Marty Jeffers MD        methocarbamol (ROBAXIN) tablet 750 mg  750 mg Oral or Feeding Tube Q6H PRN Marty Jeffers MD   750 mg at 08/18/24 2324    naloxone (NARCAN) injection 0.2 mg  0.2 mg Intravenous Q2 Min PRN Mulvihill, Michael, MD        Or    naloxone (NARCAN) injection 0.4 mg  0.4 mg Intravenous Q2 Min PRN Mulvihill, Michael, MD        Or    naloxone (NARCAN) injection 0.2 mg  0.2 mg Intramuscular Q2 Min PRN Mulvihill, Michael, MD        Or    naloxone (NARCAN) injection 0.4 mg  0.4 mg Intramuscular Q2 Min PRN Mulvihill, Michael, MD        NO anticoagulation unless approved by Anesthesia Provider   Does not apply  "Continuous PRN Claudy Dykes MD        ondansetron (ZOFRAN ODT) ODT tab 4 mg  4 mg Oral Q6H PRN Marty Jeffers MD        Or    ondansetron (ZOFRAN) injection 4 mg  4 mg Intravenous Q6H PRN Marty Jeffers MD        oxyCODONE IR (ROXICODONE) half-tab 2.5 mg  2.5 mg Oral or Feeding Tube Q4H PRN Kit Hodgson MD        Or    oxyCODONE (ROXICODONE) tablet 5 mg  5 mg Oral or Feeding Tube Q4H PRN Kit Hodgson MD   5 mg at 08/19/24 1230    prochlorperazine (COMPAZINE) injection 10 mg  10 mg Intravenous Q6H PRN Marty Jeffers MD        Or    prochlorperazine (COMPAZINE) tablet 10 mg  10 mg Oral or Feeding Tube Q6H PRN Marty Jeffers MD        Reason beta blocker order not selected   Does not apply DOES NOT GO TO Marty Parker MD        sodium chloride (PF) 0.9% PF flush 10-40 mL  10-40 mL Intracatheter Once PRN Margie Diego APRN CNP        sodium chloride (PF) 0.9% PF flush 3 mL  3 mL Intracatheter q1 min prn Marty Jeffers MD            Physical Exam:     All notes, images, and labs from past 24 hours (at minimum) were reviewed.    Vital signs:  Temp: 100  F (37.8  C) Temp src: Bladder   Pulse: 90   Resp: 22 SpO2: 95 % O2 Device: (S) High Flow Nasal Cannula (HFNC) Oxygen Delivery: 35 LPM Height: 177.8 cm (5' 10\") Weight: 79.3 kg (174 lb 13.2 oz)    Intake/Output Summary (Last 24 hours) at 8/19/2024 1436  Last data filed at 8/19/2024 1400  Gross per 24 hour   Intake 3071.5 ml   Output 5860 ml   Net -2788.5 ml     GENERAL: Patient is awake though a tad tangential. He was oriented to self but not place or date.   NOSE: Without deformity, bleeding or discharge.   MOUTH: Mucosa are dry. No thrush.  NECK: Normal  LUNGS: Slightly tachypneic to the low 30s on my exam, but only mildly increased work of breathing. Good air movement bilaterally, with some bibasilar crackles. Wound vac in place over clamshell incision.   HEART: Regular rate and rhythm.  ABDOMEN: Soft, nontender and nondistended. Hepatosplenomegaly and " no hernias are noted.  EXTREMITIES: No LE edema noted on my exam.  SKIN: No rashes. No jaundice.    Results:     LABS    CMP:   Recent Labs   Lab 08/19/24  1237 08/19/24  0920 08/19/24  0558 08/19/24  0347 08/18/24  1331 08/18/24  1329 08/18/24  0514 08/18/24  0355 08/17/24  1546 08/17/24  1542 08/17/24  0353 08/17/24  0351 08/16/24  0616 08/16/24  0419 08/15/24  2024 08/15/24  2020 08/15/24  0308 08/14/24  2338   NA  --   --   --  138  --   --   --  142  --   --   --  141  --  138  --  142   < > 141   POTASSIUM  --   --   --  4.5  --  4.1  --  3.5  --  3.9   < > 3.4  --  3.8  --  4.6   < > 3.7   CHLORIDE  --   --   --  106  --   --   --  107  --   --   --  108*  --  105  --  107   < > 104   CO2  --   --   --  26  --   --   --  28  --   --   --  27  --  24  --  21*   < > 23   ANIONGAP  --   --   --  6*  --   --   --  7  --   --   --  6*  --  9  --  14   < > 14   * 141* 136* 139*  147*   < >  --    < > 125*  149*   < >  --    < > 142*   < > 199*   < > 145*   < > 123*   BUN  --   --   --  15.4  --   --   --  14.3  --   --   --  13.4  --  12.7  --  10.7   < > 11.9   CR  --   --   --  0.46*  --   --   --  0.50*  --   --   --  0.58*  --  0.62*  --  0.55*   < > 0.65*   GFRESTIMATED  --   --   --  >90  --   --   --  >90  --   --   --  >90  --  >90  --  >90   < > >90   HUGO  --   --   --  8.4*  --   --   --  8.2*  --   --   --  8.0*  --  7.9*  --  7.8*   < > 9.9   MAG  --   --   --  1.9  --  2.1  --  2.0  --   --   --  2.0  --  2.0  --  2.2   < > 2.1   PHOS  --   --   --  2.3*  --  2.2*  --  1.7*  --   --   --  2.0*  --  3.6  --  4.1   < > 3.8   PROTTOTAL  --   --   --  5.0*  --   --   --   --   --   --   --   --   --   --   --  3.8*  --  7.4   ALBUMIN  --   --   --  2.6*  --   --   --   --   --   --   --   --   --   --   --  2.2*  --  4.3   BILITOTAL  --   --   --  0.4  --   --   --   --   --   --   --   --   --   --   --  1.4*  --  0.4   ALKPHOS  --   --   --  76  --   --   --   --   --   --   --   --   --   --    "--  61  --  89   AST  --   --   --  47*  --   --   --   --   --   --   --   --   --   --   --  79*  --  24   ALT  --   --   --  22  --   --   --   --   --   --   --   --   --   --   --  26  --  22    < > = values in this interval not displayed.     CBC:   Recent Labs   Lab 08/19/24  0347 08/18/24  0355 08/17/24  0351 08/16/24  0419   WBC 10.3 10.6 15.8* 16.4*   RBC 4.02* 3.99* 4.29* 4.77   HGB 11.9* 11.8* 12.4* 14.0   HCT 37.2* 35.9* 38.3* 43.0   MCV 93 90 89 90   MCH 29.6 29.6 28.9 29.4   MCHC 32.0 32.9 32.4 32.6   RDW 13.2 13.2 13.2 13.0   * 139* 194 182       INR:   Recent Labs   Lab 08/15/24  2020 08/15/24  1820 08/14/24  2338   INR 1.46* 1.74* 1.07       Glucose:   Recent Labs   Lab 08/19/24  1237 08/19/24  0920 08/19/24  0558 08/19/24  0347 08/19/24  0220   * 141* 136* 139*  147* 138*       Blood Gas:   Recent Labs   Lab 08/19/24  0954 08/18/24  0355 08/17/24  0352 08/16/24  1301 08/16/24  1005 08/16/24  0804 08/16/24  0419   PHV  --   --   --   --  7.36 7.39 7.37   PCO2V  --   --   --   --  48 45 45   PO2V  --   --   --   --  44 38 38   HCO3V  --   --   --   --  27 27 26   EUGENIE  --   --   --   --  0.9 1.6 0.6   O2PER 0 40 50   < > 60 60 65    < > = values in this interval not displayed.       Culture Data No results for input(s): \"CULT\" in the last 168 hours.    Virology Data: No results found for: \"INFLUA\", \"FLUAH1\", \"FLUAH3\", \"AM7256\", \"IFLUB\", \"RSVA\", \"RSVB\", \"PIV1\", \"PIV2\", \"PIV3\", \"HMPV\", \"HRVS\", \"ADVBE\", \"ADVC\"    Historical CMV results (last 3 of prior testing):  No results found for: \"CMVQNT\"  No results found for: \"CMVLOG\"    Urine Studies    Recent Labs   Lab Test 08/15/24  0017 06/18/24  1216   URINEPH 5.0 5.5   NITRITE Negative Negative   LEUKEST Negative Negative   WBCU 1  --      IMAGING    8/19/2024 CXR  Impression:   1. Stable support devices.  2. Continued dense right lower lobe opacity suspicious for  infectious/inflammatory etiology, likely with a component of pulmonary  edema " as well.  3. Left basilar mixed opacity likely representing atelectasis with  pulmonary edema.

## 2024-08-20 ENCOUNTER — APPOINTMENT (OUTPATIENT)
Dept: GENERAL RADIOLOGY | Facility: CLINIC | Age: 59
End: 2024-08-20
Attending: STUDENT IN AN ORGANIZED HEALTH CARE EDUCATION/TRAINING PROGRAM
Payer: COMMERCIAL

## 2024-08-20 ENCOUNTER — APPOINTMENT (OUTPATIENT)
Dept: OCCUPATIONAL THERAPY | Facility: CLINIC | Age: 59
End: 2024-08-20
Attending: SURGERY
Payer: COMMERCIAL

## 2024-08-20 LAB
ALLEN'S TEST: ABNORMAL
ANION GAP SERPL CALCULATED.3IONS-SCNC: 9 MMOL/L (ref 7–15)
ATRIAL RATE - MUSE: 88 BPM
BASE EXCESS BLDA CALC-SCNC: 2.4 MMOL/L (ref -3–3)
BASE EXCESS BLDA CALC-SCNC: 3.3 MMOL/L (ref -3–3)
BASE EXCESS BLDA CALC-SCNC: 5.3 MMOL/L (ref -3–3)
BASOPHILS # BLD AUTO: 0 10E3/UL (ref 0–0.2)
BASOPHILS NFR BLD AUTO: 0 %
BUN SERPL-MCNC: 18 MG/DL (ref 8–23)
CALCIUM SERPL-MCNC: 8.5 MG/DL (ref 8.8–10.4)
CHLORIDE SERPL-SCNC: 105 MMOL/L (ref 98–107)
COHGB MFR BLD: 96.5 % (ref 96–97)
COHGB MFR BLD: 98.5 % (ref 96–97)
COHGB MFR BLD: 98.8 % (ref 96–97)
CREAT SERPL-MCNC: 0.56 MG/DL (ref 0.67–1.17)
DIASTOLIC BLOOD PRESSURE - MUSE: NORMAL MMHG
EGFRCR SERPLBLD CKD-EPI 2021: >90 ML/MIN/1.73M2
EOSINOPHIL # BLD AUTO: 0.3 10E3/UL (ref 0–0.7)
EOSINOPHIL NFR BLD AUTO: 3 %
ERYTHROCYTE [DISTWIDTH] IN BLOOD BY AUTOMATED COUNT: 13.3 % (ref 10–15)
GLUCOSE BLDC GLUCOMTR-MCNC: 165 MG/DL (ref 70–99)
GLUCOSE BLDC GLUCOMTR-MCNC: 172 MG/DL (ref 70–99)
GLUCOSE BLDC GLUCOMTR-MCNC: 208 MG/DL (ref 70–99)
GLUCOSE BLDC GLUCOMTR-MCNC: 229 MG/DL (ref 70–99)
GLUCOSE BLDC GLUCOMTR-MCNC: 259 MG/DL (ref 70–99)
GLUCOSE SERPL-MCNC: 181 MG/DL (ref 70–99)
HCO3 BLD-SCNC: 25 MMOL/L (ref 21–28)
HCO3 BLD-SCNC: 27 MMOL/L (ref 21–28)
HCO3 BLD-SCNC: 29 MMOL/L (ref 21–28)
HCO3 SERPL-SCNC: 25 MMOL/L (ref 22–29)
HCT VFR BLD AUTO: 37 % (ref 40–53)
HGB BLD-MCNC: 11.9 G/DL (ref 13.3–17.7)
IMM GRANULOCYTES # BLD: 0.2 10E3/UL
IMM GRANULOCYTES NFR BLD: 2 %
INTERPRETATION ECG - MUSE: NORMAL
LYMPHOCYTES # BLD AUTO: 1 10E3/UL (ref 0.8–5.3)
LYMPHOCYTES NFR BLD AUTO: 10 %
MAGNESIUM SERPL-MCNC: 1.8 MG/DL (ref 1.7–2.3)
MCH RBC QN AUTO: 29.1 PG (ref 26.5–33)
MCHC RBC AUTO-ENTMCNC: 32.2 G/DL (ref 31.5–36.5)
MCV RBC AUTO: 91 FL (ref 78–100)
MONOCYTES # BLD AUTO: 1.1 10E3/UL (ref 0–1.3)
MONOCYTES NFR BLD AUTO: 11 %
NEUTROPHILS # BLD AUTO: 7.2 10E3/UL (ref 1.6–8.3)
NEUTROPHILS NFR BLD AUTO: 74 %
NRBC # BLD AUTO: 0 10E3/UL
NRBC BLD AUTO-RTO: 0 /100
O2/TOTAL GAS SETTING VFR VENT: 26 %
O2/TOTAL GAS SETTING VFR VENT: 40 %
O2/TOTAL GAS SETTING VFR VENT: 50 %
P AXIS - MUSE: 57 DEGREES
PCO2 BLD: 34 MM HG (ref 35–45)
PCO2 BLD: 36 MM HG (ref 35–45)
PCO2 BLD: 39 MM HG (ref 35–45)
PH BLD: 7.48 [PH] (ref 7.35–7.45)
PHOSPHATE SERPL-MCNC: 3.2 MG/DL (ref 2.5–4.5)
PLATELET # BLD AUTO: 164 10E3/UL (ref 150–450)
PO2 BLD: 104 MM HG (ref 80–105)
PO2 BLD: 76 MM HG (ref 80–105)
PO2 BLD: 97 MM HG (ref 80–105)
POTASSIUM SERPL-SCNC: 4 MMOL/L (ref 3.4–5.3)
PR INTERVAL - MUSE: 128 MS
QRS DURATION - MUSE: 86 MS
QT - MUSE: 352 MS
QTC - MUSE: 425 MS
R AXIS - MUSE: 13 DEGREES
RBC # BLD AUTO: 4.09 10E6/UL (ref 4.4–5.9)
SAO2 % BLDA: 95 % (ref 92–100)
SAO2 % BLDA: 96 % (ref 92–100)
SAO2 % BLDA: 97 % (ref 92–100)
SODIUM SERPL-SCNC: 139 MMOL/L (ref 135–145)
SYSTOLIC BLOOD PRESSURE - MUSE: NORMAL MMHG
T AXIS - MUSE: 54 DEGREES
TACROLIMUS BLD-MCNC: 5.5 UG/L (ref 5–15)
TME LAST DOSE: NORMAL H
TME LAST DOSE: NORMAL H
VENTRICULAR RATE- MUSE: 88 BPM
WBC # BLD AUTO: 9.7 10E3/UL (ref 4–11)

## 2024-08-20 PROCEDURE — 250N000011 HC RX IP 250 OP 636

## 2024-08-20 PROCEDURE — 250N000011 HC RX IP 250 OP 636: Performed by: STUDENT IN AN ORGANIZED HEALTH CARE EDUCATION/TRAINING PROGRAM

## 2024-08-20 PROCEDURE — 97530 THERAPEUTIC ACTIVITIES: CPT | Mod: GO

## 2024-08-20 PROCEDURE — 250N000013 HC RX MED GY IP 250 OP 250 PS 637

## 2024-08-20 PROCEDURE — 94668 MNPJ CHEST WALL SBSQ: CPT

## 2024-08-20 PROCEDURE — 80048 BASIC METABOLIC PNL TOTAL CA: CPT | Performed by: PHYSICIAN ASSISTANT

## 2024-08-20 PROCEDURE — 250N000013 HC RX MED GY IP 250 OP 250 PS 637: Performed by: STUDENT IN AN ORGANIZED HEALTH CARE EDUCATION/TRAINING PROGRAM

## 2024-08-20 PROCEDURE — 250N000012 HC RX MED GY IP 250 OP 636 PS 637: Performed by: SURGERY

## 2024-08-20 PROCEDURE — 250N000009 HC RX 250: Performed by: SURGERY

## 2024-08-20 PROCEDURE — 250N000013 HC RX MED GY IP 250 OP 250 PS 637: Performed by: SURGERY

## 2024-08-20 PROCEDURE — 36569 INSJ PICC 5 YR+ W/O IMAGING: CPT

## 2024-08-20 PROCEDURE — 71045 X-RAY EXAM CHEST 1 VIEW: CPT

## 2024-08-20 PROCEDURE — 999N000157 HC STATISTIC RCP TIME EA 10 MIN

## 2024-08-20 PROCEDURE — 80197 ASSAY OF TACROLIMUS: CPT | Performed by: SURGERY

## 2024-08-20 PROCEDURE — 97165 OT EVAL LOW COMPLEX 30 MIN: CPT | Mod: GO

## 2024-08-20 PROCEDURE — 99233 SBSQ HOSP IP/OBS HIGH 50: CPT | Mod: 24 | Performed by: INTERNAL MEDICINE

## 2024-08-20 PROCEDURE — 82805 BLOOD GASES W/O2 SATURATION: CPT | Performed by: SURGERY

## 2024-08-20 PROCEDURE — 200N000002 HC R&B ICU UMMC

## 2024-08-20 PROCEDURE — 94640 AIRWAY INHALATION TREATMENT: CPT | Mod: 76

## 2024-08-20 PROCEDURE — 99232 SBSQ HOSP IP/OBS MODERATE 35: CPT | Performed by: PHYSICIAN ASSISTANT

## 2024-08-20 PROCEDURE — 82805 BLOOD GASES W/O2 SATURATION: CPT

## 2024-08-20 PROCEDURE — 271N000002 HC RX 271: Performed by: STUDENT IN AN ORGANIZED HEALTH CARE EDUCATION/TRAINING PROGRAM

## 2024-08-20 PROCEDURE — 94640 AIRWAY INHALATION TREATMENT: CPT

## 2024-08-20 PROCEDURE — 85025 COMPLETE CBC W/AUTO DIFF WBC: CPT | Performed by: SURGERY

## 2024-08-20 PROCEDURE — 71045 X-RAY EXAM CHEST 1 VIEW: CPT | Mod: 26 | Performed by: RADIOLOGY

## 2024-08-20 PROCEDURE — 250N000009 HC RX 250

## 2024-08-20 PROCEDURE — 250N000013 HC RX MED GY IP 250 OP 250 PS 637: Performed by: PHYSICIAN ASSISTANT

## 2024-08-20 PROCEDURE — 84100 ASSAY OF PHOSPHORUS: CPT

## 2024-08-20 PROCEDURE — 97535 SELF CARE MNGMENT TRAINING: CPT | Mod: GO

## 2024-08-20 PROCEDURE — 272N000451 HC KIT SHRLOCK 5FR POWER PICC DOUBLE LUMEN

## 2024-08-20 PROCEDURE — 83735 ASSAY OF MAGNESIUM: CPT | Performed by: SURGERY

## 2024-08-20 PROCEDURE — 258N000003 HC RX IP 258 OP 636: Performed by: STUDENT IN AN ORGANIZED HEALTH CARE EDUCATION/TRAINING PROGRAM

## 2024-08-20 PROCEDURE — 999N000215 HC STATISTIC HFNC ADULT NON-CPAP

## 2024-08-20 PROCEDURE — 250N000012 HC RX MED GY IP 250 OP 636 PS 637: Performed by: STUDENT IN AN ORGANIZED HEALTH CARE EDUCATION/TRAINING PROGRAM

## 2024-08-20 RX ORDER — HYDROMORPHONE HCL IN WATER/PF 6 MG/30 ML
0.2 PATIENT CONTROLLED ANALGESIA SYRINGE INTRAVENOUS
Status: DISCONTINUED | OUTPATIENT
Start: 2024-08-20 | End: 2024-08-28 | Stop reason: HOSPADM

## 2024-08-20 RX ORDER — HYDROXYZINE HYDROCHLORIDE 25 MG/1
25 TABLET, FILM COATED ORAL AT BEDTIME
Status: DISCONTINUED | OUTPATIENT
Start: 2024-08-20 | End: 2024-08-28 | Stop reason: HOSPADM

## 2024-08-20 RX ORDER — METHOCARBAMOL 750 MG/1
750 TABLET, FILM COATED ORAL 3 TIMES DAILY
Status: DISCONTINUED | OUTPATIENT
Start: 2024-08-20 | End: 2024-08-20

## 2024-08-20 RX ORDER — LIDOCAINE 40 MG/G
CREAM TOPICAL
Status: ACTIVE | OUTPATIENT
Start: 2024-08-20 | End: 2024-08-23

## 2024-08-20 RX ORDER — POLYETHYLENE GLYCOL 3350 17 G/17G
17 POWDER, FOR SOLUTION ORAL DAILY PRN
Status: DISCONTINUED | OUTPATIENT
Start: 2024-08-20 | End: 2024-08-28 | Stop reason: HOSPADM

## 2024-08-20 RX ORDER — QUETIAPINE FUMARATE 25 MG/1
25 TABLET, FILM COATED ORAL ONCE
Status: COMPLETED | OUTPATIENT
Start: 2024-08-20 | End: 2024-08-20

## 2024-08-20 RX ORDER — FUROSEMIDE 10 MG/ML
10 INJECTION INTRAMUSCULAR; INTRAVENOUS ONCE
Status: COMPLETED | OUTPATIENT
Start: 2024-08-20 | End: 2024-08-20

## 2024-08-20 RX ORDER — MAGNESIUM SULFATE HEPTAHYDRATE 40 MG/ML
2 INJECTION, SOLUTION INTRAVENOUS ONCE
Status: COMPLETED | OUTPATIENT
Start: 2024-08-20 | End: 2024-08-20

## 2024-08-20 RX ORDER — BISACODYL 10 MG
10 SUPPOSITORY, RECTAL RECTAL DAILY PRN
Status: DISCONTINUED | OUTPATIENT
Start: 2024-08-20 | End: 2024-08-28 | Stop reason: HOSPADM

## 2024-08-20 RX ORDER — QUETIAPINE FUMARATE 50 MG/1
50 TABLET, FILM COATED ORAL AT BEDTIME
Status: DISCONTINUED | OUTPATIENT
Start: 2024-08-20 | End: 2024-08-20

## 2024-08-20 RX ORDER — HEPARIN SODIUM,PORCINE 10 UNIT/ML
5-20 VIAL (ML) INTRAVENOUS
Status: DISCONTINUED | OUTPATIENT
Start: 2024-08-20 | End: 2024-08-28 | Stop reason: HOSPADM

## 2024-08-20 RX ORDER — NYSTATIN 100000/ML
1000000 SUSPENSION, ORAL (FINAL DOSE FORM) ORAL 4 TIMES DAILY
Status: DISCONTINUED | OUTPATIENT
Start: 2024-08-20 | End: 2024-08-20

## 2024-08-20 RX ORDER — HYDROXYZINE HYDROCHLORIDE 25 MG/1
25 TABLET, FILM COATED ORAL AT BEDTIME
Status: DISCONTINUED | OUTPATIENT
Start: 2024-08-20 | End: 2024-08-20

## 2024-08-20 RX ORDER — AMOXICILLIN 250 MG
2 CAPSULE ORAL 2 TIMES DAILY PRN
Status: DISCONTINUED | OUTPATIENT
Start: 2024-08-20 | End: 2024-08-28 | Stop reason: HOSPADM

## 2024-08-20 RX ORDER — NOREPINEPHRINE BITARTRATE 0.06 MG/ML
INJECTION, SOLUTION INTRAVENOUS
Status: DISCONTINUED
Start: 2024-08-20 | End: 2024-08-21 | Stop reason: HOSPADM

## 2024-08-20 RX ORDER — HYDRALAZINE HYDROCHLORIDE 20 MG/ML
10 INJECTION INTRAMUSCULAR; INTRAVENOUS EVERY 4 HOURS PRN
Status: DISCONTINUED | OUTPATIENT
Start: 2024-08-20 | End: 2024-08-28 | Stop reason: HOSPADM

## 2024-08-20 RX ORDER — QUETIAPINE FUMARATE 25 MG/1
25-50 TABLET, FILM COATED ORAL
Status: DISCONTINUED | OUTPATIENT
Start: 2024-08-20 | End: 2024-08-22

## 2024-08-20 RX ORDER — TRAZODONE HYDROCHLORIDE 50 MG/1
50 TABLET, FILM COATED ORAL AT BEDTIME
Status: DISCONTINUED | OUTPATIENT
Start: 2024-08-20 | End: 2024-08-20

## 2024-08-20 RX ORDER — HEPARIN SODIUM,PORCINE 10 UNIT/ML
5-20 VIAL (ML) INTRAVENOUS EVERY 24 HOURS
Status: DISCONTINUED | OUTPATIENT
Start: 2024-08-20 | End: 2024-08-28 | Stop reason: HOSPADM

## 2024-08-20 RX ORDER — LABETALOL HYDROCHLORIDE 5 MG/ML
10 INJECTION, SOLUTION INTRAVENOUS EVERY 4 HOURS PRN
Status: DISCONTINUED | OUTPATIENT
Start: 2024-08-20 | End: 2024-08-28 | Stop reason: HOSPADM

## 2024-08-20 RX ORDER — METHOCARBAMOL 750 MG/1
750 TABLET, FILM COATED ORAL 4 TIMES DAILY
Status: DISCONTINUED | OUTPATIENT
Start: 2024-08-20 | End: 2024-08-28 | Stop reason: HOSPADM

## 2024-08-20 RX ORDER — NYSTATIN 100000/ML
1000000 SUSPENSION, ORAL (FINAL DOSE FORM) ORAL 4 TIMES DAILY
Status: DISCONTINUED | OUTPATIENT
Start: 2024-08-20 | End: 2024-08-28 | Stop reason: HOSPADM

## 2024-08-20 RX ORDER — HYDROMORPHONE HYDROCHLORIDE 1 MG/ML
0.4 INJECTION, SOLUTION INTRAMUSCULAR; INTRAVENOUS; SUBCUTANEOUS
Status: DISCONTINUED | OUTPATIENT
Start: 2024-08-20 | End: 2024-08-28 | Stop reason: HOSPADM

## 2024-08-20 RX ORDER — METOPROLOL TARTRATE 25 MG/1
25 TABLET, FILM COATED ORAL 2 TIMES DAILY
Status: DISCONTINUED | OUTPATIENT
Start: 2024-08-20 | End: 2024-08-24

## 2024-08-20 RX ADMIN — LEVALBUTEROL HYDROCHLORIDE 1.25 MG: 1.25 SOLUTION RESPIRATORY (INHALATION) at 08:40

## 2024-08-20 RX ADMIN — ACETYLCYSTEINE 2 ML: 200 SOLUTION ORAL; RESPIRATORY (INHALATION) at 12:58

## 2024-08-20 RX ADMIN — LEVALBUTEROL HYDROCHLORIDE 1.25 MG: 1.25 SOLUTION RESPIRATORY (INHALATION) at 20:42

## 2024-08-20 RX ADMIN — TACROLIMUS 1.5 MG: 1 CAPSULE ORAL at 08:12

## 2024-08-20 RX ADMIN — NYSTATIN 1000000 UNITS: 100000 SUSPENSION ORAL at 14:13

## 2024-08-20 RX ADMIN — OXYCODONE HYDROCHLORIDE 5 MG: 5 TABLET ORAL at 15:07

## 2024-08-20 RX ADMIN — PREDNISOLONE 30 MG: 15 SOLUTION ORAL at 08:06

## 2024-08-20 RX ADMIN — ACETYLCYSTEINE 2 ML: 200 SOLUTION ORAL; RESPIRATORY (INHALATION) at 20:42

## 2024-08-20 RX ADMIN — ACETYLCYSTEINE 2 ML: 200 SOLUTION ORAL; RESPIRATORY (INHALATION) at 15:54

## 2024-08-20 RX ADMIN — QUETIAPINE FUMARATE 25 MG: 25 TABLET ORAL at 02:17

## 2024-08-20 RX ADMIN — MAGNESIUM SULFATE HEPTAHYDRATE 2 G: 2 INJECTION, SOLUTION INTRAVENOUS at 05:26

## 2024-08-20 RX ADMIN — ROSUVASTATIN CALCIUM 10 MG: 10 TABLET, FILM COATED ORAL at 08:06

## 2024-08-20 RX ADMIN — Medication 60 ML: at 08:22

## 2024-08-20 RX ADMIN — Medication 60 ML: at 20:14

## 2024-08-20 RX ADMIN — Medication 15 ML: at 14:16

## 2024-08-20 RX ADMIN — INSULIN GLARGINE 30 UNITS: 100 INJECTION, SOLUTION SUBCUTANEOUS at 20:28

## 2024-08-20 RX ADMIN — METHOCARBAMOL 750 MG: 750 TABLET ORAL at 05:49

## 2024-08-20 RX ADMIN — FUROSEMIDE 10 MG: 10 INJECTION, SOLUTION INTRAMUSCULAR; INTRAVENOUS at 15:58

## 2024-08-20 RX ADMIN — PIPERACILLIN AND TAZOBACTAM 4.5 G: 4; .5 INJECTION, POWDER, LYOPHILIZED, FOR SOLUTION INTRAVENOUS at 08:06

## 2024-08-20 RX ADMIN — ACETAMINOPHEN 975 MG: 325 TABLET, FILM COATED ORAL at 15:58

## 2024-08-20 RX ADMIN — POTASSIUM & SODIUM PHOSPHATES POWDER PACK 280-160-250 MG 2 PACKET: 280-160-250 PACK at 20:07

## 2024-08-20 RX ADMIN — ACETAMINOPHEN 975 MG: 325 TABLET, FILM COATED ORAL at 08:06

## 2024-08-20 RX ADMIN — Medication: at 00:10

## 2024-08-20 RX ADMIN — TACROLIMUS 1.5 MG: 1 CAPSULE ORAL at 18:22

## 2024-08-20 RX ADMIN — SULFAMETHOXAZOLE AND TRIMETHOPRIM 80 MG: 200; 40 SUSPENSION ORAL at 08:06

## 2024-08-20 RX ADMIN — METHOCARBAMOL 750 MG: 750 TABLET ORAL at 10:25

## 2024-08-20 RX ADMIN — OXYCODONE HYDROCHLORIDE 5 MG: 5 TABLET ORAL at 05:50

## 2024-08-20 RX ADMIN — MICAFUNGIN SODIUM 100 MG: 50 INJECTION, POWDER, LYOPHILIZED, FOR SOLUTION INTRAVENOUS at 15:08

## 2024-08-20 RX ADMIN — PIPERACILLIN AND TAZOBACTAM 4.5 G: 4; .5 INJECTION, POWDER, LYOPHILIZED, FOR SOLUTION INTRAVENOUS at 02:02

## 2024-08-20 RX ADMIN — HEPARIN SODIUM 5000 UNITS: 5000 INJECTION, SOLUTION INTRAVENOUS; SUBCUTANEOUS at 21:59

## 2024-08-20 RX ADMIN — QUETIAPINE FUMARATE 75 MG: 50 TABLET ORAL at 20:06

## 2024-08-20 RX ADMIN — HEPARIN SODIUM 5000 UNITS: 5000 INJECTION, SOLUTION INTRAVENOUS; SUBCUTANEOUS at 05:26

## 2024-08-20 RX ADMIN — LABETALOL HYDROCHLORIDE 10 MG: 5 INJECTION, SOLUTION INTRAVENOUS at 00:44

## 2024-08-20 RX ADMIN — LABETALOL HYDROCHLORIDE 10 MG: 5 INJECTION, SOLUTION INTRAVENOUS at 12:41

## 2024-08-20 RX ADMIN — POLYETHYLENE GLYCOL 400 AND PROPYLENE GLYCOL 1 DROP: 4; 3 SOLUTION/ DROPS OPHTHALMIC at 20:06

## 2024-08-20 RX ADMIN — Medication 12.5 MG: at 10:25

## 2024-08-20 RX ADMIN — POTASSIUM & SODIUM PHOSPHATES POWDER PACK 280-160-250 MG 2 PACKET: 280-160-250 PACK at 15:58

## 2024-08-20 RX ADMIN — NYSTATIN 1000000 UNITS: 100000 SUSPENSION ORAL at 15:58

## 2024-08-20 RX ADMIN — POTASSIUM & SODIUM PHOSPHATES POWDER PACK 280-160-250 MG 2 PACKET: 280-160-250 PACK at 08:06

## 2024-08-20 RX ADMIN — Medication 12.5 MG: at 15:57

## 2024-08-20 RX ADMIN — HYDRALAZINE HYDROCHLORIDE 10 MG: 20 INJECTION INTRAMUSCULAR; INTRAVENOUS at 14:20

## 2024-08-20 RX ADMIN — LEVALBUTEROL HYDROCHLORIDE 1.25 MG: 1.25 SOLUTION RESPIRATORY (INHALATION) at 15:54

## 2024-08-20 RX ADMIN — INSULIN GLARGINE 30 UNITS: 100 INJECTION, SOLUTION SUBCUTANEOUS at 08:26

## 2024-08-20 RX ADMIN — HYDRALAZINE HYDROCHLORIDE 10 MG: 20 INJECTION INTRAMUSCULAR; INTRAVENOUS at 04:26

## 2024-08-20 RX ADMIN — PIPERACILLIN AND TAZOBACTAM 4.5 G: 4; .5 INJECTION, POWDER, LYOPHILIZED, FOR SOLUTION INTRAVENOUS at 20:06

## 2024-08-20 RX ADMIN — HYDROXYZINE HYDROCHLORIDE 25 MG: 25 TABLET, FILM COATED ORAL at 21:59

## 2024-08-20 RX ADMIN — MYCOPHENOLATE MOFETIL 1000 MG: 200 POWDER, FOR SUSPENSION ORAL at 08:06

## 2024-08-20 RX ADMIN — Medication 10 MG: at 20:42

## 2024-08-20 RX ADMIN — LEVALBUTEROL HYDROCHLORIDE 1.25 MG: 1.25 SOLUTION RESPIRATORY (INHALATION) at 12:58

## 2024-08-20 RX ADMIN — HEPARIN SODIUM 5000 UNITS: 5000 INJECTION, SOLUTION INTRAVENOUS; SUBCUTANEOUS at 14:17

## 2024-08-20 RX ADMIN — VANCOMYCIN HYDROCHLORIDE 1750 MG: 1 INJECTION, POWDER, LYOPHILIZED, FOR SOLUTION INTRAVENOUS at 09:44

## 2024-08-20 RX ADMIN — POTASSIUM & SODIUM PHOSPHATES POWDER PACK 280-160-250 MG 2 PACKET: 280-160-250 PACK at 14:16

## 2024-08-20 RX ADMIN — MYCOPHENOLATE MOFETIL 1000 MG: 200 POWDER, FOR SUSPENSION ORAL at 20:06

## 2024-08-20 RX ADMIN — LABETALOL HYDROCHLORIDE 10 MG: 5 INJECTION, SOLUTION INTRAVENOUS at 05:26

## 2024-08-20 RX ADMIN — ACETAMINOPHEN 975 MG: 325 TABLET, FILM COATED ORAL at 00:10

## 2024-08-20 RX ADMIN — Medication 10 MG: at 08:58

## 2024-08-20 RX ADMIN — PIPERACILLIN AND TAZOBACTAM 4.5 G: 4; .5 INJECTION, POWDER, LYOPHILIZED, FOR SOLUTION INTRAVENOUS at 14:17

## 2024-08-20 RX ADMIN — VANCOMYCIN HYDROCHLORIDE 1750 MG: 1 INJECTION, POWDER, LYOPHILIZED, FOR SOLUTION INTRAVENOUS at 20:54

## 2024-08-20 RX ADMIN — NYSTATIN 1000000 UNITS: 100000 SUSPENSION ORAL at 20:07

## 2024-08-20 RX ADMIN — ACETYLCYSTEINE 2 ML: 200 SOLUTION ORAL; RESPIRATORY (INHALATION) at 08:40

## 2024-08-20 RX ADMIN — LIDOCAINE HYDROCHLORIDE 1 ML: 10 INJECTION, SOLUTION EPIDURAL; INFILTRATION; INTRACAUDAL; PERINEURAL at 11:38

## 2024-08-20 RX ADMIN — METHOCARBAMOL 750 MG: 750 TABLET ORAL at 15:03

## 2024-08-20 RX ADMIN — Medication 40 MG: at 08:06

## 2024-08-20 RX ADMIN — METHOCARBAMOL 750 MG: 750 TABLET ORAL at 20:07

## 2024-08-20 RX ADMIN — Medication 10 MG: at 20:07

## 2024-08-20 RX ADMIN — METOPROLOL TARTRATE 25 MG: 25 TABLET, FILM COATED ORAL at 20:07

## 2024-08-20 ASSESSMENT — ACTIVITIES OF DAILY LIVING (ADL)
ADLS_ACUITY_SCORE: 47
ADLS_ACUITY_SCORE: 45
ADLS_ACUITY_SCORE: 45
ADLS_ACUITY_SCORE: 47

## 2024-08-20 NOTE — PROGRESS NOTES
Pain Service Progress Note  [facility]  Date: 08/20/2024       Patient Name: Travon Cartwright  MRN: [id number]  Age: 59 year old  Sex: male      Assessment:  Travon Cartwright is a 59 year old male who has PMH of chronic respiratory failure and hepatic steatosis.      Procedure: Lung Tx     Date of Surgery: 8/15/24     Date of Catheter Placement: 8/18/24     Plan/Recommendations:  1. Regional Anesthesia/Analgesia  -Continuous Catheter Type/Site: bilateral paravertebral (PV) T7-8  Infusate: 0.2% Ropivacaine  Programmed Intermittent Bolus (PIB) at 7 mL Q60 min via each catheter, total infusion rate of 14 mL/hr     Plan to maintain catheter approximately of 7 days     2. Anticoagulation  -Please contact Inpatient Pain Service before ordering or making any anticoagulation changes        3. Multimodal Analgesia  - Per primary service     Pain Service will continue to follow.     Discussed with attending anesthesiologist  Jessica Henderson PA-C  08/20/2024     Overnight Events: Poor sleep and reports hallucinating-improved this morning.    Tubes/Drains: Yes  Chest tubes    Subjective: he feels pain is managed. Feels weak.  =  Symptoms of LAST: No    Pain Location:  chest    Pain Intensity:    Pain at Rest: 5/10     Satisfied with your level of pain control: Yes    Diet: NPO for Medical/Clinical Reasons Except for: Meds  Adult Formula Drip Feeding: Continuous Osmolite 1.5; Nasoduodenal tube; Goal Rate: 60 mL/hr (goal) - Once FT verified post-pyloric and ok to use per CVICU, initiate @ 10 mL/hr and advance by 10 mL q8hr as tolerated to goal rate.; mL/hr; Do not adv...    Relevant Labs:  Recent Labs   Lab Test 08/20/24  0353 08/19/24  0347 08/18/24  0613 08/16/24  0419 08/15/24  2020   INR  --   --   --   --  1.46*      < >  --    < > 233   PTT  --   --  34  --  42*   BUN 18.0   < >  --    < > 10.7    < > = values in this interval not displayed.       Physical Exam:  Vitals: BP 94/58    "Pulse 95   Temp 98.4  F (36.9  C) (Oral)   Resp 28   Ht 1.778 m (5' 10\")   Wt 73.7 kg (162 lb 7.7 oz)   SpO2 99%   BMI 23.31 kg/m      Physical Exam:   Orientation:  Alert, oriented, and in no acute distress: Yes  Sedation: No    Motor Examination:  5/5 Strength in lower extremities: Yes      Catheter Site:   Catheter entry site is clean/dry/intact: Yes    Tender: No      Relevant Medications:  Current Pain Medications:  Medications related to Pain Management (From now, onward)      Start     Dose/Rate Route Frequency Ordered Stop    08/20/24 2200  hydrOXYzine HCl (ATARAX) tablet 25 mg         25 mg Oral AT BEDTIME 08/20/24 0917      08/20/24 2016  HYDROmorphone (DILAUDID) injection 0.2 mg        Placed in \"Or\" Linked Group    0.2 mg Intravenous EVERY 2 HOURS PRN 08/20/24 0537      08/20/24 2016  HYDROmorphone (PF) (DILAUDID) injection 0.4 mg        Placed in \"Or\" Linked Group    0.4 mg Intravenous EVERY 2 HOURS PRN 08/20/24 0537      08/20/24 1000  methocarbamol (ROBAXIN) tablet 750 mg         750 mg Oral or Feeding Tube 4 TIMES DAILY 08/20/24 0913      08/20/24 0907  lidocaine 1 % 0.1-5 mL         0.1-5 mL Other EVERY 1 HOUR PRN 08/20/24 0912 08/23/24 0906    08/20/24 0907  lidocaine (LMX4) cream          Topical EVERY 1 HOUR PRN 08/20/24 0912 08/23/24 0906    08/20/24 0600  senna-docusate (SENOKOT-S/PERICOLACE) 8.6-50 MG per tablet 2 tablet         2 tablet Oral or Feeding Tube 2 TIMES DAILY PRN 08/20/24 0536      08/20/24 0536  bisacodyl (DULCOLAX) suppository 10 mg         10 mg Rectal DAILY PRN 08/20/24 0536      08/20/24 0536  magnesium hydroxide (MILK OF MAGNESIA) suspension 30 mL         30 mL Oral or Feeding Tube DAILY PRN 08/20/24 0536      08/20/24 0536  polyethylene glycol (MIRALAX) Packet 17 g         17 g Oral or Feeding Tube DAILY PRN 08/20/24 0536      08/19/24 0920  oxyCODONE (ROXICODONE) tablet 5 mg        Placed in \"Or\" Linked Group    5 mg Oral or Feeding Tube EVERY 4 HOURS PRN 08/19/24 " "0921 08/19/24 0920  oxyCODONE IR (ROXICODONE) half-tab 2.5 mg        Placed in \"Or\" Linked Group    2.5 mg Oral or Feeding Tube EVERY 4 HOURS PRN 08/19/24 0921      08/19/24 0902  lidocaine (LMX4) cream          Topical EVERY 1 HOUR PRN 08/19/24 0907 08/22/24 0901    08/19/24 0902  lidocaine 1 % 0.1-5 mL         0.1-5 mL Other EVERY 1 HOUR PRN 08/19/24 0907 08/22/24 0901    08/18/24 1700  ROPivacaine 0.2% in sodium chloride 0.9% PERINEURAL infusion          Perineural Continuous Nerve Block 08/18/24 1657      08/18/24 1700  ROPivacaine 0.2% in sodium chloride 0.9% PERINEURAL infusion          Perineural Continuous Nerve Block 08/18/24 1657      08/18/24 1600  acetaminophen (TYLENOL) tablet 975 mg         975 mg Oral or Feeding Tube EVERY 8 HOURS 08/18/24 1448      08/18/24 0000  acetaminophen (TYLENOL) tablet 650 mg         650 mg Oral or Feeding Tube EVERY 4 HOURS PRN 08/15/24 2016      08/15/24 2016  lidocaine 1 % 0.1-1 mL         0.1-1 mL Other EVERY 1 HOUR PRN 08/15/24 2016      08/15/24 2016  lidocaine (LMX4) kit          Topical EVERY 1 HOUR PRN 08/15/24 2016                          Acute Inpatient Pain Service [facility]  Hours of pain coverage 24/7   Page via Amcom- Please Page the Pain Team Via Amcom: \"PAIN MANAGEMENT ACUTE INPATIENT/ [facility]\"            "

## 2024-08-20 NOTE — PROGRESS NOTES
Pulmonary Medicine  Cystic Fibrosis - Lung Transplant Team    Patient: Travon Cartwright  MRN: [id number]  : 1965 (age 59 year old)  Transplant: 8/15/2024 (Lung), POD#5  Admission date: 2024  Primary Care Provider: Dwayne Thomas    Assessment & Plan:     Travon Cartwright is a 59 year old male with a history of idiopathic pulmonary fibrosis, diabetes, hepatosteatosis, and essential tremor.  Pt. is now s/p BSLT on 8/15/2024 with Dr. Mulvihill.  Surgery was uncomplicated and done on pump, extubated and off pressors since 2024. Now working on improving ICU delirium and normalizing sleep cycle.    Recommendations:    Wean oxygen as tolerated, per CVICU team  Daily CXR with chest tubes in place. CVTS managing chest tubes (x5)  L bronchial washing cultures from 8/15 growing Strep constellatus, donor cultures wit Staph  Follow up susceptibilities  Continue vancomycin x10 days (end date: )  Diuresis per primary team; agree with goal of 0.5-1L net negative.   VFSS today with SLP  IS: Daily tacro levels. Tacro level pending, will adjust dose accordingly. Likely will change from SL -> enteral today.   PPX: As below.   Abx - vancomycin IV x10 days, zosyn; cultures pending as par tof workup for fevers . Continue zosyn for now.   ES catheter in place.  Agree with removal of CVC, arterial line, [place]. Recommend PICC for central access    S/p bilateral sequential lung transplant (BSLT) 8/15/24 for idiopathic pulmonary fibrosis:  Acute on chronic hypoxic respiratory failure (post operative):   - Wean oxygen as tolerated per CVICU team  - Nebs: levalbuterol and Mucomyst QID  - Aggressive pulmonary toilet with chest physiotherapy QID (addition of Aerobika and incentive spirometry now that he's extubated)  - DSA at one week (ordered ) then one month post-transplant (additionally per protocol)  - Ammonia monitoring qMTh (screening for hyperammonemia post-lung transplant) with ureaplasma PCR ordered  POD #7 (8/22) per protocol n  - ES catheter placed by anesthesia 8/18 prior to extubation  - Chest tubes managed by surgical team; daily CXRs with chest tubes in place (5 remain; 4 pleural, 1 pericardial)  - SLP consulted; plan for VFSS today, oral diet pending results  - Await explant pathology    Immunosuppression:  Induction therapy with high dose IV steroid intraoperatively and basiliximab (POD #0 and #4).   - Tacrolimus 1.5 mg SL BID.  Goal tacrolimus level 8-12. Tacro levels daily.   - MMF 1000 mg BID  - Methylprednisolone 125 mg x 3 doses, then prednisolone 30 mg daily with taper per lung transplant protocol:  Date Daily Dose (mg)   8/17/2024 30   8/24/2024 25   8/31/2024 20   9/21/2024 15   10/12/2024 10   11/2/2024 5     Prophylaxis:   - Bactrim for PJP ppx started POD #1 due to donor toxoplasma IgG+  - VGCV for CMV ppx (ordered as below), CMV monitoring per protocol (after completion of ppx course, additionally prn)  - Ampho B nebs twice weekly for antifungal ppx through discharge, then will stop  - Nystatin for oral candidiasis ppx, 6 month course   - Micofungin 100mg q24h for prophylaxis x 10 days (end date: 8/25)  - See below for serologies and viral ppx:   Donor Recipient Intervention   CMV status - + Valganciclovir POD #8-90   EBV status - + EBV monthly   HSV status N/A + none      Primary graft dysfunction (per ISHLT guidelines):  See chart below:   POD #0  (~0 hours) POD #1  (~24 hours) POD #2   (~48 hours) POD#3   (~72 hours)   Date 8/15 8/16 8/17 8/18   Time 1446 1301 0352 0355   Intubated Y Y Y Y   PaO2 425 149 151 90   FiO2 80 60 50 40   P/F Ratio 531 248 302 225   PGD Grade   (0=mild, 3=severe) 1 2 1 2   ECMO N N N N   Inhaled NO/Flolan Y Y N N   ISHLT PGD Scoring    Grade 0 - PaO2/FiO2 >300 and normal chest radiograph (absence of diffuse allograft infiltrates)  Grade 1 - PaO2/FiO2 >300 and diffuse allograft infiltrates on chest radiograph  Grade 2 - PaO2/FiO2 between 200 and 300  Grade 3 -  PaO2/FiO2 <200 and/or on ECMO  Grade U (Ungradable) - on ECMO and normal chest radiograph (absence of diffuse allograft infiltrates)     ID: Prior history of infection/colonization with Strep constellatus (bronch BAL on L 8/16), donor cultures positive for Staph.  - IgG at one month (ordered 9/15)  - Donor cultures ([facility]) NGTD; ([facility]) NGTD ([facility] personally reviewed today) - staph aueus +  - Recipient cultures - washing from left 8/16- strep constellatus  - Abx- Completed IV ceftaz. Vancomycin resumed 8/17-plan for total 10 days (end date 8/24)  - Repeat blood cultures 8/19 due to fevers over night; start empiric zosyn during sepsis rule out    PHS risk criteria donor:  Additional labs required post-transplant (between 4-8 weeks post-op): Hepatitis B, Hepatitis C, and HIV by JOVANY ([id number], ordered POD #30, ordered for 9/15).    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 Proceed with J feeds. No need for NPO for 6 weeks.    Diabetes mellitus: Prior to transplant was on Jardiance, dulaglutide, glargine, metformin. All currently held; insulin gtt per primary team-->transition to Lantus.     Toxic-metabolic encephalopathy: Likely ICU delirium, which is improved today compared to yesterday.  - Continue 12.5mg qAM seroquel/50mg qPM seroquel per CVICU  - PRN melatonin at bedtime     We appreciate the excellent care provided by the CVTS and CVICU teams.  Recommendations communicated via in person rounding and this note.  Will continue to follow along closely, please do not hesitate to call with any questions or concerns.    Seen and staffed with Dr. Lowery.     Mallorie Hoover MD  Internal Medicine-Pediatrics  Pulmonary/Critical Care Fellow - PGY7/FL2  [facility]  P: [phone]    Subjective:     No acute events over night. Patient appeared much less delirious this morning but does report that he had hallucinations over night and this morning. He did have a bowel movement this  morning! Very mild pain in chest.     Review of Systems:     Skin: No rashes noted; wound vac over the clamshell incision is draining serosanginous fluid in collection chamber.  Ears/Nose/Throat: No sinus pain.  Respiratory: No trouble breathing, wheezing. Denies having any chest pain.  Cardiovascular: Minimal pain in the chest.   Gastrointestinal: +passing gas, had a BM this morning  Genitourinary: Urinary catheter in place.  Neurologic: No headaches, focal neurologic symptoms.  Psychiatric: Poor sleep last night, hallucinations (mostly visual) last night    Medications:     Medications Prior to Admission   Medication Sig Dispense Refill Last Dose    atorvastatin (LIPITOR) 20 MG tablet Take 1 tablet by mouth daily   8/14/2024 at am    Dulaglutide (TRULICITY) 3 MG/0.5ML SOPN Inject 3 mg Subcutaneous once a week   8/12/2024 at pm    empagliflozin (JARDIANCE) 10 MG TABS tablet Take 1 tablet (10 mg) by mouth daily 90 tablet 1 8/13/2024 at pm    insulin glargine (LANTUS PEN) 100 UNIT/ML pen Inject 30 Units subcutaneously at bedtime   8/13/2024 at hs    losartan (COZAAR) 50 MG tablet Take 1 tablet by mouth daily   8/13/2024 at am    metFORMIN (GLUCOPHAGE XR) 500 MG 24 hr tablet Take 500 mg by mouth 2 times daily (with meals)   8/13/2024 at pm    Pirfenidone 801 MG TABS Take 801 mg by mouth 2 times daily   8/13/2024 at pm    Continuous Glucose Sensor (DEXCOM G7 SENSOR) MISC Change every 10 days. 1 each 0      Current Scheduled Meds  Current Facility-Administered Medications   Medication Dose Route Frequency Provider Last Rate Last Admin    acetaminophen (TYLENOL) tablet 975 mg  975 mg Oral or Feeding Tube Q8H Barry Laughlin PA-C   975 mg at 08/20/24 0806    acetylcysteine (MUCOMYST) 20 % nebulizer solution 2 mL  2 mL Nebulization 4x Daily Marty Jeffers MD   2 mL at 08/20/24 0840    amphotericin B (FUNGIZONE) 10 mg/2 mL inhalation solution 10 mg  10 mg Nebulization 2 times daily Marty Jeffers MD   10 mg at 08/20/24  0858    [START ON 8/23/2024] calcium carbonate-vitamin D (CALTRATE) 600-10 MG-MCG per tablet 1 tablet  1 tablet Oral or Feeding Tube BID Vivi Bowser RPH        heparin ANTICOAGULANT injection 5,000 Units  5,000 Units Subcutaneous Q8H [facility] Margie Diego APRN CNP   5,000 Units at 08/20/24 0526    hydrOXYzine HCl (ATARAX) tablet 25 mg  25 mg Oral At Bedtime Kit Hodgson MD        insulin aspart (NovoLOG) injection (RAPID ACTING)  1-12 Units Subcutaneous Q4H Kit Hodgson MD   3 Units at 08/20/24 0826    insulin glargine (LANTUS PEN) injection 30 Units  30 Units Subcutaneous BID Barry Laughlin PA-C   30 Units at 08/20/24 0826    levalbuterol (XOPENEX) neb solution 1.25 mg  1.25 mg Nebulization 4x Daily Marty Jeffers MD   1.25 mg at 08/20/24 0840    melatonin tablet 10 mg  10 mg Oral or Feeding Tube QPM Kit Hodgson MD   10 mg at 08/19/24 2001    methocarbamol (ROBAXIN) tablet 750 mg  750 mg Oral or Feeding Tube 4x Daily Kit Hodgson MD        metoprolol tartrate (LOPRESSOR) half-tab 12.5 mg  12.5 mg Oral BID Margie Diego APRN CNP        micafungin (MYCAMINE) 100 mg in sodium chloride 0.9 % 100 mL intermittent infusion  100 mg Intravenous Q24H William Jones PA-C 100 mL/hr at 08/19/24 1402 100 mg at 08/19/24 1402    multivitamins w/minerals liquid 15 mL  15 mL Per Feeding Tube Daily William Jones PA-C   15 mL at 08/19/24 1230    mycophenolate (GENERIC EQUIVALENT) capsule 1,000 mg  1,000 mg Oral BID Marty Jeffers MD   1,000 mg at 08/19/24 2001    Or    mycophenolate (CELLCEPT BRAND) suspension 1,000 mg  1,000 mg Oral or NG Tube BID Marty Jeffers MD   1,000 mg at 08/20/24 0806    pantoprazole (PROTONIX) 2 mg/mL suspension 40 mg  40 mg Oral or Feeding Tube QAM AC Vivi Bowser RPH   40 mg at 08/20/24 0806    piperacillin-tazobactam (ZOSYN) 4.5 g vial to attach to  mL bag  4.5 g Intravenous Q6H Margie Diego APRN CNP   4.5 g at 08/20/24 0806    potassium & sodium  phosphates (NEUTRA-PHOS) Packet 2 packet  2 packet Oral or Feeding Tube 4x Daily Mulvihill, Michael, MD   2 packet at 08/20/24 0806    prednisoLONE (ORAPRED) 15 MG/5 ML solution 30 mg  30 mg Oral or NG Tube Daily Marty Jeffers MD   30 mg at 08/20/24 0806    Prosource TF20 ENfit Compatibl EN LIQD (PROSOURCE TF20) packet 60 mL  1 packet Per Feeding Tube BID William Jones PA-C   60 mL at 08/20/24 0822    QUEtiapine (SEROquel) tablet 75 mg  75 mg Oral or Feeding Tube At Bedtime Kit Hodgson MD        rosuvastatin (CRESTOR) tablet 10 mg  10 mg Oral Daily Barry Laughlin PA-C   10 mg at 08/20/24 0806    sodium chloride (PF) 0.9% PF flush 3 mL  3 mL Intracatheter Q8H Marty Jeffers MD   3 mL at 08/20/24 0428    sulfamethoxazole-trimethoprim (BACTRIM/SEPTRA) suspension 80 mg  10 mL Oral or NG Tube Daily William Jones PA-C   80 mg at 08/20/24 0806    Or    sulfamethoxazole-trimethoprim (BACTRIM) 400-80 MG per tablet 1 tablet  1 tablet Oral or NG Tube Daily William Jones PA-C   1 tablet at 08/19/24 0844    tacrolimus (GENERIC EQUIVALENT) PO capsule for SUBLINGUAL use 1.5 mg  1.5 mg Sublingual BID IS Mallorie Hoover MD   1.5 mg at 08/20/24 0812    [START ON 8/23/2024] valGANciclovir (VALCYTE) solution 900 mg  900 mg Oral or Feeding Tube Daily Mallorie Hoover MD        vancomycin (VANCOCIN) 1,750 mg in sodium chloride 0.9 % 250 mL intermittent infusion  1,750 mg (central catheter) Intravenous Q12H Mulvihill, Michael, MD   1,750 mg at 08/20/24 0944      Current PRN Meds  Current Facility-Administered Medications   Medication Dose Route Frequency Provider Last Rate Last Admin    acetaminophen (TYLENOL) tablet 650 mg  650 mg Oral or Feeding Tube Q4H PRN Marty Jeffers MD   650 mg at 08/19/24 2011    bisacodyl (DULCOLAX) suppository 10 mg  10 mg Rectal Daily PRN Raven Byrnes, SHAN CNP        dextrose 10% infusion   Intravenous Continuous PRN William Jones PA-C        glucose gel 15-30 g  15-30 g Oral Q15 Min  PRN Kit Hodgson MD        Or    dextrose 50 % injection 25-50 mL  25-50 mL Intravenous Q15 Min PRN Kit Hodgson MD        Or    glucagon injection 1 mg  1 mg Subcutaneous Q15 Min PRN Kit Hodgson MD        hydrALAZINE (APRESOLINE) injection 10 mg  10 mg Intravenous Q4H PRN Raven Byrnes APRN CNP   10 mg at 08/20/24 0426    HYDROmorphone (DILAUDID) injection 0.2 mg  0.2 mg Intravenous Q2H PRN Raven Byrnes APRN CNP        Or    HYDROmorphone (PF) (DILAUDID) injection 0.4 mg  0.4 mg Intravenous Q2H PRN Raven Byrnes APRN CNP        labetalol (NORMODYNE/TRANDATE) injection 10 mg  10 mg Intravenous Q4H PRN Raven Byrnes APRN CNP   10 mg at 08/20/24 0526    lidocaine (LMX4) cream   Topical Q1H PRN Margie Diego APRN CNP        lidocaine (LMX4) cream   Topical Q1H PRN Margie Diego APRN CNP        lidocaine (LMX4) kit   Topical Q1H PRN Marty Jeffers MD        lidocaine 1 % 0.1-1 mL  0.1-1 mL Other Q1H PRN Marty Jeffers MD        lidocaine 1 % 0.1-5 mL  0.1-5 mL Other Q1H PRMargie Moore APRN CNP        lidocaine 1 % 0.1-5 mL  0.1-5 mL Other Q1H PRN Margie Diego APRN CNP        magnesium hydroxide (MILK OF MAGNESIA) suspension 30 mL  30 mL Oral or Feeding Tube Daily PRN Raven Byrnes APRN CNP        naloxone (NARCAN) injection 0.2 mg  0.2 mg Intravenous Q2 Min PRN Mulvihill, Michael, MD        Or    naloxone (NARCAN) injection 0.4 mg  0.4 mg Intravenous Q2 Min PRN Mulvihill, Michael, MD        Or    naloxone (NARCAN) injection 0.2 mg  0.2 mg Intramuscular Q2 Min PRN Mulvihill, Michael, MD        Or    naloxone (NARCAN) injection 0.4 mg  0.4 mg Intramuscular Q2 Min PRN Mulvihill, Michael, MD        NO anticoagulation unless approved by Anesthesia Provider   Does not apply Continuous PRN Claudy Dykes MD        ondansetron (ZOFRAN ODT) ODT tab 4 mg  4 mg Oral Q6H PRN Marty Jeffers MD        Or    ondansetron (ZOFRAN) injection 4 mg  4 mg  "Intravenous Q6H PRN Marty Jeffers MD        oxyCODONE IR (ROXICODONE) half-tab 2.5 mg  2.5 mg Oral or Feeding Tube Q4H PRN Kit Hodgson MD        Or    oxyCODONE (ROXICODONE) tablet 5 mg  5 mg Oral or Feeding Tube Q4H PRN Kit Hodgson MD   5 mg at 08/20/24 0550    polyethylene glycol (MIRALAX) Packet 17 g  17 g Oral or Feeding Tube Daily PRN Raven Byrnes APRN CNP        prochlorperazine (COMPAZINE) injection 10 mg  10 mg Intravenous Q6H PRN Marty Jeffers MD        Or    prochlorperazine (COMPAZINE) tablet 10 mg  10 mg Oral or Feeding Tube Q6H PRN Marty Jeffers MD        QUEtiapine (SEROquel) tablet 25-50 mg  25-50 mg Oral At Bedtime PRN Kit Hodgson MD        Reason beta blocker order not selected   Does not apply DOES NOT GO TO Marty Parker MD        senna-docusate (SENOKOT-S/PERICOLACE) 8.6-50 MG per tablet 2 tablet  2 tablet Oral or Feeding Tube BID PRN Raven Byrnes APRN CNP        sodium chloride (PF) 0.9% PF flush 10-40 mL  10-40 mL Intracatheter Once PRN Margie Diego APRN CNP        sodium chloride (PF) 0.9% PF flush 10-40 mL  10-40 mL Intracatheter Once PRN Margie Diego APRN CNP        sodium chloride (PF) 0.9% PF flush 3 mL  3 mL Intracatheter q1 min prn Marty Jeffers MD            Physical Exam:     All notes, images, and labs from past 24 hours (at minimum) were reviewed.    Vital signs:  Temp: 99.2  F (37.3  C) Temp src: Oral   Pulse: 86   Resp: 27 SpO2: 99 % O2 Device: High Flow Nasal Cannula (HFNC) Oxygen Delivery: 35 LPM Height: 177.8 cm (5' 10\") Weight: 73.7 kg (162 lb 7.7 oz)      Intake/Output Summary (Last 24 hours) at 8/20/2024 1036  Last data filed at 8/20/2024 1000  Gross per 24 hour   Intake 2029 ml   Output 5449 ml   Net -3420 ml     GENERAL: Patient is awake, alert, answering questions appropriately. He was in no acute distress.   NOSE: Without deformity, bleeding or discharge.   MOUTH: Mucosa are slightly dry. No thrush.  NECK: " Normal  LUNGS: Slightly tachypneic to the high 20s on my exam, no increased work of breathing. Good air movement bilaterally, with some shifting coarseness in apices. Wound vac in place over clamshell incision.   HEART: Regular rate and rhythm.  ABDOMEN: Soft, nontender and nondistended. +Bowel sounds.  EXTREMITIES: No LE edema noted on my exam.  SKIN: No rashes. No jaundice.    Results:     LABS    CMP:   Recent Labs   Lab 08/20/24  0824 08/20/24  0353 08/20/24  0352 08/20/24  0021 08/19/24  0558 08/19/24  0347 08/18/24  1331 08/18/24  1329 08/18/24  0514 08/18/24  0355 08/17/24  0353 08/17/24  0351 08/15/24  2024 08/15/24  2020 08/15/24  0308 08/14/24  2338   NA  --  139  --   --   --  138  --   --   --  142  --  141   < > 142   < > 141   POTASSIUM  --  4.0  --   --   --  4.5  --  4.1  --  3.5   < > 3.4   < > 4.6   < > 3.7   CHLORIDE  --  105  --   --   --  106  --   --   --  107  --  108*   < > 107   < > 104   CO2  --  25  --   --   --  26  --   --   --  28  --  27   < > 21*   < > 23   ANIONGAP  --  9  --   --   --  6*  --   --   --  7  --  6*   < > 14   < > 14   * 181* 165* 172*   < > 139*  147*   < >  --    < > 125*  149*   < > 142*   < > 145*   < > 123*   BUN  --  18.0  --   --   --  15.4  --   --   --  14.3  --  13.4   < > 10.7   < > 11.9   CR  --  0.56*  --   --   --  0.46*  --   --   --  0.50*  --  0.58*   < > 0.55*   < > 0.65*   GFRESTIMATED  --  >90  --   --   --  >90  --   --   --  >90  --  >90   < > >90   < > >90   HUGO  --  8.5*  --   --   --  8.4*  --   --   --  8.2*  --  8.0*   < > 7.8*   < > 9.9   MAG  --  1.8  --   --   --  1.9  --  2.1  --  2.0  --  2.0   < > 2.2   < > 2.1   PHOS  --  3.2  --   --   --  2.3*  --  2.2*  --  1.7*  --  2.0*   < > 4.1   < > 3.8   PROTTOTAL  --   --   --   --   --  5.0*  --   --   --   --   --   --   --  3.8*  --  7.4   ALBUMIN  --   --   --   --   --  2.6*  --   --   --   --   --   --   --  2.2*  --  4.3   BILITOTAL  --   --   --   --   --  0.4  --   --   --    "--   --   --   --  1.4*  --  0.4   ALKPHOS  --   --   --   --   --  76  --   --   --   --   --   --   --  61  --  89   AST  --   --   --   --   --  47*  --   --   --   --   --   --   --  79*  --  24   ALT  --   --   --   --   --  22  --   --   --   --   --   --   --  26  --  22    < > = values in this interval not displayed.     CBC:   Recent Labs   Lab 08/20/24  0353 08/19/24  0347 08/18/24  0355 08/17/24  0351   WBC 9.7 10.3 10.6 15.8*   RBC 4.09* 4.02* 3.99* 4.29*   HGB 11.9* 11.9* 11.8* 12.4*   HCT 37.0* 37.2* 35.9* 38.3*   MCV 91 93 90 89   MCH 29.1 29.6 29.6 28.9   MCHC 32.2 32.0 32.9 32.4   RDW 13.3 13.2 13.2 13.2    141* 139* 194       INR:   Recent Labs   Lab 08/15/24  2020 08/15/24  1820 08/14/24  2338   INR 1.46* 1.74* 1.07       Glucose:   Recent Labs   Lab 08/20/24  0824 08/20/24  0353 08/20/24  0352 08/20/24  0021 08/19/24  1957 08/19/24  1538   * 181* 165* 172* 154* 234*       Blood Gas:   Recent Labs   Lab 08/20/24  0353 08/19/24  1409 08/19/24  0954 08/16/24  1301 08/16/24  1005 08/16/24  0804 08/16/24  0419   PHV  --   --   --   --  7.36 7.39 7.37   PCO2V  --   --   --   --  48 45 45   PO2V  --   --   --   --  44 38 38   HCO3V  --   --   --   --  27 27 26   EUGENIE  --   --   --   --  0.9 1.6 0.6   O2PER 50 60 0   < > 60 60 65    < > = values in this interval not displayed.       Culture Data No results for input(s): \"CULT\" in the last 168 hours.    Virology Data: No results found for: \"INFLUA\", \"FLUAH1\", \"FLUAH3\", \"PP4072\", \"IFLUB\", \"RSVA\", \"RSVB\", \"PIV1\", \"PIV2\", \"PIV3\", \"HMPV\", \"HRVS\", \"ADVBE\", \"ADVC\"    Historical CMV results (last 3 of prior testing):  No results found for: \"CMVQNT\"  No results found for: \"CMVLOG\"    Urine Studies    Recent Labs   Lab Test 08/19/24  1535 08/15/24  0017   URINEPH 7.5* 5.0   NITRITE Negative Negative   LEUKEST Negative Negative   WBCU 1 1     IMAGING    8/20/2024 CXR       "

## 2024-08-20 NOTE — PLAN OF CARE
Major Shift Events:  Pt continues to endorse consistent visual hallucinations. HFNC weaned to 4L NC. Sinus tach w/min PVCs. Persistently hypertensive SBP>140. Team aware. PRN labetolol x1, hydralazine x1. Scheduled metoprolol increased to 25 mg BID. PICC placed bedside in RUE. Pt up to chair majority of shift. Worked with OT. Chest tubes split into atria x5. Fluoro swallow study delayed for tomorrow 1500. Pt remains NPO. Mx loose bowel movements.   Plan: Swallow study 1500  For vital signs and complete assessments, please see documentation flowsheets.

## 2024-08-20 NOTE — CARE PLAN
SLP: VFSS scheduled for today, unfortunately PICC line was placed and timing overlapped with scheduled VFSS time slot. Radiology unable to reschedule for later today. Swallow study now scheduled for tomorrow (8/21/24) at 1500.

## 2024-08-20 NOTE — PROGRESS NOTES
Critical Care Attending Progress Note  I, Neli Brown MD, PhD saw this patient with the resident and agree with the findings and plan of care as documented in the note. Please see separate note from today for further documentation.     I personally reviewed vital signs, medications, labs and imaging.     Assessment:   59 year old male with PMH of ILD, with chronic hypoxic respiratory failure on 6L oxygen at baseline, T2DM, hepatic steatosis and bilateral hand tremor now s/p lung transplant via clamshell incision with Dr Mulvihill on 8/15/24     Did not sleep overnight. Continues to have delirium.     Active problems and current treatments include:     Acute postop pain: B/L paravertebral blocks  Delirium: CAM-ICU positive-> delirium precautions  Respiratory insufficiency: pulm toilet  SP lung tx: add metop for AF prophylaxis  Volume: goal net negative 1L, dose furosemide  ID: fever last night, pan cx (8/19), cont Zosyn/Vanc, appreciate pulm tx team following along  Nutrition: TF to goal, bowel regimen (BM 8/20), speech following- video swallow eval today  Lines: RIJ-dc, zo- dc, Cts, get PICC, discontinue rodriguez  DM: lantus and SSI  PPX: subcutaneous heaprin, SCDs  Family: The sister participated in rounds, all questions answered.  DISPO: CVICU        The patient is critically ill.   I personally managed the ventilator, hemodynamics, sedation, analgesia, metabolic abnormalities and nutritional status.    I agree with the assessment and plan. I spent 52 minutes exclusive of procedures evaluating and managing this patient, discussing with the consultants, and updating the patient and family.     Neli Brown MD, PhD

## 2024-08-20 NOTE — CONSULTS
"PICC placed in Right upper extremity for access and frequent lab draws. Patient tolerated well and denies pain. Tip location verified at the SVC using ECG technology. PICC is ready to use. To contact the Vascular Access team enter a \"nursing to consult for vascular access\" [id number] order in EPIC.    "

## 2024-08-20 NOTE — PROGRESS NOTES
08/19/24 1100   Appointment Info   Signing Clinician's Name / Credentials (PT) Laura Monteiro, PT   Living Environment   People in Home alone   Current Living Arrangements house   Home Accessibility other (see comments)  (tub/shower)   Transportation Anticipated family or friend will provide  (sister drives)   Living Environment Comments Pt lives in single level home with no stairs to enter or within room. Has a tub/shower and used no equipment previously.   Self-Care   Usual Activity Tolerance moderate   Current Activity Tolerance fair   Regular Exercise No   Equipment Currently Used at Home none   Fall history within last six months no   Activity/Exercise/Self-Care Comment Prior to transplant reports 2L O2 at rest, 6L O2 with activity, has been very sedentary last 6 months per sister, unable to do much more than sit. Previously IND with ADLs/IADLs   General Information   Onset of Illness/Injury or Date of Surgery 08/15/24   Referring Physician Marty Jeffers MD   Patient/Family Therapy Goals Statement (PT) to go home   Pertinent History of Current Problem (include personal factors and/or comorbidities that impact the POC) 59 year old male with a history of idiopathic pulmonary fibrosis, diabetes, hepatosteatosis, and essential tremor.  Pt. is now s/p BSLT on 8/15/2024 with Dr. Mulvihill.  Surgery was uncomplicated and done on pump, extubated on 8/18/2024.   Existing Precautions/Restrictions fall;other (see comments);oxygen therapy device and L/min  (clamshell)   Heart Disease Risk Factors Diabetes;Overweight;Gender;Age   Cognition   Affect/Mental Status (Cognition) confused   Orientation Status (Cognition) oriented to;person;place  (oriented to month and that he was in a hospital)   Follows Commands (Cognition) 75-90% accuracy;physical/tactile prompts required;repetition of directions required   Pain Assessment   Patient Currently in Pain Yes, see Vital Sign flowsheet   Posture    Posture Forward head  position;Protracted shoulders   Range of Motion (ROM)   ROM Comment BUE limited due to pain/incision, BLE grossly WFL   Strength (Manual Muscle Testing)   Strength (Manual Muscle Testing) Deficits observed during functional mobility   Strength Comments demonstrates ~3/5 BUE and BLE   Bed Mobility   Comment, (Bed Mobility) Supine > sit mod A   Transfers   Comment, (Transfers) Sit > stand mod A   Gait/Stairs (Locomotion)   Comment, (Gait/Stairs) impaired per clinical reasoning   Balance   Balance Comments min A seated balance, mod A standing balance   Clinical Impression   Criteria for Skilled Therapeutic Intervention Yes, treatment indicated   PT Diagnosis (PT) impaired functional mobility   Influenced by the following impairments strength, respiratory status, cognition, balance, activity tolerance, pain   Functional limitations due to impairments gait, stairs, transfers, bed mobility, functional endurance   Clinical Presentation (PT Evaluation Complexity) evolving   Clinical Presentation Rationale clinical reasoning   Clinical Decision Making (Complexity) moderate complexity   Planned Therapy Interventions (PT) balance training;bed mobility training;gait training;home exercise program;neuromuscular re-education;patient/family education;ROM (range of motion);stair training;strengthening;stretching;transfer training;progressive activity/exercise;risk factor education;home program guidelines   Risk & Benefits of therapy have been explained evaluation/treatment results reviewed;care plan/treatment goals reviewed;risks/benefits reviewed;patient   PT Total Evaluation Time   PT Eval, Moderate Complexity Minutes (60065) 10   Physical Therapy Goals   PT Frequency 6x/week   PT Predicted Duration/Target Date for Goal Attainment 09/30/24   PT Goals Bed Mobility;Gait;Transfers;Stairs   PT: Bed Mobility Independent;Supine to/from sit;Within precautions   PT: Transfers Independent;Sit to/from stand;Within precautions   PT: Gait  Independent;Within precautions;Greater than 200 feet   PT: Stairs Modified independent;Within precautions;3 stairs;Rail on both sides  (for community mobility)   PT Discharge Planning   PT Plan Pre-gait/short distance gait and transfers, sit <> stands, LE strengthening   PT Discharge Recommendation (DC Rec) Acute [facility]hab [facility]-Motivated patient will benefit from intensive, interdisciplinary therapy.  Anticipate will be able to tolerate 3 hours of therapy per day   PT Rationale for DC Rec Currently pt below baseline with significant deficits in cognition, functional mobility and safety, Has supportive family and demonstrates good participation. Pt would benefit from ongoing rehab to address deficits.   PT Brief overview of current status Ax2, pivot transfers only otherwise use lift   Total Session Time   Timed Code Treatment Minutes 40   Total Session Time (sum of timed and untimed services) 50

## 2024-08-20 NOTE — PROGRESS NOTES
08/20/24 1442   Appointment Info   Signing Clinician's Name / Credentials (OT) Aida Croft OTRL   Living Environment   People in Home alone   Current Living Arrangements house   Home Accessibility no concerns   Transportation Anticipated family or friend will provide   Living Environment Comments pt lives in a single level home, no stairs. Pt reports tub/shower combo.   Self-Care   Usual Activity Tolerance moderate   Current Activity Tolerance fair   Regular Exercise No   Equipment Currently Used at Home none   Fall history within last six months no   Activity/Exercise/Self-Care Comment pt reports fairly sendentary over last few months, 6L O2, was previously IND with ADLs/IADLs   General Information   Onset of Illness/Injury or Date of Surgery 08/15/24   Referring Physician Marty Jeffers MD   Patient/Family Therapy Goal Statement (OT) to go home   Additional Occupational Profile Info/Pertinent History of Current Problem per chart jonah Cartwright is a 59 year old male with a history of idiopathic pulmonary fibrosis, diabetes, hepatosteatosis, and essential tremor.  Pt. is now s/p BSLT on 8/15/2024 with Dr. Mulvihill.  Surgery was uncomplicated and done on pump, extubated and off pressors since 8/18/2024. Now working on improving ICU delirium and normalizing sleep cycle.   Existing Precautions/Restrictions fall;other (see comments)  (clamshell)   Cognitive Status Examination   Orientation Status orientation to person, place and time   Affect/Mental Status (Cognitive) confused   Follows Commands WFL   Attention Deficit minimal deficit   Cognitive Status Comments Pt alert and oriented, occasionally needs repetition of cuing, though follows commands appropriately. Pt endorses cognition being below baseline and reports hallucinations.   Visual Perception   Visual Impairment/Limitations WFL   Sensory   Sensory Quick Adds sensation intact   Posture   Posture forward head position;protracted shoulders   Range of  Motion Comprehensive   Comment, General Range of Motion pt notes some difficulty lifting BUE over head, suspect WFL   Strength Comprehensive (MMT)   Comment, General Manual Muscle Testing (MMT) Assessment generalized weakness - BUE at least 3/5   Coordination   Upper Extremity Coordination No deficits were identified   Bed Mobility   Comment (Bed Mobility) Anticipate Ax2   Transfers   Transfers bed-chair transfer;sit-stand transfer;toilet transfer   Sit-Stand Transfer   Sit-Stand [place] (Transfers) moderate assist (50% patient effort)   Toilet Transfer   Type (Toilet Transfer) sit-stand   [place] Level (Toilet Transfer) moderate assist (50% patient effort)   Toilet Transfer Comments per clinical judgement   Balance   Balance Comments standing balance with Jose   Activities of Daily Living   BADL Assessment/Intervention bathing;lower body dressing;toileting   Bathing Assessment/Intervention   [place] Level (Bathing) moderate assist (50% patient effort)   Comment, (Bathing) per clinical judgement   Lower Body Dressing Assessment/Training   Comment, (Lower Body Dressing) per clinical judgement   [place] Level (Lower Body Dressing) moderate assist (50% patient effort)   Toileting   [place] Level (Toileting) maximum assist (25% patient effort)   Clinical Impression   Criteria for Skilled Therapeutic Interventions Met (OT) Yes, treatment indicated   OT Diagnosis pt is below baseline for ADLs   OT Problem List-Impairments impacting ADL problems related to;activity tolerance impaired;cognition;balance;mobility;strength;pain;post-surgical precautions   Assessment of Occupational Performance 3-5 Performance Deficits   Identified Performance Deficits dressing, bathing, tolieting, home management   Planned Therapy Interventions (OT) ADL retraining;IADL retraining;strengthening;transfer training;cognition;home program guidelines;progressive activity/exercise   Clinical Decision Making Complexity (OT)  problem focused assessment/low complexity   Risk & Benefits of therapy have been explained evaluation/treatment results reviewed;care plan/treatment goals reviewed;risks/benefits reviewed;current/potential barriers reviewed;participants voiced agreement with care plan;participants included;patient;sibling   Clinical Impression Comments pt presents below baseline, limited by activity tolerance, post-surgical precautions, pain, mild confusion/delirium. pt will benefit from skilled OT to progress toward PLOF   OT Total Evaluation Time   OT Eval, Low Complexity Minutes (12524) 8   OT Goals   Therapy Frequency (OT) 5 times/week   OT Predicted Duration/Target Date for Goal Attainment 09/13/24   OT Goals Hygiene/Grooming;Lower Body Dressing;Lower Body Bathing;Toilet Transfer/Toileting;Cognition   OT: Hygiene/Grooming independent;while standing   OT: Lower Body Dressing Modified independent;within precautions   OT: Lower Body Bathing Modified independent;with precautions   OT: Toilet Transfer/Toileting Independent;toilet transfer;cleaning and garment management   OT: Cognitive Patient/caregiver will verbalize understanding of cognitive assessment results/recommendations as needed for safe discharge planning   Interventions   Interventions Quick Adds Self-Care/Home Management;Therapeutic Activity   Self-Care/Home Management   Self-Care/Home Mgmt/ADL, Compensatory, Meal Prep Minutes (47011) 12   Symptoms Noted During/After Treatment (Meal Preparation/Planning Training) fatigue   Treatment Detail/Skilled Intervention Facilitated seated g/h to progress pt activity tolerance and IND with ADLs. Reviewed with pt clamshell precautions, pt notes some difficulty w/face level reaching. pt brushes teeth and washes face/hands with set up A. Pt has difficulty reaching hair to wash hair and comb, max A.   Therapeutic Activities   Therapeutic Activity Minutes (61065) 10   Symptoms noted during/after treatment fatigue   Treatment  Detail/Skilled Intervention Facilitated STS's to progress pt activity tolerance for ADLs. Pt notes need for pericares. time spent setting up room/lines for pt safety/IND. pt STS from recliner with modA, second person A w/lines.  Jose to maintain standing for approx 2 mins for pericares and linen change. Pt scoots hips posteriorly in chair with Jose. Pt declines further standing, notes fatigue.   OT Discharge Planning   OT Plan standing tolerance, commode transfers, seated ADLs   OT Discharge Recommendation (DC Rec) Acute [facility]hab Center-Motivated patient will benefit from intensive, interdisciplinary therapy.  Anticipate will be able to tolerate 3 hours of therapy per day   OT Rationale for DC Rec pt significantly below baseline, likely to benefit from rehab stay to progress IND/safety. pt motivated and with good family support   OT Brief overview of current status Ax2 pivot w/lines   Total Session Time   Timed Code Treatment Minutes 22   Total Session Time (sum of timed and untimed services) 30

## 2024-08-20 NOTE — PROVIDER NOTIFICATION
08/20/24 1100   PICC 08/20/24 Double Lumen Right Basilic   Placement Date/Time: 08/20/24 (c) 1129   Lumens (Required): Double Lumen  Lumen Identification: Purple;Red  Catheter Brand: GenAudio  Catheter Size: 5 fr  Lot #: [id number]  Description: Non - valved (open ended);Power PICC  Orientation: Right  Vein : (c) B...   Site Assessment WDL   External Cath Length (cm) (S)  3 cm   Extremity Circumference (cm) 27 cm   Dressing Chlorhexidine disk;Transparent   Dressing Status clean;dry;intact   Dressing Intervention   (New dressing)   Dressing Change Due (S)  08/27/24   Line Necessity Yes, meets criteria   Purple - Status blood return noted;saline locked   Purple - Cap Change Due 08/24/24   Purple - Intervention Flushed   Red - Status blood return noted;saline locked   Red - Cap Change Due 08/24/24   Red - Intervention Flushed   Phlebitis Scale 0-->no symptoms   Infiltration? no   PICC Comment (S)  PICC okay to use.

## 2024-08-20 NOTE — PROGRESS NOTES
CV ICU Progress note      ASSESSMENT: Travon Cartwright is a 59 year old male with PMH of  ILD, with chronic hypoxic respiratory failure on 6L oxygen at baseline, T2DM, hepatic steatosis and bilateral hand tremor now s/p lung transplant via clamshell incision with Dr Mulvihill on 8/15/24.     Today   - Neuro: Delirium with visual hallucinations: Ordered hydroxyzine for anxiety, increased quetiapine at bedtime. Sleep hygiene, reorientation, mobility. RAPS team following PV catheters.  - Pulm: Wean FiO2 as able. Follow ABG.  - CV: MAPS >65, off pressors. Ordered metoprolol. PRN antihypertensives.  - GI: NPO. SLP following: VFSS today  - Renal: Hold diuresis. Re-evaluate this afternoon.  - Endo: Continue lantus BID on high SSI.  - ID: Overnight, afebrile. 8/19 PAN Cx NGTD on pip-tazo and vano.  Continue Vancomycin for 10 days. Donor positive for Staph Aureus in sputum.  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 Delirium precautions.  - Sleep hygiene, reorientation, mobility. Scheduled at bedtime: Melatonin 10mg, Hydroxyzine 25mg, Quetiapine 75mg.    Pulmonary:  # ILD emphysema on 6L O2 s/p BL lung transplant (8/15)    # Post operative ventilator support, extubated (8/18)   FiO2 (%): 50 %, Resp: 27   - Goal SpO2 > HOB elevation. Follow daily CXR  - Encourage IS q15-30 minutes when awake.  - Scheduled Mucomyst, Xopenex  - S/p Methylprednisolone 125 q8hrs x3 doses, now on Prednisolone 30 daily    Cardiovascular:   # Shock, multifactorial, hypovolemic, distributive - resolved  # Hx HTN  # Hx of HLD  - Monitor hemodynamic status. Goal MAP >65, SBP <140  - Continue rosuvastatin 20 daily.  - Holding home PTA losartan  - Metoprolol 12.5mg BID for A.fib prophylaxis    GI care /  Nutrition:   # Moderate protein calorie malnutrition  # Concern for refeeding syndrome  # Hepatic steatosis  - Nutrition consulted, appreciate recommendations.   - NJ for TF, advance to goal.  - PPI for GI prophylaxis  - SLP following: Videofluoroscopic swallowing study today  - Bowel regimen PRN: MiraLAX BID, senna    Renal / Fluids / Electrolytes:   # Hypophosphatemia, resolved  BL creat appears to be ~ 0.62-0.65   - Strict I/O, daily weights, Avoid/limit nephrotoxins as able  - Replete lytes PRN per protocol  - Goal NET negative -1L. Reassess this AM    Endocrine:  # Stress hyperglycemia  # Type 2 diabetes mellitus  - Hgb A1C 8.4%   - Lantus 30 BID + high SSI. (On Lantus 30 daily at home)  - Goal BG <180 for optimal healing  - Holding home PTA metformin, Jardiance    ID / Antibiotics:  # Stress induced leukocytosis  # Immunosuppression due for lung transplant   # Immunoprophylaxis for lung transplant   - To complete perioperative regimen per transplant protocol.   - will defer changes to immunosuppression to Transplant Pulmonary team    - Mycophenolate, Basiliximab (Simulect), Tacrolimus   - Amphotericin B nebs   - Micafungin   - Vancomycin x 10 days (Donor with positive staph aureus)  - Bactrim to started 08/16 based on donor results.  - S/p  Ceftazidime   - Valganciclovir  - Follow PAN Cx ordered. Broad spectrum abx: vancomycin and Pip-tazo    Heme:     # Acute blood loss anemia  # Acute blood loss thrombocytopenia  # Coagulopathy  - No s/s of bleeding  - Continue to monitor, Hgb goal > 7.0  - AC: SQH    MSK / Skin:  # Sternotomy and Surgical Incision  # Weakness and deconditioning    - Postoperative incision management per protocol and sternal precautions  - PT/OT/CR    General cares and Prophylaxis:     - DVT: Mechanical and SQH  - GI: PPI    Lines / Tubes / Drains:  - RIJ CVC, remove. Ordered PICC line.  - Arterial Line, remove  - CTs x5  - Goodrich, remove  - NJ tube    Disposition: CVICU    [facility] MELVIN  MD Rema    ICU Checklist  F - Feeding: TF at goal  A - Analgesia: PV catheters per IPS, scheduled medications  S - Sedation: N/A  T - Thromboembolic prophylaxis: SQH     H - Head of bed elevated  U - Ulcer prophylaxis: PPI  G - Glycemic control: Lantus BID, high SSI  S - SBT     B - Bowel regimen: Yes  I - Indwelling catheter: Yes  D - De-escalation of antibiotics: No, follow PAN Cx    ====================================    Interval Events:  Pt did not sleep last night. Continued delirium this AM. CAM-ICU positive.    OBJECTIVE:  1. VITAL SIGNS:   Temp:  [97.8  F (36.6  C)-100.4  F (38  C)] 99.2  F (37.3  C)  Pulse:  [] 86  Resp:  [16-36] 27  MAP:  [73 mmHg-104 mmHg] 80 mmHg  Arterial Line BP: (104-169)/(56-81) 124/58  FiO2 (%):  [50 %-60 %] 50 %  SpO2:  [89 %-100 %] 99 %    FiO2 (%): 50 %, Resp: 27      2. INTAKE/ OUTPUT:   I/O last 3 completed shifts:  In: 2381.58 [I.V.:1051.58; NG/GT:890]  Out: 5737 [Urine:5345; Chest Tube:392]      3. PHYSICAL EXAMINATION:   General: Awake, A&O x2.  HEENT: PERRLA. On HFNC.   Neuro: Delirious, moves all extremities, following commands  Resp: Coarse breath sounds B/L  Chest tubes y'ed together, serosanguinous output.  CV: S1, S2, RRR, no m/r/g, no lower extremity swelling  Abdomen: Soft, non-distended, non-tender  Extremities: warm and well perfused, calves soft and compressible. Pulses palpable      4. INVESTIGATIONS:   Arterial Blood Gases   Recent Labs   Lab 08/20/24  0353 08/19/24  1409 08/19/24  0954 08/18/24  0355   PH 7.48* 7.48* 7.49* 7.47*   PCO2 39 38 37 44   PO2 104 93 52* 90   HCO3 29* 28 28 32*     Complete Blood Count   Recent Labs   Lab 08/20/24  0353 08/19/24  0347 08/18/24  0355 08/17/24  0351   WBC 9.7 10.3 10.6 15.8*   HGB 11.9* 11.9* 11.8* 12.4*    141* 139* 194     Basic Metabolic Panel  Recent Labs   Lab 08/20/24  0824 08/20/24  0353 08/20/24  0352 08/20/24  0021 08/19/24  0558 08/19/24  0347 08/18/24  1331 08/18/24  1329 08/18/24  0514  08/18/24  0355 08/17/24  0353 08/17/24  0351   NA  --  139  --   --   --  138  --   --   --  142  --  141   POTASSIUM  --  4.0  --   --   --  4.5  --  4.1  --  3.5   < > 3.4   CHLORIDE  --  105  --   --   --  106  --   --   --  107  --  108*   CO2  --  25  --   --   --  26  --   --   --  28  --  27   BUN  --  18.0  --   --   --  15.4  --   --   --  14.3  --  13.4   CR  --  0.56*  --   --   --  0.46*  --   --   --  0.50*  --  0.58*   * 181* 165* 172*   < > 139*  147*   < >  --    < > 125*  149*   < > 142*    < > = values in this interval not displayed.     Liver Function Tests  Recent Labs   Lab 08/19/24  0347 08/15/24  2020 08/15/24  1820 08/14/24  2338   AST 47* 79*  --  24   ALT 22 26  --  22   ALKPHOS 76 61  --  89   BILITOTAL 0.4 1.4*  --  0.4   ALBUMIN 2.6* 2.2*  --  4.3   INR  --  1.46* 1.74* 1.07     Pancreatic Enzymes  No lab results found in last 7 days.  Coagulation Profile  Recent Labs   Lab 08/18/24  0613 08/15/24  2020 08/15/24  1820 08/14/24  2338   INR  --  1.46* 1.74* 1.07   PTT 34 42* 36 32         5. RADIOLOGY:   No results found for this or any previous visit (from the past 24 hour(s)).      =========================================    Yes

## 2024-08-20 NOTE — PROCEDURES
[facility]    Double Lumen PICC Placement    Date/Time: 8/20/2024 11:29 AM    Performed by: Jayleen Rodriguez RN  Authorized by: Margie Diego APRN CNP  Indications: vascular access (Frequent lab draws)      UNIVERSAL PROTOCOL   Site Marked: Yes  Prior Images Obtained and Reviewed:  Yes  Required items: Required blood products, implants, devices and special equipment available    Patient identity confirmed:  Verbally with patient, arm band, provided demographic data and hospital-assigned identification number  Patient was reevaluated immediately before administering moderate or deep sedation or anesthesia  Confirmation Checklist:  Patient's identity using two indicators, relevant allergies, procedure was appropriate and matched the consent or emergent situation and correct equipment/implants were available  Time out: Immediately prior to the procedure a time out was called    Universal Protocol: the Joint Commission Universal Protocol was followed    Preparation: Patient was prepped and draped in usual sterile fashion       ANESTHESIA    Anesthesia:  See MAR for details  Local Anesthetic:  Lidocaine 1% without epinephrine  Anesthetic Total (mL):  1      SEDATION    Patient Sedated: No        Preparation: skin prepped with ChloraPrep  Skin prep agent: skin prep agent completely dried prior to procedure  Sterile barriers: maximum sterile barriers were used: cap, mask, sterile gown, sterile gloves, and large sterile sheet  Hand hygiene: hand hygiene performed prior to central venous catheter insertion  Type of line used: PICC  Catheter type: double lumen  Lumen type: non-valved and power PICC  Lumen Identification: Purple and Red  Catheter size: 5 Fr  Brand: Bard  Lot number: [id number]  Placement method: venipuncture, ultrasound, tip navigation system and MST  Number of attempts: 1  Difficulty threading catheter: no  Successful placement: yes  Orientation:  right  Catheter to Vein (%): 25  Location: basilic vein (0.70cm)  Tip Location: SVC  Arm circumference: adults 10 cm  Extremity circumference: 27  Visible catheter length: 3  Total catheter length: 47  Dressing and securement: alcohol impregnated caps, chlorhexidine disc applied, site cleansed, statlock, tissue adhesive, sterile dressing applied and transparent dressing  Post procedure assessment: blood return through all ports, free fluid flow and placement verified by 3CG technology  PROCEDURE   Patient Tolerance:  Patient tolerated the procedure well with no immediate complicationsDescribe Procedure: PICC location verified using Sherlock Tip 3CG Confirmation System. PICC okay to use.  Disposal: sharps and needle count correct at the end of procedure, needles and guidewire disposed in sharps container

## 2024-08-20 NOTE — DISCHARGE INSTRUCTIONS
AFTER YOU GO HOME FROM YOUR LUNG SURGERY  (Bilateral Lung Transplant - 8/15/2024 by Dr. Mulvihill)    You had a clamshell sternotomy, avoid lifting anything greater than ten pounds for 8 weeks after surgery and then less than 20 pounds for an additional 4 weeks.   Please try to sleep on your back for the first 4-6 weeks to avoid extra stress on your sternum (breastbone) while it is healing.   Do not reach backwards or use arms to push out of chair.   Do not let people pull on your arms to assist with standing.   Avoid twisting or reaching too far across your body.    Avoid strenuous activities such as bowling, vacuuming, raking, shoveling, golf or tennis for 12 weeks after your surgery.   It is okay to resume sex if you feel comfortable in doing so. You may have to try different positions with your partner.    Splint your chest incision by hugging a pillow or bringing your arms across your chest when coughing or sneezing.     No driving for 4 weeks after surgery or while on pain medication.     Shower or wash your incisions daily with soap and water (or as instructed), pat dry.   Keep wound clean and dry, showers are okay after discharge, but don't let spray hit directly on incision.   No baths or swimming for 1 month.   Cover chest tube sites with dry gauze until they stop draining, then leave open to air. It is not abnormal for chest tube sites to drain yellowish/clear fluid for up to 2-3 weeks after surgery.   Watch for signs of infection: fever (temperature greater than 100.5 degrees F), chills, increased redness, tenderness, warmth or any drainage that appears infected (pus like) or is persistent.  If you should develop any of these issues, call your transplant coordinator or primary care provider's office immediately.   Remove any skin glue left on incisions after 10-14 days. This will not affect your incision and can speed up healing.    Exercise is very important in your recovery.  Avoid sitting for  prolonged periods of time, try to walk every hour during the day.    Check your weight when you get home from the hospital and continue to check it daily for at least a month. If you notice a weight gain of 2-3 pounds in a week, notify your primary care physician or transplant coordinator.    Bowel activity may be slow after surgery. If necessary, you may take an over the counter laxative such as milk of magnesia or Miralax. You may have stool softeners prescribed (docusate sodium, Senokot). We recommend using stool softeners while using narcotics for pain (oxycodone/percocet, hydrocodone/vicodin, hydromorphone/dilaudid).      Wean OFF of narcotics (oxycodone, dilaudid, hydrocodone) as soon as possible. You should continue taking acetaminophen as long as you have any surgical pain as the first choice for pain control and add narcotics as necessary for pain to be tolerable.      REGARDING PRESCRIPTION REFILLS.  All medications will be adjusted, discontinued and re-filled by your primary care physician and/or your pulmonologist as they were prior to your surgery.    POST-OPERATIVE CLINIC VISITS  You will return to the care of your primary provider and your pulmonologist, future medication refills should come from them.    SURGICAL QUESTIONS  Please call your Transplant Coordinator with surgical recovery and medication questions. They will assist you with your needs and contact other surgery care team members as indicated.    Nutrition Services - Post-Transplant Diet Guidelines   Follow recommendations on the Guide to Your Diet after Transplant handout (summary below).    You have increased energy and protein needs for six to eight weeks after transplant. Your goal is to consume at least 105 grams of protein per day during this time frame.   Eat a heart-healthy diet (low sodium, low saturated and trans-fat) once you are outside of the 6-8 week post-transplant window, and once your appetite improves to normal  In some  cases (but not in all cases), adjust potassium intake   Take calcium and vitamin D supplement if your doctor or team orders  Take measures to prevent food poisoning: store and prepare foods to the proper temperature, practice good handwashing, heat all deli meat (to 165 degrees Fahrenheit), avoid raw fish and meats (including uncooked eggs, non-pasteurized or raw milk, and mold-ripened, non-pasteurized, and raw cheeses [including Brie, Camembert, Roquefort, Stilton, Gorgonzola and Vivek or other soft, unpasteurized cheeses such as Mexican queso fresco]), and throw out leftovers older than two days.   Do not consume grapefruit or pomegranate-containing products. These can interact with your medications.   Avoid the following post txp d/t risk for rejection, unknown effects on the organs, and/or potential interactions with immunosuppressants:       - Herbal, Chinese, holistic, chiropractic, natural, alternative medicines and supplements       - Detoxes and cleanses       - Weight loss pills       - Protein powders or other products with extracts or herbs (ie green tea extract)  If you have any nutrition-related questions or concerns after discharge, please contact our outpatient transplant dietitian, Katerina Rivas at [phone]                 Lung Transplant Discharge Information      Reminder for Clinic Visits:  Most times that you come into the lung transplant clinic, we will check your tacrolimus (Prograf) level.     Please remember:            1) Do NOT take your tacrolimus (Prograf) pills before your blood is drawn.  Ideally, we draw your blood about 12 hours after your last evening dose, which is drawn just before you take your next morning dose.            2) Bring your pills with you to your clinic visit.  Once your blood has been drawn, you should take your pills (bring a small snack with you if you need one)    EXAMPLE: Your clinic appointment is at 9:15 am.  You should have your blood drawn before  "your appointment, so that these results will be available to the provider.  Do NOT take your tacrolimus (Prograf) before going to the clinic lab to have your blood drawn around 8:00 am.  If your lab appointment is at 8:00 am, you would have taken your dose the evening at 8:00 pm the day before.  Please let the lab know what time you took your tacrolimus (Prograf) pills the night before.  After your lab draw, you will have time to take your pills, eat a snack, go to your chest x-ray or PFTs (if ordered) and be on time for your 9:15 am appointment.       Post-Lung Transplant Instructions:  Gradually continue to increase your activity each day.  You will have \"good days and bad days\", but overall you should be feeling better and more energetic from week to week. You should be walking to the restroom, showering each day, and making yourself a snack or light meal.  Walk every day, starting with a few minutes at a time and gradually increasing the time.  Remember, your caregiver is there to help you, not to do everything for you.   Monitor and record weight, temperature, pulse and blood pressure each day.  Record these numbers in your transplant book.  Bring your medication and Prednisone taper cards and transplant lab book with you to each clinic visit.  Check your blood sugars before each meal and before bed.  Follow the insulin instructions provided to you.   No driving for at least one month after surgery - please consult with your lung transplant team about timing of readiness to begin driving.  You must also be off of narcotics before you can drive.   No lifting more than 10 pounds for 6 weeks after surgery (a gallon of milk weighs about 10 pounds).  You may shower 48 hours after your last chest tube was removed.  Leave incisions open to air (no dressings).  Wash your incisions gently with soap and water daily, pat dry.   Do not take a bath or soak your incisions.  No pools or hot tubs until cleared by your " "transplant provider.  Wear a mask over your mouth and nose any time you are in a crowd, for example in a mall or movie theater.   Be careful about handwashing.  Wash your own hands with soap and water or hand gel frequently, and encourage those around you to do the same.   Protect yourself from the sun.  Any time you are outside, wear sunscreen (SPF 50 or above) and a hat as well as long sleeves/pants when able.  Limit your time in the sunlight as much as possible.  Absolutely NO additional medications (over the counter, herbal, etc) without discussing directly with your provider.   Do not take any NSAID medications [examples: ibuprofen (Advil/Motrin), naproxen (Aleve)] as these medicines interact with your transplant medicines and may harm your kidneys.  Take all of your medications just as we have prescribed them.  If you are unable to take your medications (due to not feeling well, financial reasons, or any other reason), call your transplant coordinator right away.   If you do not live locally, you will be asked to remain living in a local apartment or hotel for 3 months after surgery.  Plan to stay this entire 3 months, and we may extend this longer than 3 months.  However, this will be determined based on your recovery and in discussion with your transplant provider.      Notify your Transplant Coordinator if you experience any of the following:  Oral Temperature greater then 100.5  Drainage from any incision that soaks more than 2 gauze pads in a day, or drainage that is thick and yellow/green/brown/tan/bloody  Redness and/or swelling around your incision  An area of your incision that seems to be opening, whether there is drainage or not  Pain that is getting worse instead of better  Clicking, popping, \"thumping\" feeling in your chest  Chills or night sweats  Chest pain  Increase in sputum (phlegm) amount, or change in color  Blood in your sputum (phlegm)  Increased shortness of breath or coughing  Swelling " or pain in your arms or legs  Nausea and/or vomiting  Diarrhea or constipation  Change in the amount of exercise that you can tolerate    Activity:  Activity as tolerated.  Outpatient pulmonary rehab to start 1-2 weeks after discharge, they will contact you to set up       Nutrition/Diet:  Regular diet as tolerated, or as recommended by your team/doctors    *Post-Transplant Diet Guidelines - Follow recommendations on the Guide to Your Diet after Transplant handout (summary below).    Maintain a healthy weight  Eat a heart-healthy diet (low sodium, low saturated and trans-fat) once your appetite improves to normal  Control blood sugar  Limit sodium (salt)  Take calcium and vitamin D supplement if your doctor or team orders  Eat more protein for six to eight weeks after transplant    Take measures to prevent food poisoning: store and prepare foods to the proper temperature, practice good handwashing, heat all deli meat (to 165 degrees Fahrenheit), avoid raw fish and meats (including uncooked eggs, non-pasteurized or raw milk, and non-pasteurized or raw cheeses [including brie, camembert, blue-veined cheese, and Mexican queso fresco]), avoid shellfish/seafood for three weeks after transplant, and throw out leftovers older than two days.   In some cases (but not in all cases), adjust potassium intake   Check your blood sugar before each meal and before bedtime.  Follow insulin instructions provided to you.    Upcoming Appointments and Plans:  (Please see your full discharge instructions for appointment dates and times)  After hospital discharge, you will go to a local apartment/hotel.  Once you leave the hospital, here is a general idea of what your next 6 weeks will look like:  Transplant clinic appointments twice a week for two weeks, then once a week for two weeks, then to be determined after that  Blood draws with most clinic appointments, and chest x-rays and PFTs (pulmonary function tests) with some clinic  appointments.  Outpatient pulmonary rehab initial intake visit, followed by appointments twice a week for pulmonary rehab      Your transplant coordinator: MALLORIE Tamez     Notify your Transplant Coordinator  at [phone] Monday through Friday with questions or any new symptoms. For assistance after 5pm weekdays or during weekends and holidays, call the hospital at [phone] and  ask the hospital  to page the Lung Transplant Coordinator On Call. Someone is available to you 24 hours a day, 7 days a week! Do not hesitate to call us.      Important Phone Numbers:  Lung Transplant Office (Coordinator Main Line): [phone]  [facility]: [phone]  After-hours/weekends/holidays: [phone] (ask  to page Lung Transplant Coordinator On Call)      Lung Transplant Clinic ([facility]) Location:  [facility]  Pulmonary is on the 3rd floor  [address] [address] [phone]  [place] [address]        Glucose Control Regimen:      Lantus 30 units every day at 7 pm     To cover prednisone 25 mg daily,    NPH 24 units every day at the same time as the prednisone at 8 am ( If you do not take prednisone, do not take NPH)     When decrease Prednisone 20 mg daily--> Decrease NPH 18 units daily at the same time as the prednisone at 8 am ( If you do not take prednisone, do not take NPH)     When decrease prednisone to 15 mg daily must call provider for dose of NPH        Novolog Fixed meal dose:     Breakfast, lunch and dinner: Give 8 units of Novolog for medium meal, around 60g of carb and 10 units of Novolog for larger meal about 80 or greater g of carb----> Do not take if you do not eat a meal     Give 5 units of Novolog for a snack or supplement before 10 pm, do not cover snacks after 10 pm     Check blood glucose prior to meal and give Novolog insulin per the sliding scale:     Correction Scale - HIGH INSULIN RESISTANCE DOSING     Do  Not give Correction Insulin if Pre-Meal BG less than 140.   For Pre-Meal  - 164 give 1 unit.   For Pre-Meal  - 189 give 2 units.   For Pre-Meal  - 214 give 3 units.   For Pre-Meal  - 239 give 4 units.   For Pre-Meal  - 264 give 5 units.   For Pre-Meal  - 289 give 6 units.   For Pre-Meal  - 314 give 7 units.   For Pre-Meal  - 339 give 8 units.   For Pre-Meal  - 364 give 9 units.   For Pre-Meal BG greater than or equal to 365 give 10 units     Check blood glucose before bedtime and give Novolog insulin according to the following scale:     HIGH INSULIN RESISTANCE DOSING    Do Not give Bedtime Correction Insulin if BG less than 200.   For  - 224 give 1 units.   For  - 249 give 2 units.   For  - 274 give 3 units.   For  - 299 give 4 units.   For  - 324 give 5 units.   For  - 349 give 6 units.   For BG greater than or equal to 350 give 7 units  Stop Jardiance 10 mg daily due  to increased chance of urinary infection/yeast infection/genital infection with high dose  steroids/MMF and tacrolimus     HOLD metformin and Trulicity and can discuss and restart at the  recommendation of outpatient provider     Blood Glucose Checks:   three times daily before meals, and at bedtime.     Please call if you have more than 2 blood sugar over 275 in one day, or any sugars less than 75.      Low Blood Sugars:  Signs/symptoms of low blood sugar include (but are not limited to): sweating, shaking, feeling dizzy or weak, extreme hunger, headache, feeling crabby or confused, numbness or tingling of mouth/lips.  If you have these symptoms check your blood sugar if you can and then consume carbohydrate (sugar)  This is a helpful reminder on how to treat a blood sugar if you are low:  If your blood sugar is < 70 mg/dL, consume 15 grams of fast-acting carbohydrate (4 glucose tabs OR 4 oz juice or non-diet pop OR 2 Tbsp dried fruit OR 5-7 lifesavers OR 1  Tbsp sugar) and wait 15 minutes. Check blood sugar again and if not rising, repeat.  If your blood sugar is < 50 mg/dL, consume 30 grams of fast-acting carbohydrate (8 glucose tabs OR 8 oz juice or non-diet pop OR 4 Tbsp dried fruit OR 10-14 lifesavers OR 2 Tbsp sugar) and wait 15 minutes. Check blood sugar again and if not rising, repeat.        Endocrinology Outpatient follow up: An appointment request should be made by patient with outpatient endocrine provider 1-2 weeks from discharge.         If you have urgent questions or concerns regarding your blood sugars or insulin, you may contact [phone] (the main hospital ). Ask to speak with the endocrinologist on call.        Thank you for letting the Diabetes Management Team be involved in your care!     Discharge Planning:    Medications: as above    Test Claims: done 8/26/2024  humulin N kwikpens 0.00     insulin aspart flexpens 0.00     insulin lispro kwikpens 0.00     lantus solostar pens 0.00 last filled 8/10, next fill 9/3   novolin N flexpens not covered       Education: Need education for carb coverage and correction scale insulin,  order with dietician and with diabetes education  ordered and let educator know he is discharging today  Outpatient Follow-up:  recommend [facility] Endocrinology SENDY Mendez MD

## 2024-08-20 NOTE — PLAN OF CARE
ICU End of Shift Summary. See flowsheets for vital signs and detailed assessment.    Changes this shift: Patient awake, disoriented to time. Continues with hallucinations and difficult time sleeping. Utilizing call light. Complaining of pain throughout the shift, PRN robaxin and oxy. Frequent loose watery BM's. Good urine output with rodriguez.     Plan: Video swallow study today.     Goal Outcome Evaluation:      Plan of Care Reviewed With: patient    Overall Patient Progress: no changeOverall Patient Progress: no change    Outcome Evaluation: Continues with hallucinations during the night, stable vitals on HFNC 35L 50%

## 2024-08-21 ENCOUNTER — APPOINTMENT (OUTPATIENT)
Dept: GENERAL RADIOLOGY | Facility: CLINIC | Age: 59
End: 2024-08-21
Attending: STUDENT IN AN ORGANIZED HEALTH CARE EDUCATION/TRAINING PROGRAM
Payer: COMMERCIAL

## 2024-08-21 ENCOUNTER — APPOINTMENT (OUTPATIENT)
Dept: OCCUPATIONAL THERAPY | Facility: CLINIC | Age: 59
End: 2024-08-21
Attending: STUDENT IN AN ORGANIZED HEALTH CARE EDUCATION/TRAINING PROGRAM
Payer: COMMERCIAL

## 2024-08-21 ENCOUNTER — APPOINTMENT (OUTPATIENT)
Dept: SPEECH THERAPY | Facility: CLINIC | Age: 59
End: 2024-08-21
Attending: STUDENT IN AN ORGANIZED HEALTH CARE EDUCATION/TRAINING PROGRAM
Payer: COMMERCIAL

## 2024-08-21 ENCOUNTER — HOSPITAL ENCOUNTER (OUTPATIENT)
Facility: CLINIC | Age: 59
End: 2024-08-21
Attending: INTERNAL MEDICINE | Admitting: INTERNAL MEDICINE
Payer: COMMERCIAL

## 2024-08-21 ENCOUNTER — APPOINTMENT (OUTPATIENT)
Dept: GENERAL RADIOLOGY | Facility: CLINIC | Age: 59
End: 2024-08-21
Payer: COMMERCIAL

## 2024-08-21 LAB
ANION GAP SERPL CALCULATED.3IONS-SCNC: 8 MMOL/L (ref 7–15)
BASOPHILS # BLD AUTO: 0.1 10E3/UL (ref 0–0.2)
BASOPHILS NFR BLD AUTO: 1 %
BUN SERPL-MCNC: 18.6 MG/DL (ref 8–23)
C DIFF TOX B STL QL: NEGATIVE
CALCIUM SERPL-MCNC: 8.5 MG/DL (ref 8.8–10.4)
CHLORIDE SERPL-SCNC: 103 MMOL/L (ref 98–107)
CREAT SERPL-MCNC: 0.52 MG/DL (ref 0.67–1.17)
EGFRCR SERPLBLD CKD-EPI 2021: >90 ML/MIN/1.73M2
EOSINOPHIL # BLD AUTO: 0.3 10E3/UL (ref 0–0.7)
EOSINOPHIL NFR BLD AUTO: 3 %
ERYTHROCYTE [DISTWIDTH] IN BLOOD BY AUTOMATED COUNT: 13.2 % (ref 10–15)
GLUCOSE BLDC GLUCOMTR-MCNC: 174 MG/DL (ref 70–99)
GLUCOSE BLDC GLUCOMTR-MCNC: 190 MG/DL (ref 70–99)
GLUCOSE BLDC GLUCOMTR-MCNC: 202 MG/DL (ref 70–99)
GLUCOSE BLDC GLUCOMTR-MCNC: 243 MG/DL (ref 70–99)
GLUCOSE BLDC GLUCOMTR-MCNC: 257 MG/DL (ref 70–99)
GLUCOSE BLDC GLUCOMTR-MCNC: 278 MG/DL (ref 70–99)
GLUCOSE SERPL-MCNC: 217 MG/DL (ref 70–99)
HCO3 SERPL-SCNC: 27 MMOL/L (ref 22–29)
HCT VFR BLD AUTO: 39.7 % (ref 40–53)
HGB BLD-MCNC: 12.6 G/DL (ref 13.3–17.7)
IMM GRANULOCYTES # BLD: 0.3 10E3/UL
IMM GRANULOCYTES NFR BLD: 3 %
LYMPHOCYTES # BLD AUTO: 0.9 10E3/UL (ref 0.8–5.3)
LYMPHOCYTES NFR BLD AUTO: 9 %
MAGNESIUM SERPL-MCNC: 1.8 MG/DL (ref 1.7–2.3)
MCH RBC QN AUTO: 29 PG (ref 26.5–33)
MCHC RBC AUTO-ENTMCNC: 31.7 G/DL (ref 31.5–36.5)
MCV RBC AUTO: 91 FL (ref 78–100)
MONOCYTES # BLD AUTO: 1.3 10E3/UL (ref 0–1.3)
MONOCYTES NFR BLD AUTO: 13 %
NEUTROPHILS # BLD AUTO: 7.1 10E3/UL (ref 1.6–8.3)
NEUTROPHILS NFR BLD AUTO: 71 %
NRBC # BLD AUTO: 0 10E3/UL
NRBC BLD AUTO-RTO: 0 /100
PHOSPHATE SERPL-MCNC: 3.1 MG/DL (ref 2.5–4.5)
PLATELET # BLD AUTO: 183 10E3/UL (ref 150–450)
POTASSIUM SERPL-SCNC: 4 MMOL/L (ref 3.4–5.3)
RBC # BLD AUTO: 4.35 10E6/UL (ref 4.4–5.9)
SODIUM SERPL-SCNC: 138 MMOL/L (ref 135–145)
TACROLIMUS BLD-MCNC: 6.3 UG/L (ref 5–15)
TME LAST DOSE: NORMAL H
TME LAST DOSE: NORMAL H
WBC # BLD AUTO: 9.9 10E3/UL (ref 4–11)

## 2024-08-21 PROCEDURE — 71045 X-RAY EXAM CHEST 1 VIEW: CPT | Mod: 77

## 2024-08-21 PROCEDURE — 250N000011 HC RX IP 250 OP 636: Performed by: STUDENT IN AN ORGANIZED HEALTH CARE EDUCATION/TRAINING PROGRAM

## 2024-08-21 PROCEDURE — 92526 ORAL FUNCTION THERAPY: CPT | Mod: GN

## 2024-08-21 PROCEDURE — 74018 RADEX ABDOMEN 1 VIEW: CPT | Mod: 26 | Performed by: RADIOLOGY

## 2024-08-21 PROCEDURE — 84100 ASSAY OF PHOSPHORUS: CPT | Performed by: STUDENT IN AN ORGANIZED HEALTH CARE EDUCATION/TRAINING PROGRAM

## 2024-08-21 PROCEDURE — 250N000012 HC RX MED GY IP 250 OP 636 PS 637: Performed by: STUDENT IN AN ORGANIZED HEALTH CARE EDUCATION/TRAINING PROGRAM

## 2024-08-21 PROCEDURE — 94640 AIRWAY INHALATION TREATMENT: CPT | Mod: 76

## 2024-08-21 PROCEDURE — 80197 ASSAY OF TACROLIMUS: CPT | Performed by: SURGERY

## 2024-08-21 PROCEDURE — 250N000012 HC RX MED GY IP 250 OP 636 PS 637: Performed by: SURGERY

## 2024-08-21 PROCEDURE — 85025 COMPLETE CBC W/AUTO DIFF WBC: CPT | Performed by: SURGERY

## 2024-08-21 PROCEDURE — 74230 X-RAY XM SWLNG FUNCJ C+: CPT

## 2024-08-21 PROCEDURE — 250N000013 HC RX MED GY IP 250 OP 250 PS 637: Performed by: STUDENT IN AN ORGANIZED HEALTH CARE EDUCATION/TRAINING PROGRAM

## 2024-08-21 PROCEDURE — 200N000002 HC R&B ICU UMMC

## 2024-08-21 PROCEDURE — 258N000003 HC RX IP 258 OP 636: Performed by: STUDENT IN AN ORGANIZED HEALTH CARE EDUCATION/TRAINING PROGRAM

## 2024-08-21 PROCEDURE — 94668 MNPJ CHEST WALL SBSQ: CPT

## 2024-08-21 PROCEDURE — 87493 C DIFF AMPLIFIED PROBE: CPT | Performed by: PHYSICIAN ASSISTANT

## 2024-08-21 PROCEDURE — 80048 BASIC METABOLIC PNL TOTAL CA: CPT | Performed by: PHYSICIAN ASSISTANT

## 2024-08-21 PROCEDURE — 250N000013 HC RX MED GY IP 250 OP 250 PS 637

## 2024-08-21 PROCEDURE — 250N000013 HC RX MED GY IP 250 OP 250 PS 637: Performed by: PHYSICIAN ASSISTANT

## 2024-08-21 PROCEDURE — 83735 ASSAY OF MAGNESIUM: CPT | Performed by: STUDENT IN AN ORGANIZED HEALTH CARE EDUCATION/TRAINING PROGRAM

## 2024-08-21 PROCEDURE — 250N000011 HC RX IP 250 OP 636

## 2024-08-21 PROCEDURE — 99233 SBSQ HOSP IP/OBS HIGH 50: CPT | Mod: 24 | Performed by: INTERNAL MEDICINE

## 2024-08-21 PROCEDURE — 71045 X-RAY EXAM CHEST 1 VIEW: CPT | Mod: 26 | Performed by: RADIOLOGY

## 2024-08-21 PROCEDURE — 250N000009 HC RX 250: Performed by: SURGERY

## 2024-08-21 PROCEDURE — 99291 CRITICAL CARE FIRST HOUR: CPT | Mod: 24 | Performed by: PHYSICIAN ASSISTANT

## 2024-08-21 PROCEDURE — 92611 MOTION FLUOROSCOPY/SWALLOW: CPT | Mod: GN

## 2024-08-21 PROCEDURE — 99292 CRITICAL CARE ADDL 30 MIN: CPT | Mod: 24 | Performed by: PHYSICIAN ASSISTANT

## 2024-08-21 PROCEDURE — 999N000065 XR ABDOMEN PORT 1 VIEW

## 2024-08-21 PROCEDURE — 250N000011 HC RX IP 250 OP 636: Performed by: PHYSICIAN ASSISTANT

## 2024-08-21 PROCEDURE — 250N000012 HC RX MED GY IP 250 OP 636 PS 637: Performed by: INTERNAL MEDICINE

## 2024-08-21 PROCEDURE — 271N000002 HC RX 271: Performed by: STUDENT IN AN ORGANIZED HEALTH CARE EDUCATION/TRAINING PROGRAM

## 2024-08-21 PROCEDURE — 71045 X-RAY EXAM CHEST 1 VIEW: CPT

## 2024-08-21 PROCEDURE — 74230 X-RAY XM SWLNG FUNCJ C+: CPT | Mod: 26 | Performed by: RADIOLOGY

## 2024-08-21 PROCEDURE — 97535 SELF CARE MNGMENT TRAINING: CPT | Mod: GO

## 2024-08-21 PROCEDURE — 99232 SBSQ HOSP IP/OBS MODERATE 35: CPT | Performed by: PHYSICIAN ASSISTANT

## 2024-08-21 PROCEDURE — 250N000013 HC RX MED GY IP 250 OP 250 PS 637: Performed by: SURGERY

## 2024-08-21 RX ORDER — MAGNESIUM SULFATE HEPTAHYDRATE 40 MG/ML
2 INJECTION, SOLUTION INTRAVENOUS ONCE
Status: COMPLETED | OUTPATIENT
Start: 2024-08-21 | End: 2024-08-21

## 2024-08-21 RX ORDER — BARIUM SULFATE 400 MG/ML
SUSPENSION ORAL ONCE
Status: COMPLETED | OUTPATIENT
Start: 2024-08-21 | End: 2024-08-21

## 2024-08-21 RX ORDER — FUROSEMIDE 10 MG/ML
20 INJECTION INTRAMUSCULAR; INTRAVENOUS ONCE
Status: COMPLETED | OUTPATIENT
Start: 2024-08-21 | End: 2024-08-21

## 2024-08-21 RX ORDER — LOPERAMIDE HCL 1 MG/7.5ML
2 SOLUTION ORAL 4 TIMES DAILY PRN
Status: DISCONTINUED | OUTPATIENT
Start: 2024-08-21 | End: 2024-08-25

## 2024-08-21 RX ADMIN — PIPERACILLIN AND TAZOBACTAM 4.5 G: 4; .5 INJECTION, POWDER, LYOPHILIZED, FOR SOLUTION INTRAVENOUS at 08:05

## 2024-08-21 RX ADMIN — LOPERAMIDE HCL 2 MG: 1 SOLUTION ORAL at 20:28

## 2024-08-21 RX ADMIN — Medication 10 MG: at 09:09

## 2024-08-21 RX ADMIN — METOPROLOL TARTRATE 25 MG: 25 TABLET, FILM COATED ORAL at 08:04

## 2024-08-21 RX ADMIN — Medication 15 ML: at 11:54

## 2024-08-21 RX ADMIN — Medication 40 MG: at 08:04

## 2024-08-21 RX ADMIN — HEPARIN SODIUM 5000 UNITS: 5000 INJECTION, SOLUTION INTRAVENOUS; SUBCUTANEOUS at 15:56

## 2024-08-21 RX ADMIN — SULFAMETHOXAZOLE AND TRIMETHOPRIM 80 MG: 200; 40 SUSPENSION ORAL at 08:03

## 2024-08-21 RX ADMIN — BARIUM SULFATE: 400 SUSPENSION ORAL at 14:49

## 2024-08-21 RX ADMIN — MAGNESIUM SULFATE HEPTAHYDRATE 2 G: 2 INJECTION, SOLUTION INTRAVENOUS at 06:48

## 2024-08-21 RX ADMIN — HYDROXYZINE HYDROCHLORIDE 25 MG: 25 TABLET, FILM COATED ORAL at 22:18

## 2024-08-21 RX ADMIN — Medication 500 MG: at 12:41

## 2024-08-21 RX ADMIN — ACETYLCYSTEINE 2 ML: 200 SOLUTION ORAL; RESPIRATORY (INHALATION) at 20:44

## 2024-08-21 RX ADMIN — PIPERACILLIN AND TAZOBACTAM 4.5 G: 4; .5 INJECTION, POWDER, LYOPHILIZED, FOR SOLUTION INTRAVENOUS at 03:19

## 2024-08-21 RX ADMIN — OXYCODONE HYDROCHLORIDE 5 MG: 5 TABLET ORAL at 16:59

## 2024-08-21 RX ADMIN — NYSTATIN 1000000 UNITS: 100000 SUSPENSION ORAL at 16:02

## 2024-08-21 RX ADMIN — ACETYLCYSTEINE 2 ML: 200 SOLUTION ORAL; RESPIRATORY (INHALATION) at 12:56

## 2024-08-21 RX ADMIN — QUETIAPINE FUMARATE 75 MG: 50 TABLET ORAL at 20:03

## 2024-08-21 RX ADMIN — ACETAMINOPHEN 975 MG: 325 TABLET, FILM COATED ORAL at 08:02

## 2024-08-21 RX ADMIN — OXYCODONE HYDROCHLORIDE 5 MG: 5 TABLET ORAL at 08:04

## 2024-08-21 RX ADMIN — METHOCARBAMOL 750 MG: 750 TABLET ORAL at 08:05

## 2024-08-21 RX ADMIN — FUROSEMIDE 20 MG: 10 INJECTION, SOLUTION INTRAMUSCULAR; INTRAVENOUS at 16:10

## 2024-08-21 RX ADMIN — HYDRALAZINE HYDROCHLORIDE 10 MG: 20 INJECTION INTRAMUSCULAR; INTRAVENOUS at 06:42

## 2024-08-21 RX ADMIN — LEVALBUTEROL HYDROCHLORIDE 1.25 MG: 1.25 SOLUTION RESPIRATORY (INHALATION) at 12:56

## 2024-08-21 RX ADMIN — Medication 500 MG: at 12:40

## 2024-08-21 RX ADMIN — NYSTATIN 1000000 UNITS: 100000 SUSPENSION ORAL at 08:02

## 2024-08-21 RX ADMIN — NYSTATIN 1000000 UNITS: 100000 SUSPENSION ORAL at 11:54

## 2024-08-21 RX ADMIN — ACETYLCYSTEINE 2 ML: 200 SOLUTION ORAL; RESPIRATORY (INHALATION) at 15:57

## 2024-08-21 RX ADMIN — Medication 10 MG: at 20:44

## 2024-08-21 RX ADMIN — METHOCARBAMOL 750 MG: 750 TABLET ORAL at 11:54

## 2024-08-21 RX ADMIN — MYCOPHENOLATE MOFETIL 1000 MG: 200 POWDER, FOR SUSPENSION ORAL at 20:04

## 2024-08-21 RX ADMIN — NYSTATIN 1000000 UNITS: 100000 SUSPENSION ORAL at 20:11

## 2024-08-21 RX ADMIN — ROSUVASTATIN CALCIUM 10 MG: 10 TABLET, FILM COATED ORAL at 08:04

## 2024-08-21 RX ADMIN — ACETAMINOPHEN 975 MG: 325 TABLET, FILM COATED ORAL at 00:13

## 2024-08-21 RX ADMIN — Medication 60 ML: at 08:08

## 2024-08-21 RX ADMIN — ACETYLCYSTEINE 2 ML: 200 SOLUTION ORAL; RESPIRATORY (INHALATION) at 08:58

## 2024-08-21 RX ADMIN — MICAFUNGIN SODIUM 100 MG: 50 INJECTION, POWDER, LYOPHILIZED, FOR SOLUTION INTRAVENOUS at 12:14

## 2024-08-21 RX ADMIN — HEPARIN SODIUM 5000 UNITS: 5000 INJECTION, SOLUTION INTRAVENOUS; SUBCUTANEOUS at 05:15

## 2024-08-21 RX ADMIN — LEVALBUTEROL HYDROCHLORIDE 1.25 MG: 1.25 SOLUTION RESPIRATORY (INHALATION) at 20:44

## 2024-08-21 RX ADMIN — HEPARIN SODIUM 5000 UNITS: 5000 INJECTION, SOLUTION INTRAVENOUS; SUBCUTANEOUS at 22:18

## 2024-08-21 RX ADMIN — Medication 60 ML: at 20:08

## 2024-08-21 RX ADMIN — TACROLIMUS 3.5 MG: 5 CAPSULE ORAL at 18:36

## 2024-08-21 RX ADMIN — METOPROLOL TARTRATE 25 MG: 25 TABLET, FILM COATED ORAL at 20:03

## 2024-08-21 RX ADMIN — PIPERACILLIN AND TAZOBACTAM 4.5 G: 4; .5 INJECTION, POWDER, LYOPHILIZED, FOR SOLUTION INTRAVENOUS at 15:51

## 2024-08-21 RX ADMIN — LEVALBUTEROL HYDROCHLORIDE 1.25 MG: 1.25 SOLUTION RESPIRATORY (INHALATION) at 08:58

## 2024-08-21 RX ADMIN — OXYCODONE HYDROCHLORIDE 5 MG: 5 TABLET ORAL at 11:54

## 2024-08-21 RX ADMIN — Medication 10 MG: at 20:03

## 2024-08-21 RX ADMIN — PIPERACILLIN AND TAZOBACTAM 4.5 G: 4; .5 INJECTION, POWDER, LYOPHILIZED, FOR SOLUTION INTRAVENOUS at 20:11

## 2024-08-21 RX ADMIN — ACETAMINOPHEN 975 MG: 325 TABLET, FILM COATED ORAL at 16:55

## 2024-08-21 RX ADMIN — INSULIN GLARGINE 30 UNITS: 100 INJECTION, SOLUTION SUBCUTANEOUS at 08:22

## 2024-08-21 RX ADMIN — TACROLIMUS 1.5 MG: 1 CAPSULE ORAL at 08:29

## 2024-08-21 RX ADMIN — LEVALBUTEROL HYDROCHLORIDE 1.25 MG: 1.25 SOLUTION RESPIRATORY (INHALATION) at 15:57

## 2024-08-21 RX ADMIN — METHOCARBAMOL 750 MG: 750 TABLET ORAL at 16:55

## 2024-08-21 RX ADMIN — METHOCARBAMOL 750 MG: 750 TABLET ORAL at 20:03

## 2024-08-21 RX ADMIN — PREDNISOLONE 30 MG: 15 SOLUTION ORAL at 08:04

## 2024-08-21 RX ADMIN — MYCOPHENOLATE MOFETIL 1000 MG: 200 POWDER, FOR SUSPENSION ORAL at 08:04

## 2024-08-21 ASSESSMENT — ACTIVITIES OF DAILY LIVING (ADL)
ADLS_ACUITY_SCORE: 47
ADLS_ACUITY_SCORE: 45
ADLS_ACUITY_SCORE: 47

## 2024-08-21 NOTE — PROGRESS NOTES
08/21/24 1532   Appointment Info   Signing Clinician's Name / Credentials (SLP) Damari Hernandez MA CCC-SLP   General Information   Onset of Illness/Injury or Date of Surgery 08/14/24   Referring Physician Margie Diego APRN CNP   Patient/Family Therapy Goal Statement (SLP) None stated   Pertinent History of Current Problem   Pt is a 59 year old male with a history of idiopathic pulmonary fibrosis, diabetes, hepatosteatosis, and essential tremor. Pt is now s/p BSLT on 8/15/2024 with Dr. Mulvihill. Surgery was uncomplicated and done on pump, extubated and off pressors since 8/18/2024. Clinically improved with resolution of delirium. VFSS completed today to objectively assess swallowing mechanism.     General Observations Alert, agreeable   Type of Evaluation   Type of Evaluation Swallow Evaluation   General Swallowing Observations   Past History of Dysphagia Pt evaluated at bedside on 8/19, NPO recommended. No other hx of dysphagia in chart or per pt report.   Respiratory Support nasal cannula   Current Diet/Method of Nutritional Intake (General Swallowing Observations, NIS) NPO;nasogastric tube (NG)   Swallowing Evaluation Videofluoroscopic swallow study (VFSS)   VFSS Evaluation   Radiologist Resident   Views Taken left lateral   Physical Location of Procedure [facility] [address]   VFSS Textures Trialed thin liquids;mildly thick liquids;pureed;solid foods   VFSS Eval: Thin Liquid Texture Trial   Mode of Presentation, Thin Liquid cup;straw;self-fed;fed by clinician   Order of Presentation 1 (cup), 2 (cup), 3 (straw), 6 (straw), 9 (cup), 9 (cup)   Bolus Location When Swallow Triggered valleculae   Rosenbek's Penetration Aspiration Scale: Thin Liquid Trial Results 8 - contrast passes glottis, visible subglottic residue remains, absent patient response (aspiration)   Response to Aspiration absent response   Diagnostic Statement One instance of silent aspiration on the second swallow of series 4 -  aspiration was silent. A cued cough did not clear aspirated material. No other instance of aspiration occurred during study.   VFSS Eval: Mildly Thick Liquids   Mode of Presentation cup;self-fed   Order of Presentation 4   Bolus Location When Swallow Triggered posterior angle of ramus   Rosenbek's Penetration Aspiration Scale 2 - contrast enters airway, remains above the vocal cords, no residue remains (penetration)   Diagnostic Statement Very trace penetration, no aspiration   VFSS Evaluation: Puree Solid Texture Trial   Mode of Presentation, Puree spoon;self-fed   Order of Presentation 5   Preparatory Phase WFL   Bolus Location When Swallow Triggered posterior angle of ramus   Rosenbek's Penetration Aspiration Scale: Puree Food Trial Results 1 - no aspiration, contrast does not enter airway   Diagnostic Statement No pen or asp   VFSS Evaluation: Solid Food Texture Trial   Mode of Presentation, Solid self-fed   Order of Presentation 7   Preparatory Phase WFL   Oral Phase, Solid WFL   Bolus Location When Swallow Triggered valleculae   Rosenbek's Penetration Aspiration Scale: Solid Food Trial Results 1 - no aspiration, contrast does not enter airway   Diagnostic Statement Min phar residue, no pen or asp   Esophageal Phase of Swallow   Patient reports or presents with symptoms of esophageal dysphagia No   Swallowing Recommendations   Diet Consistency Recommendations regular diet;thin liquids (level 0)   Supervision Level for Intake patient independent   Mode of Delivery Recommendations bolus size, small;slow rate of intake   Medication Administration Recommendations, Swallowing (SLP) as tolerated   Instrumental Assessment Recommendations reassess via non-instrumental clinical swallow evaluation   General Therapy Interventions   Planned Therapy Interventions Dysphagia Treatment   Dysphagia treatment Instruction of safe swallow strategies;Compensatory strategies for swallowing   Clinical Impression   Criteria for  Skilled Therapeutic Interventions Met (SLP Eval) Yes, treatment indicated   SLP Diagnosis Dysphagia s/p BSLT   Risks & Benefits of therapy have been explained evaluation/treatment results reviewed;care plan/treatment goals reviewed;risks/benefits reviewed;current/potential barriers reviewed;participants voiced agreement with care plan;participants included;patient   Clinical Impression Comments   Videoswallow study completed per MD order to objectively assess oropharyngeal swallow mechanism. Under fluoroscopy, pt presents with mild oropharyngeal dysphagia s/p BSLT. Pt assessed with thin liquids, mildly-thick liquids, pudding, and cracker. Oral phase characterized by premature spillage of thins vs delayed swallow trigger. Once triggered, velar elevation, BOT retraction, and pharyngeal stripping wave are adequate. Epiglottic inversion inconsistent. Reduced laryngeal vestibule closure. One event of significant silent aspiration which occurred on a sequential swallow of thin liquids. Aside from this, only small amounts of flash penetration noted with liquids. Minimal pharyngeal residue appreciated with solids. Recommend regular diet/thin liquids. Ensure pt is upright and alert for all PO, taking small single sips. Pt should complete thorough oral care 3x/day, and continue physical activity as able. SLP will follow.     SLP Total Evaluation Time   Evaluation, videofluoroscopic eval of swallow function Minutes ([id number]) 16   SLP Discharge Recommendation defer to PT/OT   SLP Rationale for DC Rec Anticipate pt will meet all ST goals prior to d/c   Total Session Time   Total Session Time (sum of timed and untimed services) 22

## 2024-08-21 NOTE — PLAN OF CARE
Assumed care of patient @ 0030    ICU End of Shift Summary. See flowsheets for vital signs, I&Os, and detailed assessment.    Changes this shift:   Neuro: Disoriented to time. Still has hallucinations but is aware of them. Using call light appropriately most of the time and making needs known. Denies pain.    CV:   Rate/rhythm:  SR/ST . MAP >65 PRN Labetolol and hydralazine for SBP >140 Tmax 99.2.     Pulm: NC 4 LPM. Weak cough. Using IS at bedside. CT x5 to suction.     GI/: NJ w/ TF at goal of 60ml/hr.  Large, loose BM's. Rectal tube placed overnight. Sliding scale insulin. Goodrich patent,  mL/hr.    Plan: Monitor hemodynamic status, monitor electrolytes and replace per protocol. Continue POC.

## 2024-08-21 NOTE — PROGRESS NOTES
CV ICU Progress note      ASSESSMENT: Travon Cartwright is a 59 year old male with PMH of  ILD, with chronic hypoxic respiratory failure on 6L oxygen at baseline, T2DM, hepatic steatosis and bilateral hand tremor now s/p lung transplant via clamshell incision with Dr Mulvihill on 8/15/24.     Today   - Discontinue scheduled nutriphos d/t diarrhea.   - Send C. Diff if negative, will scheduled loperamide as long as negative.. FT placed overnight.  Discontinue Nutriphos d/t worsening diarrhea. Replete per protocol.  - Plan for swallow study today.   - Remove apical and christian CT today.  - increased lantus 35 in AM and 30 PM.  - Goal NET negative 500 mL to 1L today. Will assess for need for lasix this afternoon.   - Transfer to the floor today  - Vancomycin discontinued.  - Will plan on 2 days more of zosyn. 08/19 cultures NGTD.    PLAN:  Neuro/ pain/ sedation:  # Acute postoperative pain  - Scheduled: tylenol   - PRN: oxycodone, dilaudid, robaxin  - Paravertebral catheters per IPS (8/18 - current)    #Delirium  - CAM-ICU Positive (8/19). Delirium precautions.  - Sleep hygiene, reorientation, mobility. Scheduled: Melatonin  - Quetiapine 12.5mg AM,  75 mg at night. EKG ordered to monitor Qtc.    Pulmonary:  # ILD emphysema on 6L O2 s/p BL lung transplant (8/15)    # Post operative ventilator support, extubated (8/18)   - Goal SpO2 >92%  - Encourage IS q15-30 minutes when awake.  - Scheduled Mucomyst, Xopenex  - S/p Methylprednisolone 125 q8hrs x3 doses, now on Prednisolone 30 daily    Cardiovascular:   # Shock, multifactorial, hypovolemic, distributive - resolved  # Hx HTN  # Hx of HLD  - Monitor hemodynamic status. Goal MAP >65, SBP <140  - Continue rosuvastatin 20 daily.  - Metoprolol 25 BID  - Discontinue PTA losartan.    GI care / Nutrition:   # Moderate protein calorie malnutrition  # Hepatic steatosis  # Diarrhea  - Nutrition consulted, appreciate recommendations.   - NJ for TF, currently at goal  - Bowel regimen:  MiraLA  - Rectal tube present. C. Diff pending. If negative scheduled loperamide    Renal / Fluids / Electrolytes:   # Hypophosphatemia  BL creat appears to be ~ 0.62-0.65   - Strict I/O, daily weights, Avoid/limit nephrotoxins as able  - Replete lytes PRN per protocol  - Goal NET negative -500 to 1L.   - Discontinued Nutriphos d/t worsening diarrhea.    Endocrine:  # Stress hyperglycemia  # Type 2 diabetes mellitus  - Hgb A1C 8.4%   - Lantus 30 PM and 35 AM. (On Lantus 30 daily at home)  - Sliding scale insulin (high scale)  - Goal BG <180 for optimal healing  - Holding home PTA metformin, Jardiance    ID / Antibiotics:  # Stress induced leukocytosis  # Immunosuppression due for lung transplant   # Immunoprophylaxis for lung transplant   - To complete perioperative regimen per transplant protocol.   - will defer changes to immunosuppression to Transplant Pulmonary team    - Mycophenolate, Basiliximab (Simulect), Tacrolimus   - Amphotericin B nebs   - Micafungin   - Zosyn (likely end date 08/23)  - Bactrim to started 08/16 based on donor results.  - S/p Ceftazidime and Vancomycin course   - Valaciclovir (to begin 08/23)    Blood and sputum 08/19: no growth to date    Heme:     # Acute blood loss anemia  # Acute blood loss thrombocytopenia  # Coagulopathy  - No s/s of bleeding. Continue to monitor, Hgb goal > 7.0. Stable.    MSK / Skin:  # Sternotomy and Surgical Incision  # Weakness and deconditioning    - Postoperative incision management per protocol and sternal precautions  - PT/OT/CR - Recommending acute rehab    General cares and Prophylaxis:     - DVT: Mechanical and SQH  - GI: PPI    Lines / Tubes / Drains:  - Right PICC  - CTs x3  - NJ tube    Disposition: Gold10 transfer    45 minutes spent in management of this patient, excluding any procedures performed the same day.    Barry Laughlin PA-C    ICU Checklist  F - Feeding: at goal  A - Analgesia: no changes   S - Sedation: NA  T - Thromboembolic  prophylaxis: SQH     H - Head of bed elevated: yes  U - Ulcer prophylaxis: PPI  G - Glycemic control: increased Lantus today  S - SBT: NA    B - Bowel regimen: holding d/t diarrhea.  I - Indwelling catheter: remove rodriguez  D - De-escalation of antibiotics: discontinue vancomycin. Plan on 2 days of Zosyn.    ====================================    Interval Events:  Patient did not sleep well overnight. Has been having increased stool output requiring fecal tube placement overnight. On 4L and weaning as tolerated. No pressors.    OBJECTIVE:  1. VITAL SIGNS:   Temp:  [98.1  F (36.7  C)-99.2  F (37.3  C)] 99.2  F (37.3  C)  Pulse:  [] 96  Resp:  [19-46] 19  BP: (116-154)/(76-90) 143/82  MAP:  [77 mmHg-113 mmHg] 84 mmHg  Arterial Line BP: (116-170)/(53-88) 116/68  FiO2 (%):  [40 %] 40 %  SpO2:  [94 %-100 %] 98 %    FiO2 (%): 40 %, Resp: 19      2. INTAKE/ OUTPUT:   I/O last 3 completed shifts:  In: 3577 [I.V.:1127; NG/GT:1010]  Out: 3927 [Urine:3630; Chest Tube:297]      3. PHYSICAL EXAMINATION:   General: Awake, A&O x2, no acute distress.  HEENT: PERRLA,  Neuro: Delirious but following commands, moves all extremities, following commands  Resp: Coarse breath sounds B/L  Chest tubes y'ed together, serosanguinous output.  CV: S1, S2, RRR, no m/r/g, no lower extremity swelling  Abdomen: Soft, non-distended, non-tender  Extremities: warm and well perfused, calves soft and compressible. Pulses palpable      4. INVESTIGATIONS:   Arterial Blood Gases   Recent Labs   Lab 08/20/24  1619 08/20/24  1128 08/20/24  0353 08/19/24  1409   PH 7.48* 7.48* 7.48* 7.48*   PCO2 34* 36 39 38   PO2 76* 97 104 93   HCO3 25 27 29* 28     Complete Blood Count   Recent Labs   Lab 08/21/24  0331 08/20/24  0353 08/19/24  0347 08/18/24  0355   WBC 9.9 9.7 10.3 10.6   HGB 12.6* 11.9* 11.9* 11.8*    164 141* 139*     Basic Metabolic Panel  Recent Labs   Lab 08/21/24  0331 08/21/24  0330 08/21/24  0012 08/20/24 2027 08/20/24  0824  08/20/24  0353 08/19/24  0558 08/19/24  0347 08/18/24  1331 08/18/24  1329 08/18/24  0514 08/18/24  0355     --   --   --   --  139  --  138  --   --   --  142   POTASSIUM 4.0  --   --   --   --  4.0  --  4.5  --  4.1  --  3.5   CHLORIDE 103  --   --   --   --  105  --  106  --   --   --  107   CO2 27  --   --   --   --  25  --  26  --   --   --  28   BUN 18.6  --   --   --   --  18.0  --  15.4  --   --   --  14.3   CR 0.52*  --   --   --   --  0.56*  --  0.46*  --   --   --  0.50*   * 190* 202* 229*   < > 181*   < > 139*  147*   < >  --    < > 125*  149*    < > = values in this interval not displayed.     Liver Function Tests  Recent Labs   Lab 08/19/24  0347 08/15/24  2020 08/15/24  1820 08/14/24  2338   AST 47* 79*  --  24   ALT 22 26  --  22   ALKPHOS 76 61  --  89   BILITOTAL 0.4 1.4*  --  0.4   ALBUMIN 2.6* 2.2*  --  4.3   INR  --  1.46* 1.74* 1.07     Pancreatic Enzymes  No lab results found in last 7 days.  Coagulation Profile  Recent Labs   Lab 08/18/24  0613 08/15/24  2020 08/15/24  1820 08/14/24  2338   INR  --  1.46* 1.74* 1.07   PTT 34 42* 36 32         5. RADIOLOGY:   Recent Results (from the past 24 hour(s))   XR Chest Port 1 View    Narrative    Exam: XR CHEST PORT 1 VIEW, 8/21/2024 6:32 AM    Indication: lung transplant follow-up    Comparison: X-ray chest 8/20/2024, multiple priors appear    Findings:   Frontal semiupright radiograph of the chest, 0511 hours. Stable  placement of feeding tube, multiple chest drains, bibasilar chest  tubes, left atrial appendage clip, epicardial pacer wires, sternotomy  wires, and right IJ sheath. Interval placement right upper cavity PICC  with tip terminating at the cavoatrial junction.    The trachea is midline, heart size and shape is unremarkable, partial  silhouetting of the cardiac borders. Continued perihilar and medial  basilar hazy predominance. No definitive pneumothorax is seen on this  radiograph. No significant pleural effusion.       Impression    Impression:   1. Continued hilar and basilar streaky and hazy opacities likely  representing a mixed picture of edema with atelectasis.  2. No definitive pneumothorax is seen on this radiograph, continued  attention on follow-up.  3. Stable support devices.  4. Interval placement right upper extremity PICC with tip terminating  at the cavoatrial junction.    I have personally reviewed the examination and initial interpretation  and I agree with the findings.    FADY MCDONNELL MD         SYSTEM ID:  [id number]         =========================================

## 2024-08-21 NOTE — PROGRESS NOTES
Pulmonary Medicine  Cystic Fibrosis - Lung Transplant Team  Progress Note  2024     Patient: Travon Cartwright  MRN: [id number]  : 1965 (age 59 year old)  Transplant: 8/15/2024 (Lung), POD#6  Admission date: 2024    Assessment & Plan:   Travon Cartwright is a 59 year old male with a history of idiopathic pulmonary fibrosis, diabetes, hepatosteatosis, and essential tremor.  Pt. is now s/p BSLT on 8/15/2024 with Dr. Mulvihill.  Surgery was uncomplicated and done on pump, extubated and off pressors since 2024. Clinically improved with resolution of delirium.     Recommendations:  Wean oxygen as tolerated  Daily CXR with chest tubes in place. CVTS managing chest tubes  3 removed today  L bronchial washing cultures from 8/15 growing Strep constellatus, donor cultures with Staph  Unable to obtain susceptibilities  discontinue vancomycin today given lack of positive cultures since that time  Diuresis per primary team; agree with goal of 0.5-1L net negative.   VFSS today with SLP  IS: Daily tacro levels. Tacro level today subtherapeutic  Changed to suspension per NJ today  Repeat level  ordered   PPX: As below.   Abx - Continue zosyn for 7 days  ES catheters in place.  Agree with removal of CVC, [place].   Agree with transfer to the floor  Routine surveillance bronch scheduled  at 8 am     S/p bilateral sequential lung transplant (BSLT) 8/15/24 for idiopathic pulmonary fibrosis:  Acute on chronic hypoxic respiratory failure (post operative):   - Wean oxygen as tolerated per CVICU team  - Nebs: levalbuterol and Mucomyst QID  - Pulmonary toilet with chest physiotherapy QID (addition of Aerobika and incentive spirometry now that he's extubated)  - DSA at one week (ordered ) then one month post-transplant (additionally per protocol)  - Ammonia monitoring qMTh (screening for hyperammonemia post-lung transplant) with ureaplasma PCR ordered POD #7 () per protocol   - ES catheter placed  by anesthesia 8/18 prior to extubation  - Chest tubes managed by surgical team; daily CXRs with chest tubes in place   - SLP consulted; plan for VFSS today, oral diet pending results  - Await explant pathology     Immunosuppression:  Induction therapy with high dose IV steroid intraoperatively and basiliximab (POD #0 and #4).   - Tacrolimus Goal  8-12.    - changed to enteral 8/21   - repeat level 8/24 ordered   - MMF 1000 mg BID  - Methylprednisolone 125 mg x 3 doses, then prednisolone 30 mg daily with taper per lung transplant protocol:  Date Daily Dose (mg)   8/17/2024 30   8/24/2024 25   8/31/2024 20   9/21/2024 15   10/12/2024 10   11/2/2024 5      Prophylaxis:   - Bactrim for PJP ppx started POD #1 due to donor toxoplasma IgG+  - VGCV for CMV ppx (ordered as below), CMV monitoring per protocol (after completion of ppx course, additionally prn)  - Ampho B nebs twice weekly for antifungal ppx through discharge, then will stop  - Nystatin for oral candidiasis ppx, 6 month course   - Micofungin 100mg q24h for prophylaxis x 10 days (end date: 8/25)  - See below for serologies and viral ppx:    Donor Recipient Intervention   CMV status - + Valganciclovir POD #8-90   EBV status - + EBV monthly   HSV status N/A + none        ID: Prior history of infection/colonization with Strep constellatus (bronch BAL on L 8/16), donor cultures positive for Staph.  - IgG at one month (ordered 9/15)  - Donor cultures ([facility]) NGTD; ([facility]) NGTD ([facility] personally reviewed today) - staph aueus +  - Recipient cultures - washing from left 8/16- strep constellatus  - Abx- Completed IV ceftaz. Vancomycin resumed 8/17-8/21   - Repeat blood cultures 8/19 due to fevers over night; zosyn for 7 days     PHS risk criteria donor:  Additional labs required post-transplant (between 4-8 weeks post-op): Hepatitis B, Hepatitis C, and HIV by JOVANY ([id number], ordered POD #30, ordered for 9/15).     EGJ outflow obstruction (?) inconclusive: Noted on esophageal  "manometry 7/9. Proceed with J feeds. No need for NPO for 6 weeks.     Diabetes mellitus: Prior to transplant was on Jardiance, dulaglutide, glargine, metformin. All currently held  Lantus and sliding scale insulin per CVICU     Toxic-metabolic encephalopathy: Likely ICU delirium, which continues to improve.  - Continue seroquel per CVICU  - PRN melatonin at bedtime      We appreciate the excellent care provided by the CVTS and CVICU teams.  Recommendations communicated via in person rounding and this note.  Will continue to follow along closely, please do not hesitate to call with any questions or concerns.    Kathrin Lowery MD MPH  Associate Professor of Medicine       Subjective & Interval History:     Patient feeling like he is improving.  He continues to cough and produce sputum.  Chest pain worse this am.      Review of Systems:     C: No fever, no chills, decrease in weight, remain NPO  INTEGUMENTARY/SKIN: No rash or obvious new lesions  ENT/MOUTH: No sore throat, no sinus pain, no nasal congestion or drainage  RESP: See interval history  CV: + chest pain, no palpitations, no peripheral edema  GI: No nausea, no vomiting, no change in stools, no reflux symptoms  : rodriguez in place  MUSCULOSKELETAL: No myalgias, no arthralgias  ENDOCRINE: Blood sugars with adequate control  NEURO: No headache  PSYCHIATRIC: Mood stable    Physical Exam:     All notes, images, and labs from past 24 hours (at minimum) were reviewed.    Vital signs:  Temp: 98.1  F (36.7  C) Temp src: Axillary BP: 120/75 Pulse: 94   Resp: 29 SpO2: 96 % O2 Device: Nasal cannula Oxygen Delivery: 4 LPM Height: 177.8 cm (5' 10\") Weight: 71.5 kg (157 lb 11.2 oz)  I/O:   Intake/Output Summary (Last 24 hours) at 8/21/2024 1309  Last data filed at 8/21/2024 1200  Gross per 24 hour   Intake 2778 ml   Output 4338 ml   Net -1560 ml     Constitutional: sitting in chair, in no apparent distress.   HEENT: Eyes with pink conjunctivae, anicteric.  Oral mucosa " moist without lesions.   PULM: Fair air flow bilaterally.  No crackles, + rhonchi, no wheezes.   CV: Normal S1 and S2.  RRR.  No murmur, gallop, or rub.  No peripheral edema.   ABD: NABS, soft, nontender, nondistended.    MSK: Moves all extremities.  No apparent muscle wasting.   NEURO: Alert and conversant.   SKIN: Warm, dry.  No rash on limited exam.   PSYCH: Mood stable.     Data:     LABS    CMP:   Recent Labs   Lab 08/21/24  1212 08/21/24  0821 08/21/24  0331 08/21/24  0330 08/20/24  0824 08/20/24  0353 08/19/24  0558 08/19/24  0347 08/18/24  1331 08/18/24  1329 08/18/24  0514 08/18/24  0355 08/15/24  2024 08/15/24  2020 08/15/24  0308 08/14/24  2338   NA  --   --  138  --   --  139  --  138  --   --   --  142   < > 142   < > 141   POTASSIUM  --   --  4.0  --   --  4.0  --  4.5  --  4.1  --  3.5   < > 4.6   < > 3.7   CHLORIDE  --   --  103  --   --  105  --  106  --   --   --  107   < > 107   < > 104   CO2  --   --  27  --   --  25  --  26  --   --   --  28   < > 21*   < > 23   ANIONGAP  --   --  8  --   --  9  --  6*  --   --   --  7   < > 14   < > 14   * 243* 217* 190*   < > 181*   < > 139*  147*   < >  --    < > 125*  149*   < > 145*   < > 123*   BUN  --   --  18.6  --   --  18.0  --  15.4  --   --   --  14.3   < > 10.7   < > 11.9   CR  --   --  0.52*  --   --  0.56*  --  0.46*  --   --   --  0.50*   < > 0.55*   < > 0.65*   GFRESTIMATED  --   --  >90  --   --  >90  --  >90  --   --   --  >90   < > >90   < > >90   HUGO  --   --  8.5*  --   --  8.5*  --  8.4*  --   --   --  8.2*   < > 7.8*   < > 9.9   MAG  --   --  1.8  --   --  1.8  --  1.9  --  2.1  --  2.0   < > 2.2   < > 2.1   PHOS  --   --  3.1  --   --  3.2  --  2.3*  --  2.2*  --  1.7*   < > 4.1   < > 3.8   PROTTOTAL  --   --   --   --   --   --   --  5.0*  --   --   --   --   --  3.8*  --  7.4   ALBUMIN  --   --   --   --   --   --   --  2.6*  --   --   --   --   --  2.2*  --  4.3   BILITOTAL  --   --   --   --   --   --   --  0.4  --   --   --    --   --  1.4*  --  0.4   ALKPHOS  --   --   --   --   --   --   --  76  --   --   --   --   --  61  --  89   AST  --   --   --   --   --   --   --  47*  --   --   --   --   --  79*  --  24   ALT  --   --   --   --   --   --   --  22  --   --   --   --   --  26  --  22    < > = values in this interval not displayed.     CBC:   Recent Labs   Lab 08/21/24  0331 08/20/24  0353 08/19/24  0347 08/18/24  0355   WBC 9.9 9.7 10.3 10.6   RBC 4.35* 4.09* 4.02* 3.99*   HGB 12.6* 11.9* 11.9* 11.8*   HCT 39.7* 37.0* 37.2* 35.9*   MCV 91 91 93 90   MCH 29.0 29.1 29.6 29.6   MCHC 31.7 32.2 32.0 32.9   RDW 13.2 13.3 13.2 13.2    164 141* 139*       INR:   Recent Labs   Lab 08/15/24  2020 08/15/24  1820 08/14/24  2338   INR 1.46* 1.74* 1.07       Glucose:   Recent Labs   Lab 08/21/24  1212 08/21/24  0821 08/21/24  0331 08/21/24  0330 08/21/24  0012 08/20/24 2027   * 243* 217* 190* 202* 229*       Blood Gas:   Recent Labs   Lab 08/20/24  1619 08/20/24  1128 08/20/24  0353 08/16/24  1301 08/16/24  1005 08/16/24  0804 08/16/24  0419   PHV  --   --   --   --  7.36 7.39 7.37   PCO2V  --   --   --   --  48 45 45   PO2V  --   --   --   --  44 38 38   HCO3V  --   --   --   --  27 27 26   EUGENIE  --   --   --   --  0.9 1.6 0.6   O2PER 26 40 50   < > 60 60 65    < > = values in this interval not displayed.     IMAGING    Recent Results (from the past 48 hour(s))   XR Chest Port 1 View    Narrative    EXAM: XR CHEST PORT 1 VIEW  8/20/2024 6:10 AM      HISTORY: lung transplant follow-up    COMPARISON: Chest x-ray 8/19/2024, chest x-ray 8/18/2024    FINDINGS: Single view of the chest. Stable positioning of multiple  chest/mediastinal tubes. Enteric tube is present with the tip and  sidehole projecting beyond the field of view. Right-sided IJ sheath  lies within the proximal SVC. Left atrial appendage clip. Grossly  stable postsurgical changes of the chest including clamshell  sternotomy wires that are intact and skin staples.    The  trachea is midline. Trace left greater than right apical  pneumothoraces. No significant pleural effusion. Interval improvement  in consolidative opacity in the right lower lung field. Streaky  bibasilar opacities likely represent atelectasis. Continued diffuse  patchy and linear interstitial opacities with peribronchial cuffing  which may suggest a component of pulmonary edema. The  cardiomediastinal silhouette is unchanged in appearance.      Impression    IMPRESSION:   1. Interval improvement in right lower lung field consolidative  opacities. Finding is again concerning for an infectious/inflammatory  process. Differential also includes atelectasis/edema.  2. Radiographic evidence of pulmonary edema. Bibasilar atelectasis.  3. Trace left greater than right biapical pneumothoraces. No signs of  tension.  4. Stable postsurgical changes of the chest and positioning of support  devices.    I have personally reviewed the examination and initial interpretation  and I agree with the findings.    ELISSA PUGA MD         SYSTEM ID:  [id number]   XR Chest Port 1 View    Narrative    Exam: XR CHEST PORT 1 VIEW, 8/21/2024 6:32 AM    Indication: lung transplant follow-up    Comparison: X-ray chest 8/20/2024, multiple priors appear    Findings:   Frontal semiupright radiograph of the chest, 0511 hours. Stable  placement of feeding tube, multiple chest drains, bibasilar chest  tubes, left atrial appendage clip, epicardial pacer wires, sternotomy  wires, and right IJ sheath. Interval placement right upper cavity PICC  with tip terminating at the cavoatrial junction.    The trachea is midline, heart size and shape is unremarkable, partial  silhouetting of the cardiac borders. Continued perihilar and medial  basilar hazy predominance. No definitive pneumothorax is seen on this  radiograph. No significant pleural effusion.      Impression    Impression:   1. Continued hilar and basilar streaky and hazy opacities  likely  representing a mixed picture of edema with atelectasis.  2. No definitive pneumothorax is seen on this radiograph, continued  attention on follow-up.  3. Stable support devices.  4. Interval placement right upper extremity PICC with tip terminating  at the cavoatrial junction.    I have personally reviewed the examination and initial interpretation  and I agree with the findings.    FADY MCDONNELL MD         SYSTEM ID:  [id number]   XR Chest Port 1 View    Narrative    Portable chest 8/21/2024 at 1043 hours    INDICATION: Post chest tube removal    COMPARISON: 0511 hours earlier today    FINDINGS: Butterfly clamshell sternotomy again noted from prior  bilateral lung transplantation. No evidence of pneumothorax. Feeding  tube beyond the inferior margin of the image. Right PICC tip in the  right atrium. Left atrial appendage ligation clip. Continued patchy  opacities. Bibasilar thoracostomy tubes. Apically directed  thoracostomy tubes removed. Right IJ sheath in the upper SVC.      Impression    IMPRESSION: No significant changes from earlier this morning other  than removal of apically directed thoracostomy tubes.    MORENO POSEY MD         SYSTEM ID:  [id number]

## 2024-08-21 NOTE — PROGRESS NOTES
Critical Care Attending Progress Note  I, Neli Brown MD, PhD saw this patient with the resident and agree with the findings and plan of care as documented in the note. Please see separate note from today for further documentation.     I personally reviewed vital signs, medications, labs and imaging.     Assessment:   59 year old male with PMH of ILD, with chronic hypoxic respiratory failure on 6L oxygen at baseline, T2DM, hepatic steatosis and bilateral hand tremor now s/p lung transplant via clamshell incision with Dr Mulvihill on 8/15/24     8/20: Did not sleep overnight. Continues to have delirium.     Better night, delirium still present but seems to be improving.     Active problems and current treatments include:     Acute postop pain: B/L paravertebral blocks  Delirium: CAM-ICU positive-> delirium precautions  Respiratory insufficiency: pulm toilet  SP lung tx: metop for AF prophylaxis  Volume: goal net negative 0.5-1L, dose furosemide  ID: fever last night, pan cx (8/19), cont Zosyn, appreciate pulm tx team following along  Nutrition: TF to goal, bowel regimen (BM 8/20), speech following- video swallow eval today  Lines: CTs-discontinue apicals, PICC  DM: lantus and SSI  PPX: subcutaneous heparin, SCDs  Family: The sister participated in rounds, all questions answered.  DISPO: TTF        The patient is critically ill.   I personally managed the ventilator, hemodynamics, sedation, analgesia, metabolic abnormalities and nutritional status.    I agree with the assessment and plan. I spent 52 minutes exclusive of procedures evaluating and managing this patient, discussing with the consultants, and updating the patient and family.     Neli Brown MD, PhD

## 2024-08-21 NOTE — PROGRESS NOTES
Brief CVTS progress note    Patient was seen today for chest tube removal. Medistinal and bilateral apical chest tubes are appropriate for removal at this time. Patient's vitals and CXR were reviewed prior to removal. Patient is on subcutaneous heparin. No chest tubes had an air leak on valsalva. Medistinal tube had 850 ml of total serosang output atrium, left apic ~10cc and right apical ~20cc. Tubes were removed sequentially without complication at 0853. The patient tolerated this wel. Post-pull CXR has been ordered and will be followed up.    Patient discussed with CVICU team and CVTS fellow prior to removal    Rafat Adame MD  [facility] Resident, General Surgery  Cardiothoracic Surgery Service

## 2024-08-21 NOTE — PROGRESS NOTES
Pain Service Progress Note  [facility]  Date: 08/21/2024       Patient Name: Travon Cartwright  MRN: [id number]  Age: 59 year old  Sex: male      Assessment:  Travon Cartwright is a 59 year old male who has PMH of chronic respiratory failure and hepatic steatosis.      Procedure: Lung Tx     Date of Surgery: 8/15/24     Date of Catheter Placement: 8/18/24     Plan/Recommendations:  1. Regional Anesthesia/Analgesia  -Continuous Catheter Type/Site: bilateral paravertebral (PV) T7-8  Infusate: 0.2% Ropivacaine  Programmed Intermittent Bolus (PIB) at 7 mL Q60 min via each catheter, total infusion rate of 14 mL/hr     Plan to maintain catheter approximately of 7 days     2. Anticoagulation  -Please contact Inpatient Pain Service before ordering or making any anticoagulation changes  -Heparin 5k Units subcutaneous Q 8 hours      3. Multimodal Analgesia  - Per primary service     Pain Service will continue to follow.     Discussed with attending anesthesiologist    Jessica Henderson PA-C  08/21/2024     Overnight Events: having visual hallucinations    Tubes/Drains: Yes  2 out of 5 remaining    Subjective:  I am sore     Symptoms of LAST: No    Pain Location:  Chest through the mid back    Pain Intensity:    Pain at Rest: 0/10 after pain medications    Satisfied with your level of pain control: Yes    Diet: NPO for Medical/Clinical Reasons Except for: Meds  Adult Formula Drip Feeding: Continuous Osmolite 1.5; Nasoduodenal tube; Goal Rate: 60 mL/hr (goal) - Once FT verified post-pyloric and ok to use per CVICU, initiate @ 10 mL/hr and advance by 10 mL q8hr as tolerated to goal rate.; mL/hr; Do not adv...    Relevant Labs:  Recent Labs   Lab Test 08/21/24  0331 08/19/24  0347 08/18/24  0613 08/16/24  0419 08/15/24  2020   INR  --   --   --   --  1.46*      < >  --    < > 233   PTT  --   --  34  --  42*   BUN 18.6   < >  --    < > 10.7    < > = values in this interval not displayed.  "      Physical Exam:  Vitals: BP (!) 140/74   Pulse 102   Temp 99.2  F (37.3  C) (Axillary)   Resp 29   Ht 1.778 m (5' 10\")   Wt 71.5 kg (157 lb 11.2 oz)   SpO2 97%   BMI 22.63 kg/m      Physical Exam:   Orientation:  Alert, oriented, and in no acute distress: Yes  Sedation: No    Motor Examination:  5/5 Strength in lower extremities: Yes        Catheter Site:   Catheter entry site is clean/dry/intact: Yes    Tender: No      Relevant Medications:  Current Pain Medications:  Medications related to Pain Management (From now, onward)      Start     Dose/Rate Route Frequency Ordered Stop    08/20/24 2200  hydrOXYzine HCl (ATARAX) tablet 25 mg         25 mg Oral AT BEDTIME 08/20/24 0917      08/20/24 2016  HYDROmorphone (DILAUDID) injection 0.2 mg        Placed in \"Or\" Linked Group    0.2 mg Intravenous EVERY 2 HOURS PRN 08/20/24 0537      08/20/24 2016  HYDROmorphone (PF) (DILAUDID) injection 0.4 mg        Placed in \"Or\" Linked Group    0.4 mg Intravenous EVERY 2 HOURS PRN 08/20/24 0537      08/20/24 1000  methocarbamol (ROBAXIN) tablet 750 mg         750 mg Oral or Feeding Tube 4 TIMES DAILY 08/20/24 0913      08/20/24 0907  lidocaine 1 % 0.1-5 mL         0.1-5 mL Other EVERY 1 HOUR PRN 08/20/24 0912 08/23/24 0906    08/20/24 0907  lidocaine (LMX4) cream          Topical EVERY 1 HOUR PRN 08/20/24 0912 08/23/24 0906    08/20/24 0600  senna-docusate (SENOKOT-S/PERICOLACE) 8.6-50 MG per tablet 2 tablet         2 tablet Oral or Feeding Tube 2 TIMES DAILY PRN 08/20/24 0536      08/20/24 0536  bisacodyl (DULCOLAX) suppository 10 mg         10 mg Rectal DAILY PRN 08/20/24 0536      08/20/24 0536  magnesium hydroxide (MILK OF MAGNESIA) suspension 30 mL         30 mL Oral or Feeding Tube DAILY PRN 08/20/24 0536      08/20/24 0536  polyethylene glycol (MIRALAX) Packet 17 g         17 g Oral or Feeding Tube DAILY PRN 08/20/24 0536      08/19/24 0920  oxyCODONE (ROXICODONE) tablet 5 mg        Placed in \"Or\" Linked Group    " "5 mg Oral or Feeding Tube EVERY 4 HOURS PRN 08/19/24 0921      08/19/24 0920  oxyCODONE IR (ROXICODONE) half-tab 2.5 mg        Placed in \"Or\" Linked Group    2.5 mg Oral or Feeding Tube EVERY 4 HOURS PRN 08/19/24 0921      08/18/24 1700  ROPivacaine 0.2% in sodium chloride 0.9% PERINEURAL infusion          Perineural Continuous Nerve Block 08/18/24 1657      08/18/24 1700  ROPivacaine 0.2% in sodium chloride 0.9% PERINEURAL infusion          Perineural Continuous Nerve Block 08/18/24 1657      08/18/24 1600  acetaminophen (TYLENOL) tablet 975 mg         975 mg Oral or Feeding Tube EVERY 8 HOURS 08/18/24 1448      08/18/24 0000  acetaminophen (TYLENOL) tablet 650 mg         650 mg Oral or Feeding Tube EVERY 4 HOURS PRN 08/15/24 2016      08/15/24 2016  lidocaine 1 % 0.1-1 mL         0.1-1 mL Other EVERY 1 HOUR PRN 08/15/24 2016      08/15/24 2016  lidocaine (LMX4) kit          Topical EVERY 1 HOUR PRN 08/15/24 2016                        Acute Inpatient Pain Service [facility]  Hours of pain coverage 24/7   Page via Amcom- Please Page the Pain Team Via Amcom: \"PAIN MANAGEMENT ACUTE INPATIENT/ [facility]\"            "

## 2024-08-21 NOTE — PLAN OF CARE
Major Shift Events:   Pt is alert and oriented x 4, very nice and pleasant. Still complains of visual hallucinations when eyes are closed.  CV: ST (110's-120s) MAP goal >65.   Pt passed his video swallow study and was advanced to a Reg and thin liquids diet   : Goodrich was removed today. DTV @0000.   GI: Tube feeding @ goal @60ml/hr but it has been on hold since 3pm after it was pulled out during video swallow study and currently waiting for x-ray results to verify placement .Rectal tube was removed today d/t minimal output   Pulm: 4L NC, Cough and  R and L CT on -20 sxn. 3 CT's removed today.    Plan: Floor orders are in, pt waiting bed on the 6th floor   For vital signs and complete assessments, please see documentation flowsheets.

## 2024-08-22 ENCOUNTER — APPOINTMENT (OUTPATIENT)
Dept: SPEECH THERAPY | Facility: CLINIC | Age: 59
End: 2024-08-22
Attending: STUDENT IN AN ORGANIZED HEALTH CARE EDUCATION/TRAINING PROGRAM
Payer: COMMERCIAL

## 2024-08-22 ENCOUNTER — APPOINTMENT (OUTPATIENT)
Dept: PHYSICAL THERAPY | Facility: CLINIC | Age: 59
End: 2024-08-22
Attending: STUDENT IN AN ORGANIZED HEALTH CARE EDUCATION/TRAINING PROGRAM
Payer: COMMERCIAL

## 2024-08-22 ENCOUNTER — APPOINTMENT (OUTPATIENT)
Dept: GENERAL RADIOLOGY | Facility: CLINIC | Age: 59
End: 2024-08-22
Attending: STUDENT IN AN ORGANIZED HEALTH CARE EDUCATION/TRAINING PROGRAM
Payer: COMMERCIAL

## 2024-08-22 LAB
ABO/RH(D): NORMAL
ALBUMIN SERPL BCG-MCNC: 2.8 G/DL (ref 3.5–5.2)
ALP SERPL-CCNC: 139 U/L (ref 40–150)
ALT SERPL W P-5'-P-CCNC: 43 U/L (ref 0–70)
AMMONIA PLAS-SCNC: 37 UMOL/L (ref 16–60)
ANION GAP SERPL CALCULATED.3IONS-SCNC: 9 MMOL/L (ref 7–15)
ANTIBODY SCREEN: NEGATIVE
AST SERPL W P-5'-P-CCNC: 33 U/L (ref 0–45)
BASOPHILS # BLD AUTO: ABNORMAL 10*3/UL
BASOPHILS # BLD MANUAL: 0 10E3/UL (ref 0–0.2)
BASOPHILS NFR BLD AUTO: ABNORMAL %
BASOPHILS NFR BLD MANUAL: 0 %
BILIRUB DIRECT SERPL-MCNC: <0.2 MG/DL (ref 0–0.3)
BILIRUB SERPL-MCNC: 0.4 MG/DL
BUN SERPL-MCNC: 17.9 MG/DL (ref 8–23)
BURR CELLS BLD QL SMEAR: SLIGHT
CALCIUM SERPL-MCNC: 8.8 MG/DL (ref 8.8–10.4)
CHLORIDE SERPL-SCNC: 105 MMOL/L (ref 98–107)
CREAT SERPL-MCNC: 0.61 MG/DL (ref 0.67–1.17)
EGFRCR SERPLBLD CKD-EPI 2021: >90 ML/MIN/1.73M2
EOSINOPHIL # BLD AUTO: ABNORMAL 10*3/UL
EOSINOPHIL # BLD MANUAL: 0.8 10E3/UL (ref 0–0.7)
EOSINOPHIL NFR BLD AUTO: ABNORMAL %
EOSINOPHIL NFR BLD MANUAL: 7 %
ERYTHROCYTE [DISTWIDTH] IN BLOOD BY AUTOMATED COUNT: 13.5 % (ref 10–15)
GLUCOSE BLDC GLUCOMTR-MCNC: 195 MG/DL (ref 70–99)
GLUCOSE BLDC GLUCOMTR-MCNC: 203 MG/DL (ref 70–99)
GLUCOSE BLDC GLUCOMTR-MCNC: 214 MG/DL (ref 70–99)
GLUCOSE BLDC GLUCOMTR-MCNC: 262 MG/DL (ref 70–99)
GLUCOSE BLDC GLUCOMTR-MCNC: 290 MG/DL (ref 70–99)
GLUCOSE BLDC GLUCOMTR-MCNC: 349 MG/DL (ref 70–99)
GLUCOSE BLDC GLUCOMTR-MCNC: 380 MG/DL (ref 70–99)
GLUCOSE BLDC GLUCOMTR-MCNC: 417 MG/DL (ref 70–99)
GLUCOSE SERPL-MCNC: 200 MG/DL (ref 70–99)
HCO3 SERPL-SCNC: 26 MMOL/L (ref 22–29)
HCT VFR BLD AUTO: 35.3 % (ref 40–53)
HGB BLD-MCNC: 11.4 G/DL (ref 13.3–17.7)
IMM GRANULOCYTES # BLD: ABNORMAL 10*3/UL
IMM GRANULOCYTES NFR BLD: ABNORMAL %
LYMPHOCYTES # BLD AUTO: ABNORMAL 10*3/UL
LYMPHOCYTES # BLD MANUAL: 1.8 10E3/UL (ref 0.8–5.3)
LYMPHOCYTES NFR BLD AUTO: ABNORMAL %
LYMPHOCYTES NFR BLD MANUAL: 15 %
Lab: NORMAL
MAGNESIUM SERPL-MCNC: 1.6 MG/DL (ref 1.7–2.3)
MCH RBC QN AUTO: 29.5 PG (ref 26.5–33)
MCHC RBC AUTO-ENTMCNC: 32.3 G/DL (ref 31.5–36.5)
MCV RBC AUTO: 91 FL (ref 78–100)
MONOCYTES # BLD AUTO: ABNORMAL 10*3/UL
MONOCYTES # BLD MANUAL: 1.2 10E3/UL (ref 0–1.3)
MONOCYTES NFR BLD AUTO: ABNORMAL %
MONOCYTES NFR BLD MANUAL: 10 %
NEUTROPHILS # BLD AUTO: ABNORMAL 10*3/UL
NEUTROPHILS # BLD MANUAL: 8.1 10E3/UL (ref 1.6–8.3)
NEUTROPHILS NFR BLD AUTO: ABNORMAL %
NEUTROPHILS NFR BLD MANUAL: 68 %
NRBC # BLD AUTO: 0 10E3/UL
NRBC # BLD AUTO: 0.1 10E3/UL
NRBC BLD AUTO-RTO: 0 /100
NRBC BLD MANUAL-RTO: 1 %
PERFORMING LABORATORY: NORMAL
PHOSPHATE SERPL-MCNC: 2.8 MG/DL (ref 2.5–4.5)
PLAT MORPH BLD: ABNORMAL
PLATELET # BLD AUTO: 257 10E3/UL (ref 150–450)
POLYCHROMASIA BLD QL SMEAR: SLIGHT
POTASSIUM SERPL-SCNC: 4.1 MMOL/L (ref 3.4–5.3)
PROT SERPL-MCNC: 5.5 G/DL (ref 6.4–8.3)
RBC # BLD AUTO: 3.87 10E6/UL (ref 4.4–5.9)
RBC MORPH BLD: ABNORMAL
SODIUM SERPL-SCNC: 140 MMOL/L (ref 135–145)
SPECIMEN EXPIRATION DATE: NORMAL
SPECIMEN STATUS: NORMAL
TARGETS BLD QL SMEAR: SLIGHT
TEST NAME: NORMAL
WBC # BLD AUTO: 11.9 10E3/UL (ref 4–11)

## 2024-08-22 PROCEDURE — 84100 ASSAY OF PHOSPHORUS: CPT | Performed by: STUDENT IN AN ORGANIZED HEALTH CARE EDUCATION/TRAINING PROGRAM

## 2024-08-22 PROCEDURE — 250N000011 HC RX IP 250 OP 636: Performed by: INTERNAL MEDICINE

## 2024-08-22 PROCEDURE — 94640 AIRWAY INHALATION TREATMENT: CPT

## 2024-08-22 PROCEDURE — 86832 HLA CLASS I HIGH DEFIN QUAL: CPT | Performed by: SURGERY

## 2024-08-22 PROCEDURE — 250N000011 HC RX IP 250 OP 636: Performed by: STUDENT IN AN ORGANIZED HEALTH CARE EDUCATION/TRAINING PROGRAM

## 2024-08-22 PROCEDURE — 250N000011 HC RX IP 250 OP 636

## 2024-08-22 PROCEDURE — 97116 GAIT TRAINING THERAPY: CPT | Mod: GP | Performed by: PHYSICAL THERAPIST

## 2024-08-22 PROCEDURE — 250N000013 HC RX MED GY IP 250 OP 250 PS 637: Performed by: STUDENT IN AN ORGANIZED HEALTH CARE EDUCATION/TRAINING PROGRAM

## 2024-08-22 PROCEDURE — 86833 HLA CLASS II HIGH DEFIN QUAL: CPT | Performed by: SURGERY

## 2024-08-22 PROCEDURE — 82140 ASSAY OF AMMONIA: CPT | Performed by: SURGERY

## 2024-08-22 PROCEDURE — 99233 SBSQ HOSP IP/OBS HIGH 50: CPT | Mod: 24 | Performed by: INTERNAL MEDICINE

## 2024-08-22 PROCEDURE — 250N000013 HC RX MED GY IP 250 OP 250 PS 637

## 2024-08-22 PROCEDURE — 250N000012 HC RX MED GY IP 250 OP 636 PS 637: Performed by: SURGERY

## 2024-08-22 PROCEDURE — 250N000012 HC RX MED GY IP 250 OP 636 PS 637: Performed by: INTERNAL MEDICINE

## 2024-08-22 PROCEDURE — 999N000157 HC STATISTIC RCP TIME EA 10 MIN

## 2024-08-22 PROCEDURE — 83735 ASSAY OF MAGNESIUM: CPT | Performed by: STUDENT IN AN ORGANIZED HEALTH CARE EDUCATION/TRAINING PROGRAM

## 2024-08-22 PROCEDURE — 272N000202 HC AEROBIKA WITH MANOMETER

## 2024-08-22 PROCEDURE — 250N000009 HC RX 250: Performed by: SURGERY

## 2024-08-22 PROCEDURE — 94640 AIRWAY INHALATION TREATMENT: CPT | Mod: 76

## 2024-08-22 PROCEDURE — 85027 COMPLETE CBC AUTOMATED: CPT | Performed by: SURGERY

## 2024-08-22 PROCEDURE — 250N000013 HC RX MED GY IP 250 OP 250 PS 637: Performed by: SURGERY

## 2024-08-22 PROCEDURE — 258N000003 HC RX IP 258 OP 636: Performed by: STUDENT IN AN ORGANIZED HEALTH CARE EDUCATION/TRAINING PROGRAM

## 2024-08-22 PROCEDURE — 86900 BLOOD TYPING SEROLOGIC ABO: CPT | Performed by: STUDENT IN AN ORGANIZED HEALTH CARE EDUCATION/TRAINING PROGRAM

## 2024-08-22 PROCEDURE — 82248 BILIRUBIN DIRECT: CPT | Performed by: SURGERY

## 2024-08-22 PROCEDURE — 86901 BLOOD TYPING SEROLOGIC RH(D): CPT | Performed by: STUDENT IN AN ORGANIZED HEALTH CARE EDUCATION/TRAINING PROGRAM

## 2024-08-22 PROCEDURE — 250N000013 HC RX MED GY IP 250 OP 250 PS 637: Performed by: PHYSICIAN ASSISTANT

## 2024-08-22 PROCEDURE — 71045 X-RAY EXAM CHEST 1 VIEW: CPT | Mod: 26 | Performed by: RADIOLOGY

## 2024-08-22 PROCEDURE — 99232 SBSQ HOSP IP/OBS MODERATE 35: CPT | Performed by: PHYSICIAN ASSISTANT

## 2024-08-22 PROCEDURE — 99233 SBSQ HOSP IP/OBS HIGH 50: CPT | Performed by: STUDENT IN AN ORGANIZED HEALTH CARE EDUCATION/TRAINING PROGRAM

## 2024-08-22 PROCEDURE — 94799 UNLISTED PULMONARY SVC/PX: CPT

## 2024-08-22 PROCEDURE — 87798 DETECT AGENT NOS DNA AMP: CPT | Performed by: SURGERY

## 2024-08-22 PROCEDURE — 120N000002 HC R&B MED SURG/OB UMMC

## 2024-08-22 PROCEDURE — 87798 DETECT AGENT NOS DNA AMP: CPT | Performed by: STUDENT IN AN ORGANIZED HEALTH CARE EDUCATION/TRAINING PROGRAM

## 2024-08-22 PROCEDURE — 85007 BL SMEAR W/DIFF WBC COUNT: CPT | Performed by: SURGERY

## 2024-08-22 PROCEDURE — 92526 ORAL FUNCTION THERAPY: CPT | Mod: GN

## 2024-08-22 PROCEDURE — 71045 X-RAY EXAM CHEST 1 VIEW: CPT

## 2024-08-22 PROCEDURE — 80053 COMPREHEN METABOLIC PANEL: CPT | Performed by: PHYSICIAN ASSISTANT

## 2024-08-22 PROCEDURE — 94668 MNPJ CHEST WALL SBSQ: CPT

## 2024-08-22 RX ORDER — MAGNESIUM SULFATE HEPTAHYDRATE 40 MG/ML
2 INJECTION, SOLUTION INTRAVENOUS ONCE
Status: COMPLETED | OUTPATIENT
Start: 2024-08-22 | End: 2024-08-22

## 2024-08-22 RX ORDER — PREDNISONE 10 MG/1
10 TABLET ORAL DAILY
Status: DISCONTINUED | OUTPATIENT
Start: 2024-10-12 | End: 2024-08-28 | Stop reason: HOSPADM

## 2024-08-22 RX ORDER — PREDNISONE 20 MG/1
20 TABLET ORAL DAILY
Status: DISCONTINUED | OUTPATIENT
Start: 2024-08-31 | End: 2024-08-28 | Stop reason: HOSPADM

## 2024-08-22 RX ORDER — QUETIAPINE FUMARATE 25 MG/1
25 TABLET, FILM COATED ORAL AT BEDTIME
Status: DISCONTINUED | OUTPATIENT
Start: 2024-08-22 | End: 2024-08-23

## 2024-08-22 RX ORDER — FUROSEMIDE 10 MG/ML
10 INJECTION INTRAMUSCULAR; INTRAVENOUS ONCE
Status: COMPLETED | OUTPATIENT
Start: 2024-08-22 | End: 2024-08-22

## 2024-08-22 RX ORDER — PREDNISONE 5 MG/1
5 TABLET ORAL DAILY
Status: DISCONTINUED | OUTPATIENT
Start: 2024-11-02 | End: 2024-08-28 | Stop reason: HOSPADM

## 2024-08-22 RX ORDER — TRAZODONE HYDROCHLORIDE 50 MG/1
50 TABLET, FILM COATED ORAL
Status: DISCONTINUED | OUTPATIENT
Start: 2024-08-22 | End: 2024-08-28 | Stop reason: HOSPADM

## 2024-08-22 RX ADMIN — HYDROXYZINE HYDROCHLORIDE 25 MG: 25 TABLET, FILM COATED ORAL at 21:26

## 2024-08-22 RX ADMIN — TACROLIMUS 3.5 MG: 5 CAPSULE ORAL at 08:03

## 2024-08-22 RX ADMIN — METHOCARBAMOL 750 MG: 750 TABLET ORAL at 16:34

## 2024-08-22 RX ADMIN — QUETIAPINE FUMARATE 25 MG: 25 TABLET ORAL at 20:15

## 2024-08-22 RX ADMIN — HEPARIN SODIUM 5000 UNITS: 5000 INJECTION, SOLUTION INTRAVENOUS; SUBCUTANEOUS at 13:51

## 2024-08-22 RX ADMIN — PIPERACILLIN AND TAZOBACTAM 4.5 G: 4; .5 INJECTION, POWDER, LYOPHILIZED, FOR SOLUTION INTRAVENOUS at 02:19

## 2024-08-22 RX ADMIN — OXYCODONE HYDROCHLORIDE 5 MG: 5 TABLET ORAL at 06:02

## 2024-08-22 RX ADMIN — ROSUVASTATIN CALCIUM 10 MG: 10 TABLET, FILM COATED ORAL at 07:58

## 2024-08-22 RX ADMIN — SULFAMETHOXAZOLE AND TRIMETHOPRIM 80 MG: 200; 40 SUSPENSION ORAL at 07:57

## 2024-08-22 RX ADMIN — PIPERACILLIN AND TAZOBACTAM 4.5 G: 4; .5 INJECTION, POWDER, LYOPHILIZED, FOR SOLUTION INTRAVENOUS at 13:51

## 2024-08-22 RX ADMIN — LEVALBUTEROL HYDROCHLORIDE 1.25 MG: 1.25 SOLUTION RESPIRATORY (INHALATION) at 20:16

## 2024-08-22 RX ADMIN — METHOCARBAMOL 750 MG: 750 TABLET ORAL at 20:15

## 2024-08-22 RX ADMIN — Medication 10 MG: at 20:15

## 2024-08-22 RX ADMIN — ACETAMINOPHEN 975 MG: 325 TABLET, FILM COATED ORAL at 00:15

## 2024-08-22 RX ADMIN — LEVALBUTEROL HYDROCHLORIDE 1.25 MG: 1.25 SOLUTION RESPIRATORY (INHALATION) at 16:18

## 2024-08-22 RX ADMIN — NYSTATIN 1000000 UNITS: 100000 SUSPENSION ORAL at 16:34

## 2024-08-22 RX ADMIN — ACETYLCYSTEINE 2 ML: 200 SOLUTION ORAL; RESPIRATORY (INHALATION) at 08:56

## 2024-08-22 RX ADMIN — METOPROLOL TARTRATE 25 MG: 25 TABLET, FILM COATED ORAL at 08:00

## 2024-08-22 RX ADMIN — MICAFUNGIN SODIUM 100 MG: 50 INJECTION, POWDER, LYOPHILIZED, FOR SOLUTION INTRAVENOUS at 12:34

## 2024-08-22 RX ADMIN — LEVALBUTEROL HYDROCHLORIDE 1.25 MG: 1.25 SOLUTION RESPIRATORY (INHALATION) at 08:56

## 2024-08-22 RX ADMIN — Medication 10 MG: at 08:57

## 2024-08-22 RX ADMIN — MYCOPHENOLATE MOFETIL 1000 MG: 250 CAPSULE ORAL at 20:15

## 2024-08-22 RX ADMIN — ACETAMINOPHEN 975 MG: 325 TABLET, FILM COATED ORAL at 07:57

## 2024-08-22 RX ADMIN — HEPARIN SODIUM 5000 UNITS: 5000 INJECTION, SOLUTION INTRAVENOUS; SUBCUTANEOUS at 05:50

## 2024-08-22 RX ADMIN — PREDNISOLONE 30 MG: 15 SOLUTION ORAL at 07:56

## 2024-08-22 RX ADMIN — METHOCARBAMOL 750 MG: 750 TABLET ORAL at 12:10

## 2024-08-22 RX ADMIN — HEPARIN SODIUM 5000 UNITS: 5000 INJECTION, SOLUTION INTRAVENOUS; SUBCUTANEOUS at 21:25

## 2024-08-22 RX ADMIN — METHOCARBAMOL 750 MG: 750 TABLET ORAL at 07:57

## 2024-08-22 RX ADMIN — Medication 60 ML: at 08:04

## 2024-08-22 RX ADMIN — LEVALBUTEROL HYDROCHLORIDE 1.25 MG: 1.25 SOLUTION RESPIRATORY (INHALATION) at 12:38

## 2024-08-22 RX ADMIN — Medication 10 MG: at 20:17

## 2024-08-22 RX ADMIN — NYSTATIN 1000000 UNITS: 100000 SUSPENSION ORAL at 07:57

## 2024-08-22 RX ADMIN — Medication 60 ML: at 20:16

## 2024-08-22 RX ADMIN — MYCOPHENOLATE MOFETIL 1000 MG: 200 POWDER, FOR SUSPENSION ORAL at 08:03

## 2024-08-22 RX ADMIN — ACETAMINOPHEN 975 MG: 325 TABLET, FILM COATED ORAL at 16:34

## 2024-08-22 RX ADMIN — Medication 40 MG: at 07:57

## 2024-08-22 RX ADMIN — Medication 15 ML: at 12:10

## 2024-08-22 RX ADMIN — PIPERACILLIN AND TAZOBACTAM 4.5 G: 4; .5 INJECTION, POWDER, LYOPHILIZED, FOR SOLUTION INTRAVENOUS at 07:57

## 2024-08-22 RX ADMIN — TACROLIMUS 3.5 MG: 5 CAPSULE ORAL at 18:15

## 2024-08-22 RX ADMIN — ACETAMINOPHEN 975 MG: 325 TABLET, FILM COATED ORAL at 23:19

## 2024-08-22 RX ADMIN — ACETYLCYSTEINE 2 ML: 200 SOLUTION ORAL; RESPIRATORY (INHALATION) at 16:18

## 2024-08-22 RX ADMIN — MAGNESIUM SULFATE HEPTAHYDRATE 2 G: 2 INJECTION, SOLUTION INTRAVENOUS at 08:42

## 2024-08-22 RX ADMIN — PIPERACILLIN AND TAZOBACTAM 4.5 G: 4; .5 INJECTION, POWDER, LYOPHILIZED, FOR SOLUTION INTRAVENOUS at 20:15

## 2024-08-22 RX ADMIN — OXYCODONE HYDROCHLORIDE 2.5 MG: 5 TABLET ORAL at 12:10

## 2024-08-22 RX ADMIN — Medication 5 ML: at 12:11

## 2024-08-22 RX ADMIN — NYSTATIN 1000000 UNITS: 100000 SUSPENSION ORAL at 20:15

## 2024-08-22 RX ADMIN — FUROSEMIDE 10 MG: 10 INJECTION, SOLUTION INTRAMUSCULAR; INTRAVENOUS at 12:10

## 2024-08-22 RX ADMIN — ACETYLCYSTEINE 2 ML: 200 SOLUTION ORAL; RESPIRATORY (INHALATION) at 12:38

## 2024-08-22 RX ADMIN — NYSTATIN 1000000 UNITS: 100000 SUSPENSION ORAL at 12:11

## 2024-08-22 RX ADMIN — LOPERAMIDE HCL 2 MG: 1 SOLUTION ORAL at 13:07

## 2024-08-22 RX ADMIN — METOPROLOL TARTRATE 25 MG: 25 TABLET, FILM COATED ORAL at 20:15

## 2024-08-22 RX ADMIN — ACETYLCYSTEINE 2 ML: 200 SOLUTION ORAL; RESPIRATORY (INHALATION) at 20:17

## 2024-08-22 ASSESSMENT — ACTIVITIES OF DAILY LIVING (ADL)
ADLS_ACUITY_SCORE: 43
ADLS_ACUITY_SCORE: 43
ADLS_ACUITY_SCORE: 45
ADLS_ACUITY_SCORE: 46
ADLS_ACUITY_SCORE: 46
ADLS_ACUITY_SCORE: 45
ADLS_ACUITY_SCORE: 43
ADLS_ACUITY_SCORE: 45
ADLS_ACUITY_SCORE: 46
ADLS_ACUITY_SCORE: 45
ADLS_ACUITY_SCORE: 46
ADLS_ACUITY_SCORE: 45
ADLS_ACUITY_SCORE: 45
ADLS_ACUITY_SCORE: 46
ADLS_ACUITY_SCORE: 45
ADLS_ACUITY_SCORE: 46
ADLS_ACUITY_SCORE: 46

## 2024-08-22 NOTE — PROGRESS NOTES
Brief Cross Cover:    Primary ICU team request to transfer pt to medicine primary, Gold 10.    S/p BSLT on 8/15/24    Currently on 4 liters  Has L and R pleural chest tubes (managed by surgery)  Cleared for regular diet and still with TF  Diarrhea, with Cdiff negative (8/21) so started imodium.    Added to Gold 10 list.      Sly Elizabeth MD  Text Page

## 2024-08-22 NOTE — PROGRESS NOTES
CLINICAL NUTRITION SERVICES - REASSESSMENT NOTE   Nutrition Prescription    Malnutrition Status:    Moderate malnutrition in the setting of acute on chronic illness    Recommendations already ordered by Registered Dietitian (RD):  Supplements:   -Sent one-time order of 2 chocolate Ensure Enlive and 2 Special K Bars  -PRN snacks/supplements    Kcal cts 8/22-8/24    Continue EN support as ordered - pt with good appetite while feeds running, and extra nutrition might be of benefit to pt (wt loss PTA)    Future/Additional Recommendations:  Monitor ongoing tolerance to EN support.    Monitor kcal cts. Recommend pt on average meets at least 65% minimum assessed needs (1350 kcal, 68 g pro daily) via kcal cts prior to removal of FT and discontinuation of EN support        EVALUATION OF THE PROGRESS TOWARD GOALS   Diet: Regular as of 8/21 (yesterday)    Oral Intake: This morning, pt ate 1/2 blueberry muffin from hospital coffee shop on main level, 24 oz apple juice, and 1 string cheese from floorstock. Appetite is good.    Enteral Access: NDT    FWF: 30 mL q4h    Nutrition Support: EN  8/16-Current:  Osmolite 1.5 Aneesh (or equivalent) @ goal of  60ml/hr  (1440ml/day) + 1 pkt ProSource TF20 BID (2 pkts) provides: 2320 kcals (34 kcal/kg), 130 g PRO (1.9 g pro/kg), 1097 ml free H20, 293 g CHO, and 0 g fiber daily.     Enteral Intake: Reached stable goal rate on 8/19 at 2000. Tolerating TF at goal rate.   -->3-day average enteral nutrition infusions: 1180 mL TF + 2 ProSource protein packets = 1930 kcals (28 kcal/kg, or 93% minimum assessed kcal needs) and 114 g protein (1.7 g protein/kg, or >100% minimum assessed protein needs).     NEW FINDINGS   -Per visit with pt and pt's sister today (8/22): Pt very eager to have FT removed. He has a good appetite and is very confident that he will be able to meet minimum kcal and protein goals (1350 kcal, 68 g protein) without issue. He's agreeable to receiving Ensure and Special K Bars  (writer sent 2 of each). Pt's sister plans to bring him some food from outside hospital today. Reviewed food safety.     -General: Pt transferred to [place] [id number] today.    -Wt trends: Continue dosing wt of 69 kg.  Date/Time Weight Weight Method   08/22/24 0700 70.5 kg (155 lb 6.4 oz) Standing scale   08/21/24 0400 71.5 kg (157 lb 11.2 oz) Standing scale   08/20/24 0000 73.7 kg (162 lb 7.7 oz) Bed scale   08/18/24 0000 79.3 kg (174 lb 13.2 oz) Bed scale   08/17/24 0000 79.5 kg (175 lb 4.3 oz) Bed scale   08/15/24 2115 79.6 kg (175 lb 7.8 oz) Bed scale   08/15/24 0000 69.1 kg (152 lb 5.4 oz) --      -Labs: Reviewed    -GI: Rectal tube removed yesterday (8/21). 0-12 BMs daily over past week per I/Os - went 8/15-8/18 without BM, suspect catching up a little    -Respiratory: S/p lung transplant on 8/15. Extubated 8/18.    -Skin: WOCN not following. Skin beneath nasal bridle was inspected; appears appropriate, with no skin breakdown or tension on the bridle string.      -Meds & Vitamin/Mineral Supplementation: Reviewed, notable for:   Caltrate BID  High dose sliding scale insulin  Lantus 35 units qAM and Lantus 30 units qAM  Liquid MVI/mineral    MALNUTRITION  % Intake: Decreased intake does not meet criteria  % Weight Loss: None noted this admit  Subcutaneous Fat Loss: Facial: Mild  Muscle Loss: Temporal:  Mild and Upper leg (quadricep, hamstring):  Mild  Fluid Accumulation/Edema: Does not meet criteria (+1, 2+ edema localized to hands per RN flowsheets)  Malnutrition Diagnosis: Moderate malnutrition in the setting of acute on chronic illness    Previous Goals   Once TF at goal rate, total avg nutritional intake to meet a minimum of 30 kcal/kg and 1.5 g PRO/kg daily (per dosing wt 69 kg).   Evaluation: Not met    Previous Nutrition Diagnosis  Inadequate protein-energy intake related to NPO status in setting of intubation as evidenced by pt currently meeting 0% minimum assessed needs (not accounting for kcal from  lipid/dextrose-containing medications).   Evaluation: Improving    CURRENT NUTRITION DIAGNOSIS  Inadequate oral intake related to increased needs acute-phase post-transplant as evidenced by pt not currently eating adequately to support discontinuation of EN support and removal of FT and estimated needs of 8170-0640 kcals/day (30 - 35 kcals/kg) and 104-138 grams protein/day (1.5 - 2 grams of pro/kg) for 6-8 weeks post-transplant.      INTERVENTIONS  Implementation  -Enteral Nutrition - Continue  -Medical food supplement therapy  -Nutrition education for nutrition relationship to health/disease - Provided brief verbal review of post-transplant diet education (food safety, high protein diet for 6-8 wks, heart healthy diet longterm). Discussed minimum kcal and protein goals to warrant removal of FT - wrote on pt's whiteboard (1350 kcal, 68 g protein).    Goals  Pt on average meets at least 65% minimum assessed needs (1350 kcal, 68 g pro daily) via kcal cts prior to removal of FT and discontinuation of EN support.    Monitoring/Evaluation  Progress toward goals will be monitored and evaluated per protocol.     Dulce Maria Oconnor RD, LD  Available on Vocera - can search by name or unit Dietitian  **Clinical Nutrition is no longer available via pager

## 2024-08-22 NOTE — PROGRESS NOTES
Pulmonary Medicine  Cystic Fibrosis - Lung Transplant Team  Progress Note  2024     Patient: Travon Cartwright  MRN: [id number]  : 1965 (age 59 year old)  Transplant: 8/15/2024 (Lung), POD#7  Admission date: 2024    Assessment & Plan:   Travon Cartwright is a 59 year old male with a history of idiopathic pulmonary fibrosis, diabetes, hepatosteatosis, and essential tremor.  Pt. is now s/p BSLT on 8/15/2024 with Dr. Mulvihill.  Surgery was uncomplicated and done on pump, extubated and off pressors since 2024. Clinically improved, minimal oxygen requirements at this point.     Recommendations:  Wean oxygen as tolerated  Daily CXR with chest tubes in place. CVTS managing chest tubes  3 removed today; 2 pleural tubes remain  L bronchial washing cultures from 8/15 growing Strep constellatus, donor cultures with Staph; complete a 7-day course of zosyn  Diuresis per primary team; agree with goal of 0.5-1L net negative  Recommend starting calorie counts   Ammonia today 37; continue M/Th checks per protocol  IS: Next tacro level . Changed from SL to enteral yesterday.   PPX: As below.   Abx - Continue zosyn for 7 days  ES catheters in place, managed by anesthesia.  Routine surveillance bronch scheduled  at 8 am     S/p bilateral sequential lung transplant (BSLT) 8/15/24 for idiopathic pulmonary fibrosis:  Acute on chronic hypoxic respiratory failure (post operative):   - Wean oxygen as tolerated per Gold 10 team  - Nebs: levalbuterol and Mucomyst QID  - Pulmonary toilet with chest physiotherapy QID (addition of Aerobika and incentive spirometry now that he's extubated)  - DSA at one week (drawn , pending) then one month post-transplant (additionally per protocol)  - Ammonia monitoring qMTh (screening for hyperammonemia post-lung transplant) with ureaplasma PCR ordered POD #7 (, collected and pending) per protocol   - ES catheter placed by anesthesia  prior to extubation  -  Chest tubes managed by surgical team; daily CXRs with chest tubes in place   - Await explant pathology     Immunosuppression:  Induction therapy with high dose IV steroid intraoperatively and basiliximab (POD #0 and #4).   - Tacrolimus Goal  8-12.    - changed to enteral 8/21   - repeat level 8/24 ordered   - MMF 1000 mg BID  - Methylprednisolone 125 mg x 3 doses, then prednisolone 30 mg daily with taper per lung transplant protocol: (taper ordered)  Date Daily Dose (mg)   8/17/2024 30   8/24/2024 25   8/31/2024 20   9/21/2024 15   10/12/2024 10   11/2/2024 5      Prophylaxis:   - Bactrim for PJP ppx started POD #1 due to donor toxoplasma IgG+  - VGCV for CMV ppx (ordered as below), CMV monitoring per protocol (after completion of ppx course, additionally prn)  - Ampho B nebs twice weekly for antifungal ppx through discharge, then will stop  - Nystatin for oral candidiasis ppx, 6 month course   - Micofungin 100mg q24h for prophylaxis x 10 days (end date: 8/25)  - See below for serologies and viral ppx:    Donor Recipient Intervention   CMV status - + Valganciclovir POD #8-90   EBV status - + EBV monthly   HSV status N/A + none        ID: Prior history of infection/colonization with Strep constellatus (bronch BAL on L 8/16), donor cultures positive for Staph.  - IgG at one month (ordered 9/15)  - Donor cultures ([facility]) NGTD; ([facility]) NGTD ([facility] personally reviewed today) - staph aueus +  - Recipient cultures - washing from left 8/16- strep constellatus  - Abx- Completed IV ceftaz. Vancomycin resumed 8/17-8/21 for Staph in donor cx, Strep in recipient BAL. Plan for zosyn 8/20-8/26 (7 days).  - Repeat blood cultures 8/19 due to fevers over night; zosyn for 7 days     PHS risk criteria donor:  Additional labs required post-transplant (between 4-8 weeks post-op): Hepatitis B, Hepatitis C, and HIV by JOVANY ([id number], ordered POD #30, ordered for 9/15).     EGJ outflow obstruction (?) inconclusive: Noted on esophageal manometry  "7/9. Proceed with J feeds. No need for NPO for 6 weeks.     Diabetes mellitus: Prior to transplant was on Jardiance, dulaglutide, glargine, metformin. All currently held.  - Lantus and sliding scale insulin per primary team     Toxic-metabolic encephalopathy: Likely ICU delirium, which continues to improve.  - Continue seroquel per primary team  - PRN melatonin at bedtime      We appreciate the excellent care provided by the Gold 10 team. Recommendations communicated via in person rounding and this note.  Will continue to follow along closely, please do not hesitate to call with any questions or concerns.    Patient seen and staffed with Dr. Lowery.    Mallorie Hoover MD  Internal Medicine-Pediatrics  Pulmonary/Critical Care Fellow - PGY7/FL2  [facility]  P: [phone]       Subjective & Interval History:     Patient feeling like he is improving, as does his sister. His hallucinations have resolved, and he says that he got a great nights sleep last night. They are awaiting a bed on progressive. Pain is well-managed. Juan J anticipates working with PT today.    Review of Systems:     C: No fever, no chills, decrease in weight; tolerating full diet with thin liquids  INTEGUMENTARY/SKIN: No rash or obvious new lesions  ENT/MOUTH: No sore throat, no sinus pain, no nasal congestion or drainage  RESP: See interval history  CV: no chest pain, no palpitations, no peripheral edema  GI: No nausea, no vomiting, no change in stools, no reflux symptoms  : No dysuria  MUSCULOSKELETAL: No myalgias, no arthralgias  ENDOCRINE: Blood sugars with adequate control  NEURO: No headache  PSYCHIATRIC: Mood stable    Physical Exam:     All notes, images, and labs from past 24 hours (at minimum) were reviewed.    Vital signs:  Temp: 99.8  F (37.7  C) Temp src: Axillary BP: (!) 133/93 Pulse: 111   Resp: 29 SpO2: 97 % O2 Device: Nasal cannula Oxygen Delivery: 1 LPM Height: 177.8 cm (5' 10\") Weight: 70.5 kg (155 lb 6.4 " oz)    Intake/Output Summary (Last 24 hours) at 8/22/2024 1007  Last data filed at 8/22/2024 0900  Gross per 24 hour   Intake 2765 ml   Output 2755 ml   Net 10 ml     Constitutional: sitting in chair, in no apparent distress   HEENT: Eyes with pink conjunctivae, anicteric.  Oral mucosa moist without lesions. No thrush.  PULM: Good air flow bilaterally.  No crackles, no rhonchi, no wheezes. No increased work of breathing, saturations >95% on 1L.  CV: Normal S1 and S2.  RRR.  No murmur, gallop, or rub.  No peripheral edema.   ABD: NABS, soft, nontender, nondistended.    MSK: Moves all extremities.  No apparent muscle wasting.   NEURO: Alert and conversant.   SKIN: Warm, dry.  No rash on limited exam.   PSYCH: Mood stable.     Data:     LABS    CMP:   Recent Labs   Lab 08/22/24  0807 08/22/24  0543 08/22/24  0019 08/21/24  0821 08/21/24  0331 08/20/24  0824 08/20/24  0353 08/19/24  0558 08/19/24  0347 08/15/24  2024 08/15/24  2020   NA  --  140  --   --  138  --  139  --  138   < > 142   POTASSIUM  --  4.1  --   --  4.0  --  4.0  --  4.5   < > 4.6   CHLORIDE  --  105  --   --  103  --  105  --  106   < > 107   CO2  --  26  --   --  27  --  25  --  26   < > 21*   ANIONGAP  --  9  --   --  8  --  9  --  6*   < > 14   * 195*  200* 214*   < > 217*   < > 181*   < > 139*  147*   < > 145*   BUN  --  17.9  --   --  18.6  --  18.0  --  15.4   < > 10.7   CR  --  0.61*  --   --  0.52*  --  0.56*  --  0.46*   < > 0.55*   GFRESTIMATED  --  >90  --   --  >90  --  >90  --  >90   < > >90   HUGO  --  8.8  --   --  8.5*  --  8.5*  --  8.4*   < > 7.8*   MAG  --  1.6*  --   --  1.8  --  1.8  --  1.9   < > 2.2   PHOS  --  2.8  --   --  3.1  --  3.2  --  2.3*   < > 4.1   PROTTOTAL  --  5.5*  --   --   --   --   --   --  5.0*  --  3.8*   ALBUMIN  --  2.8*  --   --   --   --   --   --  2.6*  --  2.2*   BILITOTAL  --  0.4  --   --   --   --   --   --  0.4  --  1.4*   ALKPHOS  --  139  --   --   --   --   --   --  76  --  61   AST  --   33  --   --   --   --   --   --  47*  --  79*   ALT  --  43  --   --   --   --   --   --  22  --  26    < > = values in this interval not displayed.     CBC:   Recent Labs   Lab 08/22/24  0543 08/21/24  0331 08/20/24  0353 08/19/24  0347   WBC 11.9* 9.9 9.7 10.3   RBC 3.87* 4.35* 4.09* 4.02*   HGB 11.4* 12.6* 11.9* 11.9*   HCT 35.3* 39.7* 37.0* 37.2*   MCV 91 91 91 93   MCH 29.5 29.0 29.1 29.6   MCHC 32.3 31.7 32.2 32.0   RDW 13.5 13.2 13.3 13.2    183 164 141*       INR:   Recent Labs   Lab 08/15/24  2020 08/15/24  1820   INR 1.46* 1.74*       Glucose:   Recent Labs   Lab 08/22/24  0807 08/22/24  0543 08/22/24  0019 08/21/24  2035 08/21/24  1600   * 195*  200* 214* 174* 257*       Blood Gas:   Recent Labs   Lab 08/20/24  1619 08/20/24  1128 08/20/24  0353 08/16/24  1301 08/16/24  1005 08/16/24  0804 08/16/24  0419   PHV  --   --   --   --  7.36 7.39 7.37   PCO2V  --   --   --   --  48 45 45   PO2V  --   --   --   --  44 38 38   HCO3V  --   --   --   --  27 27 26   EUGENIE  --   --   --   --  0.9 1.6 0.6   O2PER 26 40 50   < > 60 60 65    < > = values in this interval not displayed.     IMAGING    8/22/2024 CXR  1. Basilar subsegmental atelectasis. A few patchy basilar opacities  likely representing a mixture of edema and/or atelectasis.  2. No significant effusions, no discernible pneumothorax.

## 2024-08-22 NOTE — PLAN OF CARE
Speech Language Therapy Discharge Summary    Reason for therapy discharge:    All goals and outcomes met, no further needs identified.    Progress towards therapy goal(s). See goals on Care Plan in [facility] electronic health record for goal details.  Goals met    Therapy recommendation(s):    No further therapy is recommended. Recommend regular diet/thin liquids. Ensure pt is upright and alert for all PO, taking small single sips. Pt should complete thorough oral care 3x/day, and continue physical activity as able.

## 2024-08-22 NOTE — PROGRESS NOTES
Pain Service Progress Note  [facility]  Date: 08/22/2024       Patient Name: Travon Cartwright  MRN: [id number]  Age: 59 year old  Sex: male      Assessment:  Travon Cartwright is a 59 year old male who has PMH of chronic respiratory failure and hepatic steatosis.      Procedure: Lung Tx     Date of Surgery: 8/15/24     Date of Catheter Placement: 8/18/24     Plan/Recommendations:  1. Regional Anesthesia/Analgesia  -Continuous Catheter Type/Site: bilateral paravertebral (PV) T7-8  Infusate: 0.2% Ropivacaine  Programmed Intermittent Bolus (PIB) at 7 mL Q60 min via each catheter, total infusion rate of 14 mL/hr     Plan to maintain catheter approximately of 7 days     2. Anticoagulation  -Please contact Inpatient Pain Service before ordering or making any anticoagulation changes  -Heparin 5k Units subcutaneous Q 8 hours      3. Multimodal Analgesia  - Per primary service     Pain Service will continue to follow.     Discussed with attending anesthesiologist    Jessica Henderson PA-C  08/22/2024     Overnight Events: Still having visual hallucinations, none during the day    Tubes/Drains: Yes  Chest tubes    Subjective:  I don't have any pain     Symptoms of LAST: No    Pain Location:  chest    Pain Intensity:    Pain at Rest: 0/10     Satisfied with your level of pain control: Yes    Diet: Adult Formula Drip Feeding: Continuous Osmolite 1.5; Nasoduodenal tube; Goal Rate: 60 mL/hr (goal) - Once FT verified post-pyloric and ok to use per CVICU, initiate @ 10 mL/hr and advance by 10 mL q8hr as tolerated to goal rate.; mL/hr; Do not adv...  Regular Diet Adult Thin Liquids (level 0)    Relevant Labs:  Recent Labs   Lab Test 08/22/24  0543 08/19/24  0347 08/18/24  0613 08/16/24  0419 08/15/24  2020   INR  --   --   --   --  1.46*      < >  --    < > 233   PTT  --   --  34  --  42*   BUN 17.9   < >  --    < > 10.7    < > = values in this interval not displayed.       Physical Exam:  Vitals:  "/81   Pulse 102   Temp 99.8  F (37.7  C) (Axillary)   Resp 27   Ht 1.778 m (5' 10\")   Wt 70.5 kg (155 lb 6.4 oz)   SpO2 94%   BMI 22.30 kg/m      Physical Exam:   Orientation:  Alert, oriented, and in no acute distress: Yes  Sedation: No    Motor Examination:  5/5 Strength in lower extremities: Yes        Catheter Site:   Catheter entry site is clean/dry/intact: Yes    Tender: No      Relevant Medications:  Current Pain Medications:  Medications related to Pain Management (From now, onward)      Start     Dose/Rate Route Frequency Ordered Stop    08/20/24 2200  hydrOXYzine HCl (ATARAX) tablet 25 mg         25 mg Oral AT BEDTIME 08/20/24 0917      08/20/24 2016  HYDROmorphone (DILAUDID) injection 0.2 mg        Placed in \"Or\" Linked Group    0.2 mg Intravenous EVERY 2 HOURS PRN 08/20/24 0537      08/20/24 2016  HYDROmorphone (PF) (DILAUDID) injection 0.4 mg        Placed in \"Or\" Linked Group    0.4 mg Intravenous EVERY 2 HOURS PRN 08/20/24 0537      08/20/24 1000  methocarbamol (ROBAXIN) tablet 750 mg         750 mg Oral or Feeding Tube 4 TIMES DAILY 08/20/24 0913      08/20/24 0907  lidocaine 1 % 0.1-5 mL         0.1-5 mL Other EVERY 1 HOUR PRN 08/20/24 0912 08/23/24 0906    08/20/24 0907  lidocaine (LMX4) cream          Topical EVERY 1 HOUR PRN 08/20/24 0912 08/23/24 0906    08/20/24 0600  senna-docusate (SENOKOT-S/PERICOLACE) 8.6-50 MG per tablet 2 tablet         2 tablet Oral or Feeding Tube 2 TIMES DAILY PRN 08/20/24 0536      08/20/24 0536  bisacodyl (DULCOLAX) suppository 10 mg         10 mg Rectal DAILY PRN 08/20/24 0536      08/20/24 0536  magnesium hydroxide (MILK OF MAGNESIA) suspension 30 mL         30 mL Oral or Feeding Tube DAILY PRN 08/20/24 0536      08/20/24 0536  polyethylene glycol (MIRALAX) Packet 17 g         17 g Oral or Feeding Tube DAILY PRN 08/20/24 0536      08/19/24 0920  oxyCODONE (ROXICODONE) tablet 5 mg        Placed in \"Or\" Linked Group    5 mg Oral or Feeding Tube EVERY 4 " "HOURS PRN 08/19/24 0921      08/19/24 0920  oxyCODONE IR (ROXICODONE) half-tab 2.5 mg        Placed in \"Or\" Linked Group    2.5 mg Oral or Feeding Tube EVERY 4 HOURS PRN 08/19/24 0921      08/18/24 1700  ROPivacaine 0.2% in sodium chloride 0.9% PERINEURAL infusion          Perineural Continuous Nerve Block 08/18/24 1657      08/18/24 1700  ROPivacaine 0.2% in sodium chloride 0.9% PERINEURAL infusion          Perineural Continuous Nerve Block 08/18/24 1657      08/18/24 1600  acetaminophen (TYLENOL) tablet 975 mg         975 mg Oral or Feeding Tube EVERY 8 HOURS 08/18/24 1448      08/18/24 0000  acetaminophen (TYLENOL) tablet 650 mg         650 mg Oral or Feeding Tube EVERY 4 HOURS PRN 08/15/24 2016      08/15/24 2016  lidocaine 1 % 0.1-1 mL         0.1-1 mL Other EVERY 1 HOUR PRN 08/15/24 2016      08/15/24 2016  lidocaine (LMX4) kit          Topical EVERY 1 HOUR PRN 08/15/24 2016              Primary Service Contacted with Recommendations? Yes            Acute Inpatient Pain Service [employer]  Hours of pain coverage 24/7   Page via Am[employer]- Please Page the Pain Team Via Amcom: \"PAIN MANAGEMENT ACUTE INPATIENT/ [facility]\"            "

## 2024-08-22 NOTE — PLAN OF CARE
ICU End of Shift Summary. See flowsheets for vital signs, I&Os, and detailed assessment.    Changes this shift:   Neuro: Alert, disoriented to time. LAUREANO, PERRLA, follows commands. Continues to report visual hallucinations.     CV:   Rate/rhythm:  SR/ST . MAP >65 PRN Labetolol and hydralazine for SBP >140    Pulm: NC 4 LPM. Weak cough. Using IS at bedside. CT x2 to suction.     GI/: Passed swallow study, regular diet. NJ w/ TF at goal of 60ml/hr. Voids spontaneously using urinal. Several loose Bms overnight after restarting TF.    Plan: Monitor hemodynamic status, monitor electrolytes and replace per protocol. Continue POC. Transfer to floor when bed is available.

## 2024-08-22 NOTE — PLAN OF CARE
Goal Outcome Evaluation:      Plan of Care Reviewed With: patient, family    Overall Patient Progress: improvingOverall Patient Progress: improving    Outcome Evaluation: A&Ox4; Ambulating SBA to bathroom frequently; SpO2 > 92% on RA    Major Shift Events: Continues at RA with SpO2 > 92%. VSS & Afebrile. Up frequently to the bathroom SBA for frequent BMs. Imodium given x1 with improvement in frequency. Pain well controlled on current regimen. Minimal output from CTs. Started calorie count and carb coverage with meals.   Plan: Transfer to floor when available.   For vital signs and complete assessments, please see documentation flowsheets.

## 2024-08-22 NOTE — PROGRESS NOTES
Donor Culture Results  Final positive donor  sputum culture results have been uploaded into [facility]. Donor ID [id number].  Dr. Ji notified of results.      GRACE Pena RN  Transplant Coordinator

## 2024-08-22 NOTE — PROGRESS NOTES
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 Extubated 8/18/24; transferred from the ICU 8/21/24.    Today/interval events  - passed swallow study, reg diet thin liquids  - Start calorie counts 8/22-4  - weaned from HFNC to 4L oxygen 8/21  - Remaining two chest tubes (L and R bases) per CVTS  - insulin glargine once daily qPM  - Add NPH every morning to cover steroid-related hyperglycemia  - Add carb-coverage short acting insulin now that he's started a diet  - Goal slightly neg negative fluid balance today (furosemide 10mg IV once)  - awaiting floor bed  - continue empiric piperacillin-tazobactam     IPF s/p BL lung transplant (8/15/24)    Post operative ventilator support, extubated (8/18)   Acute hypoxemic respiratory failure (postop), improving  - Goal SpO2 >90%  - Pulmonary toilet   - Chest PT QID, aerobika, IS  - Ammonia qMTh  - Scheduled Mucomyst, levalbuterol  - S/p Methylprednisolone 125 q8hrs x3 doses, now on Prednisolone 30 daily  - Analgesia  - Scheduled: tylenol   - PRN: oxycodone, hydromorphone, robaxin  - Paravertebral catheters bilateral per IPS (8/18 - current, anticipate 7 days total)  - Bronchoscopy 8/27    Immunosuppression lung transplant   To complete perioperative regimen per transplant protocol.   - immunosuppression per Transplant Pulmonary team   - Basiliximab              - Mycophenolate, prednisolone with taper per pulm, tacrolimus (goal 8-12)    Infectious ppx for lung transplant   - Amphotericin B nebs twice weekly until discharge  - Micafungin 100mg daily through 8/25  - TMP-SMX started 08/16 based on donor toxo IgG+  - S/p Ceftazidime and Vancomycin course  -  Valaciclovir (to begin 08/23)    Fever 8/19  Blood and sputum cultures 08/19: no growth to date.             - empiric piperacillin-tazobactam x7 days (likely end date 08/23)     Delirium, resolved  - Delirium precautions.  - Sleep hygiene, reorientation, mobility. Scheduled: Melatonin  - Quetiapine reduce dose to 25mg hs, consider stopping 8/23       Shock, multifactorial, hypovolemic, distributive - resolved  Hx HTN  Hx of HLD  - Monitor hemodynamic status. Goal MAP >65, SBP <140  - Continue rosuvastatin 20 daily.  - Metoprolol 25 BID  - Discontinue PTA losartan     Moderate protein calorie malnutrition    Diarrhea  - Nutrition consulted, appreciate recommendations.   - NJ for TF, currently at goal  - Bowel regimen: MiraLA  - Rectal tube present. C. Diff pending. If negative scheduled loperamide     Hypophosphatemia, resolved  Hypomagnesemia  BL creat appears to be ~ 0.62-0.65   - Strict I/O, daily weights, Avoid/limit nephrotoxins as able  - Replete lytes PRN per protocol  - Goal NET negative -500 to 1L.   - Discontinued Nutriphos d/t worsening diarrhea.     Stress hyperglycemia  Type 2 diabetes mellitus  - Hgb A1C 8.4%   - insulin glargine 35 units qPM; stop morning glargine  - start insulin NPH to cover corticosteroid-induced hyperglycemia (can taper with steroid taper)  - Sliding scale insulin (high scale) TID WM and HS  - Goal BG <180 for optimal healing  - Holding home PTA metformin, Jardiance      Acute blood loss anemia  Acute blood loss thrombocytopenia, resolved  Coagulopathy  - No s/s of bleeding. Continue to monitor, Hgb goal > 7.0. Stable.     Sternotomy and Surgical Incision  Weakness and deconditioning    - Postoperative incision management per CVTS, sternal precautions  - PT/OT/CR - Recommending acute rehab    Hepatic steatosis          Diet: Adult Formula Drip Feeding: Continuous Osmolite 1.5; Nasoduodenal tube; Goal Rate: 60 mL/hr (goal) - Once FT verified post-pyloric and ok to use per CVICU,  "initiate @ 10 mL/hr and advance by 10 mL q8hr as tolerated to goal rate.; mL/hr; Do not adv...  Regular Diet Adult Thin Liquids (level 0)    DVT Prophylaxis: Heparin SQ  Goodrich Catheter: Not present  Lines: PRESENT      PICC 08/20/24 Double Lumen Right Basilic Difficult venous access. Frequent lab draws-Site Assessment: WDL  [REMOVED] CVC Double Lumen Right Internal jugular-Site Assessment: WDL      Cardiac Monitoring: None  Code Status: Full Code      Clinically Significant Risk Factors            # Hypomagnesemia: Lowest Mg = 1.6 mg/dL in last 2 days, will replace as needed   # Hypoalbuminemia: Lowest albumin = 2.2 g/dL at 8/15/2024  8:20 PM, will monitor as appropriate              # DMII: A1C = 8.4 % (Ref range: <5.7 %) within past 6 months    # Moderate Malnutrition: based on nutrition assessment             Disposition Plan     Medically Ready for Discharge: Anticipated in 5+ Days             Ha Olivarez DO  Hospitalist Service, GOLD TEAM 10  [facility]  Securely message with "[employer]" (more info)  Text page via [facility] Paging/Directory   See signed in provider for up to date coverage information  ______________________________________________________________________    Interval History   No acute events overnight. He has quite a bit of soreness in the chest on waking that improves through the morning. \"Like having broken ribs I guess!\" Not short of breath today, feels hungry and is eating a muffin for breakfast.    Physical Exam   Vital Signs: Temp: 99.8  F (37.7  C) Temp src: Axillary BP: 132/81 Pulse: 102   Resp: 27 SpO2: 94 % O2 Device: Nasal cannula Oxygen Delivery: 4 LPM  Weight: 155 lbs 6.4 oz    Gen: Awake and alert, appears comfortable, appears stated age, seated upright in chair.  HEENT: NCAT, sclerae anicteric.  CV: Regular rhythm, normal rate, S1/S2, extremities warm and well perfused, no lower extremity edema.  Pulm: Normal work of breathing on 4L oxygen, " lungs clear to auscultation but diminished at bases, no wheezing.  GI: Nontender, nondistended, soft. +bowel sounds.  Skin: Warm, dry, no jaundice.  Neuro: AO, speech normal, moves all extremities symmetrically.  Psych: Mood is good, affect is congruent.      Medical Decision Making       60 MINUTES SPENT BY ME on the date of service doing chart review, history, exam, documentation & further activities per the note.      Data     I have personally reviewed the following data over the past 24 hrs:    11.9 (H)  \   11.4 (L)   / 257     140 105 17.9 /  203 (H)   4.1 26 0.61 (L) \     ALT: 43 AST: 33 AP: 139 TBILI: 0.4   ALB: 2.8 (L) TOT PROTEIN: 5.5 (L) LIPASE: N/A       Imaging results reviewed over the past 24 hrs:   Recent Results (from the past 24 hour(s))   XR Chest Port 1 View    Narrative    Portable chest 8/21/2024 at 1043 hours    INDICATION: Post chest tube removal    COMPARISON: 0511 hours earlier today    FINDINGS: Butterfly clamshell sternotomy again noted from prior  bilateral lung transplantation. No evidence of pneumothorax. Feeding  tube beyond the inferior margin of the image. Right PICC tip in the  right atrium. Left atrial appendage ligation clip. Continued patchy  opacities. Bibasilar thoracostomy tubes. Apically directed  thoracostomy tubes removed. Right IJ sheath in the upper SVC.      Impression    IMPRESSION: No significant changes from earlier this morning other  than removal of apically directed thoracostomy tubes.    MORENO POSEY MD         SYSTEM ID:  [id number]   XR Video Swallow with SLP or OT    Narrative    Examination:  Modified Barium Swallow Study with Speech Pathology,  8/21/2024 3:38 PM.    Comparison: 8/21/2024 same day chest radiograph    History: Patient with recent history of lung transplantation    Total fluoroscopy time: 1.5 minutes.    Technique: Under fluoroscopic guidance, the patient was given orally  administered barium of varying consistencies in the presence of  the  speech pathology service.     Findings:  Prior to the start of barium administration, images revealed a feeding  tube that was coiled in the hypopharynx/proximal esophagus. This was  suggested by the nursing team and was unkinked prior to the start of  the exam.    The oral phase was normal. There is no delay in swallowing or pooling  of contrast. Patient was administered varying consistencies of barium  including thin barium, nectar thick barium, pudding consistency, and  barium coated cracker  Moderate volume tracheal aspiration was  observed with a swallow of thin liquid barium via cup. Subsequent  swallows of thin liquid barium did not result in aspiration when a  straw was used or when a cup swallow was tested again. Tracheal  aspiration was observed with any of the other consistencies. Deep  laryngeal penetration was observed with thin liquid barium. No  significant penetration was observed with the solid consistencies. No  significant residue within the vallecula or piriform sinuses.      Impression    Impression:   1. Moderate volume tracheal aspiration was observed with a swallow of  thin liquid barium administered via cup.     2. Deep laryngeal penetration was also observed with thin liquid  administration via cup. No tracheal aspiration or deep laryngeal  penetration was observed with the other administered consistencies.    Please see the speech pathologist report for further details regarding  the modified barium swallow study portion of the examination.    I, JAZZMINE VELOZ MD, attest that I was immediately available to  provide guidance and assistance during the entirety of the procedure.    I have personally reviewed the examination and initial interpretation  and I agree with the findings.    JAZZMINE VELOZ MD         SYSTEM ID:  [id number]   XR Abdomen Port 1 View    Narrative    EXAMINATION:  XR ABDOMEN PORT 1 VIEW 8/21/2024 5:20 PM     COMPARISON: Abdominal radiograph 8/18/2024.    HISTORY:  Verify small bowel feeding tube bedside placement    TECHNIQUE: Frontal view of the abdomen.    FINDINGS: Postoperative changes of the chest, partially visualized  lower thoracic cavity includes right and left basilar chest tubes,  intact median sternotomy wires, staple line across the lower chest.  Left atrial appendage clip is present. Feeding tube tip projects over  the distal duodenum. Unchanged asymmetric elevation of the right  hemidiaphragm. The small intestine and colon are not dilated. Oral  contrast opacifies multiple loops of small bowel. No abnormal soft  tissue densities.  No free air, pneumatosis or portal venous gas.       Impression    IMPRESSION: Feeding tube tip over the distal duodenum. Postoperative  changes of the chest. Non-obstructed bowel gas pattern.    I have personally reviewed the examination and initial interpretation  and I agree with the findings.    JOEY CHEUNG,          SYSTEM ID:  [id number]   XR Chest Port 1 View    Impression    RESIDENT PRELIMINARY INTERPRETATION  Impression:   1. Basilar subsegmental atelectasis. A few patchy basilar opacities  likely representing a mixture of edema and/or atelectasis.  2. No significant effusions, no discernible pneumothorax

## 2024-08-23 ENCOUNTER — APPOINTMENT (OUTPATIENT)
Dept: PHYSICAL THERAPY | Facility: CLINIC | Age: 59
End: 2024-08-23
Attending: STUDENT IN AN ORGANIZED HEALTH CARE EDUCATION/TRAINING PROGRAM
Payer: COMMERCIAL

## 2024-08-23 ENCOUNTER — APPOINTMENT (OUTPATIENT)
Dept: GENERAL RADIOLOGY | Facility: CLINIC | Age: 59
End: 2024-08-23
Attending: PHYSICIAN ASSISTANT
Payer: COMMERCIAL

## 2024-08-23 ENCOUNTER — APPOINTMENT (OUTPATIENT)
Dept: GENERAL RADIOLOGY | Facility: CLINIC | Age: 59
End: 2024-08-23
Attending: STUDENT IN AN ORGANIZED HEALTH CARE EDUCATION/TRAINING PROGRAM
Payer: COMMERCIAL

## 2024-08-23 LAB
ANION GAP SERPL CALCULATED.3IONS-SCNC: 7 MMOL/L (ref 7–15)
BASOPHILS # BLD AUTO: ABNORMAL 10*3/UL
BASOPHILS # BLD MANUAL: 0.1 10E3/UL (ref 0–0.2)
BASOPHILS NFR BLD AUTO: ABNORMAL %
BASOPHILS NFR BLD MANUAL: 1 %
BUN SERPL-MCNC: 14.7 MG/DL (ref 8–23)
CALCIUM SERPL-MCNC: 8.4 MG/DL (ref 8.8–10.4)
CHLORIDE SERPL-SCNC: 101 MMOL/L (ref 98–107)
CREAT SERPL-MCNC: 0.52 MG/DL (ref 0.67–1.17)
EGFRCR SERPLBLD CKD-EPI 2021: >90 ML/MIN/1.73M2
EOSINOPHIL # BLD AUTO: ABNORMAL 10*3/UL
EOSINOPHIL # BLD MANUAL: 0.8 10E3/UL (ref 0–0.7)
EOSINOPHIL NFR BLD AUTO: ABNORMAL %
EOSINOPHIL NFR BLD MANUAL: 6 %
ERYTHROCYTE [DISTWIDTH] IN BLOOD BY AUTOMATED COUNT: 13.2 % (ref 10–15)
GLUCOSE BLDC GLUCOMTR-MCNC: 211 MG/DL (ref 70–99)
GLUCOSE BLDC GLUCOMTR-MCNC: 230 MG/DL (ref 70–99)
GLUCOSE BLDC GLUCOMTR-MCNC: 242 MG/DL (ref 70–99)
GLUCOSE BLDC GLUCOMTR-MCNC: 259 MG/DL (ref 70–99)
GLUCOSE SERPL-MCNC: 223 MG/DL (ref 70–99)
HCO3 SERPL-SCNC: 26 MMOL/L (ref 22–29)
HCT VFR BLD AUTO: 37 % (ref 40–53)
HGB BLD-MCNC: 11.7 G/DL (ref 13.3–17.7)
IMM GRANULOCYTES # BLD: ABNORMAL 10*3/UL
IMM GRANULOCYTES NFR BLD: ABNORMAL %
LYMPHOCYTES # BLD AUTO: ABNORMAL 10*3/UL
LYMPHOCYTES # BLD MANUAL: 0.9 10E3/UL (ref 0.8–5.3)
LYMPHOCYTES NFR BLD AUTO: ABNORMAL %
LYMPHOCYTES NFR BLD MANUAL: 7 %
MAGNESIUM SERPL-MCNC: 1.4 MG/DL (ref 1.7–2.3)
MAGNESIUM SERPL-MCNC: 1.9 MG/DL (ref 1.7–2.3)
MAYO MISC RESULT: NORMAL
MCH RBC QN AUTO: 29 PG (ref 26.5–33)
MCHC RBC AUTO-ENTMCNC: 31.6 G/DL (ref 31.5–36.5)
MCV RBC AUTO: 92 FL (ref 78–100)
MONOCYTES # BLD AUTO: ABNORMAL 10*3/UL
MONOCYTES # BLD MANUAL: 1.3 10E3/UL (ref 0–1.3)
MONOCYTES NFR BLD AUTO: ABNORMAL %
MONOCYTES NFR BLD MANUAL: 10 %
NEUTROPHILS # BLD AUTO: ABNORMAL 10*3/UL
NEUTROPHILS # BLD MANUAL: 9.7 10E3/UL (ref 1.6–8.3)
NEUTROPHILS NFR BLD AUTO: ABNORMAL %
NEUTROPHILS NFR BLD MANUAL: 76 %
NRBC # BLD AUTO: 0 10E3/UL
NRBC # BLD AUTO: 0.1 10E3/UL
NRBC BLD AUTO-RTO: 0 /100
NRBC BLD MANUAL-RTO: 1 %
PHOSPHATE SERPL-MCNC: 2.6 MG/DL (ref 2.5–4.5)
PLAT MORPH BLD: ABNORMAL
PLATELET # BLD AUTO: 228 10E3/UL (ref 150–450)
POLYCHROMASIA BLD QL SMEAR: SLIGHT
POTASSIUM SERPL-SCNC: 4 MMOL/L (ref 3.4–5.3)
RBC # BLD AUTO: 4.03 10E6/UL (ref 4.4–5.9)
RBC MORPH BLD: ABNORMAL
SODIUM SERPL-SCNC: 134 MMOL/L (ref 135–145)
WBC # BLD AUTO: 12.7 10E3/UL (ref 4–11)

## 2024-08-23 PROCEDURE — 258N000003 HC RX IP 258 OP 636: Performed by: STUDENT IN AN ORGANIZED HEALTH CARE EDUCATION/TRAINING PROGRAM

## 2024-08-23 PROCEDURE — 94640 AIRWAY INHALATION TREATMENT: CPT | Mod: 76

## 2024-08-23 PROCEDURE — 83735 ASSAY OF MAGNESIUM: CPT | Performed by: SURGERY

## 2024-08-23 PROCEDURE — 250N000012 HC RX MED GY IP 250 OP 636 PS 637: Performed by: INTERNAL MEDICINE

## 2024-08-23 PROCEDURE — 250N000011 HC RX IP 250 OP 636: Performed by: STUDENT IN AN ORGANIZED HEALTH CARE EDUCATION/TRAINING PROGRAM

## 2024-08-23 PROCEDURE — 250N000013 HC RX MED GY IP 250 OP 250 PS 637

## 2024-08-23 PROCEDURE — 71045 X-RAY EXAM CHEST 1 VIEW: CPT

## 2024-08-23 PROCEDURE — 250N000013 HC RX MED GY IP 250 OP 250 PS 637: Performed by: STUDENT IN AN ORGANIZED HEALTH CARE EDUCATION/TRAINING PROGRAM

## 2024-08-23 PROCEDURE — 250N000012 HC RX MED GY IP 250 OP 636 PS 637: Performed by: STUDENT IN AN ORGANIZED HEALTH CARE EDUCATION/TRAINING PROGRAM

## 2024-08-23 PROCEDURE — 94668 MNPJ CHEST WALL SBSQ: CPT

## 2024-08-23 PROCEDURE — 80048 BASIC METABOLIC PNL TOTAL CA: CPT | Performed by: PHYSICIAN ASSISTANT

## 2024-08-23 PROCEDURE — 83735 ASSAY OF MAGNESIUM: CPT | Performed by: INTERNAL MEDICINE

## 2024-08-23 PROCEDURE — 99233 SBSQ HOSP IP/OBS HIGH 50: CPT | Mod: 24 | Performed by: INTERNAL MEDICINE

## 2024-08-23 PROCEDURE — 999N000157 HC STATISTIC RCP TIME EA 10 MIN

## 2024-08-23 PROCEDURE — 250N000009 HC RX 250: Performed by: SURGERY

## 2024-08-23 PROCEDURE — 271N000002 HC RX 271: Performed by: STUDENT IN AN ORGANIZED HEALTH CARE EDUCATION/TRAINING PROGRAM

## 2024-08-23 PROCEDURE — 120N000005 HC R&B MS OVERFLOW UMMC

## 2024-08-23 PROCEDURE — 250N000013 HC RX MED GY IP 250 OP 250 PS 637: Performed by: SURGERY

## 2024-08-23 PROCEDURE — 250N000013 HC RX MED GY IP 250 OP 250 PS 637: Performed by: PHYSICIAN ASSISTANT

## 2024-08-23 PROCEDURE — 84100 ASSAY OF PHOSPHORUS: CPT | Performed by: SURGERY

## 2024-08-23 PROCEDURE — 250N000011 HC RX IP 250 OP 636

## 2024-08-23 PROCEDURE — 85027 COMPLETE CBC AUTOMATED: CPT | Performed by: SURGERY

## 2024-08-23 PROCEDURE — 71046 X-RAY EXAM CHEST 2 VIEWS: CPT | Mod: 26 | Performed by: RADIOLOGY

## 2024-08-23 PROCEDURE — 71045 X-RAY EXAM CHEST 1 VIEW: CPT | Mod: 26 | Performed by: RADIOLOGY

## 2024-08-23 PROCEDURE — 99232 SBSQ HOSP IP/OBS MODERATE 35: CPT | Performed by: STUDENT IN AN ORGANIZED HEALTH CARE EDUCATION/TRAINING PROGRAM

## 2024-08-23 PROCEDURE — 99232 SBSQ HOSP IP/OBS MODERATE 35: CPT | Performed by: PHYSICIAN ASSISTANT

## 2024-08-23 PROCEDURE — 97530 THERAPEUTIC ACTIVITIES: CPT | Mod: GP | Performed by: PHYSICAL THERAPIST

## 2024-08-23 PROCEDURE — 250N000012 HC RX MED GY IP 250 OP 636 PS 637: Performed by: SURGERY

## 2024-08-23 PROCEDURE — 94640 AIRWAY INHALATION TREATMENT: CPT

## 2024-08-23 PROCEDURE — 97116 GAIT TRAINING THERAPY: CPT | Mod: GP | Performed by: PHYSICAL THERAPIST

## 2024-08-23 PROCEDURE — 250N000011 HC RX IP 250 OP 636: Performed by: INTERNAL MEDICINE

## 2024-08-23 PROCEDURE — 250N000013 HC RX MED GY IP 250 OP 250 PS 637: Performed by: INTERNAL MEDICINE

## 2024-08-23 PROCEDURE — 71046 X-RAY EXAM CHEST 2 VIEWS: CPT

## 2024-08-23 PROCEDURE — 85007 BL SMEAR W/DIFF WBC COUNT: CPT | Performed by: SURGERY

## 2024-08-23 RX ORDER — FUROSEMIDE 10 MG/ML
10 INJECTION INTRAMUSCULAR; INTRAVENOUS EVERY 6 HOURS
Status: COMPLETED | OUTPATIENT
Start: 2024-08-23 | End: 2024-08-23

## 2024-08-23 RX ORDER — MAGNESIUM SULFATE HEPTAHYDRATE 40 MG/ML
4 INJECTION, SOLUTION INTRAVENOUS ONCE
Status: COMPLETED | OUTPATIENT
Start: 2024-08-23 | End: 2024-08-23

## 2024-08-23 RX ADMIN — HYDROXYZINE HYDROCHLORIDE 25 MG: 25 TABLET, FILM COATED ORAL at 21:32

## 2024-08-23 RX ADMIN — TACROLIMUS 3.5 MG: 5 CAPSULE ORAL at 17:45

## 2024-08-23 RX ADMIN — MYCOPHENOLATE MOFETIL 1000 MG: 200 POWDER, FOR SUSPENSION ORAL at 08:59

## 2024-08-23 RX ADMIN — OXYCODONE HYDROCHLORIDE 2.5 MG: 5 TABLET ORAL at 21:31

## 2024-08-23 RX ADMIN — POTASSIUM & SODIUM PHOSPHATES POWDER PACK 280-160-250 MG 1 PACKET: 280-160-250 PACK at 08:52

## 2024-08-23 RX ADMIN — VALGANCICLOVIR HYDROCHLORIDE 900 MG: 50 POWDER, FOR SOLUTION ORAL at 13:28

## 2024-08-23 RX ADMIN — METHOCARBAMOL 750 MG: 750 TABLET ORAL at 13:29

## 2024-08-23 RX ADMIN — PIPERACILLIN AND TAZOBACTAM 4.5 G: 4; .5 INJECTION, POWDER, LYOPHILIZED, FOR SOLUTION INTRAVENOUS at 20:20

## 2024-08-23 RX ADMIN — Medication 60 ML: at 08:59

## 2024-08-23 RX ADMIN — TACROLIMUS 3.5 MG: 5 CAPSULE ORAL at 09:00

## 2024-08-23 RX ADMIN — Medication: at 00:51

## 2024-08-23 RX ADMIN — PIPERACILLIN AND TAZOBACTAM 4.5 G: 4; .5 INJECTION, POWDER, LYOPHILIZED, FOR SOLUTION INTRAVENOUS at 08:53

## 2024-08-23 RX ADMIN — Medication 60 ML: at 20:27

## 2024-08-23 RX ADMIN — ACETYLCYSTEINE 2 ML: 200 SOLUTION ORAL; RESPIRATORY (INHALATION) at 20:49

## 2024-08-23 RX ADMIN — Medication 10 MG: at 20:49

## 2024-08-23 RX ADMIN — PIPERACILLIN AND TAZOBACTAM 4.5 G: 4; .5 INJECTION, POWDER, LYOPHILIZED, FOR SOLUTION INTRAVENOUS at 01:10

## 2024-08-23 RX ADMIN — MYCOPHENOLATE MOFETIL 1000 MG: 250 CAPSULE ORAL at 20:21

## 2024-08-23 RX ADMIN — PIPERACILLIN AND TAZOBACTAM 4.5 G: 4; .5 INJECTION, POWDER, LYOPHILIZED, FOR SOLUTION INTRAVENOUS at 14:30

## 2024-08-23 RX ADMIN — CALCIUM CARBONATE 600 MG (1,500 MG)-VITAMIN D3 400 UNIT TABLET 1 TABLET: at 10:18

## 2024-08-23 RX ADMIN — Medication 10 MG: at 08:27

## 2024-08-23 RX ADMIN — ACETYLCYSTEINE 2 ML: 200 SOLUTION ORAL; RESPIRATORY (INHALATION) at 13:33

## 2024-08-23 RX ADMIN — LOPERAMIDE HCL 2 MG: 1 SOLUTION ORAL at 10:23

## 2024-08-23 RX ADMIN — CALCIUM CARBONATE 600 MG (1,500 MG)-VITAMIN D3 400 UNIT TABLET 1 TABLET: at 21:31

## 2024-08-23 RX ADMIN — METHOCARBAMOL 750 MG: 750 TABLET ORAL at 20:20

## 2024-08-23 RX ADMIN — SULFAMETHOXAZOLE AND TRIMETHOPRIM 1 TABLET: 400; 80 TABLET ORAL at 08:52

## 2024-08-23 RX ADMIN — METHOCARBAMOL 750 MG: 750 TABLET ORAL at 08:52

## 2024-08-23 RX ADMIN — POTASSIUM & SODIUM PHOSPHATES POWDER PACK 280-160-250 MG 1 PACKET: 280-160-250 PACK at 10:18

## 2024-08-23 RX ADMIN — LEVALBUTEROL HYDROCHLORIDE 1.25 MG: 1.25 SOLUTION RESPIRATORY (INHALATION) at 13:33

## 2024-08-23 RX ADMIN — Medication 5 ML: at 13:29

## 2024-08-23 RX ADMIN — FUROSEMIDE 10 MG: 10 INJECTION, SOLUTION INTRAMUSCULAR; INTRAVENOUS at 13:29

## 2024-08-23 RX ADMIN — NYSTATIN 1000000 UNITS: 100000 SUSPENSION ORAL at 08:52

## 2024-08-23 RX ADMIN — METHOCARBAMOL 750 MG: 750 TABLET ORAL at 17:45

## 2024-08-23 RX ADMIN — Medication: at 00:50

## 2024-08-23 RX ADMIN — ACETAMINOPHEN 975 MG: 325 TABLET, FILM COATED ORAL at 23:44

## 2024-08-23 RX ADMIN — LOPERAMIDE HCL 2 MG: 1 SOLUTION ORAL at 04:11

## 2024-08-23 RX ADMIN — NYSTATIN 1000000 UNITS: 100000 SUSPENSION ORAL at 17:45

## 2024-08-23 RX ADMIN — HEPARIN SODIUM 5000 UNITS: 5000 INJECTION, SOLUTION INTRAVENOUS; SUBCUTANEOUS at 04:52

## 2024-08-23 RX ADMIN — LEVALBUTEROL HYDROCHLORIDE 1.25 MG: 1.25 SOLUTION RESPIRATORY (INHALATION) at 08:27

## 2024-08-23 RX ADMIN — HEPARIN SODIUM 5000 UNITS: 5000 INJECTION, SOLUTION INTRAVENOUS; SUBCUTANEOUS at 13:30

## 2024-08-23 RX ADMIN — NYSTATIN 1000000 UNITS: 100000 SUSPENSION ORAL at 13:28

## 2024-08-23 RX ADMIN — MAGNESIUM SULFATE IN WATER 4 G: 40 INJECTION, SOLUTION INTRAVENOUS at 07:31

## 2024-08-23 RX ADMIN — ACETAMINOPHEN 975 MG: 325 TABLET, FILM COATED ORAL at 08:52

## 2024-08-23 RX ADMIN — Medication 15 ML: at 13:30

## 2024-08-23 RX ADMIN — LEVALBUTEROL HYDROCHLORIDE 1.25 MG: 1.25 SOLUTION RESPIRATORY (INHALATION) at 20:49

## 2024-08-23 RX ADMIN — MICAFUNGIN SODIUM 100 MG: 50 INJECTION, POWDER, LYOPHILIZED, FOR SOLUTION INTRAVENOUS at 13:30

## 2024-08-23 RX ADMIN — ACETAMINOPHEN 975 MG: 325 TABLET, FILM COATED ORAL at 17:45

## 2024-08-23 RX ADMIN — METOPROLOL TARTRATE 25 MG: 25 TABLET, FILM COATED ORAL at 20:20

## 2024-08-23 RX ADMIN — Medication 40 MG: at 08:51

## 2024-08-23 RX ADMIN — ROSUVASTATIN CALCIUM 10 MG: 10 TABLET, FILM COATED ORAL at 08:52

## 2024-08-23 RX ADMIN — Medication 10 MG: at 20:20

## 2024-08-23 RX ADMIN — ACETYLCYSTEINE 2 ML: 200 SOLUTION ORAL; RESPIRATORY (INHALATION) at 08:27

## 2024-08-23 RX ADMIN — PREDNISONE 30 MG: 20 TABLET ORAL at 08:52

## 2024-08-23 RX ADMIN — NYSTATIN 1000000 UNITS: 100000 SUSPENSION ORAL at 20:20

## 2024-08-23 RX ADMIN — INSULIN HUMAN 20 UNITS: 100 INJECTION, SUSPENSION SUBCUTANEOUS at 09:06

## 2024-08-23 RX ADMIN — METOPROLOL TARTRATE 25 MG: 25 TABLET, FILM COATED ORAL at 08:52

## 2024-08-23 ASSESSMENT — ACTIVITIES OF DAILY LIVING (ADL)
ADLS_ACUITY_SCORE: 48
ADLS_ACUITY_SCORE: 46
ADLS_ACUITY_SCORE: 48
ADLS_ACUITY_SCORE: 46
ADLS_ACUITY_SCORE: 48
ADLS_ACUITY_SCORE: 44
ADLS_ACUITY_SCORE: 48
ADLS_ACUITY_SCORE: 48
ADLS_ACUITY_SCORE: 46
ADLS_ACUITY_SCORE: 48
ADLS_ACUITY_SCORE: 48
ADLS_ACUITY_SCORE: 46
ADLS_ACUITY_SCORE: 48
ADLS_ACUITY_SCORE: 46
ADLS_ACUITY_SCORE: 46
ADLS_ACUITY_SCORE: 48

## 2024-08-23 ASSESSMENT — VISUAL ACUITY: OU: BASELINE

## 2024-08-23 NOTE — PROGRESS NOTES
Calorie Count  Intake recorded for: 8/22  Total Kcals: 1027 Total Protein: 39g  Kcals from Hospital Food: 1027   Protein: 39g  Kcals from Outside Food (average):0 Protein: 0g  # Meals Ordered from Kitchen: 2  # Meals Recorded: 2  1: 100% 3 8oz apple juice, string cheese, 50% blueberry muffin  2: 50% cheese burger on white bun  # Supplements Recorded: 100% one special K bar

## 2024-08-23 NOTE — PROGRESS NOTES
Transplant Social Work Services Progress Note      Date of Initial Social Work Evaluation: 6/20/2024  Collaborated with: pulm team    Data: patient improving.  Per medical team, may be ready for discharge late next week. Patient with questions about housing options.    Intervention: met with patient and sister. Reviewed [facility] wait list. There is some potential that there will be an apartment open by late next week.  Briefly discussed other options.  Also, patient may need a brief ARU stay. In which case they would not need an apartment right away.  Will update [facility] admissions with discharge time frame.    Assessment: improving.    Education provided by SW: lodging options.    Plan:    Discharge Plans in Progress: [facility] ARU vs. Local apartment    Barriers to d/c plan: medical stability.  Chest tubes, NG.     Follow up Plan: will continue to follow for support, counseling, education and resources.     [facility]  [facility]   Phone: [phone]     Joaquim

## 2024-08-23 NOTE — PLAN OF CARE
Goal Outcome Evaluation:    No Hallucinations noted this shift. He LAUREANO, up with an Assist-1 for lines support. He c/o minimal chest tube incisional pain. Scheduled Tylenol and Atarax given. He is on room air O2 sats greater than 92%. Chest tubes x2 to Sx. Minimal drainage (Serosanguinous)    TF at 60 ml/hr via NJ  X1 Large loose stool PRN Imodium. Voiding adequate urine at bedside.     DL-PICC-SL, Paravertebral x2-Ropivacaine 7 ml/hr    Plan to transfer to stepdown pending bed availability

## 2024-08-23 NOTE — PROGRESS NOTES
"    BRIEF CVTS PROGRESS NOTE    S:  Travon Cartwright is a 59 year old male with a history of idiopathic pulmonary fibrosis, diabetes, hepatosteatosis, and essential tremor. Pt. is now s/p BSLT on 8/15/2024 with Dr. Mulvihill. Surgery was uncomplicated and done on pump, extubated and off pressors since 8/18/2024. CVTS following for incision and chest tube management.    Patient reports he feels well. No acute concerns.     O:  /82   Pulse 94   Temp 98.8  F (37.1  C) (Axillary)   Resp 24   Ht 1.778 m (5' 10\")   Wt 70.5 kg (155 lb 6.4 oz)   SpO2 94%   BMI 22.30 kg/m      I/O last 3 completed shifts:  In: 4320 [P.O.:1960; I.V.:550; NG/GT:370]  Out: 2940 [Urine:2450; Stool:300; Chest Tube:190]    Recent Results (from the past 24 hour(s))   XR Chest Port 1 View    Narrative    Exam: XR CHEST PORT 1 VIEW, 8/23/2024 7:01 AM    Indication: lung transplant follow-up    Comparison: X-ray chest 8/20/2024, multiple priors.    Findings:   Frontal radiograph of the chest, 0117 hours. Stable placement of  feeding tube, right upper extremity PICC, bibasilar chest tubes, and  stable surgical changes of the chest including sternotomy and left  atrial appendage clip.    The trachea is midline. Stable heart size and shape. Unchanged  retrocardiac and basilar streaky opacities. No significant effusion,  no definite pneumothorax.      Impression    Impression:   1. Stable support devices and surgical changes of the chest.  2. Continued perihilar and basilar streaky opacities likely  representing a mixture of atelectasis and edema. No new focal  consolidations.    I have personally reviewed the examination and initial interpretation  and I agree with the findings.    JAZZMINE VELOZ MD         SYSTEM ID:  [id number]       Gen: A&Ox4, NAD  Neuro: no focal deficits   CV: NSR on monitor   Pulm: breathing comfortably on RA at the time of exam  Abd: nondistended, normal BS, soft, nontender  Ext: no peripheral edema  Incision: " incisional wound vac removed from clamshell incision. Incision is clean, dry, intact, no erythema or drainage, staples in place    Tubes/drain sites: dressing clean and dry, serosanguinous output, no air leak. 24 hr output 90 mL from left pleural, 100 mL from right pleural.     A/P:  S/p BLST on 8/15/2024 with Dr. Mulvihill.     - Right and left pleural chest tubes were removed today without complication   - post pull CXR ordered for 1600   - incisional wound vac removed, underlying incision is intact without signs of infection   - plan for staple removal 4 weeks post-operatively per Dr. Mulvihill    - Daily wound care:  wound cleanser/soap & water, pat dry, keep wound clean and dry using Interdry prn   - Encourage good nutrition, particularly protein intake   - Remainder of plan per primary/Pulmonary Transplant teams; please call with questions    Discussed with Dr. Mulvihill.     Juana Daily PA-C on 8/23/2024 at 1:43 PM

## 2024-08-23 NOTE — PROGRESS NOTES
[facility]    Medicine Progress Note - Hospitalist Service, GOLD TEAM 10    Date of Admission:  8/14/2024    Assessment & Plan   Shyam Cartwright is a 59 year old man with history including IPF with chronic hypoxemic resp failure (baseline 6L oxygen), hepatic steatosis, and type 2 DM s/p bilateral lung transplant 8/15/24. Extubated 8/18/24; transferred from the ICU 8/21/24.    Today/interval events  - passed swallow study, reg diet thin liquids  - calorie counts 8/22-4  - weaned from 4L oxygen to RA overnight  - Remaining two chest tubes (L and R bases) per CVTS  - insulin glargine once daily qPM  - Add NPH every morning to cover steroid-related hyperglycemia  - Add carb-coverage short acting insulin now that he's started a diet  - Goal slightly neg negative fluid balance today (furosemide 10mg IV twice)  - awaiting floor bed  - continue empiric piperacillin-tazobactam to 8/25     IPF s/p BL lung transplant (8/15/24)    Post operative ventilator support, extubated (8/18)   Acute hypoxemic respiratory failure (postop), improving  - Goal SpO2 >90%  - Pulmonary toilet   - Chest PT QID, aerobika, IS  - Ammonia qMTh  - Scheduled Mucomyst, levalbuterol  - S/p Methylprednisolone 125 q8hrs x3 doses, now on prednisone 30 daily with taper planning per pulm  - Analgesia  - Scheduled: tylenol   - PRN: oxycodone, hydromorphone, robaxin  - Paravertebral catheters bilateral per IPS (8/18 - current, possible removal 08/23/24 per RAPS)  - Bronchoscopy planned 8/27    Immunosuppression lung transplant   To complete perioperative regimen per transplant protocol.   - immunosuppression per Transplant Pulmonary team   - Basiliximab              - Mycophenolate, prednisolone with taper per pulm, tacrolimus (goal 8-12)    Infectious ppx for lung transplant   - Amphotericin B nebs twice weekly until discharge  - Micafungin 100mg daily through 8/25  - TMP-SMX started 08/16 based on donor toxo  IgG+  - S/p Ceftazidime and Vancomycin course  - Valaciclovir (to begin 08/23)    Fever 8/19, resolved  Blood and sputum cultures 08/19: no growth to date.             - empiric piperacillin-tazobactam x7 days (likely end date 08/23)     Delirium, resolved  - Delirium precautions.  - Sleep hygiene, reorientation, mobility. Scheduled: Melatonin  - Quetiapine reduce dose to 25mg hs, stopping 8/23    Shock, multifactorial, hypovolemic, distributive - resolved  Hx HTN  Hx of HLD  - Monitor hemodynamic status. Goal MAP >65, SBP <140  - Continue rosuvastatin 20 daily.  - Metoprolol 25 BID  - Discontinue PTA losartan     Moderate protein calorie malnutrition    Diarrhea  - Nutrition consulted, appreciate recommendations.   - NJ for TF, currently at goal  - Bowel regimen: MiraLA  - Rectal tube present. C. Diff pending. If negative scheduled loperamide     Hypophosphatemia, resolved  Hypomagnesemia  BL creat appears to be ~ 0.62-0.65   - Strict I/O, daily weights, Avoid/limit nephrotoxins as able  - Replete lytes PRN per protocol  - Goal NET negative -500 to 1L.   - Discontinued Nutriphos d/t worsening diarrhea.     Stress hyperglycemia  Type 2 diabetes mellitus  - Hgb A1C 8.4%   - insulin glargine 35 units qPM; stop morning glargine  - start insulin NPH to cover corticosteroid-induced hyperglycemia (can taper with steroid taper)  - Sliding scale insulin (high scale) TID WM and HS  - Goal BG <180 for optimal healing  - Holding home PTA metformin, Jardiance      Acute blood loss anemia  Acute blood loss thrombocytopenia, resolved  Coagulopathy  - No s/s of bleeding. Continue to monitor, Hgb goal > 7.0. Stable.     Sternotomy and Surgical Incision  Weakness and deconditioning    - Postoperative incision management per CVTS, sternal precautions  - PT/OT/CR - Recommending acute rehab    Hepatic steatosis          Diet: Regular Diet Adult Thin Liquids (level 0)  Snacks/Supplements Adult: Other; PRN - please allow pt to order any  snack/supplement if requested; With Meals  Calorie Counts  Adult Formula Drip Feeding: Specified Time Osmolite 1.5; Nasoduodenal tube; Goal Rate: 60 mL/hr (goal) - initiate @ 10 mL/hr and advance by 10 mL q8hr as tolerated to goal rate; mL/hr; From: 8:00 PM; To: 8:00 AM; Do not advance tube feeding rate u...    DVT Prophylaxis: Heparin SQ  [place] Catheter: Not present  Lines: PRESENT      PICC 08/20/24 Double Lumen Right Basilic Difficult venous access. Frequent lab draws-Site Assessment: WDL      Cardiac Monitoring: None  Code Status: Full Code      Clinically Significant Risk Factors            # Hypomagnesemia: Lowest Mg = 1.4 mg/dL in last 2 days, will replace as needed   # Hypoalbuminemia: Lowest albumin = 2.2 g/dL at 8/15/2024  8:20 PM, will monitor as appropriate              # DMII: A1C = 8.4 % (Ref range: <5.7 %) within past 6 months    # Moderate Malnutrition: based on nutrition assessment             Disposition Plan     Medically Ready for Discharge: Anticipated in 5+ Days             Ha Olivarez DO  Hospitalist Service, GOLD TEAM 10  [facility]  Securely message with [employer] (more info)  Text page via [facility] Paging/Directory   See signed in provider for up to date coverage information  ______________________________________________________________________    Interval History   No acute events overnight. He is feeling even better today. Chest pain is there but not terrible, breathing is comfortable, and he's eating well, feels hungry this morning. Stools are loose.    Revisit on new unit and no new concerns. Update to sister at bedside.    Physical Exam   Vital Signs: Temp: 98.3  F (36.8  C) Temp src: Oral BP: 120/83 Pulse: (!) 129   Resp: 26 SpO2: 92 % O2 Device: None (Room air)    Weight: 155 lbs 6.4 oz    Gen: Awake and alert, appears comfortable, appears stated age, seated upright in chair.  HEENT: NCAT, sclerae anicteric.  CV: Regular rhythm, normal rate,  extremities warm and well perfused, no lower extremity edema.  Pulm: Normal work of breathing on room air, lungs clear to auscultation but diminished at bases, no wheezing.  GI: Nontender, nondistended, soft. +bowel sounds.  Skin: Warm, dry, no jaundice.  Neuro: AO, speech normal, moves all extremities symmetrically.  Psych: Mood is good, even bright, affect is congruent.      Medical Decision Making       60 MINUTES SPENT BY ME on the date of service doing chart review, history, exam, documentation & further activities per the note.      Data     I have personally reviewed the following data over the past 24 hrs:    12.7 (H)  \   11.7 (L)   / 228     134 (L) 101 14.7 /  259 (H)   4.0 26 0.52 (L) \       Imaging results reviewed over the past 24 hrs:   Recent Results (from the past 24 hour(s))   XR Chest Port 1 View    Narrative    Exam: XR CHEST PORT 1 VIEW, 8/23/2024 7:01 AM    Indication: lung transplant follow-up    Comparison: X-ray chest 8/20/2024, multiple priors.    Findings:   Frontal radiograph of the chest, 0117 hours. Stable placement of  feeding tube, right upper extremity PICC, bibasilar chest tubes, and  stable surgical changes of the chest including sternotomy and left  atrial appendage clip.    The trachea is midline. Stable heart size and shape. Unchanged  retrocardiac and basilar streaky opacities. No significant effusion,  no definite pneumothorax.      Impression    Impression:   1. Stable support devices and surgical changes of the chest.  2. Continued perihilar and basilar streaky opacities likely  representing a mixture of atelectasis and edema. No new focal  consolidations.    I have personally reviewed the examination and initial interpretation  and I agree with the findings.    JAZZMINE VELOZ MD         SYSTEM ID:  [id number]

## 2024-08-23 NOTE — PROGRESS NOTES
Pain Service Progress Note  [facility]  Date: 08/23/2024       Patient Name: Travon Cartwright  MRN: [id number]  Age: 59 year old  Sex: male      Assessment:  Travon Cartwright is a 59 year old male who has PMH of chronic respiratory failure and hepatic steatosis.      Procedure: Lung Tx     Date of Surgery: 8/15/24     Date of Catheter Placement: 8/18/24     Plan/Recommendations:  1. Regional Anesthesia/Analgesia  -Continuous Catheter Type/Site: bilateral paravertebral (PV) T7-8  Infusate: 0.2% Ropivacaine  Programmed Intermittent Bolus (PIB) at 7 mL Q60 min via each catheter, total infusion rate of 14 mL/hr     Plan to maintain catheter approximately of 7 days     2. Anticoagulation  -Please contact Inpatient Pain Service before ordering or making any anticoagulation changes  -Heparin 5k Units subcutaneous Q 8 hours      3. Multimodal Analgesia  - Per primary service     Pain Service will continue to follow.     Discussed with attending anesthesiologist    Jessica Henderson PA-C  08/23/2024     Overnight Events: no major acute event    Tubes/Drains: Yes  Chest tubes    Subjective:  no pain     Symptoms of LAST: No    Pain Location:  No pain    Pain Intensity:    Pain at Rest: 0/10     Satisfied with your level of pain control: Yes    Diet: Adult Formula Drip Feeding: Continuous Osmolite 1.5; Nasoduodenal tube; Goal Rate: 60 mL/hr (goal) - Once FT verified post-pyloric and ok to use per CVICU, initiate @ 10 mL/hr and advance by 10 mL q8hr as tolerated to goal rate.; mL/hr; Do not adv...  Regular Diet Adult Thin Liquids (level 0)  Snacks/Supplements Adult: Other; PRN - please allow pt to order any snack/supplement if requested; With Meals  Calorie Counts    Relevant Labs:  Recent Labs   Lab Test 08/23/24  0443 08/19/24  0347 08/18/24  0613 08/16/24  0419 08/15/24  2020   INR  --   --   --   --  1.46*      < >  --    < > 233   PTT  --   --  34  --  42*   BUN 14.7   < >  --    < >  "10.7    < > = values in this interval not displayed.       Physical Exam:  Vitals: BP (!) 132/93   Pulse 104   Temp 98.8  F (37.1  C) (Axillary)   Resp 30   Ht 1.778 m (5' 10\")   Wt 70.5 kg (155 lb 6.4 oz)   SpO2 98%   BMI 22.30 kg/m      Physical Exam:   Orientation:  Alert, oriented, and in no acute distress: Yes  Sedation: No    Motor Examination:  5/5 Strength in lower extremities: Yes        Catheter Site:   Catheter entry site is clean/dry/intact: Yes    Tender: No      Relevant Medications:  Current Pain Medications:  Medications related to Pain Management (From now, onward)      Start     Dose/Rate Route Frequency Ordered Stop    08/20/24 2200  hydrOXYzine HCl (ATARAX) tablet 25 mg         25 mg Oral AT BEDTIME 08/20/24 0917      08/20/24 2016  HYDROmorphone (DILAUDID) injection 0.2 mg        Placed in \"Or\" Linked Group    0.2 mg Intravenous EVERY 2 HOURS PRN 08/20/24 0537      08/20/24 2016  HYDROmorphone (PF) (DILAUDID) injection 0.4 mg        Placed in \"Or\" Linked Group    0.4 mg Intravenous EVERY 2 HOURS PRN 08/20/24 0537      08/20/24 1000  methocarbamol (ROBAXIN) tablet 750 mg         750 mg Oral or Feeding Tube 4 TIMES DAILY 08/20/24 0913      08/20/24 0600  senna-docusate (SENOKOT-S/PERICOLACE) 8.6-50 MG per tablet 2 tablet         2 tablet Oral or Feeding Tube 2 TIMES DAILY PRN 08/20/24 0536      08/20/24 0536  bisacodyl (DULCOLAX) suppository 10 mg         10 mg Rectal DAILY PRN 08/20/24 0536      08/20/24 0536  magnesium hydroxide (MILK OF MAGNESIA) suspension 30 mL         30 mL Oral or Feeding Tube DAILY PRN 08/20/24 0536      08/20/24 0536  polyethylene glycol (MIRALAX) Packet 17 g         17 g Oral or Feeding Tube DAILY PRN 08/20/24 0536      08/19/24 0920  oxyCODONE (ROXICODONE) tablet 5 mg        Placed in \"Or\" Linked Group    5 mg Oral or Feeding Tube EVERY 4 HOURS PRN 08/19/24 0921      08/19/24 0920  oxyCODONE IR (ROXICODONE) half-tab 2.5 mg        Placed in \"Or\" Linked Group    " "2.5 mg Oral or Feeding Tube EVERY 4 HOURS PRN 08/19/24 0921      08/18/24 1700  ROPivacaine 0.2% in sodium chloride 0.9% PERINEURAL infusion          Perineural Continuous Nerve Block 08/18/24 1657      08/18/24 1700  ROPivacaine 0.2% in sodium chloride 0.9% PERINEURAL infusion          Perineural Continuous Nerve Block 08/18/24 1657      08/18/24 1600  acetaminophen (TYLENOL) tablet 975 mg         975 mg Oral or Feeding Tube EVERY 8 HOURS 08/18/24 1448      08/18/24 0000  acetaminophen (TYLENOL) tablet 650 mg         650 mg Oral or Feeding Tube EVERY 4 HOURS PRN 08/15/24 2016      08/15/24 2016  lidocaine 1 % 0.1-1 mL         0.1-1 mL Other EVERY 1 HOUR PRN 08/15/24 2016      08/15/24 2016  lidocaine (LMX4) kit          Topical EVERY 1 HOUR PRN 08/15/24 2016              Primary Service Contacted with Recommendations? Yes            Acute Inpatient Pain Service [facility]  Hours of pain coverage 24/7   Page via [employer]- Please Page the Pain Team Via [employer]: \"PAIN MANAGEMENT ACUTE INPATIENT/ [facility]\"            "

## 2024-08-23 NOTE — PROGRESS NOTES
Pulmonary Medicine  Cystic Fibrosis - Lung Transplant Team  Progress Note  2024     Patient: Travon Cartwright  MRN: [id number]  : 1965 (age 59 year old)  Transplant: 8/15/2024 (Lung), POD#8  Admission date: 2024    Assessment & Plan:   Travon Cartwright is a 59 year old male with a history of idiopathic pulmonary fibrosis, diabetes, hepatosteatosis, and essential tremor.  Pt. is now s/p BSLT on 8/15/2024 with Dr. Mulvihill.  Surgery was uncomplicated and done on pump, extubated and off pressors since 2024. Clinically improved, minimal oxygen requirements at this point.     Recommendations:  Wean oxygen as tolerated  Daily CXR with chest tubes in place. CVTS managing chest tubes  2 pleural tubes remain  ES catheter in place for pain control, managed by anesthesia.  L bronchial washing cultures from 8/15 growing Strep constellatus, donor cultures with Staph; complete a 7-day course of zosyn (-)  Diuresis per primary team; agree with goal of 0.5-1L net negative  Calorie counts excellent; agree with reaching out to RD to cycle tube feeds tonight  IS: Next tacro level . Changed from SL to enteral .   PPX: As below.   Abx - Zosyn, as above.  Routine surveillance bronch scheduled  at 8 am     S/p bilateral sequential lung transplant (BSLT) 8/15/24 for idiopathic pulmonary fibrosis:  Acute on chronic hypoxic respiratory failure (post operative):   - Wean oxygen as tolerated per Gold 10 team  - Nebs: levalbuterol and Mucomyst QID  - Pulmonary toilet with chest physiotherapy QID (addition of Aerobika and incentive spirometry now that he's extubated)  - DSA at one week (drawn , pending) then one month post-transplant (additionally per protocol)  - Ammonia monitoring qMTh (screening for hyperammonemia post-lung transplant) with ureaplasma PCR ordered POD #7 (, collected and pending) per protocol   - ES catheter placed by anesthesia  prior to extubation  - Chest  tubes managed by surgical team; daily CXRs with chest tubes in place   - Await explant pathology     Immunosuppression:  Induction therapy with high dose IV steroid intraoperatively and basiliximab (POD #0 and #4).   - Tacrolimus Goal  8-12.    - changed to enteral 8/21   - repeat level 8/24 ordered   - MMF 1000 mg BID  - Methylprednisolone 125 mg x 3 doses, then prednisolone 30 mg daily with taper per lung transplant protocol: (taper ordered)  Date Daily Dose (mg)   8/17/2024 30   8/24/2024 25   8/31/2024 20   9/21/2024 15   10/12/2024 10   11/2/2024 5      Prophylaxis:   - Bactrim for PJP ppx started POD #1 due to donor toxoplasma IgG+  - VGCV for CMV ppx (ordered as below), CMV monitoring per protocol (after completion of ppx course, additionally prn)  - Ampho B nebs twice weekly for antifungal ppx through discharge, then will stop  - Nystatin for oral candidiasis ppx, 6 month course   - Micofungin 100mg q24h for prophylaxis x 10 days (end date: 8/25)  - See below for serologies and viral ppx:    Donor Recipient Intervention   CMV status - + Valganciclovir POD #8-90   EBV status - + EBV monthly   HSV status N/A + none        ID: Prior history of infection/colonization with Strep constellatus (bronch BAL on L 8/16), donor cultures positive for Staph.  - IgG at one month (ordered 9/15)  - Donor cultures ([facility]) NGTD; ([facility]) NGTD ([facility] personally reviewed today) - staph aueus +  - Recipient cultures - washing from left 8/16- strep constellatus  - Abx- Completed IV ceftaz. Vancomycin resumed 8/17-8/21 for Staph in donor cx, Strep in recipient BAL. Plan for zosyn 8/20-8/25 (7 days).  - Repeat blood cultures 8/19 for fevers over night; plan for zosyn x 7 days     PHS risk criteria donor:  Additional labs required post-transplant (between 4-8 weeks post-op): Hepatitis B, Hepatitis C, and HIV by JOVANY ([id number], ordered POD #30, ordered for 9/15).     EGJ outflow obstruction (?) inconclusive: Noted on esophageal manometry  "7/9. Proceed with J feeds. No need for NPO for 6 weeks.     Diabetes mellitus: Prior to transplant was on Jardiance, dulaglutide, glargine, metformin. All currently held.  - Lantus and sliding scale insulin per primary team     Toxic-metabolic encephalopathy: Likely ICU delirium, which continues to improve.  - PRN melatonin at bedtime      We appreciate the excellent care provided by the Gold 10 team. Recommendations communicated via in person rounding and this note.  Will continue to follow along closely, please do not hesitate to call with any questions or concerns.    Patient seen and staffed with Dr. Lowery.    Mallorie Hoover MD  Internal Medicine-Pediatrics  Pulmonary/Critical Care Fellow - PGY7/FL2  [facility]  P: [phone]       Subjective & Interval History:     Juan J says that he's feeling \"really good.\" Mine was at bedside during our visit. He denies chest pain, breathing issues, or nausea. He did get his Italian Ice slushie yesterday and drank the whole thing. Slept poorly, but no more hallucinations.    Review of Systems:     C: No fever, no chills, decrease in weight; tolerating full diet with thin liquids  INTEGUMENTARY/SKIN: No rash or obvious new lesions  ENT/MOUTH: No sore throat, no sinus pain, no nasal congestion or drainage  RESP: See interval history  CV: no chest pain, no palpitations, no peripheral edema  GI: No nausea, no vomiting, no change in stools, no reflux symptoms  : No dysuria  MUSCULOSKELETAL: No myalgias, no arthralgias  ENDOCRINE: Blood sugars with adequate control  NEURO: No headache  PSYCHIATRIC: Mood stable    Physical Exam:     All notes, images, and labs from past 24 hours (at minimum) were reviewed.    Vital signs:  Temp: 98.8  F (37.1  C) Temp src: Axillary BP: 107/82 Pulse: 85   Resp: 24 SpO2: 93 % O2 Device: None (Room air) Oxygen Delivery: 1 LPM Height: 177.8 cm (5' 10\") Weight: 70.5 kg (155 lb 6.4 oz)    Intake/Output Summary (Last 24 hours) at 8/23/2024 " 1118  Last data filed at 8/23/2024 0900  Gross per 24 hour   Intake 3505 ml   Output 2725 ml   Net 780 ml     Constitutional: sitting in chair, in no apparent distress; appears in good spirits   HEENT: Eyes with pink conjunctivae, anicteric.  Oral mucosa moist without lesions. No thrush. NJ tube in place.  PULM: Good air flow bilaterally.  No crackles, no rhonchi, no wheezes. No increased work of breathing, saturations >95% on RA.  CV: Normal S1 and S2.  RRR.  No murmur, gallop, or rub.  No peripheral edema.   ABD: NABS, soft, nontender, nondistended.    MSK: Moves all extremities.  No apparent muscle wasting.   NEURO: Alert and conversant.   SKIN: Warm, dry.  No rash on limited exam.   PSYCH: Mood stable.     Data:     LABS    CMP:   Recent Labs   Lab 08/23/24  0904 08/23/24  0443 08/22/24  2330 08/22/24  2104 08/22/24  0807 08/22/24  0543 08/21/24  0821 08/21/24  0331 08/20/24  0824 08/20/24  0353 08/19/24  0558 08/19/24  0347   NA  --  134*  --   --   --  140  --  138  --  139  --  138   POTASSIUM  --  4.0  --   --   --  4.1  --  4.0  --  4.0  --  4.5   CHLORIDE  --  101  --   --   --  105  --  103  --  105  --  106   CO2  --  26  --   --   --  26  --  27  --  25  --  26   ANIONGAP  --  7  --   --   --  9  --  8  --  9  --  6*   * 223* 262* 417*   < > 195*  200*   < > 217*   < > 181*   < > 139*  147*   BUN  --  14.7  --   --   --  17.9  --  18.6  --  18.0  --  15.4   CR  --  0.52*  --   --   --  0.61*  --  0.52*  --  0.56*  --  0.46*   GFRESTIMATED  --  >90  --   --   --  >90  --  >90  --  >90  --  >90   HUGO  --  8.4*  --   --   --  8.8  --  8.5*  --  8.5*  --  8.4*   MAG  --  1.4*  --   --   --  1.6*  --  1.8  --  1.8  --  1.9   PHOS  --  2.6  --   --   --  2.8  --  3.1  --  3.2  --  2.3*   PROTTOTAL  --   --   --   --   --  5.5*  --   --   --   --   --  5.0*   ALBUMIN  --   --   --   --   --  2.8*  --   --   --   --   --  2.6*   BILITOTAL  --   --   --   --   --  0.4  --   --   --   --   --  0.4    ALKPHOS  --   --   --   --   --  139  --   --   --   --   --  76   AST  --   --   --   --   --  33  --   --   --   --   --  47*   ALT  --   --   --   --   --  43  --   --   --   --   --  22    < > = values in this interval not displayed.     CBC:   Recent Labs   Lab 08/23/24  0443 08/22/24  0543 08/21/24  0331 08/20/24  0353   WBC 12.7* 11.9* 9.9 9.7   RBC 4.03* 3.87* 4.35* 4.09*   HGB 11.7* 11.4* 12.6* 11.9*   HCT 37.0* 35.3* 39.7* 37.0*   MCV 92 91 91 91   MCH 29.0 29.5 29.0 29.1   MCHC 31.6 32.3 31.7 32.2   RDW 13.2 13.5 13.2 13.3    257 183 164       INR:   No lab results found in last 7 days.      Glucose:   Recent Labs   Lab 08/23/24  0904 08/23/24  0443 08/22/24  2330 08/22/24  2104 08/22/24  1644 08/22/24  1359   * 223* 262* 417* 290* 349*       Blood Gas:   Recent Labs   Lab 08/20/24  1619 08/20/24  1128 08/20/24  0353   O2PER 26 40 50     IMAGING    8/23/2024 CXR  Impression:   1. Stable support devices and surgical changes of the chest.  2. Continued perihilar and basilar streaky opacities likely  representing a mixture of atelectasis and edema. No new focal  consolidations.

## 2024-08-23 NOTE — CARE PLAN
Patient transferred to 6B room 30 with RN and NST. Report given to Joi NOBLES. Passed on to Joi that the Valcyte has not shown up from pharmacy yet so this medication needs to be started. Messages sent to pharmacy to try and get this medication from them for patient.

## 2024-08-24 ENCOUNTER — APPOINTMENT (OUTPATIENT)
Dept: GENERAL RADIOLOGY | Facility: CLINIC | Age: 59
End: 2024-08-24
Attending: STUDENT IN AN ORGANIZED HEALTH CARE EDUCATION/TRAINING PROGRAM
Payer: COMMERCIAL

## 2024-08-24 ENCOUNTER — APPOINTMENT (OUTPATIENT)
Dept: OCCUPATIONAL THERAPY | Facility: CLINIC | Age: 59
End: 2024-08-24
Attending: STUDENT IN AN ORGANIZED HEALTH CARE EDUCATION/TRAINING PROGRAM
Payer: COMMERCIAL

## 2024-08-24 LAB
ANION GAP SERPL CALCULATED.3IONS-SCNC: 8 MMOL/L (ref 7–15)
BACTERIA BLD CULT: NO GROWTH
BASOPHILS # BLD AUTO: ABNORMAL 10*3/UL
BASOPHILS # BLD MANUAL: 0 10E3/UL (ref 0–0.2)
BASOPHILS NFR BLD AUTO: ABNORMAL %
BASOPHILS NFR BLD MANUAL: 0 %
BUN SERPL-MCNC: 15.1 MG/DL (ref 8–23)
CALCIUM SERPL-MCNC: 8.5 MG/DL (ref 8.8–10.4)
CHLORIDE SERPL-SCNC: 99 MMOL/L (ref 98–107)
CREAT SERPL-MCNC: 0.55 MG/DL (ref 0.67–1.17)
EGFRCR SERPLBLD CKD-EPI 2021: >90 ML/MIN/1.73M2
EOSINOPHIL # BLD AUTO: ABNORMAL 10*3/UL
EOSINOPHIL # BLD MANUAL: 0.3 10E3/UL (ref 0–0.7)
EOSINOPHIL NFR BLD AUTO: ABNORMAL %
EOSINOPHIL NFR BLD MANUAL: 2 %
ERYTHROCYTE [DISTWIDTH] IN BLOOD BY AUTOMATED COUNT: 13.3 % (ref 10–15)
GLUCOSE BLDC GLUCOMTR-MCNC: 203 MG/DL (ref 70–99)
GLUCOSE BLDC GLUCOMTR-MCNC: 217 MG/DL (ref 70–99)
GLUCOSE BLDC GLUCOMTR-MCNC: 276 MG/DL (ref 70–99)
GLUCOSE BLDC GLUCOMTR-MCNC: 282 MG/DL (ref 70–99)
GLUCOSE BLDC GLUCOMTR-MCNC: 328 MG/DL (ref 70–99)
GLUCOSE SERPL-MCNC: 269 MG/DL (ref 70–99)
HCO3 SERPL-SCNC: 25 MMOL/L (ref 22–29)
HCT VFR BLD AUTO: 37.6 % (ref 40–53)
HGB BLD-MCNC: 11.9 G/DL (ref 13.3–17.7)
IMM GRANULOCYTES # BLD: ABNORMAL 10*3/UL
IMM GRANULOCYTES NFR BLD: ABNORMAL %
LYMPHOCYTES # BLD AUTO: ABNORMAL 10*3/UL
LYMPHOCYTES # BLD MANUAL: 1.3 10E3/UL (ref 0.8–5.3)
LYMPHOCYTES NFR BLD AUTO: ABNORMAL %
LYMPHOCYTES NFR BLD MANUAL: 9 %
MAGNESIUM SERPL-MCNC: 1.5 MG/DL (ref 1.7–2.3)
MCH RBC QN AUTO: 29 PG (ref 26.5–33)
MCHC RBC AUTO-ENTMCNC: 31.6 G/DL (ref 31.5–36.5)
MCV RBC AUTO: 92 FL (ref 78–100)
MONOCYTES # BLD AUTO: ABNORMAL 10*3/UL
MONOCYTES # BLD MANUAL: 0.9 10E3/UL (ref 0–1.3)
MONOCYTES NFR BLD AUTO: ABNORMAL %
MONOCYTES NFR BLD MANUAL: 6 %
NEUTROPHILS # BLD AUTO: ABNORMAL 10*3/UL
NEUTROPHILS # BLD MANUAL: 12 10E3/UL (ref 1.6–8.3)
NEUTROPHILS NFR BLD AUTO: ABNORMAL %
NEUTROPHILS NFR BLD MANUAL: 83 %
NRBC # BLD AUTO: 0 10E3/UL
NRBC BLD AUTO-RTO: 0 /100
PHOSPHATE SERPL-MCNC: 2.5 MG/DL (ref 2.5–4.5)
PLAT MORPH BLD: ABNORMAL
PLATELET # BLD AUTO: 269 10E3/UL (ref 150–450)
POLYCHROMASIA BLD QL SMEAR: SLIGHT
POTASSIUM SERPL-SCNC: 4.3 MMOL/L (ref 3.4–5.3)
RBC # BLD AUTO: 4.1 10E6/UL (ref 4.4–5.9)
RBC MORPH BLD: ABNORMAL
SODIUM SERPL-SCNC: 132 MMOL/L (ref 135–145)
TACROLIMUS BLD-MCNC: 4.2 UG/L (ref 5–15)
TME LAST DOSE: ABNORMAL H
TME LAST DOSE: ABNORMAL H
WBC # BLD AUTO: 14.4 10E3/UL (ref 4–11)

## 2024-08-24 PROCEDURE — 250N000011 HC RX IP 250 OP 636: Performed by: STUDENT IN AN ORGANIZED HEALTH CARE EDUCATION/TRAINING PROGRAM

## 2024-08-24 PROCEDURE — 250N000012 HC RX MED GY IP 250 OP 636 PS 637: Performed by: INTERNAL MEDICINE

## 2024-08-24 PROCEDURE — 250N000011 HC RX IP 250 OP 636

## 2024-08-24 PROCEDURE — 250N000013 HC RX MED GY IP 250 OP 250 PS 637: Performed by: STUDENT IN AN ORGANIZED HEALTH CARE EDUCATION/TRAINING PROGRAM

## 2024-08-24 PROCEDURE — 80048 BASIC METABOLIC PNL TOTAL CA: CPT | Performed by: PHYSICIAN ASSISTANT

## 2024-08-24 PROCEDURE — 84100 ASSAY OF PHOSPHORUS: CPT | Performed by: INTERNAL MEDICINE

## 2024-08-24 PROCEDURE — 120N000005 HC R&B MS OVERFLOW UMMC

## 2024-08-24 PROCEDURE — 83735 ASSAY OF MAGNESIUM: CPT | Performed by: SURGERY

## 2024-08-24 PROCEDURE — 250N000013 HC RX MED GY IP 250 OP 250 PS 637

## 2024-08-24 PROCEDURE — 71045 X-RAY EXAM CHEST 1 VIEW: CPT

## 2024-08-24 PROCEDURE — 97110 THERAPEUTIC EXERCISES: CPT | Mod: GO

## 2024-08-24 PROCEDURE — 250N000013 HC RX MED GY IP 250 OP 250 PS 637: Performed by: SURGERY

## 2024-08-24 PROCEDURE — 99232 SBSQ HOSP IP/OBS MODERATE 35: CPT | Performed by: STUDENT IN AN ORGANIZED HEALTH CARE EDUCATION/TRAINING PROGRAM

## 2024-08-24 PROCEDURE — 85007 BL SMEAR W/DIFF WBC COUNT: CPT | Performed by: SURGERY

## 2024-08-24 PROCEDURE — 85027 COMPLETE CBC AUTOMATED: CPT | Performed by: SURGERY

## 2024-08-24 PROCEDURE — 258N000003 HC RX IP 258 OP 636: Performed by: STUDENT IN AN ORGANIZED HEALTH CARE EDUCATION/TRAINING PROGRAM

## 2024-08-24 PROCEDURE — 99233 SBSQ HOSP IP/OBS HIGH 50: CPT | Mod: 24 | Performed by: INTERNAL MEDICINE

## 2024-08-24 PROCEDURE — 80197 ASSAY OF TACROLIMUS: CPT | Performed by: INTERNAL MEDICINE

## 2024-08-24 PROCEDURE — 94640 AIRWAY INHALATION TREATMENT: CPT | Mod: 76

## 2024-08-24 PROCEDURE — 250N000012 HC RX MED GY IP 250 OP 636 PS 637: Performed by: STUDENT IN AN ORGANIZED HEALTH CARE EDUCATION/TRAINING PROGRAM

## 2024-08-24 PROCEDURE — 94668 MNPJ CHEST WALL SBSQ: CPT

## 2024-08-24 PROCEDURE — 999N000157 HC STATISTIC RCP TIME EA 10 MIN

## 2024-08-24 PROCEDURE — 250N000009 HC RX 250: Performed by: SURGERY

## 2024-08-24 PROCEDURE — 71045 X-RAY EXAM CHEST 1 VIEW: CPT | Mod: 26 | Performed by: STUDENT IN AN ORGANIZED HEALTH CARE EDUCATION/TRAINING PROGRAM

## 2024-08-24 PROCEDURE — 250N000013 HC RX MED GY IP 250 OP 250 PS 637: Performed by: PHYSICIAN ASSISTANT

## 2024-08-24 PROCEDURE — 94640 AIRWAY INHALATION TREATMENT: CPT

## 2024-08-24 PROCEDURE — 250N000012 HC RX MED GY IP 250 OP 636 PS 637: Performed by: SURGERY

## 2024-08-24 RX ORDER — METOPROLOL TARTRATE 50 MG
50 TABLET ORAL 2 TIMES DAILY
Status: DISCONTINUED | OUTPATIENT
Start: 2024-08-24 | End: 2024-08-28 | Stop reason: HOSPADM

## 2024-08-24 RX ADMIN — Medication 10 MG: at 20:44

## 2024-08-24 RX ADMIN — HEPARIN SODIUM 5000 UNITS: 5000 INJECTION, SOLUTION INTRAVENOUS; SUBCUTANEOUS at 17:37

## 2024-08-24 RX ADMIN — MYCOPHENOLATE MOFETIL 1000 MG: 250 CAPSULE ORAL at 09:40

## 2024-08-24 RX ADMIN — ROSUVASTATIN CALCIUM 10 MG: 10 TABLET, FILM COATED ORAL at 09:42

## 2024-08-24 RX ADMIN — Medication 40 MG: at 09:39

## 2024-08-24 RX ADMIN — Medication 5 ML: at 13:08

## 2024-08-24 RX ADMIN — NYSTATIN 1000000 UNITS: 100000 SUSPENSION ORAL at 20:43

## 2024-08-24 RX ADMIN — CALCIUM CARBONATE 600 MG (1,500 MG)-VITAMIN D3 400 UNIT TABLET 1 TABLET: at 22:04

## 2024-08-24 RX ADMIN — ACETYLCYSTEINE 2 ML: 200 SOLUTION ORAL; RESPIRATORY (INHALATION) at 20:10

## 2024-08-24 RX ADMIN — PIPERACILLIN AND TAZOBACTAM 4.5 G: 4; .5 INJECTION, POWDER, LYOPHILIZED, FOR SOLUTION INTRAVENOUS at 09:36

## 2024-08-24 RX ADMIN — LEVALBUTEROL HYDROCHLORIDE 1.25 MG: 1.25 SOLUTION RESPIRATORY (INHALATION) at 09:10

## 2024-08-24 RX ADMIN — ACETAMINOPHEN 975 MG: 325 TABLET, FILM COATED ORAL at 17:37

## 2024-08-24 RX ADMIN — HYDROMORPHONE HYDROCHLORIDE 0.2 MG: 0.2 INJECTION, SOLUTION INTRAMUSCULAR; INTRAVENOUS; SUBCUTANEOUS at 00:30

## 2024-08-24 RX ADMIN — PREDNISONE 25 MG: 5 TABLET ORAL at 09:42

## 2024-08-24 RX ADMIN — Medication 10 MG: at 20:10

## 2024-08-24 RX ADMIN — METHOCARBAMOL 750 MG: 750 TABLET ORAL at 13:09

## 2024-08-24 RX ADMIN — NYSTATIN 1000000 UNITS: 100000 SUSPENSION ORAL at 09:39

## 2024-08-24 RX ADMIN — ACETYLCYSTEINE 2 ML: 200 SOLUTION ORAL; RESPIRATORY (INHALATION) at 17:02

## 2024-08-24 RX ADMIN — ACETAMINOPHEN 975 MG: 325 TABLET, FILM COATED ORAL at 09:41

## 2024-08-24 RX ADMIN — PIPERACILLIN AND TAZOBACTAM 4.5 G: 4; .5 INJECTION, POWDER, LYOPHILIZED, FOR SOLUTION INTRAVENOUS at 20:44

## 2024-08-24 RX ADMIN — LEVALBUTEROL HYDROCHLORIDE 1.25 MG: 1.25 SOLUTION RESPIRATORY (INHALATION) at 20:09

## 2024-08-24 RX ADMIN — METHOCARBAMOL 750 MG: 750 TABLET ORAL at 17:37

## 2024-08-24 RX ADMIN — PIPERACILLIN AND TAZOBACTAM 4.5 G: 4; .5 INJECTION, POWDER, LYOPHILIZED, FOR SOLUTION INTRAVENOUS at 17:38

## 2024-08-24 RX ADMIN — PIPERACILLIN AND TAZOBACTAM 4.5 G: 4; .5 INJECTION, POWDER, LYOPHILIZED, FOR SOLUTION INTRAVENOUS at 02:03

## 2024-08-24 RX ADMIN — LEVALBUTEROL HYDROCHLORIDE 1.25 MG: 1.25 SOLUTION RESPIRATORY (INHALATION) at 12:03

## 2024-08-24 RX ADMIN — HEPARIN SODIUM 5000 UNITS: 5000 INJECTION, SOLUTION INTRAVENOUS; SUBCUTANEOUS at 09:35

## 2024-08-24 RX ADMIN — NYSTATIN 1000000 UNITS: 100000 SUSPENSION ORAL at 17:36

## 2024-08-24 RX ADMIN — NYSTATIN 1000000 UNITS: 100000 SUSPENSION ORAL at 13:07

## 2024-08-24 RX ADMIN — Medication 60 ML: at 09:36

## 2024-08-24 RX ADMIN — Medication 15 ML: at 13:07

## 2024-08-24 RX ADMIN — METHOCARBAMOL 750 MG: 750 TABLET ORAL at 09:41

## 2024-08-24 RX ADMIN — MICAFUNGIN SODIUM 100 MG: 50 INJECTION, POWDER, LYOPHILIZED, FOR SOLUTION INTRAVENOUS at 13:08

## 2024-08-24 RX ADMIN — Medication 60 ML: at 20:45

## 2024-08-24 RX ADMIN — VALGANCICLOVIR HYDROCHLORIDE 900 MG: 50 POWDER, FOR SOLUTION ORAL at 09:39

## 2024-08-24 RX ADMIN — METHOCARBAMOL 750 MG: 750 TABLET ORAL at 20:43

## 2024-08-24 RX ADMIN — METOPROLOL TARTRATE 50 MG: 50 TABLET, FILM COATED ORAL at 20:44

## 2024-08-24 RX ADMIN — ACETYLCYSTEINE 2 ML: 200 SOLUTION ORAL; RESPIRATORY (INHALATION) at 12:03

## 2024-08-24 RX ADMIN — SULFAMETHOXAZOLE AND TRIMETHOPRIM 80 MG: 200; 40 SUSPENSION ORAL at 09:40

## 2024-08-24 RX ADMIN — TACROLIMUS 3.5 MG: 5 CAPSULE ORAL at 09:40

## 2024-08-24 RX ADMIN — LOPERAMIDE HCL 2 MG: 1 SOLUTION ORAL at 13:07

## 2024-08-24 RX ADMIN — HEPARIN SODIUM 5000 UNITS: 5000 INJECTION, SOLUTION INTRAVENOUS; SUBCUTANEOUS at 01:09

## 2024-08-24 RX ADMIN — MYCOPHENOLATE MOFETIL 1000 MG: 250 CAPSULE ORAL at 20:44

## 2024-08-24 RX ADMIN — CALCIUM CARBONATE 600 MG (1,500 MG)-VITAMIN D3 400 UNIT TABLET 1 TABLET: at 09:41

## 2024-08-24 RX ADMIN — INSULIN HUMAN 20 UNITS: 100 INJECTION, SUSPENSION SUBCUTANEOUS at 09:36

## 2024-08-24 RX ADMIN — TACROLIMUS 4 MG: 5 CAPSULE ORAL at 17:37

## 2024-08-24 RX ADMIN — ACETYLCYSTEINE 2 ML: 200 SOLUTION ORAL; RESPIRATORY (INHALATION) at 09:10

## 2024-08-24 RX ADMIN — METOPROLOL TARTRATE 25 MG: 25 TABLET, FILM COATED ORAL at 09:41

## 2024-08-24 RX ADMIN — LEVALBUTEROL HYDROCHLORIDE 1.25 MG: 1.25 SOLUTION RESPIRATORY (INHALATION) at 17:02

## 2024-08-24 RX ADMIN — HEPARIN SODIUM 5000 UNITS: 5000 INJECTION, SOLUTION INTRAVENOUS; SUBCUTANEOUS at 22:04

## 2024-08-24 RX ADMIN — HYDROXYZINE HYDROCHLORIDE 25 MG: 25 TABLET, FILM COATED ORAL at 22:04

## 2024-08-24 ASSESSMENT — ACTIVITIES OF DAILY LIVING (ADL)
ADLS_ACUITY_SCORE: 48
ADLS_ACUITY_SCORE: 30
ADLS_ACUITY_SCORE: 30
ADLS_ACUITY_SCORE: 48
ADLS_ACUITY_SCORE: 43
ADLS_ACUITY_SCORE: 44
ADLS_ACUITY_SCORE: 44
ADLS_ACUITY_SCORE: 43
ADLS_ACUITY_SCORE: 48
ADLS_ACUITY_SCORE: 48
ADLS_ACUITY_SCORE: 43
ADLS_ACUITY_SCORE: 30
ADLS_ACUITY_SCORE: 30
ADLS_ACUITY_SCORE: 43
ADLS_ACUITY_SCORE: 48
ADLS_ACUITY_SCORE: 44
ADLS_ACUITY_SCORE: 30
ADLS_ACUITY_SCORE: 43
ADLS_ACUITY_SCORE: 29
ADLS_ACUITY_SCORE: 43
ADLS_ACUITY_SCORE: 44

## 2024-08-24 NOTE — PLAN OF CARE
"  Problem: Adult Inpatient Plan of Care  Goal: Plan of Care Review  Description: The Plan of Care Review/Shift note should be completed every shift.  The Outcome Evaluation is a brief statement about your assessment that the patient is improving, declining, or no change.  This information will be displayed automatically on your shift  note.  8/24/2024 0702 by Jing Gaffney RN  Outcome: Progressing  Flowsheets (Taken 8/24/2024 0702)  Plan of Care Reviewed With: patient  Overall Patient Progress: improving  8/24/2024 0701 by Jing Gaffney RN  Outcome: Progressing  Goal: Patient-Specific Goal (Individualized)  Description: You can add care plan individualizations to a care plan. Examples of Individualization might be:  \"Parent requests to be called daily at 9am for status\", \"I have a hard time hearing out of my right ear\", or \"Do not touch me to wake me up as it startles  me\".  8/24/2024 0702 by Jing Gaffney RN  Outcome: Progressing  8/24/2024 0701 by Jing Gaffney RN  Outcome: Progressing  Goal: Absence of Hospital-Acquired Illness or Injury  8/24/2024 0702 by Jing Gaffney RN  Outcome: Progressing  8/24/2024 0701 by Jing Gaffney RN  Outcome: Progressing  Intervention: Identify and Manage Fall Risk  Recent Flowsheet Documentation  Taken 8/24/2024 0400 by Jing Gaffney RN  Safety Promotion/Fall Prevention:   activity supervised   assistive device/personal items within reach   clutter free environment maintained   lighting adjusted   nonskid shoes/slippers when out of bed   safety round/check completed   treat reversible contributory factors   treat underlying cause  Taken 8/24/2024 0000 by Jing Gaffney, RN  Safety Promotion/Fall Prevention:   activity supervised   assistive device/personal items within reach   clutter free environment maintained   lighting adjusted   nonskid shoes/slippers when out of bed   safety round/check completed   treat reversible contributory factors   treat " underlying cause  Taken 8/23/2024 2000 by Jing Gaffney RN  Safety Promotion/Fall Prevention:   activity supervised   assistive device/personal items within reach   clutter free environment maintained   lighting adjusted   nonskid shoes/slippers when out of bed   safety round/check completed   treat reversible contributory factors   treat underlying cause  Intervention: Prevent Skin Injury  Recent Flowsheet Documentation  Taken 8/24/2024 0415 by Jing Gaffney RN  Body Position: position changed independently  Taken 8/24/2024 0400 by Jing Gaffney RN  Skin Protection: silicone foam dressing in place  Device Skin Pressure Protection: absorbent pad utilized/changed  Taken 8/24/2024 0000 by Jing Gaffney RN  Skin Protection: silicone foam dressing in place  Device Skin Pressure Protection: absorbent pad utilized/changed  Taken 8/23/2024 2345 by Jing Gaffney RN  Body Position: position changed independently  Taken 8/23/2024 2000 by Jing Gaffney RN  Body Position: position changed independently  Skin Protection: silicone foam dressing in place  Device Skin Pressure Protection: absorbent pad utilized/changed  Intervention: Prevent and Manage VTE (Venous Thromboembolism) Risk  Recent Flowsheet Documentation  Taken 8/24/2024 0400 by Jing Gaffney RN  VTE Prevention/Management: SCDs off (sequential compression devices)  Taken 8/24/2024 0000 by Jing Gaffney RN  VTE Prevention/Management: SCDs off (sequential compression devices)  Taken 8/23/2024 2000 by Jing Gaffney RN  VTE Prevention/Management: SCDs off (sequential compression devices)  Intervention: Prevent Infection  Recent Flowsheet Documentation  Taken 8/24/2024 0400 by Jing Gaffney RN  Infection Prevention:   equipment surfaces disinfected   hand hygiene promoted   personal protective equipment utilized   rest/sleep promoted   single patient room provided   visitors restricted/screened  Taken 8/24/2024 0000 by Yeimy  MALLORIE Ch  Infection Prevention:   equipment surfaces disinfected   hand hygiene promoted   personal protective equipment utilized   rest/sleep promoted   single patient room provided   visitors restricted/screened  Taken 8/23/2024 2000 by Jing Gaffney RN  Infection Prevention:   equipment surfaces disinfected   hand hygiene promoted   personal protective equipment utilized   rest/sleep promoted   single patient room provided   visitors restricted/screened  Goal: Optimal Comfort and Wellbeing  8/24/2024 0702 by Jing Gaffney RN  Outcome: Progressing  8/24/2024 0701 by Jing Gaffney RN  Outcome: Progressing  Intervention: Monitor Pain and Promote Comfort  Recent Flowsheet Documentation  Taken 8/23/2024 2345 by Jing Gaffney RN  Pain Management Interventions: medication (see MAR)  Taken 8/23/2024 2000 by Jing Gaffney RN  Pain Management Interventions: medication (see MAR)  Intervention: Provide Person-Centered Care  Recent Flowsheet Documentation  Taken 8/24/2024 0400 by Jing Gaffney RN  Trust Relationship/Rapport:   care explained   choices provided   reassurance provided   thoughts/feelings acknowledged  Taken 8/24/2024 0000 by Jing Gaffney RN  Trust Relationship/Rapport:   care explained   choices provided   reassurance provided   thoughts/feelings acknowledged  Taken 8/23/2024 2000 by Jing Gaffney RN  Trust Relationship/Rapport:   care explained   choices provided   reassurance provided   thoughts/feelings acknowledged  Goal: Readiness for Transition of Care  8/24/2024 0702 by Jing Gaffney RN  Outcome: Progressing  8/24/2024 0701 by Jing Gaffney RN  Outcome: Progressing   Goal Outcome Evaluation:      Plan of Care Reviewed With: patient    Overall Patient Progress: improvingOverall Patient Progress: improving  Neuro: A&Ox4.   Cardiac: SR ST VSS.   Respiratory: Sating above 95% on RA.  GI/: Adequate urine output. BM X1  Diet/appetite: Tolerating regular thin  liquids diet. Eating well.  Activity:  Assist of 1 up to chair and in halls.  Pain: At acceptable level on current regimen. On oxycodone and dilaudid. Ropivacaine discontinued.  Skin: No new deficits noted. Clampshell dry with staples.  LDA's: PICC 2 lumens, Ndt tube to TF 60 ml/hr    Plan: Continue with POC. Notify primary team with changes.

## 2024-08-24 NOTE — PROGRESS NOTES
Anesthesia Pain Catheter Removal Note     Patient is s/p lung transplant via clamshell incision with Dr Mulvihill on 8/15/24, with bilateral paravertebral (PV) T7-8  on 8/18/24. Removed both catheters around 2130 hours with both tips intact. May resume heparin as scheduled at 2230 hours today.      Inpatient Pain Service will sign off at this time.      Hailey Kim DO, PGY-2/CA-1  Anesthesia Resident   Inpatient Pain Service   Contact via Basic6

## 2024-08-24 NOTE — PROGRESS NOTES
"Calorie Count  Intake recorded for: 8/23  Total Kcals: 490 Total Protein: 16g  Kcals from Hospital Food: 490   Protein: 16g  Kcals from Outside Food (average):0 Protein: 0g  # Meals Ordered from Kitchen: 2  # Meals Recorded: 1  1: 100% deli sandwich on white bread, 4 pastrana slices, 2 tomato slices, lettuce, 2 tapia  # Supplements Recorded: 0  Note: 8/24 meal ticket includes \"8/23 - 100% grilled chicken salad\" - insufficient information given to calculate kcal & protein  "

## 2024-08-24 NOTE — PROGRESS NOTES
Transfer  Transferred from: 4A  Via:bed  Reason for transfer: Pt appropriate for 6B- improved patient condition  Family: Aware of transfer  Belongings: Received with pt  Chart: Received with pt  Medications: Meds received from old unit with pt  Code Status verified on armband: yes  2 RN Skin Assessment Completed By:Heron RN   Med rec completed: no  Suction/Ambu bag/Flowmeter at bedside: yes    Report received from: Gini MENDOZA nurse   Pt status: LOCx4. SR/ST on tele.RA. Regular diet with joe counts until 8/24/2024. Cyclic tube feeds start tonight from 8p-8a via ND. Up with SBA, walker in hallways. Chest tubes and wound vac taken out today. Received lasix today and had good out put via urinal. Denies pain. Double lumen PICC with intermittent antibiotics.

## 2024-08-24 NOTE — PLAN OF CARE
Goal Outcome Evaluation:    Neuro: A&Ox4  Cardiac: SR VSS   Respiratory: Sating >90% on RA.  GI/: Adequate urine output. BM X1   Diet/appetite: Tolerating regular diet. Eating well.  Activity:  Assist of 1 SBA inside room, up to chair and in halls with FWW  Pain: At acceptable level on current regimen.   Skin: No new deficits noted.  LDA's: PICC CHELA    Plan: Continue with POC. Notify primary team with changes.

## 2024-08-24 NOTE — PROGRESS NOTES
Pulmonary Medicine  Cystic Fibrosis - Lung Transplant Team  Progress Note  2024     Patient: Travon Cartwright  MRN: [id number]  : 1965 (age 59 year old)  Transplant: 8/15/2024 (Lung), POD#9  Admission date: 2024    Assessment & Plan:   Travon Cartwright is a 59 year old male with a history of idiopathic pulmonary fibrosis, diabetes, hepatosteatosis, and essential tremor.  Pt. is now s/p BSLT on 8/15/2024 with Dr. Mulvihill.  Surgery was uncomplicated and done on pump, extubated and off pressors since 2024. Clinically improved, minimal oxygen requirements at this point.     Recommendations:    Currently on room air, keep Spo2 > 92%  Continue with airway clearance therapies  Daily CXR for now-slight increase in perihilar opacities  Bronchoscopy scheduled for   Diuresis per primary team; agree with holding today (tachycardia and close to dry weight)-re-assess daily  Increase metoprolol for sinus tachycardia at rest  Monitor calorie counts-TF to be continued  IS: Tacro dose increased -daily levels ordered  PPX: As below.   Abx - Zosyn till 24    Fili Knight MD [facility]  Associate Professor of Medicine  Division of Pulmonary, Allergy & Critical Care   [facility]  [facility]       S/p bilateral sequential lung transplant (BSLT) 8/15/24 for idiopathic pulmonary fibrosis:  Acute on chronic hypoxic respiratory failure (post operative):   - Wean oxygen as tolerated per Gold 10 team  - Nebs: levalbuterol and Mucomyst QID  - Pulmonary toilet with chest physiotherapy QID (addition of Aerobika and incentive spirometry now that he's extubated)  - DSA at one week (drawn , pending) then one month post-transplant (additionally per protocol)  - Ammonia monitoring qMTh (screening for hyperammonemia post-lung transplant) with ureaplasma PCR ordered POD #7 (, collected and pending) per protocol   - ES catheter placed by anesthesia   prior to extubation  - Chest tubes managed by surgical team; daily CXRs with chest tubes in place   - Await explant pathology     Immunosuppression:  Induction therapy with high dose IV steroid intraoperatively and basiliximab (POD #0 and #4).   - Tacrolimus Goal  8-12. Dose increased 8/24-daily levels ordered.  - MMF 1000 mg BID  - Methylprednisolone 125 mg x 3 doses, then prednisolone 30 mg daily with taper per lung transplant protocol: (taper ordered)  Date Daily Dose (mg)   8/17/2024 30   8/24/2024 25   8/31/2024 20   9/21/2024 15   10/12/2024 10   11/2/2024 5      Prophylaxis:   - Bactrim for PJP ppx started POD #1 due to donor toxoplasma IgG+  - VGCV for CMV ppx (ordered as below), CMV monitoring per protocol (after completion of ppx course, additionally prn)  - Ampho B nebs twice weekly for antifungal ppx through discharge, then will stop  - Nystatin for oral candidiasis ppx, 6 month course   - Micofungin 100mg q24h for prophylaxis x 10 days (end date: 8/25)  - See below for serologies and viral ppx:    Donor Recipient Intervention   CMV status - + Valganciclovir POD #8-90   EBV status - + EBV monthly   HSV status N/A + none        ID: Prior history of infection/colonization with Strep constellatus (bronch BAL on L 8/16), donor cultures positive for Staph.  - IgG at one month (ordered 9/15)  - Donor cultures ([facility]) NGTD; ([facility]) NGTD ([facility] personally reviewed today) - staph aueus +  - Recipient cultures - washing from left 8/16- strep constellatus  - Abx- Completed IV ceftaz. Vancomycin resumed 8/17-8/21 for Staph in donor cx, Strep in recipient BAL. Plan for zosyn 8/20-8/25 (7 days).  - Repeat blood cultures 8/19 for fevers over night; plan for zosyn x 7 days     PHS risk criteria donor:  Additional labs required post-transplant (between 4-8 weeks post-op): Hepatitis B, Hepatitis C, and HIV by JOVANY ([id number], ordered POD #30, ordered for 9/15).     EGJ outflow obstruction (?) inconclusive: Noted on esophageal  "manometry 7/9. Proceed with J feeds. No need for NPO for 6 weeks.     Diabetes mellitus: Prior to transplant was on Jardiance, dulaglutide, glargine, metformin. All currently held.  - Lantus and sliding scale insulin per primary team     Toxic-metabolic encephalopathy: Likely ICU delirium, which continues to improve.  - PRN melatonin at bedtime      We appreciate the excellent care provided by the [facility] 10 team. Recommendations communicated via in person rounding and this note.  Will continue to follow along closely, please do not hesitate to call with any questions or concerns.    Patient seen and staffed with Dr. Lowery.    Mallorie Hoover MD  Internal Medicine-Pediatrics  Pulmonary/Critical Care Fellow - PGY7/FL2  [facility]  P: [phone]       Subjective & Interval History:     Juan J says that he's feeling \"really good.\" Mine was at bedside during our visit. He denies chest pain, breathing issues, or nausea. He did get his Italian Ice slushie yesterday and drank the whole thing. Slept poorly, but no more hallucinations.    Review of Systems:     C: No fever, no chills, decrease in weight; tolerating full diet with thin liquids  INTEGUMENTARY/SKIN: No rash or obvious new lesions  ENT/MOUTH: No sore throat, no sinus pain, no nasal congestion or drainage  RESP: See interval history  CV: no chest pain, no palpitations, no peripheral edema  GI: No nausea, no vomiting, no change in stools, no reflux symptoms  : No dysuria  MUSCULOSKELETAL: No myalgias, no arthralgias  ENDOCRINE: Blood sugars with adequate control  NEURO: No headache  PSYCHIATRIC: Mood stable    Physical Exam:     All notes, images, and labs from past 24 hours (at minimum) were reviewed.    Vital signs:  Temp: 98.6  F (37  C) Temp src: Oral BP: (!) 153/85 Pulse: 100   Resp: 20 SpO2: 95 % O2 Device: None (Room air) Oxygen Delivery: 1 LPM Height: 177.8 cm (5' 10\") Weight: 70.2 kg (154 lb 11.2 oz)      Constitutional: sitting in bed, room air, " in no apparent distress; appears in good spirits   HEENT: Eyes with pink conjunctivae, anicteric.  Oral mucosa moist without lesions. No thrush. NJ tube in place.  PULM: Good air flow bilaterally.  No crackles, no rhonchi, no wheezes. No increased work of breathing, saturations >95% on RA.  CV: Normal S1 and S2.  RRR.  No murmur, gallop, or rub.  No peripheral edema.   ABD: NABS, soft, nontender, nondistended.    MSK: Moves all extremities.  No apparent muscle wasting.   NEURO: Alert and conversant.   SKIN: Warm, dry.  No rash on limited exam.   PSYCH: Mood stable.     Data:     LABS    CMP:   Recent Labs   Lab 08/24/24  1237 08/24/24  0843 08/24/24  0624 08/24/24  0206 08/23/24  1743 08/23/24  1429 08/23/24  0904 08/23/24  0443 08/22/24  0807 08/22/24  0543 08/21/24  0821 08/21/24  0331 08/19/24  0558 08/19/24  0347   NA  --   --  132*  --   --   --   --  134*  --  140  --  138   < > 138   POTASSIUM  --   --  4.3  --   --   --   --  4.0  --  4.1  --  4.0   < > 4.5   CHLORIDE  --   --  99  --   --   --   --  101  --  105  --  103   < > 106   CO2  --   --  25  --   --   --   --  26  --  26  --  27   < > 26   ANIONGAP  --   --  8  --   --   --   --  7  --  9  --  8   < > 6*   * 203* 269* 282*   < >  --    < > 223*   < > 195*  200*   < > 217*   < > 139*  147*   BUN  --   --  15.1  --   --   --   --  14.7  --  17.9  --  18.6   < > 15.4   CR  --   --  0.55*  --   --   --   --  0.52*  --  0.61*  --  0.52*   < > 0.46*   GFRESTIMATED  --   --  >90  --   --   --   --  >90  --  >90  --  >90   < > >90   HUGO  --   --  8.5*  --   --   --   --  8.4*  --  8.8  --  8.5*   < > 8.4*   MAG  --   --  1.5*  --   --  1.9  --  1.4*  --  1.6*  --  1.8   < > 1.9   PHOS  --   --  2.5  --   --   --   --  2.6  --  2.8  --  3.1   < > 2.3*   PROTTOTAL  --   --   --   --   --   --   --   --   --  5.5*  --   --   --  5.0*   ALBUMIN  --   --   --   --   --   --   --   --   --  2.8*  --   --   --  2.6*   BILITOTAL  --   --   --   --   --    --   --   --   --  0.4  --   --   --  0.4   ALKPHOS  --   --   --   --   --   --   --   --   --  139  --   --   --  76   AST  --   --   --   --   --   --   --   --   --  33  --   --   --  47*   ALT  --   --   --   --   --   --   --   --   --  43  --   --   --  22    < > = values in this interval not displayed.     CBC:   Recent Labs   Lab 08/24/24  0624 08/23/24  0443 08/22/24  0543 08/21/24  0331   WBC 14.4* 12.7* 11.9* 9.9   RBC 4.10* 4.03* 3.87* 4.35*   HGB 11.9* 11.7* 11.4* 12.6*   HCT 37.6* 37.0* 35.3* 39.7*   MCV 92 92 91 91   MCH 29.0 29.0 29.5 29.0   MCHC 31.6 31.6 32.3 31.7   RDW 13.3 13.2 13.5 13.2    228 257 183       INR:   No lab results found in last 7 days.      Glucose:   Recent Labs   Lab 08/24/24  1237 08/24/24  0843 08/24/24  0624 08/24/24  0206 08/23/24  2140 08/23/24  1743   * 203* 269* 282* 211* 230*       Blood Gas:   Recent Labs   Lab 08/20/24  1619 08/20/24  1128 08/20/24  0353   O2PER 26 40 50     IMAGING    Recent Results (from the past 24 hour(s))   XR Chest 2 Views    Narrative    EXAM: XR CHEST 2 VIEWS  8/23/2024 4:13 PM      HISTORY: post chest tube pull    COMPARISON: Same day chest radiograph at 0117 hours 8/23/2024    FINDINGS: Two views of the chest. Interval removal of bibasilar chest  tubes. An enteric tube is present with the tip and sidehole beyond the  field-of-view. Right-sided PICC line is present with the tip within  the right atrium. Stable postsurgical changes of the chest with intact  clam shell sternotomy wires and skin staples. Left atrial appendage  clip.    The trachea is midline. No appreciable pneumothorax. Costophrenic  angles are sharp, no significant pleural effusion. Interval increase  in perihilar and bibasilar streaky opacities. The cardiomediastinal  silhouette is unremarkable.      Impression    IMPRESSION:   1. Interval worsening in perihilar and bibasilar streaky opacities  likely represent atelectasis versus edema.  2. No appreciable  pneumothorax.  3. Stable postsurgical changes of the chest and positioning of support  devices.    I have personally reviewed the examination and initial interpretation  and I agree with the findings.    ELISSA PUGA MD         SYSTEM ID:  [id number]

## 2024-08-24 NOTE — PROGRESS NOTES
[facility]    Medicine Progress Note - Hospitalist Service, GOLD TEAM 10    Date of Admission:  8/14/2024    Assessment & Plan   Shyam Cartwright is a 59 year old man with history including IPF with chronic hypoxemic resp failure (baseline 6L oxygen), hepatic steatosis, and type 2 DM s/p bilateral lung transplant 8/15/24. Extubated 8/18/24; transferred from the ICU 8/21/24.    Today/interval events  - calorie counts 8/22-4; likely under-counted 8/23 based on his description  - R and L chest tubes removed yesterday with stable XR after  - increase carb-coverage insulin aspart ratio to 1 unit per 10g carbohydrate  - No diuretic today given tachycardia and euvolemia  - awaiting floor bed  - continue empiric piperacillin-tazobactam to 8/25     IPF s/p BL lung transplant (8/15/24)    Post operative ventilator support, extubated (8/18)   Acute hypoxemic respiratory failure (postop), improving  - Goal SpO2 >90%  - Pulmonary toilet   - Chest PT QID, aerobika, IS  - Ammonia qMTh  - Scheduled Mucomyst, levalbuterol  - S/p Methylprednisolone 125 q8hrs x3 doses, now on prednisone 30 daily with taper planning per pulm  - Analgesia  - Scheduled: tylenol   - PRN: oxycodone, hydromorphone, robaxin  - Paravertebral catheters bilateral per IPS (8/18 - current, possible removal 08/23/24 per RAPS)  - Bronchoscopy planned 8/27    Immunosuppression lung transplant   To complete perioperative regimen per transplant protocol.   - immunosuppression per Transplant Pulmonary team   - Basiliximab              - Mycophenolate, prednisolone with taper per pulm, tacrolimus (goal 8-12)    Infectious ppx for lung transplant   - Amphotericin B nebs twice weekly until discharge  - Micafungin 100mg daily through 8/25  - TMP-SMX started 08/16 based on donor toxo IgG+  - S/p Ceftazidime and Vancomycin course  - Valaciclovir (to begin 08/23)    Fever 8/19, resolved  Blood and sputum cultures 08/19: no growth to  date.             - empiric piperacillin-tazobactam x7 days (likely end date 08/23)     Delirium, resolved  - Delirium precautions.  - Sleep hygiene, reorientation, mobility. Scheduled: Melatonin  - Quetiapine reduce dose to 25mg hs, stopping 8/23    Shock, multifactorial, hypovolemic, distributive - resolved  Hx HTN  Hx of HLD  - Monitor hemodynamic status. Goal MAP >65, SBP <140  - Continue rosuvastatin 20 daily.  - Metoprolol 25 BID  - Discontinue PTA losartan     Moderate protein calorie malnutrition    Diarrhea  - Nutrition consulted, appreciate recommendations.   - NJ for TF, currently at goal  - Bowel regimen: MiraLA  - Rectal tube present. C. Diff pending. If negative scheduled loperamide     Hypophosphatemia, resolved  Hypomagnesemia  BL creat appears to be ~ 0.62-0.65   - Strict I/O, daily weights, Avoid/limit nephrotoxins as able  - Replete lytes PRN per protocol  - Goal NET negative -500 to 1L.   - Discontinued Nutriphos d/t worsening diarrhea.     Stress hyperglycemia  Type 2 diabetes mellitus  - Hgb A1C 8.4%   - insulin glargine 35 units qPM; stop morning glargine  - start insulin NPH to cover corticosteroid-induced hyperglycemia (can taper with steroid taper)  - Sliding scale insulin (high scale) TID WM and HS  - Goal BG <180 for optimal healing  - Holding home PTA metformin, Jardiance      Acute blood loss anemia  Acute blood loss thrombocytopenia, resolved  Coagulopathy  - No s/s of bleeding. Continue to monitor, Hgb goal > 7.0. Stable.     Sternotomy and Surgical Incision  Weakness and deconditioning    - Postoperative incision management per CVTS, sternal precautions  - PT/OT/CR - Recommending acute rehab    Hepatic steatosis          Diet: Regular Diet Adult Thin Liquids (level 0)  Snacks/Supplements Adult: Other; PRN - please allow pt to order any snack/supplement if requested; With Meals  Calorie Counts  Adult Formula Drip Feeding: Specified Time Osmolite 1.5; Nasoduodenal tube; Goal Rate: 60  mL/hr (goal) - initiate @ 10 mL/hr and advance by 10 mL q8hr as tolerated to goal rate; mL/hr; From: 8:00 PM; To: 8:00 AM; Do not advance tube feeding rate u...    DVT Prophylaxis: Heparin SQ  Goodrich Catheter: Not present  Lines: PRESENT      PICC 08/20/24 Double Lumen Right Basilic Difficult venous access. Frequent lab draws-Site Assessment: WDL      Cardiac Monitoring: None  Code Status: Full Code      Clinically Significant Risk Factors            # Hypomagnesemia: Lowest Mg = 1.4 mg/dL in last 2 days, will replace as needed   # Hypoalbuminemia: Lowest albumin = 2.2 g/dL at 8/15/2024  8:20 PM, will monitor as appropriate              # DMII: A1C = 8.4 % (Ref range: <5.7 %) within past 6 months    # Moderate Malnutrition: based on nutrition assessment             Disposition Plan     Medically Ready for Discharge: Anticipated in 5+ Days         Ha Olivarez DO  Hospitalist Service, GOLD TEAM 10  [facility]  Securely message with Vocera (more info)  Text page via [facility] Paging/Directory   See signed in provider for up to date coverage information  ______________________________________________________________________    Interval History   No acute events overnight. He is feeling quite well today. Pain not changed after paravertebral catheters removed yesterday. Breathing feels comfortable and he has been walking outside of the room. Continues to eat well. Update to sister at bedside.    Physical Exam   Vital Signs: Temp: 98.6  F (37  C) Temp src: Oral BP: (!) 153/85 Pulse: 100   Resp: 20 SpO2: 95 % O2 Device: None (Room air)    Weight: 154 lbs 11.2 oz    Gen: Awake and alert, appears comfortable, appears stated age, seated upright on bed.  HEENT: NCAT, sclerae anicteric.  CV: Regular rhythm, normal rate, extremities warm and well perfused, no lower extremity edema.  Pulm: Normal work of breathing on room air, mildly increasing with conversation; lungs clear to  auscultation but diminished at bases, no wheezing.  GI: Nontender, nondistended, soft. +bowel sounds.  Skin: Warm, dry, no jaundice.  Neuro: AO, speech normal, moves all extremities symmetrically.  Psych: Mood is good, even bright, affect is congruent.      Medical Decision Making       45 MINUTES SPENT BY ME on the date of service doing chart review, history, exam, documentation & further activities per the note.      Data     I have personally reviewed the following data over the past 24 hrs:    14.4 (H)  \   11.9 (L)   / 269     132 (L) 99 15.1 /  217 (H)   4.3 25 0.55 (L) \       Imaging results reviewed over the past 24 hrs:   Recent Results (from the past 24 hour(s))   XR Chest 2 Views    Narrative    EXAM: XR CHEST 2 VIEWS  8/23/2024 4:13 PM      HISTORY: post chest tube pull    COMPARISON: Same day chest radiograph at 0117 hours 8/23/2024    FINDINGS: Two views of the chest. Interval removal of bibasilar chest  tubes. An enteric tube is present with the tip and sidehole beyond the  field-of-view. Right-sided PICC line is present with the tip within  the right atrium. Stable postsurgical changes of the chest with intact  clam shell sternotomy wires and skin staples. Left atrial appendage  clip.    The trachea is midline. No appreciable pneumothorax. Costophrenic  angles are sharp, no significant pleural effusion. Interval increase  in perihilar and bibasilar streaky opacities. The cardiomediastinal  silhouette is unremarkable.      Impression    IMPRESSION:   1. Interval worsening in perihilar and bibasilar streaky opacities  likely represent atelectasis versus edema.  2. No appreciable pneumothorax.  3. Stable postsurgical changes of the chest and positioning of support  devices.    I have personally reviewed the examination and initial interpretation  and I agree with the findings.    ELISSA PUGA MD         SYSTEM ID:  [id number]

## 2024-08-25 ENCOUNTER — APPOINTMENT (OUTPATIENT)
Dept: GENERAL RADIOLOGY | Facility: CLINIC | Age: 59
End: 2024-08-25
Attending: STUDENT IN AN ORGANIZED HEALTH CARE EDUCATION/TRAINING PROGRAM
Payer: COMMERCIAL

## 2024-08-25 LAB
ANION GAP SERPL CALCULATED.3IONS-SCNC: 11 MMOL/L (ref 7–15)
BASOPHILS # BLD AUTO: 0.1 10E3/UL (ref 0–0.2)
BASOPHILS NFR BLD AUTO: 0 %
BUN SERPL-MCNC: 19.4 MG/DL (ref 8–23)
CALCIUM SERPL-MCNC: 9.1 MG/DL (ref 8.8–10.4)
CHLORIDE SERPL-SCNC: 99 MMOL/L (ref 98–107)
CREAT SERPL-MCNC: 0.52 MG/DL (ref 0.67–1.17)
EGFRCR SERPLBLD CKD-EPI 2021: >90 ML/MIN/1.73M2
EOSINOPHIL # BLD AUTO: 0.3 10E3/UL (ref 0–0.7)
EOSINOPHIL NFR BLD AUTO: 2 %
ERYTHROCYTE [DISTWIDTH] IN BLOOD BY AUTOMATED COUNT: 13.6 % (ref 10–15)
GLUCOSE BLDC GLUCOMTR-MCNC: 186 MG/DL (ref 70–99)
GLUCOSE BLDC GLUCOMTR-MCNC: 216 MG/DL (ref 70–99)
GLUCOSE BLDC GLUCOMTR-MCNC: 240 MG/DL (ref 70–99)
GLUCOSE BLDC GLUCOMTR-MCNC: 302 MG/DL (ref 70–99)
GLUCOSE BLDC GLUCOMTR-MCNC: 303 MG/DL (ref 70–99)
GLUCOSE BLDC GLUCOMTR-MCNC: 355 MG/DL (ref 70–99)
GLUCOSE SERPL-MCNC: 298 MG/DL (ref 70–99)
HCO3 SERPL-SCNC: 23 MMOL/L (ref 22–29)
HCT VFR BLD AUTO: 36.6 % (ref 40–53)
HGB BLD-MCNC: 11.6 G/DL (ref 13.3–17.7)
IMM GRANULOCYTES # BLD: 0.6 10E3/UL
IMM GRANULOCYTES NFR BLD: 4 %
LYMPHOCYTES # BLD AUTO: 1.5 10E3/UL (ref 0.8–5.3)
LYMPHOCYTES NFR BLD AUTO: 9 %
M GENITALIUM DNA SPEC QL NAA+PROBE: NOT DETECTED
M HOMINIS DNA SPEC QL NAA+PROBE: NOT DETECTED
MAGNESIUM SERPL-MCNC: 1.4 MG/DL (ref 1.7–2.3)
MAGNESIUM SERPL-MCNC: 1.5 MG/DL (ref 1.7–2.3)
MAGNESIUM SERPL-MCNC: 2 MG/DL (ref 1.7–2.3)
MCH RBC QN AUTO: 29 PG (ref 26.5–33)
MCHC RBC AUTO-ENTMCNC: 31.7 G/DL (ref 31.5–36.5)
MCV RBC AUTO: 92 FL (ref 78–100)
MONOCYTES # BLD AUTO: 1.3 10E3/UL (ref 0–1.3)
MONOCYTES NFR BLD AUTO: 8 %
NEUTROPHILS # BLD AUTO: 12.6 10E3/UL (ref 1.6–8.3)
NEUTROPHILS NFR BLD AUTO: 77 %
NRBC # BLD AUTO: 0 10E3/UL
NRBC BLD AUTO-RTO: 0 /100
PATH REPORT.COMMENTS IMP SPEC: NORMAL
PATH REPORT.COMMENTS IMP SPEC: NORMAL
PATH REPORT.FINAL DX SPEC: NORMAL
PATH REPORT.GROSS SPEC: NORMAL
PATH REPORT.MICROSCOPIC SPEC OTHER STN: NORMAL
PATH REPORT.RELEVANT HX SPEC: NORMAL
PHOSPHATE SERPL-MCNC: 2.7 MG/DL (ref 2.5–4.5)
PHOTO IMAGE: NORMAL
PLATELET # BLD AUTO: 291 10E3/UL (ref 150–450)
POTASSIUM SERPL-SCNC: 4.5 MMOL/L (ref 3.4–5.3)
RBC # BLD AUTO: 4 10E6/UL (ref 4.4–5.9)
SODIUM SERPL-SCNC: 133 MMOL/L (ref 135–145)
TACROLIMUS BLD-MCNC: 5.3 UG/L (ref 5–15)
TME LAST DOSE: NORMAL H
TME LAST DOSE: NORMAL H
U PARVUM DNA SPEC QL NAA+PROBE: NOT DETECTED
U UREALYTICUM DNA SPEC QL NAA+PROBE: NOT DETECTED
WBC # BLD AUTO: 16.4 10E3/UL (ref 4–11)

## 2024-08-25 PROCEDURE — 250N000009 HC RX 250: Performed by: SURGERY

## 2024-08-25 PROCEDURE — 250N000011 HC RX IP 250 OP 636

## 2024-08-25 PROCEDURE — 250N000013 HC RX MED GY IP 250 OP 250 PS 637: Performed by: STUDENT IN AN ORGANIZED HEALTH CARE EDUCATION/TRAINING PROGRAM

## 2024-08-25 PROCEDURE — 250N000012 HC RX MED GY IP 250 OP 636 PS 637: Performed by: SURGERY

## 2024-08-25 PROCEDURE — 250N000013 HC RX MED GY IP 250 OP 250 PS 637: Performed by: SURGERY

## 2024-08-25 PROCEDURE — 94668 MNPJ CHEST WALL SBSQ: CPT

## 2024-08-25 PROCEDURE — 80197 ASSAY OF TACROLIMUS: CPT | Performed by: INTERNAL MEDICINE

## 2024-08-25 PROCEDURE — 250N000011 HC RX IP 250 OP 636: Performed by: STUDENT IN AN ORGANIZED HEALTH CARE EDUCATION/TRAINING PROGRAM

## 2024-08-25 PROCEDURE — 250N000012 HC RX MED GY IP 250 OP 636 PS 637: Performed by: STUDENT IN AN ORGANIZED HEALTH CARE EDUCATION/TRAINING PROGRAM

## 2024-08-25 PROCEDURE — 99232 SBSQ HOSP IP/OBS MODERATE 35: CPT | Performed by: STUDENT IN AN ORGANIZED HEALTH CARE EDUCATION/TRAINING PROGRAM

## 2024-08-25 PROCEDURE — 83735 ASSAY OF MAGNESIUM: CPT | Performed by: STUDENT IN AN ORGANIZED HEALTH CARE EDUCATION/TRAINING PROGRAM

## 2024-08-25 PROCEDURE — 250N000012 HC RX MED GY IP 250 OP 636 PS 637: Performed by: INTERNAL MEDICINE

## 2024-08-25 PROCEDURE — 250N000013 HC RX MED GY IP 250 OP 250 PS 637: Performed by: INTERNAL MEDICINE

## 2024-08-25 PROCEDURE — 71045 X-RAY EXAM CHEST 1 VIEW: CPT | Mod: 26 | Performed by: RADIOLOGY

## 2024-08-25 PROCEDURE — 999N000157 HC STATISTIC RCP TIME EA 10 MIN

## 2024-08-25 PROCEDURE — 80048 BASIC METABOLIC PNL TOTAL CA: CPT | Performed by: PHYSICIAN ASSISTANT

## 2024-08-25 PROCEDURE — 84100 ASSAY OF PHOSPHORUS: CPT | Performed by: SURGERY

## 2024-08-25 PROCEDURE — 250N000013 HC RX MED GY IP 250 OP 250 PS 637

## 2024-08-25 PROCEDURE — 120N000005 HC R&B MS OVERFLOW UMMC

## 2024-08-25 PROCEDURE — 85025 COMPLETE CBC W/AUTO DIFF WBC: CPT | Performed by: SURGERY

## 2024-08-25 PROCEDURE — 83735 ASSAY OF MAGNESIUM: CPT | Performed by: INTERNAL MEDICINE

## 2024-08-25 PROCEDURE — 250N000013 HC RX MED GY IP 250 OP 250 PS 637: Performed by: PHYSICIAN ASSISTANT

## 2024-08-25 PROCEDURE — 258N000003 HC RX IP 258 OP 636: Performed by: STUDENT IN AN ORGANIZED HEALTH CARE EDUCATION/TRAINING PROGRAM

## 2024-08-25 PROCEDURE — 250N000011 HC RX IP 250 OP 636: Performed by: INTERNAL MEDICINE

## 2024-08-25 PROCEDURE — 71045 X-RAY EXAM CHEST 1 VIEW: CPT

## 2024-08-25 PROCEDURE — 83735 ASSAY OF MAGNESIUM: CPT | Performed by: SURGERY

## 2024-08-25 PROCEDURE — 94640 AIRWAY INHALATION TREATMENT: CPT

## 2024-08-25 PROCEDURE — 94640 AIRWAY INHALATION TREATMENT: CPT | Mod: 76

## 2024-08-25 PROCEDURE — 99233 SBSQ HOSP IP/OBS HIGH 50: CPT | Mod: 24 | Performed by: INTERNAL MEDICINE

## 2024-08-25 RX ORDER — MULTIVITAMIN,THERAPEUTIC
1 TABLET ORAL DAILY
Status: DISCONTINUED | OUTPATIENT
Start: 2024-08-26 | End: 2024-08-28 | Stop reason: HOSPADM

## 2024-08-25 RX ORDER — MAGNESIUM SULFATE HEPTAHYDRATE 40 MG/ML
4 INJECTION, SOLUTION INTRAVENOUS ONCE
Status: COMPLETED | OUTPATIENT
Start: 2024-08-25 | End: 2024-08-25

## 2024-08-25 RX ORDER — VALGANCICLOVIR 450 MG/1
900 TABLET, FILM COATED ORAL DAILY
Status: DISCONTINUED | OUTPATIENT
Start: 2024-08-26 | End: 2024-08-28 | Stop reason: HOSPADM

## 2024-08-25 RX ORDER — LOPERAMIDE HCL 2 MG
2 CAPSULE ORAL 4 TIMES DAILY PRN
Status: DISCONTINUED | OUTPATIENT
Start: 2024-08-25 | End: 2024-08-28 | Stop reason: HOSPADM

## 2024-08-25 RX ORDER — PANTOPRAZOLE SODIUM 40 MG/1
40 TABLET, DELAYED RELEASE ORAL
Status: DISCONTINUED | OUTPATIENT
Start: 2024-08-26 | End: 2024-08-28 | Stop reason: HOSPADM

## 2024-08-25 RX ADMIN — MICAFUNGIN SODIUM 100 MG: 50 INJECTION, POWDER, LYOPHILIZED, FOR SOLUTION INTRAVENOUS at 12:34

## 2024-08-25 RX ADMIN — LEVALBUTEROL HYDROCHLORIDE 1.25 MG: 1.25 SOLUTION RESPIRATORY (INHALATION) at 09:02

## 2024-08-25 RX ADMIN — ACETYLCYSTEINE 2 ML: 200 SOLUTION ORAL; RESPIRATORY (INHALATION) at 15:49

## 2024-08-25 RX ADMIN — LEVALBUTEROL HYDROCHLORIDE 1.25 MG: 1.25 SOLUTION RESPIRATORY (INHALATION) at 20:12

## 2024-08-25 RX ADMIN — LEVALBUTEROL HYDROCHLORIDE 1.25 MG: 1.25 SOLUTION RESPIRATORY (INHALATION) at 15:49

## 2024-08-25 RX ADMIN — NYSTATIN 1000000 UNITS: 100000 SUSPENSION ORAL at 08:18

## 2024-08-25 RX ADMIN — METOPROLOL TARTRATE 50 MG: 50 TABLET, FILM COATED ORAL at 08:21

## 2024-08-25 RX ADMIN — PIPERACILLIN AND TAZOBACTAM 4.5 G: 4; .5 INJECTION, POWDER, LYOPHILIZED, FOR SOLUTION INTRAVENOUS at 08:19

## 2024-08-25 RX ADMIN — ACETAMINOPHEN 975 MG: 325 TABLET, FILM COATED ORAL at 21:32

## 2024-08-25 RX ADMIN — Medication 15 ML: at 12:26

## 2024-08-25 RX ADMIN — TACROLIMUS 4 MG: 5 CAPSULE ORAL at 18:05

## 2024-08-25 RX ADMIN — ACETAMINOPHEN 975 MG: 325 TABLET, FILM COATED ORAL at 12:25

## 2024-08-25 RX ADMIN — VALGANCICLOVIR HYDROCHLORIDE 900 MG: 50 POWDER, FOR SOLUTION ORAL at 08:26

## 2024-08-25 RX ADMIN — Medication 40 MG: at 08:26

## 2024-08-25 RX ADMIN — MYCOPHENOLATE MOFETIL 1000 MG: 250 CAPSULE ORAL at 21:33

## 2024-08-25 RX ADMIN — Medication 10 MG: at 20:13

## 2024-08-25 RX ADMIN — LEVALBUTEROL HYDROCHLORIDE 1.25 MG: 1.25 SOLUTION RESPIRATORY (INHALATION) at 11:49

## 2024-08-25 RX ADMIN — MAGNESIUM SULFATE IN WATER 4 G: 40 INJECTION, SOLUTION INTRAVENOUS at 15:14

## 2024-08-25 RX ADMIN — POTASSIUM & SODIUM PHOSPHATES POWDER PACK 280-160-250 MG 1 PACKET: 280-160-250 PACK at 12:27

## 2024-08-25 RX ADMIN — CALCIUM CARBONATE 600 MG (1,500 MG)-VITAMIN D3 400 UNIT TABLET 1 TABLET: at 10:24

## 2024-08-25 RX ADMIN — HYDROXYZINE HYDROCHLORIDE 25 MG: 25 TABLET, FILM COATED ORAL at 21:33

## 2024-08-25 RX ADMIN — METHOCARBAMOL 750 MG: 750 TABLET ORAL at 15:14

## 2024-08-25 RX ADMIN — HEPARIN SODIUM 5000 UNITS: 5000 INJECTION, SOLUTION INTRAVENOUS; SUBCUTANEOUS at 08:16

## 2024-08-25 RX ADMIN — ROSUVASTATIN CALCIUM 10 MG: 10 TABLET, FILM COATED ORAL at 08:21

## 2024-08-25 RX ADMIN — NYSTATIN 1000000 UNITS: 100000 SUSPENSION ORAL at 15:13

## 2024-08-25 RX ADMIN — Medication 60 ML: at 08:24

## 2024-08-25 RX ADMIN — TACROLIMUS 4 MG: 5 CAPSULE ORAL at 08:26

## 2024-08-25 RX ADMIN — HEPARIN SODIUM 5000 UNITS: 5000 INJECTION, SOLUTION INTRAVENOUS; SUBCUTANEOUS at 13:53

## 2024-08-25 RX ADMIN — MAGNESIUM SULFATE IN WATER 4 G: 40 INJECTION, SOLUTION INTRAVENOUS at 08:30

## 2024-08-25 RX ADMIN — METOPROLOL TARTRATE 50 MG: 50 TABLET, FILM COATED ORAL at 21:33

## 2024-08-25 RX ADMIN — INSULIN HUMAN 20 UNITS: 100 INJECTION, SUSPENSION SUBCUTANEOUS at 08:16

## 2024-08-25 RX ADMIN — SULFAMETHOXAZOLE AND TRIMETHOPRIM 1 TABLET: 400; 80 TABLET ORAL at 08:21

## 2024-08-25 RX ADMIN — Medication 5 ML: at 13:36

## 2024-08-25 RX ADMIN — METHOCARBAMOL 750 MG: 750 TABLET ORAL at 12:25

## 2024-08-25 RX ADMIN — MYCOPHENOLATE MOFETIL 1000 MG: 250 CAPSULE ORAL at 08:22

## 2024-08-25 RX ADMIN — NYSTATIN 1000000 UNITS: 100000 SUSPENSION ORAL at 12:26

## 2024-08-25 RX ADMIN — Medication 10 MG: at 09:16

## 2024-08-25 RX ADMIN — NYSTATIN 1000000 UNITS: 100000 SUSPENSION ORAL at 21:32

## 2024-08-25 RX ADMIN — METHOCARBAMOL 750 MG: 750 TABLET ORAL at 08:21

## 2024-08-25 RX ADMIN — Medication 10 MG: at 21:32

## 2024-08-25 RX ADMIN — CALCIUM CARBONATE 600 MG (1,500 MG)-VITAMIN D3 400 UNIT TABLET 1 TABLET: at 22:48

## 2024-08-25 RX ADMIN — METHOCARBAMOL 750 MG: 750 TABLET ORAL at 21:32

## 2024-08-25 RX ADMIN — POTASSIUM & SODIUM PHOSPHATES POWDER PACK 280-160-250 MG 1 PACKET: 280-160-250 PACK at 08:26

## 2024-08-25 RX ADMIN — ACETYLCYSTEINE 2 ML: 200 SOLUTION ORAL; RESPIRATORY (INHALATION) at 09:02

## 2024-08-25 RX ADMIN — PIPERACILLIN AND TAZOBACTAM 4.5 G: 4; .5 INJECTION, POWDER, LYOPHILIZED, FOR SOLUTION INTRAVENOUS at 21:29

## 2024-08-25 RX ADMIN — PIPERACILLIN AND TAZOBACTAM 4.5 G: 4; .5 INJECTION, POWDER, LYOPHILIZED, FOR SOLUTION INTRAVENOUS at 01:38

## 2024-08-25 RX ADMIN — ACETYLCYSTEINE 2 ML: 200 SOLUTION ORAL; RESPIRATORY (INHALATION) at 11:49

## 2024-08-25 RX ADMIN — PREDNISONE 25 MG: 5 TABLET ORAL at 08:21

## 2024-08-25 RX ADMIN — PIPERACILLIN AND TAZOBACTAM 4.5 G: 4; .5 INJECTION, POWDER, LYOPHILIZED, FOR SOLUTION INTRAVENOUS at 13:53

## 2024-08-25 RX ADMIN — ACETAMINOPHEN 975 MG: 325 TABLET, FILM COATED ORAL at 04:12

## 2024-08-25 RX ADMIN — HEPARIN SODIUM 5000 UNITS: 5000 INJECTION, SOLUTION INTRAVENOUS; SUBCUTANEOUS at 21:33

## 2024-08-25 RX ADMIN — ACETYLCYSTEINE 2 ML: 200 SOLUTION ORAL; RESPIRATORY (INHALATION) at 20:12

## 2024-08-25 ASSESSMENT — ACTIVITIES OF DAILY LIVING (ADL)
ADLS_ACUITY_SCORE: 26
ADLS_ACUITY_SCORE: 27
ADLS_ACUITY_SCORE: 27
ADLS_ACUITY_SCORE: 26
ADLS_ACUITY_SCORE: 27
ADLS_ACUITY_SCORE: 27
ADLS_ACUITY_SCORE: 26
ADLS_ACUITY_SCORE: 27
ADLS_ACUITY_SCORE: 26
ADLS_ACUITY_SCORE: 27
ADLS_ACUITY_SCORE: 26
ADLS_ACUITY_SCORE: 27

## 2024-08-25 NOTE — PROGRESS NOTES
[facility]    Medicine Progress Note - Hospitalist Service, GOLD TEAM 10    Date of Admission:  8/14/2024    Assessment & Plan   Shyam Cartwright is a 59 year old man with history including IPF with chronic hypoxemic resp failure (baseline 6L oxygen), hepatic steatosis, and type 2 DM s/p bilateral lung transplant 8/15/24. Extubated 8/18/24; transferred from the ICU 8/21/24.    Today/interval events  -Calories were not counted yesterday, but estimate he is meeting at least 75% of his need  -Remove NJ tube today  -Continue to monitor glycemic control as tube feeding is stopped  - No diuretic today given tachycardia and euvolemia  - awaiting floor bed  - continue empiric piperacillin-tazobactam to 8/25       IPF s/p BL lung transplant (8/15/24)    Post operative ventilator support, extubated (8/18)   Acute hypoxemic respiratory failure (postop), improving  - Goal SpO2 >90%  - Pulmonary toilet   - Chest PT QID, aerobika, IS  - Ammonia qMTh  - Scheduled Mucomyst, levalbuterol  - S/p Methylprednisolone 125 q8hrs x3 doses, now on prednisone 30 daily with taper planning per pulm  - Analgesia  - Scheduled: tylenol   - PRN: oxycodone, hydromorphone, robaxin  - Paravertebral catheters bilateral per IPS (8/18 - current, possible removal 08/23/24 per RAPS)  - Bronchoscopy planned 8/27    Immunosuppression lung transplant   To complete perioperative regimen per transplant protocol.   - immunosuppression per Transplant Pulmonary team   - Basiliximab              - Mycophenolate, prednisolone with taper per pulm, tacrolimus (goal 8-12)    Infectious ppx for lung transplant   - Amphotericin B nebs twice weekly until discharge  - Micafungin 100mg daily through 8/25  - TMP-SMX started 08/16 based on donor toxo IgG+  - S/p Ceftazidime and Vancomycin course  - Valaciclovir (to begin 08/23)    Fever 8/19, resolved  Blood and sputum cultures 08/19: no growth to date.             - empiric  piperacillin-tazobactam x7 days (end date 8/28-extended past date of next bronchoscopy for increasing leukocytosis)     Delirium, resolved  - Delirium precautions.  - Sleep hygiene, reorientation, mobility. Scheduled: Melatonin  - Quetiapine reduce dose to 25mg hs, stopped 8/23    Shock, multifactorial, hypovolemic, distributive - resolved  Hx HTN  Hx of HLD  - Monitor hemodynamic status. Goal MAP >65, SBP <140  - Continue rosuvastatin 20 daily.  - Metoprolol 50 mg BID  - Discontinue PTA losartan     Moderate protein calorie malnutrition    Diarrhea  - Nutrition consulted, appreciate recommendations.   - NJ for TF, currently at goal  - Bowel regimen: MiraLA  - Rectal tube present. C. Diff pending. If negative scheduled loperamide     Hypophosphatemia, resolved  Hypomagnesemia  BL creat appears to be ~ 0.62-0.65   - Strict I/O, daily weights, Avoid/limit nephrotoxins as able  - Replete lytes PRN per protocol  - Goal NET even  - Discontinued Nutriphos d/t worsening diarrhea.     Stress hyperglycemia  Type 2 diabetes mellitus  - Hgb A1C 8.4%   - insulin glargine 35 units qPM; stop morning glargine  - start insulin NPH to cover corticosteroid-induced hyperglycemia (can taper with steroid taper)  - Sliding scale insulin (high sensitivity) TID WM and HS  - Goal BG <180 for optimal healing  - Holding home PTA metformin, empagliflozin     Acute blood loss anemia  Acute blood loss thrombocytopenia, resolved  Coagulopathy  - No s/s of bleeding. Continue to monitor, Hgb goal > 7.0. Stable.     Sternotomy and Surgical Incision  Weakness and deconditioning    - Postoperative incision management per CVTS, sternal precautions  - PT/OT/CR - Recommending acute rehab    Hepatic steatosis          Diet: Regular Diet Adult Thin Liquids (level 0)  Snacks/Supplements Adult: Other; PRN - please allow pt to order any snack/supplement if requested; With Meals  Adult Formula Drip Feeding: Specified Time Osmolite 1.5; Nasoduodenal tube; Goal  Rate: 60 mL/hr (goal) - initiate @ 10 mL/hr and advance by 10 mL q8hr as tolerated to goal rate; mL/hr; From: 8:00 PM; To: 8:00 AM; Do not advance tube feeding rate u...    DVT Prophylaxis: Heparin SQ  Goodrich Catheter: Not present  Lines: PRESENT      PICC 08/20/24 Double Lumen Right Basilic Difficult venous access. Frequent lab draws-Site Assessment: WDL      Cardiac Monitoring: None  Code Status: Full Code      Clinically Significant Risk Factors            # Hypomagnesemia: Lowest Mg = 1.4 mg/dL in last 2 days, will replace as needed   # Hypoalbuminemia: Lowest albumin = 2.2 g/dL at 8/15/2024  8:20 PM, will monitor as appropriate              # DMII: A1C = 8.4 % (Ref range: <5.7 %) within past 6 months    # Moderate Malnutrition: based on nutrition assessment             Disposition Plan     Medically Ready for Discharge: Anticipated in 5+ Days         Ha Olivarez DO  Hospitalist Service, GOLD TEAM 10  [facility]  Securely message with [email or web address] (more info)  Text page via [facility] Paging/Directory   See signed in provider for up to date coverage information  ______________________________________________________________________    Interval History   No acute events overnight. He is feeling quite well again today.  Breathing feels comfortable but he does note increased work of breathing with walks around the unit. Continues to eat well-yesterday had 4 chicken tacos and a large omelette for breakfast.  Today he had 2 breakfast burritos before 10 AM. Update to sister at bedside.    Physical Exam   Vital Signs: Temp: 97.6  F (36.4  C) Temp src: Oral BP: 107/80 Pulse: 90   Resp: 18 SpO2: 94 % O2 Device: None (Room air)    Weight: 152 lbs 8.93 oz    Gen: Awake and alert, appears comfortable, appears stated age, seated upright on bed.  HEENT: NCAT, sclerae anicteric.  CV: Regular rhythm, normal rate, extremities warm and well perfused, no lower extremity edema.  Pulm: Normal work  of breathing on room air, mildly increasing with conversation; lungs clear to auscultation but diminished at bases, no wheezing.  GI: Nontender, nondistended, soft. +bowel sounds.  Skin: Warm, dry, no jaundice.  Neuro: AO, speech normal, moves all extremities symmetrically.  Psych: Mood is good, even bright, affect is congruent.      Medical Decision Making       45 MINUTES SPENT BY ME on the date of service doing chart review, history, exam, documentation & further activities per the note.      Data     I have personally reviewed the following data over the past 24 hrs:    16.4 (H)  \   11.6 (L)   / 291     133 (L) 99 19.4 /  302 (H)   4.5 23 0.52 (L) \       Imaging results reviewed over the past 24 hrs:   Recent Results (from the past 24 hour(s))   XR Chest Port 1 View    Narrative    Exam: Chest 1 view portable 70 degrees upright 8/25/2024 10:15 AM     History:  lung transplant follow-up    Comparison: 8/24/2024    Findings: Surgical changes bilateral lung transplant. Right PICC with  tip in the right atrium. Cardiac silhouette is enlarged. Clamshell  sternotomy wires and skin staples. Feeding tube collimated off the  film and abdomen. Small left apical pneumothorax unchanged. Mild  interstitial opacities bilaterally.      Impression    IMPRESSION: Unchanged small left apical pneumothorax.    ELISSA PUGA MD         SYSTEM ID:  [id number]

## 2024-08-25 NOTE — PLAN OF CARE
"  Problem: Adult Inpatient Plan of Care  Goal: Plan of Care Review  Description: The Plan of Care Review/Shift note should be completed every shift.  The Outcome Evaluation is a brief statement about your assessment that the patient is improving, declining, or no change.  This information will be displayed automatically on your shift  note.  Outcome: Progressing  Flowsheets (Taken 8/25/2024 0346)  Outcome Evaluation: no pain  Plan of Care Reviewed With: patient  Goal: Patient-Specific Goal (Individualized)  Description: You can add care plan individualizations to a care plan. Examples of Individualization might be:  \"Parent requests to be called daily at 9am for status\", \"I have a hard time hearing out of my right ear\", or \"Do not touch me to wake me up as it startles  me\".  Outcome: Progressing  Goal: Absence of Hospital-Acquired Illness or Injury  Outcome: Progressing  Intervention: Identify and Manage Fall Risk  Recent Flowsheet Documentation  Taken 8/25/2024 0000 by Jing Gaffney, RN  Safety Promotion/Fall Prevention:   activity supervised   assistive device/personal items within reach   clutter free environment maintained   lighting adjusted   nonskid shoes/slippers when out of bed   safety round/check completed   treat reversible contributory factors   treat underlying cause  Taken 8/24/2024 2000 by Jing Gaffney, RN  Safety Promotion/Fall Prevention:   activity supervised   assistive device/personal items within reach   clutter free environment maintained   lighting adjusted   nonskid shoes/slippers when out of bed   safety round/check completed   treat reversible contributory factors   treat underlying cause  Intervention: Prevent Skin Injury  Recent Flowsheet Documentation  Taken 8/25/2024 0000 by Jing Gaffney, RN  Skin Protection: silicone foam dressing in place  Device Skin Pressure Protection: absorbent pad utilized/changed  Taken 8/24/2024 2345 by Jing Gaffney, RN  Body Position: position " changed independently  Taken 8/24/2024 2000 by Jing Gaffney RN  Skin Protection: silicone foam dressing in place  Device Skin Pressure Protection: absorbent pad utilized/changed  Taken 8/24/2024 1945 by Jing Gaffney RN  Body Position: position changed independently  Intervention: Prevent Infection  Recent Flowsheet Documentation  Taken 8/25/2024 0000 by Jing Gaffney RN  Infection Prevention:   equipment surfaces disinfected   hand hygiene promoted   personal protective equipment utilized   rest/sleep promoted   single patient room provided   visitors restricted/screened  Taken 8/24/2024 2000 by Jing Gaffney RN  Infection Prevention:   equipment surfaces disinfected   hand hygiene promoted   personal protective equipment utilized   rest/sleep promoted   single patient room provided   visitors restricted/screened  Goal: Optimal Comfort and Wellbeing  Outcome: Progressing  Intervention: Provide Person-Centered Care  Recent Flowsheet Documentation  Taken 8/25/2024 0000 by Jing Gaffney RN  Trust Relationship/Rapport:   care explained   choices provided   reassurance provided   thoughts/feelings acknowledged  Taken 8/24/2024 2000 by Jing Gaffney RN  Trust Relationship/Rapport:   care explained   choices provided   reassurance provided   thoughts/feelings acknowledged  Goal: Readiness for Transition of Care  Outcome: Progressing   Goal Outcome Evaluation:      Plan of Care Reviewed With: patient    Overall Patient Progress: improvingOverall Patient Progress: improving    Outcome Evaluation: no pain  Neuro: A&Ox4. Pleasant.  Cardiac:  VSS.   Respiratory: Sating above 95% on RA.  GI/: Adequate urine output. BM X1  Diet/appetite: Tolerating regular diet. Eating well.  Activity:  Assist of 1, up to chair and bathroom.   Pain: At acceptable level on current regimen.   Skin: No new deficits noted.  LDA's: PICC 2 lumens. SL    Plan: Continue with POC. Notify primary team with changes.

## 2024-08-25 NOTE — PROGRESS NOTES
Calorie Count  Intake recorded for: 8/24  Total Kcals: 0 Total Protein: 0g  Kcals from Hospital Food: 0   Protein: 0g  Kcals from Outside Food (average):0 Protein: 0g  # Meals Ordered from Kitchen: 1 meal  # Meals Recorded: no intake recorded  # Supplements Recorded: 0

## 2024-08-25 NOTE — PLAN OF CARE
"Goal Outcome Evaluation:      Plan of Care Reviewed With: patient    Overall Patient Progress: improvingOverall Patient Progress: improving    Outcome Evaluation: Denied pain. Sating >92% on RA, denied SOB. BG's continued to be elevated.    Pt alert and oriented. -120's, NSR. Sating >92% on RA, denied SOB. Clear/diminished. ND removed today. Regular diet, good appetite. Adequate UO, multiple loose BM's. R PICC, Hep locked. Phos and Magnesium replaced x2. SBA, ambulated in halls x2 today. Denied pain. Clamshell incision, PHYLLIS with old CT sites.     /80 (BP Location: Left arm)   Pulse 90   Temp 97.6  F (36.4  C) (Oral)   Resp 18   Ht 1.778 m (5' 10\")   Wt 69.2 kg (152 lb 8.9 oz)   SpO2 94%   BMI 21.89 kg/m      "

## 2024-08-25 NOTE — PROGRESS NOTES
Pulmonary Medicine  Cystic Fibrosis - Lung Transplant Team  Progress Note  2024     Patient: Travon Cartwright  MRN: [id number]  : 1965 (age 59 year old)  Transplant: 8/15/2024 (Lung), POD#10  Admission date: 2024    Assessment & Plan:   Travon Cartwright is a 59 year old male with a history of idiopathic pulmonary fibrosis, diabetes, hepatosteatosis, and essential tremor.  Pt. is now s/p BSLT on 8/15/2024 with Dr. Mulvihill.  Surgery was uncomplicated and done on pump, extubated and off pressors since 2024. Clinically improved, minimal oxygen requirements at this point.     Recommendations:    Currently on room air, keep Spo2 > 92%  Continue with airway clearance therapies  Daily CXR for now-slight increase in perihilar opacities  Bronchoscopy scheduled for   Diuresis per primary team; agree with holding today (tachycardia, -2 liters, and close to dry weight)-re-assess daily  Discontinue feeding tube ( calorie counts are inaccurate)  IS: Tacro dose increased -daily levels ordered  PPX: As below.   Abx - Zosyn-extended till .     Fili Knight MD [facility]  Associate Professor of Medicine  Division of Pulmonary, Allergy & Critical Care   [facility]  [facility]       S/p bilateral sequential lung transplant (BSLT) 8/15/24 for idiopathic pulmonary fibrosis:  Acute on chronic hypoxic respiratory failure (post operative):   - Wean oxygen as tolerated per Gold [facility] team  - Nebs: levalbuterol and Mucomyst QID  - Pulmonary toilet with chest physiotherapy QID (addition of Aerobika and incentive spirometry now that he's extubated)  - DSA at one week (drawn , pending) then one month post-transplant (additionally per protocol)  - Ammonia monitoring qMTh (screening for hyperammonemia post-lung transplant) with ureaplasma PCR ordered POD #7 (, collected and pending) per protocol   - ES catheter placed by anesthesia  prior to  extubation  - Chest tubes managed by surgical team; daily CXRs with chest tubes in place   - Await explant pathology     Immunosuppression:  Induction therapy with high dose IV steroid intraoperatively and basiliximab (POD #0 and #4).   - Tacrolimus Goal  8-12. Dose increased 8/24-daily levels ordered.  - MMF 1000 mg BID  - Methylprednisolone 125 mg x 3 doses, then prednisolone 30 mg daily with taper per lung transplant protocol: (taper ordered)  Date Daily Dose (mg)   8/17/2024 30   8/24/2024 25   8/31/2024 20   9/21/2024 15   10/12/2024 10   11/2/2024 5      Prophylaxis:   - Bactrim for PJP ppx started POD #1 due to donor toxoplasma IgG+  - VGCV for CMV ppx (ordered as below), CMV monitoring per protocol (after completion of ppx course, additionally prn)  - Ampho B nebs twice weekly for antifungal ppx through discharge, then will stop  - Nystatin for oral candidiasis ppx, 6 month course   - Micofungin 100mg q24h for prophylaxis x 10 days (end date: 8/25)  - See below for serologies and viral ppx:    Donor Recipient Intervention   CMV status - + Valganciclovir POD #8-90   EBV status - + EBV monthly   HSV status N/A + none        ID: Prior history of infection/colonization with Strep constellatus (bronch BAL on L 8/16), donor cultures positive for Staph.  - IgG at one month (ordered 9/15)  - Donor cultures ([facility]) NGTD; ([facility]) NGTD ([facility] personally reviewed today) - staph aueus +  - Recipient cultures - washing from left 8/16- strep constellatus  - Abx- Completed IV ceftaz. Vancomycin resumed 8/17-8/21 for Staph in donor cx, Strep in recipient BAL. Plan for zosyn 8/20-8/28 (10 days).  - Repeat blood cultures 8/19 for fevers over night; plan for zosyn x 10 days     PHS risk criteria donor:  Additional labs required post-transplant (between 4-8 weeks post-op): Hepatitis B, Hepatitis C, and HIV by JOVANY ([id number], ordered POD #30, ordered for 9/15).     EGJ outflow obstruction (?) inconclusive: Noted on esophageal manometry  "7/9. Proceed with J feeds. No need for NPO for 6 weeks.     Diabetes mellitus: Prior to transplant was on Jardiance, dulaglutide, glargine, metformin. All currently held.  - Lantus and sliding scale insulin per primary team     Toxic-metabolic encephalopathy (resolved): Likely ICU delirium.   - PRN melatonin at bedtime      We appreciate the excellent care provided by the [facility] team. Recommendations communicated via in person rounding and this note.  Will continue to follow along closely, please do not hesitate to call with any questions or concerns.       Subjective & Interval History:     Juan J says that he's feeling \"really good.\"On room air. Walked around the hallways. Great apetite-ate 3-4 meals. 2 buyrritos today. BM +.     Review of Systems:     C: No fever, no chills, decrease in weight; tolerating full diet with thin liquids  INTEGUMENTARY/SKIN: No rash or obvious new lesions  ENT/MOUTH: No sore throat, no sinus pain, no nasal congestion or drainage  RESP: See interval history  CV: no chest pain, no palpitations, no peripheral edema  GI: No nausea, no vomiting, no change in stools, no reflux symptoms  : No dysuria  MUSCULOSKELETAL: No myalgias, no arthralgias  ENDOCRINE: Blood sugars with adequate control  NEURO: No headache  PSYCHIATRIC: Mood stable    Physical Exam:     All notes, images, and labs from past 24 hours (at minimum) were reviewed.    Vital signs:  Temp: 98.5  F (36.9  C) Temp src: Oral BP: 128/81 Pulse: 89   Resp: 20 SpO2: 95 % O2 Device: None (Room air) Oxygen Delivery: 1 LPM Height: 177.8 cm (5' 10\") Weight: 69.2 kg (152 lb 8.9 oz)      Constitutional: sitting in bed, room air, in no apparent distress; appears in good spirits   HEENT: Eyes with pink conjunctivae, anicteric.  Oral mucosa moist without lesions. No thrush. NJ tube in place.  PULM: Good air flow bilaterally.  No crackles, no rhonchi, no wheezes. No increased work of breathing, saturations >95% on RA.  CV: Normal S1 and S2.  " RRR.  No murmur, gallop, or rub.  No peripheral edema.   ABD: NABS, soft, nontender, nondistended.    MSK: Moves all extremities.  No apparent muscle wasting.   NEURO: Alert and conversant.   SKIN: Warm, dry.  No rash on limited exam.   PSYCH: Mood stable.     Data:     LABS    CMP:   Recent Labs   Lab 08/25/24  1118 08/25/24  0721 08/25/24  0542 08/25/24  0137 08/24/24  0843 08/24/24  0624 08/23/24  1743 08/23/24  1429 08/23/24  0904 08/23/24  0443 08/22/24  0807 08/22/24  0543 08/19/24  0558 08/19/24  0347   NA  --   --  133*  --   --  132*  --   --   --  134*  --  140   < > 138   POTASSIUM  --   --  4.5  --   --  4.3  --   --   --  4.0  --  4.1   < > 4.5   CHLORIDE  --   --  99  --   --  99  --   --   --  101  --  105   < > 106   CO2  --   --  23  --   --  25  --   --   --  26  --  26   < > 26   ANIONGAP  --   --  11  --   --  8  --   --   --  7  --  9   < > 6*   * 216* 298* 240*   < > 269*   < >  --    < > 223*   < > 195*  200*   < > 139*  147*   BUN  --   --  19.4  --   --  15.1  --   --   --  14.7  --  17.9   < > 15.4   CR  --   --  0.52*  --   --  0.55*  --   --   --  0.52*  --  0.61*   < > 0.46*   GFRESTIMATED  --   --  >90  --   --  >90  --   --   --  >90  --  >90   < > >90   HUGO  --   --  9.1  --   --  8.5*  --   --   --  8.4*  --  8.8   < > 8.4*   MAG  --   --  1.5*  --   --  1.5*  --  1.9  --  1.4*  --  1.6*   < > 1.9   PHOS  --   --  2.7  --   --  2.5  --   --   --  2.6  --  2.8   < > 2.3*   PROTTOTAL  --   --   --   --   --   --   --   --   --   --   --  5.5*  --  5.0*   ALBUMIN  --   --   --   --   --   --   --   --   --   --   --  2.8*  --  2.6*   BILITOTAL  --   --   --   --   --   --   --   --   --   --   --  0.4  --  0.4   ALKPHOS  --   --   --   --   --   --   --   --   --   --   --  139  --  76   AST  --   --   --   --   --   --   --   --   --   --   --  33  --  47*   ALT  --   --   --   --   --   --   --   --   --   --   --  43  --  22    < > = values in this interval not displayed.      CBC:   Recent Labs   Lab 08/25/24  0542 08/24/24  0624 08/23/24  0443 08/22/24  0543   WBC 16.4* 14.4* 12.7* 11.9*   RBC 4.00* 4.10* 4.03* 3.87*   HGB 11.6* 11.9* 11.7* 11.4*   HCT 36.6* 37.6* 37.0* 35.3*   MCV 92 92 92 91   MCH 29.0 29.0 29.0 29.5   MCHC 31.7 31.6 31.6 32.3   RDW 13.6 13.3 13.2 13.5    269 228 257       INR:   No lab results found in last 7 days.      Glucose:   Recent Labs   Lab 08/25/24  1118 08/25/24  0721 08/25/24  0542 08/25/24  0137 08/24/24  2207 08/24/24  1746   * 216* 298* 240* 276* 328*       Blood Gas:   Recent Labs   Lab 08/20/24  1619 08/20/24  1128 08/20/24  0353   O2PER 26 40 50     IMAGING    Recent Results (from the past 24 hour(s))   XR Chest 2 Views    Narrative    EXAM: XR CHEST 2 VIEWS  8/23/2024 4:13 PM      HISTORY: post chest tube pull    COMPARISON: Same day chest radiograph at 0117 hours 8/23/2024    FINDINGS: Two views of the chest. Interval removal of bibasilar chest  tubes. An enteric tube is present with the tip and sidehole beyond the  field-of-view. Right-sided PICC line is present with the tip within  the right atrium. Stable postsurgical changes of the chest with intact  clam shell sternotomy wires and skin staples. Left atrial appendage  clip.    The trachea is midline. No appreciable pneumothorax. Costophrenic  angles are sharp, no significant pleural effusion. Interval increase  in perihilar and bibasilar streaky opacities. The cardiomediastinal  silhouette is unremarkable.      Impression    IMPRESSION:   1. Interval worsening in perihilar and bibasilar streaky opacities  likely represent atelectasis versus edema.  2. No appreciable pneumothorax.  3. Stable postsurgical changes of the chest and positioning of support  devices.    I have personally reviewed the examination and initial interpretation  and I agree with the findings.    ELISSA PUGA MD         SYSTEM ID:  [id number]

## 2024-08-26 ENCOUNTER — APPOINTMENT (OUTPATIENT)
Dept: ULTRASOUND IMAGING | Facility: CLINIC | Age: 59
End: 2024-08-26
Attending: STUDENT IN AN ORGANIZED HEALTH CARE EDUCATION/TRAINING PROGRAM
Payer: COMMERCIAL

## 2024-08-26 ENCOUNTER — APPOINTMENT (OUTPATIENT)
Dept: OCCUPATIONAL THERAPY | Facility: CLINIC | Age: 59
End: 2024-08-26
Attending: STUDENT IN AN ORGANIZED HEALTH CARE EDUCATION/TRAINING PROGRAM
Payer: COMMERCIAL

## 2024-08-26 ENCOUNTER — APPOINTMENT (OUTPATIENT)
Dept: GENERAL RADIOLOGY | Facility: CLINIC | Age: 59
End: 2024-08-26
Attending: STUDENT IN AN ORGANIZED HEALTH CARE EDUCATION/TRAINING PROGRAM
Payer: COMMERCIAL

## 2024-08-26 ENCOUNTER — APPOINTMENT (OUTPATIENT)
Dept: PHYSICAL THERAPY | Facility: CLINIC | Age: 59
End: 2024-08-26
Attending: STUDENT IN AN ORGANIZED HEALTH CARE EDUCATION/TRAINING PROGRAM
Payer: COMMERCIAL

## 2024-08-26 DIAGNOSIS — Z94.2 S/P LUNG TRANSPLANT (H): Primary | ICD-10-CM

## 2024-08-26 PROBLEM — J84.9 ILD (INTERSTITIAL LUNG DISEASE) (H): Status: RESOLVED | Noted: 2024-06-21 | Resolved: 2024-08-26

## 2024-08-26 PROBLEM — D84.9 IMMUNOSUPPRESSION (H): Status: ACTIVE | Noted: 2024-08-26

## 2024-08-26 PROBLEM — E83.42 HYPOMAGNESEMIA: Status: ACTIVE | Noted: 2024-08-26

## 2024-08-26 PROBLEM — Z79.2 ADMINISTRATION OF LONG-TERM PROPHYLACTIC ANTIBIOTICS: Status: ACTIVE | Noted: 2024-08-26

## 2024-08-26 PROBLEM — R93.89 ABNORMAL CT SCAN: Status: RESOLVED | Noted: 2024-06-21 | Resolved: 2024-08-26

## 2024-08-26 LAB
ACANTHOCYTES BLD QL SMEAR: ABNORMAL
ALBUMIN SERPL BCG-MCNC: 3.2 G/DL (ref 3.5–5.2)
ALP SERPL-CCNC: 170 U/L (ref 40–150)
ALT SERPL W P-5'-P-CCNC: 44 U/L (ref 0–70)
AMMONIA PLAS-SCNC: 43 UMOL/L (ref 16–60)
ANION GAP SERPL CALCULATED.3IONS-SCNC: 10 MMOL/L (ref 7–15)
AST SERPL W P-5'-P-CCNC: 27 U/L (ref 0–45)
AUER BODIES BLD QL SMEAR: ABNORMAL
BASO STIPL BLD QL SMEAR: ABNORMAL
BASOPHILS # BLD AUTO: 0.1 10E3/UL (ref 0–0.2)
BASOPHILS NFR BLD AUTO: 0 %
BILIRUB DIRECT SERPL-MCNC: <0.2 MG/DL (ref 0–0.3)
BILIRUB SERPL-MCNC: 0.4 MG/DL
BITE CELLS BLD QL SMEAR: ABNORMAL
BLISTER CELLS BLD QL SMEAR: ABNORMAL
BUN SERPL-MCNC: 15.8 MG/DL (ref 8–23)
BURR CELLS BLD QL SMEAR: SLIGHT
CALCIUM SERPL-MCNC: 9.5 MG/DL (ref 8.8–10.4)
CHLORIDE SERPL-SCNC: 101 MMOL/L (ref 98–107)
CREAT SERPL-MCNC: 0.64 MG/DL (ref 0.67–1.17)
DACRYOCYTES BLD QL SMEAR: ABNORMAL
EGFRCR SERPLBLD CKD-EPI 2021: >90 ML/MIN/1.73M2
ELLIPTOCYTES BLD QL SMEAR: ABNORMAL
EOSINOPHIL # BLD AUTO: 0.4 10E3/UL (ref 0–0.7)
EOSINOPHIL NFR BLD AUTO: 2 %
ERYTHROCYTE [DISTWIDTH] IN BLOOD BY AUTOMATED COUNT: 13.8 % (ref 10–15)
FRAGMENTS BLD QL SMEAR: ABNORMAL
GLUCOSE BLDC GLUCOMTR-MCNC: 113 MG/DL (ref 70–99)
GLUCOSE BLDC GLUCOMTR-MCNC: 149 MG/DL (ref 70–99)
GLUCOSE BLDC GLUCOMTR-MCNC: 235 MG/DL (ref 70–99)
GLUCOSE BLDC GLUCOMTR-MCNC: 242 MG/DL (ref 70–99)
GLUCOSE BLDC GLUCOMTR-MCNC: 264 MG/DL (ref 70–99)
GLUCOSE SERPL-MCNC: 106 MG/DL (ref 70–99)
HCO3 SERPL-SCNC: 25 MMOL/L (ref 22–29)
HCT VFR BLD AUTO: 39.2 % (ref 40–53)
HGB BLD-MCNC: 12.4 G/DL (ref 13.3–17.7)
HGB C CRYSTALS: ABNORMAL
HOWELL-JOLLY BOD BLD QL SMEAR: ABNORMAL
IMM GRANULOCYTES # BLD: 0.6 10E3/UL
IMM GRANULOCYTES NFR BLD: 3 %
LYMPHOCYTES # BLD AUTO: 1.7 10E3/UL (ref 0.8–5.3)
LYMPHOCYTES NFR BLD AUTO: 9 %
MAGNESIUM SERPL-MCNC: 1.8 MG/DL (ref 1.7–2.3)
MCH RBC QN AUTO: 29.2 PG (ref 26.5–33)
MCHC RBC AUTO-ENTMCNC: 31.6 G/DL (ref 31.5–36.5)
MCV RBC AUTO: 93 FL (ref 78–100)
MONOCYTES # BLD AUTO: 1.5 10E3/UL (ref 0–1.3)
MONOCYTES NFR BLD AUTO: 8 %
NEUTROPHILS # BLD AUTO: 14.1 10E3/UL (ref 1.6–8.3)
NEUTROPHILS NFR BLD AUTO: 78 %
NEUTS HYPERSEG BLD QL SMEAR: ABNORMAL
NRBC # BLD AUTO: 0 10E3/UL
NRBC BLD AUTO-RTO: 0 /100
PHOSPHATE SERPL-MCNC: 4.2 MG/DL (ref 2.5–4.5)
PLAT MORPH BLD: ABNORMAL
PLATELET # BLD AUTO: 290 10E3/UL (ref 150–450)
POLYCHROMASIA BLD QL SMEAR: SLIGHT
POTASSIUM SERPL-SCNC: 4.4 MMOL/L (ref 3.4–5.3)
PROT SERPL-MCNC: 6.1 G/DL (ref 6.4–8.3)
RBC # BLD AUTO: 4.24 10E6/UL (ref 4.4–5.9)
RBC AGGLUT BLD QL: ABNORMAL
RBC MORPH BLD: ABNORMAL
ROULEAUX BLD QL SMEAR: ABNORMAL
SICKLE CELLS BLD QL SMEAR: ABNORMAL
SMUDGE CELLS BLD QL SMEAR: ABNORMAL
SODIUM SERPL-SCNC: 136 MMOL/L (ref 135–145)
SPHEROCYTES BLD QL SMEAR: ABNORMAL
STOMATOCYTES BLD QL SMEAR: ABNORMAL
TACROLIMUS BLD-MCNC: 6.8 UG/L (ref 5–15)
TARGETS BLD QL SMEAR: ABNORMAL
TME LAST DOSE: NORMAL H
TME LAST DOSE: NORMAL H
TOXIC GRANULES BLD QL SMEAR: ABNORMAL
VARIANT LYMPHS BLD QL SMEAR: ABNORMAL
WBC # BLD AUTO: 17.9 10E3/UL (ref 4–11)

## 2024-08-26 PROCEDURE — 250N000012 HC RX MED GY IP 250 OP 636 PS 637: Performed by: STUDENT IN AN ORGANIZED HEALTH CARE EDUCATION/TRAINING PROGRAM

## 2024-08-26 PROCEDURE — 250N000013 HC RX MED GY IP 250 OP 250 PS 637: Performed by: PHYSICIAN ASSISTANT

## 2024-08-26 PROCEDURE — 97116 GAIT TRAINING THERAPY: CPT | Mod: GP | Performed by: PHYSICAL THERAPIST

## 2024-08-26 PROCEDURE — 82140 ASSAY OF AMMONIA: CPT | Performed by: SURGERY

## 2024-08-26 PROCEDURE — 250N000013 HC RX MED GY IP 250 OP 250 PS 637: Performed by: STUDENT IN AN ORGANIZED HEALTH CARE EDUCATION/TRAINING PROGRAM

## 2024-08-26 PROCEDURE — 80197 ASSAY OF TACROLIMUS: CPT | Performed by: INTERNAL MEDICINE

## 2024-08-26 PROCEDURE — 93970 EXTREMITY STUDY: CPT | Mod: XS

## 2024-08-26 PROCEDURE — 250N000011 HC RX IP 250 OP 636

## 2024-08-26 PROCEDURE — 82310 ASSAY OF CALCIUM: CPT | Performed by: PHYSICIAN ASSISTANT

## 2024-08-26 PROCEDURE — 99255 IP/OBS CONSLTJ NEW/EST HI 80: CPT | Mod: 24 | Performed by: PHYSICIAN ASSISTANT

## 2024-08-26 PROCEDURE — 99233 SBSQ HOSP IP/OBS HIGH 50: CPT | Mod: 24 | Performed by: INTERNAL MEDICINE

## 2024-08-26 PROCEDURE — 97535 SELF CARE MNGMENT TRAINING: CPT | Mod: GO

## 2024-08-26 PROCEDURE — 120N000005 HC R&B MS OVERFLOW UMMC

## 2024-08-26 PROCEDURE — 71045 X-RAY EXAM CHEST 1 VIEW: CPT | Mod: 26 | Performed by: RADIOLOGY

## 2024-08-26 PROCEDURE — 71045 X-RAY EXAM CHEST 1 VIEW: CPT

## 2024-08-26 PROCEDURE — 82248 BILIRUBIN DIRECT: CPT | Performed by: SURGERY

## 2024-08-26 PROCEDURE — 93970 EXTREMITY STUDY: CPT | Mod: 26 | Performed by: RADIOLOGY

## 2024-08-26 PROCEDURE — 85025 COMPLETE CBC W/AUTO DIFF WBC: CPT | Performed by: SURGERY

## 2024-08-26 PROCEDURE — 94640 AIRWAY INHALATION TREATMENT: CPT | Mod: 76

## 2024-08-26 PROCEDURE — 250N000012 HC RX MED GY IP 250 OP 636 PS 637: Performed by: SURGERY

## 2024-08-26 PROCEDURE — 94668 MNPJ CHEST WALL SBSQ: CPT

## 2024-08-26 PROCEDURE — 250N000013 HC RX MED GY IP 250 OP 250 PS 637

## 2024-08-26 PROCEDURE — 93970 EXTREMITY STUDY: CPT

## 2024-08-26 PROCEDURE — 84100 ASSAY OF PHOSPHORUS: CPT | Performed by: INTERNAL MEDICINE

## 2024-08-26 PROCEDURE — 250N000013 HC RX MED GY IP 250 OP 250 PS 637: Performed by: SURGERY

## 2024-08-26 PROCEDURE — 999N000157 HC STATISTIC RCP TIME EA 10 MIN

## 2024-08-26 PROCEDURE — 94640 AIRWAY INHALATION TREATMENT: CPT

## 2024-08-26 PROCEDURE — 83735 ASSAY OF MAGNESIUM: CPT | Performed by: SURGERY

## 2024-08-26 PROCEDURE — 99232 SBSQ HOSP IP/OBS MODERATE 35: CPT | Performed by: STUDENT IN AN ORGANIZED HEALTH CARE EDUCATION/TRAINING PROGRAM

## 2024-08-26 PROCEDURE — 97530 THERAPEUTIC ACTIVITIES: CPT | Mod: GP | Performed by: PHYSICAL THERAPIST

## 2024-08-26 PROCEDURE — 97530 THERAPEUTIC ACTIVITIES: CPT | Mod: GO

## 2024-08-26 PROCEDURE — 250N000009 HC RX 250: Performed by: SURGERY

## 2024-08-26 RX ORDER — ACYCLOVIR 400 MG/1
TABLET ORAL
Qty: 1 EACH | Refills: 11 | Status: SHIPPED | OUTPATIENT
Start: 2024-08-26

## 2024-08-26 RX ORDER — ACETYLCYSTEINE 200 MG/ML
2 SOLUTION ORAL; RESPIRATORY (INHALATION) 4 TIMES DAILY PRN
Qty: 30 ML | Refills: 1 | Status: SHIPPED | OUTPATIENT
Start: 2024-08-26

## 2024-08-26 RX ORDER — PREDNISONE 5 MG/1
TABLET ORAL
Qty: 269 TABLET | Refills: 0 | Status: SHIPPED | OUTPATIENT
Start: 2024-08-27 | End: 2024-08-28

## 2024-08-26 RX ORDER — ACETAMINOPHEN 325 MG/1
650 TABLET ORAL EVERY 4 HOURS PRN
Qty: 30 TABLET | Refills: 0 | Status: SHIPPED | OUTPATIENT
Start: 2024-08-26

## 2024-08-26 RX ORDER — MAGNESIUM OXIDE 400 MG/1
400 TABLET ORAL EVERY 4 HOURS
Status: COMPLETED | OUTPATIENT
Start: 2024-08-26 | End: 2024-08-26

## 2024-08-26 RX ORDER — TACROLIMUS 1 MG/1
4 CAPSULE ORAL 2 TIMES DAILY
Qty: 240 CAPSULE | Refills: 11 | Status: SHIPPED | OUTPATIENT
Start: 2024-08-26 | End: 2024-09-03

## 2024-08-26 RX ORDER — TACROLIMUS 0.5 MG/1
0.5 CAPSULE ORAL 2 TIMES DAILY PRN
Qty: 60 CAPSULE | Refills: 3 | Status: SHIPPED | OUTPATIENT
Start: 2024-08-26 | End: 2024-09-03

## 2024-08-26 RX ORDER — MAGNESIUM GLYCINATE 100 MG
200 CAPSULE ORAL 2 TIMES DAILY
Status: DISCONTINUED | OUTPATIENT
Start: 2024-08-26 | End: 2024-08-26

## 2024-08-26 RX ORDER — PANTOPRAZOLE SODIUM 40 MG/1
40 TABLET, DELAYED RELEASE ORAL
Qty: 30 TABLET | Refills: 0 | Status: SHIPPED | OUTPATIENT
Start: 2024-08-27 | End: 2024-09-09

## 2024-08-26 RX ORDER — TRAZODONE HYDROCHLORIDE 50 MG/1
50 TABLET, FILM COATED ORAL
Qty: 30 TABLET | Refills: 0 | Status: SHIPPED | OUTPATIENT
Start: 2024-08-26

## 2024-08-26 RX ORDER — MAGNESIUM GLYCINATE 100 MG
400 CAPSULE ORAL 3 TIMES DAILY
Qty: 360 CAPSULE | Refills: 0 | Status: SHIPPED | OUTPATIENT
Start: 2024-08-26 | End: 2024-09-05

## 2024-08-26 RX ORDER — METOPROLOL TARTRATE 50 MG
50 TABLET ORAL 2 TIMES DAILY
Qty: 60 TABLET | Refills: 0 | Status: SHIPPED | OUTPATIENT
Start: 2024-08-26 | End: 2024-09-09

## 2024-08-26 RX ORDER — MULTIVITAMIN,THERAPEUTIC
1 TABLET ORAL DAILY
Qty: 30 TABLET | Refills: 0 | Status: SHIPPED | OUTPATIENT
Start: 2024-08-26

## 2024-08-26 RX ORDER — OXYCODONE HYDROCHLORIDE 5 MG/1
5 TABLET ORAL EVERY 6 HOURS PRN
Qty: 18 TABLET | Refills: 0 | Status: SHIPPED | OUTPATIENT
Start: 2024-08-26 | End: 2024-09-01

## 2024-08-26 RX ORDER — NYSTATIN 100000/ML
1000000 SUSPENSION, ORAL (FINAL DOSE FORM) ORAL 4 TIMES DAILY
Qty: 1200 ML | Refills: 0 | Status: SHIPPED | OUTPATIENT
Start: 2024-08-26 | End: 2024-09-16

## 2024-08-26 RX ORDER — CALCIUM CARBONATE/VITAMIN D3 600 MG-10
1 TABLET ORAL 2 TIMES DAILY
Qty: 60 TABLET | Refills: 0 | Status: SHIPPED | OUTPATIENT
Start: 2024-08-26

## 2024-08-26 RX ORDER — VALGANCICLOVIR 450 MG/1
900 TABLET, FILM COATED ORAL DAILY
Qty: 60 TABLET | Refills: 0 | Status: SHIPPED | OUTPATIENT
Start: 2024-08-27 | End: 2024-09-09

## 2024-08-26 RX ORDER — LEVALBUTEROL INHALATION SOLUTION 1.25 MG/3ML
1.25 SOLUTION RESPIRATORY (INHALATION) 4 TIMES DAILY
Qty: 300 ML | Refills: 0 | Status: SHIPPED | OUTPATIENT
Start: 2024-08-26 | End: 2024-08-28

## 2024-08-26 RX ORDER — AMOXICILLIN 250 MG
2 CAPSULE ORAL 2 TIMES DAILY PRN
Qty: 120 TABLET | Refills: 0 | Status: SHIPPED | OUTPATIENT
Start: 2024-08-26

## 2024-08-26 RX ORDER — POLYETHYLENE GLYCOL 3350 17 G/17G
17 POWDER, FOR SOLUTION ORAL DAILY
Qty: 510 G | Refills: 0 | Status: SHIPPED | OUTPATIENT
Start: 2024-08-26 | End: 2024-08-28

## 2024-08-26 RX ORDER — SULFAMETHOXAZOLE AND TRIMETHOPRIM 400; 80 MG/1; MG/1
1 TABLET ORAL DAILY
Qty: 30 TABLET | Refills: 0 | Status: SHIPPED | OUTPATIENT
Start: 2024-08-27 | End: 2024-09-09

## 2024-08-26 RX ORDER — METHOCARBAMOL 750 MG/1
750 TABLET, FILM COATED ORAL 4 TIMES DAILY PRN
Qty: 30 TABLET | Refills: 0 | Status: SHIPPED | OUTPATIENT
Start: 2024-08-26 | End: 2024-08-29

## 2024-08-26 RX ORDER — MAGNESIUM GLYCINATE 100 MG
400 CAPSULE ORAL 3 TIMES DAILY
Status: DISCONTINUED | OUTPATIENT
Start: 2024-08-26 | End: 2024-08-28 | Stop reason: HOSPADM

## 2024-08-26 RX ORDER — MYCOPHENOLATE MOFETIL 250 MG/1
1000 CAPSULE ORAL 2 TIMES DAILY
Qty: 240 CAPSULE | Refills: 11 | Status: SHIPPED | OUTPATIENT
Start: 2024-08-26

## 2024-08-26 RX ORDER — ROSUVASTATIN CALCIUM 10 MG/1
10 TABLET, COATED ORAL DAILY
Qty: 30 TABLET | Refills: 0 | Status: SHIPPED | OUTPATIENT
Start: 2024-08-27 | End: 2024-09-09

## 2024-08-26 RX ORDER — TACROLIMUS 1 MG/1
4 CAPSULE ORAL
Status: DISCONTINUED | OUTPATIENT
Start: 2024-08-26 | End: 2024-08-28 | Stop reason: HOSPADM

## 2024-08-26 RX ADMIN — Medication 400 MG: at 19:07

## 2024-08-26 RX ADMIN — METHOCARBAMOL 750 MG: 750 TABLET ORAL at 19:08

## 2024-08-26 RX ADMIN — PIPERACILLIN AND TAZOBACTAM 4.5 G: 4; .5 INJECTION, POWDER, LYOPHILIZED, FOR SOLUTION INTRAVENOUS at 02:51

## 2024-08-26 RX ADMIN — LEVALBUTEROL HYDROCHLORIDE 1.25 MG: 1.25 SOLUTION RESPIRATORY (INHALATION) at 09:15

## 2024-08-26 RX ADMIN — HYDROXYZINE HYDROCHLORIDE 25 MG: 25 TABLET, FILM COATED ORAL at 21:07

## 2024-08-26 RX ADMIN — INSULIN HUMAN 20 UNITS: 100 INJECTION, SUSPENSION SUBCUTANEOUS at 08:53

## 2024-08-26 RX ADMIN — PIPERACILLIN AND TAZOBACTAM 4.5 G: 4; .5 INJECTION, POWDER, LYOPHILIZED, FOR SOLUTION INTRAVENOUS at 19:09

## 2024-08-26 RX ADMIN — ROSUVASTATIN CALCIUM 10 MG: 10 TABLET, FILM COATED ORAL at 08:52

## 2024-08-26 RX ADMIN — PANTOPRAZOLE SODIUM 40 MG: 40 TABLET, DELAYED RELEASE ORAL at 08:51

## 2024-08-26 RX ADMIN — METHOCARBAMOL 750 MG: 750 TABLET ORAL at 12:18

## 2024-08-26 RX ADMIN — HEPARIN SODIUM 5000 UNITS: 5000 INJECTION, SOLUTION INTRAVENOUS; SUBCUTANEOUS at 21:07

## 2024-08-26 RX ADMIN — ACETYLCYSTEINE 2 ML: 200 SOLUTION ORAL; RESPIRATORY (INHALATION) at 16:10

## 2024-08-26 RX ADMIN — ACETAMINOPHEN 975 MG: 325 TABLET, FILM COATED ORAL at 04:29

## 2024-08-26 RX ADMIN — MYCOPHENOLATE MOFETIL 1000 MG: 250 CAPSULE ORAL at 19:08

## 2024-08-26 RX ADMIN — NYSTATIN 1000000 UNITS: 100000 SUSPENSION ORAL at 16:31

## 2024-08-26 RX ADMIN — CALCIUM CARBONATE 600 MG (1,500 MG)-VITAMIN D3 400 UNIT TABLET 1 TABLET: at 09:13

## 2024-08-26 RX ADMIN — SULFAMETHOXAZOLE AND TRIMETHOPRIM 1 TABLET: 400; 80 TABLET ORAL at 08:52

## 2024-08-26 RX ADMIN — LEVALBUTEROL HYDROCHLORIDE 1.25 MG: 1.25 SOLUTION RESPIRATORY (INHALATION) at 20:52

## 2024-08-26 RX ADMIN — Medication 10 MG: at 20:52

## 2024-08-26 RX ADMIN — ACETYLCYSTEINE 2 ML: 200 SOLUTION ORAL; RESPIRATORY (INHALATION) at 20:52

## 2024-08-26 RX ADMIN — NYSTATIN 1000000 UNITS: 100000 SUSPENSION ORAL at 19:07

## 2024-08-26 RX ADMIN — METHOCARBAMOL 750 MG: 750 TABLET ORAL at 16:31

## 2024-08-26 RX ADMIN — OXYCODONE HYDROCHLORIDE 2.5 MG: 5 TABLET ORAL at 21:18

## 2024-08-26 RX ADMIN — TACROLIMUS 4 MG: 1 CAPSULE ORAL at 09:13

## 2024-08-26 RX ADMIN — TACROLIMUS 4 MG: 1 CAPSULE ORAL at 17:44

## 2024-08-26 RX ADMIN — LEVALBUTEROL HYDROCHLORIDE 1.25 MG: 1.25 SOLUTION RESPIRATORY (INHALATION) at 16:10

## 2024-08-26 RX ADMIN — NYSTATIN 1000000 UNITS: 100000 SUSPENSION ORAL at 08:52

## 2024-08-26 RX ADMIN — ACETYLCYSTEINE 2 ML: 200 SOLUTION ORAL; RESPIRATORY (INHALATION) at 09:15

## 2024-08-26 RX ADMIN — ACETYLCYSTEINE 2 ML: 200 SOLUTION ORAL; RESPIRATORY (INHALATION) at 12:22

## 2024-08-26 RX ADMIN — MAGNESIUM OXIDE TAB 400 MG (241.3 MG ELEMENTAL MG) 400 MG: 400 (241.3 MG) TAB at 08:52

## 2024-08-26 RX ADMIN — Medication 10 MG: at 09:15

## 2024-08-26 RX ADMIN — METOPROLOL TARTRATE 50 MG: 50 TABLET, FILM COATED ORAL at 19:08

## 2024-08-26 RX ADMIN — PIPERACILLIN AND TAZOBACTAM 4.5 G: 4; .5 INJECTION, POWDER, LYOPHILIZED, FOR SOLUTION INTRAVENOUS at 09:04

## 2024-08-26 RX ADMIN — MYCOPHENOLATE MOFETIL 1000 MG: 250 CAPSULE ORAL at 08:51

## 2024-08-26 RX ADMIN — THERA TABS 1 TABLET: TAB at 12:17

## 2024-08-26 RX ADMIN — LEVALBUTEROL HYDROCHLORIDE 1.25 MG: 1.25 SOLUTION RESPIRATORY (INHALATION) at 12:22

## 2024-08-26 RX ADMIN — HEPARIN SODIUM 5000 UNITS: 5000 INJECTION, SOLUTION INTRAVENOUS; SUBCUTANEOUS at 14:22

## 2024-08-26 RX ADMIN — METHOCARBAMOL 750 MG: 750 TABLET ORAL at 08:52

## 2024-08-26 RX ADMIN — HEPARIN SODIUM 5000 UNITS: 5000 INJECTION, SOLUTION INTRAVENOUS; SUBCUTANEOUS at 08:51

## 2024-08-26 RX ADMIN — Medication 10 MG: at 19:09

## 2024-08-26 RX ADMIN — NYSTATIN 1000000 UNITS: 100000 SUSPENSION ORAL at 12:18

## 2024-08-26 RX ADMIN — METOPROLOL TARTRATE 50 MG: 50 TABLET, FILM COATED ORAL at 08:52

## 2024-08-26 RX ADMIN — PIPERACILLIN AND TAZOBACTAM 4.5 G: 4; .5 INJECTION, POWDER, LYOPHILIZED, FOR SOLUTION INTRAVENOUS at 14:24

## 2024-08-26 RX ADMIN — VALGANCICLOVIR HYDROCHLORIDE 900 MG: 450 TABLET ORAL at 09:04

## 2024-08-26 RX ADMIN — MAGNESIUM OXIDE TAB 400 MG (241.3 MG ELEMENTAL MG) 400 MG: 400 (241.3 MG) TAB at 12:17

## 2024-08-26 RX ADMIN — ACETAMINOPHEN 975 MG: 325 TABLET, FILM COATED ORAL at 19:08

## 2024-08-26 RX ADMIN — PREDNISONE 25 MG: 5 TABLET ORAL at 08:51

## 2024-08-26 RX ADMIN — CALCIUM CARBONATE 600 MG (1,500 MG)-VITAMIN D3 400 UNIT TABLET 1 TABLET: at 21:07

## 2024-08-26 RX ADMIN — Medication 400 MG: at 14:36

## 2024-08-26 RX ADMIN — ACETAMINOPHEN 975 MG: 325 TABLET, FILM COATED ORAL at 12:17

## 2024-08-26 ASSESSMENT — ACTIVITIES OF DAILY LIVING (ADL)
ADLS_ACUITY_SCORE: 25
ADLS_ACUITY_SCORE: 26
ADLS_ACUITY_SCORE: 25
ADLS_ACUITY_SCORE: 25
ADLS_ACUITY_SCORE: 26
ADLS_ACUITY_SCORE: 25
ADLS_ACUITY_SCORE: 26
ADLS_ACUITY_SCORE: 25
ADLS_ACUITY_SCORE: 25
ADLS_ACUITY_SCORE: 26
ADLS_ACUITY_SCORE: 25
ADLS_ACUITY_SCORE: 25
ADLS_ACUITY_SCORE: 26
ADLS_ACUITY_SCORE: 25
ADLS_ACUITY_SCORE: 26

## 2024-08-26 NOTE — PROGRESS NOTES
Pulmonary Medicine  Cystic Fibrosis - Lung Transplant Team  Progress Note  2024     Patient: Travon Cartwright  MRN: [id number]  : 1965 (age 59 year old)  Transplant: 8/15/2024 (Lung), POD#11  Admission date: 2024    Assessment & Plan:   Travon Cartwright is a 59 year old male with a history of idiopathic pulmonary fibrosis, diabetes, hepatosteatosis, and essential tremor.  Pt. is now s/p BSLT on 8/15/2024 with Dr. Mulvihill.  Surgery was uncomplicated and done on pump, extubated and off pressors since 2024. Clinically improved, minimal oxygen requirements at this point.     Recommendations:    Currently on room air, keep Spo2 > 92%  Continue with airway clearance therapies  Daily CXR for now-slightly improved basilar airway opacities compared to yesterday.  Will need staples removed in clinic   Discharge screening bilateral upper and lower extremity ultrasounds ordered   Bronchoscopy scheduled for  at 8AM  Diuresis per primary team; agree with holding today - re-assess daily  IS: Tacro dose increased  - this morning's level is pending, will follow up and adjust accordingly   PPX: As below.   Abx - Zosyn-extended till .      S/p bilateral sequential lung transplant (BSLT) 8/15/24 for idiopathic pulmonary fibrosis:  Acute on chronic hypoxic respiratory failure (post operative):   - Oxygen as needed to keep spO2>90%  - Nebs: levalbuterol and Mucomyst QID  - Pulmonary toilet with chest physiotherapy QID (addition of Aerobika and incentive spirometry now that he's extubated)  - DSA at one week (drawn , pending) then one month post-transplant (additionally per protocol)  - Ammonia monitoring qMTh (screening for hyperammonemia post-lung transplant) with ureaplasma PCR ordered POD #7 (, negative) per protocol   - Chest tubes managed by surgical team; daily CXRs with chest tubes in place   - Explant pathology: L with UIP and organizing pneumonia (5 benign hilar LNs), R UIP  with organizing pneumonia (7 benign hilar LNs)     Immunosuppression:  Induction therapy with high dose IV steroid intraoperatively and basiliximab (POD #0 and #4).   - Tacrolimus Goal  8-12. Dose increased 8/24- today's level is pending.  - MMF 1000 mg BID  - Methylprednisolone 125 mg x 3 doses, then prednisolone 30 mg daily with taper per lung transplant protocol: (taper ordered)  Date Daily Dose (mg)   8/17/2024 30   8/24/2024 25   8/31/2024 20   9/21/2024 15   10/12/2024 10   11/2/2024 5      Prophylaxis:   - Bactrim for PJP ppx started POD #1 due to donor toxoplasma IgG+  - VGCV for CMV ppx (ordered as below), CMV monitoring per protocol (after completion of ppx course, additionally prn)  - Ampho B nebs twice weekly for antifungal ppx through discharge, then will stop  - Nystatin for oral candidiasis ppx, 6 month course   - Micofungin 100mg q24h for prophylaxis x 10 days (end date: 8/25)  - See below for serologies and viral ppx:    Donor Recipient Intervention   CMV status - + Valganciclovir POD #8-90   EBV status - + EBV monthly   HSV status N/A + none        ID: Prior history of infection/colonization with Strep constellatus (bronch BAL on L 8/16), donor cultures positive for Staph.  - IgG at one month (ordered 9/15)  - Donor cultures ([facility]) NGTD; ([facility]) NGTD ([facility] personally reviewed today) - staph aueus +  - Recipient cultures - washing from left 8/16- strep constellatus  - Abx- Completed IV ceftaz. Vancomycin resumed 8/17-8/21 for Staph in donor cx, Strep in recipient BAL. Plan for zosyn 8/20-8/28 (10 days).  - Repeat blood cultures 8/19 for fevers over night; plan for zosyn x 10 days     PHS risk criteria donor:  Additional labs required post-transplant (between 4-8 weeks post-op): Hepatitis B, Hepatitis C, and HIV by JOVANY ([id number], ordered POD #30, ordered for 9/15).     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 Proceed with J feeds. No need for NPO for 6 weeks.    "  Diabetes mellitus: Prior to transplant was on Jardiance, dulaglutide, glargine, metformin. All currently held.  - Lantus and sliding scale insulin per primary team     Toxic-metabolic encephalopathy (resolved): Likely ICU delirium.   - PRN melatonin at bedtime      We appreciate the excellent care provided by the [facility] team. Recommendations communicated via in person rounding and this note.  Will continue to follow along closely, please do not hesitate to call with any questions or concerns.    Patient staffed with Dr. Knigth.    Mallorie Hoover MD  Internal Medicine-Pediatrics  Pulmonary/Critical Care Fellow - PGY7/FL2  [facility]  P: [phone]       Subjective & Interval History:     No acute events over night. Juan J has been walking around on RA, denies having any pain. He took a shower today and feels good. Diet is going well. Denies shortness of breath. His sister, Mine, is planning on visiting later today.    Review of Systems:     C: No fever, no chills, decrease in weight; tolerating full diet with thin liquids  INTEGUMENTARY/SKIN: No rash or obvious new lesions  ENT/MOUTH: No sore throat, no sinus pain, no nasal congestion or drainage  RESP: See interval history  CV: no chest pain, no palpitations, no peripheral edema  GI: No nausea, no vomiting, no change in stools, no reflux symptoms  : No dysuria  MUSCULOSKELETAL: No myalgias, no arthralgias  ENDOCRINE: Blood sugars with adequate control  NEURO: No headache  PSYCHIATRIC: Mood stable    Physical Exam:     All notes, images, and labs from past 24 hours (at minimum) were reviewed.    Vital signs:  Temp: 98.3  F (36.8  C) Temp src: Axillary BP: 120/74 Pulse: 88   Resp: 22 SpO2: 94 % O2 Device: None (Room air) Oxygen Delivery: 1 LPM Height: 177.8 cm (5' 10\") Weight: 67.9 kg (149 lb 11.1 oz)    Constitutional: sitting in bed, room air, in no apparent distress; appears in good spirits  HEENT: Eyes with pink conjunctivae, anicteric.  Oral mucosa " moist without lesions. No thrush. NJ tube is out!  PULM: Good air flow bilaterally.  No crackles, no rhonchi, no wheezes. No increased work of breathing, saturations >95% on RA.  CV: Normal S1 and S2.  RRR.  No murmur, gallop, or rub.  No peripheral edema.   ABD: NABS, soft, nontender, nondistended.    MSK: Moves all extremities.  No apparent muscle wasting.   NEURO: Alert and conversant.   SKIN: Warm, dry.  No rash on limited exam.   PSYCH: Mood stable.     Data:     LABS    CMP:   Recent Labs   Lab 08/26/24  0951 08/26/24  0603 08/26/24  0249 08/25/24  2148 08/25/24  2138 08/25/24  1651 08/25/24  1331 08/25/24  0721 08/25/24  0542 08/24/24  0843 08/24/24  0624 08/23/24  0904 08/23/24  0443 08/22/24  0807 08/22/24  0543   NA  --  136  --   --   --   --   --   --  133*  --  132*  --  134*  --  140   POTASSIUM  --  4.4  --   --   --   --   --   --  4.5  --  4.3  --  4.0  --  4.1   CHLORIDE  --  101  --   --   --   --   --   --  99  --  99  --  101  --  105   CO2  --  25  --   --   --   --   --   --  23  --  25  --  26  --  26   ANIONGAP  --  10  --   --   --   --   --   --  11  --  8  --  7  --  9   * 106* 113*  --  303*   < >  --    < > 298*   < > 269*   < > 223*   < > 195*  200*   BUN  --  15.8  --   --   --   --   --   --  19.4  --  15.1  --  14.7  --  17.9   CR  --  0.64*  --   --   --   --   --   --  0.52*  --  0.55*  --  0.52*  --  0.61*   GFRESTIMATED  --  >90  --   --   --   --   --   --  >90  --  >90  --  >90  --  >90   HUGO  --  9.5  --   --   --   --   --   --  9.1  --  8.5*  --  8.4*  --  8.8   MAG  --  1.8  --  2.0  --   --  1.4*  --  1.5*  --  1.5*   < > 1.4*  --  1.6*   PHOS  --  4.2  --   --   --   --   --   --  2.7  --  2.5  --  2.6  --  2.8   PROTTOTAL  --  6.1*  --   --   --   --   --   --   --   --   --   --   --   --  5.5*   ALBUMIN  --  3.2*  --   --   --   --   --   --   --   --   --   --   --   --  2.8*   BILITOTAL  --  0.4  --   --   --   --   --   --   --   --   --   --   --   --  0.4    ALKPHOS  --  170*  --   --   --   --   --   --   --   --   --   --   --   --  139   AST  --  27  --   --   --   --   --   --   --   --   --   --   --   --  33   ALT  --  44  --   --   --   --   --   --   --   --   --   --   --   --  43    < > = values in this interval not displayed.     CBC:   Recent Labs   Lab 08/26/24  0603 08/25/24  0542 08/24/24  0624 08/23/24  0443   WBC 17.9* 16.4* 14.4* 12.7*   RBC 4.24* 4.00* 4.10* 4.03*   HGB 12.4* 11.6* 11.9* 11.7*   HCT 39.2* 36.6* 37.6* 37.0*   MCV 93 92 92 92   MCH 29.2 29.0 29.0 29.0   MCHC 31.6 31.7 31.6 31.6   RDW 13.8 13.6 13.3 13.2    291 269 228       INR:   No lab results found in last 7 days.      Glucose:   Recent Labs   Lab 08/26/24  0951 08/26/24  0603 08/26/24  0249 08/25/24  2138 08/25/24  1651 08/25/24  1303   * 106* 113* 303* 302* 355*       Blood Gas:   Recent Labs   Lab 08/20/24  1619 08/20/24  1128 08/20/24  0353   O2PER 26 40 50     IMAGING    8/26/2024 CXR  IMPRESSION: Bilateral lung transplant again noted with improved  aeration. Possible trace left apical pneumothorax

## 2024-08-26 NOTE — PROGRESS NOTES
[facility]    Medicine Progress Note - Hospitalist Service, GOLD TEAM 10    Date of Admission:  8/14/2024    Assessment & Plan   Shyam Cartwright is a 59 year old man with history including IPF with chronic hypoxemic resp failure (baseline 6L oxygen), hepatic steatosis, and type 2 DM s/p bilateral lung transplant 8/15/24. Extubated 8/18/24; transferred from the ICU 8/21/24.    Today/interval events  -NJ out yesterday, eating well  - Endo consultation for labile glucose as he nears discharge, appreciate input  - No diuretic today given appearance of euvolemia  - continue empiric piperacillin-tazobactam to 8/28  - NPO at [facility] for bronchoscopy    Pending discharge meds:  - final immunosuppressant dosing  - final insulin dosing       IPF s/p BL lung transplant (8/15/24)    Post operative ventilator support, extubated (8/18)   Acute hypoxemic respiratory failure (postop), improving  - Goal SpO2 >90%  - Pulmonary toilet   - Chest PT QID, aerobika, IS  - Ammonia qMTh  - Scheduled Mucomyst, levalbuterol  - S/p Methylprednisolone 125 q8hrs x3 doses, now on prednisone 25mg daily with taper planning per pulm  - Analgesia  - Scheduled: acataminophen   - PRN: oxycodone, hydromorphone, methocarbamol  - Paravertebral catheters bilateral per IPS (8/18 - current, possible removal 08/23/24 per RAPS)  - Bronchoscopy planned 8/27    Immunosuppression lung transplant   To complete perioperative regimen per transplant protocol.   - immunosuppression per Transplant Pulmonary team   - Basiliximab              - Mycophenolate, prednisolone with taper per pulm, tacrolimus (goal 8-12)    Infectious ppx for lung transplant   - Amphotericin B nebs twice weekly until discharge  - Micafungin 100mg daily through 8/25  - TMP-SMX started 08/16 based on donor toxo IgG+  - S/p Ceftazidime and Vancomycin course  - Valaciclovir (to begin 08/23)    Fever 8/19, resolved  Leukocytosis  Blood and sputum cultures  08/19: no growth to date.             - empiric piperacillin-tazobactam 10 days (end date 8/28-extended past date of next bronchoscopy due to increasing leukocytosis)     Delirium, resolved  - Delirium precautions.  - Sleep hygiene, reorientation, mobility. Scheduled: Melatonin  - Quetiapine stopped 8/23    Shock, multifactorial, hypovolemic, distributive - resolved  Hx HTN  Hx of HLD  - Monitor hemodynamic status. Goal MAP >65, SBP <140  - Continue rosuvastatin 20 daily.  - Metoprolol tartrate 50 mg BID  - Discontinue PTA losartan     Moderate protein calorie malnutrition    Diarrhea  - Nutrition consulted, appreciate recommendations   - Bowel regimen: MiraLAx  - loperamide as needed     Hypophosphatemia, resolved  Hypomagnesemia  BL creat appears to be ~ 0.62-0.65   - Strict I/O, daily weights, Avoid/limit nephrotoxins as able  - Replete lytes PRN per protocol  - Goal NET even  - Discontinued Nutriphos d/t worsening diarrhea.     Stress hyperglycemia  Type 2 diabetes mellitus  - Hgb A1C 8.4%   - insulin glargine 35 units qPM; stop morning glargine  - insulin NPH to cover corticosteroid-induced hyperglycemia (can taper with steroid taper)  - Sliding scale insulin (high sensitivity) TID WM and HS  - Goal BG <180 for optimal healing  - endocrine consultation 08/26/24 given labile glucose and complicating factors, appreciate input  - Holding home PTA metformin, empagliflozin     Acute blood loss anemia  Acute blood loss thrombocytopenia, resolved  Coagulopathy  - No s/s of bleeding. Continue to monitor, Hgb goal > 7.0. Stable.     Sternotomy and Surgical Incision  Weakness and deconditioning    - Postoperative incision management per CVTS, sternal precautions  - PT/OT/CR - Recommending acute rehab    Hepatic steatosis          Diet: Regular Diet Adult Thin Liquids (level 0)  Snacks/Supplements Adult: Other; PRN - please allow pt to order any snack/supplement if requested; With Meals  NPO per Anesthesia Guidelines for  Procedure/Surgery Except for: No Exceptions    DVT Prophylaxis: Heparin SQ  Goodrich Catheter: Not present  Lines: PRESENT      PICC 08/20/24 Double Lumen Right Basilic Difficult venous access. Frequent lab draws-Site Assessment: WDL      Cardiac Monitoring: None  Code Status: Full Code      Clinically Significant Risk Factors            # Hypomagnesemia: Lowest Mg = 1.4 mg/dL in last 2 days, will replace as needed   # Hypoalbuminemia: Lowest albumin = 2.2 g/dL at 8/15/2024  8:20 PM, will monitor as appropriate              # DMII: A1C = 8.4 % (Ref range: <5.7 %) within past 6 months    # Moderate Malnutrition: based on nutrition assessment             Disposition Plan     Medically Ready for Discharge: Anticipated in 5+ Days         Ha Olivarez DO  Hospitalist Service, GOLD TEAM 10  [facility]  Securely message with [employer] (more info)  Text page via [facility] Paging/Directory   See signed in provider for up to date coverage information  ______________________________________________________________________    Interval History   No acute events overnight. Feeling well overall today. Asks if he can discharge tomorrow but at the same time does not want to go too soon. Breathing comfortable at rest, labored with activity. No chest pain, fever, chills. No new complaints today.    RN relayed in the afternoon that he had new urinary frequency.    Physical Exam   Vital Signs: Temp: 97.9  F (36.6  C) Temp src: Axillary BP: 123/78 Pulse: 93   Resp: 20 SpO2: 96 % O2 Device: None (Room air)    Weight: 149 lbs 11.08 oz    Gen: Awake and alert, appears comfortable, seated upright on bed.  HEENT: NCAT, sclerae anicteric.  CV: Regular rhythm, normal rate, extremities warm and well perfused.  Pulm: Normal work of breathing on room air, mildly increasing with conversation; lungs clear to auscultation but diminished at bases, no wheezing.  GI: Nontender, nondistended, soft. +bowel  sounds.  Skin: Warm, dry, no jaundice.  Neuro: AO, speech normal, moves all extremities symmetrically.  Psych: Mood is good, affect is congruent.      Medical Decision Making       50 MINUTES SPENT BY ME on the date of service doing chart review, history, exam, documentation & further activities per the note.      Data     I have personally reviewed the following data over the past 24 hrs:    17.9 (H)  \   12.4 (L)   / 290     136 101 15.8 /  242 (H)   4.4 25 0.64 (L) \     ALT: 44 AST: 27 AP: 170 (H) TBILI: 0.4   ALB: 3.2 (L) TOT PROTEIN: 6.1 (L) LIPASE: N/A       Imaging results reviewed over the past 24 hrs:   Recent Results (from the past 24 hour(s))   XR Chest Port 1 View    Narrative    Portable chest    INDICATION: Lung transplant follow-up    COMPARISON: Yesterday    EYES: Heart size normal. Butterfly clamshell sternotomy from prior  lung transplantation again noted. Left atrial appendage ligation  clipping again present. A right upper extremity PICC line tip is in  the right atrium. Previous feeding tube has been removed. Scattered  subtle streaky opacities in the lung bases are also slightly improved.  Question trace left apical pneumothorax.      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 Possible trace left apical pneumothorax    MORENO POSEY MD         SYSTEM ID:  [id number]   US Lower Extremity Venous Duplex Bilateral    Narrative    ULTRASOUND LOWER EXTREMITY VENOUS DUPLEX BILATERAL 8/26/2024 1:37 PM    CLINICAL HISTORY: Screening after lung transplant.      COMPARISONS: None available.    REFERRING PROVIDER: SAMUEL PIERRE    TECHNIQUE: Grayscale, color Doppler, Doppler waveform ultrasound  evaluation was performed through the bilateral common femoral,  femoral, and popliteal veins. Bilateral posterior tibial veins were  evaluated with grayscale imaging and compression. Peroneal veins were  not evaluated.    FINDINGS: Right common femoral, femoral, and  "popliteal veins are fully  compressible with phasic Doppler waveforms.    Right posterior tibial veins are fully compressible.    Left common femoral, femoral, and popliteal veins are fully  compressible with phasic Doppler waveforms.    Left posterior tibial veins are fully compressible.      Impression    IMPRESSION: No deep venous thrombosis demonstrated in either leg.    Reference: \"Duplex Ultrasound in the Diagnosis of Lower-Extremity Deep  Venous Thrombosis\"- Cecille Aranda MD, S; Kip Nava MD  (Circulation. 2014;129:917-921. http://circ.ahajournals.org)    SULAIMAN ESCALANTE MD         SYSTEM ID:  [id number]    Upper Extremity Venous Duplex Bilat    Narrative    ULTRASOUND UPPER EXTREMITY VENOUS DUPLEX BILATERAL 8/26/2024 1:35 PM    CLINICAL HISTORY: Screening after lung transplant..     COMPARISONS: None available.    REFERRING PROVIDER: SAMUEL PIERRE    TECHNIQUE: Bilateral internal jugular veins evaluated with grayscale,  color Doppler, and spectral pulsed wave Doppler ultrasound. Bilateral  innominate, subclavian, and axillary veins evaluated with color  Doppler and spectral pulsed wave Doppler ultrasound.    FINDINGS: Right internal jugular vein is fully compressible with a  phasic waveform.    Right innominate, subclavian, and axillary veins fill [id number] in  color Doppler with phasic waveforms.    Left internal jugular vein is fully compressible with a phasic  waveform.    Left innominate, subclavian, and axillary veins fill [id number] in  color Doppler with phasic waveforms.      Impression    IMPRESSION: No central deep venous thrombosis demonstrated in either  arm.    SULAIMAN ESCALANTE MD         SYSTEM ID:  [id number]     "

## 2024-08-26 NOTE — PROGRESS NOTES
CLINICAL NUTRITION SERVICES - BRIEF NOTE     Nutrition Prescription    Recommendations already ordered by Registered Dietitian (RD):  Discontinued orders for enteral formula, protein modulars, and free water flushes with removal of NJT     Continue to encourage PO intake, prioritize high protein foods first on meal trays      Future/Additional Recommendations:  Continue to monitor nutrition related findings per protocol   - Will need nutrition edu pertinent to transplant prior to discharge   For last full RD assessment, see note dated 8/22/24    NEW FINDINGS   - Chart review for patient who transferred from ICU to IMC on enteral feeds + joe cts, s/p BLT.   - Recent intake based on joe ct records:  8/22       Total Kcals: 1027     Total Protein: 39g  - All meals documented to sheet + 1 Special K bar  8/23       Total Kcals: 490       Total Protein: 16g - One out of 2 meals documented to sheet; no supplements documented.   8/24       Total Kcals: 0           Total Protein: 0g - 1 meal ordered from kitchen, no documentation to sheet  8/23       Total Kcals: 490       Total Protein: 16g - Incomplete, NSA unable to calculate some items due to missing serving size/description    - Recent intake per I/O consistently documented as 100% of meal trays. Per prior RD note, nutrition goals to qualify for FT removal  ~1300 Kcals and 68 gm protein daily, however, given that some meals were not accounted for per calorie count record and pt demonstrating good intake/motivation, primary team MD elected to remove patient's FT yesterday.     INTERVENTIONS  Implementation  Enteral Nutrition - Discontinued enteral orders, modulars, FWF    Monitoring/Evaluation  Will continue to monitor and evaluate per protocol.    David Melton, MS, RDN, LD, CNSC  Available by [employer] or phone   Vocluis manuel: M-F (7:00-3:30)  6B Clinical Dietitian  or 1A Obs Clinical Dietitian  Weekend/Holiday (7:00-3:30) - Weekend Clinical Dietitian   6B RD desk:  [phone]       **Clinical Dietitians are no longer available by pager.

## 2024-08-26 NOTE — PLAN OF CARE
Neuro: A&Ox4. Neuros normal aside from bilateral hand tremor that pt. reports is normal for them.  Cardiac: Sinus rhythm. VSS.   Respiratory: Sating >95% on RA, lungs clear bilaterally with crackles after neb treatment. Pt is coughing up small amounts of thick mucus occasionally and handles oral suctioning himself.  GI/: Good urine output. BM X2 overnight, loose watery each time.   Diet/appetite: Tolerating full diet.  Activity:  Assist of 1 standby, up to bathroom.  Pain: No pain reported.  Skin: No new deficits noted. Clamshell wound still has staples, dressings changed over chest tubes because of drainage.  LDA's: right PICC 2L, both saline locked.    Plan: Continue with POC. Care conference today per patient, and bronchoscopy 8/27.

## 2024-08-26 NOTE — PROGRESS NOTES
Inpatient Lung Transplant Coordinator Note:     Met with Juan J and his sister Mine at bedside. Re-reviewed with patient with 'Adult Lung Transplant Guide' as well as 'Organ Transplant Handbook' with SOT office contact information both during and after office hours. Medication card completed by nursing staff, medications reviewed and updated prior to discharge by this writer. Discharge pharmacy to also review medication card, fill discharge medications, and will provide transplant bag (BP cuff, thermometer, and empty weekly pill box).      Reviewed the role of the caregiver, resources at home to review such as [facility] videos. Also reviewed transplant lab manual, home vitals and monitoring. Discussed what to expect for outpatient follow up visits such as drug level monitoring, labs, PFTs, chest x-ray, bronchoscopies, etc. Also reviewed organ rejection, incision care, physical restrictions related to sternotomy, infection, environmental modifications, and food safety post-transplant.       Plan for discharge Wednesday, 8/28. Will schedule pulmonary follow up appointment for 9/29 with Dr. Johnson. Plan to start outpatient pulmonary rehab after discharge. Patient and caregiver agreeable and acknowledged this plan. Urged pt to call SOT office or speak with transplant team if any further questions or change in condition once patient is discharged. Pt denied further questions related to transplant content and discharge instructions at this time.        Katarina Bustillos, RN, BSN  Patient Care Supervisor - Thoracic Transplant

## 2024-08-26 NOTE — PLAN OF CARE
Neuro: A&Ox4. Hand tremors at baseline.   Cardiac: VSS.   Respiratory: Sating >92% on RA.  GI/: Adequate urine output. Pt complained of urinary frequency and urgency, provider notified.  Diet/appetite: Tolerating Regular diet. Eating well.  Activity:  SBA up to chair and in halls.  Pain: Denies  Skin: No new deficits noted.  LDA's: R double lumen PICC SL.    Plan: NPO @ 2am for Bronchoscopy tomorrow @ 8am. Continue with POC. Notify primary team with changes.     Goal Outcome Evaluation:      Plan of Care Reviewed With: patient, spouse    Overall Patient Progress: improvingOverall Patient Progress: improving    Outcome Evaluation: Denied pain. Eating well. Sating >92% on RA. BGs < 300      Problem: Lung Transplant  Goal: Effective Organ Function  Outcome: Progressing  Intervention: Promote Airway Secretion Clearance  Recent Flowsheet Documentation  Taken 8/26/2024 1606 by Betty Orosco RN  Breathing Techniques/Airway Clearance: deep/controlled cough encouraged  Cough And Deep Breathing: done independently per patient  Activity Management: up ad [email or web address]  Taken 8/26/2024 1200 by Betty Orosco RN  Breathing Techniques/Airway Clearance: deep/controlled cough encouraged  Cough And Deep Breathing: done independently per patient  Activity Management: ambulated outside room  Taken 8/26/2024 0919 by Betty Orosco RN  Activity Management: ambulated to bathroom  Intervention: Optimize Oxygenation and Ventilation  Recent Flowsheet Documentation  Taken 8/26/2024 1606 by Betty Orosco RN  Airway/Ventilation Management: airway patency maintained  Chest Tube Safety: all connections secured  Head of Bed (HOB) Positioning: HOB at 30 degrees  Taken 8/26/2024 1200 by Betyt Orosco RN  Airway/Ventilation Management: airway patency maintained  Chest Tube Safety: all connections secured  Head of Bed (HOB) Positioning: HOB at 30 degrees  Taken 8/26/2024 0919 by Betty Orosco RN  Head of Bed (HOB)  Positioning: HOB at 20-30 degrees     Problem: Lung Transplant  Goal: Fluid and Electrolyte Balance  Outcome: Progressing  Intervention: Monitor and Manage Fluid Balance  Recent Flowsheet Documentation  Taken 8/26/2024 1606 by Betty Orosco RN  Fluid/Electrolyte Management: fluids provided  Taken 8/26/2024 1200 by Betty Orosco RN  Fluid/Electrolyte Management: fluids provided     Problem: Lung Transplant  Goal: Blood Glucose Level Within Targeted Range  Outcome: Progressing  Intervention: Optimize Glycemic Control  Recent Flowsheet Documentation  Taken 8/26/2024 1606 by Betty Orosco RN  Glycemic Management: blood glucose monitored  Taken 8/26/2024 1200 by Betty Orosco RN  Glycemic Management: blood glucose monitored     Problem: Lung Transplant  Goal: Optimal Nutrition Intake  Outcome: Progressing     Problem: Lung Transplant  Goal: Effective Urinary Elimination  Outcome: Progressing  Intervention: Monitor and Manage Urinary Retention  Recent Flowsheet Documentation  Taken 8/26/2024 1606 by Betty Orosco RN  Urinary Elimination Promotion: absorbent pad/diaper use encouraged  Taken 8/26/2024 1200 by Betty Orosco RN  Urinary Elimination Promotion: absorbent pad/diaper use encouraged     Problem: Lung Transplant  Goal: Effective Oxygenation and Ventilation  Outcome: Progressing  Intervention: Optimize Oxygenation and Ventilation  Recent Flowsheet Documentation  Taken 8/26/2024 1606 by Betty Orosco RN  Airway/Ventilation Management: airway patency maintained  Head of Bed (HOB) Positioning: HOB at 30 degrees  Taken 8/26/2024 1200 by Betty Orosco RN  Airway/Ventilation Management: airway patency maintained  Head of Bed (HOB) Positioning: HOB at 30 degrees  Taken 8/26/2024 0919 by Betty Orosco RN  Head of Bed (HOB) Positioning: HOB at 20-30 degrees

## 2024-08-26 NOTE — CONSULTS
Inpatient Diabetes Management Service : New Consult Note     Patient: Travon Cartwright   YOB: 1965    MRN: [id number]     Date of Consult : 08/26/2024   Date of Admission: 8/14/2024     Reason for Consult: lung transplant on steroid taper, discharging 8/28    Consult Requestor: Ha Olivarez DO            History of Present Illness:     Shyam Cartwright is a 59 year old man with history including IPF with chronic hypoxemic resp failure (baseline 6L oxygen), hepatic steatosis, and type 2 DM s/p bilateral lung transplant 8/15/24.  Inpatient Diabetes Service consulted for for glycemic control now off TF that were cycled 8 pm to 8 am as off night of 8/25/24 and on a steroid taper.     History obtained via the patient, chart review and discussion with primary team.       Additional Historian:  Sister who is a retired nurse practitioner     Diabetes Focus:  Mr Travon Cartwright is not known to the Inpatient Diabetes Service from past admission(s). He is known to the Sara Reilly NP as part of [facility] endocrinology. He was  seen on 6/7/2024 to establish care for his diabetes when Jardiance 10 mg was added to his diabetes regimen. He was on metformin  mg po bid( lower dose due to diarrhea), lantus 30 units at bedtime and Trulicity 3 mg weekly on Tuesdays.It will be 2 weeks tomorrow since his last Trulicity dose. He has been using a Dexcom G7 which is also new.  Previously he was non compliant, not always taking his medication or monitoring his BG.  His last A1c on 6/18/2024=8.4, right before his Jardiance was added. Since that time his time in range was around 70%.( Previous 38% in range) Since that time he received transplanted lungs on 8/15/2024 and started on high doses of steroids and then put on an insulin drip. He started on Lantus 60 units on 8/18/2024 and NPH was added on 8/23/2024.  He also was receiving enteral feeds via a NG that recently was cycled and then stopped on Sunday, 8/25/2024.   These make the insulin picture cloudy. He remains on a high insulin sliding scale and 1/10 carb coverage. There are many post prandial BG.  Fasting blood glucose this gk=758.His PTA Jardiance, Metformin and Trulicity has been on hold.He has 2-3 very liquid stools but is eating better.    Last dose insulin PTA: 8/14/2024, Lantus 30 units   Current inpatient regimen:  Lantus 40 units at bedtime, NPH 20 units at the same time as the prednisone which has tapered to 25 mg daily in am. He is    BG at time of consult: 242    Other Active/Contributory Medical Problems: Practitioner     Planned Procedures/Surgeries: bronch tomorrow so is NPO at 2 am    Relevant Labs on Admission:  if contributory, otherwise see labs below  ROUTINE IP LABS (Last four results)  Creat=0.64 with GFR=>90  BMP  Recent Labs   Lab 08/26/24  1634 08/26/24  1244 08/26/24  0951 08/26/24  0603 08/25/24  0721 08/25/24  0542 08/24/24  0843 08/24/24  0624 08/23/24  0904 08/23/24  0443   NA  --   --   --  136  --  133*  --  132*  --  134*   POTASSIUM  --   --   --  4.4  --  4.5  --  4.3  --  4.0   CHLORIDE  --   --   --  101  --  99  --  99  --  101   HUGO  --   --   --  9.5  --  9.1  --  8.5*  --  8.4*   CO2  --   --   --  25  --  23  --  25  --  26   BUN  --   --   --  15.8  --  19.4  --  15.1  --  14.7   CR  --   --   --  0.64*  --  0.52*  --  0.55*  --  0.52*   * 264* 149* 106*   < > 298*   < > 269*   < > 223*    < > = values in this interval not displayed.     CBC  Recent Labs   Lab 08/26/24  0603 08/25/24  0542 08/24/24  0624 08/23/24  0443   WBC 17.9* 16.4* 14.4* 12.7*   RBC 4.24* 4.00* 4.10* 4.03*   HGB 12.4* 11.6* 11.9* 11.7*   HCT 39.2* 36.6* 37.6* 37.0*   MCV 93 92 92 92   MCH 29.2 29.0 29.0 29.0   MCHC 31.6 31.7 31.6 31.6   RDW 13.8 13.6 13.3 13.2    291 269 228     INRNo lab results found in last 7 days.      GAD65 antibody, zinc transporter 8 antibody, islet antibody, insulin antibody, and c-peptide  not available on epic search      Inpatient Glucose Trends:           Diabetes History:   Diabetes Type and Duration: He was diagnosed in 2020 with type 2 diabetes- he had a prediabetes of 6.2 in 2019 and 8.3 in 6.4.2020     PTA Medication Regimen: Jardiance 10 mg was added to his diabetes regimen. He was on metformin  mg po bid( lower dose due to diarrhea), lantus 30 units at bedtime and Trulicity 3 mg weekly on Tuesdays  Historical Diabetes Medications: He was taking Ozempic for a while, and likely had an availability issue     Glucose monitoring device and frequency: using a dexom G7 for about 3 months  Outpatient Diabetes Provider: most recently Sara Reilly NP  [facility], saw him on 6/7/2024   Formal Diabetes Education/Educator: not sure    ------------------------  Complications:  - peripheral neuropathy, -retinopathy (last eye exam: 2023), -nephropathy, -gastroparesis, -macrovascular disease    Last A1c: 8.4  on 6/18/2024  Hemoglobin at time of last A1c: 16.9  RBC transfusion in past 3 months:  none      History of DKA: no    Hypoglycemia PTA: sister and Travon say no lows  - Awareness: ? Intact has never had a low    Safety Kit:   - Glucagon: -   - Ketone Strips: -  Medic Alert if Type 1: -    Diet/Lifestyle: eats three meals daily    Ability to [place] Prescribed Regimen:  Sister is present to help.  He seems not to be a good historian   Food/Housing Insecurity: no          Medications Impacting Glycemia:    Steroids:   D5W containing solutions/medications: none  Other medications impacting glucose: Tacrolimus         Diet/Nutrition:    Orders Placed This Encounter      Regular Diet Adult Thin Liquids (level 0)      NPO per Anesthesia Guidelines for Procedure/Surgery Except for: No Exceptions     Supplements:  allow pt to order snacks and supplements  TF: done on 8/25/2024  TPN: none          Review of Systems:    CC:   Constitutional: - fever and chills.      HEENT: - headache, vision changes,    Cardiac: - chest pain,  palpitations, or racing heart.    Respiratory: - dyspnea at rest. - wheezing and cough,    GI: - abdominal pain, tenderness and bloating. - nausea and vomiting. 2 -3 very watery stools daily   : - difficulty urinating, dysuria, and incontinence.    Endocrine: - polyuria, polydipsia           Past Medical History:       Past Medical History:   Diagnosis Date    Administration of long-term prophylactic antibiotics 08/26/2024    Hepatic steatosis 2017    Noted on ultrasound    Hypomagnesemia 08/26/2024    Immunosuppression (H24) 08/26/2024    S/P lung transplant (H) 08/15/2024    Tremor 05/23/2024             Past Surgical History:      Past Surgical History:   Procedure Laterality Date    BRONCHOSCOPY  08/16/2024    COLONOSCOPY      CV CORONARY ANGIOGRAM N/A 06/21/2024    Procedure: Coronary Angiogram;  Surgeon: Gabriel Julian MD;  Location:  HEART CARDIAC CATH LAB    CV RIGHT HEART CATH MEASUREMENTS RECORDED N/A 06/21/2024    Procedure: Right Heart Catheterization;  Surgeon: Gabriel Julian MD;  Location:  HEART CARDIAC CATH LAB    PICC DOUBLE LUMEN PLACEMENT Right 08/20/2024    47-3cm, Basilic    TRANSPLANT LUNG RECIPIENT SINGLE X2 Bilateral 08/15/2024    Procedure: Clamshell Incision, On Central Venoarterial Extracorporeal Membranous Oxygenation, Bilateral Lung Transplant Recipient, Left atrial appendage ligation, Intraoperative Flexible Bronchoscopy by Anesthesia;  Surgeon: Mulvihill, Michael, MD;  Location:  OR             Social History:      Social History     Tobacco Use    Smoking status: Former     Types: Cigarettes    Smokeless tobacco: Never   Substance Use Topics    Alcohol use: Not Currently     Comment: not since 2017        History   Sexual Activity    Sexual activity: Not on file        Tobacco: former    Etoh: not current    Lives with [place] but staying her in apartment before goes back after transplant          Family History:    Reviewed and non-contributory.    Family History of  "Diabetes: patient denies any family history of diabetes    Family History   Problem Relation Age of Onset    Hypertension Mother     Lung Cancer Father         former smoker    Other - See Comments Sister         daughter 26 years    No Known Problems Maternal Grandmother     Lung Cancer Maternal Grandfather         former smoker    No Known Problems Paternal Grandmother     No Known Problems Paternal Grandfather              Physical Exam:    /78   Pulse 93   Temp 97.9  F (36.6  C) (Axillary)   Resp 20   Ht 1.778 m (5' 10\")   Wt 67.9 kg (149 lb 11.1 oz)   SpO2 96%   BMI 21.48 kg/m     General:  in no acute distress.  HEENT:  NC/AT. MMM, sclera not injected  Lungs:  unremarkable, no audible cough, no work of breathing  ABD:  rounded  Skin:  warm and dry, no obvious lesions, some bruising  MSK:   moving all extremities  Lymp:   no LE edema  Mental Status:  Alert and oriented x3  Psych:   Cooperative, good eye contact, full/appropriate affect    Feet:   cool, without discoloration,  without evidence of wounds, corns, calluses, pulses +,             Laboratory Data:      Lab Results   Component Value Date    A1C 8.4 (H) 06/18/2024     Recent Labs   Lab Test 08/26/24  0603   WBC 17.9*   RBC 4.24*   HGB 12.4*   HCT 39.2*   MCV 93   MCH 29.2   MCHC 31.6   RDW 13.8        Recent Labs   Lab Test 08/26/24  1634 08/26/24  1244 08/26/24  0951 08/26/24  0603 08/25/24  0721 08/25/24  0542   NA  --   --   --  136  --  133*   POTASSIUM  --   --   --  4.4  --  4.5   CHLORIDE  --   --   --  101  --  99   CO2  --   --   --  25  --  23   ANIONGAP  --   --   --  10  --  11   * 264*   < > 106*   < > 298*   BUN  --   --   --  15.8  --  19.4   CR  --   --   --  0.64*  --  0.52*   HUGO  --   --   --  9.5  --  9.1    < > = values in this interval not displayed.     Recent Labs   Lab Test 08/26/24  0603   PROTTOTAL 6.1*   ALBUMIN 3.2*   BILITOTAL 0.4   ALKPHOS 170*   AST 27   ALT 44            Assessment and " Recommendations:       Assessment:   Type 2 Diabetes Mellitus, without complication, sub optimal control  (A1c 8.4 %)    Steroid induced hyperglycemia  Enteral feed induced hyperglycemia, now resolved    Plan/Recommendations:    - Decrease Lantus 35 q 24 hrs at 20:00 ( no further NPO since usually NPO from 2 am to 7 pm and bronch will be relatively early in am)  -Increase NPH 24 units at 8 am at the same time as the prednisone 25 mg po daily- DO not give if prednisone is held  - Increase Novolog Meal Coverage: 1 units per 7 g CHO, TID AC and PRN with snacks/supplements   - Continue Novolog Correction Scale: High resistance (ISF: 1/25)  TID AC / HS / 0200   - BG monitoring: TID AC, HS, 0200 /    - Hypoglycemia protocol    - Carb counting protocol   - Hold Trulicity, not on formulary, will discuss with transplant pharmacy if ok to give with tacrolimus and MMF-- will hold as well due to diarrhea and when it resolves  - Would prefer to continue to hold Jardiance 10 mg daily due to immunosuppression ie higher doses of prednisone  -Restart metformin  mg po daily when diarrhea is improved to resolved  Discussion:  Although there were mostly post prandial BG taken yesterday overall when steroids are on board and eating, post prandial BG are high so increase carb coverage.  YVP=066 so will decrease lantus to 35 units tomorrow.  Will increase NPH to 24 units to help bring down -300 after eats. Has diarrhea, hold metformin and Trulicity for now.  Hold Jardiance while on immunosuppression at least while on higher doses of steroids     Discharge Planning: (tentative)    Medications: TBD    Test Claims: NPH    Education: Need education for carb coverage and correction scale insulin, will order     Outpatient Follow-up:  recommend [facility] Endocrinology Sara Reilly NP    Thank you for this consult. IDS will continue to follow.      Please notify Inpatient Diabetes Service if changes are planned to steroids,  nutrition, TPN/TF and anticipated procedures requiring prolonged NPO status.     Benita Machuca PA-C  Inpatient Diabetes Service  [phone]  [place]      DIscussed plan with patient and sister, discussed with bedside RN via this note and primary care provider by phone  To contact Inpatient Diabetes Service:     7 AM - 5 PM: Page the IDS EMILY following the patient that day (see filed or incomplete progress notes/consult notes under Endocrinology)    OR if uncertain of provider assignment: page job code [phone]    5 PM - 7 AM: First call after hours is to primary service.    For urgent after-hours questions, page job code for on call fellow: [phone]     I spent a total of 80 minutes on the date of the encounter doing prep/post-work, chart review, history and exam, documentation and further activities per the note including lab review, multidisciplinary communication, counseling the patient and/or coordinating care regarding acute hyper/hypoglycemic management, as well as discharge management and planning/communication.  See note for details.

## 2024-08-26 NOTE — PROGRESS NOTES
Shift Summary:    Assessment: BS clear and diminished at the bases bilaterally. Pt remained on RA throughout the shift.    Therapy: Pt tolerated nebs and cpt, however, pt unable to tolerate HOB flat along with CPT on posterior or lower lobes. RT only able to get pt to agree to do 5 min on each upper lobe anteriorly each treatment time today for a total of 10 min.    RT will continue to follow and monitor pt as appropriate.     Cintia Hendrix, RT on 8/26/2024 at 4:38 PM

## 2024-08-26 NOTE — PROGRESS NOTES
Care Management Follow Up    Length of Stay (days): 11    Expected Discharge Date: 08/28/2024     Concerns to be Addressed:       Patient plan of care discussed at interdisciplinary rounds: Yes    Anticipated Discharge Disposition: Other (Comments) (local apartment)     Anticipated Discharge Services: None  Anticipated Discharge DME: Other (see comment) (Nebulizer)    Patient/family educated on Medicare website which has current facility and service quality ratings: no  Education Provided on the Discharge Plan:    Patient/Family in Agreement with the Plan:      Referrals Placed by CM/SW: Durable Medical Equipment (DME)  Private pay costs discussed: Not applicable    Additional Information:  Writer was informed by transplant team that he will need a nebulizer for discharging.  Writer put in a referral to [facility] as they can provide service both here and at his home in [place] when he can move back home.  [facility] will deliver his equipment here before discharge.  Plan is to discharge on Wednesday Aug 28th.     Care management will continue to follow for discharge planning.    Rosalba Vitale,  Nurse Coordinator, BSN  Phone: [phone]  Vocera: 6B IMC RNCC     Social Work and Care Management Department      SEARCHABLE in [facility] - search CARE COORDINATOR      [place] ([phone]) Saturday & Sunday; ([phone]) [facility] Recognized Holidays      Units: 5A Onc Vocera & 5C Vocera      Units: 6B Vocera & 6C Vocera       Units: 7A SOT RNCC Vocera, 7B Med Surg Vocera, & 7C Med Surg Vocera       Units: 6A Vocera & 4A CVICU Vocera, 4C MICU Vocera, and 4E SICU Vocera       Units: 5 Ortho Vocera & 5 Med Surg Vocera       Units: 6 Med Surg Vocera & 8 Med Surg Vocera       Margie Vitale

## 2024-08-26 NOTE — PROGRESS NOTES
Transplant Social Work Services Progress Note      Date of Initial Social Work Evaluation: 6/20/2024  Collaborated with: pulm team, accommodations, PT    Data: Per pulm team, patient could be ready for discharge as early as Wednesday.  Per PT he has now progressed beyond needing inpatient rehab stay.    Intervention: met with patient and sister to discuss options for lodging.  [facility] apartment not available immediately.  Patient and sister prefer to stay in a hotel temporarily if [facility] not available.  Provided them with hotel list and made some recommendations of where they might locate opening.s    Assessment: lodging needing.    Education provided by SW: lodging options.   Plan:    Discharge Plans in Progress: to local lodging with assist from sister.     Barriers to d/c plan: bronch tomorrow.     Follow up Plan: will continue to follow for support, counseling, education and resources.      Carol Ann Brown [facility]  Lung Transplant   Phone: [phone]     [place]

## 2024-08-27 ENCOUNTER — APPOINTMENT (OUTPATIENT)
Dept: PHYSICAL THERAPY | Facility: CLINIC | Age: 59
End: 2024-08-27
Attending: STUDENT IN AN ORGANIZED HEALTH CARE EDUCATION/TRAINING PROGRAM
Payer: COMMERCIAL

## 2024-08-27 ENCOUNTER — TELEPHONE (OUTPATIENT)
Dept: PHARMACY | Facility: CLINIC | Age: 59
End: 2024-08-27
Payer: COMMERCIAL

## 2024-08-27 ENCOUNTER — APPOINTMENT (OUTPATIENT)
Dept: GENERAL RADIOLOGY | Facility: CLINIC | Age: 59
End: 2024-08-27
Attending: STUDENT IN AN ORGANIZED HEALTH CARE EDUCATION/TRAINING PROGRAM
Payer: COMMERCIAL

## 2024-08-27 DIAGNOSIS — Z94.2 LUNG REPLACED BY TRANSPLANT (H): Primary | ICD-10-CM

## 2024-08-27 LAB
ANION GAP SERPL CALCULATED.3IONS-SCNC: 13 MMOL/L (ref 7–15)
BACTERIA SPEC CULT: NORMAL
BACTERIA SPT CULT: NORMAL
BASOPHILS # BLD AUTO: 0.1 10E3/UL (ref 0–0.2)
BASOPHILS # BLD AUTO: 0.1 10E3/UL (ref 0–0.2)
BASOPHILS NFR BLD AUTO: 1 %
BASOPHILS NFR BLD AUTO: 1 %
BRONCHOSCOPY: NORMAL
BUN SERPL-MCNC: 15.2 MG/DL (ref 8–23)
CALCIUM SERPL-MCNC: 9.7 MG/DL (ref 8.8–10.4)
CHLORIDE SERPL-SCNC: 102 MMOL/L (ref 98–107)
CREAT SERPL-MCNC: 0.62 MG/DL (ref 0.67–1.17)
EGFRCR SERPLBLD CKD-EPI 2021: >90 ML/MIN/1.73M2
EOSINOPHIL # BLD AUTO: 0.3 10E3/UL (ref 0–0.7)
EOSINOPHIL # BLD AUTO: 0.3 10E3/UL (ref 0–0.7)
EOSINOPHIL NFR BLD AUTO: 2 %
EOSINOPHIL NFR BLD AUTO: 2 %
ERYTHROCYTE [DISTWIDTH] IN BLOOD BY AUTOMATED COUNT: 13.7 % (ref 10–15)
ERYTHROCYTE [DISTWIDTH] IN BLOOD BY AUTOMATED COUNT: 13.8 % (ref 10–15)
GLUCOSE BLDC GLUCOMTR-MCNC: 139 MG/DL (ref 70–99)
GLUCOSE BLDC GLUCOMTR-MCNC: 142 MG/DL (ref 70–99)
GLUCOSE BLDC GLUCOMTR-MCNC: 180 MG/DL (ref 70–99)
GLUCOSE BLDC GLUCOMTR-MCNC: 242 MG/DL (ref 70–99)
GLUCOSE BLDC GLUCOMTR-MCNC: 323 MG/DL (ref 70–99)
GLUCOSE SERPL-MCNC: 112 MG/DL (ref 70–99)
GRAM STAIN RESULT: NORMAL
HCO3 SERPL-SCNC: 23 MMOL/L (ref 22–29)
HCT VFR BLD AUTO: 38.2 % (ref 40–53)
HCT VFR BLD AUTO: 40.4 % (ref 40–53)
HGB BLD-MCNC: 12.4 G/DL (ref 13.3–17.7)
HGB BLD-MCNC: 12.8 G/DL (ref 13.3–17.7)
IMM GRANULOCYTES # BLD: 0.5 10E3/UL
IMM GRANULOCYTES # BLD: 0.5 10E3/UL
IMM GRANULOCYTES NFR BLD: 3 %
IMM GRANULOCYTES NFR BLD: 3 %
LYMPHOCYTES # BLD AUTO: 1.7 10E3/UL (ref 0.8–5.3)
LYMPHOCYTES # BLD AUTO: 2.1 10E3/UL (ref 0.8–5.3)
LYMPHOCYTES NFR BLD AUTO: 11 %
LYMPHOCYTES NFR BLD AUTO: 14 %
MAGNESIUM SERPL-MCNC: 1.6 MG/DL (ref 1.7–2.3)
MCH RBC QN AUTO: 29.1 PG (ref 26.5–33)
MCH RBC QN AUTO: 29.5 PG (ref 26.5–33)
MCHC RBC AUTO-ENTMCNC: 31.7 G/DL (ref 31.5–36.5)
MCHC RBC AUTO-ENTMCNC: 32.5 G/DL (ref 31.5–36.5)
MCV RBC AUTO: 91 FL (ref 78–100)
MCV RBC AUTO: 92 FL (ref 78–100)
MONOCYTES # BLD AUTO: 1.1 10E3/UL (ref 0–1.3)
MONOCYTES # BLD AUTO: 1.2 10E3/UL (ref 0–1.3)
MONOCYTES NFR BLD AUTO: 7 %
MONOCYTES NFR BLD AUTO: 8 %
NEUTROPHILS # BLD AUTO: 10.6 10E3/UL (ref 1.6–8.3)
NEUTROPHILS # BLD AUTO: 11.5 10E3/UL (ref 1.6–8.3)
NEUTROPHILS NFR BLD AUTO: 72 %
NEUTROPHILS NFR BLD AUTO: 76 %
NRBC # BLD AUTO: 0 10E3/UL
NRBC # BLD AUTO: 0 10E3/UL
NRBC BLD AUTO-RTO: 0 /100
NRBC BLD AUTO-RTO: 0 /100
PHOSPHATE SERPL-MCNC: 3.5 MG/DL (ref 2.5–4.5)
PLATELET # BLD AUTO: 370 10E3/UL (ref 150–450)
PLATELET # BLD AUTO: 378 10E3/UL (ref 150–450)
POTASSIUM SERPL-SCNC: 4.1 MMOL/L (ref 3.4–5.3)
RBC # BLD AUTO: 4.21 10E6/UL (ref 4.4–5.9)
RBC # BLD AUTO: 4.4 10E6/UL (ref 4.4–5.9)
SODIUM SERPL-SCNC: 138 MMOL/L (ref 135–145)
TACROLIMUS BLD-MCNC: 6.8 UG/L (ref 5–15)
TME LAST DOSE: NORMAL H
TME LAST DOSE: NORMAL H
WBC # BLD AUTO: 14.7 10E3/UL (ref 4–11)
WBC # BLD AUTO: 15.1 10E3/UL (ref 4–11)

## 2024-08-27 PROCEDURE — 87101 SKIN FUNGI CULTURE: CPT | Performed by: INTERNAL MEDICINE

## 2024-08-27 PROCEDURE — 99233 SBSQ HOSP IP/OBS HIGH 50: CPT | Performed by: HOSPITALIST

## 2024-08-27 PROCEDURE — 31622 DX BRONCHOSCOPE/WASH: CPT | Performed by: INTERNAL MEDICINE

## 2024-08-27 PROCEDURE — 250N000011 HC RX IP 250 OP 636: Performed by: INTERNAL MEDICINE

## 2024-08-27 PROCEDURE — 94668 MNPJ CHEST WALL SBSQ: CPT

## 2024-08-27 PROCEDURE — 87070 CULTURE OTHR SPECIMN AEROBIC: CPT | Performed by: INTERNAL MEDICINE

## 2024-08-27 PROCEDURE — 71045 X-RAY EXAM CHEST 1 VIEW: CPT | Mod: 26 | Performed by: RADIOLOGY

## 2024-08-27 PROCEDURE — 80197 ASSAY OF TACROLIMUS: CPT | Performed by: INTERNAL MEDICINE

## 2024-08-27 PROCEDURE — 80048 BASIC METABOLIC PNL TOTAL CA: CPT | Performed by: PHYSICIAN ASSISTANT

## 2024-08-27 PROCEDURE — 99232 SBSQ HOSP IP/OBS MODERATE 35: CPT | Mod: 24 | Performed by: PHYSICIAN ASSISTANT

## 2024-08-27 PROCEDURE — 83735 ASSAY OF MAGNESIUM: CPT | Performed by: STUDENT IN AN ORGANIZED HEALTH CARE EDUCATION/TRAINING PROGRAM

## 2024-08-27 PROCEDURE — 97110 THERAPEUTIC EXERCISES: CPT | Mod: GP

## 2024-08-27 PROCEDURE — 999N000157 HC STATISTIC RCP TIME EA 10 MIN

## 2024-08-27 PROCEDURE — 250N000011 HC RX IP 250 OP 636

## 2024-08-27 PROCEDURE — 250N000013 HC RX MED GY IP 250 OP 250 PS 637

## 2024-08-27 PROCEDURE — 87206 SMEAR FLUORESCENT/ACID STAI: CPT | Performed by: INTERNAL MEDICINE

## 2024-08-27 PROCEDURE — 99233 SBSQ HOSP IP/OBS HIGH 50: CPT | Mod: 24 | Performed by: INTERNAL MEDICINE

## 2024-08-27 PROCEDURE — 87205 SMEAR GRAM STAIN: CPT | Performed by: INTERNAL MEDICINE

## 2024-08-27 PROCEDURE — 250N000013 HC RX MED GY IP 250 OP 250 PS 637: Performed by: STUDENT IN AN ORGANIZED HEALTH CARE EDUCATION/TRAINING PROGRAM

## 2024-08-27 PROCEDURE — 84100 ASSAY OF PHOSPHORUS: CPT | Performed by: STUDENT IN AN ORGANIZED HEALTH CARE EDUCATION/TRAINING PROGRAM

## 2024-08-27 PROCEDURE — 250N000012 HC RX MED GY IP 250 OP 636 PS 637: Performed by: STUDENT IN AN ORGANIZED HEALTH CARE EDUCATION/TRAINING PROGRAM

## 2024-08-27 PROCEDURE — 250N000011 HC RX IP 250 OP 636: Performed by: HOSPITALIST

## 2024-08-27 PROCEDURE — 250N000013 HC RX MED GY IP 250 OP 250 PS 637: Performed by: PHYSICIAN ASSISTANT

## 2024-08-27 PROCEDURE — 94640 AIRWAY INHALATION TREATMENT: CPT

## 2024-08-27 PROCEDURE — 87205 SMEAR GRAM STAIN: CPT

## 2024-08-27 PROCEDURE — 97116 GAIT TRAINING THERAPY: CPT | Mod: GP

## 2024-08-27 PROCEDURE — 94640 AIRWAY INHALATION TREATMENT: CPT | Mod: 76

## 2024-08-27 PROCEDURE — 250N000009 HC RX 250: Performed by: INTERNAL MEDICINE

## 2024-08-27 PROCEDURE — 120N000003 HC R&B IMCU UMMC

## 2024-08-27 PROCEDURE — 250N000012 HC RX MED GY IP 250 OP 636 PS 637: Performed by: SURGERY

## 2024-08-27 PROCEDURE — 71045 X-RAY EXAM CHEST 1 VIEW: CPT

## 2024-08-27 PROCEDURE — 250N000009 HC RX 250: Performed by: SURGERY

## 2024-08-27 PROCEDURE — G0500 MOD SEDAT ENDO SERVICE >5YRS: HCPCS | Performed by: INTERNAL MEDICINE

## 2024-08-27 PROCEDURE — 85025 COMPLETE CBC W/AUTO DIFF WBC: CPT | Performed by: SURGERY

## 2024-08-27 PROCEDURE — 0BJ08ZZ INSPECTION OF TRACHEOBRONCHIAL TREE, VIA NATURAL OR ARTIFICIAL OPENING ENDOSCOPIC: ICD-10-PCS | Performed by: INTERNAL MEDICINE

## 2024-08-27 PROCEDURE — 250N000013 HC RX MED GY IP 250 OP 250 PS 637: Performed by: SURGERY

## 2024-08-27 PROCEDURE — 999N000202 HC STATISTICAL VASC ACCESS NURSE TIME, 1-15 MINUTES

## 2024-08-27 RX ORDER — MAGNESIUM SULFATE HEPTAHYDRATE 40 MG/ML
2 INJECTION, SOLUTION INTRAVENOUS ONCE
Status: COMPLETED | OUTPATIENT
Start: 2024-08-27 | End: 2024-08-27

## 2024-08-27 RX ORDER — FENTANYL CITRATE 50 UG/ML
INJECTION, SOLUTION INTRAMUSCULAR; INTRAVENOUS PRN
Status: DISCONTINUED | OUTPATIENT
Start: 2024-08-27 | End: 2024-08-28 | Stop reason: HOSPADM

## 2024-08-27 RX ORDER — MAGNESIUM OXIDE 400 MG/1
400 TABLET ORAL EVERY 4 HOURS
Status: DISCONTINUED | OUTPATIENT
Start: 2024-08-27 | End: 2024-08-27

## 2024-08-27 RX ORDER — LIDOCAINE HYDROCHLORIDE 10 MG/ML
INJECTION, SOLUTION INFILTRATION; PERINEURAL PRN
Status: DISCONTINUED | OUTPATIENT
Start: 2024-08-27 | End: 2024-08-28 | Stop reason: HOSPADM

## 2024-08-27 RX ORDER — LIDOCAINE HYDROCHLORIDE 40 MG/ML
INJECTION, SOLUTION RETROBULBAR PRN
Status: DISCONTINUED | OUTPATIENT
Start: 2024-08-27 | End: 2024-08-28 | Stop reason: HOSPADM

## 2024-08-27 RX ADMIN — TACROLIMUS 4 MG: 1 CAPSULE ORAL at 09:20

## 2024-08-27 RX ADMIN — Medication 10 MG: at 20:23

## 2024-08-27 RX ADMIN — Medication 400 MG: at 22:05

## 2024-08-27 RX ADMIN — METHOCARBAMOL 750 MG: 750 TABLET ORAL at 15:40

## 2024-08-27 RX ADMIN — METHOCARBAMOL 750 MG: 750 TABLET ORAL at 10:32

## 2024-08-27 RX ADMIN — ACETYLCYSTEINE 2 ML: 200 SOLUTION ORAL; RESPIRATORY (INHALATION) at 21:06

## 2024-08-27 RX ADMIN — Medication 400 MG: at 17:00

## 2024-08-27 RX ADMIN — CALCIUM CARBONATE 600 MG (1,500 MG)-VITAMIN D3 400 UNIT TABLET 1 TABLET: at 22:05

## 2024-08-27 RX ADMIN — MYCOPHENOLATE MOFETIL 1000 MG: 250 CAPSULE ORAL at 09:22

## 2024-08-27 RX ADMIN — LEVALBUTEROL HYDROCHLORIDE 1.25 MG: 1.25 SOLUTION RESPIRATORY (INHALATION) at 17:11

## 2024-08-27 RX ADMIN — VALGANCICLOVIR HYDROCHLORIDE 900 MG: 450 TABLET ORAL at 09:20

## 2024-08-27 RX ADMIN — METOPROLOL TARTRATE 50 MG: 50 TABLET, FILM COATED ORAL at 20:27

## 2024-08-27 RX ADMIN — SULFAMETHOXAZOLE AND TRIMETHOPRIM 1 TABLET: 400; 80 TABLET ORAL at 09:22

## 2024-08-27 RX ADMIN — ACETYLCYSTEINE 2 ML: 200 SOLUTION ORAL; RESPIRATORY (INHALATION) at 13:13

## 2024-08-27 RX ADMIN — NYSTATIN 1000000 UNITS: 100000 SUSPENSION ORAL at 20:24

## 2024-08-27 RX ADMIN — Medication 5 ML: at 11:43

## 2024-08-27 RX ADMIN — METHOCARBAMOL 750 MG: 750 TABLET ORAL at 20:24

## 2024-08-27 RX ADMIN — PIPERACILLIN AND TAZOBACTAM 4.5 G: 4; .5 INJECTION, POWDER, LYOPHILIZED, FOR SOLUTION INTRAVENOUS at 20:23

## 2024-08-27 RX ADMIN — THERA TABS 1 TABLET: TAB at 11:40

## 2024-08-27 RX ADMIN — ACETAMINOPHEN 975 MG: 325 TABLET, FILM COATED ORAL at 20:23

## 2024-08-27 RX ADMIN — ACETYLCYSTEINE 2 ML: 200 SOLUTION ORAL; RESPIRATORY (INHALATION) at 09:51

## 2024-08-27 RX ADMIN — ROSUVASTATIN CALCIUM 10 MG: 10 TABLET, FILM COATED ORAL at 09:21

## 2024-08-27 RX ADMIN — PANTOPRAZOLE SODIUM 40 MG: 40 TABLET, DELAYED RELEASE ORAL at 09:20

## 2024-08-27 RX ADMIN — Medication 400 MG: at 11:41

## 2024-08-27 RX ADMIN — MAGNESIUM SULFATE HEPTAHYDRATE 2 G: 2 INJECTION, SOLUTION INTRAVENOUS at 11:41

## 2024-08-27 RX ADMIN — PIPERACILLIN AND TAZOBACTAM 4.5 G: 4; .5 INJECTION, POWDER, LYOPHILIZED, FOR SOLUTION INTRAVENOUS at 09:19

## 2024-08-27 RX ADMIN — NYSTATIN 1000000 UNITS: 100000 SUSPENSION ORAL at 15:39

## 2024-08-27 RX ADMIN — HEPARIN SODIUM 5000 UNITS: 5000 INJECTION, SOLUTION INTRAVENOUS; SUBCUTANEOUS at 17:00

## 2024-08-27 RX ADMIN — HYDROXYZINE HYDROCHLORIDE 25 MG: 25 TABLET, FILM COATED ORAL at 22:05

## 2024-08-27 RX ADMIN — LEVALBUTEROL HYDROCHLORIDE 1.25 MG: 1.25 SOLUTION RESPIRATORY (INHALATION) at 13:13

## 2024-08-27 RX ADMIN — Medication 10 MG: at 21:06

## 2024-08-27 RX ADMIN — ACETYLCYSTEINE 2 ML: 200 SOLUTION ORAL; RESPIRATORY (INHALATION) at 17:11

## 2024-08-27 RX ADMIN — CALCIUM CARBONATE 600 MG (1,500 MG)-VITAMIN D3 400 UNIT TABLET 1 TABLET: at 11:41

## 2024-08-27 RX ADMIN — PIPERACILLIN AND TAZOBACTAM 4.5 G: 4; .5 INJECTION, POWDER, LYOPHILIZED, FOR SOLUTION INTRAVENOUS at 02:06

## 2024-08-27 RX ADMIN — Medication 5 ML: at 05:50

## 2024-08-27 RX ADMIN — HEPARIN SODIUM 5000 UNITS: 5000 INJECTION, SOLUTION INTRAVENOUS; SUBCUTANEOUS at 09:22

## 2024-08-27 RX ADMIN — ACETAMINOPHEN 975 MG: 325 TABLET, FILM COATED ORAL at 11:40

## 2024-08-27 RX ADMIN — NYSTATIN 1000000 UNITS: 100000 SUSPENSION ORAL at 10:50

## 2024-08-27 RX ADMIN — LEVALBUTEROL HYDROCHLORIDE 1.25 MG: 1.25 SOLUTION RESPIRATORY (INHALATION) at 21:06

## 2024-08-27 RX ADMIN — TACROLIMUS 4 MG: 1 CAPSULE ORAL at 18:26

## 2024-08-27 RX ADMIN — METOPROLOL TARTRATE 50 MG: 50 TABLET, FILM COATED ORAL at 09:21

## 2024-08-27 RX ADMIN — PREDNISONE 25 MG: 5 TABLET ORAL at 09:21

## 2024-08-27 RX ADMIN — PIPERACILLIN AND TAZOBACTAM 4.5 G: 4; .5 INJECTION, POWDER, LYOPHILIZED, FOR SOLUTION INTRAVENOUS at 15:39

## 2024-08-27 RX ADMIN — LEVALBUTEROL HYDROCHLORIDE 1.25 MG: 1.25 SOLUTION RESPIRATORY (INHALATION) at 09:50

## 2024-08-27 RX ADMIN — MYCOPHENOLATE MOFETIL 1000 MG: 250 CAPSULE ORAL at 20:24

## 2024-08-27 RX ADMIN — Medication 10 MG: at 09:50

## 2024-08-27 ASSESSMENT — ACTIVITIES OF DAILY LIVING (ADL)
ADLS_ACUITY_SCORE: 25
ADLS_ACUITY_SCORE: 25
ADLS_ACUITY_SCORE: 23
ADLS_ACUITY_SCORE: 25
ADLS_ACUITY_SCORE: 23
ADLS_ACUITY_SCORE: 25
ADLS_ACUITY_SCORE: 23
ADLS_ACUITY_SCORE: 25
ADLS_ACUITY_SCORE: 23
ADLS_ACUITY_SCORE: 25
ADLS_ACUITY_SCORE: 25
ADLS_ACUITY_SCORE: 23

## 2024-08-27 NOTE — CONSULTS
Discharge Pharmacy Test Claim    Patient's [place] Medicaid plan covers formulary insulin with $0 copays. Requested test claims listed below.    Test Claim Copay Notes   humulin N kwikpens 0.00    insulin aspart flexpens 0.00    insulin lispro kwikpens 0.00    lantus solostar pens 0.00 last filled 8/10, next fill 9/3   novolin N flexpens not covered     Carey Chatterjee  [facility] Pharmacy Liaison, [place]  Ph: [phone]  Fax: [phone]  Available on Teams and Vocera

## 2024-08-27 NOTE — PROGRESS NOTES
[facility]    Medicine Progress Note - Hospitalist Service, GOLD TEAM 10    Date of Admission:  8/14/2024    Assessment & Plan   Shyam Cartwright is a 59 year old man with history including IPF with chronic hypoxemic resp failure (baseline 6L oxygen), hepatic steatosis, and type 2 DM s/p bilateral lung transplant 8/15/24. Extubated 8/18/24; transferred from the ICU 8/21/24.    Today/interval events  - Tolerated bronchoscopy well  - Improving PO intake    Pending discharge meds:  - final immunosuppressant dosing  - final insulin dosing     IPF s/p BL lung transplant (8/15/24)    Post operative ventilator support, extubated (8/18)   Acute hypoxemic respiratory failure (postop), improving  - Goal SpO2 >90%  - Pulmonary toilet   - Chest PT QID, aerobika, IS  - Ammonia qMTh  - Scheduled Mucomyst, levalbuterol  - S/p Methylprednisolone 125 q8hrs x3 doses, now on prednisone 25mg daily with taper planning per pulm  - Analgesia  - Scheduled: acataminophen   - PRN: oxycodone, hydromorphone, methocarbamol  - Paravertebral catheters bilateral per IPS (8/18 - current, possible removal 08/23/24 per RAPS)  - s/p Bronchoscopy -     Immunosuppression lung transplant   To complete perioperative regimen per transplant protocol.   - immunosuppression per Transplant Pulmonary team   - Basiliximab              - Mycophenolate, prednisolone with taper per pulm, tacrolimus (goal 8-12)    Infectious ppx for lung transplant   - Amphotericin B nebs twice weekly until discharge  - Micafungin 100mg daily through 8/25  - TMP-SMX started 08/16 based on donor toxo IgG+  - S/p Ceftazidime and Vancomycin course  - Valaciclovir (to begin 08/23)    Fever 8/19, resolved  Leukocytosis  Blood and sputum cultures 08/19: no growth to date.             - empiric piperacillin-tazobactam 10 days (end date 8/28-extended past date of next bronchoscopy due to increasing leukocytosis)     Delirium, resolved  - Delirium  precautions.  - Sleep hygiene, reorientation, mobility. Scheduled: Melatonin  - Quetiapine stopped 8/23    Shock, multifactorial, hypovolemic, distributive - resolved  Hx HTN  Hx of HLD  - Monitor hemodynamic status. Goal MAP >65, SBP <140  - Continue rosuvastatin 20 daily.  - Metoprolol tartrate 50 mg BID  - Discontinue PTA losartan     Moderate protein calorie malnutrition    Diarrhea  - Nutrition consulted, appreciate recommendations   - Bowel regimen: MiraLAx  - loperamide as needed     Hypophosphatemia, resolved  Hypomagnesemia  BL creat appears to be ~ 0.62-0.65   - Strict I/O, daily weights, Avoid/limit nephrotoxins as able  - Replete lytes PRN per protocol  - Goal NET even  - Discontinued Nutriphos d/t worsening diarrhea.     Stress hyperglycemia  Type 2 diabetes mellitus  - Hgb A1C 8.4%   - insulin glargine 35 units qPM; stop morning glargine  - insulin NPH to cover corticosteroid-induced hyperglycemia (can taper with steroid taper)  - Sliding scale insulin (high sensitivity) TID WM and HS  - Goal BG <180 for optimal healing  - endocrine consultation 08/26/24 given labile glucose and complicating factors, appreciate input  - Holding home PTA metformin, empagliflozin     Acute blood loss anemia  Acute blood loss thrombocytopenia, resolved  Coagulopathy  - No s/s of bleeding. Continue to monitor, Hgb goal > 7.0. Stable.     Sternotomy and Surgical Incision  Weakness and deconditioning    - Postoperative incision management per CVTS, sternal precautions  - PT/OT/CR - Recommending acute rehab    Hepatic steatosis          Diet: Snacks/Supplements Adult: Other; PRN - please allow pt to order any snack/supplement if requested; With Meals  Regular Diet Adult    DVT Prophylaxis: Heparin SQ  Goodrich Catheter: Not present  Lines: PRESENT      PICC 08/20/24 Double Lumen Right Basilic Difficult venous access. Frequent lab draws-Site Assessment: WDL      Cardiac Monitoring: None  Code Status: Full Code      Clinically  Significant Risk Factors            # Hypomagnesemia: Lowest Mg = 1.6 mg/dL in last 2 days, will replace as needed   # Hypoalbuminemia: Lowest albumin = 2.2 g/dL at 8/15/2024  8:20 PM, will monitor as appropriate              # DMII: A1C = 8.4 % (Ref range: <5.7 %) within past 6 months    # Moderate Malnutrition: based on nutrition assessment             Disposition Plan     Medically Ready for Discharge: Anticipated Tomorrow         Pablo Medrano MD  Hospitalist Service, GOLD TEAM 10  [facility]  Securely message with [employer] (more info)  Text page via [facility] Paging/Directory   See signed in provider for up to date coverage information  ______________________________________________________________________    Interval History     Reports eating well and no new complaints    Physical Exam   Vital Signs: Temp: 97.4  F (36.3  C) Temp src: Oral BP: 120/78 Pulse: 93   Resp: 20 SpO2: 95 % O2 Device: None (Room air) Oxygen Delivery: 2 LPM  Weight: 149 lbs 11.08 oz    Gen: Awake and alert, appears comfortable, seated upright on bed.  HEENT: NCAT, sclerae anicteric.  CV: Regular rhythm, normal rate, extremities warm and well perfused.  Pulm: Normal work of breathing on room air, mildly increasing with conversation; lungs clear to auscultation but diminished at bases, no wheezing.  GI: Nontender, nondistended, soft. +bowel sounds.  Skin: Warm, dry, no jaundice.  Neuro: AO, speech normal, moves all extremities symmetrically.  Psych: Mood is good, affect is congruent.      Medical Decision Making       55 MINUTES SPENT BY ME on the date of service doing chart review, history, exam, documentation & further activities per the note.      Data     I have personally reviewed the following data over the past 24 hrs:    15.1 (H)  \   12.4 (L)   / 378     138 102 15.2 /  180 (H)   4.1 23 0.62 (L) \       Imaging results reviewed over the past 24 hrs:   Recent Results (from the past 24  hour(s))   XR Chest Port 1 View    Narrative    Portable chest    INDICATION: Lung transplant follow-up    COMPARISON: Yesterday    FINDINGS: Heart size normal. Bilateral lung transplant again noted  including butterfly clamshell sternotomy. Left atrial appendage  ligation clip again present. A right upper extremity PICC line tip  again is in the right atrium. Trace left apical pneumothorax the  appearance is grossly unchanged. No significant changes otherwise.      Impression    IMPRESSION: No significant change from yesterday with again question  trace left apical pneumothorax.    MORENO POSEY MD         SYSTEM ID:  [id number]

## 2024-08-27 NOTE — OR NURSING
Patient underwent bronchoscopy with washings under conscious sedation. Tolerated well. Specimens sent with RT. VSS on 2 L O2. Report given to MALLORIE Ro.

## 2024-08-27 NOTE — PROGRESS NOTES
PICC dressing change    Patient is discharging tomorrow, per primary RN, dressing may not need to be changed. Patient will net go home with current PICC.

## 2024-08-27 NOTE — PROGRESS NOTES
Documentation of Face to Face for Nebulizer Machine    I certify that patient: Travon Cartwright is under my care and that I, or a nurse practitioner or physician's assistant working with me, had a face-to-face encounter that meets the physician face-to-face encounter requirements with this patient on: 08/27/24    This encounter with the patient was in whole, or in part, for the following medical condition, which is the primary reason for a nebulizer machine: idiopathic pulmonary fibrosis s/p bilateral sequential lung transplant. He requires a nebulizer machine for levalbuterol and N-acetylcysteine administration for the purpose of pulmonary toilet.    I certify that, based on my findings, the following is medically necessary: nebulizer machine    Attending hospital physician (the Medicare certified [facility] provider): Pablo Medrano MD    Physician Signature: See electronic signature associated with these discharge orders.  Date: 8/27/2024

## 2024-08-27 NOTE — TELEPHONE ENCOUNTER
A pharmacist spent 60 minutes providing medication teaching with Travon Cartwright and Mine (sister) for discharge with a focus on new medications/dose changes.  The discharge medication list was reviewed with the patient/family and the following points were discussed, as applicable: Name, description, purpose, dose/strength, duration of medications, common side effects, food/medications to avoid, action to be taken if dose is missed, when to call MD, and how to obtain refills.  The patient and family member (Mine - sister) will be responsible for managing medications. Additionally, the following transplant related education was covered: Purpose of medication card, Medication videos, Timing of medications and day of lab draw considerations , Prescription Insurance , and Discharge process for receiving meds   Patient will  transplant supplies including 7 day pill organizer, thermometer, and BP monitor at the discharge pharmacy along with medications.  Patient chooses to receive medications from [facility].   Clinical Pharmacy Consult:                                                      Transplant Specific:   Date of Transplant: 8/15/24  Type of Transplant: lung  First Transplant: yes  History of rejection: no    Immunosuppression Regimen   TAC 4mg qAM & 4mg qPM, Prednisone 25mg qAM then taper, and MMF 1000mg qAM & 1000mg qPM  Patient specific goal: 8-12  Most recent level: 6.8, date 8/26  Immunosuppressant Levels:  Subtherapeutic  Pt adherent to lab draws: yes  Scr:   Lab Results   Component Value Date    CR 0.62 08/27/2024     Side effects: Diarrhea    Prophylactic Medications  PJP Prophylactic: Bactrim SS daily  Scheduled Discontinue Date: Lifelong     Antifungal: Nystatin  Scheduled Discontinue Date: 6 months Anticipated date: 2/15/25    CMV Prophylactic: CrCl >/=60 mL/minute: Valcyte 900mg daily   Scheduled Discontinue Date: 3 months Anticipated date: 11/15/24    Acid Reducer: Protonix  (pantoprazole)  Scheduled Reviewed Date: Lifelong    Vascular Prophylactic: Heparin injection  Scheduled Discontinue Date:  upon discharge     Reminders:  Bring to first clinic appt: med box, med card, bp monitor, all medications being taken, and lab book.  2.   MTM pharmacist visit on first clinic appt and if ok, again in 3 to 4 months during follow up appt.  3.   Avoid Grapefruit and Grapefruit juice.   4.   Avoid herbal supplements. If wish to take other medications or supplements, call your coordinator.   5.   Keep lab appts.   6.   Can use apps on phone like [employer] to help manage medication lists and reminders.   7.   Make sure you are protecting your skin by wearing long sleeves and applying sunscreen to exposed skin, for any significant time in the sun.     Transplant Coordinator is Joi [facility]

## 2024-08-27 NOTE — PROGRESS NOTES
"CLINICAL NUTRITION SERVICES - BRIEF/DISCHARGE NOTE     Nutrition Prescription    RECOMMENDATIONS FOR MDs/PROVIDERS TO ORDER:  Informed endocrine team that patient feels overwhelmed with CHO counting and the idea of having to assess CHO level for insulin dosing. Pt requests for his insulin to be as simple as possible.     Recommendations already ordered by Registered Dietitian (RD):  - CHO counting education complete with handouts. Pt somewhat engaged and allowed writer to review education on CHO servings, various 1-CHO equivalent foods, label reading tips but admits to being overwhelmed and reports that he is not likely to track his CHO closely (informed endocrine team)    - Post txp diet education completed with pt and sister today. Reviewed guidelines for high Kcal/protein needs post txp, food safety recommendations, food/med interactions, and general recommendations for nutrition once outside of acute post txp window. Provided with [place] Food Safety Handbook and \"a guide to your diet after transplant\" handout. Pt verbalized an understanding to the information provided.     Future/Additional Recommendations:  Minimize diet restrictions as able d/t high calorie/protein needs post-transplant.  Oral supplements as needed to help meet nutritional needs.     High protein food choices with meals to help meet high needs post-transplant over the next 6-8 weeks.     Heart-healthy diet (low saturated fat, low sodium, high fiber) and food safety precautions long term due to immunosuppression regimen post-transplant       - Consult received for CHO counting education. Additionally, patient still needs nutrition education post transplant. Planned discharge tomorrow AM, so discussed both topics during visit today. Patient's sister/caregiver present for education. Pt feels overwhelmed with CHO counting but seems accepting of txp edu.     Monitoring/Evaluation  Progress toward goals will be monitored and evaluated per " protocol.    Patient s discharge needs assessed and discharge planning has been conducted with the multidisciplinary transplant care team including physicians, pharmacy, social work and transplant coordinator.    Once discharged, place outpatient nutrition consult via the transplant team if nutrition concerns arise.    David Melton, MS, RDN, LD, CNSC  Available by Vocera or phone   Vocera: M-F (7:00-3:30)  6B Clinical Dietitian  or 1A Obs Clinical Dietitian  Weekend/Holiday (7:00-3:30) - Weekend Clinical Dietitian   6B RD desk: [phone]       **Clinical Dietitians are no longer available by pager.

## 2024-08-27 NOTE — PROGRESS NOTES
Pulmonary Medicine  Cystic Fibrosis - Lung Transplant Team  Progress Note  2024     Patient: Travon Cartwright  MRN: [id number]  : 1965 (age 59 year old)  Transplant: 8/15/2024 (Lung), POD#12  Admission date: 2024    Assessment & Plan:   Travon Cartwright is a 59 year old male with a history of idiopathic pulmonary fibrosis, diabetes, hepatosteatosis, and essential tremor.  Pt. is now s/p BSLT on 8/15/2024 with Dr. Mulvihill.  Surgery was uncomplicated and done on pump, extubated and off pressors since 2024. Clinically improved, minimal oxygen requirements at this point.     Recommendations:  Currently on room air, keep Spo2 > 92%  Continue with airway clearance therapies  CXR tomorrow AM (post-bronchoscopy today)  Will need staples removed in clinic   Discharge screening bilateral upper and lower extremity ultrasounds done yesterday negative for VTE  Diuresis per primary team; approximately net even yesterday  IS: Tacro dose increased  - this morning's level is pending, will adjust dose once level results  DSA pending (drawn )  PPX: As below.   Abx - Zosyn-extended till ; can remove PICC prior to discharge tomorrow  Tentatively plan to discharge to home tomorrow, with follow up in clinic Thursday afternoon       S/p bilateral sequential lung transplant (BSLT) 8/15/24 for idiopathic pulmonary fibrosis:  Acute on chronic hypoxic respiratory failure (post operative):   - Oxygen as needed to keep spO2>90%  - Nebs: levalbuterol and Mucomyst QID  - Pulmonary toilet with chest physiotherapy QID (addition of Aerobika and incentive spirometry now that he's extubated)  - DSA at one week (drawn , pending) then one month post-transplant (additionally per protocol)  - Ammonia monitoring qMTh (screening for hyperammonemia post-lung transplant) with ureaplasma PCR ordered POD #7 (, negative) per protocol   - Explant pathology: L with UIP and organizing pneumonia (5 benign hilar  LNs), R UIP with organizing pneumonia (7 benign hilar LNs)     Immunosuppression:  Induction therapy with high dose IV steroid intraoperatively and basiliximab (POD #0 and #4).   - Tacrolimus Goal  8-12. Dose increased 8/24- today's level is pending.  - MMF 1000 mg BID  - Methylprednisolone 125 mg x 3 doses, then prednisolone 30 mg daily with taper per lung transplant protocol: (taper ordered)  Date Daily Dose (mg)   8/17/2024 30   8/24/2024 25   8/31/2024 20   9/21/2024 15   10/12/2024 10   11/2/2024 5      Prophylaxis:   - Bactrim for PJP ppx started POD #1 due to donor toxoplasma IgG+  - VGCV for CMV ppx (ordered as below), CMV monitoring per protocol (after completion of ppx course, additionally prn)  - Ampho B nebs twice weekly for antifungal ppx through discharge, then will stop  - Nystatin for oral candidiasis ppx, 6 month course   - s/p Micofungin 100mg q24h for prophylaxis x 10 days (end date: 8/25)  - See below for serologies and viral ppx:    Donor Recipient Intervention   CMV status - + Valganciclovir POD #8-90   EBV status - + EBV monthly   HSV status N/A + none        ID: Prior history of infection/colonization with Strep constellatus (bronch BAL on L 8/16), donor cultures positive for Staph.  - IgG at one month (ordered 9/15)  - Donor cultures ([facility]) NGTD; ([facility]) NGTD ([facility] personally reviewed today) - MSSA (s/p vancomycin 8/17-8/21, Zosyn as below)  - Recipient cultures - washing from left 8/16- Strep constellatus (Zosyn, as below)  - Abx- Completed IV ceftaz. Vancomycin resumed 8/17-8/21 for Staph in donor cx, Strep in recipient BAL. Plan for zosyn 8/20-8/28 (10 days).  - Repeat blood cultures 8/19 for fevers over night; plan for zosyn x 10 days     PHS risk criteria donor:  Additional labs required post-transplant (between 4-8 weeks post-op): Hepatitis B, Hepatitis C, and HIV by JOVANY ([id number], ordered POD #30, ordered for 9/15).     EGJ outflow obstruction (?) inconclusive: Noted on esophageal  "manometry 7/9. Proceed with J feeds. No need for NPO for 6 weeks.     Diabetes mellitus: Prior to transplant was on Jardiance, dulaglutide, glargine, metformin. All currently held.  - Lantus and sliding scale insulin per primary team     Toxic-metabolic encephalopathy (resolved): Likely ICU delirium.   - PRN melatonin at bedtime      We appreciate the excellent care provided by the [facility] team. Recommendations communicated via in person rounding and this note.  Will continue to follow along closely, please do not hesitate to call with any questions or concerns.    Patient staffed with Dr. Knight.    Mallorie Hoover MD  Internal Medicine-Pediatrics  Pulmonary/Critical Care Fellow - PGY7/FL2  [facility]  P: [phone]       Subjective & Interval History:     No acute events over night. I saw Juan J in the endoscopy suite today, and he was in good spirits and on board with the plan to proceed with inspection bronchoscopy and possibly discharge tomorrow. His sister is planning on coming in later today. He remains on RA.     Review of Systems:     C: No fever, no chills, decrease in weight; tolerating full diet with thin liquids  INTEGUMENTARY/SKIN: No rash or obvious new lesions  ENT/MOUTH: No sore throat, no sinus pain, no nasal congestion or drainage  RESP: See interval history  CV: no chest pain, no palpitations, no peripheral edema  GI: No nausea, no vomiting, no change in stools, no reflux symptoms  : No dysuria  MUSCULOSKELETAL: No myalgias, no arthralgias  ENDOCRINE: Blood sugars with adequate control  NEURO: No headache  PSYCHIATRIC: Mood stable    Physical Exam:     All notes, images, and labs from past 24 hours (at minimum) were reviewed.    Vital signs:  Temp: 97.4  F (36.3  C) Temp src: Oral BP: 120/78 Pulse: 93   Resp: 20 SpO2: 95 % O2 Device: None (Room air) Oxygen Delivery: 2 LPM Height: 177.8 cm (5' 10\") Weight: 67.9 kg (149 lb 11.1 oz)    Constitutional: sitting in bed, room air, in no apparent " distress; appears in good spirits; tolerated bronchoscopy sedation with issues   HEENT: Eyes with pink conjunctivae, anicteric.   PULM: Good air flow bilaterally.  No crackles, no rhonchi, no wheezes. No increased work of breathing, saturations >95% on RA.  CV: Normal S1 and S2.  RRR.  No murmur, gallop, or rub.  No peripheral edema.   ABD: NABS, soft, nontender, nondistended.    MSK: Moves all extremities.  No apparent muscle wasting.   NEURO: Alert and conversant.   SKIN: Warm, dry.  No rash on limited exam.   PSYCH: Mood stable.     Data:     LABS    CMP:   Recent Labs   Lab 08/27/24  0856 08/27/24  0550 08/27/24  0205 08/26/24  2110 08/26/24  0951 08/26/24  0603 08/26/24  0249 08/25/24  2148 08/25/24  1651 08/25/24  1331 08/25/24  0721 08/25/24  0542 08/24/24  0843 08/24/24  0624 08/22/24  0807 08/22/24  0543   NA  --  138  --   --   --  136  --   --   --   --   --  133*  --  132*   < > 140   POTASSIUM  --  4.1  --   --   --  4.4  --   --   --   --   --  4.5  --  4.3   < > 4.1   CHLORIDE  --  102  --   --   --  101  --   --   --   --   --  99  --  99   < > 105   CO2  --  23  --   --   --  25  --   --   --   --   --  23  --  25   < > 26   ANIONGAP  --  13  --   --   --  10  --   --   --   --   --  11  --  8   < > 9   * 112* 139* 235*   < > 106*   < >  --    < >  --    < > 298*   < > 269*   < > 195*  200*   BUN  --  15.2  --   --   --  15.8  --   --   --   --   --  19.4  --  15.1   < > 17.9   CR  --  0.62*  --   --   --  0.64*  --   --   --   --   --  0.52*  --  0.55*   < > 0.61*   GFRESTIMATED  --  >90  --   --   --  >90  --   --   --   --   --  >90  --  >90   < > >90   HUGO  --  9.7  --   --   --  9.5  --   --   --   --   --  9.1  --  8.5*   < > 8.8   MAG  --  1.6*  --   --   --  1.8  --  2.0  --  1.4*  --  1.5*  --  1.5*   < > 1.6*   PHOS  --  3.5  --   --   --  4.2  --   --   --   --   --  2.7  --  2.5   < > 2.8   PROTTOTAL  --   --   --   --   --  6.1*  --   --   --   --   --   --   --   --   --  5.5*    ALBUMIN  --   --   --   --   --  3.2*  --   --   --   --   --   --   --   --   --  2.8*   BILITOTAL  --   --   --   --   --  0.4  --   --   --   --   --   --   --   --   --  0.4   ALKPHOS  --   --   --   --   --  170*  --   --   --   --   --   --   --   --   --  139   AST  --   --   --   --   --  27  --   --   --   --   --   --   --   --   --  33   ALT  --   --   --   --   --  44  --   --   --   --   --   --   --   --   --  43    < > = values in this interval not displayed.     CBC:   Recent Labs   Lab 08/27/24  0736 08/27/24  0550 08/26/24  0603 08/25/24  0542   WBC 15.1* 14.7* 17.9* 16.4*   RBC 4.21* 4.40 4.24* 4.00*   HGB 12.4* 12.8* 12.4* 11.6*   HCT 38.2* 40.4 39.2* 36.6*   MCV 91 92 93 92   MCH 29.5 29.1 29.2 29.0   MCHC 32.5 31.7 31.6 31.7   RDW 13.7 13.8 13.8 13.6    370 290 291       INR:   No lab results found in last 7 days.      Glucose:   Recent Labs   Lab 08/27/24  0856 08/27/24  0550 08/27/24  0205 08/26/24  2110 08/26/24  1634 08/26/24  1244   * 112* 139* 235* 242* 264*       Blood Gas:   Recent Labs   Lab 08/20/24  1619   O2PER 26     IMAGING    8/27/2024 CXR  IMPRESSION: No significant change from yesterday with again question  trace left apical pneumothorax.

## 2024-08-27 NOTE — PROGRESS NOTES
Donor Culture Results:    Preliminary / Final positive donor sputum culture results have been uploaded into [facility]. Donor ID [id number].  Dr. Rey notified of results.      GRACE Pena RN  Transplant Coordinator

## 2024-08-27 NOTE — PROGRESS NOTES
Transplant Social Work Services Progress Note      Date of Initial Social Work Evaluation: 6/20/2024  Collaborated with: pulm team    Data: patient likely ready for discharge tomorrow. Per patient's sister, they have decided to stay locally with a friend until an [facility] apartment opens up.    Intervention: Updated accommodations department that patient still very much interested in [facility] apartment when one becomes available to him.   Assessment: adequate temporary housing secured.   Education provided by SW: n/a  Plan:    Discharge Plans in Progress:  will discharge to friend's home locally in the care of his sister, Mine.      Barriers to d/c plan: bronch today. Should be ready for discharge tomorrow.      Follow up Plan: will continue to follow for support, counseling, education and resources.      [facility]  Lung Transplant   Phone: [phone]     Joaquim

## 2024-08-27 NOTE — PROGRESS NOTES
Inpatient Diabetes Management Service: Daily Progress Note     HPI: Shyam Cartwright is a 59 year old man with history including IPF with chronic hypoxemic resp failure (baseline 6L oxygen), hepatic steatosis, and type 2 DM s/p bilateral lung transplant 8/15/24.  Inpatient Diabetes Service consulted for for glycemic control now off TF that were cycled 8 pm to 8 am as off night of 8/25/24 and on a steroid taper.          Assessment/Plan:     Assessment:   Type 2 Diabetes Mellitus, without complication, sub optimal control  (A1c 8.4 %)    Steroid induced hyperglycemia  Enteral feed induced hyperglycemia, now resolved     Plan/Recommendations:    - Continue Lantus 35 q 24 hrs at 20:00  -Increase NPH 24 units at 8 am at the same time as the prednisone 25 mg po daily- DO not give if prednisone is held  - Increase Novolog Meal Coverage: 1 units per 7 g CHO, TID AC and PRN with snacks/supplements   - Continue Novolog Correction Scale: High resistance (ISF: 1/25)  TID AC / HS / 0200   - BG monitoring: TID AC, HS, 0200 /    - Hypoglycemia protocol    - Carb counting protocol   - Hold Trulicity, not on formulary,discussed with transplant pharmacy- no drug interaction with MMF/tac-- will hold and monitor for formed stools  - Would prefer to continue to hold Jardiance 10 mg daily due to immunosuppression ie higher doses of prednisone  -Restart metformin  mg po daily when diarrhea is improved to resolved    IP Diabetes Management Team Discharge Instructions ( tentative)    Diabetes and supplies to be ordered by primary team:    Dexcom continuous glucose monitor  Humalin N Kwikpen  Insulin aspart flexpen  Sharps container   Alcohol wipes   B-D 4 mm chu pen needles   Lantus Solostar pen      Glucose Control Regimen:     Lantus 35 units every day at 7 pm    To cover prednisone 30 mg daily, give NPH 24 units every day at the same time as the prednisone at 8 am ( If do not take prednisone do not take  NPH)    When Decrease Prednisone 20 mg daily--> Decrease NPH 18 units daily at the same time as the prednisone at 8 am ( If do not take prednisone do not take NPH)    When decrease prednisone to 15 mg daily must call provider for dose of NPH      Novolog Fixed meal dose:    Breakfast, lunch and dinner: Give 8 units of Novolog for medium meal, around 60g of carb and 10 units of Novolog for larger meal about 80 or greater g of carb----> Do not take if do not eat meal    Give 5 units of Novolog for a snack or supplement    Check blood glucose prior to meal and give Novolog insulin per the sliding scale:    Correction Scale - HIGH INSULIN RESISTANCE DOSING     Do Not give Correction Insulin if Pre-Meal BG less than 140.   For Pre-Meal  - 164 give 1 unit.   For Pre-Meal  - 189 give 2 units.   For Pre-Meal  - 214 give 3 units.   For Pre-Meal  - 239 give 4 units.   For Pre-Meal  - 264 give 5 units.   For Pre-Meal  - 289 give 6 units.   For Pre-Meal  - 314 give 7 units.   For Pre-Meal  - 339 give 8 units.   For Pre-Meal  - 364 give 9 units.   For Pre-Meal BG greater than or equal to 365 give 10 units    Check blood glucose before bedtime and give Novolog insulin according to the following scale:    HIGH INSULIN RESISTANCE DOSING    Do Not give Bedtime Correction Insulin if BG less than 200.   For  - 224 give 1 units.   For  - 249 give 2 units.   For  - 274 give 3 units.   For  - 299 give 4 units.   For  - 324 give 5 units.   For  - 349 give 6 units.   For BG greater than or equal to 350 give 7 units  Stop Jardiance 10 mg daily to increased chance of urinary infection/yeast infection with high dose of steroids/MMF and tacrolimus    Can restart metformin and Trulicity at recommendation of outpatient provider    Blood Glucose Checks:   three times daily before meals, and at bedtime.    Please call if you have more than 2 blood sugar over  275 in one day, or any sugars less than 75.     Low Blood Sugars:  Signs/symptoms of low blood sugar include (but are not limited to): sweating, shaking, feeling dizzy or weak, extreme hunger, headache, feeling crabby or confused, numbness or tingling of mouth/lips.  If you have these symptoms check your blood sugar if you can and then consume carbohydrate (sugar)  This is a helpful reminder on how to treat a blood sugar if you are low:  If your blood sugar is < 70 mg/dL, consume 15 grams of fast-acting carbohydrate (4 glucose tabs OR 4 oz juice or non-diet pop OR 2 Tbsp dried fruit OR 5-7 lifesavers OR 1 Tbsp sugar) and wait 15 minutes. Check blood sugar again and if not rising, repeat.  If your blood sugar is < 50 mg/dL, consume 30 grams of fast-acting carbohydrate (8 glucose tabs OR 8 oz juice or non-diet pop OR 4 Tbsp dried fruit OR 10-14 lifesavers OR 2 Tbsp sugar) and wait 15 minutes. Check blood sugar again and if not rising, repeat.      Endocrinology Outpatient follow up: An appointment request should be made by patient with outpatient endocrine provider 1-2 weeks from discharge.       If you have urgent questions or concerns regarding your blood sugars or insulin, you may contact [phone] (the main hospital ). Ask to speak with the endocrinologist on call.    Your target A1c value is less than 7%.   Thank you for letting the Diabetes Management Team be involved in your care!    Discharge Planning: (tentative)    Medications: as above    Test Claims: done 8/26/2024  humulin N kwikpens 0.00     insulin aspart flexpens 0.00     insulin lispro kwikpens 0.00     lantus solostar pens 0.00 last filled 8/10, next fill 9/3   novolin N flexpens not covered       Education: Need education for carb coverage and correction scale insulin,  order with dietician and with diabetes education    Outpatient Follow-up:  recommend [facility] End[facility] Sara Reilly NP           Please notify Inpatient Diabetes  "Service if changes are planned to steroids, nutrition, TPN/TF and anticipated procedures requiring prolonged NPO status.                Interval History/Review of Systems :   The last 24 hours progress and nursing notes reviewed.  Doing well physically.  No n,v.  Says stools are more formed today.  Tried to explain plan x2 today and Juan J is not very interested in plan.  He continues to look at his sister.  Creat stable at 0.62  Planned Procedures/Surgeries: bronchoscopy this morning    Inpatient Glucose Control:       Recent Labs   Lab 08/27/24  1413 08/27/24  0856 08/27/24  0550 08/27/24  0205 08/26/24  2110 08/26/24  1634   * 142* 112* 139* 235* 242*             Medications Impacting Glycemia:   Steroids:       D5W-containing solutions/medications: none   Other medications impacting glucose: none         Nutrition:   Orders Placed This Encounter      Regular Diet Adult  NPO for bronch 8/27/2024 and now with regular diet  Supplements:  allow pt to order snacks and supplements  TF: done on 8/25/2024  TPN: none       Diabetes History: see full consult note for complete diabetes history   Diabetes Type and Duration: He was diagnosed in 2020 with type 2 diabetes- he had a prediabetes of 6.2 in 2019 and 8.3 in 6.4.2020      PTA Medication Regimen: Jardiance 10 mg was added to his diabetes regimen. He was on metformin  mg po bid( lower dose due to diarrhea), lantus 30 units at bedtime and Trulicity 3 mg weekly on Tuesdays  Historical Diabetes Medications: He was taking Ozempic for a while, and likely had an availability issue      Glucose monitoring device and frequency: using a dexom G7 for about 3 months  Outpatient Diabetes Provider: most recently Sara Reilly, NP  [facility], saw him on 6/7/2024         Physical Exam:   /78 (BP Location: Left arm)   Pulse 93   Temp 97.4  F (36.3  C) (Oral)   Resp 20   Ht 1.778 m (5' 10\")   Wt 67.9 kg (149 lb 11.1 oz)   SpO2 95%   BMI 21.48 kg/m  "   General:  in no acute distress.  HEENT:  NC/AT. MMM, sclera not injected  Lungs:  unremarkable, no audible cough, no work of breathing  ABD:  rounded  Skin:  warm and dry, no obvious lesions, some bruising  MSK:   moving all extremities  Lymp:   no LE edema  Mental Status:  Alert and oriented x3  Psych:    good eye contact, full/appropriate affect     Feet:   cool, without discoloration,  without evidence of wounds, corns, calluses, pulses +  , not examined today            Data:     Lab Results   Component Value Date    A1C 8.4 (H) 06/18/2024       ROUTINE IP LABS (Last four results)  BMP  Recent Labs   Lab 08/27/24  1413 08/27/24  0856 08/27/24  0550 08/27/24  0205 08/26/24  0951 08/26/24  0603 08/25/24  0721 08/25/24  0542 08/24/24  0843 08/24/24  0624   NA  --   --  138  --   --  136  --  133*  --  132*   POTASSIUM  --   --  4.1  --   --  4.4  --  4.5  --  4.3   CHLORIDE  --   --  102  --   --  101  --  99  --  99   HUGO  --   --  9.7  --   --  9.5  --  9.1  --  8.5*   CO2  --   --  23  --   --  25  --  23  --  25   BUN  --   --  15.2  --   --  15.8  --  19.4  --  15.1   CR  --   --  0.62*  --   --  0.64*  --  0.52*  --  0.55*   * 142* 112* 139*   < > 106*   < > 298*   < > 269*    < > = values in this interval not displayed.     CBC  Recent Labs   Lab 08/27/24  0736 08/27/24  0550 08/26/24  0603 08/25/24  0542   WBC 15.1* 14.7* 17.9* 16.4*   RBC 4.21* 4.40 4.24* 4.00*   HGB 12.4* 12.8* 12.4* 11.6*   HCT 38.2* 40.4 39.2* 36.6*   MCV 91 92 93 92   MCH 29.5 29.1 29.2 29.0   MCHC 32.5 31.7 31.6 31.7   RDW 13.7 13.8 13.8 13.6    370 290 291     INRNo lab results found in last 7 days.    Inpatient Diabetes Service will continue to follow, please don't hesitate to contact the team with any questions or concerns.     Benita Machuca PA-C  Inpatient Diabetes Service  [phone]  Available by Joaquim    Plan discussed with sister and patient, bedside RN, and primary team via this note.  Spoke with  dietician    To contact Inpatient Diabetes Service:     7 AM - 5 PM: Page the IDS EMILY following the patient that day (see filed or incomplete progress notes/consult notes under Endocrinology)    OR if uncertain of provider assignment: page job code [phone]    5 PM - 7 AM: First call after hours is to primary service.    For urgent after-hours questions, page job code for on call fellow: [phone]     I spent a total of 40 minutes on the date of the encounter doing prep/post-work, chart review, history and exam, documentation and further activities per the note including lab review, multidisciplinary communication, counseling the patient and/or coordinating care regarding acute hyper/hypoglycemic management, as well as discharge management and planning/communication.

## 2024-08-27 NOTE — PLAN OF CARE
Shift: [id number]    Neuro: A&Ox4, calls appropriately, lets needs be known, pleasant and cooperative with cares.   VS: VSS  Respiratory: RA, lung sounds clear, no complaints of SOB.  Cardiac: Apical pulse regular, no complaints of chest pain.   Pain: No complaints of pain.  GI/: Adequately voiding, no BM during the shift. No complaints of N/V.  Diet: NPO since midnight including all meds  BG/Insulin: AC & HS BG checks  IV/Drips: Right PICC, both lumens heparin locked  Activity: independent/SBA  Skin/Drains: Clamshell incision, PHYLLIS closed with staples. Prior CT sites x5, covered with gauze (CDI)  Replacements/Labs: Tacrolimus lab not drawn yet this morning, since pt can't take his morning dose right at scheduled time, due to bronch. Will need to be drawn prior to giving.    P: Report Changes to treatment team Gold 10,  Bronch today scheduled at 0800, possible discharge tomorrow.

## 2024-08-28 ENCOUNTER — APPOINTMENT (OUTPATIENT)
Dept: GENERAL RADIOLOGY | Facility: CLINIC | Age: 59
End: 2024-08-28
Attending: STUDENT IN AN ORGANIZED HEALTH CARE EDUCATION/TRAINING PROGRAM
Payer: COMMERCIAL

## 2024-08-28 VITALS
DIASTOLIC BLOOD PRESSURE: 80 MMHG | HEIGHT: 70 IN | TEMPERATURE: 97.6 F | HEART RATE: 84 BPM | OXYGEN SATURATION: 93 % | RESPIRATION RATE: 18 BRPM | BODY MASS INDEX: 21.24 KG/M2 | WEIGHT: 148.37 LBS | SYSTOLIC BLOOD PRESSURE: 119 MMHG

## 2024-08-28 PROBLEM — T38.0X5A STEROID-INDUCED HYPERGLYCEMIA: Status: ACTIVE | Noted: 2024-08-28

## 2024-08-28 PROBLEM — R73.9 STEROID-INDUCED HYPERGLYCEMIA: Status: ACTIVE | Noted: 2024-08-28

## 2024-08-28 LAB
ACID FAST STAIN (ARUP): NORMAL
ACID FAST STAIN (ARUP): NORMAL
ALBUMIN SERPL BCG-MCNC: 3.3 G/DL (ref 3.5–5.2)
ALBUMIN UR-MCNC: NEGATIVE MG/DL
ALP SERPL-CCNC: 176 U/L (ref 40–150)
ALT SERPL W P-5'-P-CCNC: 51 U/L (ref 0–70)
ANION GAP SERPL CALCULATED.3IONS-SCNC: 11 MMOL/L (ref 7–15)
APPEARANCE UR: CLEAR
AST SERPL W P-5'-P-CCNC: 27 U/L (ref 0–45)
BASOPHILS # BLD AUTO: 0.1 10E3/UL (ref 0–0.2)
BASOPHILS NFR BLD AUTO: 1 %
BILIRUB DIRECT SERPL-MCNC: 0.21 MG/DL (ref 0–0.3)
BILIRUB SERPL-MCNC: 0.5 MG/DL
BILIRUB UR QL STRIP: NEGATIVE
BRONCHOSCOPY: NORMAL
BUN SERPL-MCNC: 13 MG/DL (ref 8–23)
CALCIUM SERPL-MCNC: 9.7 MG/DL (ref 8.8–10.4)
CHLORIDE SERPL-SCNC: 102 MMOL/L (ref 98–107)
COLOR UR AUTO: ABNORMAL
CREAT SERPL-MCNC: 0.7 MG/DL (ref 0.67–1.17)
EGFRCR SERPLBLD CKD-EPI 2021: >90 ML/MIN/1.73M2
EOSINOPHIL # BLD AUTO: 0.2 10E3/UL (ref 0–0.7)
EOSINOPHIL NFR BLD AUTO: 2 %
ERYTHROCYTE [DISTWIDTH] IN BLOOD BY AUTOMATED COUNT: 13.9 % (ref 10–15)
GLUCOSE BLDC GLUCOMTR-MCNC: 135 MG/DL (ref 70–99)
GLUCOSE BLDC GLUCOMTR-MCNC: 149 MG/DL (ref 70–99)
GLUCOSE BLDC GLUCOMTR-MCNC: 180 MG/DL (ref 70–99)
GLUCOSE BLDC GLUCOMTR-MCNC: 98 MG/DL (ref 70–99)
GLUCOSE SERPL-MCNC: 99 MG/DL (ref 70–99)
GLUCOSE UR STRIP-MCNC: NEGATIVE MG/DL
HCO3 SERPL-SCNC: 24 MMOL/L (ref 22–29)
HCT VFR BLD AUTO: 38.7 % (ref 40–53)
HGB BLD-MCNC: 12.4 G/DL (ref 13.3–17.7)
HGB UR QL STRIP: NEGATIVE
IMM GRANULOCYTES # BLD: 0.3 10E3/UL
IMM GRANULOCYTES NFR BLD: 2 %
KETONES UR STRIP-MCNC: NEGATIVE MG/DL
LEUKOCYTE ESTERASE UR QL STRIP: NEGATIVE
LYMPHOCYTES # BLD AUTO: 2.3 10E3/UL (ref 0.8–5.3)
LYMPHOCYTES NFR BLD AUTO: 18 %
MAGNESIUM SERPL-MCNC: 1.7 MG/DL (ref 1.7–2.3)
MCH RBC QN AUTO: 29.5 PG (ref 26.5–33)
MCHC RBC AUTO-ENTMCNC: 32 G/DL (ref 31.5–36.5)
MCV RBC AUTO: 92 FL (ref 78–100)
MONOCYTES # BLD AUTO: 1 10E3/UL (ref 0–1.3)
MONOCYTES NFR BLD AUTO: 8 %
NEUTROPHILS # BLD AUTO: 8.5 10E3/UL (ref 1.6–8.3)
NEUTROPHILS NFR BLD AUTO: 69 %
NITRATE UR QL: NEGATIVE
NRBC # BLD AUTO: 0 10E3/UL
NRBC BLD AUTO-RTO: 0 /100
PH UR STRIP: 7.5 [PH] (ref 5–7)
PHOSPHATE SERPL-MCNC: 3.7 MG/DL (ref 2.5–4.5)
PLATELET # BLD AUTO: 326 10E3/UL (ref 150–450)
PLATELET # BLD AUTO: 326 10E3/UL (ref 150–450)
POTASSIUM SERPL-SCNC: 4.1 MMOL/L (ref 3.4–5.3)
PROT SERPL-MCNC: 6.6 G/DL (ref 6.4–8.3)
RBC # BLD AUTO: 4.21 10E6/UL (ref 4.4–5.9)
RBC URINE: <1 /HPF
SODIUM SERPL-SCNC: 137 MMOL/L (ref 135–145)
SP GR UR STRIP: 1.01 (ref 1–1.03)
TACROLIMUS BLD-MCNC: 8.8 UG/L (ref 5–15)
TME LAST DOSE: NORMAL H
TME LAST DOSE: NORMAL H
UROBILINOGEN UR STRIP-MCNC: NORMAL MG/DL
WBC # BLD AUTO: 12.4 10E3/UL (ref 4–11)
WBC URINE: <1 /HPF

## 2024-08-28 PROCEDURE — 250N000013 HC RX MED GY IP 250 OP 250 PS 637: Performed by: STUDENT IN AN ORGANIZED HEALTH CARE EDUCATION/TRAINING PROGRAM

## 2024-08-28 PROCEDURE — 71045 X-RAY EXAM CHEST 1 VIEW: CPT

## 2024-08-28 PROCEDURE — 81001 URINALYSIS AUTO W/SCOPE: CPT | Performed by: PHYSICIAN ASSISTANT

## 2024-08-28 PROCEDURE — 250N000012 HC RX MED GY IP 250 OP 636 PS 637: Performed by: STUDENT IN AN ORGANIZED HEALTH CARE EDUCATION/TRAINING PROGRAM

## 2024-08-28 PROCEDURE — 250N000013 HC RX MED GY IP 250 OP 250 PS 637

## 2024-08-28 PROCEDURE — 250N000013 HC RX MED GY IP 250 OP 250 PS 637: Performed by: SURGERY

## 2024-08-28 PROCEDURE — 250N000012 HC RX MED GY IP 250 OP 636 PS 637: Performed by: SURGERY

## 2024-08-28 PROCEDURE — 99233 SBSQ HOSP IP/OBS HIGH 50: CPT | Mod: FS | Performed by: PHYSICIAN ASSISTANT

## 2024-08-28 PROCEDURE — 250N000009 HC RX 250: Performed by: SURGERY

## 2024-08-28 PROCEDURE — 80048 BASIC METABOLIC PNL TOTAL CA: CPT | Performed by: PHYSICIAN ASSISTANT

## 2024-08-28 PROCEDURE — 250N000013 HC RX MED GY IP 250 OP 250 PS 637: Performed by: PHYSICIAN ASSISTANT

## 2024-08-28 PROCEDURE — 85049 AUTOMATED PLATELET COUNT: CPT

## 2024-08-28 PROCEDURE — 85025 COMPLETE CBC W/AUTO DIFF WBC: CPT | Performed by: PHYSICIAN ASSISTANT

## 2024-08-28 PROCEDURE — 99239 HOSP IP/OBS DSCHRG MGMT >30: CPT | Performed by: HOSPITALIST

## 2024-08-28 PROCEDURE — 250N000011 HC RX IP 250 OP 636

## 2024-08-28 PROCEDURE — 71045 X-RAY EXAM CHEST 1 VIEW: CPT | Mod: 26 | Performed by: RADIOLOGY

## 2024-08-28 PROCEDURE — 80197 ASSAY OF TACROLIMUS: CPT | Performed by: INTERNAL MEDICINE

## 2024-08-28 PROCEDURE — 82248 BILIRUBIN DIRECT: CPT | Performed by: PHYSICIAN ASSISTANT

## 2024-08-28 PROCEDURE — 250N000011 HC RX IP 250 OP 636: Performed by: HOSPITALIST

## 2024-08-28 PROCEDURE — 83735 ASSAY OF MAGNESIUM: CPT | Performed by: HOSPITALIST

## 2024-08-28 PROCEDURE — 84100 ASSAY OF PHOSPHORUS: CPT | Performed by: HOSPITALIST

## 2024-08-28 PROCEDURE — 99232 SBSQ HOSP IP/OBS MODERATE 35: CPT | Mod: 24 | Performed by: PHYSICIAN ASSISTANT

## 2024-08-28 RX ORDER — LEVALBUTEROL INHALATION SOLUTION 1.25 MG/3ML
1.25 SOLUTION RESPIRATORY (INHALATION) 4 TIMES DAILY PRN
Qty: 300 ML | Refills: 0 | Status: SHIPPED | OUTPATIENT
Start: 2024-08-28

## 2024-08-28 RX ORDER — POLYETHYLENE GLYCOL 3350 17 G/17G
17 POWDER, FOR SOLUTION ORAL DAILY PRN
Qty: 510 G | Refills: 0 | Status: SHIPPED | OUTPATIENT
Start: 2024-08-28

## 2024-08-28 RX ORDER — PREDNISONE 5 MG/1
25 TABLET ORAL DAILY
Qty: 150 TABLET | Refills: 3 | Status: SHIPPED | OUTPATIENT
Start: 2024-08-28

## 2024-08-28 RX ORDER — PIPERACILLIN SODIUM, TAZOBACTAM SODIUM 4; .5 G/20ML; G/20ML
4.5 INJECTION, POWDER, LYOPHILIZED, FOR SOLUTION INTRAVENOUS ONCE
Status: COMPLETED | OUTPATIENT
Start: 2024-08-28 | End: 2024-08-28

## 2024-08-28 RX ORDER — HYDROXYZINE HYDROCHLORIDE 25 MG/1
25 TABLET, FILM COATED ORAL EVERY 6 HOURS PRN
Qty: 20 TABLET | Refills: 0 | Status: SHIPPED | OUTPATIENT
Start: 2024-08-28 | End: 2024-09-09

## 2024-08-28 RX ADMIN — NYSTATIN 1000000 UNITS: 100000 SUSPENSION ORAL at 12:45

## 2024-08-28 RX ADMIN — METHOCARBAMOL 750 MG: 750 TABLET ORAL at 08:37

## 2024-08-28 RX ADMIN — SULFAMETHOXAZOLE AND TRIMETHOPRIM 1 TABLET: 400; 80 TABLET ORAL at 08:40

## 2024-08-28 RX ADMIN — PREDNISONE 25 MG: 5 TABLET ORAL at 08:35

## 2024-08-28 RX ADMIN — Medication 400 MG: at 10:08

## 2024-08-28 RX ADMIN — PIPERACILLIN AND TAZOBACTAM 4.5 G: 4; .5 INJECTION, POWDER, LYOPHILIZED, FOR SOLUTION INTRAVENOUS at 12:46

## 2024-08-28 RX ADMIN — ACETYLCYSTEINE 2 ML: 200 SOLUTION ORAL; RESPIRATORY (INHALATION) at 08:57

## 2024-08-28 RX ADMIN — THERA TABS 1 TABLET: TAB at 12:46

## 2024-08-28 RX ADMIN — ROSUVASTATIN CALCIUM 10 MG: 10 TABLET, FILM COATED ORAL at 08:37

## 2024-08-28 RX ADMIN — HEPARIN SODIUM 5000 UNITS: 5000 INJECTION, SOLUTION INTRAVENOUS; SUBCUTANEOUS at 08:38

## 2024-08-28 RX ADMIN — ACETAMINOPHEN 975 MG: 325 TABLET, FILM COATED ORAL at 04:18

## 2024-08-28 RX ADMIN — ACETAMINOPHEN 975 MG: 325 TABLET, FILM COATED ORAL at 12:45

## 2024-08-28 RX ADMIN — Medication 10 MG: at 08:57

## 2024-08-28 RX ADMIN — CALCIUM CARBONATE 600 MG (1,500 MG)-VITAMIN D3 400 UNIT TABLET 1 TABLET: at 10:08

## 2024-08-28 RX ADMIN — METHOCARBAMOL 750 MG: 750 TABLET ORAL at 12:47

## 2024-08-28 RX ADMIN — HEPARIN SODIUM 5000 UNITS: 5000 INJECTION, SOLUTION INTRAVENOUS; SUBCUTANEOUS at 02:41

## 2024-08-28 RX ADMIN — LEVALBUTEROL HYDROCHLORIDE 1.25 MG: 1.25 SOLUTION RESPIRATORY (INHALATION) at 08:57

## 2024-08-28 RX ADMIN — TACROLIMUS 4 MG: 1 CAPSULE ORAL at 08:34

## 2024-08-28 RX ADMIN — PIPERACILLIN AND TAZOBACTAM 4.5 G: 4; .5 INJECTION, POWDER, LYOPHILIZED, FOR SOLUTION INTRAVENOUS at 02:41

## 2024-08-28 RX ADMIN — MYCOPHENOLATE MOFETIL 1000 MG: 250 CAPSULE ORAL at 08:34

## 2024-08-28 RX ADMIN — VALGANCICLOVIR HYDROCHLORIDE 900 MG: 450 TABLET ORAL at 08:36

## 2024-08-28 RX ADMIN — NYSTATIN 1000000 UNITS: 100000 SUSPENSION ORAL at 08:34

## 2024-08-28 RX ADMIN — PIPERACILLIN AND TAZOBACTAM 4.5 G: 4; .5 INJECTION, POWDER, LYOPHILIZED, FOR SOLUTION INTRAVENOUS at 08:36

## 2024-08-28 RX ADMIN — METOPROLOL TARTRATE 50 MG: 50 TABLET, FILM COATED ORAL at 08:37

## 2024-08-28 RX ADMIN — PANTOPRAZOLE SODIUM 40 MG: 40 TABLET, DELAYED RELEASE ORAL at 08:36

## 2024-08-28 ASSESSMENT — ACTIVITIES OF DAILY LIVING (ADL)
ADLS_ACUITY_SCORE: 23

## 2024-08-28 NOTE — PROGRESS NOTES
Transplant Social Work Service Discharge Note      Patient Name:  Travon Cartwright     Anticipated Discharge Date:  08/28/24     Discharge Disposition: will discharge to a friend's home in [place], [place] (25 miles away) until [facility] apartment available on Tuesday, September 3rd.  Accommodations department has been in touch with patient's sister and has scheduled a check in time for that date. Writer will access [employer] anthony assistance to pay for first month's rent.     Plan for 24 hour care for immediate post transplant period: sister, Mine    If not local, plans for short term stay:  [facility] starting September 3.    Additional Services/Equipment Arranged:  nebulizer arranged by RNCC     Persons notified of above discharge plan:  patient, sisiter    Patient / Family response to discharge plan:  agreeable.     Education and resources provided by  at discharge: role of transplant  in out patient setting and provided contact info for , availability of support groups, and availability of [facility].     Discussed anticipated pharmacy out of pocket costs: YES  $0 co-pays    Provided [employer] Donor Letter Writing packet : YES  reviewed in detail.      Plan: patient will discharge today to a friend's home who lives locally in care of his sister. Will move into [facility] on Tuesday. No further  needs identified. They know when to reach out to me as out patient.     Carol Ann Brown, [facility]  Lung Transplant   Phone: [phone]     Joaquim

## 2024-08-28 NOTE — PROGRESS NOTES
IP Diabetes Management Team Discharge Instructions   Diabetes and supplies to be ordered by primary team:    Dexcom continuous glucose monitor  Humalin N Kwikpen  Insulin aspart flexpen  Sharps container   Alcohol wipes   B-D 4 mm chu pen needles   Lantus Solostar pen      Glucose Control Regimen:     Lantus 30 units every day at 7 pm    To cover prednisone 25 mg daily,    NPH 24 units every day at the same time as the prednisone at 8 am ( If do not take prednisone do not take NPH)    When Decrease Prednisone 20 mg daily--> Decrease NPH 18 units daily at the same time as the prednisone at 8 am ( If do not take prednisone do not take NPH)    When decrease prednisone to 15 mg daily must call provider for dose of NPH      Novolog Fixed meal dose:    Breakfast, lunch and dinner: Give 8 units of Novolog for medium meal, around 60g of carb and 10 units of Novolog for larger meal about 80 or greater g of carb----> Do not take if do not eat meal    Give 5 units of Novolog for a snack or supplement before 10 pm, do not cover snacks after 10 pm    Check blood glucose prior to meal and give Novolog insulin per the sliding scale:    Correction Scale - HIGH INSULIN RESISTANCE DOSING     Do Not give Correction Insulin if Pre-Meal BG less than 140.   For Pre-Meal  - 164 give 1 unit.   For Pre-Meal  - 189 give 2 units.   For Pre-Meal  - 214 give 3 units.   For Pre-Meal  - 239 give 4 units.   For Pre-Meal  - 264 give 5 units.   For Pre-Meal  - 289 give 6 units.   For Pre-Meal  - 314 give 7 units.   For Pre-Meal  - 339 give 8 units.   For Pre-Meal  - 364 give 9 units.   For Pre-Meal BG greater than or equal to 365 give 10 units    Check blood glucose before bedtime and give Novolog insulin according to the following scale:    HIGH INSULIN RESISTANCE DOSING    Do Not give Bedtime Correction Insulin if BG less than 200.   For  - 224 give 1 units.   For  - 249 give 2  units.   For  - 274 give 3 units.   For  - 299 give 4 units.   For  - 324 give 5 units.   For  - 349 give 6 units.   For BG greater than or equal to 350 give 7 units  Stop Jardiance 10 mg daily due  to increased chance of urinary infection/yeast infection/genital infection with high dose  steroids/MMF and tacrolimus    Can restart metformin and Trulicity at recommendation of outpatient provider    Blood Glucose Checks:   three times daily before meals, and at bedtime.    Please call if you have more than 2 blood sugar over 275 in one day, or any sugars less than 75.     Low Blood Sugars:  Signs/symptoms of low blood sugar include (but are not limited to): sweating, shaking, feeling dizzy or weak, extreme hunger, headache, feeling crabby or confused, numbness or tingling of mouth/lips.  If you have these symptoms check your blood sugar if you can and then consume carbohydrate (sugar)  This is a helpful reminder on how to treat a blood sugar if you are low:  If your blood sugar is < 70 mg/dL, consume 15 grams of fast-acting carbohydrate (4 glucose tabs OR 4 oz juice or non-diet pop OR 2 Tbsp dried fruit OR 5-7 lifesavers OR 1 Tbsp sugar) and wait 15 minutes. Check blood sugar again and if not rising, repeat.  If your blood sugar is < 50 mg/dL, consume 30 grams of fast-acting carbohydrate (8 glucose tabs OR 8 oz juice or non-diet pop OR 4 Tbsp dried fruit OR 10-14 lifesavers OR 2 Tbsp sugar) and wait 15 minutes. Check blood sugar again and if not rising, repeat.      Endocrinology Outpatient follow up: An appointment request should be made by patient with outpatient endocrine provider 1-2 weeks from discharge.       If you have urgent questions or concerns regarding your blood sugars or insulin, you may contact [phone] (the main hospital ). Ask to speak with the endocrinologist on call.

## 2024-08-28 NOTE — PROGRESS NOTES
Pulmonary Medicine  Cystic Fibrosis - Lung Transplant Team  Progress Note  2024     Patient: Travon Cartwright  MRN: [id number]  : 1965 (age 59 year old)  Transplant: 8/15/2024 (Lung), POD#13  Admission date: 2024    Assessment & Plan:     Travon Cartwright is a 59 year old male with a history of idiopathic pulmonary fibrosis, diabetes, hepatosteatosis, and essential tremor.  Pt. is now s/p BSLT on 8/15/2024 with Dr. Mulvihill.  Surgery was uncomplicated and done on pump, extubated and off pressors since .  Weaned to RA .  Plan for discharge to local housing today.     Discharge recommendations:  - Adjust levalbuterol and Mucomyst nebs to prn upon discharge  - Stop chest physiotherapy upon discharge, continue IS and Aerobika   - DSA () pending then one month post-transplant (due 9/15, additionally per protocol)  - Prospera one month post-transplant (due 9/15)  - CXR today as below, repeat  as OP  - Pulmonary follow up scheduled   - Plan for staple removal 4 weeks post-op per Dr. Mulvihill (see CVTS note )  - Tacrolimus level therapeutic, no dose adjustment, repeat level  as OP  - Prednisone taper next due   - Stop ampho B nebs upon discharge  - EBV, IgG, and donor labs due 9/15  - Follow pending bronch cultures ()  - IV Zosyn through today, remove PICC prior to discharge  - Monitor LFTs  as OP, noted alk phos elevation since      S/p bilateral sequential lung transplant (BSLT) 8/15/24 for idiopathic pulmonary fibrosis:  Acute on chronic hypoxic respiratory failure, Resolved: Explant pathology: left with UIP and organizing pneumonia (5 benign hilar lymph nodes), right with UIP with organizing pneumonia (7 benign hilar lymph nodes).  PGD 1-2.  Extubated and off pressors since .  Weaned to RA .  Extremity US negative for DVT .  Bronch () with normal surgical anastomosis in right mainstem bronchus, normal surgical anastomosis in left  mainstem bronchus and noted pedunculated granulation tissue 6 o'clock, distal to anastomoses airway hyperemic but not friable, and moderate amount of thick, tenacious, clear secretions and fair amount of thin secretions.  - Nebs: levalbuterol and Mucomyst QID --> adjust to prn upon discharge  - Pulmonary toilet with chest physiotherapy QID --> stop upon discharge  - IS and Aerobika   - DSA (8/22) pending then one month post-transplant (due 9/15, additionally per protocol)  - Prospera one month post-transplant (due 9/15)  - Ammonia monitoring qMTh (screening for hyperammonemia post-lung transplant), most recently 43 on 8/26  - CXR today with stable trace left apical PTX and streaky perihilar and bibasilar opacities (personally reviewed)  - Pulmonary follow up scheduled 8/29  - Plan for staple removal 4 weeks post-op per Dr. Mulvihill (see CVTS note 8/23)     Immunosuppression: S/p induction therapy with high dose IV steroid intraoperatively and basiliximab (POD #0 and #4).   - Tacrolimus 4 mg BID (adjusted from suspension to PO 8/26).  Goal  8-12.  Level 8/28 therapeutic at 8.8, no dose adjustment, repeat level 8/29 as OP.  - MMF 1000 mg BID  - Prednisone 25 mg daily with taper per lung transplant protocol (next due 8/31):  Date Daily Dose (mg)   8/24/2024 25   8/31/2024 20   9/21/2024 15   10/12/2024 10   11/2/2024 5     Prophylaxis:   - Bactrim for PJP ppx (started POD #1 due to donor toxoplasma IgG +)  - VGCV for CMV ppx (as below), CMV monitoring per protocol (after completion of ppx course, additionally prn)  - Ampho B nebs twice weekly for antifungal ppx through discharge, then will stop  - Nystatin for oral candidiasis ppx, 6 month course   - S/p micafungin 100 mg daily for fungal ppx x10 days (8/16-8/25)  - See below for serologies and viral ppx:    Donor Recipient Intervention   CMV status - + Valganciclovir POD #8-90   EBV status - + EBV monthly (due 9/15)   HSV status N/A + N/A         ID: Donor cultures  ([facility]) with Strep constellatus, donor cultures ([facility]) with MSSA.  S/p IV vancomycin 8/15-8/20 and ceftazidime 8/15-8/17.  Noted fever 8/18-8/19.  Blood culture negative 8/19.  C diff negative 8/21.  Report of urinary urgency and frequency, UA unremarkable 8/28.   - IgG at one month (due 9/15)  - Bronch cultures (8/27) NGTD, follow  - ABX: IV Zosyn (8/19-8/28) for 10 day course --> remove PICC prior to discharge     PHS risk criteria donor: Additional labs required post-transplant (between 4-8 weeks post-op): Hepatitis B, Hepatitis C, and HIV by JOVANY ([id number], due 9/15).    Other relevant problems being managed by the primary team:     EGJ outflow obstruction (?) inconclusive: Noted on esophageal manometry 7/9.  No need for NPO for 6 weeks.    Hypomagnesemia: Suppressed absorption d/t CNI.   - Mag glycinate 400 mg TID     Diabetes mellitus: Prior to transplant was on Jardiance, dulaglutide, glargine, metformin.    - Management per endocrine and primary     Toxic-metabolic encephalopathy, Resolved: Likely ICU delirium.   - Melatonin at bedtime, management per primary    We appreciate the excellent care provided by the [place] [id number] team.  Recommendations communicated via in person rounding and this note.  Will continue to follow along closely, please do not hesitate to call with any questions or concerns.    Patient discussed with Dr. Knight.    Afsaneh Gonzalez PA-C  Inpatient [facility]  Pulmonary CF/Transplant     Subjective & Interval History:     Remains on RA, breathing feels comfortable.  Cough minimal and nonproductive.  Chest physiotherapy TID yesterday.  Denies incisional pain.  Diarrhea improving, stools now becoming more formed.  Reports ongoing urinary frequency and urgency even since stopping diuresis.    Review of Systems:     C: No fever, + decreased weight, no change in appetite  INTEGUMENTARY/SKIN: + clamshell incision  ENT/MOUTH: No sore throat, no sinus pain, no nasal congestion or drainage  RESP: See  "interval history  CV: No chest pain, no peripheral edema, no orthopnea  GI: No nausea, no vomiting, no reflux symptoms  : No dysuria  MUSCULOSKELETAL: No myalgias, no arthralgias  ENDOCRINE: + blood sugars intermittently elevated  NEURO: No headache, no numbness or tingling  PSYCHIATRIC: Mood stable    Physical Exam:     All notes, images, and labs from past 24 hours (at minimum) were reviewed.    Vital signs:  Temp: 97.4  F (36.3  C) Temp src: Oral BP: 124/80 Pulse: 81   Resp: 18 SpO2: 93 % O2 Device: None (Room air) Oxygen Delivery: 2 LPM Height: 177.8 cm (5' 10\") Weight: 67.3 kg (148 lb 5.9 oz)  I/O:   Intake/Output Summary (Last 24 hours) at 8/28/2024 1125  Last data filed at 8/28/2024 1000  Gross per 24 hour   Intake 1640 ml   Output 2000 ml   Net -360 ml     Constitutional: Lying upright in bed, in no apparent distress.   HEENT: Eyes with pink conjunctivae, anicteric.  Oral mucosa moist without lesions.   PULM: Mildly diminished air flow to bases bilaterally.  No crackles, no rhonchi, no wheezes.  Non-labored breathing on RA.  CV: Normal S1 and S2.  RRR.  No murmur, gallop, or rub.  No peripheral edema.   ABD: NABS, soft, nontender, nondistended.    MSK: Moves all extremities.    NEURO: Alert and conversant.   SKIN: Warm, dry.  Clamshell incision intact with staples.  PSYCH: Mood stable.     Data:     LABS    CMP:   Recent Labs   Lab 08/28/24  0843 08/28/24  0629 08/28/24  0244 08/28/24  0009 08/27/24  0856 08/27/24  0550 08/26/24  0951 08/26/24  0603 08/26/24  0249 08/25/24  2148 08/25/24  0721 08/25/24  0542 08/22/24  0807 08/22/24  0543   NA  --  137  --   --   --  138  --  136  --   --   --  133*   < > 140   POTASSIUM  --  4.1  --   --   --  4.1  --  4.4  --   --   --  4.5   < > 4.1   CHLORIDE  --  102  --   --   --  102  --  101  --   --   --  99   < > 105   CO2  --  24  --   --   --  23  --  25  --   --   --  23   < > 26   ANIONGAP  --  11  --   --   --  13  --  10  --   --   --  11   < > 9   * " "99 98 180*   < > 112*   < > 106*   < >  --    < > 298*   < > 195*  200*   BUN  --  13.0  --   --   --  15.2  --  15.8  --   --   --  19.4   < > 17.9   CR  --  0.70  --   --   --  0.62*  --  0.64*  --   --   --  0.52*   < > 0.61*   GFRESTIMATED  --  >90  --   --   --  >90  --  >90  --   --   --  >90   < > >90   HUGO  --  9.7  --   --   --  9.7  --  9.5  --   --   --  9.1   < > 8.8   MAG  --  1.7  --   --   --  1.6*  --  1.8  --  2.0   < > 1.5*   < > 1.6*   PHOS  --  3.7  --   --   --  3.5  --  4.2  --   --   --  2.7   < > 2.8   PROTTOTAL  --  6.6  --   --   --   --   --  6.1*  --   --   --   --   --  5.5*   ALBUMIN  --  3.3*  --   --   --   --   --  3.2*  --   --   --   --   --  2.8*   BILITOTAL  --  0.5  --   --   --   --   --  0.4  --   --   --   --   --  0.4   ALKPHOS  --  176*  --   --   --   --   --  170*  --   --   --   --   --  139   AST  --  27  --   --   --   --   --  27  --   --   --   --   --  33   ALT  --  51  --   --   --   --   --  44  --   --   --   --   --  43    < > = values in this interval not displayed.     CBC:   Recent Labs   Lab 08/28/24  0629 08/27/24  0736 08/27/24  0550 08/26/24  0603   WBC 12.4* 15.1* 14.7* 17.9*   RBC 4.21* 4.21* 4.40 4.24*   HGB 12.4* 12.4* 12.8* 12.4*   HCT 38.7* 38.2* 40.4 39.2*   MCV 92 91 92 93   MCH 29.5 29.5 29.1 29.2   MCHC 32.0 32.5 31.7 31.6   RDW 13.9 13.7 13.8 13.8     326 378 370 290       INR: No lab results found in last 7 days.    Glucose:   Recent Labs   Lab 08/28/24  0843 08/28/24  0629 08/28/24  0244 08/28/24  0009 08/27/24  2159 08/27/24  1741   * 99 98 180* 323* 242*       Blood Gas: No lab results found in last 7 days.    Culture Data No results for input(s): \"CULT\" in the last 168 hours.    Virology Data: No results found for: \"INFLUA\", \"FLUAH1\", \"FLUAH3\", \"JM5893\", \"IFLUB\", \"RSVA\", \"RSVB\", \"PIV1\", \"PIV2\", \"PIV3\", \"HMPV\", \"HRVS\", \"ADVBE\", \"ADVC\"    Historical CMV results (last 3 of prior testing):  No results found for: \"CMVQNT\"  No " "results found for: \"CMVLOG\"    Urine Studies    Recent Labs   Lab Test 08/19/24  1535 08/15/24  0017   URINEPH 7.5* 5.0   NITRITE Negative Negative   LEUKEST Negative Negative   WBCU 1 1       Most Recent [place] Pulmonary Function Testing (FVC/FEV1 only)  No results found for: \"20002\"  No results found for: \"20003\"  No results found for: \"20015\"  No results found for: \"20016\"    IMAGING    Recent Results (from the past 48 hour(s))   US Lower Extremity Venous Duplex Bilateral    Narrative    ULTRASOUND LOWER EXTREMITY VENOUS DUPLEX BILATERAL 8/26/2024 1:37 PM    CLINICAL HISTORY: Screening after lung transplant.      COMPARISONS: None available.    REFERRING PROVIDER: SAMUEL PIERRE    TECHNIQUE: Grayscale, color Doppler, Doppler waveform ultrasound  evaluation was performed through the bilateral common femoral,  femoral, and popliteal veins. Bilateral posterior tibial veins were  evaluated with grayscale imaging and compression. Peroneal veins were  not evaluated.    FINDINGS: Right common femoral, femoral, and popliteal veins are fully  compressible with phasic Doppler waveforms.    Right posterior tibial veins are fully compressible.    Left common femoral, femoral, and popliteal veins are fully  compressible with phasic Doppler waveforms.    Left posterior tibial veins are fully compressible.      Impression    IMPRESSION: No deep venous thrombosis demonstrated in either leg.    Reference: \"Duplex Ultrasound in the Diagnosis of Lower-Extremity Deep  Venous Thrombosis\"- Cecille Aranda MD, S; Kip Nava MD  (Circulation. 2014;129:917-921. http://circ.ahajournals.org)    SULAIMAN ESCALANTE MD         SYSTEM ID:  [id number]   US Upper Extremity Venous Duplex Bilat    Narrative    ULTRASOUND UPPER EXTREMITY VENOUS DUPLEX BILATERAL 8/26/2024 1:35 PM    CLINICAL HISTORY: Screening after lung transplant..     COMPARISONS: None available.    REFERRING PROVIDER: SAMUEL PIERRE    TECHNIQUE: Bilateral internal jugular veins " evaluated with grayscale,  color Doppler, and spectral pulsed wave Doppler ultrasound. Bilateral  innominate, subclavian, and axillary veins evaluated with color  Doppler and spectral pulsed wave Doppler ultrasound.    FINDINGS: Right internal jugular vein is fully compressible with a  phasic waveform.    Right innominate, subclavian, and axillary veins fill [id number] in  color Doppler with phasic waveforms.    Left internal jugular vein is fully compressible with a phasic  waveform.    Left innominate, subclavian, and axillary veins fill [id number] in  color Doppler with phasic waveforms.      Impression    IMPRESSION: No central deep venous thrombosis demonstrated in either  arm.    SULAIMAN ESCALANTE MD         SYSTEM ID:  [id number]   XR Chest Port 1 View    Narrative    Portable chest    INDICATION: Lung transplant follow-up    COMPARISON: Yesterday    FINDINGS: Heart size normal. Bilateral lung transplant again noted  including butterfly clamshell sternotomy. Left atrial appendage  ligation clip again present. A right upper extremity PICC line tip  again is in the right atrium. Trace left apical pneumothorax the  appearance is grossly unchanged. No significant changes otherwise.      Impression    IMPRESSION: No significant change from yesterday with again question  trace left apical pneumothorax.    MORENO POSEY MD         SYSTEM ID:  [id number]   XR Chest Port 1 View    Narrative    EXAM: XR CHEST PORT 1 VIEW 8/28/2024 6:30 AM      HISTORY: lung transplant follow-up.    COMPARISON: Previous day.     TECHNIQUE: Frontal view of the chest.    FINDINGS: Postoperative changes of bilateral lung transplantation with  clamshell sternotomy wires. Right upper extremity PICC with tip  terminating near the superior cavoatrial junction. Left atrial  appendage clip. Trace left apical pneumothorax, stable. No appreciable  right pneumothorax. No focal consolidative opacity. Streaky opacity in  the left mid lung. Streaky  perihilar and basilar opacities.      Impression    IMPRESSION:   Trace left apical pneumothorax is stable. Streaky perihilar and  bibasilar opacities likely represent atelectasis.    I have personally reviewed the examination and initial interpretation  and I agree with the findings.    GLADYS WITT MD         SYSTEM ID:  [id number]

## 2024-08-28 NOTE — CONSULTS
"Diabetes CNS/Educator Consult    Travon Cartwright is a 59 year old male with history including IPF with chronic hypoxemic resp failure (baseline 6L oxygen), hepatic steatosis, and type 2 DM s/p bilateral lung transplant 8/15/24.     Diabetes Educator consulted for new insulin regimen at discharge. Travon has T2DM managed with PTA jardiance 10 mg, metformin  mg BID, Lantus 30 units daily at bedtime, Trulicity 3 mg weekly on Tuesdays, and monitoring with a Dexcom G7 CGM. A1c was 8.4% on 6/18/24. Follows with [facility], Sara Reilly. Now has steroid induced hyperglycemia.     Inpatient Diabetes Service is following for glycemic management. Please see their notes for plan.    Met with Travon and sister (Mine) at bedside for education. Juan J will be managing diabetes cares at home independently but will have Mine for support.    Diet: Snacks/Supplements Adult: Other; PRN - please allow pt to order any snack/supplement if requested; With Meals  Regular Diet Adult  Diet       Barriers to diabetes management: None    Diabetes Education Provided:  Discussed reason for requiring blood glucose monitoring and insulin with steroid induced hyperglycemia. They have spoke at length with IDS team on why PTA meds are on hold and insufficient for glycemic control at this time. Provided \"Understanding Diabetes\" handout.   Reviewed insulin glargine, NPH, and lispro action, dose, onset, and duration. Discussed frequency and dosing of insulin administration. Travon knows how to use insulin pens. He does not have his PTA Lantus with him currently and will be staying locally after discharge--will follow up with Pharmacy Liaison on getting coverage for Lantus.  Frequency of testing discussed. Travon will resume his Dexcom G7 CGM. He has glucose meter and supplies as back up. Reviewed when to do a fingerstick. Assisted Juan J with getting connected to Dexcom Clarity and shared with [facility] " clinic.  Discussed signs and symptoms of hypoglycemia. Reviewed treating blood glucose below 70 mg/dL with 15 grams of carbohydrates. Discussed examples of 15 grams of CHO. Reviewed treating blood glucose less than or equal to 50 mg/dL with 30 grams of CHO. Discussed rechecking BG 15 minutes after treatment and retreating if necessary.   Reviewed calling provider for consistently elevated BGs and/or any lows.  Discussed following up with Endocrinology in 1-2 weeks after discharge.     Diabetes Plan Reviewed with Patient: See Benita Machuca PA-C, Inpatient Diabetes Service note from 8/28/24. Benita will clarify predinsone 30 mg versus 25 mg dosing with NPH and call Travon to update.    Supplies Needed at Discharge: please use the DIAB non-branded discharge supply order set ([id number])   Insulin Lispro Kwikpens   Humulin N (NPH) Kwikpens   Lantus Solostar pens - can't fill again until 9/3 - per pharm liaison, insurance approved filling again now.  Sharps container  Alcohol Wipes   B-D 4 mm chu pen needles     Diabetes education completed. Travon and  were engaged and asked appropriate questions. Both stated that plan is manageable. All questions answered. Notified RNs and IDS [place]. Notified Medicine MD and asked to order supplies for discharge.     SHAN Rodriguez, [facility]  Diabetes Educator  Pager: [phone]  Office: [phone]  Securely message with Joaquim

## 2024-08-28 NOTE — PLAN OF CARE
Occupational Therapy Discharge Summary    Reason for therapy discharge:    Discharged to home with outpatient therapy.    Progress towards therapy goal(s). See goals on Care Plan in [facility] electronic health record for goal details.  Goals partially met.  Barriers to achieving goals:   discharge from facility.    Therapy recommendation(s):    Continued therapy is recommended.  Rationale/Recommendations:  Pt progressing well with therapy and now up at [facility] and completing cares at supervision level. Rec updated to home with OP PT at discharge.

## 2024-08-28 NOTE — PROGRESS NOTES
"                       Inpatient Diabetes Management Service: Daily Progress Note     HPI: Shyam Cartwright is a 59 year old man with history including IPF with chronic hypoxemic resp failure (baseline 6L oxygen), hepatic steatosis, and type 2 DM s/p bilateral lung transplant 8/15/24.  Inpatient Diabetes Service consulted for for glycemic control now off TF that were cycled 8 pm to 8 am as off night of 8/25/24 and on a steroid taper.          Assessment/Plan:     Assessment:   Type 2 Diabetes Mellitus, without complication, sub optimal control  (A1c 8.4 %)    Steroid induced hyperglycemia  Enteral feed induced hyperglycemia, now resolved     Plan/Recommendations:    - Decrease Lantus 30 q 24 hrs at 20:00  -Continue NPH 24 units at 8 am at the same time as the prednisone 25 mg po daily- DO not give if prednisone is held  - Continue Novolog Meal Coverage: 1 units per 7 g CHO, TID AC and PRN with snacks/supplements   - Continue Novolog Correction Scale: High resistance (ISF: 1/25)  TID AC / HS / 0200   - BG monitoring: TID AC, HS, 0200     - Hypoglycemia protocol    - Carb counting protocol   - Hold Trulicity, not on formulary,discussed with transplant pharmacy- no drug interaction with MMF/tac-- will hold on discharge  - Would prefer to continue to hold Jardiance 10 mg daily due to immunosuppression ie higher doses of prednisone and risk for genital/UTI/yeast infection    -Hold metformin  mg po daily as could add to diarrhea    Discussion:   Sister Catia is a NP and is  concerned that we are holding Jardiance as this is what helped last time to get him into \"time in range.\"  I hear her concerns. PTA he ws not on steroids, tac which can increase bg. She does not feel correcting with insulin will bring BG down- she is wondering if there is something else we can do. He does have some post prandial BG that are taken incorrectly ie 10 pm YB=437 ie after he eats but in general his blood glucose are elevated when he " eats and is on steroids. I do not think that NPH 24 units can be replaced by something else. Per the dietician using 1 unit of insulin for 7g of carbs is over whelming so gave them fixed meal dose and correction insulin. As steroids taper Juan J will need less insulin and could if okay with pulmonary go back on Jardiance.  FBG=98. He last received 5 units of Novolog at 10 pm  and last NPH at 09:30 am so lower BG this am is due to lantus which we had reduced yesterday by 5 units and will reduce again to 30 units today which is his PTA dose.  Discussed insulin plan with Catia and Juan J this morning. Would prefer they use correction insulin and Fixed meal dose insulin but if too overwhelming then asked if they would continue NPH for steroids induced hyperglycemia.Gave copy of plan to sister and Juan J.    IP Diabetes Management Team Discharge Instructions   Diabetes and supplies to be ordered by primary team:    Dexcom continuous glucose monitor  Humalin N Kwikpen  Insulin aspart flexpen  Sharps container   Alcohol wipes   B-D 4 mm chu pen needles   Lantus Solostar pen      Glucose Control Regimen:     Lantus 30 units every day at 7 pm    To cover prednisone 25  mg daily,    NPH 24 units every day at the same time as the prednisone at 8 am ( If do not take prednisone do not take NPH)    When Decrease Prednisone 20 mg daily--> Decrease NPH 18 units daily at the same time as the prednisone at 8 am ( If do not take prednisone do not take NPH)    When decrease prednisone to 15 mg daily must call provider for dose of NPH      Novolog Fixed meal dose:    Breakfast, lunch and dinner: Give 8 units of Novolog for medium meal, around 60g of carb and 10 units of Novolog for larger meal about 80 or greater g of carb----> Do not take if do not eat meal    Give 5 units of Novolog for a snack or supplement before 10 pm, do not cover snacks after 10 pm    Check blood glucose prior to meal and give Novolog insulin per the sliding  scale:    Correction Scale - HIGH INSULIN RESISTANCE DOSING     Do Not give Correction Insulin if Pre-Meal BG less than 140.   For Pre-Meal  - 164 give 1 unit.   For Pre-Meal  - 189 give 2 units.   For Pre-Meal  - 214 give 3 units.   For Pre-Meal  - 239 give 4 units.   For Pre-Meal  - 264 give 5 units.   For Pre-Meal  - 289 give 6 units.   For Pre-Meal  - 314 give 7 units.   For Pre-Meal  - 339 give 8 units.   For Pre-Meal  - 364 give 9 units.   For Pre-Meal BG greater than or equal to 365 give 10 units    Check blood glucose before bedtime and give Novolog insulin according to the following scale:    HIGH INSULIN RESISTANCE DOSING    Do Not give Bedtime Correction Insulin if BG less than 200.   For  - 224 give 1 units.   For  - 249 give 2 units.   For  - 274 give 3 units.   For  - 299 give 4 units.   For  - 324 give 5 units.   For  - 349 give 6 units.   For BG greater than or equal to 350 give 7 units  Stop Jardiance 10 mg daily due  to increased chance of urinary infection/yeast infection/genital infection with high dose  steroids/MMF and tacrolimus    HOLD metformin and Trulicity and can discuss and restart at the  recommendation of outpatient provider    Blood Glucose Checks:   three times daily before meals, and at bedtime.    Please call if you have more than 2 blood sugar over 275 in one day, or any sugars less than 75.     Low Blood Sugars:  Signs/symptoms of low blood sugar include (but are not limited to): sweating, shaking, feeling dizzy or weak, extreme hunger, headache, feeling crabby or confused, numbness or tingling of mouth/lips.  If you have these symptoms check your blood sugar if you can and then consume carbohydrate (sugar)  This is a helpful reminder on how to treat a blood sugar if you are low:  If your blood sugar is < 70 mg/dL, consume 15 grams of fast-acting carbohydrate (4 glucose tabs OR 4 oz juice or  non-diet pop OR 2 Tbsp dried fruit OR 5-7 lifesavers OR 1 Tbsp sugar) and wait 15 minutes. Check blood sugar again and if not rising, repeat.  If your blood sugar is < 50 mg/dL, consume 30 grams of fast-acting carbohydrate (8 glucose tabs OR 8 oz juice or non-diet pop OR 4 Tbsp dried fruit OR 10-14 lifesavers OR 2 Tbsp sugar) and wait 15 minutes. Check blood sugar again and if not rising, repeat.      Endocrinology Outpatient follow up: An appointment request should be made by patient with outpatient endocrine provider 1-2 weeks from discharge.       If you have urgent questions or concerns regarding your blood sugars or insulin, you may contact [phone] (the main hospital ). Ask to speak with the endocrinologist on call.       Thank you for letting the Diabetes Management Team be involved in your care!    Discharge Planning:    Medications: as above    Test Claims: done 8/26/2024  humulin N kwikpens 0.00     insulin aspart flexpens 0.00     insulin lispro kwikpens 0.00     lantus solostar pens 0.00 last filled 8/10, next fill 9/3   novolin N flexpens not covered       Education: Need education for carb coverage and correction scale insulin,  order with dietician and with diabetes education  ordered and let educator know he is discharging today  Outpatient Follow-up:  recommend [facility] Sara Reilly NP           Please notify Inpatient Diabetes Service if changes are planned to steroids, nutrition, TPN/TF and anticipated procedures requiring prolonged NPO status.                Interval History/Review of Systems :   The last 24 hours progress and nursing notes reviewed.  Doing well .  No n,v.  Says stools are ok.    Creat=0.7 from 0.62  Planned Procedures/Surgeries: bronchoscopy this morning    Inpatient Glucose Control:       Recent Labs   Lab 08/28/24  0629 08/28/24  0244 08/28/24  0009 08/27/24  2159 08/27/24  1741 08/27/24  1413   GLC 99 98 180* 323* 242* 180*             Medications  "Impacting Glycemia:   Steroids:       D5W-containing solutions/medications: none   Other medications impacting glucose: none         Nutrition:   Orders Placed This Encounter      Regular Diet Adult  NPO for bronch 8/27/2024 and now with regular diet  Supplements:  allow pt to order snacks and supplements  TF: done on 8/25/2024  TPN: none       Diabetes History: see full consult note for complete diabetes history   Diabetes Type and Duration: He was diagnosed in 2020 with type 2 diabetes- he had a prediabetes of 6.2 in 2019 and 8.3 in 6.4.2020      PTA Medication Regimen: Jardiance 10 mg was added to his diabetes regimen. He was on metformin  mg po bid( lower dose due to diarrhea), lantus 30 units at bedtime and Trulicity 3 mg weekly on Tuesdays  Historical Diabetes Medications: He was taking Ozempic for a while, and likely had an availability issue      Glucose monitoring device and frequency: using a dexom G7 for about 3 months  Outpatient Diabetes Provider: most recently Sara Reilly, NP  [facility], saw him on 6/7/2024         Physical Exam:   /80 (BP Location: Left arm)   Pulse 81   Temp 97.4  F (36.3  C) (Oral)   Resp 18   Ht 1.778 m (5' 10\")   Wt 67.3 kg (148 lb 5.9 oz)   SpO2 93%   BMI 21.29 kg/m    General:  in no acute distress.  HEENT:  NC/AT. MMM, sclera not injected  Lungs:  unremarkable, no audible cough, no work of breathing  ABD:  rounded  Skin:  warm and dry, no obvious lesions, some bruising  MSK:   moving all extremities  Lymp:   no LE edema  Mental Status:  Alert and oriented x3  Psych:    good eye contact,appropriate affect     Feet:   cool, without discoloration,  without evidence of wounds, corns, calluses, pulses +  , not examined today            Data:     Lab Results   Component Value Date    A1C 8.4 (H) 06/18/2024       ROUTINE IP LABS (Last four results)  BMP  Recent Labs   Lab 08/28/24  0629 08/28/24  0244 08/28/24  0009 08/27/24  2159 08/27/24  0856 " 08/27/24  0550 08/26/24  0951 08/26/24  0603 08/25/24  0721 08/25/24  0542     --   --   --   --  138  --  136  --  133*   POTASSIUM 4.1  --   --   --   --  4.1  --  4.4  --  4.5   CHLORIDE 102  --   --   --   --  102  --  101  --  99   HUGO 9.7  --   --   --   --  9.7  --  9.5  --  9.1   CO2 24  --   --   --   --  23  --  25  --  23   BUN 13.0  --   --   --   --  15.2  --  15.8  --  19.4   CR 0.70  --   --   --   --  0.62*  --  0.64*  --  0.52*   GLC 99 98 180* 323*   < > 112*   < > 106*   < > 298*    < > = values in this interval not displayed.     CBC  Recent Labs   Lab 08/28/24  0629 08/27/24  0736 08/27/24  0550 08/26/24  0603 08/25/24  0542   WBC  --  15.1* 14.7* 17.9* 16.4*   RBC  --  4.21* 4.40 4.24* 4.00*   HGB  --  12.4* 12.8* 12.4* 11.6*   HCT  --  38.2* 40.4 39.2* 36.6*   MCV  --  91 92 93 92   MCH  --  29.5 29.1 29.2 29.0   MCHC  --  32.5 31.7 31.6 31.7   RDW  --  13.7 13.8 13.8 13.6    378 370 290 291     INRNo lab results found in last 7 days.        Benita Machuca PA-C  Inpatient Diabetes Service  [phone]  Available by [facility]  Date of Service: 8/28/2024    Plan discussed with sister and patient x3, bedside RN, and primary team by phone.  Spoke with dietician yesterday and she saw him yesterday and diabetes ed will see today  Pulmonary EMILY in room today    To contact Inpatient Diabetes Service:     7 AM - 5 PM: Page the IDS EMILY following the patient that day (see filed or incomplete progress notes/consult notes under Endocrinology)    OR if uncertain of provider assignment: page job code [phone]    5 PM - 7 AM: First call after hours is to primary service.    For urgent after-hours questions, page job code for on call fellow: [phone]     I spent a total of 40 minutes on the date of the encounter doing prep/post-work, chart review, history and exam, documentation and further activities per the note including lab review, multidisciplinary communication, counseling the patient and/or  coordinating care regarding acute hyper/hypoglycemic management, as well as discharge management and planning/communication.

## 2024-08-28 NOTE — PHARMACY-TRANSPLANT NOTE
Solid Organ Transplant Recipient Prior to Discharge Note    59 year old male s/p lung transplant on 8/15/2024.    Current immunosuppression: tacrolimus 4mg BID, mycophenolate 1000mg BID, prednisone taper    Opportunistic infections: Bactrim, Valcyte, amphotericin nebs, nystatin suspension    Pharmacy has monitored for medication interactions and immunosuppression levels in conjunction with the multidisciplinary team. In anticipation for discharge, medication therapy needs have been addressed daily throughout the current admission via multidisciplinary rounds and/or discussions, order verification, daily clinical pharmacy review, and communication with prescribers.    Maile Butler, PharmD, BCPS

## 2024-08-28 NOTE — PLAN OF CARE
Goal Outcome Evaluation:      Plan of Care Reviewed With: patient, family    Overall Patient Progress: improvingOverall Patient Progress: improving    Outcome Evaluation: plan for patient to discharge, carb coverage education, transplant discharge education    Pt A/Ox4 pleasant and cooperative.  Pts sister at bedside t/o shift, transplant education did with medcard.  Dietician did carb coverage and dietary education as well.  Pt up independently, walking in halls.  Clamshell approximated, staples intact, wounds cleansed.  PICC remains in place, dressing not changed as pt will be discharging tomorrow and will be removed.  Pt had bronch this AM, see notes for results.  VS stable during shiftt, see flowsheets.

## 2024-08-28 NOTE — PROGRESS NOTES
DISCHARGE                         8/28/2024  3:02 PM  ----------------------------------------------------------------------------  Discharged to: Home  Via: private transportation  Accompanied by: Spouse  Discharge Instructions: Regular diet, encouraged activity, medications, follow up appointments, when to call the MD, aftercare instructions.  Prescriptions: [facility]; medication list reviewed & sent with pt  Follow Up Appointments: arranged; information given  Belongings: All sent with pt  IV: d/c'd  Telemetry: d/c'd  Pt exhibits understanding of above discharge instructions; all questions answered.    Discharge Paperwork: Copied, and sent home with patient.

## 2024-08-28 NOTE — PLAN OF CARE
"  Problem: Adult Inpatient Plan of Care  Goal: Plan of Care Review  Description: The Plan of Care Review/Shift note should be completed every shift.  The Outcome Evaluation is a brief statement about your assessment that the patient is improving, declining, or no change.  This information will be displayed automatically on your shift  note.  Outcome: Adequate for Care Transition  Flowsheets (Taken 8/28/2024 0454)  Outcome Evaluation: for discharge, education reinforement  Plan of Care Reviewed With: patient  Overall Patient Progress: improving     Problem: Adult Inpatient Plan of Care  Goal: Patient-Specific Goal (Individualized)  Description: You can add care plan individualizations to a care plan. Examples of Individualization might be:  \"Parent requests to be called daily at 9am for status\", \"I have a hard time hearing out of my right ear\", or \"Do not touch me to wake me up as it startles  me\".  Outcome: Adequate for Care Transition  Flowsheets (Taken 8/28/2024 0454)  Individualized Care Needs: Education reinforcement  Anxieties, Fears or Concerns: post op complications  Patient/Family-Specific Goals (Include Timeframe): Education givem     Problem: Adult Inpatient Plan of Care  Goal: Absence of Hospital-Acquired Illness or Injury  Intervention: Prevent Infection  Recent Flowsheet Documentation  Taken 8/28/2024 0000 by Jing Gaffney, RN  Infection Prevention:   environmental surveillance performed   cohorting utilized   hand hygiene promoted   rest/sleep promoted  Taken 8/27/2024 2000 by Jing Gaffney, RN  Infection Prevention:   environmental surveillance performed   cohorting utilized   hand hygiene promoted   rest/sleep promoted     Problem: Adult Inpatient Plan of Care  Goal: Optimal Comfort and Wellbeing  Outcome: Adequate for Care Transition  Intervention: Provide Person-Centered Care  Recent Flowsheet Documentation  Taken 8/28/2024 0000 by Jing Gaffney, RN  Trust Relationship/Rapport:   care " explained   choices provided   thoughts/feelings acknowledged  Taken 8/27/2024 2000 by Jing Gaffney RN  Trust Relationship/Rapport:   care explained   choices provided   thoughts/feelings acknowledged     Problem: Adult Inpatient Plan of Care  Goal: Optimal Comfort and Wellbeing  Intervention: Provide Person-Centered Care  Recent Flowsheet Documentation  Taken 8/28/2024 0000 by Jing Gaffney RN  Trust Relationship/Rapport:   care explained   choices provided   thoughts/feelings acknowledged  Taken 8/27/2024 2000 by Jing Gaffney RN  Trust Relationship/Rapport:   care explained   choices provided   thoughts/feelings acknowledged     Problem: Lung Transplant  Goal: Optimal Coping with Organ Transplant  Outcome: Adequate for Care Transition  Intervention: Optimize Psychosocial Response  Recent Flowsheet Documentation  Taken 8/28/2024 0000 by Jing Gaffney RN  Supportive Measures:   active listening utilized   relaxation techniques promoted   self-care encouraged  Taken 8/27/2024 2000 by Jing Gaffney RN  Supportive Measures:   active listening utilized   relaxation techniques promoted   self-care encouraged  Goal: Absence of Bleeding  Outcome: Adequate for Care Transition  Goal: Effective Bowel Elimination  Outcome: Adequate for Care Transition  Goal: Effective Organ Function  Outcome: Adequate for Care Transition  Intervention: Promote Airway Secretion Clearance  Recent Flowsheet Documentation  Taken 8/28/2024 0004 by Jing Gaffney RN  Activity Management: up ad jasmyne  Taken 8/28/2024 0000 by Jing Gaffney RN  Breathing Techniques/Airway Clearance: deep/controlled cough encouraged  Cough And Deep Breathing: done independently per patient  Activity Management: up ad jasmyne  Taken 8/27/2024 2000 by Jing Gaffney RN  Breathing Techniques/Airway Clearance: deep/controlled cough encouraged  Cough And Deep Breathing: done independently per patient  Activity Management: up ad jasmyne  Intervention:  Optimize Oxygenation and Ventilation  Recent Flowsheet Documentation  Taken 8/28/2024 0000 by Jing Gaffney RN  Airway/Ventilation Management: airway patency maintained  Head of Bed (HOB) Positioning: HOB at 20-30 degrees  Taken 8/27/2024 2000 by Jing Gaffney RN  Airway/Ventilation Management: airway patency maintained  Head of Bed (HOB) Positioning: HOB at 20-30 degrees  Goal: Fluid and Electrolyte Balance  Outcome: Adequate for Care Transition  Goal: Blood Glucose Level Within Targeted Range  Outcome: Adequate for Care Transition  Goal: Absence of Infection Signs and Symptoms  Outcome: Adequate for Care Transition  Intervention: Prevent or Manage Infection  Recent Flowsheet Documentation  Taken 8/28/2024 0000 by Jing Gaffney RN  Infection Prevention:   environmental surveillance performed   cohorting utilized   hand hygiene promoted   rest/sleep promoted  Taken 8/27/2024 2000 by Jing Gaffney RN  Infection Prevention:   environmental surveillance performed   cohorting utilized   hand hygiene promoted   rest/sleep promoted  Goal: Anesthesia/Sedation Recovery  Outcome: Adequate for Care Transition  Intervention: Optimize Anesthesia Recovery  Recent Flowsheet Documentation  Taken 8/28/2024 0000 by Jing Gaffney RN  Safety Promotion/Fall Prevention:   activity supervised   assistive device/personal items within reach   nonskid shoes/slippers when out of bed   safety round/check completed   clutter free environment maintained   lighting adjusted   room organization consistent   treat reversible contributory factors   treat underlying cause  Reorientation Measures:   clock in view   calendar in view  Taken 8/27/2024 2000 by Jing Gaffney RN  Safety Promotion/Fall Prevention:   activity supervised   assistive device/personal items within reach   nonskid shoes/slippers when out of bed   safety round/check completed   clutter free environment maintained   lighting adjusted   room organization  consistent   treat reversible contributory factors   treat underlying cause  Reorientation Measures:   clock in view   calendar in view  Goal: Optimal Nutrition Intake  Outcome: Adequate for Care Transition  Goal: Acceptable Pain Control  Outcome: Adequate for Care Transition  Goal: Nausea and Vomiting Relief  Outcome: Adequate for Care Transition  Goal: Effective Urinary Elimination  Outcome: Adequate for Care Transition  Goal: Effective Oxygenation and Ventilation  Outcome: Adequate for Care Transition  Intervention: Optimize Oxygenation and Ventilation  Recent Flowsheet Documentation  Taken 8/28/2024 0000 by Jing Gaffney RN  Airway/Ventilation Management: airway patency maintained  Head of Bed (HOB) Positioning: HOB at 20-30 degrees  Taken 8/27/2024 2000 by Jing Gaffney, RN  Airway/Ventilation Management: airway patency maintained  Head of Bed (HOB) Positioning: HOB at 20-30 degrees     Problem: Lung Transplant  Goal: Optimal Nutrition Intake  Outcome: Adequate for Care Transition     Problem: Comorbidity Management  Goal: Blood Glucose Levels Within Targeted Range  Outcome: Adequate for Care Transition  Intervention: Monitor and Manage Glycemia  Recent Flowsheet Documentation  Taken 8/28/2024 0000 by Jing Gaffney RN  Medication Review/Management:   medications reviewed   high-risk medications identified  Taken 8/27/2024 2000 by Jing Gaffney, RN  Medication Review/Management:   medications reviewed   high-risk medications identified   Goal Outcome Evaluation:      Plan of Care Reviewed With: patient    Overall Patient Progress: improvingOverall Patient Progress: improving    Outcome Evaluation: for discharge, education reinforement    Neuro: A&Ox4. Pleasant.  Cardiac: VSS. Afebrile.   Respiratory: Sating above 95%  on RA.  GI/: Adequate urine output. No BM.  Diet/appetite: Tolerating regular diet. Eating well. BS checked.   Activity:  Independent.   Pain: At acceptable level on current  regimen. Pain managed with tylenol  Skin: No new deficits noted.  LDA's: PICC at right arm, 2Lumens, SL.     Plan: For possible discharge today. All discharge education done, verbalized understanding. Continue with POC. Notify primary team with changes.

## 2024-08-28 NOTE — PROGRESS NOTES
Reason for Visit  Travon Cartwright is a 59 year old male who is being seen for RECHECK ( LUNG POST RETURN TXP PULM - per [facility]//)  Lung TX HPI  The patient was seen and examined by Carlos Johnson MD     Travon Cartwright is a 59 year old male with a history of idiopathic pulmonary fibrosis, diabetes, hepatosteatosis, and essential tremor.  Pt. is now s/p BSLT on 8/15/2024 with Dr. Mulvihill.  Surgery was uncomplicated and done on pump, extubated and off pressors since 8/18.  Weaned to RA 8/22.     Interval history:  First clinic visit today. Reports he is doing well. Denies having activity intolerance. Denies cough, palpitations, chest pain, abdominal pain, diarrhea, or dysuria. Does report urinary urgency and nocturia. Wakes up every 1-2 hours at night to urinate. UA yesterday was normal. He states he would like to wait and see how he does. He does not want bladder scan at this time, but states this was in issue prior to transplant as well. Does not have his nebulizer machine yet. His sister notes his blood sugars were 180s at bedtime. Elevated to 200s in AM and 400s in mid afternoon.     Current Outpatient Medications   Medication Sig Dispense Refill    acetaminophen (TYLENOL) 325 MG tablet Take 2 tablets (650 mg) by mouth or Feeding Tube every 4 hours as needed for other (For optimal non-opioid multimodal pain management to improve pain control.). 30 tablet 0    acetylcysteine (MUCOMYST) 20 % neb solution Take 2 mLs by nebulization 4 times daily as needed for mucolysis/respiratory distress. 30 mL 1    calcium carbonate-vitamin D (CALTRATE) 600-10 MG-MCG per tablet Take 1 tablet by mouth or Feeding Tube 2 times daily. 60 tablet 0    Continuous Glucose Sensor (DEXCOM G7 SENSOR) MISC Change every 10 days. 1 each 11    hydrOXYzine HCl (ATARAX) 25 MG tablet Take 1 tablet (25 mg) by mouth every 6 hours as needed for itching. 20 tablet 0    insulin aspart (NOVOLOG PEN) 100 UNIT/ML pen Inject 1-10 Units  subcutaneously 3 times daily (before meals). Correction Scale - HIGH INSULIN RESISTANCE DOSING   Do Not give Correction Insulin if Pre-Meal BG less than 140. For Pre-Meal  - 164 give 1 unit. For Pre-Meal  - 189 give 2 units. For Pre-Meal  - 214 give 3 units. For Pre-Meal  - 239 give 4 units. For Pre-Meal  - 264 give 5 units. For Pre-Meal  - 289 give 6 units. For Pre-Meal  - 314 give 7 units. For Pre-Meal  - 339 give 8 units. For Pre-Meal  - 364 give 9 units.  For Pre-Meal BG greater than or equal to 365 give 10 units To be given with prandial insulin, and based on pre-meal blood glucose. Administering insulin within 5 minutes of the start of the meal is ideal. Administer insulin no more than 30 minutes after the start of the meal, unless directed otherwise by provider. Notify provider if glucose greater than or equal to 350 mg/dL after administration of correction dose. 15 mL 2    insulin aspart (NOVOLOG PEN) 100 UNIT/ML pen Inject 5 Units subcutaneously Take with snacks or supplements for high blood sugar. 15 mL 2    insulin aspart (NOVOLOG PEN) 100 UNIT/ML pen Inject 1-7 Units subcutaneously at bedtime. HIGH INSULIN RESISTANCE DOSING  Do Not give Bedtime Correction Insulin if BG less than 200. For  - 224 give 1 units. For  - 249 give 2 units. For  - 274 give 3 units. For  - 299 give 4 units. For  - 324 give 5 units. For  - 349 give 6 units. For BG greater than or equal to 350 give 7 units. Notify provider if glucose greater than or equal to 350 mg/dL after administration of correction dose. 15 mL 2    insulin aspart (NOVOLOG PEN) 100 UNIT/ML pen Inject 8-10 Units subcutaneously 3 times daily (with meals). 8 units with medium meal, 10 units with large meals 15 mL 2    insulin glargine (LANTUS PEN) 100 UNIT/ML pen Inject 35 Units subcutaneously at bedtime. 15 mL 3    insulin  UNIT/ML injection 24 units before breakfast  15 mL 3    insulin pen needle (32G X 4 MM) 32G X 4 MM miscellaneous Use pen needles daily or as directed. 100 each 3    levalbuterol (XOPENEX) 1.25 MG/3ML neb solution Take 3 mLs (1.25 mg) by nebulization 4 times daily as needed for shortness of breath or wheezing. Use prior to Mucomyst neb. 300 mL 0    magnesium glycinate 100 MG CAPS capsule Take 4 capsules (400 mg) by mouth 3 times daily. 360 capsule 0    melatonin 5 MG tablet Take 1 tablet (5 mg) by mouth or Feeding Tube every evening. 30 tablet 0    methocarbamol (ROBAXIN) 750 MG tablet Take 1 tablet (750 mg) by mouth or Feeding Tube 4 times daily as needed for muscle spasms. 30 tablet 0    metoprolol tartrate (LOPRESSOR) 50 MG tablet Take 1 tablet (50 mg) by mouth or Feeding Tube 2 times daily. 60 tablet 0    multivitamin, therapeutic (THERA-VIT) TABS tablet Take 1 tablet by mouth daily. 30 tablet 0    mycophenolate (GENERIC EQUIVALENT) 250 MG capsule Take 4 capsules (1,000 mg) by mouth 2 times daily. 240 capsule 11    nystatin (MYCOSTATIN) 083680 UNIT/ML suspension Take 10 mLs (1,000,000 Units) by mouth 4 times daily. 1200 mL 0    oxyCODONE (ROXICODONE) 5 MG tablet Take 1 tablet (5 mg) by mouth or Feeding Tube every 6 hours as needed for severe pain. 18 tablet 0    pantoprazole (PROTONIX) 40 MG EC tablet Take 1 tablet (40 mg) by mouth every morning (before breakfast). 30 tablet 0    polyethylene glycol (MIRALAX) 17 GM/Dose powder Take 17 g by mouth daily as needed for constipation. 510 g 0    predniSONE (DELTASONE) 5 MG tablet Take 5 tablets (25 mg) by mouth daily. Taper per lung transplant clinic.  Next taper to 20 mg daily on 8/31 150 tablet 3    rosuvastatin (CRESTOR) 10 MG tablet Take 1 tablet (10 mg) by mouth daily. 30 tablet 0    senna-docusate (SENOKOT-S/PERICOLACE) 8.6-50 MG tablet Take 2 tablets by mouth or Feeding Tube 2 times daily as needed for constipation (use 1st). 120 tablet 0    sulfamethoxazole-trimethoprim (BACTRIM) 400-80 MG tablet Take 1  tablet by mouth or NG Tube daily. 30 tablet 0    tacrolimus (GENERIC EQUIVALENT) 0.5 MG capsule Take 1 capsule (0.5 mg) by mouth 2 times daily as needed (Total dose 4 mg twice daily, to be adjusted by pulmonary clinic). 60 capsule 3    tacrolimus (GENERIC EQUIVALENT) 1 MG capsule Take 4 capsules (4 mg) by mouth 2 times daily. Total dose 4 mg twice daily, to be adjusted by pulmonary clinic 240 capsule 11    traZODone (DESYREL) 50 MG tablet Take 1 tablet (50 mg) by mouth or Feeding Tube nightly as needed for sleep. 30 tablet 0    valGANciclovir (VALCYTE) 450 MG tablet Take 2 tablets (900 mg) by mouth daily. 60 tablet 0     No current facility-administered medications for this visit.     Facility-Administered Medications Ordered in Other Visits   Medication Dose Route Frequency Provider Last Rate Last Admin    sodium chloride bacteriostatic 0.9 % flush 9 mL  9 mL Intravenous Once NICOLA Pearce MD         No Known Allergies  Past Medical History:   Diagnosis Date    Administration of long-term prophylactic antibiotics 08/26/2024    Hepatic steatosis 2017    Noted on ultrasound    Hypomagnesemia 08/26/2024    Immunosuppression (H24) 08/26/2024    S/P lung transplant (H) 08/15/2024    Tremor 05/23/2024       Past Surgical History:   Procedure Laterality Date    BRONCHOSCOPY  08/16/2024    BRONCHOSCOPY FLEXIBLE AND RIGID N/A 8/27/2024    Procedure: Bronchoscopy Inspection;  Surgeon: Oneida Silver MD;  Location:  GI    COLONOSCOPY      CV CORONARY ANGIOGRAM N/A 06/21/2024    Procedure: Coronary Angiogram;  Surgeon: Gabriel Julian MD;  Location:  HEART CARDIAC CATH LAB    CV RIGHT HEART CATH MEASUREMENTS RECORDED N/A 06/21/2024    Procedure: Right Heart Catheterization;  Surgeon: Gabriel Julian MD;  Location:  HEART CARDIAC CATH LAB    PICC DOUBLE LUMEN PLACEMENT Right 08/20/2024    47-3cm, Basilic    TRANSPLANT LUNG RECIPIENT SINGLE X2 Bilateral 08/15/2024    Procedure: Clamshell Incision, On Central  Venoarterial Extracorporeal Membranous Oxygenation, Bilateral Lung Transplant Recipient, Left atrial appendage ligation, Intraoperative Flexible Bronchoscopy by Anesthesia;  Surgeon: Mulvihill, Michael, MD;  Location:  OR       Social History     Socioeconomic History    Marital status: Single     Spouse name: Not on file    Number of children: Not on file    Years of education: Not on file    Highest education level: Not on file   Occupational History    Not on file   Tobacco Use    Smoking status: Former     Types: Cigarettes    Smokeless tobacco: Never   Substance and Sexual Activity    Alcohol use: Not Currently     Comment: not since 2017    Drug use: Never    Sexual activity: Not on file   Other Topics Concern    Not on file   Social History Narrative    Not on file     Social Determinants of Health     Financial Resource Strain: Low Risk  (8/24/2024)    Financial Resource Strain     Within the past 12 months, have you or your family members you live with been unable to get utilities (heat, electricity) when it was really needed?: No   Food Insecurity: Low Risk  (8/24/2024)    Food Insecurity     Within the past 12 months, did you worry that your food would run out before you got money to buy more?: No     Within the past 12 months, did the food you bought just not last and you didn t have money to get more?: No   Transportation Needs: Low Risk  (8/24/2024)    Transportation Needs     Within the past 12 months, has lack of transportation kept you from medical appointments, getting your medicines, non-medical meetings or appointments, work, or from getting things that you need?: No   Physical Activity: Not on File (8/26/2019)    Received from [facility]    Physical Activity     Physical Activity: 0   Stress: Not on File (8/26/2019)    Received from [facility]    Stress     Stress: 0   Social Connections: Not on File (8/26/2019)    Received from [facility]    Social Connections     Social Connections and Isolation: 0    Interpersonal Safety: Low Risk  (8/24/2024)    Interpersonal Safety     Do you feel physically and emotionally safe where you currently live?: Yes     Within the past 12 months, have you been hit, slapped, kicked or otherwise physically hurt by someone?: No     Within the past 12 months, have you been humiliated or emotionally abused in other ways by your partner or ex-partner?: No   Housing Stability: Low Risk  (8/24/2024)    Housing Stability     Do you have housing? : Yes     Are you worried about losing your housing?: No       ROS Pulmonary    A complete ROS was otherwise negative except as noted in the HPI.  There were no vitals taken for this visit.  Exam:   GENERAL APPEARANCE: Well developed, well nourished, alert, and in no apparent distress. Poor posture.   EYES: PERRL, EOMI  HENT: Nasal mucosa with no edema and no hyperemia. No nasal polyps.  EARS: Canals clear, TMs normal  MOUTH: Oral mucosa is moist, without any lesions, no tonsillar enlargement, no oropharyngeal exudate.  NECK: supple, no masses, no thyromegaly.  LYMPHATICS: No significant axillary, cervical, or supraclavicular nodes.  RESP: normal percussion, good air flow throughout.  No crackles. No rhonchi. No wheezes.  CV: Normal S1, S2, regular rhythm, normal rate. No murmur.  No rub. No gallop. No LE edema.   ABDOMEN:  Bowel sounds normal, soft, nontender, no HSM or masses.   MS: extremities normal. No clubbing. No cyanosis.  SKIN: no rash on limited exam, staples intact without erythema, induration, discharge, or warmth  NEURO: Mentation intact, speech normal, normal strength and tone, normal gait and stance  PSYCH: mentation appears normal. and affect normal/bright.    Results:  Recent Results (from the past 168 hour(s))   Glucose by meter    Collection Time: 08/22/24  1:59 PM   Result Value Ref Range    GLUCOSE BY METER POCT 349 (H) 70 - 99 mg/dL   Glucose by meter    Collection Time: 08/22/24  4:44 PM   Result Value Ref Range    GLUCOSE BY  METER POCT 290 (H) 70 - 99 mg/dL   Glucose by meter    Collection Time: 08/22/24  9:04 PM   Result Value Ref Range    GLUCOSE BY METER POCT 417 (H) 70 - 99 mg/dL   Glucose by meter    Collection Time: 08/22/24 11:30 PM   Result Value Ref Range    GLUCOSE BY METER POCT 262 (H) 70 - 99 mg/dL   Basic metabolic panel    Collection Time: 08/23/24  4:43 AM   Result Value Ref Range    Sodium 134 (L) 135 - 145 mmol/L    Potassium 4.0 3.4 - 5.3 mmol/L    Chloride 101 98 - 107 mmol/L    Carbon Dioxide (CO2) 26 22 - 29 mmol/L    Anion Gap 7 7 - 15 mmol/L    Urea Nitrogen 14.7 8.0 - 23.0 mg/dL    Creatinine 0.52 (L) 0.67 - 1.17 mg/dL    GFR Estimate >90 >60 mL/min/1.73m2    Calcium 8.4 (L) 8.8 - 10.4 mg/dL    Glucose 223 (H) 70 - 99 mg/dL   Magnesium    Collection Time: 08/23/24  4:43 AM   Result Value Ref Range    Magnesium 1.4 (L) 1.7 - 2.3 mg/dL   Phosphorus    Collection Time: 08/23/24  4:43 AM   Result Value Ref Range    Phosphorus 2.6 2.5 - 4.5 mg/dL   CBC with platelets and differential    Collection Time: 08/23/24  4:43 AM   Result Value Ref Range    WBC Count 12.7 (H) 4.0 - 11.0 10e3/uL    RBC Count 4.03 (L) 4.40 - 5.90 10e6/uL    Hemoglobin 11.7 (L) 13.3 - 17.7 g/dL    Hematocrit 37.0 (L) 40.0 - 53.0 %    MCV 92 78 - 100 fL    MCH 29.0 26.5 - 33.0 pg    MCHC 31.6 31.5 - 36.5 g/dL    RDW 13.2 10.0 - 15.0 %    Platelet Count 228 150 - 450 10e3/uL    % Neutrophils      % Lymphocytes      % Monocytes      % Eosinophils      % Basophils      % Immature Granulocytes      NRBCs per 100 WBC 0 <1 /100    Absolute Neutrophils      Absolute Lymphocytes      Absolute Monocytes      Absolute Eosinophils      Absolute Basophils      Absolute Immature Granulocytes      Absolute NRBCs 0.0 10e3/uL   Manual Differential    Collection Time: 08/23/24  4:43 AM   Result Value Ref Range    % Neutrophils 76 %    % Lymphocytes 7 %    % Monocytes 10 %    % Eosinophils 6 %    % Basophils 1 %    NRBCs per 100 WBC 1 (H) <=0 %    Absolute  Neutrophils 9.7 (H) 1.6 - 8.3 10e3/uL    Absolute Lymphocytes 0.9 0.8 - 5.3 10e3/uL    Absolute Monocytes 1.3 0.0 - 1.3 10e3/uL    Absolute Eosinophils 0.8 (H) 0.0 - 0.7 10e3/uL    Absolute Basophils 0.1 0.0 - 0.2 10e3/uL    Absolute NRBCs 0.1 (H) <=0.0 10e3/uL    RBC Morphology Confirmed RBC Indices     Platelet Assessment  Automated Count Confirmed. Platelet morphology is normal.     Automated Count Confirmed. Platelet morphology is normal.    Polychromasia Slight (A) None Seen   Glucose by meter    Collection Time: 08/23/24  9:04 AM   Result Value Ref Range    GLUCOSE BY METER POCT 242 (H) 70 - 99 mg/dL   Glucose by meter    Collection Time: 08/23/24  1:58 PM   Result Value Ref Range    GLUCOSE BY METER POCT 259 (H) 70 - 99 mg/dL   Magnesium    Collection Time: 08/23/24  2:29 PM   Result Value Ref Range    Magnesium 1.9 1.7 - 2.3 mg/dL   Glucose by meter    Collection Time: 08/23/24  5:43 PM   Result Value Ref Range    GLUCOSE BY METER POCT 230 (H) 70 - 99 mg/dL   Glucose by meter    Collection Time: 08/23/24  9:40 PM   Result Value Ref Range    GLUCOSE BY METER POCT 211 (H) 70 - 99 mg/dL   Glucose by meter    Collection Time: 08/24/24  2:06 AM   Result Value Ref Range    GLUCOSE BY METER POCT 282 (H) 70 - 99 mg/dL   Basic metabolic panel    Collection Time: 08/24/24  6:24 AM   Result Value Ref Range    Sodium 132 (L) 135 - 145 mmol/L    Potassium 4.3 3.4 - 5.3 mmol/L    Chloride 99 98 - 107 mmol/L    Carbon Dioxide (CO2) 25 22 - 29 mmol/L    Anion Gap 8 7 - 15 mmol/L    Urea Nitrogen 15.1 8.0 - 23.0 mg/dL    Creatinine 0.55 (L) 0.67 - 1.17 mg/dL    GFR Estimate >90 >60 mL/min/1.73m2    Calcium 8.5 (L) 8.8 - 10.4 mg/dL    Glucose 269 (H) 70 - 99 mg/dL   Phosphorus    Collection Time: 08/24/24  6:24 AM   Result Value Ref Range    Phosphorus 2.5 2.5 - 4.5 mg/dL   Magnesium    Collection Time: 08/24/24  6:24 AM   Result Value Ref Range    Magnesium 1.5 (L) 1.7 - 2.3 mg/dL   Tacrolimus by Tandem Mass Spectrometry     Collection Time: 08/24/24  6:24 AM   Result Value Ref Range    Tacrolimus by Tandem Mass Spectrometry 4.2 (L) 5.0 - 15.0 ug/L    Tacrolimus Last Dose Date 3/23/2024     Tacrolimus Last Dose Time  5:45 PM    CBC with platelets and differential    Collection Time: 08/24/24  6:24 AM   Result Value Ref Range    WBC Count 14.4 (H) 4.0 - 11.0 10e3/uL    RBC Count 4.10 (L) 4.40 - 5.90 10e6/uL    Hemoglobin 11.9 (L) 13.3 - 17.7 g/dL    Hematocrit 37.6 (L) 40.0 - 53.0 %    MCV 92 78 - 100 fL    MCH 29.0 26.5 - 33.0 pg    MCHC 31.6 31.5 - 36.5 g/dL    RDW 13.3 10.0 - 15.0 %    Platelet Count 269 150 - 450 10e3/uL    % Neutrophils      % Lymphocytes      % Monocytes      % Eosinophils      % Basophils      % Immature Granulocytes      NRBCs per 100 WBC 0 <1 /100    Absolute Neutrophils      Absolute Lymphocytes      Absolute Monocytes      Absolute Eosinophils      Absolute Basophils      Absolute Immature Granulocytes      Absolute NRBCs 0.0 10e3/uL   Manual Differential    Collection Time: 08/24/24  6:24 AM   Result Value Ref Range    % Neutrophils 83 %    % Lymphocytes 9 %    % Monocytes 6 %    % Eosinophils 2 %    % Basophils 0 %    Absolute Neutrophils 12.0 (H) 1.6 - 8.3 10e3/uL    Absolute Lymphocytes 1.3 0.8 - 5.3 10e3/uL    Absolute Monocytes 0.9 0.0 - 1.3 10e3/uL    Absolute Eosinophils 0.3 0.0 - 0.7 10e3/uL    Absolute Basophils 0.0 0.0 - 0.2 10e3/uL    RBC Morphology Confirmed RBC Indices     Platelet Assessment  Automated Count Confirmed. Platelet morphology is normal.     Automated Count Confirmed. Platelet morphology is normal.    Polychromasia Slight (A) None Seen   Glucose by meter    Collection Time: 08/24/24  8:43 AM   Result Value Ref Range    GLUCOSE BY METER POCT 203 (H) 70 - 99 mg/dL   Glucose by meter    Collection Time: 08/24/24 12:37 PM   Result Value Ref Range    GLUCOSE BY METER POCT 217 (H) 70 - 99 mg/dL   Glucose by meter    Collection Time: 08/24/24  5:46 PM   Result Value Ref Range    GLUCOSE BY  METER POCT 328 (H) 70 - 99 mg/dL   Glucose by meter    Collection Time: 08/24/24 10:07 PM   Result Value Ref Range    GLUCOSE BY METER POCT 276 (H) 70 - 99 mg/dL   Glucose by meter    Collection Time: 08/25/24  1:37 AM   Result Value Ref Range    GLUCOSE BY METER POCT 240 (H) 70 - 99 mg/dL   Basic metabolic panel    Collection Time: 08/25/24  5:42 AM   Result Value Ref Range    Sodium 133 (L) 135 - 145 mmol/L    Potassium 4.5 3.4 - 5.3 mmol/L    Chloride 99 98 - 107 mmol/L    Carbon Dioxide (CO2) 23 22 - 29 mmol/L    Anion Gap 11 7 - 15 mmol/L    Urea Nitrogen 19.4 8.0 - 23.0 mg/dL    Creatinine 0.52 (L) 0.67 - 1.17 mg/dL    GFR Estimate >90 >60 mL/min/1.73m2    Calcium 9.1 8.8 - 10.4 mg/dL    Glucose 298 (H) 70 - 99 mg/dL   Magnesium    Collection Time: 08/25/24  5:42 AM   Result Value Ref Range    Magnesium 1.5 (L) 1.7 - 2.3 mg/dL   Phosphorus    Collection Time: 08/25/24  5:42 AM   Result Value Ref Range    Phosphorus 2.7 2.5 - 4.5 mg/dL   Tacrolimus by Tandem Mass Spectrometry    Collection Time: 08/25/24  5:42 AM   Result Value Ref Range    Tacrolimus by Tandem Mass Spectrometry 5.3 5.0 - 15.0 ug/L    Tacrolimus Last Dose Date 8/24/2024     Tacrolimus Last Dose Time  5:37 PM    CBC with platelets and differential    Collection Time: 08/25/24  5:42 AM   Result Value Ref Range    WBC Count 16.4 (H) 4.0 - 11.0 10e3/uL    RBC Count 4.00 (L) 4.40 - 5.90 10e6/uL    Hemoglobin 11.6 (L) 13.3 - 17.7 g/dL    Hematocrit 36.6 (L) 40.0 - 53.0 %    MCV 92 78 - 100 fL    MCH 29.0 26.5 - 33.0 pg    MCHC 31.7 31.5 - 36.5 g/dL    RDW 13.6 10.0 - 15.0 %    Platelet Count 291 150 - 450 10e3/uL    % Neutrophils 77 %    % Lymphocytes 9 %    % Monocytes 8 %    % Eosinophils 2 %    % Basophils 0 %    % Immature Granulocytes 4 %    NRBCs per 100 WBC 0 <1 /100    Absolute Neutrophils 12.6 (H) 1.6 - 8.3 10e3/uL    Absolute Lymphocytes 1.5 0.8 - 5.3 10e3/uL    Absolute Monocytes 1.3 0.0 - 1.3 10e3/uL    Absolute Eosinophils 0.3 0.0 - 0.7  10e3/uL    Absolute Basophils 0.1 0.0 - 0.2 10e3/uL    Absolute Immature Granulocytes 0.6 (H) <=0.4 10e3/uL    Absolute NRBCs 0.0 10e3/uL   Glucose by meter    Collection Time: 08/25/24  7:21 AM   Result Value Ref Range    GLUCOSE BY METER POCT 216 (H) 70 - 99 mg/dL   Glucose by meter    Collection Time: 08/25/24 11:18 AM   Result Value Ref Range    GLUCOSE BY METER POCT 186 (H) 70 - 99 mg/dL   Glucose by meter    Collection Time: 08/25/24  1:03 PM   Result Value Ref Range    GLUCOSE BY METER POCT 355 (H) 70 - 99 mg/dL   Magnesium    Collection Time: 08/25/24  1:31 PM   Result Value Ref Range    Magnesium 1.4 (L) 1.7 - 2.3 mg/dL   Glucose by meter    Collection Time: 08/25/24  4:51 PM   Result Value Ref Range    GLUCOSE BY METER POCT 302 (H) 70 - 99 mg/dL   Glucose by meter    Collection Time: 08/25/24  9:38 PM   Result Value Ref Range    GLUCOSE BY METER POCT 303 (H) 70 - 99 mg/dL   Magnesium    Collection Time: 08/25/24  9:48 PM   Result Value Ref Range    Magnesium 2.0 1.7 - 2.3 mg/dL   Glucose by meter    Collection Time: 08/26/24  2:49 AM   Result Value Ref Range    GLUCOSE BY METER POCT 113 (H) 70 - 99 mg/dL   Basic metabolic panel    Collection Time: 08/26/24  6:03 AM   Result Value Ref Range    Sodium 136 135 - 145 mmol/L    Potassium 4.4 3.4 - 5.3 mmol/L    Chloride 101 98 - 107 mmol/L    Carbon Dioxide (CO2) 25 22 - 29 mmol/L    Anion Gap 10 7 - 15 mmol/L    Urea Nitrogen 15.8 8.0 - 23.0 mg/dL    Creatinine 0.64 (L) 0.67 - 1.17 mg/dL    GFR Estimate >90 >60 mL/min/1.73m2    Calcium 9.5 8.8 - 10.4 mg/dL    Glucose 106 (H) 70 - 99 mg/dL   Phosphorus    Collection Time: 08/26/24  6:03 AM   Result Value Ref Range    Phosphorus 4.2 2.5 - 4.5 mg/dL   Magnesium    Collection Time: 08/26/24  6:03 AM   Result Value Ref Range    Magnesium 1.8 1.7 - 2.3 mg/dL   Hepatic panel    Collection Time: 08/26/24  6:03 AM   Result Value Ref Range    Protein Total 6.1 (L) 6.4 - 8.3 g/dL    Albumin 3.2 (L) 3.5 - 5.2 g/dL     Bilirubin Total 0.4 <=1.2 mg/dL    Alkaline Phosphatase 170 (H) 40 - 150 U/L    AST 27 0 - 45 U/L    ALT 44 0 - 70 U/L    Bilirubin Direct <0.20 0.00 - 0.30 mg/dL   Ammonia    Collection Time: 08/26/24  6:03 AM   Result Value Ref Range    Ammonia 43 16 - 60 umol/L   Tacrolimus by Tandem Mass Spectrometry    Collection Time: 08/26/24  6:03 AM   Result Value Ref Range    Tacrolimus by Tandem Mass Spectrometry 6.8 5.0 - 15.0 ug/L    Tacrolimus Last Dose Date 8/25/2024     Tacrolimus Last Dose Time  6:05 PM    CBC with platelets and differential    Collection Time: 08/26/24  6:03 AM   Result Value Ref Range    WBC Count 17.9 (H) 4.0 - 11.0 10e3/uL    RBC Count 4.24 (L) 4.40 - 5.90 10e6/uL    Hemoglobin 12.4 (L) 13.3 - 17.7 g/dL    Hematocrit 39.2 (L) 40.0 - 53.0 %    MCV 93 78 - 100 fL    MCH 29.2 26.5 - 33.0 pg    MCHC 31.6 31.5 - 36.5 g/dL    RDW 13.8 10.0 - 15.0 %    Platelet Count 290 150 - 450 10e3/uL    % Neutrophils 78 %    % Lymphocytes 9 %    % Monocytes 8 %    % Eosinophils 2 %    % Basophils 0 %    % Immature Granulocytes 3 %    NRBCs per 100 WBC 0 <1 /100    Absolute Neutrophils 14.1 (H) 1.6 - 8.3 10e3/uL    Absolute Lymphocytes 1.7 0.8 - 5.3 10e3/uL    Absolute Monocytes 1.5 (H) 0.0 - 1.3 10e3/uL    Absolute Eosinophils 0.4 0.0 - 0.7 10e3/uL    Absolute Basophils 0.1 0.0 - 0.2 10e3/uL    Absolute Immature Granulocytes 0.6 (H) <=0.4 10e3/uL    Absolute NRBCs 0.0 10e3/uL   RBC and Platelet Morphology    Collection Time: 08/26/24  6:03 AM   Result Value Ref Range    Platelet Assessment  Automated Count Confirmed. Platelet morphology is normal.     Automated Count Confirmed. Platelet morphology is normal.    Acanthocytes      Vidya Rods      Basophilic Stippling      Bite Cells      Blister Cells      Rachel Cells Slight (A) None Seen    Elliptocytes      Hgb C Crystals      Pendleton-Jolly Bodies      Hypersegmented Neutrophils      Polychromasia Slight (A) None Seen    RBC agglutination      RBC Fragments       Reactive Lymphocytes      Rouleaux      Sickle Cells      Smudge Cells      Spherocytes      Stomatocytes      Target Cells      Teardrop Cells      Toxic Neutrophils      RBC Morphology Confirmed RBC Indices    Glucose by meter    Collection Time: 08/26/24  9:51 AM   Result Value Ref Range    GLUCOSE BY METER POCT 149 (H) 70 - 99 mg/dL   Glucose by meter    Collection Time: 08/26/24 12:44 PM   Result Value Ref Range    GLUCOSE BY METER POCT 264 (H) 70 - 99 mg/dL   Glucose by meter    Collection Time: 08/26/24  4:34 PM   Result Value Ref Range    GLUCOSE BY METER POCT 242 (H) 70 - 99 mg/dL   Glucose by meter    Collection Time: 08/26/24  9:10 PM   Result Value Ref Range    GLUCOSE BY METER POCT 235 (H) 70 - 99 mg/dL   Glucose by meter    Collection Time: 08/27/24  2:05 AM   Result Value Ref Range    GLUCOSE BY METER POCT 139 (H) 70 - 99 mg/dL   Basic metabolic panel    Collection Time: 08/27/24  5:50 AM   Result Value Ref Range    Sodium 138 135 - 145 mmol/L    Potassium 4.1 3.4 - 5.3 mmol/L    Chloride 102 98 - 107 mmol/L    Carbon Dioxide (CO2) 23 22 - 29 mmol/L    Anion Gap 13 7 - 15 mmol/L    Urea Nitrogen 15.2 8.0 - 23.0 mg/dL    Creatinine 0.62 (L) 0.67 - 1.17 mg/dL    GFR Estimate >90 >60 mL/min/1.73m2    Calcium 9.7 8.8 - 10.4 mg/dL    Glucose 112 (H) 70 - 99 mg/dL   Phosphorus    Collection Time: 08/27/24  5:50 AM   Result Value Ref Range    Phosphorus 3.5 2.5 - 4.5 mg/dL   Magnesium    Collection Time: 08/27/24  5:50 AM   Result Value Ref Range    Magnesium 1.6 (L) 1.7 - 2.3 mg/dL   CBC with platelets and differential    Collection Time: 08/27/24  5:50 AM   Result Value Ref Range    WBC Count 14.7 (H) 4.0 - 11.0 10e3/uL    RBC Count 4.40 4.40 - 5.90 10e6/uL    Hemoglobin 12.8 (L) 13.3 - 17.7 g/dL    Hematocrit 40.4 40.0 - 53.0 %    MCV 92 78 - 100 fL    MCH 29.1 26.5 - 33.0 pg    MCHC 31.7 31.5 - 36.5 g/dL    RDW 13.8 10.0 - 15.0 %    Platelet Count 370 150 - 450 10e3/uL    % Neutrophils 72 %    %  Lymphocytes 14 %    % Monocytes 8 %    % Eosinophils 2 %    % Basophils 1 %    % Immature Granulocytes 3 %    NRBCs per 100 WBC 0 <1 /100    Absolute Neutrophils 10.6 (H) 1.6 - 8.3 10e3/uL    Absolute Lymphocytes 2.1 0.8 - 5.3 10e3/uL    Absolute Monocytes 1.2 0.0 - 1.3 10e3/uL    Absolute Eosinophils 0.3 0.0 - 0.7 10e3/uL    Absolute Basophils 0.1 0.0 - 0.2 10e3/uL    Absolute Immature Granulocytes 0.5 (H) <=0.4 10e3/uL    Absolute NRBCs 0.0 10e3/uL   Tacrolimus by Tandem Mass Spectrometry    Collection Time: 08/27/24  7:36 AM   Result Value Ref Range    Tacrolimus by Tandem Mass Spectrometry 6.8 5.0 - 15.0 ug/L    Tacrolimus Last Dose Date 8/26/2024     Tacrolimus Last Dose Time  5:44 PM    CBC with platelets and differential    Collection Time: 08/27/24  7:36 AM   Result Value Ref Range    WBC Count 15.1 (H) 4.0 - 11.0 10e3/uL    RBC Count 4.21 (L) 4.40 - 5.90 10e6/uL    Hemoglobin 12.4 (L) 13.3 - 17.7 g/dL    Hematocrit 38.2 (L) 40.0 - 53.0 %    MCV 91 78 - 100 fL    MCH 29.5 26.5 - 33.0 pg    MCHC 32.5 31.5 - 36.5 g/dL    RDW 13.7 10.0 - 15.0 %    Platelet Count 378 150 - 450 10e3/uL    % Neutrophils 76 %    % Lymphocytes 11 %    % Monocytes 7 %    % Eosinophils 2 %    % Basophils 1 %    % Immature Granulocytes 3 %    NRBCs per 100 WBC 0 <1 /100    Absolute Neutrophils 11.5 (H) 1.6 - 8.3 10e3/uL    Absolute Lymphocytes 1.7 0.8 - 5.3 10e3/uL    Absolute Monocytes 1.1 0.0 - 1.3 10e3/uL    Absolute Eosinophils 0.3 0.0 - 0.7 10e3/uL    Absolute Basophils 0.1 0.0 - 0.2 10e3/uL    Absolute Immature Granulocytes 0.5 (H) <=0.4 10e3/uL    Absolute NRBCs 0.0 10e3/uL   BRONCHOSCOPY    Collection Time: 08/27/24  7:43 AM   Result Value Ref Range    Bronchoscopy       [facility]  Endoscopy Department-[place]  _______________________________________________________________________________  Patient Name: Travon Cartwright            Procedure Date: 8/27/2024 7:43 AM  MRN: [id number]                        Account #: [id number]  YOB: 1965              Admit Type: Inpatient  Age: 59                               Gender: Male  Note Status: Finalized                Attending MD: MARIEL KEITH MD,   Total Sedation Time:                    _______________________________________________________________________________     Procedure:             Bronchoscopy  Indications:           BSLT (8/15/24). Inspection bronchoscopy  Providers:             MARIEL KEITH MD, ATUL VARGAS (Fellow),                          Meghan Laureano RN, Ziggy Crespo, Technician  Medicines:             Lidocaine 4% applied to cords 9 mL, Lidocaine 1% 12 mL                          applied to subglottic space and  tracheobronchial tree  Requesting Physician:    Complications:         None  _______________________________________________________________________________  Procedure:             Pre-Anesthesia Assessment:                         - The History and Physical was reviewed prior to the                         procedure. The patient's medications, allergies and                         sensitivities have also been reviewed.                         - The risks and benefits of the procedure and the                         sedation options and risks were discussed with the                         patient. All questions were answered and informed                         consent was obtained.                         - Pre-procedure physical examination revealed no                         contraindications to sedation.                         After obtaining informed consent, a time out was                          performed. Lidocaine and sedation were then                          administered . The bronchoscope was inserted through                          the mouth. The vocal cords were anesthetized with 4%                          lidocaine. The scope was then passed beyond the cords                           and 1% lidocaine was instilled at the kelly and                          mainstems. An airway inspection was done. Airway                          secretions were collected for washing. The patient                          tolerated the procedure well. Total procedure time was                          12 minutes.  Findings:       Upper airway structures, vocal cords (anatomy and function) appear to be        normal. The kelly was sharp. An airway inspection was done to the        subsegmental level which found:       1) The surgical anastomosis in the right mainstem bronchus is normal.       2) The surgical anastomosis in the left mainstem bronchus is normal.        There was a pedunculated granulation tissue 6 oclock.       3) Distal to the anastamoses, the airway  was hyperemic but not friable.        There was a moderate amount of thick, tenancious, clear secretions as        well as a fair amount of thin secretions.  Impression:            - Inspection bronchoscopy. Normal surgical anastamoses                         - Bronchial washing                         - Hyperemic airways. Tenacious secretions  Moderate Sedation:       Midazolam 1.5 mg IV, Fentanyl 75 mcg IV  Recommendation:        - [facility] care.      ______________________  MARIEL KEITH MD  8/27/2024 10:43:03 AM  I was physically present for the entire viewing portion of the exam.  __________________________  Signature of teaching physician  Sobeida/Rangel KEITH MD  Number of Addenda: 0    Note Initiated On: 8/27/2024 7:43 AM     Gram Stain    Collection Time: 08/27/24  8:30 AM    Specimen: Bronchus; Washings   Result Value Ref Range    GS Culture See corresponding culture for results     Gram Stain Result <25 PMNs/low power field     Gram Stain Result 2+ Mixed rhianna    Fungal or Yeast Culture Routine    Collection Time: 08/27/24  8:30 AM    Specimen: Bronchus; Washings   Result Value Ref Range     Culture No growth after 2 days    Respiratory Aerobic Bacterial Culture    Collection Time: 08/27/24  8:30 AM    Specimen: Bronchus; Washings   Result Value Ref Range    Culture 3+ Normal Rhianna    Acid-Fast Bacilli Culture and Stain    Collection Time: 08/27/24  8:30 AM    Specimen: Bronchus; Washings   Result Value Ref Range    Acid Fast Stain No acid fast bacilli seen     Acid Fast Stain No acid fast bacilli seen    Glucose by meter    Collection Time: 08/27/24  8:56 AM   Result Value Ref Range    GLUCOSE BY METER POCT 142 (H) 70 - 99 mg/dL   Respiratory Aerobic Bacterial Culture with Gram Stain    Collection Time: 08/27/24 10:46 AM    Specimen: Expectorate; Sputum   Result Value Ref Range    Culture       >10 Squamous epithelial cells/low power field indicates oral contamination. Please recollect.    Gram Stain Result >10 Squamous epithelial cells/low power field     Gram Stain Result >25 PMNs/low power field     Gram Stain Result 2+ Mixed rhianna    Glucose by meter    Collection Time: 08/27/24  2:13 PM   Result Value Ref Range    GLUCOSE BY METER POCT 180 (H) 70 - 99 mg/dL   Glucose by meter    Collection Time: 08/27/24  5:41 PM   Result Value Ref Range    GLUCOSE BY METER POCT 242 (H) 70 - 99 mg/dL   Glucose by meter    Collection Time: 08/27/24  9:59 PM   Result Value Ref Range    GLUCOSE BY METER POCT 323 (H) 70 - 99 mg/dL   Glucose by meter    Collection Time: 08/28/24 12:09 AM   Result Value Ref Range    GLUCOSE BY METER POCT 180 (H) 70 - 99 mg/dL   Glucose by meter    Collection Time: 08/28/24  2:44 AM   Result Value Ref Range    GLUCOSE BY METER POCT 98 70 - 99 mg/dL   Basic metabolic panel    Collection Time: 08/28/24  6:29 AM   Result Value Ref Range    Sodium 137 135 - 145 mmol/L    Potassium 4.1 3.4 - 5.3 mmol/L    Chloride 102 98 - 107 mmol/L    Carbon Dioxide (CO2) 24 22 - 29 mmol/L    Anion Gap 11 7 - 15 mmol/L    Urea Nitrogen 13.0 8.0 - 23.0 mg/dL    Creatinine 0.70 0.67 - 1.17 mg/dL    GFR  Estimate >90 >60 mL/min/1.73m2    Calcium 9.7 8.8 - 10.4 mg/dL    Glucose 99 70 - 99 mg/dL   Phosphorus    Collection Time: 08/28/24  6:29 AM   Result Value Ref Range    Phosphorus 3.7 2.5 - 4.5 mg/dL   Magnesium    Collection Time: 08/28/24  6:29 AM   Result Value Ref Range    Magnesium 1.7 1.7 - 2.3 mg/dL   Platelet count    Collection Time: 08/28/24  6:29 AM   Result Value Ref Range    Platelet Count 326 150 - 450 10e3/uL   Tacrolimus by Tandem Mass Spectrometry    Collection Time: 08/28/24  6:29 AM   Result Value Ref Range    Tacrolimus by Tandem Mass Spectrometry 8.8 5.0 - 15.0 ug/L    Tacrolimus Last Dose Date 8/27/2024     Tacrolimus Last Dose Time  6:27 PM    Hepatic panel    Collection Time: 08/28/24  6:29 AM   Result Value Ref Range    Protein Total 6.6 6.4 - 8.3 g/dL    Albumin 3.3 (L) 3.5 - 5.2 g/dL    Bilirubin Total 0.5 <=1.2 mg/dL    Alkaline Phosphatase 176 (H) 40 - 150 U/L    AST 27 0 - 45 U/L    ALT 51 0 - 70 U/L    Bilirubin Direct 0.21 0.00 - 0.30 mg/dL   CBC with platelets and differential    Collection Time: 08/28/24  6:29 AM   Result Value Ref Range    WBC Count 12.4 (H) 4.0 - 11.0 10e3/uL    RBC Count 4.21 (L) 4.40 - 5.90 10e6/uL    Hemoglobin 12.4 (L) 13.3 - 17.7 g/dL    Hematocrit 38.7 (L) 40.0 - 53.0 %    MCV 92 78 - 100 fL    MCH 29.5 26.5 - 33.0 pg    MCHC 32.0 31.5 - 36.5 g/dL    RDW 13.9 10.0 - 15.0 %    Platelet Count 326 150 - 450 10e3/uL    % Neutrophils 69 %    % Lymphocytes 18 %    % Monocytes 8 %    % Eosinophils 2 %    % Basophils 1 %    % Immature Granulocytes 2 %    NRBCs per 100 WBC 0 <1 /100    Absolute Neutrophils 8.5 (H) 1.6 - 8.3 10e3/uL    Absolute Lymphocytes 2.3 0.8 - 5.3 10e3/uL    Absolute Monocytes 1.0 0.0 - 1.3 10e3/uL    Absolute Eosinophils 0.2 0.0 - 0.7 10e3/uL    Absolute Basophils 0.1 0.0 - 0.2 10e3/uL    Absolute Immature Granulocytes 0.3 <=0.4 10e3/uL    Absolute NRBCs 0.0 10e3/uL   Glucose by meter    Collection Time: 08/28/24  8:43 AM   Result Value Ref  Range    GLUCOSE BY METER POCT 135 (H) 70 - 99 mg/dL   UA with Microscopic reflex to Culture    Collection Time: 08/28/24 11:28 AM    Specimen: Urine, Midstream   Result Value Ref Range    Color Urine Straw Colorless, Straw, Light Yellow, Yellow    Appearance Urine Clear Clear    Glucose Urine Negative Negative mg/dL    Bilirubin Urine Negative Negative    Ketones Urine Negative Negative mg/dL    Specific Gravity Urine 1.007 1.003 - 1.035    Blood Urine Negative Negative    pH Urine 7.5 (H) 5.0 - 7.0    Protein Albumin Urine Negative Negative mg/dL    Urobilinogen Urine Normal Normal, 2.0 mg/dL    Nitrite Urine Negative Negative    Leukocyte Esterase Urine Negative Negative    RBC Urine <1 <=2 /HPF    WBC Urine <1 <=5 /HPF   Glucose by meter    Collection Time: 08/28/24 12:43 PM   Result Value Ref Range    GLUCOSE BY METER POCT 149 (H) 70 - 99 mg/dL   Magnesium    Collection Time: 08/29/24 12:01 PM   Result Value Ref Range    Magnesium 1.6 (L) 1.7 - 2.3 mg/dL   CBC with platelets    Collection Time: 08/29/24 12:01 PM   Result Value Ref Range    WBC Count 13.0 (H) 4.0 - 11.0 10e3/uL    RBC Count 4.58 4.40 - 5.90 10e6/uL    Hemoglobin 13.4 13.3 - 17.7 g/dL    Hematocrit 41.9 40.0 - 53.0 %    MCV 92 78 - 100 fL    MCH 29.3 26.5 - 33.0 pg    MCHC 32.0 31.5 - 36.5 g/dL    RDW 13.6 10.0 - 15.0 %    Platelet Count 455 (H) 150 - 450 10e3/uL   Comprehensive metabolic panel    Collection Time: 08/29/24 12:01 PM   Result Value Ref Range    Sodium 133 (L) 135 - 145 mmol/L    Potassium 5.4 (H) 3.4 - 5.3 mmol/L    Carbon Dioxide (CO2) 26 22 - 29 mmol/L    Anion Gap 10 7 - 15 mmol/L    Urea Nitrogen 21.2 8.0 - 23.0 mg/dL    Creatinine 0.69 0.67 - 1.17 mg/dL    GFR Estimate >90 >60 mL/min/1.73m2    Calcium 10.2 8.8 - 10.4 mg/dL    Chloride 97 (L) 98 - 107 mmol/L    Glucose 229 (H) 70 - 99 mg/dL    Alkaline Phosphatase 185 (H) 40 - 150 U/L    AST 25 0 - 45 U/L    ALT 50 0 - 70 U/L    Protein Total 7.1 6.4 - 8.3 g/dL    Albumin 3.7  3.5 - 5.2 g/dL    Bilirubin Total 0.3 <=1.2 mg/dL   General PFT Lab (Please always keep checked)    Collection Time: 08/29/24 12:41 PM   Result Value Ref Range    FVC-Pred 4.25 L    FVC-Pre 2.71 L    FVC-%Pred-Pre 63 %    FEV1-Pre 2.27 L    FEV1-%Pred-Pre 68 %    FEV1FVC-Pred 78 %    FEV1FVC-Pre 84 %    FEFMax-Pred 9.16 L/sec    FEFMax-Pre 6.63 L/sec    FEFMax-%Pred-Pre 72 %    FEF2575-Pred 2.76 L/sec    FEF2575-Pre 2.68 L/sec    QIS5510-%Pred-Pre 97 %    ExpTime-Pre 4.21 sec    FIFMax-Pre 4.88 L/sec    FEV1FEV6-Pred 79 %    FEV1FEV6-Pre 84 %       Results as noted above.    PFT Interpretation:  Normal spirometry.  Unchanged from previous.   Best since lung transplantation  Valid Maneuver    CXR (8/28/2024), personally reviewed by me: bilateral Lung fields are clear. No effusion. Cardiac silhouette is not enlarged. Right sided atelectasis improved.     Assessment and plan: Travon Cartwright is a 59 year old male with a history of idiopathic pulmonary fibrosis, diabetes, hepatosteatosis, and essential tremor.  Pt. is now s/p BSLT on 8/15/2024 with Dr. Mulvihill.  Surgery was uncomplicated and done on pump, extubated and off pressors since 8/18.  Had delirium. Weaned to RA 8/22.      Discharge recommendations:  - Adjust levalbuterol and Mucomyst nebs to prn upon discharge  - Continue IS and Aerobika   - DSA (8/22) pending then one month post-transplant (due 9/15, additionally per protocol)  - Prospera one month post-transplant (due 9/15)  - Plan for staple removal 4 weeks post-op per Dr. Mulvihill (see CVTS note 8/23)  - EBV, IgG, and donor labs due 9/15  - Follow pending bronch cultures (8/27)  - Monitor LFTs 8/29 as OP, noted alk phos elevation since 8/26     #. S/p bilateral sequential lung transplant (BSLT) 8/15/24 for idiopathic pulmonary fibrosis: Explant pathology: left with UIP and organizing pneumonia (5 benign hilar lymph nodes), right with UIP with organizing pneumonia (7 benign hilar lymph nodes).  PGD  1-2.    - Bronch (8/27) with normal surgical anastomosis in right mainstem bronchus, normal surgical anastomosis in left mainstem bronchus and noted pedunculated granulation tissue 6 o'clock, distal to anastomoses airway hyperemic but not friable, and moderate amount of thick, tenacious, clear secretions and fair amount of thin secretions.     Immunosuppression: S/p induction therapy with high dose IV steroid intraoperatively and basiliximab (POD #0 and #4).   - Tacrolimus goal  8-12.  - MMF 1000 mg BID  - Prednisone 25 mg daily with taper per lung transplant protocol:  Date Daily Dose (mg)   8/24/2024 25   8/31/2024 20   9/21/2024 15   10/12/2024 10   11/2/2024 5      8/28/2024: Pulm sx are stable. CXR is stable to better. FEV1 - first since lung tx. Slightly low will monitor. Advised proper posture and abd breathing.  - Nebs: levalbuterol and Mucomyst QID PRN  - transplant coordinator to follow up on nebulizer machine   - IS and Aerobika   - DSA (8/22) negative then one month post-transplant (due 9/15, additionally per protocol)  - Prospera one month post-transplant (due 9/15)  - CXR today with stable with streaky Rt base and left upper lung zone opacities (personally reviewed)  - Plan for staple removal 4 weeks (9/23) post-op per Dr. Mulvihill (see CVTS note 8/23)    #. ID:   Prophylaxis:   - Bactrim for PJP ppx (started POD #1 due to donor toxoplasma IgG +)  - VGCV for CMV ppx (as below), CMV monitoring per protocol (after completion of ppx course, additionally prn)  - Nystatin for oral candidiasis ppx, 6 month course   - See below for serologies and viral ppx:    Donor Recipient Intervention   CMV status - + Valganciclovir POD #8-90   EBV status - + EBV monthly (due 9/15)   HSV status N/A + N/A         - Donor cultures ([facility]) with Strep constellatus, donor cultures ([facility]) with MSSA.  S/p IV vancomycin 8/15-8/20 and ceftazidime 8/15-8/17.  Noted fever 8/18-8/19.  Blood culture negative 8/19.  C diff negative  8/21.  Report of urinary urgency and frequency, UA unremarkable 8/28.   - IgG at one month (due 9/15)  - Bronch cultures (8/27) NGTD, follow  - ABX: IV Zosyn (8/19-8/28) for 10 day course.     PHS risk criteria donor: Additional labs required post-transplant (between 4-8 weeks post-op): Hepatitis B, Hepatitis C, and HIV by JOVANY ([id number], due 9/15).     #. 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  No need for NPO for 6 weeks.    #. Hx HTN  #. Hx of HLD  - Monitor hemodynamic status. Goal MAP >65, SBP <140  - Continue rosuvastatin 20 daily.  - Metoprolol tartrate 50 mg BID    #. Hypomagnesemia: Suppressed absorption d/t CNI.   - Mag glycinate 400 mg TID    #. Hyperkalemia: elevated in setting of tacrolimus use.    - lokelma 10g one dose today  - repeat BMP in 1 week     #. Lifelong tremors: He's had a history of lifelong tremors which have worsened in the last 1-2 years. While it doesn't sound necessarily like a progressive motor disorder we would like to have him fully evaluated by neurology during his evaluation.   - Neuro referral- preferable a movement disorder specialist     #. DM II:  He was diagnosed with diabetes about 3-4 years ago and feels like it is not as well controlled as it could be. Prior to transplant was on Jardiance, dulaglutide, glargine, metformin.   Lantus 25 units every day at 7 pm  To cover prednisone 25 mg daily,    NPH 24 units every day at the same time as the prednisone at 8 am (If do not take prednisone do not take NPH)  When Decrease Prednisone 20 mg daily--> Decrease NPH 18 units daily at the same time as the prednisone at 8 am (If do not take prednisone do not take NPH)  When decrease prednisone to 15 mg daily must call provider for dose of NPH  Novolog Fixed meal dose:  Breakfast, lunch and dinner: Give 8 units of Novolog for medium meal, around 60g of carb and 10 units of Novolog for larger meal about 80 or greater g of carb----> Do not take if do  not eat meal  Give 5 units of Novolog for a snack or supplement before 10 pm, do not cover snacks after 10 pm  8/28/2024: Hgb A1c is 8.5%.  - Refer to endocrinology, interested in CGM  - Pt to notify team if blood sugars are elevated to 400s tomorrow. Will adjust insulin     #. Hepatic Steatosis: He has had intermittently elevated LFTs since 2019, had a liver US in 2017 showing hepatic steatosis. GI felt he was low likelihood advanced fibrosis.   #. Elevated alk phos: Noted initially in 8/26. Etiology unclear.   - We will obtain Rt. UQ ultrasound.    #. HCM:  Dermatology:   Vaccination: Will need RSV vaccine   Cancer screening:  - colonoscopy: due 2026 (last 2016 , no polyps, 10 year follow up)      The longitudinal plan of care for the diagnosis(es)/condition(s) as documented were addressed during this visit. Due to the added complexity in care, I will continue to support Juan J in the subsequent management and with ongoing continuity of care.    Staffed with:  Carlos Johnson MD.  Pulmonary and Critical Care.  08/28/2024  2:12 PM     Lisy Paris MD  Internal Medicine, PGY-3     Physician Attestation   I, Carlos Johnson MD, saw this patient with Dr. Paris and agree with the findings and plan of care as documented in the note.   Items personally reviewed/procedural attestation: vitals, labs, imaging and agree with the interpretation documented in the note, and spirometry report and agree with the interpretation documented in the note.    Carlos Johnson MD

## 2024-08-28 NOTE — DISCHARGE SUMMARY
[facility]  Hospitalist Discharge Summary      Date of Admission:  8/14/2024  Date of Discharge:  8/28/2024  3:02 PM  Discharging Provider: Pablo Medrano MD  Discharge Service: Hospitalist Service, GOLD TEAM 10    Chief Complaint/Reason for admission:  S/P lung transplant (H)       Present on Admission:   (Resolved) Lung transplant planned   (Resolved) Encounter for pre-transplant evaluation for lung transplant   Hypomagnesemia   Administration of long-term prophylactic antibiotics      Discharge Diagnoses  Principal Problem:    S/P lung transplant (H)  Active Problems:    Hypomagnesemia    Administration of long-term prophylactic antibiotics    Immunosuppression (H24)    Steroid-induced hyperglycemia        Clinically Significant Risk Factors     # DMII: A1C = 8.4 % (Ref range: <5.7 %) within past 6 months  # Moderate Malnutrition: based on nutrition assessment      Follow-ups Needed After Discharge   Follow-up Appointments     Adult [facility] Follow-up and recommended labs and tests      Follow up with primary care provider, Dwayne Thomas, within 7 days for   hospital follow- up.  No follow up labs or test are needed.      Appointments on [place] and/or [place] campus (with [facility] or [facility]   provider or service). Call [phone] if you haven't heard regarding   these appointments within 7 days of discharge.    Please follow up with Lung transplant as planned    Endocrinology Outpatient follow up: An appointment request should be made   by patient with outpatient endocrine provider 1-2 weeks from discharge.     Future Appointments 8/28/2024 - 2/24/2025      Date Visit Type Length Department Provider     8/28/2024  2:45 PM IP OT TREATMENT 30 min [facility]U OT Katherine Berrios OTR    Location Instructions:     The [facility] is located on the corner of [address] on the  [facility]. It is easily accessible   from virtually any point in the [facility] area, via [address].              8/29/2024 12:00 PM LAB 15 min [facility] LABOR[facility] LAB    Location Instructions:     The [facility] ([facility]) is in a dense urban area with   multiple transportation and parking options. You may wish to review   options for  service and self-parking in more detail on the [facility]'s   website at [email or web address].&nbsp;               8/29/2024 12:20 PM XR GENERAL XRAY 20 min [facility] XRAY [email or web address]    Location Instructions:     The [facility] ([facility]) is in a dense urban area with   multiple transportation and parking options. You may wish to review   options for  service and self-parking in more detail on the [facility]'s   website at [email or web address].&nbsp;                8/29/2024  1:00 PM SPIROMETRY / FLOW LOOP 30 min [facility] PULM FUNC TESTING   UC PFL C    Location Instructions:     The [facility] ([facility]) is in a dense urban area with   multiple transportation and parking options. You may wish to review   options for  service and self-parking in more detail on the [facility]'s   website at [email or web address].&nbsp;                8/29/2024  1:50 PM LUNG POST RETURN TXP PULM 40 min UC Carlos Gallo MD    Location Instructions:     The [facility] ([facility]) is in a dense urban area with   multiple transportation and parking options. You may wish to review   options for  service and self-parking in more detail on the [facility]'s   website at [email or web address].   This appointment is in a   hospital-based location.&nbsp; Before your visit, you may want to check   with your insurance company for coverage and referral options, including   cost differences between services provided in different clinic   settings.&nbsp; For more information visit this link on the [employer]   [place] Website:&nbsp;  [email or web address]              9/3/2024  9:15 AM LAB 15 min [facility] LAB    Location Instructions:     The [facility] ([facility]) is in a dense urban area with   multiple transportation and parking options. You may wish to review   options for  service and self-parking in more detail on the [facility]'s   website at [email or web address].&nbsp;               9/3/2024  9:30 AM XR GENERAL XRAY 20 min [facility] XRAY [email or web address]    Location Instructions:     The [facility] ([facility]) is in a dense urban area with   multiple transportation and parking options. You may wish to review   options for  service and self-parking in more detail on the [facility]'s   website at [email or web address].&nbsp;                9/3/2024 10:00 AM SPIROMETRY / FLOW LOOP 30 min [facility] PULM FUNC TESTING   UC PFL B    Location Instructions:     The [facility] ([facility]) is in a dense urban area with   multiple transportation and parking options. You may wish to review   options for  service and self-parking in more detail on the [facility]'s   website at [email or web address].&nbsp;                9/3/2024 10:40 AM LUNG POST RETURN TXP PULM 40 min  Carlos Gallo MD    Location Instructions:     The [facility] ([facility]) is in a dense urban area with   multiple transportation and parking options. You may wish to review   options for  service and self-parking in more detail on the [facility]'s   website at [email or web address].   This appointment is in a   hospital-based location.&nbsp; Before your visit, you may want to check   with your insurance company for coverage and referral options, including   cost differences between services provided in different clinic   settings.&nbsp; For more information visit this link on the [employer] Website:&nbsp; [email or web address]              9/5/2024 12:30 PM NEW - MTM 60 min UC MTM Domo Childs    Nadir DASHAWN    Location Instructions:      [facility] ([facility]) is in a dense urban area with   multiple transportation and parking options. You may wish to review   options for  service and self-parking in more detail on the [facility]'s   website at [email or web address].               10/1/2024 11:00 AM LAB 15 min [facility] LAB    Location Instructions:     The [facility] ([facility]) is in a dense urban area with   multiple transportation and parking options. You may wish to review   options for  service and self-parking in more detail on the [facility]'s   website at [email or web address].&nbsp;               10/1/2024 11:30 AM FULL PFT W-WO MIP/MEP/MVV 60 min [facility] PULM FUNC   TESTING UC PFL B    Location Instructions:     The [facility] ([facility]) is in a dense urban area with   multiple transportation and parking options. You may wish to review   options for  service and self-parking in more detail on the [facility]'s   website at [email or web address].&nbsp;                10/1/2024 12:30 PM 6 MIN WALK OR O2 QUALIFY 30 min [facility] PULM FUNC   TESTING UC PFL 6 MINUTE WALK 1    Location Instructions:     The [facility] ([facility]) is in a dense urban area with   multiple transportation and parking options. You may wish to review   options for  service and self-parking in more detail on the [facility]'s   website at [email or web address].&nbsp;                10/1/2024  2:00 PM LUNG PRE RETURN 30 min  Fili Merrill MD    Location Instructions:     The [facility] ([facility]) is in a dense urban area with   multiple transportation and parking options. You may wish to review   options for  service and self-parking in more detail on the [facility]'s   website at [email or web address].   This appointment is in a   hospital-based location.&nbsp; Before your visit, you may want to check   with your insurance company for  coverage and referral options, including   cost differences between services provided in different clinic   settings.&nbsp; For more information visit this link on the [employer] Website:&nbsp; [email or web address]              10/4/2024  1:00 PM RETURN DIABETES 30 min [facility] ENDOCRINOLOGY Sara Reilly NP              11/11/2024 10:30 AM UMP TRANSPLANT SKIN CHECK 30 min [facility] DERMATOLOGY UC     DERM IM TXC SKIN CHECK    Location Instructions:     [facility] ([facility]) is in a dense urban area with   multiple transportation and parking options. You may wish to review   options for  service and self-parking in more detail on the [facility]'s   website at [email or web address].            Unresulted Labs Ordered in the Past 30 Days of this Admission       Date and Time Order Name Status Description    8/27/2024  8:41 AM Acid-Fast Bacilli Culture and Stain In process     8/27/2024  8:41 AM Respiratory Aerobic Bacterial Culture Preliminary     8/27/2024  8:41 AM Fungal or Yeast Culture Routine Preliminary     8/27/2024  8:41 AM Acid-Fast Bacilli Culture and Stain In process     8/16/2024  9:37 AM Fungus Culture, non-blood - Washing Site 1 Preliminary     8/16/2024  9:37 AM Acid-Fast Bacilli Culture and Stain In process     8/15/2024  2:58 PM Fungal or Yeast Culture Routine Preliminary     8/15/2024  2:58 PM Acid-Fast Bacilli Culture and Stain In process     8/15/2024  2:53 PM Fungal or Yeast Culture Routine Preliminary     8/15/2024  2:53 PM Acid-Fast Bacilli Culture and Stain In process         These results will be followed up by Pulmonary Transplant service    Discharge Disposition   Discharged to home  Condition at discharge: Stable    Hospital Course   Shyam Cartwright is a 59 year old man with history including IPF with chronic hypoxemic resp failure (baseline 6L oxygen), hepatic steatosis, and type 2 DM s/p bilateral lung transplant 8/15/24. Extubated 8/18/24; transferred from the ICU  8/21/24.    IPF s/p BL lung transplant (8/15/24)    Post operative ventilator support, extubated (8/18)   Acute hypoxemic respiratory failure (postop), improving  Hx of UIP with organizing pneumonia (7 benign hilar lymph nodes). Extubated and off pressors since 8/18.  Weaned to RA 8/22.  Extremity US negative for DVT 8/26.  Bronch (8/27) with normal surgical anastomosis in right mainstem bronchus, normal surgical anastomosis in left mainstem bronchus and noted pedunculated granulation tissue 6 o'clock, distal to anastomoses airway hyperemic but not friable, and moderate amount of thick, tenacious, clear secretions and fair amount of thin secretions.  - Nebs: levalbuterol and Mucomyst QID --> adjust to prn upon discharge  - Pulmonary toilet with chest physiotherapy QID --> stop upon discharge  - IS and Aerobika   - DSA (8/22) pending then one month post-transplant (due 9/15, additionally per protocol)  - Prospera one month post-transplant (due 9/15)  - Ammonia monitoring qMTh (screening for hyperammonemia post-lung transplant), most recently 43 on 8/26  - CXR today with stable trace left apical PTX and streaky perihilar and bibasilar opacities (personally reviewed)  - Pulmonary follow up scheduled 8/29  - Plan for staple removal 4 weeks post-op per Dr. Mulvihill (see CVTS note 8/23)    Immunosuppression lung transplant   S/p induction therapy with high dose IV steroid intraoperatively and basiliximab (POD #0 and #4).   - Tacrolimus 4 mg BID (adjusted from suspension to PO 8/26).  Goal  8-12.  Level 8/28 therapeutic at 8.8, no dose adjustment, repeat level 8/29 as OP.  - MMF 1000 mg BID  - Prednisone 25 mg daily with taper per lung transplant protocol (next due 8/31):  Date Daily Dose (mg)   8/24/2024 25   8/31/2024 20   9/21/2024 15   10/12/2024 10   11/2/2024 5        Infectious ppx for lung transplant   - Amphotericin B nebs twice weekly until discharge  - Micafungin 100mg daily through 8/25  - TMP-SMX started  08/16 based on donor toxo IgG+  - S/p Ceftazidime and Vancomycin course  - Valaciclovir (to begin 08/23)    Fever 8/19, resolved  Leukocytosis  Blood and sputum cultures 08/19: no growth to date.             - empiric piperacillin-tazobactam 10 days (end date 8/28-extended past date of next bronchoscopy due to increasing leukocytosis)     Delirium, resolved  - Delirium precautions.  - Sleep hygiene, reorientation, mobility. Scheduled: Melatonin  - Quetiapine stopped 8/23    Shock, multifactorial, hypovolemic, distributive - resolved  Hx HTN  Hx of HLD  - Monitor hemodynamic status. Goal MAP >65, SBP <140  - Continue rosuvastatin 20 daily.  - Metoprolol tartrate 50 mg BID  - Discontinue PTA losartan     Moderate protein calorie malnutrition    Diarrhea  - Nutrition consulted, appreciate recommendations   - Bowel regimen: MiraLAx  - loperamide as needed     Hypophosphatemia, resolved  Hypomagnesemia  BL creat appears to be ~ 0.62-0.65   - Strict I/O, daily weights, Avoid/limit nephrotoxins as able  - Replete lytes PRN per protocol  - Goal NET even  - Discontinued Nutriphos d/t worsening diarrhea.     Stress hyperglycemia  Type 2 diabetes mellitus  - Hgb A1C 8.4%   - insulin glargine 35 units qPM; stop morning glargine  - insulin NPH to cover corticosteroid-induced hyperglycemia (can taper with steroid taper)  - Sliding scale insulin (high sensitivity) TID WM and HS  - Goal BG <180 for optimal healing  - endocrine [id number]/26/24 given labile glucose and complicating factors, appreciate input  - Holding home PTA metformin, empagliflozin and GLP-1 agonist considering history of diarrhea  Dispo: extensive counseling about need for Insulin for steroid induced hyperglycemia. Patient was provided discharge instructions about how to take his insulin - NPH, Glargine and Aspart.  Continued management per primary physician with continued prednisone taper     Acute blood loss anemia  Acute blood loss thrombocytopenia,  resolved  Coagulopathy  - No s/s of bleeding. Continue to monitor, Hgb goal > 7.0. Stable.     Sternotomy and Surgical Incision  Weakness and deconditioning    - Postoperative incision management per CVTS, sternal precautions  - PT/OT/CR - Recommending acute rehab    Hepatic steatosis    Consultations This Hospital Stay   NURSING TO CONSULT FOR VASCULAR ACCESS CARE IP CONSULT  SPEECH LANGUAGE PATH ADULT IP CONSULT  CARDIAC REHAB IP CONSULT  PULMONARY CF/TRANSPLANT ADULT IP CONSULT  OCCUPATIONAL THERAPY ADULT IP CONSULT  PHYSICAL THERAPY ADULT IP CONSULT  NUTRITION SERVICES ADULT IP CONSULT  SOT MEDICATION HISTORY IP PHARMACY CONSULT  PHARMACY IP CONSULT  PHARMACY IP CONSULT  NUTRITION SERVICES ADULT IP CONSULT  PHARMACY IP CONSULT  REGIONAL ANESTHESIA PAIN SERVICE ADULT IP CONSULT  PHARMACY TO DOSE VANCO  VASCULAR ACCESS FOR PICC PLACEMENT ADULT IP CONSULT  CARE MANAGEMENT / SOCIAL WORK IP CONSULT  VASCULAR ACCESS FOR PICC PLACEMENT ADULT IP CONSULT  ENDOCRINE DIABETES ADULT IP CONSULT  ENDOCRINE DIABETES ADULT IP CONSULT  PHARMACY LIAISON FOR MEDICATION COVERAGE CONSULT  DIABETES EDUCATION IP CONSULT  DIABETES EDUCATION IP CONSULT  NUTRITION SERVICES ADULT IP CONSULT    Code Status   Full Code    Time Spent on this Encounter   IPablo MD, personally saw the patient today and spent greater than 30 minutes discharging this patient.       Pablo Medrano MD  [facility] [address] [address]  Phone: [phone]  ______________________________________________________________________    Physical Exam   Vital Signs: Temp: 97.6  F (36.4  C) Temp src: Oral BP: 119/80 Pulse: 84   Resp: 18 SpO2: 93 % O2 Device: None (Room air)    Weight: 148 lbs 5.91 oz  Physical Exam  HENT:      Mouth/Throat:      Mouth: Mucous membranes are moist.   Eyes:      Extraocular Movements: Extraocular movements intact.      Pupils: Pupils are equal, round, and reactive to light.    Cardiovascular:      Rate and Rhythm: Normal rate and regular rhythm.      Pulses: Normal pulses.      Heart sounds: Normal heart sounds. No murmur heard.  Pulmonary:      Effort: Pulmonary effort is normal. No respiratory distress.      Breath sounds: Normal breath sounds. No wheezing.      Comments: Wound site without any bleeding, purulence.  Abdominal:      General: Abdomen is flat. Bowel sounds are normal. There is no distension.      Tenderness: There is no abdominal tenderness.   Neurological:      General: No focal deficit present.      Mental Status: He is alert and oriented to person, place, and time.              Primary Care Physician   Dwayne Thomas    Discharge Orders      ALCOHOL WIPES PER BOX     PAC Visit Referral (For [facility] Only)      Pulmonary Rehab  Referral      Reason for your hospital stay    You were admitted for lung transplant surgery     Activity    Your activity upon discharge: activity as tolerated     Adult [facility] Follow-up and recommended labs and tests    Follow up with primary care provider, Dwayne Thomas, within 7 days for hospital follow- up.  No follow up labs or test are needed.      Appointments on [place] and/or [facility] (with [facility] or [facility] provider or service). Call [phone] if you haven't heard regarding these appointments within 7 days of discharge.    Please follow up with Lung transplant as planned    Endocrinology Outpatient follow up: An appointment request should be made by patient with outpatient endocrine provider 1-2 weeks from discharge.     Future Appointments 8/28/2024 - 2/24/2025      Date Visit Type Length Department Provider     8/28/2024  2:45 PM IP OT TREATMENT 30 min [facility]U OT Katherine Berrios OTR    Location Instructions:     The [facility] is located on the corner of [facility] [address] [facility] [address] [facility] on the [facility]. It is easily  accessible   from virtually any point in the [facility] area, via [address] and [address].              8/29/2024 12:00 PM LAB 15 min [facility] LAB    Location Instructions:     The [facility] ([facility]) is in a dense urban area with   multiple transportation and parking options. You may wish to review   options for  service and self-parking in more detail on the [facility]'s   website at [email or web address]/[facility].&nbsp;               8/29/2024 12:20 PM XR GENERAL XRAY 20 min [facility] XRAY [email or web address]    Location Instructions:     The [facility] ([facility]) is in a dense urban area with   multiple transportation and parking options. You may wish to review   options for  service and self-parking in more detail on the [facility]'s   website at [email or web address]/[facility].&nbsp;                8/29/2024  1:00 PM SPIROMETRY / FLOW LOOP 30 min [facility] PULM FUNC TESTING   UC PFL C    Location Instructions:     The [facility] ([facility]) is in a dense urban area with   multiple transportation and parking options. You may wish to review   options for  service and self-parking in more detail on the [facility]'s   website at [email or web address]/[facility].&nbsp;                8/29/2024  1:50 PM LUNG POST RETURN TXP PULM 40 min UC Carlos Gallo MD    Location Instructions:     The [facility] ([facility]) is in a dense urban area with   multiple transportation and parking options. You may wish to review   options for  service and self-parking in more detail on the [facility]'s   website at [email or web address]/[facility].   This appointment is in a   hospital-based location.&nbsp; Before your visit, you may want to check   with your insurance company for coverage and referral options, including   cost differences between services provided in different clinic   settings.&nbsp; For more information visit this link on the [employer] Website:&nbsp; [email or web address]              9/3/2024  9:15  AM LAB 15 min [facility] LABORATORY UC LAB    Location Instructions:     The [facility] ([facility] is in a dense urban area with   multiple transportation and parking options. You may wish to review   options for  service and self-parking in more detail on the [facility]   website at [email or web address]/[facility].&nbsp;               9/3/2024  9:30 AM XR GENERAL XRAY 20 min [facility] XRAY [email or web address]    Location Instructions:     The [facility] ([facility] is in a dense urban area with   multiple transportation and parking options. You may wish to review   options for  service and self-parking in more detail on the [facility]   website at [email or web address].&nbsp;                9/3/2024 10:00 AM SPIROMETRY / FLOW LOOP 30 min [facility] PULM FUNC TESTING    PFL B    Location Instructions:     The [facility] ([facility] is in a dense urban area with   multiple transportation and parking options. You may wish to review   options for  service and self-parking in more detail on the [facility]   website at [email or web address].&nbsp;                9/3/2024 10:40 AM LUNG POST RETURN TXP PULM 40 min  Carlos Gallo MD    Location Instructions:     The [facility] ([facility] is in a dense urban area with   multiple transportation and parking options. You may wish to review   options for  service and self-parking in more detail on the [facility]   website at [email or web address]/[facility].   This appointment is in a   hospital-based location.&nbsp; Before your visit, you may want to check   with your insurance company for coverage and referral options, including   cost differences between services provided in different clinic   settings.&nbsp; For more information visit this link on the [employer] Website:&nbsp; [email or web address]              9/5/2024 12:30 PM NEW - MTM 60 min  MTM Domo Childs DASHAWN    Location Instructions:      The  [facility] ([facility]) is in a dense urban area with   multiple transportation and parking options. You may wish to review   options for  service and self-parking in more detail on the [facility] [place]'s   website at [email or web address] [email or web address].               10/1/2024 11:00 AM LAB 15 min [facility] LAB    Location Instructions:     The [facility] ([facility] [place]) [place] a dense urban area with   multiple transportation and parking options. You may wish to review   options for  service and self-parking in more detail on the [facility] [place]'s   website at [email or web address] [email or web address].&nbsp;               10/1/2024 11:30 AM FULL PFT W-WO MIP/MEP/MVV 60 min [facility] PULM FUNC   TESTING UC PFL B    Location Instructions:     The [facility] ([facility] [place]) [place] a dense urban area with   multiple transportation and parking options. You may wish to review   options for  service and self-parking in more detail on the [facility] [place]'s   website at [email or web address] [email or web address].&nbsp;                10/1/2024 12:30 PM 6 MIN WALK OR O2 QUALIFY 30 min [facility] PULM FUNC   TESTING UC PFL 6 MINUTE WALK 1    Location Instructions:     The [facility] ([facility] [place]) [place] in a dense urban area with   multiple transportation and parking options. You may wish to review   options for  service and self-parking in more detail on the [facility] [place]'s   website at [email or web address] [email or web address].&nbsp;                10/1/2024  2:00 PM LUNG PRE RETURN 30 min [facility] Fili Merrill MD    Location Instructions:     The [facility] ([facility] [place]) is in a dense urban area with   multiple transportation and parking options. You may wish to review   options for  service and self-parking in more detail on the [facility] [place]'s   website at [email or web address] [email or web address].   This appointment is in a   hospital-based location.&nbsp; Before your visit, you may want to check   with your insurance company for coverage and referral options, including   cost  differences between services provided in different clinic   settings.&nbsp; For more information visit this link on the [employer] Website:&nbsp; [email or web address]              10/4/2024  1:00 PM RETURN DIABETES 30 min [facility] ENDOCRINOLOG[facility] Sara Reilly NP              11/11/2024 10:30 AM UMP TRANSPLANT SKIN CHECK 30 min [facility] DERMATOLOG[facility] UC   DERM IM TXC SKIN CHECK    Location Instructions:     [facility] ([facility]) is in a dense urban area with   multiple transportation and parking options. You may wish to review   options for  service and self-parking in more detail on the [facility]'s   website at [email or web address].     Nebulizer and Supplies Order    Nebulizer Documentation  I attest that I have seen and evaluated Travon Cartwright. He has a diagnosis of J84.9 - Interstitial pulmonary disease, unspecified (s/p BSLT) and a nebulizer machine is needed to administer medication to improve breathing passages.     I, the undersigned, certify that the above prescribed supplies are medically necessary for this patient and is both reasonable and necessary in reference to accepted standards of medical and necessary in reference to accepted standards of medical practice in the treatment of this patient's condition and is not prescribed as a convenience.     Diet    Follow this diet upon discharge: Current Diet:Orders Placed This Encounter      Snacks/Supplements Adult: Other; PRN - please allow pt to order any snack/supplement if requested; With Meals      Calorie Counts      Regular Diet Adult       Significant Results and Procedures   Most Recent 3 CBC's:  Recent Labs   Lab Test 08/28/24  0629 08/27/24  0736 08/27/24  0550   WBC 12.4* 15.1* 14.7*   HGB 12.4* 12.4* 12.8*   MCV 92 91 92     326 378 370     Most Recent 3 BMP's:  Recent Labs   Lab Test 08/28/24  1243 08/28/24  0843 08/28/24  0629 08/27/24  0856 08/27/24  0550 08/26/24  0951 08/26/24  0603   NA  --   --  137  --   138  --  136   POTASSIUM  --   --  4.1  --  4.1  --  4.4   CHLORIDE  --   --  102  --  102  --  101   CO2  --   --  24  --  23  --  25   BUN  --   --  13.0  --  15.2  --  15.8   CR  --   --  0.70  --  0.62*  --  0.64*   ANIONGAP  --   --  11  --  13  --  10   HUGO  --   --  9.7  --  9.7  --  9.5   * 135* 99   < > 112*   < > 106*    < > = values in this interval not displayed.     Most Recent 3 INR's:  Recent Labs   Lab Test 08/15/24  2020 08/15/24  1820 08/14/24  2338   INR 1.46* 1.74* 1.07     Most Recent 3 Hemoglobins:  Recent Labs   Lab Test 08/28/24  0629 08/27/24  0736 08/27/24  0550   HGB 12.4* 12.4* 12.8*     Most Recent Cholesterol Panel:  Recent Labs   Lab Test 06/18/24  1212   CHOL 105   LDL 20   HDL 50   TRIG 174*     Most Recent Hemoglobin A1c:  Recent Labs   Lab Test 06/18/24  1212   A1C 8.4*   ,   Results for orders placed or performed during the hospital encounter of 08/14/24   XR Chest 2 Views    Narrative    EXAM: XR CHEST 2 VIEWS  8/14/2024 11:47 PM      HISTORY: pre op lung transplant    COMPARISON: 6/18/2024    FINDINGS: Two views of the chest. Trachea is midline. Stable cardiac  silhouette. Low lung volumes with unchanged reticular fibrotic  opacities. Chronic blunting of the costophrenic sulci. No  pneumothorax.      Impression    IMPRESSION: Sequelae of interstitial lung disease with chronic  fibrotic opacities of the lungs. No appreciable acute airspace disease  identified.    I have personally reviewed the examination and initial interpretation  and I agree with the findings.    FADY MCDONNELL MD         SYSTEM ID:  [id number]   XR Chest Port 1 View    Narrative    EXAM: XR CHEST PORT 1 VIEW 8/15/2024 9:02 PM      HISTORY: ETT placement s/p bilateral lung transplant, left atrial  appendage ligation, and flexible bronchoscopy.    COMPARISON: Chest radiograph 8/14/2024.     TECHNIQUE: Frontal view of the chest.    FINDINGS: Endotracheal tube projects over the mid/lower thoracic  trachea  approximately 2.7 cm above the kelly. Right internal jugular  [place]-Jake catheter with tip projecting over the main pulmonary artery.  Bilateral apically directed and bibasilar chest tubes in place.  Sternotomy wiring present. Left atrial appendage clip present.  Postoperative changes of bilateral lung transplantation. Patchy  opacities in the left greater than right lung fields. No appreciable  pneumothorax. No significant pleural effusion. Small amount of  subcutaneous emphysema bilaterally.      Impression    IMPRESSION:   Postoperative changes of recent lung transplantation. Endotracheal  tube projects over the mid/lower thoracic trachea. Left greater than  right patchy airspace opacities present. No appreciable pneumothorax,  bilateral chest tubes in place. Small amount of subcutaneous emphysema  bilaterally.    I have personally reviewed the examination and initial interpretation  and I agree with the findings.    FADY MCDONNELL MD         SYSTEM ID:  [id number]   XR Abdomen Port 1 View    Narrative    EXAMINATION:  XR ABDOMEN PORT 1 VIEW 8/15/2024 9:05 PM     COMPARISON: CT chest abdomen and pelvis 6/18/2024.    HISTORY: OGT placement    TECHNIQUE: Frontal view of the abdomen.    FINDINGS: Enteric tube tip projecting at approximately the midline in  the distal stomach, sidehole projecting over the gastric body.  Postoperative changes of the chest following bilateral lung  transplantation and left atrial appendage ligation. The small  intestine and colon are not distended. No abnormal soft tissue  densities.  No free air, pneumatosis or portal venous gas.       Impression    IMPRESSION: Enteric tube tip and sidehole projected over the stomach.  Nonobstructive bowel gas pattern. Recent postoperative changes of the  chest.    I have personally reviewed the examination and initial interpretation  and I agree with the findings.    FADY MCDONNELL MD         SYSTEM ID:  [id number]   XR Chest Port 1 View    Narrative     EXAM: XR CHEST PORT 1 VIEW  8/16/2024 1:50 AM      HISTORY: Post-Op Lung    COMPARISON: 8/15/2024    FINDINGS: Single view of the chest. Endotracheal tube tip projects  over the midthoracic trachea. Gastric tube courses outside of the  field of view. Stable apically directed and basilar chest tubes  bilaterally. Right IJ approach Fowler-Jake catheter with tip projecting  over the proximal right pulmonary artery. Pericardial drain. Left  atrial appendage clip. Skin staples projecting across the midline.    Trachea is midline. Ill-defined cardiac silhouette. Trace right-sided  pneumothorax. No discernible pneumothorax on the left. No substantial  pleural effusion. Increased retrocardiac consolidative opacity. Left  greater than right patchy air space opacities. Low lung volumes. Trace  subcutaneous emphysema along the chest walls bilaterally.      Impression    IMPRESSION:   1. Trace right-sided pneumothorax, chest tube is in place.  2. Increased retrocardiac consolidative opacities, likely representing  atelectasis in the postoperative state. Additional left greater than  right mixed pulmonary opacities are similar to prior.    I have personally reviewed the examination and initial interpretation  and I agree with the findings.    FADY MCDONNELL MD         SYSTEM ID:  [id number]   XR Abdomen Port 1 View    Narrative    EXAMINATION:  XR ABDOMEN PORT 1 VIEW 8/16/2024     COMPARISON: 8/15/2024    HISTORY: Verify small bowel feeding tube bedside placement.    TECHNIQUE: One frontal supine view of the abdomen.    FINDINGS:   Feeding tube tip projects over the distal duodenum. Gastric tube tip  and sidehole are within the stomach. No abnormally dilated air-filled  loops of bowel. Postoperative findings in the lower chest with  multiple devices better assessed on same day chest x-ray. Small  bilateral pleural effusions and bibasilar pulmonary opacities.      Impression    IMPRESSION:   The feeding tube tip is in the distal  duodenum. Gastric tube tip and  sidehole are in the stomach.    I have personally reviewed the examination and initial interpretation  and I agree with the findings.    DIEGO ROBERTO DO         SYSTEM ID:  [id number]   XR Chest Port 1 View    Narrative    EXAM: XR CHEST PORT 1 VIEW  8/17/2024 2:15 AM      HISTORY: Post-Op Lung    COMPARISON: 8/16/2024    FINDINGS: Single view of the chest. Clamshell sternotomy wires. Left  atrial appendage clip. Feeding and gastric tubes course outside the  field of view. Stable bilateral chest tubes x4. [place]-Jake catheter has  been removed. Right IJ sheath with tip projecting over the right  innominate vein. Distal end of the pericardial drain is not  visualized, but appears to be in a similar position when compared to  prior.    Trachea is midline. Ill-defined cardiac silhouette. Increased  retrocardiac consolidation with new silhouetting of the left  hemidiaphragm. Mildly improved left upper lobe airspace opacities.  Trace right-sided pneumothorax is no longer visualized.      Impression    IMPRESSION:   1. Increased retrocardiac/left basilar opacities, likely atelectasis.  Left-sided airspace opacities otherwise appear mildly improved from  prior.   2. Trace right-sided pneumothorax is not definitively visualized.  3. [place]-Jake catheter has been removed. Otherwise stable support  devices.    I have personally reviewed the examination and initial interpretation  and I agree with the findings.    JAZZMINE VELOZ MD         SYSTEM ID:  [id number]   XR Chest Port 1 View    Narrative    EXAM: XR CHEST PORT 1 VIEW  8/18/2024 2:03 AM      HISTORY: Post-Op Lung    COMPARISON: 8/17/2024    FINDINGS: Single view of the chest. An endotracheal tube tip projects  over the midthoracic trachea. Stable right IJ sheath. Chest tubes in  stable position. Feeding and gastric tubes course outside the  field-of-view. Clamshell sternotomy wires. Left atrial appendage clip.    Trachea is midline. Ill-defined  cardiac silhouette. Increased hazy  right basilar opacities. Left-sided opacities are not substantially  changed. No discernible pneumothorax. Left costophrenic angle  partially columnated from the field-of-view.      Impression    IMPRESSION:   1. Stable support devices.  2. Mildly increased hazy right basilar opacities. Findings could  represent increased atelectasis. Cannot rule out a superimposed  infectious process.  3. Grossly stable mixed pulmonary opacities on the left.    I have personally reviewed the examination and initial interpretation  and I agree with the findings.    JAZZMINE VELOZ MD         SYSTEM ID:  [id number]   XR Abdomen Port 1 View    Narrative    EXAMINATION: XR ABDOMEN PORT 1 VIEW 8/18/2024 9:12 PM     COMPARISON: 8/16/2024    HISTORY: Check NG/OG tube placement    TECHNIQUE: Frontal view of the abdomen.    FINDINGS: Feeding tube tip likely within the proximal duodenum.  Feeding tube wire within the catheter. Interval removal of gastric  tube. No abnormally dilated loops of bowel. No pneumatosis or portal  venous gas. No definite pneumoperitoneum. Postoperative changes of the  chest status post bilateral lung transplantation. Multiple chest tubes  in stable position. Elevated right hemidiaphragm with probable right  pleural effusion. Patchy opacities of left lower lobe. Please see same  day chest x-ray for better characterization of the chest.      Impression    IMPRESSION:   1. Feeding tube tip likely within the proximal duodenum with guidewire  within the catheter.  2. Nonobstructive bowel gas pattern.  3. Elevated right hemidiaphragm with probable right pleural effusion.    I have personally reviewed the examination and initial interpretation  and I agree with the findings.    FADY MCDONNELL MD         SYSTEM ID:  [id number]   XR Chest Port 1 View    Narrative    Exam: XR CHEST PORT 1 VIEW, 8/19/2024 2:32 AM    Indication: lung transplant follow-up    Comparison: X-ray chest 8/18/2024, 0147  hours.    Findings:   Frontal upright radiograph of the chest, 0103 hours. Stable surgical  changes of lung transplant. Stable left atrial appendage clip. Stable  placement of right IJ sheath, feeding tube, multiple chest tubes.    Midline trachea. Stable heart size and shape within the context of  silhouetting of the cardiac borders. Continued dense right lower lobe  opacity, mixed interstitial airspace opacity of the left lower lobe.  Likely small right pleural effusion, no significant left effusion. No  definitive pneumothorax.      Impression    Impression:   1. Stable support devices.  2. Continued dense right lower lobe opacity suspicious for  infectious/inflammatory etiology, likely with a component of pulmonary  edema as well.  3. Left basilar mixed opacity likely representing atelectasis with  pulmonary edema.    I have personally reviewed the examination and initial interpretation  and I agree with the findings.    AFDY CMDONNELL MD         SYSTEM ID:  [id number]   XR Video Swallow with SLP or OT    Narrative    Examination:  Modified Barium Swallow Study with Speech Pathology,  8/21/2024 3:38 PM.    Comparison: 8/21/2024 same day chest radiograph    History: Patient with recent history of lung transplantation    Total fluoroscopy time: 1.5 minutes.    Technique: Under fluoroscopic guidance, the patient was given orally  administered barium of varying consistencies in the presence of the  speech pathology service.     Findings:  Prior to the start of barium administration, images revealed a feeding  tube that was coiled in the hypopharynx/proximal esophagus. This was  suggested by the nursing team and was unkinked prior to the start of  the exam.    The oral phase was normal. There is no delay in swallowing or pooling  of contrast. Patient was administered varying consistencies of barium  including thin barium, nectar thick barium, pudding consistency, and  barium coated cracker  Moderate volume tracheal  aspiration was  observed with a swallow of thin liquid barium via cup. Subsequent  swallows of thin liquid barium did not result in aspiration when a  straw was used or when a cup swallow was tested again. Tracheal  aspiration was observed with any of the other consistencies. Deep  laryngeal penetration was observed with thin liquid barium. No  significant penetration was observed with the solid consistencies. No  significant residue within the vallecula or piriform sinuses.      Impression    Impression:   1. Moderate volume tracheal aspiration was observed with a swallow of  thin liquid barium administered via cup.     2. Deep laryngeal penetration was also observed with thin liquid  administration via cup. No tracheal aspiration or deep laryngeal  penetration was observed with the other administered consistencies.    Please see the speech pathologist report for further details regarding  the modified barium swallow study portion of the examination.    I, JAZZMINE VELOZ MD, attest that I was immediately available to  provide guidance and assistance during the entirety of the procedure.    I have personally reviewed the examination and initial interpretation  and I agree with the findings.    JAZZMINE VELOZ MD         SYSTEM ID:  [id number]   XR Chest Port 1 View    Narrative    EXAM: XR CHEST PORT 1 VIEW  8/20/2024 6:10 AM      HISTORY: lung transplant follow-up    COMPARISON: Chest x-ray 8/19/2024, chest x-ray 8/18/2024    FINDINGS: Single view of the chest. Stable positioning of multiple  chest/mediastinal tubes. Enteric tube is present with the tip and  sidehole projecting beyond the field of view. Right-sided IJ sheath  lies within the proximal SVC. Left atrial appendage clip. Grossly  stable postsurgical changes of the chest including clamshell  sternotomy wires that are intact and skin staples.    The trachea is midline. Trace left greater than right apical  pneumothoraces. No significant pleural effusion.  Interval improvement  in consolidative opacity in the right lower lung field. Streaky  bibasilar opacities likely represent atelectasis. Continued diffuse  patchy and linear interstitial opacities with peribronchial cuffing  which may suggest a component of pulmonary edema. The  cardiomediastinal silhouette is unchanged in appearance.      Impression    IMPRESSION:   1. Interval improvement in right lower lung field consolidative  opacities. Finding is again concerning for an infectious/inflammatory  process. Differential also includes atelectasis/edema.  2. Radiographic evidence of pulmonary edema. Bibasilar atelectasis.  3. Trace left greater than right biapical pneumothoraces. No signs of  tension.  4. Stable postsurgical changes of the chest and positioning of support  devices.    I have personally reviewed the examination and initial interpretation  and I agree with the findings.    ELISSA PUGA MD         SYSTEM ID:  [id number]   XR Chest Port 1 View    Narrative    Exam: XR CHEST PORT 1 VIEW, 8/21/2024 6:32 AM    Indication: lung transplant follow-up    Comparison: X-ray chest 8/20/2024, multiple priors appear    Findings:   Frontal semiupright radiograph of the chest, 0511 hours. Stable  placement of feeding tube, multiple chest drains, bibasilar chest  tubes, left atrial appendage clip, epicardial pacer wires, sternotomy  wires, and right IJ sheath. Interval placement right upper cavity PICC  with tip terminating at the cavoatrial junction.    The trachea is midline, heart size and shape is unremarkable, partial  silhouetting of the cardiac borders. Continued perihilar and medial  basilar hazy predominance. No definitive pneumothorax is seen on this  radiograph. No significant pleural effusion.      Impression    Impression:   1. Continued hilar and basilar streaky and hazy opacities likely  representing a mixed picture of edema with atelectasis.  2. No definitive pneumothorax is seen on this radiograph,  continued  attention on follow-up.  3. Stable support devices.  4. Interval placement right upper extremity PICC with tip terminating  at the cavoatrial junction.    I have personally reviewed the examination and initial interpretation  and I agree with the findings.    FADY MCDONNELL MD         SYSTEM ID:  [id number]   XR Chest Port 1 View    Narrative    Portable chest 8/21/2024 at 1043 hours    INDICATION: Post chest tube removal    COMPARISON: 0511 hours earlier today    FINDINGS: Butterfly clamshell sternotomy again noted from prior  bilateral lung transplantation. No evidence of pneumothorax. Feeding  tube beyond the inferior margin of the image. Right PICC tip in the  right atrium. Left atrial appendage ligation clip. Continued patchy  opacities. Bibasilar thoracostomy tubes. Apically directed  thoracostomy tubes removed. Right IJ sheath in the upper SVC.      Impression    IMPRESSION: No significant changes from earlier this morning other  than removal of apically directed thoracostomy tubes.    MORENO POSEY MD         SYSTEM ID:  [id number]   XR Abdomen Port 1 View    Narrative    EXAMINATION:  XR ABDOMEN PORT 1 VIEW 8/21/2024 5:20 PM     COMPARISON: Abdominal radiograph 8/18/2024.    HISTORY: Verify small bowel feeding tube bedside placement    TECHNIQUE: Frontal view of the abdomen.    FINDINGS: Postoperative changes of the chest, partially visualized  lower thoracic cavity includes right and left basilar chest tubes,  intact median sternotomy wires, staple line across the lower chest.  Left atrial appendage clip is present. Feeding tube tip projects over  the distal duodenum. Unchanged asymmetric elevation of the right  hemidiaphragm. The small intestine and colon are not dilated. Oral  contrast opacifies multiple loops of small bowel. No abnormal soft  tissue densities.  No free air, pneumatosis or portal venous gas.       Impression    IMPRESSION: Feeding tube tip over the distal duodenum.  Postoperative  changes of the chest. Non-obstructed bowel gas pattern.    I have personally reviewed the examination and initial interpretation  and I agree with the findings.    JOEY CHEUNG DO         SYSTEM ID:  [id number]   XR Chest Port 1 View    Narrative    Exam: XR CHEST PORT 1 VIEW, 8/22/2024 6:27 AM    Indication: lung transplant follow-up    Comparison: X-ray chest 8/21/2024, multiple priors.    Findings:   Frontal radiograph of the chest, 0544 hours. Stable surgical changes  of the chest. Stable placement of feeding tube, left atrial appendage  clip, left basilar chest tube, right upper extremity PICC line.  Epicardial pacer wires. Midline trachea. Heart size and shape  unremarkable, partial silhouetting of the cardiac borders.  Subsegmental basilar atelectasis/streaky opacities. Mild patchy  opacities greatest in the left lung base. No significant effusions, no  discernible pneumothorax.      Impression    Impression:   1. Basilar subsegmental atelectasis. A few patchy basilar opacities  likely representing a mixture of edema and/or atelectasis.  2. No significant effusions, no discernible pneumothorax.    I have personally reviewed the examination and initial interpretation  and I agree with the findings.    DANI WOODSON MD         SYSTEM ID:  [id number]   XR Chest Port 1 View    Narrative    Exam: XR CHEST PORT 1 VIEW, 8/23/2024 7:01 AM    Indication: lung transplant follow-up    Comparison: X-ray chest 8/20/2024, multiple priors.    Findings:   Frontal radiograph of the chest, 0117 hours. Stable placement of  feeding tube, right upper extremity PICC, bibasilar chest tubes, and  stable surgical changes of the chest including sternotomy and left  atrial appendage clip.    The trachea is midline. Stable heart size and shape. Unchanged  retrocardiac and basilar streaky opacities. No significant effusion,  no definite pneumothorax.      Impression    Impression:   1. Stable support devices and  surgical changes of the chest.  2. Continued perihilar and basilar streaky opacities likely  representing a mixture of atelectasis and edema. No new focal  consolidations.    I have personally reviewed the examination and initial interpretation  and I agree with the findings.    JAZZMINE VELOZ MD         SYSTEM ID:  [id number]   XR Chest 2 Views    Narrative    EXAM: XR CHEST 2 VIEWS  8/23/2024 4:13 PM      HISTORY: post chest tube pull    COMPARISON: Same day chest radiograph at 0117 hours 8/23/2024    FINDINGS: Two views of the chest. Interval removal of bibasilar chest  tubes. An enteric tube is present with the tip and sidehole beyond the  field-of-view. Right-sided PICC line is present with the tip within  the right atrium. Stable postsurgical changes of the chest with intact  clam shell sternotomy wires and skin staples. Left atrial appendage  clip.    The trachea is midline. No appreciable pneumothorax. Costophrenic  angles are sharp, no significant pleural effusion. Interval increase  in perihilar and bibasilar streaky opacities. The cardiomediastinal  silhouette is unremarkable.      Impression    IMPRESSION:   1. Interval worsening in perihilar and bibasilar streaky opacities  likely represent atelectasis versus edema.  2. No appreciable pneumothorax.  3. Stable postsurgical changes of the chest and positioning of support  devices.    I have personally reviewed the examination and initial interpretation  and I agree with the findings.    ELISSA PUGA MD         SYSTEM ID:  [id number]   XR Chest Port 1 View    Narrative    Exam: XR CHEST PORT 1 VIEW, 8/24/2024 11:16 AM    Indication: lung transplant follow-up    Comparison: Chest x-ray 8/23/2024    Findings:     Frontal projection radiograph of the chest. Question trace left apical  pneumothorax. Similar postsurgical changes and support devices.  Similar cardiac mediastinal silhouette. Streaky perihilar and basilar  opacities, similar to slightly decreased.  Small right effusion.      Impression    Impression:     1. Question trace left apical pneumothorax. Consider attention on  short-term follow-up.  2. Stable postsurgical changes and support devices.  3. Stable to slightly decreased perihilar and basilar opacities  compared to prior radiograph  4. Small right pleural effusion.     Impression #1 discussed with patient's nurse by laney at 8/24/2024  3:18 PM    RANJEET MCALLISTER MD         SYSTEM ID:  [id number]   XR Chest Port 1 View    Narrative    Exam: Chest 1 view portable 70 degrees upright 8/25/2024 10:15 AM     History:  lung transplant follow-up    Comparison: 8/24/2024    Findings: Surgical changes bilateral lung transplant. Right PICC with  tip in the right atrium. Cardiac silhouette is enlarged. Clamshell  sternotomy wires and skin staples. Feeding tube collimated off the  film and abdomen. Small left apical pneumothorax unchanged. Mild  interstitial opacities bilaterally.      Impression    IMPRESSION: Unchanged small left apical pneumothorax.    ELISSA PUGA MD         SYSTEM ID:  [id number]   XR Chest Port 1 View    Narrative    Portable chest    INDICATION: Lung transplant follow-up    COMPARISON: Yesterday    EYES: Heart size normal. Butterfly clamshell sternotomy from prior  lung transplantation again noted. Left atrial appendage ligation  clipping again present. A right upper extremity PICC line tip is in  the right atrium. Previous feeding tube has been removed. Scattered  subtle streaky opacities in the lung bases are also slightly improved.  Question trace left apical pneumothorax.      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 Possible trace left apical pneumothorax    MORENO POSEY MD         SYSTEM ID:  [id number]   US Lower Extremity Venous Duplex Bilateral    Narrative    ULTRASOUND LOWER EXTREMITY VENOUS DUPLEX BILATERAL 8/26/2024 1:37 PM    CLINICAL HISTORY: Screening after lung transplant.   "    COMPARISONS: None available.    REFERRING PROVIDER: SAMUEL PIERRE    TECHNIQUE: Grayscale, color Doppler, Doppler waveform ultrasound  evaluation was performed through the bilateral common femoral,  femoral, and popliteal veins. Bilateral posterior tibial veins were  evaluated with grayscale imaging and compression. Peroneal veins were  not evaluated.    FINDINGS: Right common femoral, femoral, and popliteal veins are fully  compressible with phasic Doppler waveforms.    Right posterior tibial veins are fully compressible.    Left common femoral, femoral, and popliteal veins are fully  compressible with phasic Doppler waveforms.    Left posterior tibial veins are fully compressible.      Impression    IMPRESSION: No deep venous thrombosis demonstrated in either leg.    Reference: \"Duplex Ultrasound in the Diagnosis of Lower-Extremity Deep  Venous Thrombosis\"- Cecille Aranda MD, S; Kip Nava MD  (Circulation. 2014;129:917-921. http://circ.ahajournals.org)    SULAIMAN ESCALANTE MD         SYSTEM ID:  [id number]    Upper Extremity Venous Duplex Bilat    Narrative    ULTRASOUND UPPER EXTREMITY VENOUS DUPLEX BILATERAL 8/26/2024 1:35 PM    CLINICAL HISTORY: Screening after lung transplant..     COMPARISONS: None available.    REFERRING PROVIDER: SAMUEL PIERRE    TECHNIQUE: Bilateral internal jugular veins evaluated with grayscale,  color Doppler, and spectral pulsed wave Doppler ultrasound. Bilateral  innominate, subclavian, and axillary veins evaluated with color  Doppler and spectral pulsed wave Doppler ultrasound.    FINDINGS: Right internal jugular vein is fully compressible with a  phasic waveform.    Right innominate, subclavian, and axillary veins fill [id number] in  color Doppler with phasic waveforms.    Left internal jugular vein is fully compressible with a phasic  waveform.    Left innominate, subclavian, and axillary veins fill [id number] in  color Doppler with phasic waveforms.      Impression    " IMPRESSION: No central deep venous thrombosis demonstrated in either  arm.    SULAIMAN ESCALANTE MD         SYSTEM ID:  [id number]   XR Chest Port 1 View    Narrative    Portable chest    INDICATION: Lung transplant follow-up    COMPARISON: Yesterday    FINDINGS: Heart size normal. Bilateral lung transplant again noted  including butterfly clamshell sternotomy. Left atrial appendage  ligation clip again present. A right upper extremity PICC line tip  again is in the right atrium. Trace left apical pneumothorax the  appearance is grossly unchanged. No significant changes otherwise.      Impression    IMPRESSION: No significant change from yesterday with again question  trace left apical pneumothorax.    MORENO POSEY MD         SYSTEM ID:  [id number]   XR Chest Port 1 View    Narrative    EXAM: XR CHEST PORT 1 VIEW 8/28/2024 6:30 AM      HISTORY: lung transplant follow-up.    COMPARISON: Previous day.     TECHNIQUE: Frontal view of the chest.    FINDINGS: Postoperative changes of bilateral lung transplantation with  clamshell sternotomy wires. Right upper extremity PICC with tip  terminating near the superior cavoatrial junction. Left atrial  appendage clip. Trace left apical pneumothorax, stable. No appreciable  right pneumothorax. No focal consolidative opacity. Streaky opacity in  the left mid lung. Streaky perihilar and basilar opacities.      Impression    IMPRESSION:   Trace left apical pneumothorax is stable. Streaky perihilar and  bibasilar opacities likely represent atelectasis.    I have personally reviewed the examination and initial interpretation  and I agree with the findings.    GLADYS WITT MD         SYSTEM ID:  [id number]       Discharge Medications   Current Discharge Medication List        START taking these medications    Details   acetaminophen (TYLENOL) 325 MG tablet Take 2 tablets (650 mg) by mouth or Feeding Tube every 4 hours as needed for other (For optimal non-opioid multimodal pain management  to improve pain control.).  Qty: 30 tablet, Refills: 0    Associated Diagnoses: ILD (interstitial lung disease) (H); S/P lung transplant (H)      acetylcysteine (MUCOMYST) 20 % neb solution Take 2 mLs by nebulization 4 times daily as needed for mucolysis/respiratory distress.  Qty: 30 mL, Refills: 1    Associated Diagnoses: ILD (interstitial lung disease) (H); S/P lung transplant (H)      calcium carbonate-vitamin D (CALTRATE) 600-10 MG-MCG per tablet Take 1 tablet by mouth or Feeding Tube 2 times daily.  Qty: 60 tablet, Refills: 0    Associated Diagnoses: ILD (interstitial lung disease) (H); S/P lung transplant (H)      hydrOXYzine HCl (ATARAX) 25 MG tablet Take 1 tablet (25 mg) by mouth every 6 hours as needed for itching.  Qty: 20 tablet, Refills: 0    Associated Diagnoses: S/P lung transplant (H); Other specified anxiety disorders      !! insulin aspart (NOVOLOG PEN) 100 UNIT/ML pen Inject 5 Units subcutaneously 3 times daily (with meals). Dose = 1 units per 7 grams of carbohydate.  Qty: 15 mL, Refills: 2    Associated Diagnoses: Type 2 diabetes mellitus without complication, with long-term current use of insulin (H); Steroid-induced hyperglycemia      !! insulin aspart (NOVOLOG PEN) 100 UNIT/ML pen Inject 1-10 Units subcutaneously 3 times daily (before meals). Correction Scale - HIGH INSULIN RESISTANCE DOSING   Do Not give Correction Insulin if Pre-Meal BG less than 140. For Pre-Meal  - 164 give 1 unit. For Pre-Meal  - 189 give 2 units. For Pre-Meal  - 214 give 3 units. For Pre-Meal  - 239 give 4 units. For Pre-Meal  - 264 give 5 units. For Pre-Meal  - 289 give 6 units. For Pre-Meal  - 314 give 7 units. For Pre-Meal  - 339 give 8 units. For Pre-Meal  - 364 give 9 units.  For Pre-Meal BG greater than or equal to 365 give 10 units To be given with prandial insulin, and based on pre-meal blood glucose. Administering insulin within 5 minutes of the start of the  meal is ideal. Administer insulin no more than 30 minutes after the start of the meal, unless directed otherwise by provider. Notify provider if glucose greater than or equal to 350 mg/dL after administration of correction dose.  Qty: 15 mL, Refills: 2    Associated Diagnoses: Type 2 diabetes mellitus without complication, with long-term current use of insulin (H); Steroid-induced hyperglycemia      !! insulin aspart (NOVOLOG PEN) 100 UNIT/ML pen Inject 5 Units subcutaneously Take with snacks or supplements for high blood sugar.  Qty: 15 mL, Refills: 2    Associated Diagnoses: Type 2 diabetes mellitus without complication, with long-term current use of insulin (H); Steroid-induced hyperglycemia      !! insulin aspart (NOVOLOG PEN) 100 UNIT/ML pen Inject 1-7 Units subcutaneously at bedtime. HIGH INSULIN RESISTANCE DOSING  Do Not give Bedtime Correction Insulin if BG less than 200. For  - 224 give 1 units. For  - 249 give 2 units. For  - 274 give 3 units. For  - 299 give 4 units. For  - 324 give 5 units. For  - 349 give 6 units. For BG greater than or equal to 350 give 7 units. Notify provider if glucose greater than or equal to 350 mg/dL after administration of correction dose.  Qty: 15 mL, Refills: 2    Associated Diagnoses: Type 2 diabetes mellitus without complication, with long-term current use of insulin (H); Steroid-induced hyperglycemia      insulin  UNIT/ML injection 24 units before breakfast  Qty: 15 mL, Refills: 3    Associated Diagnoses: Steroid-induced hyperglycemia      insulin pen needle (32G X 4 MM) 32G X 4 MM miscellaneous Use pen needles daily or as directed.  Qty: 100 each, Refills: 3    Associated Diagnoses: Type 2 diabetes mellitus without complication, with long-term current use of insulin (H); Steroid-induced hyperglycemia      levalbuterol (XOPENEX) 1.25 MG/3ML neb solution Take 3 mLs (1.25 mg) by nebulization 4 times daily as needed for shortness of  breath or wheezing. Use prior to Mucomyst neb.  Qty: 300 mL, Refills: 0    Associated Diagnoses: ILD (interstitial lung disease) (H); S/P lung transplant (H)      magnesium glycinate 100 MG CAPS capsule Take 4 capsules (400 mg) by mouth 3 times daily.  Qty: 360 capsule, Refills: 0    Associated Diagnoses: ILD (interstitial lung disease) (H); S/P lung transplant (H); Hypomagnesemia      melatonin 5 MG tablet Take 1 tablet (5 mg) by mouth or Feeding Tube every evening.  Qty: 30 tablet, Refills: 0    Associated Diagnoses: ILD (interstitial lung disease) (H); S/P lung transplant (H)      methocarbamol (ROBAXIN) 750 MG tablet Take 1 tablet (750 mg) by mouth or Feeding Tube 4 times daily as needed for muscle spasms.  Qty: 30 tablet, Refills: 0    Associated Diagnoses: ILD (interstitial lung disease) (H); S/P lung transplant (H)      metoprolol tartrate (LOPRESSOR) 50 MG tablet Take 1 tablet (50 mg) by mouth or Feeding Tube 2 times daily.  Qty: 60 tablet, Refills: 0    Associated Diagnoses: ILD (interstitial lung disease) (H); S/P lung transplant (H)      multivitamin, therapeutic (THERA-VIT) TABS tablet Take 1 tablet by mouth daily.  Qty: 30 tablet, Refills: 0    Associated Diagnoses: ILD (interstitial lung disease) (H); S/P lung transplant (H)      mycophenolate (GENERIC EQUIVALENT) 250 MG capsule Take 4 capsules (1,000 mg) by mouth 2 times daily.  Qty: 240 capsule, Refills: 11    Associated Diagnoses: Immunosuppression (H24); S/P lung transplant (H)      nystatin (MYCOSTATIN) 170476 UNIT/ML suspension Take 10 mLs (1,000,000 Units) by mouth 4 times daily.  Qty: 1200 mL, Refills: 0    Associated Diagnoses: ILD (interstitial lung disease) (H); S/P lung transplant (H)      oxyCODONE (ROXICODONE) 5 MG tablet Take 1 tablet (5 mg) by mouth or Feeding Tube every 6 hours as needed for severe pain.  Qty: 18 tablet, Refills: 0    Associated Diagnoses: ILD (interstitial lung disease) (H); S/P lung transplant (H)      pantoprazole  (PROTONIX) 40 MG EC tablet Take 1 tablet (40 mg) by mouth every morning (before breakfast).  Qty: 30 tablet, Refills: 0    Associated Diagnoses: ILD (interstitial lung disease) (H); S/P lung transplant (H)      polyethylene glycol (MIRALAX) 17 GM/Dose powder Take 17 g by mouth daily as needed for constipation.  Qty: 510 g, Refills: 0    Associated Diagnoses: ILD (interstitial lung disease) (H); S/P lung transplant (H)      predniSONE (DELTASONE) 5 MG tablet Take 5 tablets (25 mg) by mouth daily. Taper per lung transplant clinic.  Next taper to 20 mg daily on 8/31  Qty: 150 tablet, Refills: 3    Associated Diagnoses: S/P lung transplant (H)      rosuvastatin (CRESTOR) 10 MG tablet Take 1 tablet (10 mg) by mouth daily.  Qty: 30 tablet, Refills: 0    Associated Diagnoses: ILD (interstitial lung disease) (H); S/P lung transplant (H)      senna-docusate (SENOKOT-S/PERICOLACE) 8.6-50 MG tablet Take 2 tablets by mouth or Feeding Tube 2 times daily as needed for constipation (use 1st).  Qty: 120 tablet, Refills: 0    Associated Diagnoses: ILD (interstitial lung disease) (H); S/P lung transplant (H)      sulfamethoxazole-trimethoprim (BACTRIM) 400-80 MG tablet Take 1 tablet by mouth or NG Tube daily.  Qty: 30 tablet, Refills: 0    Associated Diagnoses: ILD (interstitial lung disease) (H); S/P lung transplant (H)      !! tacrolimus (GENERIC EQUIVALENT) 0.5 MG capsule Take 1 capsule (0.5 mg) by mouth 2 times daily as needed (Total dose 4 mg twice daily, to be adjusted by pulmonary clinic).  Qty: 60 capsule, Refills: 3    Associated Diagnoses: Immunosuppression (H24); S/P lung transplant (H)      !! tacrolimus (GENERIC EQUIVALENT) 1 MG capsule Take 4 capsules (4 mg) by mouth 2 times daily. Total dose 4 mg twice daily, to be adjusted by pulmonary clinic  Qty: 240 capsule, Refills: 11    Associated Diagnoses: Immunosuppression (H24); S/P lung transplant (H)      traZODone (DESYREL) 50 MG tablet Take 1 tablet (50 mg) by mouth or  Feeding Tube nightly as needed for sleep.  Qty: 30 tablet, Refills: 0    Associated Diagnoses: ILD (interstitial lung disease) (H); S/P lung transplant (H)      valGANciclovir (VALCYTE) 450 MG tablet Take 2 tablets (900 mg) by mouth daily.  Qty: 60 tablet, Refills: 0    Associated Diagnoses: ILD (interstitial lung disease) (H); S/P lung transplant (H)       !! - Potential duplicate medications found. Please discuss with provider.        CONTINUE these medications which have CHANGED    Details   insulin glargine (LANTUS PEN) 100 UNIT/ML pen Inject 35 Units subcutaneously at bedtime.  Qty: 15 mL, Refills: 3    Comments: If Lantus is not covered by insurance, may substitute Basaglar or Semglee or other insulin glargine product per insurance preference at same dose and frequency.    Associated Diagnoses: Type 2 diabetes mellitus without complication, with long-term current use of insulin (H); Steroid-induced hyperglycemia           CONTINUE these medications which have NOT CHANGED    Details   Continuous Glucose Sensor (DEXCOM G7 SENSOR) MISC Change every 10 days.  Qty: 1 each, Refills: 11    Associated Diagnoses: Type 2 diabetes mellitus without complication, with long-term current use of insulin (H)           STOP taking these medications       atorvastatin (LIPITOR) 20 MG tablet Comments:   Reason for Stopping:         Dulaglutide (TRULICITY) 3 MG/0.5ML SOPN Comments:   Reason for Stopping:         empagliflozin (JARDIANCE) 10 MG TABS tablet Comments:   Reason for Stopping:         losartan (COZAAR) 50 MG tablet Comments:   Reason for Stopping:         metFORMIN (GLUCOPHAGE XR) 500 MG 24 hr tablet Comments:   Reason for Stopping:         Pirfenidone 801 MG TABS Comments:   Reason for Stopping:             Allergies   No Known Allergies

## 2024-08-29 ENCOUNTER — ANCILLARY PROCEDURE (OUTPATIENT)
Dept: GENERAL RADIOLOGY | Facility: CLINIC | Age: 59
End: 2024-08-29
Attending: INTERNAL MEDICINE
Payer: COMMERCIAL

## 2024-08-29 ENCOUNTER — OFFICE VISIT (OUTPATIENT)
Dept: TRANSPLANT | Facility: CLINIC | Age: 59
End: 2024-08-29
Attending: INTERNAL MEDICINE
Payer: COMMERCIAL

## 2024-08-29 ENCOUNTER — LAB (OUTPATIENT)
Dept: LAB | Facility: CLINIC | Age: 59
End: 2024-08-29
Attending: INTERNAL MEDICINE
Payer: COMMERCIAL

## 2024-08-29 ENCOUNTER — OFFICE VISIT (OUTPATIENT)
Dept: PULMONOLOGY | Facility: CLINIC | Age: 59
End: 2024-08-29
Payer: COMMERCIAL

## 2024-08-29 ENCOUNTER — TELEPHONE (OUTPATIENT)
Dept: PHARMACY | Facility: CLINIC | Age: 59
End: 2024-08-29

## 2024-08-29 VITALS
OXYGEN SATURATION: 98 % | WEIGHT: 150.8 LBS | BODY MASS INDEX: 21.64 KG/M2 | HEART RATE: 90 BPM | TEMPERATURE: 97.7 F | SYSTOLIC BLOOD PRESSURE: 120 MMHG | DIASTOLIC BLOOD PRESSURE: 76 MMHG

## 2024-08-29 DIAGNOSIS — Z94.2 S/P LUNG TRANSPLANT (H): ICD-10-CM

## 2024-08-29 DIAGNOSIS — Z79.4 TYPE 2 DIABETES MELLITUS WITHOUT COMPLICATION, WITH LONG-TERM CURRENT USE OF INSULIN (H): Primary | ICD-10-CM

## 2024-08-29 DIAGNOSIS — Z09 HOSPITAL DISCHARGE FOLLOW-UP: ICD-10-CM

## 2024-08-29 DIAGNOSIS — J84.9 ILD (INTERSTITIAL LUNG DISEASE) (H): ICD-10-CM

## 2024-08-29 DIAGNOSIS — Z94.2 LUNG REPLACED BY TRANSPLANT (H): Primary | ICD-10-CM

## 2024-08-29 DIAGNOSIS — E11.9 TYPE 2 DIABETES MELLITUS WITHOUT COMPLICATION, WITH LONG-TERM CURRENT USE OF INSULIN (H): Primary | ICD-10-CM

## 2024-08-29 LAB
ALBUMIN SERPL BCG-MCNC: 3.7 G/DL (ref 3.5–5.2)
ALP SERPL-CCNC: 185 U/L (ref 40–150)
ALT SERPL W P-5'-P-CCNC: 50 U/L (ref 0–70)
ANION GAP SERPL CALCULATED.3IONS-SCNC: 10 MMOL/L (ref 7–15)
AST SERPL W P-5'-P-CCNC: 25 U/L (ref 0–45)
BACTERIA SPEC CULT: NORMAL
BILIRUB SERPL-MCNC: 0.3 MG/DL
BUN SERPL-MCNC: 21.2 MG/DL (ref 8–23)
CALCIUM SERPL-MCNC: 10.2 MG/DL (ref 8.8–10.4)
CHLORIDE SERPL-SCNC: 97 MMOL/L (ref 98–107)
CMV DNA SPEC NAA+PROBE-ACNC: NOT DETECTED IU/ML
CREAT SERPL-MCNC: 0.69 MG/DL (ref 0.67–1.17)
DONOR IDENTIFICATION: NORMAL
DSA COMMENTS: NORMAL
DSA PRESENT: NO
DSA TEST METHOD: NORMAL
EGFRCR SERPLBLD CKD-EPI 2021: >90 ML/MIN/1.73M2
ERYTHROCYTE [DISTWIDTH] IN BLOOD BY AUTOMATED COUNT: 13.6 % (ref 10–15)
EXPTIME-PRE: 4.21 SEC
FEF2575-%PRED-PRE: 97 %
FEF2575-PRE: 2.68 L/SEC
FEF2575-PRED: 2.76 L/SEC
FEFMAX-%PRED-PRE: 72 %
FEFMAX-PRE: 6.63 L/SEC
FEFMAX-PRED: 9.16 L/SEC
FEV1-%PRED-PRE: 68 %
FEV1-PRE: 2.27 L
FEV1FEV6-PRE: 84 %
FEV1FEV6-PRED: 79 %
FEV1FVC-PRE: 84 %
FEV1FVC-PRED: 78 %
FIFMAX-PRE: 4.88 L/SEC
FVC-%PRED-PRE: 63 %
FVC-PRE: 2.71 L
FVC-PRED: 4.25 L
GLUCOSE SERPL-MCNC: 229 MG/DL (ref 70–99)
HCO3 SERPL-SCNC: 26 MMOL/L (ref 22–29)
HCT VFR BLD AUTO: 41.9 % (ref 40–53)
HGB BLD-MCNC: 13.4 G/DL (ref 13.3–17.7)
MAGNESIUM SERPL-MCNC: 1.6 MG/DL (ref 1.7–2.3)
MCH RBC QN AUTO: 29.3 PG (ref 26.5–33)
MCHC RBC AUTO-ENTMCNC: 32 G/DL (ref 31.5–36.5)
MCV RBC AUTO: 92 FL (ref 78–100)
ORGAN: NORMAL
PLATELET # BLD AUTO: 455 10E3/UL (ref 150–450)
POTASSIUM SERPL-SCNC: 5.4 MMOL/L (ref 3.4–5.3)
PROT SERPL-MCNC: 7.1 G/DL (ref 6.4–8.3)
RBC # BLD AUTO: 4.58 10E6/UL (ref 4.4–5.9)
SA 1  COMMENTS: NORMAL
SA 1 CELL: NORMAL
SA 1 TEST METHOD: NORMAL
SA 2 CELL: NORMAL
SA 2 COMMENTS: NORMAL
SA 2 TEST METHOD: NORMAL
SA1 HI RISK ABY: NORMAL
SA1 MOD RISK ABY: NORMAL
SA2 HI RISK ABY: NORMAL
SA2 MOD RISK ABY: NORMAL
SODIUM SERPL-SCNC: 133 MMOL/L (ref 135–145)
TACROLIMUS BLD-MCNC: 8.6 UG/L (ref 5–15)
TME LAST DOSE: NORMAL H
TME LAST DOSE: NORMAL H
UNACCEPTABLE ANTIGENS: NORMAL
UNOS CPRA: 0
WBC # BLD AUTO: 13 10E3/UL (ref 4–11)

## 2024-08-29 PROCEDURE — 99000 SPECIMEN HANDLING OFFICE-LAB: CPT | Performed by: PATHOLOGY

## 2024-08-29 PROCEDURE — 99214 OFFICE O/P EST MOD 30 MIN: CPT | Mod: 24 | Performed by: INTERNAL MEDICINE

## 2024-08-29 PROCEDURE — 85027 COMPLETE CBC AUTOMATED: CPT | Performed by: PATHOLOGY

## 2024-08-29 PROCEDURE — G0463 HOSPITAL OUTPT CLINIC VISIT: HCPCS | Performed by: INTERNAL MEDICINE

## 2024-08-29 PROCEDURE — 83735 ASSAY OF MAGNESIUM: CPT | Performed by: PATHOLOGY

## 2024-08-29 PROCEDURE — 80197 ASSAY OF TACROLIMUS: CPT | Performed by: INTERNAL MEDICINE

## 2024-08-29 PROCEDURE — 36415 COLL VENOUS BLD VENIPUNCTURE: CPT | Performed by: PATHOLOGY

## 2024-08-29 PROCEDURE — 80053 COMPREHEN METABOLIC PANEL: CPT | Performed by: PATHOLOGY

## 2024-08-29 PROCEDURE — 71046 X-RAY EXAM CHEST 2 VIEWS: CPT | Mod: GC | Performed by: RADIOLOGY

## 2024-08-29 PROCEDURE — 94375 RESPIRATORY FLOW VOLUME LOOP: CPT | Performed by: INTERNAL MEDICINE

## 2024-08-29 RX ORDER — METHOCARBAMOL 750 MG/1
750 TABLET, FILM COATED ORAL 4 TIMES DAILY PRN
Qty: 90 TABLET | Refills: 1 | Status: SHIPPED | OUTPATIENT
Start: 2024-08-29

## 2024-08-29 ASSESSMENT — PAIN SCALES - GENERAL: PAINLEVEL: MODERATE PAIN (5)

## 2024-08-29 NOTE — NURSING NOTE
Chief Complaint   Patient presents with    RECHECK      LUNG POST RETURN TXP PULM - per [facility]         /76 (BP Location: Right arm, Patient Position: Sitting, Cuff Size: Adult Regular)   Pulse 90   Temp 97.7  F (36.5  C) (Oral)   Wt 68.4 kg (150 lb 12.8 oz)   SpO2 98%   BMI 21.64 kg/m      Justo Morin CMA on 8/29/2024 at 1:27 PM

## 2024-08-29 NOTE — PATIENT INSTRUCTIONS
Patient Instructions  1. Continue to hydrate with 60-70 oz fluids daily.  2. Continue to exercise daily or most days of the week.  3. Follow up with your primary care provider for annual gender health maintenance procedures.  4. Follow up with colonoscopy schedule.  5. Follow up with annual dermatology visits.  6. It doesn't seem like the COVID vaccine is working well in lung transplant patients. A number of lung transplant patients have gotten sick with COVID even after receiving the vaccines. Based on our recent experience, it can be life-threatening to get COVID  even after being vaccinated. Please continue to act like you did not get the COVID vaccine - social distancing, wearing a mask, good hand hygiene, etc. If the people around you are vaccinated, it will help reduce the risk of you getting COVID. All members of your household should be vaccinated.  7. Focus on getting in the majority of your fluids before 4pm.   8. If you continue to see blood sugars in the 400s, call [facility] tomorrow.   9. [facility] is looking into the nebulizer machine. Once you get this, okay to use nebulizers as needed.   10. Focus on using your incentive spirometer and cough once/hour.   11. Your potassium was a little high today, 5.4 Please take 1 dose of Lokelma tonight, may cause diarrhea. We will send you extra to use in case it's needed in the future.     Next transplant clinic appointment: 9/3 with CXR, labs and PFTs  Next lab draw: 9/3

## 2024-08-29 NOTE — LETTER
8/29/2024      Travon Cartwright  [address] [facility] [address]      Dear Colleague,    Thank you for referring your patient, Travon Cartwright, to the [facility]. Please see a copy of my visit note below.    Reason for Visit  Travon Cartwright is a 59 year old male who is being seen for RECHECK ( LUNG POST RETURN TXP PULM - per [facility]//)  Lung TX HPI  The patient was seen and examined by Carlos Johnson MD     Travon Cartwright is a 59 year old male with a history of idiopathic pulmonary fibrosis, diabetes, hepatosteatosis, and essential tremor.  Pt. is now s/p BSLT on 8/15/2024 with Dr. Mulvihill.  Surgery was uncomplicated and done on pump, extubated and off pressors since 8/18.  Weaned to RA 8/22.     Interval history:  First clinic visit today. Reports he is doing well. Denies having activity intolerance. Denies cough, palpitations, chest pain, abdominal pain, diarrhea, or dysuria. Does report urinary urgency and nocturia. Wakes up every 1-2 hours at night to urinate. UA yesterday was normal. He states he would like to wait and see how he does. He does not want bladder scan at this time, but states this was in issue prior to transplant as well. Does not have his nebulizer machine yet. His sister notes his blood sugars were 180s at bedtime. Elevated to 200s in AM and 400s in mid afternoon.     Current Outpatient Medications   Medication Sig Dispense Refill     acetaminophen (TYLENOL) 325 MG tablet Take 2 tablets (650 mg) by mouth or Feeding Tube every 4 hours as needed for other (For optimal non-opioid multimodal pain management to improve pain control.). 30 tablet 0     acetylcysteine (MUCOMYST) 20 % neb solution Take 2 mLs by nebulization 4 times daily as needed for mucolysis/respiratory distress. 30 mL 1     calcium carbonate-vitamin D (CALTRATE) 600-10 MG-MCG per tablet Take 1 tablet by mouth or Feeding Tube 2 times daily. 60 tablet 0     Continuous Glucose Sensor  (DEXCOM G7 SENSOR) MISC Change every 10 days. 1 each 11     hydrOXYzine HCl (ATARAX) 25 MG tablet Take 1 tablet (25 mg) by mouth every 6 hours as needed for itching. 20 tablet 0     insulin aspart (NOVOLOG PEN) 100 UNIT/ML pen Inject 1-10 Units subcutaneously 3 times daily (before meals). Correction Scale - HIGH INSULIN RESISTANCE DOSING   Do Not give Correction Insulin if Pre-Meal BG less than 140. For Pre-Meal  - 164 give 1 unit. For Pre-Meal  - 189 give 2 units. For Pre-Meal  - 214 give 3 units. For Pre-Meal  - 239 give 4 units. For Pre-Meal  - 264 give 5 units. For Pre-Meal  - 289 give 6 units. For Pre-Meal  - 314 give 7 units. For Pre-Meal  - 339 give 8 units. For Pre-Meal  - 364 give 9 units.  For Pre-Meal BG greater than or equal to 365 give 10 units To be given with prandial insulin, and based on pre-meal blood glucose. Administering insulin within 5 minutes of the start of the meal is ideal. Administer insulin no more than 30 minutes after the start of the meal, unless directed otherwise by provider. Notify provider if glucose greater than or equal to 350 mg/dL after administration of correction dose. 15 mL 2     insulin aspart (NOVOLOG PEN) 100 UNIT/ML pen Inject 5 Units subcutaneously Take with snacks or supplements for high blood sugar. 15 mL 2     insulin aspart (NOVOLOG PEN) 100 UNIT/ML pen Inject 1-7 Units subcutaneously at bedtime. HIGH INSULIN RESISTANCE DOSING  Do Not give Bedtime Correction Insulin if BG less than 200. For  - 224 give 1 units. For  - 249 give 2 units. For  - 274 give 3 units. For  - 299 give 4 units. For  - 324 give 5 units. For  - 349 give 6 units. For BG greater than or equal to 350 give 7 units. Notify provider if glucose greater than or equal to 350 mg/dL after administration of correction dose. 15 mL 2     insulin aspart (NOVOLOG PEN) 100 UNIT/ML pen Inject 8-10 Units subcutaneously 3  times daily (with meals). 8 units with medium meal, 10 units with large meals 15 mL 2     insulin glargine (LANTUS PEN) 100 UNIT/ML pen Inject 35 Units subcutaneously at bedtime. 15 mL 3     insulin  UNIT/ML injection 24 units before breakfast 15 mL 3     insulin pen needle (32G X 4 MM) 32G X 4 MM miscellaneous Use pen needles daily or as directed. 100 each 3     levalbuterol (XOPENEX) 1.25 MG/3ML neb solution Take 3 mLs (1.25 mg) by nebulization 4 times daily as needed for shortness of breath or wheezing. Use prior to Mucomyst neb. 300 mL 0     magnesium glycinate 100 MG CAPS capsule Take 4 capsules (400 mg) by mouth 3 times daily. 360 capsule 0     melatonin 5 MG tablet Take 1 tablet (5 mg) by mouth or Feeding Tube every evening. 30 tablet 0     methocarbamol (ROBAXIN) 750 MG tablet Take 1 tablet (750 mg) by mouth or Feeding Tube 4 times daily as needed for muscle spasms. 30 tablet 0     metoprolol tartrate (LOPRESSOR) 50 MG tablet Take 1 tablet (50 mg) by mouth or Feeding Tube 2 times daily. 60 tablet 0     multivitamin, therapeutic (THERA-VIT) TABS tablet Take 1 tablet by mouth daily. 30 tablet 0     mycophenolate (GENERIC EQUIVALENT) 250 MG capsule Take 4 capsules (1,000 mg) by mouth 2 times daily. 240 capsule 11     nystatin (MYCOSTATIN) 784840 UNIT/ML suspension Take 10 mLs (1,000,000 Units) by mouth 4 times daily. 1200 mL 0     oxyCODONE (ROXICODONE) 5 MG tablet Take 1 tablet (5 mg) by mouth or Feeding Tube every 6 hours as needed for severe pain. 18 tablet 0     pantoprazole (PROTONIX) 40 MG EC tablet Take 1 tablet (40 mg) by mouth every morning (before breakfast). 30 tablet 0     polyethylene glycol (MIRALAX) 17 GM/Dose powder Take 17 g by mouth daily as needed for constipation. 510 g 0     predniSONE (DELTASONE) 5 MG tablet Take 5 tablets (25 mg) by mouth daily. Taper per lung transplant clinic.  Next taper to 20 mg daily on 8/31 150 tablet 3     rosuvastatin (CRESTOR) 10 MG tablet Take 1 tablet  (10 mg) by mouth daily. 30 tablet 0     senna-docusate (SENOKOT-S/PERICOLACE) 8.6-50 MG tablet Take 2 tablets by mouth or Feeding Tube 2 times daily as needed for constipation (use 1st). 120 tablet 0     sulfamethoxazole-trimethoprim (BACTRIM) 400-80 MG tablet Take 1 tablet by mouth or NG Tube daily. 30 tablet 0     tacrolimus (GENERIC EQUIVALENT) 0.5 MG capsule Take 1 capsule (0.5 mg) by mouth 2 times daily as needed (Total dose 4 mg twice daily, to be adjusted by pulmonary clinic). 60 capsule 3     tacrolimus (GENERIC EQUIVALENT) 1 MG capsule Take 4 capsules (4 mg) by mouth 2 times daily. Total dose 4 mg twice daily, to be adjusted by pulmonary clinic 240 capsule 11     traZODone (DESYREL) 50 MG tablet Take 1 tablet (50 mg) by mouth or Feeding Tube nightly as needed for sleep. 30 tablet 0     valGANciclovir (VALCYTE) 450 MG tablet Take 2 tablets (900 mg) by mouth daily. 60 tablet 0     No current facility-administered medications for this visit.     Facility-Administered Medications Ordered in Other Visits   Medication Dose Route Frequency Provider Last Rate Last Admin     sodium chloride bacteriostatic 0.9 % flush 9 mL  9 mL Intravenous Once NICOLA Pearce MD         No Known Allergies  Past Medical History:   Diagnosis Date     Administration of long-term prophylactic antibiotics 08/26/2024     Hepatic steatosis 2017    Noted on ultrasound     Hypomagnesemia 08/26/2024     Immunosuppression (H24) 08/26/2024     S/P lung transplant (H) 08/15/2024     Tremor 05/23/2024       Past Surgical History:   Procedure Laterality Date     BRONCHOSCOPY  08/16/2024     BRONCHOSCOPY FLEXIBLE AND RIGID N/A 8/27/2024    Procedure: Bronchoscopy Inspection;  Surgeon: Oneida Silver MD;  Location:  GI     COLONOSCOPY       CV CORONARY ANGIOGRAM N/A 06/21/2024    Procedure: Coronary Angiogram;  Surgeon: Gabriel Julian MD;  Location:  HEART CARDIAC CATH LAB     CV RIGHT HEART CATH MEASUREMENTS RECORDED N/A 06/21/2024     Procedure: Right Heart Catheterization;  Surgeon: Gabriel Julian MD;  Location:  HEART CARDIAC CATH LAB     PICC DOUBLE LUMEN PLACEMENT Right 08/20/2024    47-3cm, Basilic     TRANSPLANT LUNG RECIPIENT SINGLE X2 Bilateral 08/15/2024    Procedure: Clamshell Incision, On Central Venoarterial Extracorporeal Membranous Oxygenation, Bilateral Lung Transplant Recipient, Left atrial appendage ligation, Intraoperative Flexible Bronchoscopy by Anesthesia;  Surgeon: Mulvihill, Michael, MD;  Location:  OR       Social History     Socioeconomic History     Marital status: Single     Spouse name: Not on file     Number of children: Not on file     Years of education: Not on file     Highest education level: Not on file   Occupational History     Not on file   Tobacco Use     Smoking status: Former     Types: Cigarettes     Smokeless tobacco: Never   Substance and Sexual Activity     Alcohol use: Not Currently     Comment: not since 2017     Drug use: Never     Sexual activity: Not on file   Other Topics Concern     Not on file   Social History Narrative     Not on file     Social Determinants of Health     Financial Resource Strain: Low Risk  (8/24/2024)    Financial Resource Strain      Within the past 12 months, have you or your family members you live with been unable to get utilities (heat, electricity) when it was really needed?: No   Food Insecurity: Low Risk  (8/24/2024)    Food Insecurity      Within the past 12 months, did you worry that your food would run out before you got money to buy more?: No      Within the past 12 months, did the food you bought just not last and you didn t have money to get more?: No   Transportation Needs: Low Risk  (8/24/2024)    Transportation Needs      Within the past 12 months, has lack of transportation kept you from medical appointments, getting your medicines, non-medical meetings or appointments, work, or from getting things that you need?: No   Physical Activity: Not on File  (8/26/2019)    Received from [employer]    Physical Activity      Physical Activity: 0   Stress: Not on File (8/26/2019)    Received from [employer]    Stress      Stress: 0   Social Connections: Not on File (8/26/2019)    Received from [employer]    Social Connections      Social Connections and Isolation: 0   Interpersonal Safety: Low Risk  (8/24/2024)    Interpersonal Safety      Do you feel physically and emotionally safe where you currently live?: Yes      Within the past 12 months, have you been hit, slapped, kicked or otherwise physically hurt by someone?: No      Within the past 12 months, have you been humiliated or emotionally abused in other ways by your partner or ex-partner?: No   Housing Stability: Low Risk  (8/24/2024)    Housing Stability      Do you have housing? : Yes      Are you worried about losing your housing?: No       ROS Pulmonary    A complete ROS was otherwise negative except as noted in the HPI.  There were no vitals taken for this visit.  Exam:   GENERAL APPEARANCE: Well developed, well nourished, alert, and in no apparent distress. Poor posture.   EYES: PERRL, EOMI  HENT: Nasal mucosa with no edema and no hyperemia. No nasal polyps.  EARS: Canals clear, TMs normal  MOUTH: Oral mucosa is moist, without any lesions, no tonsillar enlargement, no oropharyngeal exudate.  NECK: supple, no masses, no thyromegaly.  LYMPHATICS: No significant axillary, cervical, or supraclavicular nodes.  RESP: normal percussion, good air flow throughout.  No crackles. No rhonchi. No wheezes.  CV: Normal S1, S2, regular rhythm, normal rate. No murmur.  No rub. No gallop. No LE edema.   ABDOMEN:  Bowel sounds normal, soft, nontender, no HSM or masses.   MS: extremities normal. No clubbing. No cyanosis.  SKIN: no rash on limited exam, staples intact without erythema, induration, discharge, or warmth  NEURO: Mentation intact, speech normal, normal strength and tone, normal gait and stance  PSYCH: mentation appears normal. and  affect normal/bright.    Results:  Recent Results (from the past 168 hour(s))   Glucose by meter    Collection Time: 08/22/24  1:59 PM   Result Value Ref Range    GLUCOSE BY METER POCT 349 (H) 70 - 99 mg/dL   Glucose by meter    Collection Time: 08/22/24  4:44 PM   Result Value Ref Range    GLUCOSE BY METER POCT 290 (H) 70 - 99 mg/dL   Glucose by meter    Collection Time: 08/22/24  9:04 PM   Result Value Ref Range    GLUCOSE BY METER POCT 417 (H) 70 - 99 mg/dL   Glucose by meter    Collection Time: 08/22/24 11:30 PM   Result Value Ref Range    GLUCOSE BY METER POCT 262 (H) 70 - 99 mg/dL   Basic metabolic panel    Collection Time: 08/23/24  4:43 AM   Result Value Ref Range    Sodium 134 (L) 135 - 145 mmol/L    Potassium 4.0 3.4 - 5.3 mmol/L    Chloride 101 98 - 107 mmol/L    Carbon Dioxide (CO2) 26 22 - 29 mmol/L    Anion Gap 7 7 - 15 mmol/L    Urea Nitrogen 14.7 8.0 - 23.0 mg/dL    Creatinine 0.52 (L) 0.67 - 1.17 mg/dL    GFR Estimate >90 >60 mL/min/1.73m2    Calcium 8.4 (L) 8.8 - 10.4 mg/dL    Glucose 223 (H) 70 - 99 mg/dL   Magnesium    Collection Time: 08/23/24  4:43 AM   Result Value Ref Range    Magnesium 1.4 (L) 1.7 - 2.3 mg/dL   Phosphorus    Collection Time: 08/23/24  4:43 AM   Result Value Ref Range    Phosphorus 2.6 2.5 - 4.5 mg/dL   CBC with platelets and differential    Collection Time: 08/23/24  4:43 AM   Result Value Ref Range    WBC Count 12.7 (H) 4.0 - 11.0 10e3/uL    RBC Count 4.03 (L) 4.40 - 5.90 10e6/uL    Hemoglobin 11.7 (L) 13.3 - 17.7 g/dL    Hematocrit 37.0 (L) 40.0 - 53.0 %    MCV 92 78 - 100 fL    MCH 29.0 26.5 - 33.0 pg    MCHC 31.6 31.5 - 36.5 g/dL    RDW 13.2 10.0 - 15.0 %    Platelet Count 228 150 - 450 10e3/uL    % Neutrophils      % Lymphocytes      % Monocytes      % Eosinophils      % Basophils      % Immature Granulocytes      NRBCs per 100 WBC 0 <1 /100    Absolute Neutrophils      Absolute Lymphocytes      Absolute Monocytes      Absolute Eosinophils      Absolute Basophils       Absolute Immature Granulocytes      Absolute NRBCs 0.0 10e3/uL   Manual Differential    Collection Time: 08/23/24  4:43 AM   Result Value Ref Range    % Neutrophils 76 %    % Lymphocytes 7 %    % Monocytes 10 %    % Eosinophils 6 %    % Basophils 1 %    NRBCs per 100 WBC 1 (H) <=0 %    Absolute Neutrophils 9.7 (H) 1.6 - 8.3 10e3/uL    Absolute Lymphocytes 0.9 0.8 - 5.3 10e3/uL    Absolute Monocytes 1.3 0.0 - 1.3 10e3/uL    Absolute Eosinophils 0.8 (H) 0.0 - 0.7 10e3/uL    Absolute Basophils 0.1 0.0 - 0.2 10e3/uL    Absolute NRBCs 0.1 (H) <=0.0 10e3/uL    RBC Morphology Confirmed RBC Indices     Platelet Assessment  Automated Count Confirmed. Platelet morphology is normal.     Automated Count Confirmed. Platelet morphology is normal.    Polychromasia Slight (A) None Seen   Glucose by meter    Collection Time: 08/23/24  9:04 AM   Result Value Ref Range    GLUCOSE BY METER POCT 242 (H) 70 - 99 mg/dL   Glucose by meter    Collection Time: 08/23/24  1:58 PM   Result Value Ref Range    GLUCOSE BY METER POCT 259 (H) 70 - 99 mg/dL   Magnesium    Collection Time: 08/23/24  2:29 PM   Result Value Ref Range    Magnesium 1.9 1.7 - 2.3 mg/dL   Glucose by meter    Collection Time: 08/23/24  5:43 PM   Result Value Ref Range    GLUCOSE BY METER POCT 230 (H) 70 - 99 mg/dL   Glucose by meter    Collection Time: 08/23/24  9:40 PM   Result Value Ref Range    GLUCOSE BY METER POCT 211 (H) 70 - 99 mg/dL   Glucose by meter    Collection Time: 08/24/24  2:06 AM   Result Value Ref Range    GLUCOSE BY METER POCT 282 (H) 70 - 99 mg/dL   Basic metabolic panel    Collection Time: 08/24/24  6:24 AM   Result Value Ref Range    Sodium 132 (L) 135 - 145 mmol/L    Potassium 4.3 3.4 - 5.3 mmol/L    Chloride 99 98 - 107 mmol/L    Carbon Dioxide (CO2) 25 22 - 29 mmol/L    Anion Gap 8 7 - 15 mmol/L    Urea Nitrogen 15.1 8.0 - 23.0 mg/dL    Creatinine 0.55 (L) 0.67 - 1.17 mg/dL    GFR Estimate >90 >60 mL/min/1.73m2    Calcium 8.5 (L) 8.8 - 10.4 mg/dL     Glucose 269 (H) 70 - 99 mg/dL   Phosphorus    Collection Time: 08/24/24  6:24 AM   Result Value Ref Range    Phosphorus 2.5 2.5 - 4.5 mg/dL   Magnesium    Collection Time: 08/24/24  6:24 AM   Result Value Ref Range    Magnesium 1.5 (L) 1.7 - 2.3 mg/dL   Tacrolimus by Tandem Mass Spectrometry    Collection Time: 08/24/24  6:24 AM   Result Value Ref Range    Tacrolimus by Tandem Mass Spectrometry 4.2 (L) 5.0 - 15.0 ug/L    Tacrolimus Last Dose Date 3/23/2024     Tacrolimus Last Dose Time  5:45 PM    CBC with platelets and differential    Collection Time: 08/24/24  6:24 AM   Result Value Ref Range    WBC Count 14.4 (H) 4.0 - 11.0 10e3/uL    RBC Count 4.10 (L) 4.40 - 5.90 10e6/uL    Hemoglobin 11.9 (L) 13.3 - 17.7 g/dL    Hematocrit 37.6 (L) 40.0 - 53.0 %    MCV 92 78 - 100 fL    MCH 29.0 26.5 - 33.0 pg    MCHC 31.6 31.5 - 36.5 g/dL    RDW 13.3 10.0 - 15.0 %    Platelet Count 269 150 - 450 10e3/uL    % Neutrophils      % Lymphocytes      % Monocytes      % Eosinophils      % Basophils      % Immature Granulocytes      NRBCs per 100 WBC 0 <1 /100    Absolute Neutrophils      Absolute Lymphocytes      Absolute Monocytes      Absolute Eosinophils      Absolute Basophils      Absolute Immature Granulocytes      Absolute NRBCs 0.0 10e3/uL   Manual Differential    Collection Time: 08/24/24  6:24 AM   Result Value Ref Range    % Neutrophils 83 %    % Lymphocytes 9 %    % Monocytes 6 %    % Eosinophils 2 %    % Basophils 0 %    Absolute Neutrophils 12.0 (H) 1.6 - 8.3 10e3/uL    Absolute Lymphocytes 1.3 0.8 - 5.3 10e3/uL    Absolute Monocytes 0.9 0.0 - 1.3 10e3/uL    Absolute Eosinophils 0.3 0.0 - 0.7 10e3/uL    Absolute Basophils 0.0 0.0 - 0.2 10e3/uL    RBC Morphology Confirmed RBC Indices     Platelet Assessment  Automated Count Confirmed. Platelet morphology is normal.     Automated Count Confirmed. Platelet morphology is normal.    Polychromasia Slight (A) None Seen   Glucose by meter    Collection Time: 08/24/24  8:43 AM    Result Value Ref Range    GLUCOSE BY METER POCT 203 (H) 70 - 99 mg/dL   Glucose by meter    Collection Time: 08/24/24 12:37 PM   Result Value Ref Range    GLUCOSE BY METER POCT 217 (H) 70 - 99 mg/dL   Glucose by meter    Collection Time: 08/24/24  5:46 PM   Result Value Ref Range    GLUCOSE BY METER POCT 328 (H) 70 - 99 mg/dL   Glucose by meter    Collection Time: 08/24/24 10:07 PM   Result Value Ref Range    GLUCOSE BY METER POCT 276 (H) 70 - 99 mg/dL   Glucose by meter    Collection Time: 08/25/24  1:37 AM   Result Value Ref Range    GLUCOSE BY METER POCT 240 (H) 70 - 99 mg/dL   Basic metabolic panel    Collection Time: 08/25/24  5:42 AM   Result Value Ref Range    Sodium 133 (L) 135 - 145 mmol/L    Potassium 4.5 3.4 - 5.3 mmol/L    Chloride 99 98 - 107 mmol/L    Carbon Dioxide (CO2) 23 22 - 29 mmol/L    Anion Gap 11 7 - 15 mmol/L    Urea Nitrogen 19.4 8.0 - 23.0 mg/dL    Creatinine 0.52 (L) 0.67 - 1.17 mg/dL    GFR Estimate >90 >60 mL/min/1.73m2    Calcium 9.1 8.8 - 10.4 mg/dL    Glucose 298 (H) 70 - 99 mg/dL   Magnesium    Collection Time: 08/25/24  5:42 AM   Result Value Ref Range    Magnesium 1.5 (L) 1.7 - 2.3 mg/dL   Phosphorus    Collection Time: 08/25/24  5:42 AM   Result Value Ref Range    Phosphorus 2.7 2.5 - 4.5 mg/dL   Tacrolimus by Tandem Mass Spectrometry    Collection Time: 08/25/24  5:42 AM   Result Value Ref Range    Tacrolimus by Tandem Mass Spectrometry 5.3 5.0 - 15.0 ug/L    Tacrolimus Last Dose Date 8/24/2024     Tacrolimus Last Dose Time  5:37 PM    CBC with platelets and differential    Collection Time: 08/25/24  5:42 AM   Result Value Ref Range    WBC Count 16.4 (H) 4.0 - 11.0 10e3/uL    RBC Count 4.00 (L) 4.40 - 5.90 10e6/uL    Hemoglobin 11.6 (L) 13.3 - 17.7 g/dL    Hematocrit 36.6 (L) 40.0 - 53.0 %    MCV 92 78 - 100 fL    MCH 29.0 26.5 - 33.0 pg    MCHC 31.7 31.5 - 36.5 g/dL    RDW 13.6 10.0 - 15.0 %    Platelet Count 291 150 - 450 10e3/uL    % Neutrophils 77 %    % Lymphocytes 9 %     % Monocytes 8 %    % Eosinophils 2 %    % Basophils 0 %    % Immature Granulocytes 4 %    NRBCs per 100 WBC 0 <1 /100    Absolute Neutrophils 12.6 (H) 1.6 - 8.3 10e3/uL    Absolute Lymphocytes 1.5 0.8 - 5.3 10e3/uL    Absolute Monocytes 1.3 0.0 - 1.3 10e3/uL    Absolute Eosinophils 0.3 0.0 - 0.7 10e3/uL    Absolute Basophils 0.1 0.0 - 0.2 10e3/uL    Absolute Immature Granulocytes 0.6 (H) <=0.4 10e3/uL    Absolute NRBCs 0.0 10e3/uL   Glucose by meter    Collection Time: 08/25/24  7:21 AM   Result Value Ref Range    GLUCOSE BY METER POCT 216 (H) 70 - 99 mg/dL   Glucose by meter    Collection Time: 08/25/24 11:18 AM   Result Value Ref Range    GLUCOSE BY METER POCT 186 (H) 70 - 99 mg/dL   Glucose by meter    Collection Time: 08/25/24  1:03 PM   Result Value Ref Range    GLUCOSE BY METER POCT 355 (H) 70 - 99 mg/dL   Magnesium    Collection Time: 08/25/24  1:31 PM   Result Value Ref Range    Magnesium 1.4 (L) 1.7 - 2.3 mg/dL   Glucose by meter    Collection Time: 08/25/24  4:51 PM   Result Value Ref Range    GLUCOSE BY METER POCT 302 (H) 70 - 99 mg/dL   Glucose by meter    Collection Time: 08/25/24  9:38 PM   Result Value Ref Range    GLUCOSE BY METER POCT 303 (H) 70 - 99 mg/dL   Magnesium    Collection Time: 08/25/24  9:48 PM   Result Value Ref Range    Magnesium 2.0 1.7 - 2.3 mg/dL   Glucose by meter    Collection Time: 08/26/24  2:49 AM   Result Value Ref Range    GLUCOSE BY METER POCT 113 (H) 70 - 99 mg/dL   Basic metabolic panel    Collection Time: 08/26/24  6:03 AM   Result Value Ref Range    Sodium 136 135 - 145 mmol/L    Potassium 4.4 3.4 - 5.3 mmol/L    Chloride 101 98 - 107 mmol/L    Carbon Dioxide (CO2) 25 22 - 29 mmol/L    Anion Gap 10 7 - 15 mmol/L    Urea Nitrogen 15.8 8.0 - 23.0 mg/dL    Creatinine 0.64 (L) 0.67 - 1.17 mg/dL    GFR Estimate >90 >60 mL/min/1.73m2    Calcium 9.5 8.8 - 10.4 mg/dL    Glucose 106 (H) 70 - 99 mg/dL   Phosphorus    Collection Time: 08/26/24  6:03 AM   Result Value Ref Range     Phosphorus 4.2 2.5 - 4.5 mg/dL   Magnesium    Collection Time: 08/26/24  6:03 AM   Result Value Ref Range    Magnesium 1.8 1.7 - 2.3 mg/dL   Hepatic panel    Collection Time: 08/26/24  6:03 AM   Result Value Ref Range    Protein Total 6.1 (L) 6.4 - 8.3 g/dL    Albumin 3.2 (L) 3.5 - 5.2 g/dL    Bilirubin Total 0.4 <=1.2 mg/dL    Alkaline Phosphatase 170 (H) 40 - 150 U/L    AST 27 0 - 45 U/L    ALT 44 0 - 70 U/L    Bilirubin Direct <0.20 0.00 - 0.30 mg/dL   Ammonia    Collection Time: 08/26/24  6:03 AM   Result Value Ref Range    Ammonia 43 16 - 60 umol/L   Tacrolimus by Tandem Mass Spectrometry    Collection Time: 08/26/24  6:03 AM   Result Value Ref Range    Tacrolimus by Tandem Mass Spectrometry 6.8 5.0 - 15.0 ug/L    Tacrolimus Last Dose Date 8/25/2024     Tacrolimus Last Dose Time  6:05 PM    CBC with platelets and differential    Collection Time: 08/26/24  6:03 AM   Result Value Ref Range    WBC Count 17.9 (H) 4.0 - 11.0 10e3/uL    RBC Count 4.24 (L) 4.40 - 5.90 10e6/uL    Hemoglobin 12.4 (L) 13.3 - 17.7 g/dL    Hematocrit 39.2 (L) 40.0 - 53.0 %    MCV 93 78 - 100 fL    MCH 29.2 26.5 - 33.0 pg    MCHC 31.6 31.5 - 36.5 g/dL    RDW 13.8 10.0 - 15.0 %    Platelet Count 290 150 - 450 10e3/uL    % Neutrophils 78 %    % Lymphocytes 9 %    % Monocytes 8 %    % Eosinophils 2 %    % Basophils 0 %    % Immature Granulocytes 3 %    NRBCs per 100 WBC 0 <1 /100    Absolute Neutrophils 14.1 (H) 1.6 - 8.3 10e3/uL    Absolute Lymphocytes 1.7 0.8 - 5.3 10e3/uL    Absolute Monocytes 1.5 (H) 0.0 - 1.3 10e3/uL    Absolute Eosinophils 0.4 0.0 - 0.7 10e3/uL    Absolute Basophils 0.1 0.0 - 0.2 10e3/uL    Absolute Immature Granulocytes 0.6 (H) <=0.4 10e3/uL    Absolute NRBCs 0.0 10e3/uL   RBC and Platelet Morphology    Collection Time: 08/26/24  6:03 AM   Result Value Ref Range    Platelet Assessment  Automated Count Confirmed. Platelet morphology is normal.     Automated Count Confirmed. Platelet morphology is normal.    Acanthocytes       Vidya Rods      Basophilic Stippling      Bite Cells      Blister Cells      Rachel Cells Slight (A) None Seen    Elliptocytes      Hgb C Crystals      Pendleton-Jolly Bodies      Hypersegmented Neutrophils      Polychromasia Slight (A) None Seen    RBC agglutination      RBC Fragments      Reactive Lymphocytes      Rouleaux      Sickle Cells      Smudge Cells      Spherocytes      Stomatocytes      Target Cells      Teardrop Cells      Toxic Neutrophils      RBC Morphology Confirmed RBC Indices    Glucose by meter    Collection Time: 08/26/24  9:51 AM   Result Value Ref Range    GLUCOSE BY METER POCT 149 (H) 70 - 99 mg/dL   Glucose by meter    Collection Time: 08/26/24 12:44 PM   Result Value Ref Range    GLUCOSE BY METER POCT 264 (H) 70 - 99 mg/dL   Glucose by meter    Collection Time: 08/26/24  4:34 PM   Result Value Ref Range    GLUCOSE BY METER POCT 242 (H) 70 - 99 mg/dL   Glucose by meter    Collection Time: 08/26/24  9:10 PM   Result Value Ref Range    GLUCOSE BY METER POCT 235 (H) 70 - 99 mg/dL   Glucose by meter    Collection Time: 08/27/24  2:05 AM   Result Value Ref Range    GLUCOSE BY METER POCT 139 (H) 70 - 99 mg/dL   Basic metabolic panel    Collection Time: 08/27/24  5:50 AM   Result Value Ref Range    Sodium 138 135 - 145 mmol/L    Potassium 4.1 3.4 - 5.3 mmol/L    Chloride 102 98 - 107 mmol/L    Carbon Dioxide (CO2) 23 22 - 29 mmol/L    Anion Gap 13 7 - 15 mmol/L    Urea Nitrogen 15.2 8.0 - 23.0 mg/dL    Creatinine 0.62 (L) 0.67 - 1.17 mg/dL    GFR Estimate >90 >60 mL/min/1.73m2    Calcium 9.7 8.8 - 10.4 mg/dL    Glucose 112 (H) 70 - 99 mg/dL   Phosphorus    Collection Time: 08/27/24  5:50 AM   Result Value Ref Range    Phosphorus 3.5 2.5 - 4.5 mg/dL   Magnesium    Collection Time: 08/27/24  5:50 AM   Result Value Ref Range    Magnesium 1.6 (L) 1.7 - 2.3 mg/dL   CBC with platelets and differential    Collection Time: 08/27/24  5:50 AM   Result Value Ref Range    WBC Count 14.7 (H) 4.0 - 11.0 10e3/uL     RBC Count 4.40 4.40 - 5.90 10e6/uL    Hemoglobin 12.8 (L) 13.3 - 17.7 g/dL    Hematocrit 40.4 40.0 - 53.0 %    MCV 92 78 - 100 fL    MCH 29.1 26.5 - 33.0 pg    MCHC 31.7 31.5 - 36.5 g/dL    RDW 13.8 10.0 - 15.0 %    Platelet Count 370 150 - 450 10e3/uL    % Neutrophils 72 %    % Lymphocytes 14 %    % Monocytes 8 %    % Eosinophils 2 %    % Basophils 1 %    % Immature Granulocytes 3 %    NRBCs per 100 WBC 0 <1 /100    Absolute Neutrophils 10.6 (H) 1.6 - 8.3 10e3/uL    Absolute Lymphocytes 2.1 0.8 - 5.3 10e3/uL    Absolute Monocytes 1.2 0.0 - 1.3 10e3/uL    Absolute Eosinophils 0.3 0.0 - 0.7 10e3/uL    Absolute Basophils 0.1 0.0 - 0.2 10e3/uL    Absolute Immature Granulocytes 0.5 (H) <=0.4 10e3/uL    Absolute NRBCs 0.0 10e3/uL   Tacrolimus by Tandem Mass Spectrometry    Collection Time: 08/27/24  7:36 AM   Result Value Ref Range    Tacrolimus by Tandem Mass Spectrometry 6.8 5.0 - 15.0 ug/L    Tacrolimus Last Dose Date 8/26/2024     Tacrolimus Last Dose Time  5:44 PM    CBC with platelets and differential    Collection Time: 08/27/24  7:36 AM   Result Value Ref Range    WBC Count 15.1 (H) 4.0 - 11.0 10e3/uL    RBC Count 4.21 (L) 4.40 - 5.90 10e6/uL    Hemoglobin 12.4 (L) 13.3 - 17.7 g/dL    Hematocrit 38.2 (L) 40.0 - 53.0 %    MCV 91 78 - 100 fL    MCH 29.5 26.5 - 33.0 pg    MCHC 32.5 31.5 - 36.5 g/dL    RDW 13.7 10.0 - 15.0 %    Platelet Count 378 150 - 450 10e3/uL    % Neutrophils 76 %    % Lymphocytes 11 %    % Monocytes 7 %    % Eosinophils 2 %    % Basophils 1 %    % Immature Granulocytes 3 %    NRBCs per 100 WBC 0 <1 /100    Absolute Neutrophils 11.5 (H) 1.6 - 8.3 10e3/uL    Absolute Lymphocytes 1.7 0.8 - 5.3 10e3/uL    Absolute Monocytes 1.1 0.0 - 1.3 10e3/uL    Absolute Eosinophils 0.3 0.0 - 0.7 10e3/uL    Absolute Basophils 0.1 0.0 - 0.2 10e3/uL    Absolute Immature Granulocytes 0.5 (H) <=0.4 10e3/uL    Absolute NRBCs 0.0 10e3/uL   BRONCHOSCOPY    Collection Time: 08/27/24  7:43 AM   Result Value Ref Range     Bronchoscopy       [facility]  Endoscopy Department-[place]  _______________________________________________________________________________  Patient Name: Travon Cartwright            Procedure Date: 8/27/2024 7:43 AM  MRN: [id number]                       Account #: [id number]  YOB: 1965              Admit Type: Inpatient  Age: 59                               Gender: Male  Note Status: Finalized                Attending MD: MARIEL KEITH MD,   Total Sedation Time:                    _______________________________________________________________________________     Procedure:             Bronchoscopy  Indications:           BSLT (8/15/24). Inspection bronchoscopy  Providers:             MARIEL KEITH MD, ATUL VARGAS (Fellow),                          Meghan Laureano RN, Ziggy Crespo, Technician  Medicines:             Lidocaine 4% applied to cords 9 mL, Lidocaine 1% 12 mL                          applied to subglottic space and  tracheobronchial tree  Requesting Physician:    Complications:         None  _______________________________________________________________________________  Procedure:             Pre-Anesthesia Assessment:                         - The History and Physical was reviewed prior to the                         procedure. The patient's medications, allergies and                         sensitivities have also been reviewed.                         - The risks and benefits of the procedure and the                         sedation options and risks were discussed with the                         patient. All questions were answered and informed                         consent was obtained.                         - Pre-procedure physical examination revealed no                         contraindications to sedation.                         After obtaining informed consent, a time out was                           performed. Lidocaine and sedation were then                          administered . The bronchoscope was inserted through                          the mouth. The vocal cords were anesthetized with 4%                          lidocaine. The scope was then passed beyond the cords                          and 1% lidocaine was instilled at the kelly and                          mainstems. An airway inspection was done. Airway                          secretions were collected for washing. The patient                          tolerated the procedure well. Total procedure time was                          12 minutes.  Findings:       Upper airway structures, vocal cords (anatomy and function) appear to be        normal. The kelly was sharp. An airway inspection was done to the        subsegmental level which found:       1) The surgical anastomosis in the right mainstem bronchus is normal.       2) The surgical anastomosis in the left mainstem bronchus is normal.        There was a pedunculated granulation tissue 6 oclock.       3) Distal to the anastamoses, the airway  was hyperemic but not friable.        There was a moderate amount of thick, tenancious, clear secretions as        well as a fair amount of thin secretions.  Impression:            - Inspection bronchoscopy. Normal surgical anastamoses                         - Bronchial washing                         - Hyperemic airways. Tenacious secretions  Moderate Sedation:       Midazolam 1.5 mg IV, Fentanyl 75 mcg IV  Recommendation:        - [facility] care.      ______________________  MARIEL KEITH MD  8/27/2024 10:43:03 AM  I was physically present for the entire viewing portion of the exam.  __________________________  Signature of teaching physician  B4c/Rangel KEITH MD  Number of Addenda: 0    Note Initiated On: 8/27/2024 7:43 AM     Gram Stain    Collection Time: 08/27/24  8:30 AM    Specimen: Bronchus; Washings    Result Value Ref Range    GS Culture See corresponding culture for results     Gram Stain Result <25 PMNs/low power field     Gram Stain Result 2+ Mixed rhianna    Fungal or Yeast Culture Routine    Collection Time: 08/27/24  8:30 AM    Specimen: Bronchus; Washings   Result Value Ref Range    Culture No growth after 2 days    Respiratory Aerobic Bacterial Culture    Collection Time: 08/27/24  8:30 AM    Specimen: Bronchus; Washings   Result Value Ref Range    Culture 3+ Normal Rhianna    Acid-Fast Bacilli Culture and Stain    Collection Time: 08/27/24  8:30 AM    Specimen: Bronchus; Washings   Result Value Ref Range    Acid Fast Stain No acid fast bacilli seen     Acid Fast Stain No acid fast bacilli seen    Glucose by meter    Collection Time: 08/27/24  8:56 AM   Result Value Ref Range    GLUCOSE BY METER POCT 142 (H) 70 - 99 mg/dL   Respiratory Aerobic Bacterial Culture with Gram Stain    Collection Time: 08/27/24 10:46 AM    Specimen: Expectorate; Sputum   Result Value Ref Range    Culture       >10 Squamous epithelial cells/low power field indicates oral contamination. Please recollect.    Gram Stain Result >10 Squamous epithelial cells/low power field     Gram Stain Result >25 PMNs/low power field     Gram Stain Result 2+ Mixed rhianna    Glucose by meter    Collection Time: 08/27/24  2:13 PM   Result Value Ref Range    GLUCOSE BY METER POCT 180 (H) 70 - 99 mg/dL   Glucose by meter    Collection Time: 08/27/24  5:41 PM   Result Value Ref Range    GLUCOSE BY METER POCT 242 (H) 70 - 99 mg/dL   Glucose by meter    Collection Time: 08/27/24  9:59 PM   Result Value Ref Range    GLUCOSE BY METER POCT 323 (H) 70 - 99 mg/dL   Glucose by meter    Collection Time: 08/28/24 12:09 AM   Result Value Ref Range    GLUCOSE BY METER POCT 180 (H) 70 - 99 mg/dL   Glucose by meter    Collection Time: 08/28/24  2:44 AM   Result Value Ref Range    GLUCOSE BY METER POCT 98 70 - 99 mg/dL   Basic metabolic panel    Collection Time:  08/28/24  6:29 AM   Result Value Ref Range    Sodium 137 135 - 145 mmol/L    Potassium 4.1 3.4 - 5.3 mmol/L    Chloride 102 98 - 107 mmol/L    Carbon Dioxide (CO2) 24 22 - 29 mmol/L    Anion Gap 11 7 - 15 mmol/L    Urea Nitrogen 13.0 8.0 - 23.0 mg/dL    Creatinine 0.70 0.67 - 1.17 mg/dL    GFR Estimate >90 >60 mL/min/1.73m2    Calcium 9.7 8.8 - 10.4 mg/dL    Glucose 99 70 - 99 mg/dL   Phosphorus    Collection Time: 08/28/24  6:29 AM   Result Value Ref Range    Phosphorus 3.7 2.5 - 4.5 mg/dL   Magnesium    Collection Time: 08/28/24  6:29 AM   Result Value Ref Range    Magnesium 1.7 1.7 - 2.3 mg/dL   Platelet count    Collection Time: 08/28/24  6:29 AM   Result Value Ref Range    Platelet Count 326 150 - 450 10e3/uL   Tacrolimus by Tandem Mass Spectrometry    Collection Time: 08/28/24  6:29 AM   Result Value Ref Range    Tacrolimus by Tandem Mass Spectrometry 8.8 5.0 - 15.0 ug/L    Tacrolimus Last Dose Date 8/27/2024     Tacrolimus Last Dose Time  6:27 PM    Hepatic panel    Collection Time: 08/28/24  6:29 AM   Result Value Ref Range    Protein Total 6.6 6.4 - 8.3 g/dL    Albumin 3.3 (L) 3.5 - 5.2 g/dL    Bilirubin Total 0.5 <=1.2 mg/dL    Alkaline Phosphatase 176 (H) 40 - 150 U/L    AST 27 0 - 45 U/L    ALT 51 0 - 70 U/L    Bilirubin Direct 0.21 0.00 - 0.30 mg/dL   CBC with platelets and differential    Collection Time: 08/28/24  6:29 AM   Result Value Ref Range    WBC Count 12.4 (H) 4.0 - 11.0 10e3/uL    RBC Count 4.21 (L) 4.40 - 5.90 10e6/uL    Hemoglobin 12.4 (L) 13.3 - 17.7 g/dL    Hematocrit 38.7 (L) 40.0 - 53.0 %    MCV 92 78 - 100 fL    MCH 29.5 26.5 - 33.0 pg    MCHC 32.0 31.5 - 36.5 g/dL    RDW 13.9 10.0 - 15.0 %    Platelet Count 326 150 - 450 10e3/uL    % Neutrophils 69 %    % Lymphocytes 18 %    % Monocytes 8 %    % Eosinophils 2 %    % Basophils 1 %    % Immature Granulocytes 2 %    NRBCs per 100 WBC 0 <1 /100    Absolute Neutrophils 8.5 (H) 1.6 - 8.3 10e3/uL    Absolute Lymphocytes 2.3 0.8 - 5.3 10e3/uL     Absolute Monocytes 1.0 0.0 - 1.3 10e3/uL    Absolute Eosinophils 0.2 0.0 - 0.7 10e3/uL    Absolute Basophils 0.1 0.0 - 0.2 10e3/uL    Absolute Immature Granulocytes 0.3 <=0.4 10e3/uL    Absolute NRBCs 0.0 10e3/uL   Glucose by meter    Collection Time: 08/28/24  8:43 AM   Result Value Ref Range    GLUCOSE BY METER POCT 135 (H) 70 - 99 mg/dL   UA with Microscopic reflex to Culture    Collection Time: 08/28/24 11:28 AM    Specimen: Urine, Midstream   Result Value Ref Range    Color Urine Straw Colorless, Straw, Light Yellow, Yellow    Appearance Urine Clear Clear    Glucose Urine Negative Negative mg/dL    Bilirubin Urine Negative Negative    Ketones Urine Negative Negative mg/dL    Specific Gravity Urine 1.007 1.003 - 1.035    Blood Urine Negative Negative    pH Urine 7.5 (H) 5.0 - 7.0    Protein Albumin Urine Negative Negative mg/dL    Urobilinogen Urine Normal Normal, 2.0 mg/dL    Nitrite Urine Negative Negative    Leukocyte Esterase Urine Negative Negative    RBC Urine <1 <=2 /HPF    WBC Urine <1 <=5 /HPF   Glucose by meter    Collection Time: 08/28/24 12:43 PM   Result Value Ref Range    GLUCOSE BY METER POCT 149 (H) 70 - 99 mg/dL   Magnesium    Collection Time: 08/29/24 12:01 PM   Result Value Ref Range    Magnesium 1.6 (L) 1.7 - 2.3 mg/dL   CBC with platelets    Collection Time: 08/29/24 12:01 PM   Result Value Ref Range    WBC Count 13.0 (H) 4.0 - 11.0 10e3/uL    RBC Count 4.58 4.40 - 5.90 10e6/uL    Hemoglobin 13.4 13.3 - 17.7 g/dL    Hematocrit 41.9 40.0 - 53.0 %    MCV 92 78 - 100 fL    MCH 29.3 26.5 - 33.0 pg    MCHC 32.0 31.5 - 36.5 g/dL    RDW 13.6 10.0 - 15.0 %    Platelet Count 455 (H) 150 - 450 10e3/uL   Comprehensive metabolic panel    Collection Time: 08/29/24 12:01 PM   Result Value Ref Range    Sodium 133 (L) 135 - 145 mmol/L    Potassium 5.4 (H) 3.4 - 5.3 mmol/L    Carbon Dioxide (CO2) 26 22 - 29 mmol/L    Anion Gap 10 7 - 15 mmol/L    Urea Nitrogen 21.2 8.0 - 23.0 mg/dL    Creatinine 0.69 0.67  - 1.17 mg/dL    GFR Estimate >90 >60 mL/min/1.73m2    Calcium 10.2 8.8 - 10.4 mg/dL    Chloride 97 (L) 98 - 107 mmol/L    Glucose 229 (H) 70 - 99 mg/dL    Alkaline Phosphatase 185 (H) 40 - 150 U/L    AST 25 0 - 45 U/L    ALT 50 0 - 70 U/L    Protein Total 7.1 6.4 - 8.3 g/dL    Albumin 3.7 3.5 - 5.2 g/dL    Bilirubin Total 0.3 <=1.2 mg/dL   General PFT Lab (Please always keep checked)    Collection Time: 08/29/24 12:41 PM   Result Value Ref Range    FVC-Pred 4.25 L    FVC-Pre 2.71 L    FVC-%Pred-Pre 63 %    FEV1-Pre 2.27 L    FEV1-%Pred-Pre 68 %    FEV1FVC-Pred 78 %    FEV1FVC-Pre 84 %    FEFMax-Pred 9.16 L/sec    FEFMax-Pre 6.63 L/sec    FEFMax-%Pred-Pre 72 %    FEF2575-Pred 2.76 L/sec    FEF2575-Pre 2.68 L/sec    GWO7719-%Pred-Pre 97 %    ExpTime-Pre 4.21 sec    FIFMax-Pre 4.88 L/sec    FEV1FEV6-Pred 79 %    FEV1FEV6-Pre 84 %       Results as noted above.    PFT Interpretation:  Normal spirometry.  Unchanged from previous.   Best since lung transplantation  Valid Maneuver    CXR (8/28/2024), personally reviewed by me: bilateral Lung fields are clear. No effusion. Cardiac silhouette is not enlarged. Right sided atelectasis improved.     Assessment and plan: Travon Cartwright is a 59 year old male with a history of idiopathic pulmonary fibrosis, diabetes, hepatosteatosis, and essential tremor.  Pt. is now s/p BSLT on 8/15/2024 with Dr. Mulvihill.  Surgery was uncomplicated and done on pump, extubated and off pressors since 8/18.  Had delirium. Weaned to RA 8/22.      Discharge recommendations:  - Adjust levalbuterol and Mucomyst nebs to prn upon discharge  - Continue IS and Aerobika   - DSA (8/22) pending then one month post-transplant (due 9/15, additionally per protocol)  - Prospera one month post-transplant (due 9/15)  - Plan for staple removal 4 weeks post-op per Dr. Mulvihill (see CVTS note 8/23)  - EBV, IgG, and donor labs due 9/15  - Follow pending bronch cultures (8/27)  - Monitor LFTs 8/29 as OP, noted alk  phos elevation since 8/26     #. S/p bilateral sequential lung transplant (BSLT) 8/15/24 for idiopathic pulmonary fibrosis: Explant pathology: left with UIP and organizing pneumonia (5 benign hilar lymph nodes), right with UIP with organizing pneumonia (7 benign hilar lymph nodes).  PGD 1-2.    - Bronch (8/27) with normal surgical anastomosis in right mainstem bronchus, normal surgical anastomosis in left mainstem bronchus and noted pedunculated granulation tissue 6 o'clock, distal to anastomoses airway hyperemic but not friable, and moderate amount of thick, tenacious, clear secretions and fair amount of thin secretions.     Immunosuppression: S/p induction therapy with high dose IV steroid intraoperatively and basiliximab (POD #0 and #4).   - Tacrolimus goal  8-12.  - MMF 1000 mg BID  - Prednisone 25 mg daily with taper per lung transplant protocol:  Date Daily Dose (mg)   8/24/2024 25   8/31/2024 20   9/21/2024 15   10/12/2024 10   11/2/2024 5      8/28/2024: Pulm sx are stable. CXR is stable to better. FEV1 - first since lung tx. Slightly low will monitor. Advised proper posture and abd breathing.  - Nebs: levalbuterol and Mucomyst QID PRN  - transplant coordinator to follow up on nebulizer machine   - IS and Aerobika   - DSA (8/22) negative then one month post-transplant (due 9/15, additionally per protocol)  - Prospera one month post-transplant (due 9/15)  - CXR today with stable with streaky Rt base and left upper lung zone opacities (personally reviewed)  - Plan for staple removal 4 weeks (9/23) post-op per Dr. Mulvihill (see CVTS note 8/23)    #. ID:   Prophylaxis:   - Bactrim for PJP ppx (started POD #1 due to donor toxoplasma IgG +)  - VGCV for CMV ppx (as below), CMV monitoring per protocol (after completion of ppx course, additionally prn)  - Nystatin for oral candidiasis ppx, 6 month course   - See below for serologies and viral ppx:    Donor Recipient Intervention   CMV status - + Valganciclovir POD  #8-90   EBV status - + EBV monthly (due 9/15)   HSV status N/A + N/A         - Donor cultures ([facility]) with Strep constellatus, donor cultures ([facility]) with MSSA.  S/p IV vancomycin 8/15-8/20 and ceftazidime 8/15-8/17.  Noted fever 8/18-8/19.  Blood culture negative 8/19.  C diff negative 8/21.  Report of urinary urgency and frequency, UA unremarkable 8/28.   - IgG at one month (due 9/15)  - Bronch cultures (8/27) NGTD, follow  - ABX: IV Zosyn (8/19-8/28) for 10 day course.     PHS risk criteria donor: Additional labs required post-transplant (between 4-8 weeks post-op): Hepatitis B, Hepatitis C, and HIV by JOVANY ([id number], due 9/15).     #. 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  No need for NPO for 6 weeks.    #. Hx HTN  #. Hx of HLD  - Monitor hemodynamic status. Goal MAP >65, SBP <140  - Continue rosuvastatin 20 daily.  - Metoprolol tartrate 50 mg BID    #. Hypomagnesemia: Suppressed absorption d/t CNI.   - Mag glycinate 400 mg TID    #. Hyperkalemia: elevated in setting of tacrolimus use.    - lokelma 10g one dose today  - repeat BMP in 1 week     #. Lifelong tremors: He's had a history of lifelong tremors which have worsened in the last 1-2 years. While it doesn't sound necessarily like a progressive motor disorder we would like to have him fully evaluated by neurology during his evaluation.   - Neuro referral- preferable a movement disorder specialist     #. DM II:  He was diagnosed with diabetes about 3-4 years ago and feels like it is not as well controlled as it could be. Prior to transplant was on Jardiance, dulaglutide, glargine, metformin.   Lantus 25 units every day at 7 pm  To cover prednisone 25 mg daily,    NPH 24 units every day at the same time as the prednisone at 8 am (If do not take prednisone do not take NPH)  When Decrease Prednisone 20 mg daily--> Decrease NPH 18 units daily at the same time as the prednisone at 8 am (If do not take prednisone do not take  NPH)  When decrease prednisone to 15 mg daily must call provider for dose of NPH  Novolog Fixed meal dose:  Breakfast, lunch and dinner: Give 8 units of Novolog for medium meal, around 60g of carb and 10 units of Novolog for larger meal about 80 or greater g of carb----> Do not take if do not eat meal  Give 5 units of Novolog for a snack or supplement before 10 pm, do not cover snacks after 10 pm  8/28/2024: Hgb A1c is 8.5%.  - Refer to endocrinology, interested in CGM  - Pt to notify team if blood sugars are elevated to 400s tomorrow. Will adjust insulin     #. Hepatic Steatosis: He has had intermittently elevated LFTs since 2019, had a liver US in 2017 showing hepatic steatosis. GI felt he was low likelihood advanced fibrosis.   #. Elevated alk phos: Noted initially in 8/26. Etiology unclear.   - We will obtain Rt. UQ ultrasound.    #. HCM:  Dermatology:   Vaccination: Will need RSV vaccine   Cancer screening:  - colonoscopy: due 2026 (last 2016 , no polyps, 10 year follow up)      The longitudinal plan of care for the diagnosis(es)/condition(s) as documented were addressed during this visit. Due to the added complexity in care, I will continue to support Juan J in the subsequent management and with ongoing continuity of care.    Staffed with:  Carlos Johnson MD.  Pulmonary and Critical Care.  08/28/2024  2:12 PM     Lisy Paris MD  Internal Medicine, PGY-3     Physician Attestation  I, Carlos Johnson MD, saw this patient with Dr. Paris and agree with the findings and plan of care as documented in the note.   Items personally reviewed/procedural attestation: vitals, labs, imaging and agree with the interpretation documented in the note, and spirometry report and agree with the interpretation documented in the note.    Carlos Johnson MD               Transplant Coordinator Note    Reason for visit: Post lung transplant follow up visit   Coordinator: Present   Caregiver: Present, sister     Health concerns  addressed today:  1. Resp: no concerns. Didn't get neb machine at discharge.   2. BG has been running up to 400s max. AM around 200. Overnight 180.   3.     Activity/rehab: Pulm rehab, eval 9/9  Oxygen needs: None  Pain management/RX: Tylenol, oxycodone  Diabetic management: endo, will see patient early OctDomo to help in the meantime?   Next Bronch due: 9/15  Risk Criteria Labs: due 9/15  CMV status: D-/R+  Valcyte stopped: through POD #90  EBV status: D-/R+, monitor monthly (due 9/15)  DVT/PE: N/A  Post op AFIB/follow up with EP: N/A  AC/asa: none  PJP prophylactic: Bactrim    COVID:  COVID-19 infection (yes/no, date of most recent positive test):   Status/instructions given about COVID-19 vaccine:     Pt Education: medications (use/dose/side effects), how/when to call coordinator, frequency of labs, s/s of infection/rejection, call prior to starting any new medications, lab/vital sign book    Health Maintenance:   Last colonoscopy:   Next colonoscopy due:   Dermatology:  Vaccinations this visit:     Labs, CXR, PFTs reviewed with patient  Medication record reviewed and reconciled  Questions and concerns addressed    Patient Instructions  1. Continue to hydrate with 60-70 oz fluids daily.  2. Continue to exercise daily or most days of the week.  3. Follow up with your primary care provider for annual gender health maintenance procedures.  4. Follow up with colonoscopy schedule.  5. Follow up with annual dermatology visits.  6. It doesn't seem like the COVID vaccine is working well in lung transplant patients. A number of lung transplant patients have gotten sick with COVID even after receiving the vaccines. Based on our recent experience, it can be life-threatening to get COVID  even after being vaccinated. Please continue to act like you did not get the COVID vaccine - social distancing, wearing a mask, good hand hygiene, etc. If the people around you are vaccinated, it will help reduce the risk of you getting  COVID. All members of your household should be vaccinated.  7. Focus on getting in the majority of your fluids before 4pm.   8. If you continue to see blood sugars in the 400s, call Joi tomorrow.   9. Joi is looking into the nebulizer machine. Once you get this, okay to use nebulizers as needed.   10. Focus on using your incentive spirometer and cough once/hour.   11. Your potassium was a little high today, 5.4 Please take 1 dose of Lokelma tonight, may cause diarrhea. We will send you extra to use in case it's needed in the future.     Next transplant clinic appointment: 9/3 with CXR, labs and PFTs  Next lab draw: 9/3    AVS printed at time of check out        Again, thank you for allowing me to participate in the care of your patient.        Sincerely,        Carlos Johnson MD

## 2024-08-29 NOTE — PROGRESS NOTES
Travon Cartwright comes into clinic today at the request of Dr Knight , for spirometry     Tolerated testing well. No adverse reactions. Left lab in no distress.        MAEGAN GARCIA

## 2024-08-29 NOTE — TELEPHONE ENCOUNTER
Juan J Cartwright is a post-lung transplant patient who discharged on 8/28/24. Patient chooses to receive medications from [facility]. The family member (Sister Catia) will be responsible for managing medications.    SOT*LIAISON (TOM) IS A (LUNG) TRANSPLANT PATIENT  (DATE OF TRANSPLANT: (DATE: 8/15/2024) )     HEALTH BENEFIT: ([place] MEDICAID)  ID# [id number] (EFFECTIVE (DATE: 1/1/2016) )      PHARMACY BENEFIT: ([place] MEDICA[place])  PROCESSING INFO: ID#[id number] BIN# [id number] (EFFECTIVE (DATE: 1/1/2016) ).   NO DEDUCTIBLE & NO MAX OUT-OF-POCKET   COPAY STRUCTURE:  $ (0) FOR GENERIC  $ (0) FOR BRAND     TEST CLAIM SPECIALTY #28  MYCOPHENOLATE 250MG (#240/30DS)=$0  PROGRAF 1MG (#120/30DS)=$0  TACROLIMUS 1MG (#120/30DS)=$0  CYCLOSPORINE 100MG (#60/30DS)=$0  VALGANCICLOVIR 450MG (#60/30DS)=$0  VALACYCLOVIR 1GM (#90/30DS)=$0  AMPHOTERICIN B LIPOSOME 50MG SUSR (#8/28DS)=$0    TEST CLAIM DISCHARGE #27 (18 YEARS AND OLDER):  MYCOPHENOLATE 250MG (#240/30DS)=$0  PROGRAF 1MG (#120/30DS)=$0  TACROLIMUS 1MG (#120/30DS)=$0  CYCLOSPORINE 100MG (#60/30DS)=$0  VALGANCICLOVIR 450MG (#60/30DS)=$0  VALACYCLOVIR 1GM (#90/30DS)=$0  AMPHOTERICIN B LIPOSOME 50MG SUSR (#8/28DS)=$0    **CAN PATIENT FILL AT [facility] FOR MEDICATIONS LISTED? YES    Janusz Zaragoza RPH

## 2024-08-29 NOTE — PROGRESS NOTES
Transplant Coordinator Note    Reason for visit: Post lung transplant follow up visit   Coordinator: Present   Caregiver: Present, sister     Health concerns addressed today:  1. Resp: no concerns. Didn't get neb machine at discharge.   2. BG has been running up to 400s max. AM around 200. Overnight 180.   3.     Activity/rehab: Pulm rehab, eval 9/9  Oxygen needs: None  Pain management/RX: Tylenol, oxycodone  Diabetic management: ned, will see patient early OctDomo to help in the meantime?   Next Bronch due: 9/15  Risk Criteria Labs: due 9/15  CMV status: D-/R+  Valcyte stopped: through POD #90  EBV status: D-/R+, monitor monthly (due 9/15)  DVT/PE: N/A  Post op AFIB/follow up with EP: N/A  AC/asa: none  PJP prophylactic: Bactrim    COVID:  COVID-19 infection (yes/no, date of most recent positive test):   Status/instructions given about COVID-19 vaccine:     Pt Education: medications (use/dose/side effects), how/when to call coordinator, frequency of labs, s/s of infection/rejection, call prior to starting any new medications, lab/vital sign book    Health Maintenance:   Last colonoscopy:   Next colonoscopy due:   Dermatology:  Vaccinations this visit:     Labs, CXR, PFTs reviewed with patient  Medication record reviewed and reconciled  Questions and concerns addressed    Patient Instructions  1. Continue to hydrate with 60-70 oz fluids daily.  2. Continue to exercise daily or most days of the week.  3. Follow up with your primary care provider for annual gender health maintenance procedures.  4. Follow up with colonoscopy schedule.  5. Follow up with annual dermatology visits.  6. It doesn't seem like the COVID vaccine is working well in lung transplant patients. A number of lung transplant patients have gotten sick with COVID even after receiving the vaccines. Based on our recent experience, it can be life-threatening to get COVID  even after being vaccinated. Please continue to act like you did not get the  COVID vaccine - social distancing, wearing a mask, good hand hygiene, etc. If the people around you are vaccinated, it will help reduce the risk of you getting COVID. All members of your household should be vaccinated.  7. Focus on getting in the majority of your fluids before 4pm.   8. If you continue to see blood sugars in the 400s, call [facility]i tomorrow.   9. [facility] is looking into the nebulizer machine. Once you get this, okay to use nebulizers as needed.   10. Focus on using your incentive spirometer and cough once/hour.   11. Your potassium was a little high today, 5.4 Please take 1 dose of Lokelma tonight, may cause diarrhea. We will send you extra to use in case it's needed in the future.     Next transplant clinic appointment: 9/3 with CXR, labs and PFTs  Next lab draw: 9/3    AVS printed at time of check out

## 2024-08-29 NOTE — PROGRESS NOTES
Physical Therapy Discharge Summary    Reason for therapy discharge:    Discharged to home with outpatient therapy.    Progress towards therapy goal(s). See goals on Care Plan in [facility] electronic health record for goal details.  Goals partially met.  Barriers to achieving goals:   discharge from facility.    Therapy recommendation(s):    Continued therapy is recommended.  Rationale/Recommendations:  OP AK to improve functional tolerance and I.

## 2024-08-30 ENCOUNTER — TELEPHONE (OUTPATIENT)
Dept: TRANSPLANT | Facility: CLINIC | Age: 59
End: 2024-08-30
Payer: COMMERCIAL

## 2024-08-30 NOTE — TELEPHONE ENCOUNTER
Spoke with patient and his sister Mine regarding the following:    Somehow got 2 nebulizer machines, they will return one of them.   Plan for RUQ next week due to elevated alk phos. They will check MyChart for the time.   BG high, but slightly improved from yesterday. 310-320 range, 200 overnight. Sister is hopeful it'll keep getting a little better since he's able to get in all his correction doses today. Notified Dr. Johnson and plan to continue to monitor over the weekend. He stated he can answer questions about this over the weekend if they come up.   Tacrolimus level yesterday 8.6, at goal of 8-12. Continue current dose and recheck next week.     They had no further questions/concerns at this time.

## 2024-09-02 NOTE — PROGRESS NOTES
Reason for Visit  Travon Cartwright is a 59 year old male who is being seen for RECHECK (Follow up. )  Lung TX HPI  The patient was seen and examined by Carlos Johnson MD     Travon Cartwright is a 59 year old male with a history of idiopathic pulmonary fibrosis, diabetes, hepatosteatosis, and essential tremor.  Pt. is now s/p BSLT on 8/15/2024 with Dr. Mulvihill.  Surgery was uncomplicated and done on pump, extubated and off pressors since 8/18.  Weaned to RA 8/22.     Interval history:  He is doing well. Feels SOB with climbing stairs and with longer walks. Able to walk 0.5 to 1mile. Denies cough, palpitations, chest pain, abdominal pain, or dysuria. Still has nocturia - 3 x/night.  His sister notes his blood sugars are about 200's to 300's during the day.   No Post nasal drip or heart burn.     Current Outpatient Medications   Medication Sig Dispense Refill    acetaminophen (TYLENOL) 325 MG tablet Take 2 tablets (650 mg) by mouth or Feeding Tube every 4 hours as needed for other (For optimal non-opioid multimodal pain management to improve pain control.). 30 tablet 0    calcium carbonate-vitamin D (CALTRATE) 600-10 MG-MCG per tablet Take 1 tablet by mouth or Feeding Tube 2 times daily. 60 tablet 0    Continuous Glucose Sensor (DEXCOM G7 SENSOR) MISC Change every 10 days. 1 each 11    hydrOXYzine HCl (ATARAX) 25 MG tablet Take 1 tablet (25 mg) by mouth every 6 hours as needed for itching. 20 tablet 0    insulin aspart (NOVOLOG PEN) 100 UNIT/ML pen Inject 1-10 Units subcutaneously 3 times daily (before meals). Correction Scale - HIGH INSULIN RESISTANCE DOSING   Do Not give Correction Insulin if Pre-Meal BG less than 140. For Pre-Meal  - 164 give 1 unit. For Pre-Meal  - 189 give 2 units. For Pre-Meal  - 214 give 3 units. For Pre-Meal  - 239 give 4 units. For Pre-Meal  - 264 give 5 units. For Pre-Meal  - 289 give 6 units. For Pre-Meal  - 314 give 7 units. For Pre-Meal   - 339 give 8 units. For Pre-Meal  - 364 give 9 units.  For Pre-Meal BG greater than or equal to 365 give 10 units To be given with prandial insulin, and based on pre-meal blood glucose. Administering insulin within 5 minutes of the start of the meal is ideal. Administer insulin no more than 30 minutes after the start of the meal, unless directed otherwise by provider. Notify provider if glucose greater than or equal to 350 mg/dL after administration of correction dose. 15 mL 2    insulin aspart (NOVOLOG PEN) 100 UNIT/ML pen Inject 5 Units subcutaneously Take with snacks or supplements for high blood sugar. 15 mL 2    insulin aspart (NOVOLOG PEN) 100 UNIT/ML pen Inject 1-7 Units subcutaneously at bedtime. HIGH INSULIN RESISTANCE DOSING  Do Not give Bedtime Correction Insulin if BG less than 200. For  - 224 give 1 units. For  - 249 give 2 units. For  - 274 give 3 units. For  - 299 give 4 units. For  - 324 give 5 units. For  - 349 give 6 units. For BG greater than or equal to 350 give 7 units. Notify provider if glucose greater than or equal to 350 mg/dL after administration of correction dose. 15 mL 2    insulin aspart (NOVOLOG PEN) 100 UNIT/ML pen Inject 8-10 Units subcutaneously 3 times daily (with meals). 8 units with medium meal, 10 units with large meals 15 mL 2    insulin glargine (LANTUS PEN) 100 UNIT/ML pen Inject 35 Units subcutaneously at bedtime. 15 mL 3    insulin  UNIT/ML injection 24 units before breakfast 15 mL 3    insulin pen needle (32G X 4 MM) 32G X 4 MM miscellaneous Use pen needles daily or as directed. 100 each 3    levalbuterol (XOPENEX) 1.25 MG/3ML neb solution Take 3 mLs (1.25 mg) by nebulization 4 times daily as needed for shortness of breath or wheezing. Use prior to Mucomyst neb. 300 mL 0    magnesium glycinate 100 MG CAPS capsule Take 4 capsules (400 mg) by mouth 3 times daily. 360 capsule 0    melatonin 5 MG tablet Take 1 tablet (5  mg) by mouth or Feeding Tube every evening. 30 tablet 0    methocarbamol (ROBAXIN) 750 MG tablet Take 1 tablet (750 mg) by mouth or Feeding Tube 4 times daily as needed for muscle spasms. 90 tablet 1    metoprolol tartrate (LOPRESSOR) 50 MG tablet Take 1 tablet (50 mg) by mouth or Feeding Tube 2 times daily. 60 tablet 0    multivitamin, therapeutic (THERA-VIT) TABS tablet Take 1 tablet by mouth daily. 30 tablet 0    mycophenolate (GENERIC EQUIVALENT) 250 MG capsule Take 4 capsules (1,000 mg) by mouth 2 times daily. 240 capsule 11    nystatin (MYCOSTATIN) 906640 UNIT/ML suspension Take 10 mLs (1,000,000 Units) by mouth 4 times daily. 1200 mL 0    pantoprazole (PROTONIX) 40 MG EC tablet Take 1 tablet (40 mg) by mouth every morning (before breakfast). 30 tablet 0    polyethylene glycol (MIRALAX) 17 GM/Dose powder Take 17 g by mouth daily as needed for constipation. 510 g 0    predniSONE (DELTASONE) 5 MG tablet Take 5 tablets (25 mg) by mouth daily. Taper per lung transplant clinic.  Next taper to 20 mg daily on 8/31 (Patient taking differently: Take 5 mg by mouth daily. Taper per lung transplant clinic.  Next taper to 20 mg daily on 8/31) 150 tablet 3    rosuvastatin (CRESTOR) 10 MG tablet Take 1 tablet (10 mg) by mouth daily. 30 tablet 0    senna-docusate (SENOKOT-S/PERICOLACE) 8.6-50 MG tablet Take 2 tablets by mouth or Feeding Tube 2 times daily as needed for constipation (use 1st). 120 tablet 0    sodium zirconium cyclosilicate (LOKELMA) 10 g PACK packet Take 1 packet (10 g) by mouth daily as needed. 10 packet 1    sulfamethoxazole-trimethoprim (BACTRIM) 400-80 MG tablet Take 1 tablet by mouth or NG Tube daily. 30 tablet 0    tacrolimus (GENERIC EQUIVALENT) 0.5 MG capsule Take 1 capsule (0.5 mg) by mouth 2 times daily as needed (Total dose 4 mg twice daily, to be adjusted by pulmonary clinic). 60 capsule 3    tacrolimus (GENERIC EQUIVALENT) 1 MG capsule Take 4 capsules (4 mg) by mouth 2 times daily. Total dose 4 mg  twice daily, to be adjusted by pulmonary clinic 240 capsule 11    traZODone (DESYREL) 50 MG tablet Take 1 tablet (50 mg) by mouth or Feeding Tube nightly as needed for sleep. 30 tablet 0    valGANciclovir (VALCYTE) 450 MG tablet Take 2 tablets (900 mg) by mouth daily. 60 tablet 0    acetylcysteine (MUCOMYST) 20 % neb solution Take 2 mLs by nebulization 4 times daily as needed for mucolysis/respiratory distress. (Patient not taking: Reported on 8/29/2024) 30 mL 1     No current facility-administered medications for this visit.     Facility-Administered Medications Ordered in Other Visits   Medication Dose Route Frequency Provider Last Rate Last Admin    sodium chloride bacteriostatic 0.9 % flush 9 mL  9 mL Intravenous Once NICOLA Pearce MD         No Known Allergies  Past Medical History:   Diagnosis Date    Administration of long-term prophylactic antibiotics 08/26/2024    Hepatic steatosis 2017    Noted on ultrasound    Hypomagnesemia 08/26/2024    Immunosuppression (H24) 08/26/2024    S/P lung transplant (H) 08/15/2024    Tremor 05/23/2024       Past Surgical History:   Procedure Laterality Date    BRONCHOSCOPY  08/16/2024    BRONCHOSCOPY FLEXIBLE AND RIGID N/A 8/27/2024    Procedure: Bronchoscopy Inspection;  Surgeon: Oneida Silver MD;  Location:  GI    COLONOSCOPY      CV CORONARY ANGIOGRAM N/A 06/21/2024    Procedure: Coronary Angiogram;  Surgeon: Gabriel Julian MD;  Location:  HEART CARDIAC CATH LAB    CV RIGHT HEART CATH MEASUREMENTS RECORDED N/A 06/21/2024    Procedure: Right Heart Catheterization;  Surgeon: Gabriel Julian MD;  Location:  HEART CARDIAC CATH LAB    PICC DOUBLE LUMEN PLACEMENT Right 08/20/2024    47-3cm, Basilic    TRANSPLANT LUNG RECIPIENT SINGLE X2 Bilateral 08/15/2024    Procedure: Clamshell Incision, On Central Venoarterial Extracorporeal Membranous Oxygenation, Bilateral Lung Transplant Recipient, Left atrial appendage ligation, Intraoperative Flexible Bronchoscopy  by Anesthesia;  Surgeon: Mulvihill, Michael, MD;  Location:  OR       Social History     Socioeconomic History    Marital status: Single     Spouse name: Not on file    Number of children: Not on file    Years of education: Not on file    Highest education level: Not on file   Occupational History    Not on file   Tobacco Use    Smoking status: Former     Types: Cigarettes    Smokeless tobacco: Never   Substance and Sexual Activity    Alcohol use: Not Currently     Comment: not since 2017    Drug use: Never    Sexual activity: Not on file   Other Topics Concern    Not on file   Social History Narrative    Not on file     Social Determinants of Health     Financial Resource Strain: Low Risk  (8/24/2024)    Financial Resource Strain     Within the past 12 months, have you or your family members you live with been unable to get utilities (heat, electricity) when it was really needed?: No   Food Insecurity: Low Risk  (8/24/2024)    Food Insecurity     Within the past 12 months, did you worry that your food would run out before you got money to buy more?: No     Within the past 12 months, did the food you bought just not last and you didn t have money to get more?: No   Transportation Needs: Low Risk  (8/24/2024)    Transportation Needs     Within the past 12 months, has lack of transportation kept you from medical appointments, getting your medicines, non-medical meetings or appointments, work, or from getting things that you need?: No   Physical Activity: Not on File (8/26/2019)    Received from [facility]    Physical Activity     Physical Activity: 0   Stress: Not on File (8/26/2019)    Received from [facility]    Stress     Stress: 0   Social Connections: Not on File (8/26/2019)    Received from [facility]    Social Connections     Social Connections and Isolation: 0   Interpersonal Safety: Low Risk  (8/24/2024)    Interpersonal Safety     Do you feel physically and emotionally safe where you currently live?: Yes     Within the  past 12 months, have you been hit, slapped, kicked or otherwise physically hurt by someone?: No     Within the past 12 months, have you been humiliated or emotionally abused in other ways by your partner or ex-partner?: No   Housing Stability: Low Risk  (8/24/2024)    Housing Stability     Do you have housing? : Yes     Are you worried about losing your housing?: No       ROS Pulmonary    A complete ROS was otherwise negative except as noted in the HPI.  /76 (BP Location: Right arm, Patient Position: Sitting, Cuff Size: Adult Regular)   Pulse 82   Temp 98.1  F (36.7  C) (Oral)   Wt 68.9 kg (152 lb)   SpO2 98%   BMI 21.81 kg/m    Exam:   GENERAL APPEARANCE: Well developed, well nourished, alert, and in no apparent distress. Poor posture.   EYES: PERRL, EOMI  HENT: Nasal mucosa with no edema and no hyperemia. No nasal polyps.  EARS: Canals clear, TMs normal  MOUTH: Oral mucosa is moist, without any lesions, no tonsillar enlargement, no oropharyngeal exudate.  NECK: supple, no masses, no thyromegaly.  LYMPHATICS: No significant axillary, cervical, or supraclavicular nodes.  RESP: normal percussion, good air flow throughout.  No crackles. No rhonchi. No wheezes.  CV: Normal S1, S2, regular rhythm, normal rate. No murmur.  No rub. No gallop. No LE edema.   ABDOMEN:  Bowel sounds normal, soft, nontender, no HSM or masses.   MS: extremities normal. No clubbing. No cyanosis.  SKIN: no rash on limited exam, staples intact without erythema, induration, discharge, or warmth  NEURO: Mentation intact, speech normal, normal strength and tone, normal gait and stance  PSYCH: mentation appears normal. and affect normal/bright.    Results:  Recent Results (from the past 168 hour(s))   Glucose by meter    Collection Time: 08/27/24  2:13 PM   Result Value Ref Range    GLUCOSE BY METER POCT 180 (H) 70 - 99 mg/dL   Glucose by meter    Collection Time: 08/27/24  5:41 PM   Result Value Ref Range    GLUCOSE BY METER POCT 242 (H)  70 - 99 mg/dL   Glucose by meter    Collection Time: 08/27/24  9:59 PM   Result Value Ref Range    GLUCOSE BY METER POCT 323 (H) 70 - 99 mg/dL   Glucose by meter    Collection Time: 08/28/24 12:09 AM   Result Value Ref Range    GLUCOSE BY METER POCT 180 (H) 70 - 99 mg/dL   Glucose by meter    Collection Time: 08/28/24  2:44 AM   Result Value Ref Range    GLUCOSE BY METER POCT 98 70 - 99 mg/dL   Basic metabolic panel    Collection Time: 08/28/24  6:29 AM   Result Value Ref Range    Sodium 137 135 - 145 mmol/L    Potassium 4.1 3.4 - 5.3 mmol/L    Chloride 102 98 - 107 mmol/L    Carbon Dioxide (CO2) 24 22 - 29 mmol/L    Anion Gap 11 7 - 15 mmol/L    Urea Nitrogen 13.0 8.0 - 23.0 mg/dL    Creatinine 0.70 0.67 - 1.17 mg/dL    GFR Estimate >90 >60 mL/min/1.73m2    Calcium 9.7 8.8 - 10.4 mg/dL    Glucose 99 70 - 99 mg/dL   Phosphorus    Collection Time: 08/28/24  6:29 AM   Result Value Ref Range    Phosphorus 3.7 2.5 - 4.5 mg/dL   Magnesium    Collection Time: 08/28/24  6:29 AM   Result Value Ref Range    Magnesium 1.7 1.7 - 2.3 mg/dL   Platelet count    Collection Time: 08/28/24  6:29 AM   Result Value Ref Range    Platelet Count 326 150 - 450 10e3/uL   Tacrolimus by Tandem Mass Spectrometry    Collection Time: 08/28/24  6:29 AM   Result Value Ref Range    Tacrolimus by Tandem Mass Spectrometry 8.8 5.0 - 15.0 ug/L    Tacrolimus Last Dose Date 8/27/2024     Tacrolimus Last Dose Time  6:27 PM    Hepatic panel    Collection Time: 08/28/24  6:29 AM   Result Value Ref Range    Protein Total 6.6 6.4 - 8.3 g/dL    Albumin 3.3 (L) 3.5 - 5.2 g/dL    Bilirubin Total 0.5 <=1.2 mg/dL    Alkaline Phosphatase 176 (H) 40 - 150 U/L    AST 27 0 - 45 U/L    ALT 51 0 - 70 U/L    Bilirubin Direct 0.21 0.00 - 0.30 mg/dL   CBC with platelets and differential    Collection Time: 08/28/24  6:29 AM   Result Value Ref Range    WBC Count 12.4 (H) 4.0 - 11.0 10e3/uL    RBC Count 4.21 (L) 4.40 - 5.90 10e6/uL    Hemoglobin 12.4 (L) 13.3 - 17.7 g/dL     Hematocrit 38.7 (L) 40.0 - 53.0 %    MCV 92 78 - 100 fL    MCH 29.5 26.5 - 33.0 pg    MCHC 32.0 31.5 - 36.5 g/dL    RDW 13.9 10.0 - 15.0 %    Platelet Count 326 150 - 450 10e3/uL    % Neutrophils 69 %    % Lymphocytes 18 %    % Monocytes 8 %    % Eosinophils 2 %    % Basophils 1 %    % Immature Granulocytes 2 %    NRBCs per 100 WBC 0 <1 /100    Absolute Neutrophils 8.5 (H) 1.6 - 8.3 10e3/uL    Absolute Lymphocytes 2.3 0.8 - 5.3 10e3/uL    Absolute Monocytes 1.0 0.0 - 1.3 10e3/uL    Absolute Eosinophils 0.2 0.0 - 0.7 10e3/uL    Absolute Basophils 0.1 0.0 - 0.2 10e3/uL    Absolute Immature Granulocytes 0.3 <=0.4 10e3/uL    Absolute NRBCs 0.0 10e3/uL   Glucose by meter    Collection Time: 08/28/24  8:43 AM   Result Value Ref Range    GLUCOSE BY METER POCT 135 (H) 70 - 99 mg/dL   UA with Microscopic reflex to Culture    Collection Time: 08/28/24 11:28 AM    Specimen: Urine, Midstream   Result Value Ref Range    Color Urine Straw Colorless, Straw, Light Yellow, Yellow    Appearance Urine Clear Clear    Glucose Urine Negative Negative mg/dL    Bilirubin Urine Negative Negative    Ketones Urine Negative Negative mg/dL    Specific Gravity Urine 1.007 1.003 - 1.035    Blood Urine Negative Negative    pH Urine 7.5 (H) 5.0 - 7.0    Protein Albumin Urine Negative Negative mg/dL    Urobilinogen Urine Normal Normal, 2.0 mg/dL    Nitrite Urine Negative Negative    Leukocyte Esterase Urine Negative Negative    RBC Urine <1 <=2 /HPF    WBC Urine <1 <=5 /HPF   Glucose by meter    Collection Time: 08/28/24 12:43 PM   Result Value Ref Range    GLUCOSE BY METER POCT 149 (H) 70 - 99 mg/dL   Tacrolimus by Tandem Mass Spectrometry    Collection Time: 08/29/24 12:01 PM   Result Value Ref Range    Tacrolimus by Tandem Mass Spectrometry 8.6 5.0 - 15.0 ug/L    Tacrolimus Last Dose Date 8/28/2024     Tacrolimus Last Dose Time 12:00 AM    Magnesium    Collection Time: 08/29/24 12:01 PM   Result Value Ref Range    Magnesium 1.6 (L) 1.7 - 2.3  mg/dL   CMV Quantitative, PCR    Collection Time: 08/29/24 12:01 PM    Specimen: Arm, Left; Blood   Result Value Ref Range    CMV DNA IU/mL Not Detected Not Detected IU/mL   CBC with platelets    Collection Time: 08/29/24 12:01 PM   Result Value Ref Range    WBC Count 13.0 (H) 4.0 - 11.0 10e3/uL    RBC Count 4.58 4.40 - 5.90 10e6/uL    Hemoglobin 13.4 13.3 - 17.7 g/dL    Hematocrit 41.9 40.0 - 53.0 %    MCV 92 78 - 100 fL    MCH 29.3 26.5 - 33.0 pg    MCHC 32.0 31.5 - 36.5 g/dL    RDW 13.6 10.0 - 15.0 %    Platelet Count 455 (H) 150 - 450 10e3/uL   Comprehensive metabolic panel    Collection Time: 08/29/24 12:01 PM   Result Value Ref Range    Sodium 133 (L) 135 - 145 mmol/L    Potassium 5.4 (H) 3.4 - 5.3 mmol/L    Carbon Dioxide (CO2) 26 22 - 29 mmol/L    Anion Gap 10 7 - 15 mmol/L    Urea Nitrogen 21.2 8.0 - 23.0 mg/dL    Creatinine 0.69 0.67 - 1.17 mg/dL    GFR Estimate >90 >60 mL/min/1.73m2    Calcium 10.2 8.8 - 10.4 mg/dL    Chloride 97 (L) 98 - 107 mmol/L    Glucose 229 (H) 70 - 99 mg/dL    Alkaline Phosphatase 185 (H) 40 - 150 U/L    AST 25 0 - 45 U/L    ALT 50 0 - 70 U/L    Protein Total 7.1 6.4 - 8.3 g/dL    Albumin 3.7 3.5 - 5.2 g/dL    Bilirubin Total 0.3 <=1.2 mg/dL   General PFT Lab (Please always keep checked)    Collection Time: 08/29/24 12:41 PM   Result Value Ref Range    FVC-Pred 4.25 L    FVC-Pre 2.71 L    FVC-%Pred-Pre 63 %    FEV1-Pre 2.27 L    FEV1-%Pred-Pre 68 %    FEV1FVC-Pred 78 %    FEV1FVC-Pre 84 %    FEFMax-Pred 9.16 L/sec    FEFMax-Pre 6.63 L/sec    FEFMax-%Pred-Pre 72 %    FEF2575-Pred 2.76 L/sec    FEF2575-Pre 2.68 L/sec    FKX5054-%Pred-Pre 97 %    ExpTime-Pre 4.21 sec    FIFMax-Pre 4.88 L/sec    FEV1FEV6-Pred 79 %    FEV1FEV6-Pre 84 %   Comprehensive metabolic panel    Collection Time: 09/03/24  9:36 AM   Result Value Ref Range    Sodium 137 135 - 145 mmol/L    Potassium 5.4 (H) 3.4 - 5.3 mmol/L    Carbon Dioxide (CO2) 27 22 - 29 mmol/L    Anion Gap 10 7 - 15 mmol/L    Urea Nitrogen  23.8 (H) 8.0 - 23.0 mg/dL    Creatinine 0.78 0.67 - 1.17 mg/dL    GFR Estimate >90 >60 mL/min/1.73m2    Calcium 10.3 8.8 - 10.4 mg/dL    Chloride 100 98 - 107 mmol/L    Glucose 207 (H) 70 - 99 mg/dL    Alkaline Phosphatase 139 40 - 150 U/L    AST 33 0 - 45 U/L    ALT 74 (H) 0 - 70 U/L    Protein Total 7.1 6.4 - 8.3 g/dL    Albumin 3.9 3.5 - 5.2 g/dL    Bilirubin Total 0.3 <=1.2 mg/dL   CBC with platelets    Collection Time: 09/03/24  9:36 AM   Result Value Ref Range    WBC Count 11.0 4.0 - 11.0 10e3/uL    RBC Count 4.67 4.40 - 5.90 10e6/uL    Hemoglobin 14.0 13.3 - 17.7 g/dL    Hematocrit 42.7 40.0 - 53.0 %    MCV 91 78 - 100 fL    MCH 30.0 26.5 - 33.0 pg    MCHC 32.8 31.5 - 36.5 g/dL    RDW 14.1 10.0 - 15.0 %    Platelet Count 497 (H) 150 - 450 10e3/uL   Magnesium    Collection Time: 09/03/24  9:36 AM   Result Value Ref Range    Magnesium 1.9 1.7 - 2.3 mg/dL   General PFT Lab (Please always keep checked)    Collection Time: 09/03/24  9:41 AM   Result Value Ref Range    FVC-Pred 4.25 L    FVC-Pre 2.96 L    FVC-%Pred-Pre 69 %    FEV1-Pre 2.49 L    FEV1-%Pred-Pre 75 %    FEV1FVC-Pred 78 %    FEV1FVC-Pre 84 %    FEFMax-Pred 9.16 L/sec    FEFMax-Pre 7.02 L/sec    FEFMax-%Pred-Pre 76 %    FEF2575-Pred 2.76 L/sec    FEF2575-Pre 2.95 L/sec    FNI1710-%Pred-Pre 106 %    ExpTime-Pre 5.48 sec    FIFMax-Pre 3.40 L/sec    FEV1FEV6-Pred 79 %    FEV1FEV6-Pre 84 %       Results as noted above.    PFT Interpretation:  Mild restrictive spirometry.  Improved from previous.   Best since lung transplantation  Valid Maneuver    CXR (9/3/2024), personally reviewed by me: bilateral Lung fields are clear. No effusion. Cardiac silhouette is not enlarged. Right sided atelectasis improved.     Assessment and plan: Travon Cartwright is a 59 year old male with a history of idiopathic pulmonary fibrosis, diabetes, hepatosteatosis, and essential tremor.  Pt. is now s/p BSLT on 8/15/2024 with Dr. Mulvihill.  Surgery was uncomplicated and done on  pump, extubated and off pressors since 8/18.  Had delirium. Weaned to RA 8/22.      Discharge recommendations:  - Continue IS and Aerobika   - DSA (8/22) pending then one month post-transplant (due 9/15, additionally per protocol)  - Prospera one month post-transplant (due 9/15)  - Plan for staple removal 4 weeks post-op per Dr. Mulvihill (see CVTS note 8/23)  - EBV, IgG, and donor labs due 9/15  - Follow pending bronch cultures (8/27)  - Monitor LFTs 8/29 as OP, noted alk phos elevation since 8/26     #. S/p bilateral sequential lung transplant (BSLT) 8/15/24 for idiopathic pulmonary fibrosis: Explant pathology: left with UIP and organizing pneumonia (5 benign hilar lymph nodes), right with UIP with organizing pneumonia (7 benign hilar lymph nodes).  PGD 1-2.    - Bronch (8/27) with normal surgical anastomosis in right mainstem bronchus, normal surgical anastomosis in left mainstem bronchus and noted pedunculated granulation tissue 6 o'clock, distal to anastomoses airway hyperemic but not friable, and moderate amount of thick, tenacious, clear secretions and fair amount of thin secretions.     Immunosuppression: S/p induction therapy with high dose IV steroid intraoperatively and basiliximab (POD #0 and #4).   - Tacrolimus goal  8-12.  - MMF 1000 mg BID  - Prednisone 25 mg daily with taper per lung transplant protocol:  Date Daily Dose (mg)   8/31/2024 20   9/21/2024 15   10/12/2024 10   11/2/2024 5      9/3/2024: Pulm sx are stable. CXR is stable to better. FEV1 - continues to improve. Advised proper posture and abd breathing.  - Nebs: levalbuterol and Mucomyst QID PRN  - transplant coordinator to follow up on nebulizer machine   - IS and Aerobika   - DSA (8/22) negative then one month post-transplant (due 9/15, additionally per protocol)  - Prospera one month post-transplant (due 9/15)  - CXR today with stable with streaky Rt base and left upper lung zone opacities (personally reviewed)  - Plan for staple removal  4 weeks (9/23) post-op per Dr. Mulvihill (see CVTS note 8/23)    #. ID:   Prophylaxis:   - Bactrim for PJP ppx (started POD #1 due to donor toxoplasma IgG +)  - VGCV for CMV ppx (as below), CMV monitoring per protocol (after completion of ppx course, additionally prn)  - Nystatin for oral candidiasis ppx, 6 month course   - See below for serologies and viral ppx:    Donor Recipient Intervention   CMV status - + Valganciclovir POD #8-90   EBV status - + EBV monthly (due 9/15)   HSV status N/A + N/A         - Donor cultures ([facility]) with Strep constellatus, donor cultures ([facility]) with MSSA.  S/p IV vancomycin 8/15-8/20 and ceftazidime 8/15-8/17.  Noted fever 8/18-8/19.  Blood culture negative 8/19.  C diff negative 8/21.  Report of urinary urgency and frequency, UA unremarkable 8/28.   - IgG at one month (due 9/15)  - Bronch cultures (8/27) NGTD, follow  - ABX: IV Zosyn (8/19-8/28) for 10 day course.     PHS risk criteria donor: Additional labs required post-transplant (between 4-8 weeks post-op): Hepatitis B, Hepatitis C, and HIV by JOVANY ([id number], due 9/15).     #. 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  No need for NPO for 6 weeks.    #. Hx HTN  #. Hx of HLD  - Monitor hemodynamic status. Goal MAP >65, SBP <140  - Continue rosuvastatin 20 daily.  - Metoprolol tartrate 50 mg BID    #. Hypomagnesemia: Suppressed absorption d/t CNI.   - Mag glycinate 400 mg TID    #. Hyperkalemia: elevated in setting of tacrolimus use.    - lokelma 10g one dose today again. HOpefully improving BS controll will treat this too.  - repeat BMP in 1 week     #. Lifelong tremors: He's had a history of lifelong tremors which have worsened in the last 1-2 years.    - Neuro referral- preferable a movement disorder specialist     #. DM II:  He was diagnosed with diabetes about 3-4 years ago and feels like it is not as well controlled as it could be. Prior to transplant was on Jardiance, dulaglutide, glargine,  metformin.   Lantus 30 units every day at 7 pm  To cover prednisone 20 mg daily,    NPH 18 units every day at the same time as the prednisone at 8 am (If do not take prednisone do not take NPH)  When Decrease Prednisone 15 mg daily- call provider to adjust BS. (If do not take prednisone do not take NPH)  Novolog Fixed meal dose:  Breakfast, lunch and dinner: Give 8 units of Novolog for medium meal, around 60g of carb and 10 units of Novolog for larger meal about 80 or greater g of carb----> Do not take if do not eat meal  Give 5 units of Novolog for a snack or supplement before 10 pm, do not cover snacks after 10 pm  9/3/2024: Hgb A1c is  pending.  - Refer to endocrinology, interested in [facility]  -  Endo team to review BS and give an update to insulin regimen.    #. Hepatic Steatosis: He has had intermittently elevated LFTs since 2019, had a liver US in 2017 showing hepatic steatosis. GI felt he was low likelihood advanced fibrosis.   #. Elevated alk phos: Noted initially in 8/26. Etiology unclear. it resolved.  #. Elevated ALT:   9/3/2024: Will repeat with next visit, if still elevated would stop statins and recheck a week later.    #. HCM:  Dermatology:   Vaccination: Will need RSV vaccine   Cancer screening:  - colonoscopy: due 2026 (last 2016 , no polyps, 10 year follow up)      The longitudinal plan of care for the diagnosis(es)/condition(s) as documented were addressed during this visit. Due to the added complexity in care, I will continue to support Juan J in the subsequent management and with ongoing continuity of care.

## 2024-09-03 ENCOUNTER — ANCILLARY PROCEDURE (OUTPATIENT)
Dept: GENERAL RADIOLOGY | Facility: CLINIC | Age: 59
End: 2024-09-03
Attending: INTERNAL MEDICINE
Payer: COMMERCIAL

## 2024-09-03 ENCOUNTER — OFFICE VISIT (OUTPATIENT)
Dept: TRANSPLANT | Facility: CLINIC | Age: 59
End: 2024-09-03
Attending: INTERNAL MEDICINE
Payer: COMMERCIAL

## 2024-09-03 ENCOUNTER — OFFICE VISIT (OUTPATIENT)
Dept: PULMONOLOGY | Facility: CLINIC | Age: 59
End: 2024-09-03
Attending: INTERNAL MEDICINE
Payer: COMMERCIAL

## 2024-09-03 ENCOUNTER — LAB (OUTPATIENT)
Dept: LAB | Facility: CLINIC | Age: 59
End: 2024-09-03
Attending: INTERNAL MEDICINE
Payer: COMMERCIAL

## 2024-09-03 ENCOUNTER — TELEPHONE (OUTPATIENT)
Dept: TRANSPLANT | Facility: CLINIC | Age: 59
End: 2024-09-03

## 2024-09-03 VITALS
TEMPERATURE: 98.1 F | SYSTOLIC BLOOD PRESSURE: 122 MMHG | WEIGHT: 152 LBS | DIASTOLIC BLOOD PRESSURE: 76 MMHG | HEART RATE: 82 BPM | OXYGEN SATURATION: 98 % | BODY MASS INDEX: 21.81 KG/M2

## 2024-09-03 DIAGNOSIS — Z94.2 LUNG REPLACED BY TRANSPLANT (H): ICD-10-CM

## 2024-09-03 DIAGNOSIS — Z79.4 TYPE 2 DIABETES MELLITUS WITHOUT COMPLICATION, WITH LONG-TERM CURRENT USE OF INSULIN (H): ICD-10-CM

## 2024-09-03 DIAGNOSIS — Z94.2 S/P LUNG TRANSPLANT (H): ICD-10-CM

## 2024-09-03 DIAGNOSIS — E11.9 TYPE 2 DIABETES MELLITUS WITHOUT COMPLICATION, WITH LONG-TERM CURRENT USE OF INSULIN (H): ICD-10-CM

## 2024-09-03 DIAGNOSIS — D84.9 IMMUNOSUPPRESSION (H): ICD-10-CM

## 2024-09-03 LAB
ALBUMIN SERPL BCG-MCNC: 3.9 G/DL (ref 3.5–5.2)
ALP SERPL-CCNC: 139 U/L (ref 40–150)
ALT SERPL W P-5'-P-CCNC: 74 U/L (ref 0–70)
ANION GAP SERPL CALCULATED.3IONS-SCNC: 10 MMOL/L (ref 7–15)
AST SERPL W P-5'-P-CCNC: 33 U/L (ref 0–45)
BILIRUB SERPL-MCNC: 0.3 MG/DL
BUN SERPL-MCNC: 23.8 MG/DL (ref 8–23)
CALCIUM SERPL-MCNC: 10.3 MG/DL (ref 8.8–10.4)
CHLORIDE SERPL-SCNC: 100 MMOL/L (ref 98–107)
CMV DNA SPEC NAA+PROBE-ACNC: NOT DETECTED IU/ML
CREAT SERPL-MCNC: 0.78 MG/DL (ref 0.67–1.17)
EBV DNA SERPL NAA+PROBE-ACNC: NOT DETECTED IU/ML
EGFRCR SERPLBLD CKD-EPI 2021: >90 ML/MIN/1.73M2
ERYTHROCYTE [DISTWIDTH] IN BLOOD BY AUTOMATED COUNT: 14.1 % (ref 10–15)
EXPTIME-PRE: 5.48 SEC
FEF2575-%PRED-PRE: 106 %
FEF2575-PRE: 2.95 L/SEC
FEF2575-PRED: 2.76 L/SEC
FEFMAX-%PRED-PRE: 76 %
FEFMAX-PRE: 7.02 L/SEC
FEFMAX-PRED: 9.16 L/SEC
FEV1-%PRED-PRE: 75 %
FEV1-PRE: 2.49 L
FEV1FEV6-PRE: 84 %
FEV1FEV6-PRED: 79 %
FEV1FVC-PRE: 84 %
FEV1FVC-PRED: 78 %
FIFMAX-PRE: 3.4 L/SEC
FVC-%PRED-PRE: 69 %
FVC-PRE: 2.96 L
FVC-PRED: 4.25 L
GLUCOSE SERPL-MCNC: 207 MG/DL (ref 70–99)
HBA1C MFR BLD: 7.4 %
HCO3 SERPL-SCNC: 27 MMOL/L (ref 22–29)
HCT VFR BLD AUTO: 42.7 % (ref 40–53)
HGB BLD-MCNC: 14 G/DL (ref 13.3–17.7)
MAGNESIUM SERPL-MCNC: 1.9 MG/DL (ref 1.7–2.3)
MCH RBC QN AUTO: 30 PG (ref 26.5–33)
MCHC RBC AUTO-ENTMCNC: 32.8 G/DL (ref 31.5–36.5)
MCV RBC AUTO: 91 FL (ref 78–100)
PLATELET # BLD AUTO: 497 10E3/UL (ref 150–450)
POTASSIUM SERPL-SCNC: 5.4 MMOL/L (ref 3.4–5.3)
PROT SERPL-MCNC: 7.1 G/DL (ref 6.4–8.3)
RBC # BLD AUTO: 4.67 10E6/UL (ref 4.4–5.9)
SODIUM SERPL-SCNC: 137 MMOL/L (ref 135–145)
TACROLIMUS BLD-MCNC: 12.5 UG/L (ref 5–15)
TME LAST DOSE: NORMAL H
TME LAST DOSE: NORMAL H
WBC # BLD AUTO: 11 10E3/UL (ref 4–11)

## 2024-09-03 PROCEDURE — 85027 COMPLETE CBC AUTOMATED: CPT | Performed by: PATHOLOGY

## 2024-09-03 PROCEDURE — 83036 HEMOGLOBIN GLYCOSYLATED A1C: CPT | Performed by: NURSE PRACTITIONER

## 2024-09-03 PROCEDURE — 99000 SPECIMEN HANDLING OFFICE-LAB: CPT | Performed by: PATHOLOGY

## 2024-09-03 PROCEDURE — 80197 ASSAY OF TACROLIMUS: CPT | Performed by: INTERNAL MEDICINE

## 2024-09-03 PROCEDURE — G0463 HOSPITAL OUTPT CLINIC VISIT: HCPCS | Performed by: INTERNAL MEDICINE

## 2024-09-03 PROCEDURE — 83735 ASSAY OF MAGNESIUM: CPT | Performed by: PATHOLOGY

## 2024-09-03 PROCEDURE — 87799 DETECT AGENT NOS DNA QUANT: CPT | Performed by: INTERNAL MEDICINE

## 2024-09-03 PROCEDURE — 94375 RESPIRATORY FLOW VOLUME LOOP: CPT | Performed by: INTERNAL MEDICINE

## 2024-09-03 PROCEDURE — 36415 COLL VENOUS BLD VENIPUNCTURE: CPT | Performed by: PATHOLOGY

## 2024-09-03 PROCEDURE — 80053 COMPREHEN METABOLIC PANEL: CPT | Performed by: PATHOLOGY

## 2024-09-03 PROCEDURE — 99214 OFFICE O/P EST MOD 30 MIN: CPT | Mod: 24 | Performed by: INTERNAL MEDICINE

## 2024-09-03 PROCEDURE — 71046 X-RAY EXAM CHEST 2 VIEWS: CPT | Mod: GC | Performed by: RADIOLOGY

## 2024-09-03 RX ORDER — TACROLIMUS 1 MG/1
CAPSULE ORAL
Qty: 210 CAPSULE | Refills: 11 | Status: SHIPPED | OUTPATIENT
Start: 2024-09-03

## 2024-09-03 RX ORDER — TACROLIMUS 0.5 MG/1
0.5 CAPSULE ORAL EVERY MORNING
Qty: 30 CAPSULE | Refills: 11 | Status: SHIPPED | OUTPATIENT
Start: 2024-09-03

## 2024-09-03 ASSESSMENT — PAIN SCALES - GENERAL: PAINLEVEL: NO PAIN (0)

## 2024-09-03 NOTE — LETTER
9/3/2024      Travon Cartwright  [address]  [facility] [address]      Dear Colleague,    Thank you for referring your patient, Travon Cartwright, to the [facility]. Please see a copy of my visit note below.    Reason for Visit  Travon Cartwright is a 59 year old male who is being seen for RECHECK (Follow up. )  Lung TX HPI  The patient was seen and examined by Carlos Johnson MD     Travon Cartwright is a 59 year old male with a history of idiopathic pulmonary fibrosis, diabetes, hepatosteatosis, and essential tremor.  Pt. is now s/p BSLT on 8/15/2024 with Dr. Mulvihill.  Surgery was uncomplicated and done on pump, extubated and off pressors since 8/18.  Weaned to RA 8/22.     Interval history:  He is doing well. Feels SOB with climbing stairs and with longer walks. Able to walk 0.5 to 1mile. Denies cough, palpitations, chest pain, abdominal pain, or dysuria. Still has nocturia - 3 x/night.  His sister notes his blood sugars are about 200's to 300's during the day.   No Post nasal drip or heart burn.     Current Outpatient Medications   Medication Sig Dispense Refill     acetaminophen (TYLENOL) 325 MG tablet Take 2 tablets (650 mg) by mouth or Feeding Tube every 4 hours as needed for other (For optimal non-opioid multimodal pain management to improve pain control.). 30 tablet 0     calcium carbonate-vitamin D (CALTRATE) 600-10 MG-MCG per tablet Take 1 tablet by mouth or Feeding Tube 2 times daily. 60 tablet 0     Continuous Glucose Sensor (DEXCOM G7 SENSOR) MISC Change every 10 days. 1 each 11     hydrOXYzine HCl (ATARAX) 25 MG tablet Take 1 tablet (25 mg) by mouth every 6 hours as needed for itching. 20 tablet 0     insulin aspart (NOVOLOG PEN) 100 UNIT/ML pen Inject 1-10 Units subcutaneously 3 times daily (before meals). Correction Scale - HIGH INSULIN RESISTANCE DOSING   Do Not give Correction Insulin if Pre-Meal BG less than 140. For Pre-Meal  - 164 give 1 unit.  For Pre-Meal  - 189 give 2 units. For Pre-Meal  - 214 give 3 units. For Pre-Meal  - 239 give 4 units. For Pre-Meal  - 264 give 5 units. For Pre-Meal  - 289 give 6 units. For Pre-Meal  - 314 give 7 units. For Pre-Meal  - 339 give 8 units. For Pre-Meal  - 364 give 9 units.  For Pre-Meal BG greater than or equal to 365 give 10 units To be given with prandial insulin, and based on pre-meal blood glucose. Administering insulin within 5 minutes of the start of the meal is ideal. Administer insulin no more than 30 minutes after the start of the meal, unless directed otherwise by provider. Notify provider if glucose greater than or equal to 350 mg/dL after administration of correction dose. 15 mL 2     insulin aspart (NOVOLOG PEN) 100 UNIT/ML pen Inject 5 Units subcutaneously Take with snacks or supplements for high blood sugar. 15 mL 2     insulin aspart (NOVOLOG PEN) 100 UNIT/ML pen Inject 1-7 Units subcutaneously at bedtime. HIGH INSULIN RESISTANCE DOSING  Do Not give Bedtime Correction Insulin if BG less than 200. For  - 224 give 1 units. For  - 249 give 2 units. For  - 274 give 3 units. For  - 299 give 4 units. For  - 324 give 5 units. For  - 349 give 6 units. For BG greater than or equal to 350 give 7 units. Notify provider if glucose greater than or equal to 350 mg/dL after administration of correction dose. 15 mL 2     insulin aspart (NOVOLOG PEN) 100 UNIT/ML pen Inject 8-10 Units subcutaneously 3 times daily (with meals). 8 units with medium meal, 10 units with large meals 15 mL 2     insulin glargine (LANTUS PEN) 100 UNIT/ML pen Inject 35 Units subcutaneously at bedtime. 15 mL 3     insulin  UNIT/ML injection 24 units before breakfast 15 mL 3     insulin pen needle (32G X 4 MM) 32G X 4 MM miscellaneous Use pen needles daily or as directed. 100 each 3     levalbuterol (XOPENEX) 1.25 MG/3ML neb solution Take 3 mLs (1.25 mg) by  nebulization 4 times daily as needed for shortness of breath or wheezing. Use prior to Mucomyst neb. 300 mL 0     magnesium glycinate 100 MG CAPS capsule Take 4 capsules (400 mg) by mouth 3 times daily. 360 capsule 0     melatonin 5 MG tablet Take 1 tablet (5 mg) by mouth or Feeding Tube every evening. 30 tablet 0     methocarbamol (ROBAXIN) 750 MG tablet Take 1 tablet (750 mg) by mouth or Feeding Tube 4 times daily as needed for muscle spasms. 90 tablet 1     metoprolol tartrate (LOPRESSOR) 50 MG tablet Take 1 tablet (50 mg) by mouth or Feeding Tube 2 times daily. 60 tablet 0     multivitamin, therapeutic (THERA-VIT) TABS tablet Take 1 tablet by mouth daily. 30 tablet 0     mycophenolate (GENERIC EQUIVALENT) 250 MG capsule Take 4 capsules (1,000 mg) by mouth 2 times daily. 240 capsule 11     nystatin (MYCOSTATIN) 418500 UNIT/ML suspension Take 10 mLs (1,000,000 Units) by mouth 4 times daily. 1200 mL 0     pantoprazole (PROTONIX) 40 MG EC tablet Take 1 tablet (40 mg) by mouth every morning (before breakfast). 30 tablet 0     polyethylene glycol (MIRALAX) 17 GM/Dose powder Take 17 g by mouth daily as needed for constipation. 510 g 0     predniSONE (DELTASONE) 5 MG tablet Take 5 tablets (25 mg) by mouth daily. Taper per lung transplant clinic.  Next taper to 20 mg daily on 8/31 (Patient taking differently: Take 5 mg by mouth daily. Taper per lung transplant clinic.  Next taper to 20 mg daily on 8/31) 150 tablet 3     rosuvastatin (CRESTOR) 10 MG tablet Take 1 tablet (10 mg) by mouth daily. 30 tablet 0     senna-docusate (SENOKOT-S/PERICOLACE) 8.6-50 MG tablet Take 2 tablets by mouth or Feeding Tube 2 times daily as needed for constipation (use 1st). 120 tablet 0     sodium zirconium cyclosilicate (LOKELMA) 10 g PACK packet Take 1 packet (10 g) by mouth daily as needed. 10 packet 1     sulfamethoxazole-trimethoprim (BACTRIM) 400-80 MG tablet Take 1 tablet by mouth or NG Tube daily. 30 tablet 0     tacrolimus (GENERIC  EQUIVALENT) 0.5 MG capsule Take 1 capsule (0.5 mg) by mouth 2 times daily as needed (Total dose 4 mg twice daily, to be adjusted by pulmonary clinic). 60 capsule 3     tacrolimus (GENERIC EQUIVALENT) 1 MG capsule Take 4 capsules (4 mg) by mouth 2 times daily. Total dose 4 mg twice daily, to be adjusted by pulmonary clinic 240 capsule 11     traZODone (DESYREL) 50 MG tablet Take 1 tablet (50 mg) by mouth or Feeding Tube nightly as needed for sleep. 30 tablet 0     valGANciclovir (VALCYTE) 450 MG tablet Take 2 tablets (900 mg) by mouth daily. 60 tablet 0     acetylcysteine (MUCOMYST) 20 % neb solution Take 2 mLs by nebulization 4 times daily as needed for mucolysis/respiratory distress. (Patient not taking: Reported on 8/29/2024) 30 mL 1     No current facility-administered medications for this visit.     Facility-Administered Medications Ordered in Other Visits   Medication Dose Route Frequency Provider Last Rate Last Admin     sodium chloride bacteriostatic 0.9 % flush 9 mL  9 mL Intravenous Once NICOLA Pearce MD         No Known Allergies  Past Medical History:   Diagnosis Date     Administration of long-term prophylactic antibiotics 08/26/2024     Hepatic steatosis 2017    Noted on ultrasound     Hypomagnesemia 08/26/2024     Immunosuppression (H24) 08/26/2024     S/P lung transplant (H) 08/15/2024     Tremor 05/23/2024       Past Surgical History:   Procedure Laterality Date     BRONCHOSCOPY  08/16/2024     BRONCHOSCOPY FLEXIBLE AND RIGID N/A 8/27/2024    Procedure: Bronchoscopy Inspection;  Surgeon: Oneida Silver MD;  Location:  GI     COLONOSCOPY       CV CORONARY ANGIOGRAM N/A 06/21/2024    Procedure: Coronary Angiogram;  Surgeon: Gabriel Julian MD;  Location:  HEART CARDIAC CATH LAB     CV RIGHT HEART CATH MEASUREMENTS RECORDED N/A 06/21/2024    Procedure: Right Heart Catheterization;  Surgeon: Gabriel Julian MD;  Location:  HEART CARDIAC CATH LAB     PICC DOUBLE LUMEN PLACEMENT Right  08/20/2024    47-3cm, Basilic     TRANSPLANT LUNG RECIPIENT SINGLE X2 Bilateral 08/15/2024    Procedure: Clamshell Incision, On Central Venoarterial Extracorporeal Membranous Oxygenation, Bilateral Lung Transplant Recipient, Left atrial appendage ligation, Intraoperative Flexible Bronchoscopy by Anesthesia;  Surgeon: Mulvihill, Michael, MD;  Location:  OR       Social History     Socioeconomic History     Marital status: Single     Spouse name: Not on file     Number of children: Not on file     Years of education: Not on file     Highest education level: Not on file   Occupational History     Not on file   Tobacco Use     Smoking status: Former     Types: Cigarettes     Smokeless tobacco: Never   Substance and Sexual Activity     Alcohol use: Not Currently     Comment: not since 2017     Drug use: Never     Sexual activity: Not on file   Other Topics Concern     Not on file   Social History Narrative     Not on file     Social Determinants of Health     Financial Resource Strain: Low Risk  (8/24/2024)    Financial Resource Strain      Within the past 12 months, have you or your family members you live with been unable to get utilities (heat, electricity) when it was really needed?: No   Food Insecurity: Low Risk  (8/24/2024)    Food Insecurity      Within the past 12 months, did you worry that your food would run out before you got money to buy more?: No      Within the past 12 months, did the food you bought just not last and you didn t have money to get more?: No   Transportation Needs: Low Risk  (8/24/2024)    Transportation Needs      Within the past 12 months, has lack of transportation kept you from medical appointments, getting your medicines, non-medical meetings or appointments, work, or from getting things that you need?: No   Physical Activity: Not on File (8/26/2019)    Received from [email or web address]    Physical Activity      Physical Activity: 0   Stress: Not on File (8/26/2019)    Received from [email or web address]    Stress       Stress: 0   Social Connections: Not on File (8/26/2019)    Received from [facility]      Social Connections and Isolation: 0   Interpersonal Safety: Low Risk  (8/24/2024)    Interpersonal Safety      Do you feel physically and emotionally safe where you currently live?: Yes      Within the past 12 months, have you been hit, slapped, kicked or otherwise physically hurt by someone?: No      Within the past 12 months, have you been humiliated or emotionally abused in other ways by your partner or ex-partner?: No   Housing Stability: Low Risk  (8/24/2024)    Housing Stability      Do you have housing? : Yes      Are you worried about losing your housing?: No       ROS Pulmonary    A complete ROS was otherwise negative except as noted in the HPI.  /76 (BP Location: Right arm, Patient Position: Sitting, Cuff Size: Adult Regular)   Pulse 82   Temp 98.1  F (36.7  C) (Oral)   Wt 68.9 kg (152 lb)   SpO2 98%   BMI 21.81 kg/m    Exam:   GENERAL APPEARANCE: Well developed, well nourished, alert, and in no apparent distress. Poor posture.   EYES: PERRL, EOMI  HENT: Nasal mucosa with no edema and no hyperemia. No nasal polyps.  EARS: Canals clear, TMs normal  MOUTH: Oral mucosa is moist, without any lesions, no tonsillar enlargement, no oropharyngeal exudate.  NECK: supple, no masses, no thyromegaly.  LYMPHATICS: No significant axillary, cervical, or supraclavicular nodes.  RESP: normal percussion, good air flow throughout.  No crackles. No rhonchi. No wheezes.  CV: Normal S1, S2, regular rhythm, normal rate. No murmur.  No rub. No gallop. No LE edema.   ABDOMEN:  Bowel sounds normal, soft, nontender, no HSM or masses.   MS: extremities normal. No clubbing. No cyanosis.  SKIN: no rash on limited exam, staples intact without erythema, induration, discharge, or warmth  NEURO: Mentation intact, speech normal, normal strength and tone, normal gait and stance  PSYCH: mentation appears normal. and affect  normal/bright.    Results:  Recent Results (from the past 168 hour(s))   Glucose by meter    Collection Time: 08/27/24  2:13 PM   Result Value Ref Range    GLUCOSE BY METER POCT 180 (H) 70 - 99 mg/dL   Glucose by meter    Collection Time: 08/27/24  5:41 PM   Result Value Ref Range    GLUCOSE BY METER POCT 242 (H) 70 - 99 mg/dL   Glucose by meter    Collection Time: 08/27/24  9:59 PM   Result Value Ref Range    GLUCOSE BY METER POCT 323 (H) 70 - 99 mg/dL   Glucose by meter    Collection Time: 08/28/24 12:09 AM   Result Value Ref Range    GLUCOSE BY METER POCT 180 (H) 70 - 99 mg/dL   Glucose by meter    Collection Time: 08/28/24  2:44 AM   Result Value Ref Range    GLUCOSE BY METER POCT 98 70 - 99 mg/dL   Basic metabolic panel    Collection Time: 08/28/24  6:29 AM   Result Value Ref Range    Sodium 137 135 - 145 mmol/L    Potassium 4.1 3.4 - 5.3 mmol/L    Chloride 102 98 - 107 mmol/L    Carbon Dioxide (CO2) 24 22 - 29 mmol/L    Anion Gap 11 7 - 15 mmol/L    Urea Nitrogen 13.0 8.0 - 23.0 mg/dL    Creatinine 0.70 0.67 - 1.17 mg/dL    GFR Estimate >90 >60 mL/min/1.73m2    Calcium 9.7 8.8 - 10.4 mg/dL    Glucose 99 70 - 99 mg/dL   Phosphorus    Collection Time: 08/28/24  6:29 AM   Result Value Ref Range    Phosphorus 3.7 2.5 - 4.5 mg/dL   Magnesium    Collection Time: 08/28/24  6:29 AM   Result Value Ref Range    Magnesium 1.7 1.7 - 2.3 mg/dL   Platelet count    Collection Time: 08/28/24  6:29 AM   Result Value Ref Range    Platelet Count 326 150 - 450 10e3/uL   Tacrolimus by Tandem Mass Spectrometry    Collection Time: 08/28/24  6:29 AM   Result Value Ref Range    Tacrolimus by Tandem Mass Spectrometry 8.8 5.0 - 15.0 ug/L    Tacrolimus Last Dose Date 8/27/2024     Tacrolimus Last Dose Time  6:27 PM    Hepatic panel    Collection Time: 08/28/24  6:29 AM   Result Value Ref Range    Protein Total 6.6 6.4 - 8.3 g/dL    Albumin 3.3 (L) 3.5 - 5.2 g/dL    Bilirubin Total 0.5 <=1.2 mg/dL    Alkaline Phosphatase 176 (H) 40 - 150  U/L    AST 27 0 - 45 U/L    ALT 51 0 - 70 U/L    Bilirubin Direct 0.21 0.00 - 0.30 mg/dL   CBC with platelets and differential    Collection Time: 08/28/24  6:29 AM   Result Value Ref Range    WBC Count 12.4 (H) 4.0 - 11.0 10e3/uL    RBC Count 4.21 (L) 4.40 - 5.90 10e6/uL    Hemoglobin 12.4 (L) 13.3 - 17.7 g/dL    Hematocrit 38.7 (L) 40.0 - 53.0 %    MCV 92 78 - 100 fL    MCH 29.5 26.5 - 33.0 pg    MCHC 32.0 31.5 - 36.5 g/dL    RDW 13.9 10.0 - 15.0 %    Platelet Count 326 150 - 450 10e3/uL    % Neutrophils 69 %    % Lymphocytes 18 %    % Monocytes 8 %    % Eosinophils 2 %    % Basophils 1 %    % Immature Granulocytes 2 %    NRBCs per 100 WBC 0 <1 /100    Absolute Neutrophils 8.5 (H) 1.6 - 8.3 10e3/uL    Absolute Lymphocytes 2.3 0.8 - 5.3 10e3/uL    Absolute Monocytes 1.0 0.0 - 1.3 10e3/uL    Absolute Eosinophils 0.2 0.0 - 0.7 10e3/uL    Absolute Basophils 0.1 0.0 - 0.2 10e3/uL    Absolute Immature Granulocytes 0.3 <=0.4 10e3/uL    Absolute NRBCs 0.0 10e3/uL   Glucose by meter    Collection Time: 08/28/24  8:43 AM   Result Value Ref Range    GLUCOSE BY METER POCT 135 (H) 70 - 99 mg/dL   UA with Microscopic reflex to Culture    Collection Time: 08/28/24 11:28 AM    Specimen: Urine, Midstream   Result Value Ref Range    Color Urine Straw Colorless, Straw, Light Yellow, Yellow    Appearance Urine Clear Clear    Glucose Urine Negative Negative mg/dL    Bilirubin Urine Negative Negative    Ketones Urine Negative Negative mg/dL    Specific Gravity Urine 1.007 1.003 - 1.035    Blood Urine Negative Negative    pH Urine 7.5 (H) 5.0 - 7.0    Protein Albumin Urine Negative Negative mg/dL    Urobilinogen Urine Normal Normal, 2.0 mg/dL    Nitrite Urine Negative Negative    Leukocyte Esterase Urine Negative Negative    RBC Urine <1 <=2 /HPF    WBC Urine <1 <=5 /HPF   Glucose by meter    Collection Time: 08/28/24 12:43 PM   Result Value Ref Range    GLUCOSE BY METER POCT 149 (H) 70 - 99 mg/dL   Tacrolimus by Tandem Mass  Spectrometry    Collection Time: 08/29/24 12:01 PM   Result Value Ref Range    Tacrolimus by Tandem Mass Spectrometry 8.6 5.0 - 15.0 ug/L    Tacrolimus Last Dose Date 8/28/2024     Tacrolimus Last Dose Time 12:00 AM    Magnesium    Collection Time: 08/29/24 12:01 PM   Result Value Ref Range    Magnesium 1.6 (L) 1.7 - 2.3 mg/dL   CMV Quantitative, PCR    Collection Time: 08/29/24 12:01 PM    Specimen: Arm, Left; Blood   Result Value Ref Range    CMV DNA IU/mL Not Detected Not Detected IU/mL   CBC with platelets    Collection Time: 08/29/24 12:01 PM   Result Value Ref Range    WBC Count 13.0 (H) 4.0 - 11.0 10e3/uL    RBC Count 4.58 4.40 - 5.90 10e6/uL    Hemoglobin 13.4 13.3 - 17.7 g/dL    Hematocrit 41.9 40.0 - 53.0 %    MCV 92 78 - 100 fL    MCH 29.3 26.5 - 33.0 pg    MCHC 32.0 31.5 - 36.5 g/dL    RDW 13.6 10.0 - 15.0 %    Platelet Count 455 (H) 150 - 450 10e3/uL   Comprehensive metabolic panel    Collection Time: 08/29/24 12:01 PM   Result Value Ref Range    Sodium 133 (L) 135 - 145 mmol/L    Potassium 5.4 (H) 3.4 - 5.3 mmol/L    Carbon Dioxide (CO2) 26 22 - 29 mmol/L    Anion Gap 10 7 - 15 mmol/L    Urea Nitrogen 21.2 8.0 - 23.0 mg/dL    Creatinine 0.69 0.67 - 1.17 mg/dL    GFR Estimate >90 >60 mL/min/1.73m2    Calcium 10.2 8.8 - 10.4 mg/dL    Chloride 97 (L) 98 - 107 mmol/L    Glucose 229 (H) 70 - 99 mg/dL    Alkaline Phosphatase 185 (H) 40 - 150 U/L    AST 25 0 - 45 U/L    ALT 50 0 - 70 U/L    Protein Total 7.1 6.4 - 8.3 g/dL    Albumin 3.7 3.5 - 5.2 g/dL    Bilirubin Total 0.3 <=1.2 mg/dL   General PFT Lab (Please always keep checked)    Collection Time: 08/29/24 12:41 PM   Result Value Ref Range    FVC-Pred 4.25 L    FVC-Pre 2.71 L    FVC-%Pred-Pre 63 %    FEV1-Pre 2.27 L    FEV1-%Pred-Pre 68 %    FEV1FVC-Pred 78 %    FEV1FVC-Pre 84 %    FEFMax-Pred 9.16 L/sec    FEFMax-Pre 6.63 L/sec    FEFMax-%Pred-Pre 72 %    FEF2575-Pred 2.76 L/sec    FEF2575-Pre 2.68 L/sec    RFX9588-%Pred-Pre 97 %    ExpTime-Pre 4.21 sec     FIFMax-Pre 4.88 L/sec    FEV1FEV6-Pred 79 %    FEV1FEV6-Pre 84 %   Comprehensive metabolic panel    Collection Time: 09/03/24  9:36 AM   Result Value Ref Range    Sodium 137 135 - 145 mmol/L    Potassium 5.4 (H) 3.4 - 5.3 mmol/L    Carbon Dioxide (CO2) 27 22 - 29 mmol/L    Anion Gap 10 7 - 15 mmol/L    Urea Nitrogen 23.8 (H) 8.0 - 23.0 mg/dL    Creatinine 0.78 0.67 - 1.17 mg/dL    GFR Estimate >90 >60 mL/min/1.73m2    Calcium 10.3 8.8 - 10.4 mg/dL    Chloride 100 98 - 107 mmol/L    Glucose 207 (H) 70 - 99 mg/dL    Alkaline Phosphatase 139 40 - 150 U/L    AST 33 0 - 45 U/L    ALT 74 (H) 0 - 70 U/L    Protein Total 7.1 6.4 - 8.3 g/dL    Albumin 3.9 3.5 - 5.2 g/dL    Bilirubin Total 0.3 <=1.2 mg/dL   CBC with platelets    Collection Time: 09/03/24  9:36 AM   Result Value Ref Range    WBC Count 11.0 4.0 - 11.0 10e3/uL    RBC Count 4.67 4.40 - 5.90 10e6/uL    Hemoglobin 14.0 13.3 - 17.7 g/dL    Hematocrit 42.7 40.0 - 53.0 %    MCV 91 78 - 100 fL    MCH 30.0 26.5 - 33.0 pg    MCHC 32.8 31.5 - 36.5 g/dL    RDW 14.1 10.0 - 15.0 %    Platelet Count 497 (H) 150 - 450 10e3/uL   Magnesium    Collection Time: 09/03/24  9:36 AM   Result Value Ref Range    Magnesium 1.9 1.7 - 2.3 mg/dL   General PFT Lab (Please always keep checked)    Collection Time: 09/03/24  9:41 AM   Result Value Ref Range    FVC-Pred 4.25 L    FVC-Pre 2.96 L    FVC-%Pred-Pre 69 %    FEV1-Pre 2.49 L    FEV1-%Pred-Pre 75 %    FEV1FVC-Pred 78 %    FEV1FVC-Pre 84 %    FEFMax-Pred 9.16 L/sec    FEFMax-Pre 7.02 L/sec    FEFMax-%Pred-Pre 76 %    FEF2575-Pred 2.76 L/sec    FEF2575-Pre 2.95 L/sec    FXZ2886-%Pred-Pre 106 %    ExpTime-Pre 5.48 sec    FIFMax-Pre 3.40 L/sec    FEV1FEV6-Pred 79 %    FEV1FEV6-Pre 84 %       Results as noted above.    PFT Interpretation:  Mild restrictive spirometry.  Improved from previous.   Best since lung transplantation  Valid Maneuver    CXR (9/3/2024), personally reviewed by me: bilateral Lung fields are clear. No effusion.  Cardiac silhouette is not enlarged. Right sided atelectasis improved.     Assessment and plan: Travon Cartwright is a 59 year old male with a history of idiopathic pulmonary fibrosis, diabetes, hepatosteatosis, and essential tremor.  Pt. is now s/p BSLT on 8/15/2024 with Dr. Mulvihill.  Surgery was uncomplicated and done on pump, extubated and off pressors since 8/18.  Had delirium. Weaned to RA 8/22.      Discharge recommendations:  - Continue IS and Aerobika   - DSA (8/22) pending then one month post-transplant (due 9/15, additionally per protocol)  - Prospera one month post-transplant (due 9/15)  - Plan for staple removal 4 weeks post-op per Dr. Mulvihill (see CVTS note 8/23)  - EBV, IgG, and donor labs due 9/15  - Follow pending bronch cultures (8/27)  - Monitor LFTs 8/29 as OP, noted alk phos elevation since 8/26     #. S/p bilateral sequential lung transplant (BSLT) 8/15/24 for idiopathic pulmonary fibrosis: Explant pathology: left with UIP and organizing pneumonia (5 benign hilar lymph nodes), right with UIP with organizing pneumonia (7 benign hilar lymph nodes).  PGD 1-2.    - Bronch (8/27) with normal surgical anastomosis in right mainstem bronchus, normal surgical anastomosis in left mainstem bronchus and noted pedunculated granulation tissue 6 o'clock, distal to anastomoses airway hyperemic but not friable, and moderate amount of thick, tenacious, clear secretions and fair amount of thin secretions.     Immunosuppression: S/p induction therapy with high dose IV steroid intraoperatively and basiliximab (POD #0 and #4).   - Tacrolimus goal  8-12.  - MMF 1000 mg BID  - Prednisone 25 mg daily with taper per lung transplant protocol:  Date Daily Dose (mg)   8/31/2024 20   9/21/2024 15   10/12/2024 10   11/2/2024 5      9/3/2024: Pulm sx are stable. CXR is stable to better. FEV1 - continues to improve. Advised proper posture and abd breathing.  - Nebs: levalbuterol and Mucomyst QID PRN  - transplant  coordinator to follow up on nebulizer machine   - IS and Aerobika   - DSA (8/22) negative then one month post-transplant (due 9/15, additionally per protocol)  - Prospera one month post-transplant (due 9/15)  - CXR today with stable with streaky Rt base and left upper lung zone opacities (personally reviewed)  - Plan for staple removal 4 weeks (9/23) post-op per Dr. Mulvihill (see CVTS note 8/23)    #. ID:   Prophylaxis:   - Bactrim for PJP ppx (started POD #1 due to donor toxoplasma IgG +)  - VGCV for CMV ppx (as below), CMV monitoring per protocol (after completion of ppx course, additionally prn)  - Nystatin for oral candidiasis ppx, 6 month course   - See below for serologies and viral ppx:    Donor Recipient Intervention   CMV status - + Valganciclovir POD #8-90   EBV status - + EBV monthly (due 9/15)   HSV status N/A + N/A         - Donor cultures ([facility]) with Strep constellatus, donor cultures ([facility]) with MSSA.  S/p IV vancomycin 8/15-8/20 and ceftazidime 8/15-8/17.  Noted fever 8/18-8/19.  Blood culture negative 8/19.  C diff negative 8/21.  Report of urinary urgency and frequency, UA unremarkable 8/28.   - IgG at one month (due 9/15)  - Bronch cultures (8/27) NGTD, follow  - ABX: IV Zosyn (8/19-8/28) for 10 day course.     PHS risk criteria donor: Additional labs required post-transplant (between 4-8 weeks post-op): Hepatitis B, Hepatitis C, and HIV by JOVANY ([id number], due 9/15).     #. 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  No need for NPO for 6 weeks.    #. Hx HTN  #. Hx of HLD  - Monitor hemodynamic status. Goal MAP >65, SBP <140  - Continue rosuvastatin 20 daily.  - Metoprolol tartrate 50 mg BID    #. Hypomagnesemia: Suppressed absorption d/t CNI.   - Mag glycinate 400 mg TID    #. Hyperkalemia: elevated in setting of tacrolimus use.    - lokelma 10g one dose today again. HOpefully improving BS controll will treat this too.  - repeat BMP in 1 week     #. Lifelong  tremors: He's had a history of lifelong tremors which have worsened in the last 1-2 years.    - Neuro referral- preferable a movement disorder specialist     #. DM II:  He was diagnosed with diabetes about 3-4 years ago and feels like it is not as well controlled as it could be. Prior to transplant was on Jardiance, dulaglutide, glargine, metformin.   Lantus 30 units every day at 7 pm  To cover prednisone 20 mg daily,    NPH 18 units every day at the same time as the prednisone at 8 am (If do not take prednisone do not take NPH)  When Decrease Prednisone 15 mg daily- call provider to adjust BS. (If do not take prednisone do not take NPH)  Novolog Fixed meal dose:  Breakfast, lunch and dinner: Give 8 units of Novolog for medium meal, around 60g of carb and 10 units of Novolog for larger meal about 80 or greater g of carb----> Do not take if do not eat meal  Give 5 units of Novolog for a snack or supplement before 10 pm, do not cover snacks after 10 pm  9/3/2024: Hgb A1c is  pending.  - Refer to endocrinology, interested in CGM  -  Endo team to review BS and give an update to insulin regimen.    #. Hepatic Steatosis: He has had intermittently elevated LFTs since 2019, had a liver US in 2017 showing hepatic steatosis. GI felt he was low likelihood advanced fibrosis.   #. Elevated alk phos: Noted initially in 8/26. Etiology unclear. it resolved.  #. Elevated ALT:   9/3/2024: Will repeat with next visit, if still elevated would stop statins and recheck a week later.    #. HCM:  Dermatology:   Vaccination: Will need RSV vaccine   Cancer screening:  - colonoscopy: due 2026 (last 2016 , no polyps, 10 year follow up)      The longitudinal plan of care for the diagnosis(es)/condition(s) as documented were addressed during this visit. Due to the added complexity in care, I will continue to support Juan J in the subsequent management and with ongoing continuity of care.        Again, thank you for allowing me to participate in the  care of your patient.        Sincerely,        Carlos Johnson MD

## 2024-09-03 NOTE — TELEPHONE ENCOUNTER
Tacrolimus level 12.5 on 9/3, unclear if this is a 12hr level, as patient reported to lab that his last dose at 5 pm yesterday (lab draw today was 9:30 am), pt's sister confirmed his last dose was taken around 9pm.    Goal 8-12.   Current dose 4 mg in AM, 4 mg in PM    Dose changed to 3.5 mg in AM, 4 mg in PM     Recheck level in clinic on 9/9.     Contacted patient, spoke with him and his sister regarding tacrolimus dosing time, confirmed he took the medication around 9pm, will adjust dosing as noted above.    Discussed with patient and caregiver, sisterMine.  [employer] message sent.

## 2024-09-03 NOTE — NURSING NOTE
Transplant Coordinator Note    Reason for visit: Post lung transplant follow up visit   Coordinator: Present (Maggie in person)  Caregiver:  Pt's sister    Health concerns addressed today:  1. Respiratory: shortness of breath when walking or going up stairs; dry cough  2. GI/: diarrhea x2 today  3. Activity: walking up to 0.5-1 mile per day   4. ENT: runny nose at times, but no PND  5. Blood sugars: ~200 - message sent to endocrine by pt's sister (awaiting response)  6. Waking up at night to use the bathroom - around 3 times per night    Activity/rehab: Pulm rehab, eval 9/3  Oxygen needs: Room air  Pain management/RX: Tylenol, oxycodone  Diabetic management: ned, will see patient early OctDomo to help in the meantime?   Next Bronch due: 9/15  Risk Criteria Labs: due 9/15  CMV status: D-/R+  Valcyte stopped: through POD #90  EBV status: D-/R+, monitor monthly (due 9/15)  DVT/PE: N/A  Post op AFIB/follow up with EP: N/A  AC/asa: none  PJP prophylactic: Bactrim    COVID:  COVID-19 infection (yes/no, date of most recent positive test):   Status/instructions given about COVID-19 vaccine:     Pt Education: medications (use/dose/side effects), how/when to call coordinator, frequency of labs, s/s of infection/rejection, call prior to starting any new medications, lab/vital sign book    Health Maintenance:   Last colonoscopy:   Next colonoscopy due:   Dermatology:  Vaccinations this visit:     Labs, CXR, PFTs reviewed with patient  Medication record reviewed and reconciled  Questions and concerns addressed    Patient Instructions  1. Continue to hydrate with 60-70 oz fluids daily.  2. Continue to exercise daily or most days of the week.  3. Follow up with your primary care provider for annual gender health maintenance procedures.  4. Follow up with colonoscopy schedule.  5. Follow up with annual dermatology visits.  6. It doesn't seem like the COVID vaccine is working well in lung transplant patients. A number of lung  transplant patients have gotten sick with COVID even after receiving the vaccines. Based on our recent experience, it can be life-threatening to get COVID  even after being vaccinated. Please continue to act like you did not get the COVID vaccine - social distancing, wearing a mask, good hand hygiene, etc. If the people around you are vaccinated, it will help reduce the risk of you getting COVID. All members of your household should be vaccinated.  7. Okay to NOT reschedule abdominal ultrasound.  We'll recheck your liver tests next week instead.  8. Plan for staple removal around 9/23.  We'll confirm the date and time.  9. Take lokelma 10 g today to help lower your potassium level.  10. You are growing penicillium, a fungus, from your most recent bronch.  We are not concerned and don't need to treat this.    Next transplant clinic appointment:  September 9th at 9:20 AM with CXR, labs and PFTs  Next lab draw: 9/9    AVS printed at time of check out

## 2024-09-03 NOTE — PATIENT INSTRUCTIONS
Patient Instructions  1. Continue to hydrate with 60-70 oz fluids daily.  2. Continue to exercise daily or most days of the week.  3. Follow up with your primary care provider for annual gender health maintenance procedures.  4. Follow up with colonoscopy schedule.  5. Follow up with annual dermatology visits.  6. It doesn't seem like the COVID vaccine is working well in lung transplant patients. A number of lung transplant patients have gotten sick with COVID even after receiving the vaccines. Based on our recent experience, it can be life-threatening to get COVID  even after being vaccinated. Please continue to act like you did not get the COVID vaccine - social distancing, wearing a mask, good hand hygiene, etc. If the people around you are vaccinated, it will help reduce the risk of you getting COVID. All members of your household should be vaccinated.  7. Okay to NOT reschedule abdominal ultrasound.  We'll recheck your liver tests next week instead.  8. Plan for staple removal around 9/23.  We'll confirm the date and time.  9. Take lokelma 10 g today to help lower your potassium level.  10. You are growing penicillium, a fungus, from your most recent bronch.  We are not concerned and don't need to treat this.    Next transplant clinic appointment:  September 9th at 9:20 AM with CXR, labs and PFTs  Next lab draw: 9/9    ~~~~~~~~~~~~~~~~~~~~~~~~~    Thoracic Transplant Office phone [phone] (alt [phone]), fax [phone]    Office Hours 8:30 - 5:00     For after-hours urgent issues, please dial [phone] (alt [phone]) and ask the  to page the Thoracic Transplant Coordinator On-Call.   --------------------  To expedite your medication refill(s), please contact your pharmacy and have them fax a refill request to: [phone]    *Please allow 3 business days for routine medication refills.  *Please allow 5 business days for controlled substance medication refills.    **For Diabetic  medications and supplies refill(s), please contact your pharmacy and have them contact your Endocrine team.  --------------------  For scheduling appointments call [phone] (alt [phone])  --------------------  Please Note: If you are active on [employer] all future test results will be sent by [employer] message only, and will no longer be called to patient. You may also receive communication directly from your physician.

## 2024-09-03 NOTE — NURSING NOTE
Chief Complaint   Patient presents with    RECHECK     Follow up.      Vitals:    09/03/24 1058   BP: 122/76   BP Location: Right arm   Patient Position: Sitting   Cuff Size: Adult Regular   Pulse: 82   Temp: 98.1  F (36.7  C)   TempSrc: Oral   SpO2: 98%   Weight: 68.9 kg (152 lb)       BP Readings from Last 3 Encounters:   09/03/24 122/76   08/29/24 120/76   08/28/24 119/80       /76 (BP Location: Right arm, Patient Position: Sitting, Cuff Size: Adult Regular)   Pulse 82   Temp 98.1  F (36.7  C) (Oral)   Wt 68.9 kg (152 lb)   SpO2 98%   BMI 21.81 kg/m       Lizabeth Armenta

## 2024-09-03 NOTE — RESULT ENCOUNTER NOTE
Tacrolimus level 12.5 on 9/3, unclear if this is a 12hr level, as patient reported to lab that his last dose at 5 pm yesterday (lab draw today was 9:30 am), pt's sister confirmed his last dose was taken around 9pm.    Goal 8-12.   Current dose 4 mg in AM, 4 mg in PM    Dose changed to 3.5 mg in AM, 4 mg in PM     Recheck level in clinic on 9/9.     Contacted patient, spoke with him and his sister regarding tacrolimus dosing time, confirmed he took the medication around 9pm, will adjust dosing as noted above.    Discussed with patient and caregiver, sisterMine.  [employer] Reply message sent.

## 2024-09-04 ENCOUNTER — TELEPHONE (OUTPATIENT)
Dept: CARDIOLOGY | Facility: CLINIC | Age: 59
End: 2024-09-04
Payer: COMMERCIAL

## 2024-09-04 ENCOUNTER — HOSPITAL ENCOUNTER (OUTPATIENT)
Dept: CARDIAC REHAB | Facility: CLINIC | Age: 59
Discharge: HOME | End: 2024-09-04
Attending: INTERNAL MEDICINE
Payer: COMMERCIAL

## 2024-09-04 DIAGNOSIS — Z79.4 TYPE 2 DIABETES MELLITUS WITHOUT COMPLICATION, WITH LONG-TERM CURRENT USE OF INSULIN (H): ICD-10-CM

## 2024-09-04 DIAGNOSIS — Z94.2 S/P LUNG TRANSPLANT (H): ICD-10-CM

## 2024-09-04 DIAGNOSIS — E11.9 TYPE 2 DIABETES MELLITUS WITHOUT COMPLICATION, WITH LONG-TERM CURRENT USE OF INSULIN (H): ICD-10-CM

## 2024-09-04 PROCEDURE — G0238 OTH RESP PROC, INDIV: HCPCS

## 2024-09-05 ENCOUNTER — VIRTUAL VISIT (OUTPATIENT)
Dept: PHARMACY | Facility: CLINIC | Age: 59
End: 2024-09-05
Payer: COMMERCIAL

## 2024-09-05 DIAGNOSIS — Z78.9 TAKES DIETARY SUPPLEMENTS: ICD-10-CM

## 2024-09-05 DIAGNOSIS — E78.5 DYSLIPIDEMIA: ICD-10-CM

## 2024-09-05 DIAGNOSIS — I10 ESSENTIAL HYPERTENSION: ICD-10-CM

## 2024-09-05 DIAGNOSIS — Z94.2 S/P LUNG TRANSPLANT (H): Primary | ICD-10-CM

## 2024-09-05 DIAGNOSIS — R73.9 STEROID-INDUCED HYPERGLYCEMIA: ICD-10-CM

## 2024-09-05 DIAGNOSIS — R52 PAIN: ICD-10-CM

## 2024-09-05 DIAGNOSIS — R19.5 LOOSE STOOLS: ICD-10-CM

## 2024-09-05 DIAGNOSIS — T38.0X5A STEROID-INDUCED HYPERGLYCEMIA: ICD-10-CM

## 2024-09-05 DIAGNOSIS — E87.5 HYPERKALEMIA: ICD-10-CM

## 2024-09-05 PROCEDURE — 99207 PR NO CHARGE LOS: CPT | Mod: 93 | Performed by: PHARMACIST

## 2024-09-05 NOTE — PROGRESS NOTES
"Medication Therapy Management (MTM) Encounter    ASSESSMENT:                            Medication Adherence/Access: No issues identified    Lung Transplant:    Stable.    Supplements:   Magnesium WNL, will reduce magnesium dose. Does not need to take Melatonin if he is sleeping well. Can use as needed.     Hyperkalemia:   Stable.     Diabetes   Nph was stopped yesterday by endocrine- will follow-up in 1 week to review effects of this stop. Next endocrine appt is 10/4.     Hyperlipidemia   Stable.    Hypertension   BP at goal <140/90.     Loose stools:   Stable.     Pain:   Stable.     PLAN:                            Decrease Magnesium glycinate to 400mg twice daily. Levels are normal currently.   Can use Melatonin 5mg as needed.   [facility] mail order will call you three weeks post discharge, but if your dose changes, call the number on the back of the pill box to talk to them earlier, [phone]. If your doctor sends over a new prescription to the mail order pharmacy you will have to call them to set up the order. They have an [place] called \"[facility]\" as well.     Follow-up: 9/16    SUBJECTIVE/OBJECTIVE:                          Juan J Cartwright is a 59 year old male seen for a transitions of care visit. He was discharged from [facility] on [facility]/28 for lung txp. Patient was accompanied by Mine.     Reason for visit: initial post txp.    Allergies/ADRs: Reviewed in chart  Past Medical History: Reviewed in chart  Tobacco: He reports that he has quit smoking. His smoking use included cigarettes. He has never used smokeless tobacco.  Alcohol: not currently using  THC/CBD: None    Medication Adherence/Access: Patient uses pill box(es) and has assistance with medication administration: family member, Mine.  Patient takes medications 4 time(s) per day.  7-9, 11-1pm, 4-6pm 8-10pm  Per patient, misses medication 0 times per week.   The patient fills medications at [facility]: YES.    Lung Transplant:    Tacrolimus 3.5 mg every " morning, 4 mg every evening    Mycophenolate Mofetil 1000 mg twice daily  Prednisone 20 mg every morning tapering.   Pt reports Tremors predates transplant, but it is worse right now.   Transplant date: 8/15/24  CMV prophylaxis: CrCl >/=60 mL/minute: Valcyte 900mg daily Treat for 3 months  Valcyte birth defects discussed: Yes   PJP prophylaxis: Bactrim SS daily  Thrush prophylaxis:Nystatin 4 times daily 6 months post tx  Nebs: Levalbuterol and Acetylcysteine twice daily prn.   PPI use: Pantoprazole 40mg daily. Denies GERD sx.   Tx Coordinator: Janet Gutierrez Tx MD: Dr. Tucker, Using Med Card: Yes  Immunizations: annual flu shot 2023; [id number]:  n/a; Prevnar 20: 2022; TDaP:  2017; Shingrix: unknown, HBV: no immunity, COVID: Primary x 3    Estimated Creatinine Clearance: 96.9 mL/min (based on SCr of 0.78 mg/dL).     Supplements:   Mag glycinate 400mg 3 times daily ( 2 hours from MMF)  Calcium/D twice daily   MVI daily.  Melatonin 5mg at bedtime  Lab Results   Component Value Date    MAG 1.9 09/03/2024    MAG 1.6 (L) 08/29/2024    MAG 1.7 08/28/2024     Hyperkalemia:   Lokelma 10g daily prn  Lab Results   Component Value Date    POTASSIUM 5.4 (H) 09/03/2024     Diabetes   Lantus 30 units every evening   Novolog 10 units plus sliding scale before meals (180-220: 2 units, 221-260: 3 units, etc  Stopped NPH yesterday, was on 24 units every morning.  Patient is not experiencing side effects.  Current diabetes symptoms: none  Blood sugar monitoring: Using dexcom; Ranges: (patient reported)    Stopped NPH yesterday, running 300s today.      Hyperlipidemia   Rosuvastatin 10mg daily  Patient reports no significant myalgias or other side effects.  The ASCVD Risk score (Ruben NICHLOSON, et al., 2019) failed to calculate for the following reasons:    The valid total cholesterol range is 130 to 320 mg/dL     Hypertension   Metoprolol 50mg twice daily  Patient reports no current medication side effects  Patient self monitors  blood pressure.  Home BP monitoring 120/70s .   BP Readings from Last 3 Encounters:   09/03/24 122/76   08/29/24 120/76   08/28/24 119/80      Loose stools:   1-2 BMs- soft to hard currently.    Pain:   Methocarbamol 750mg 4 times daily.  Acetaminophen 650mg prn  Oxycodone 5mg prn  Pain in back when he walks a lot. When it is bad it is 5/10    Today's Vitals: There were no vitals taken for this visit.  ----------------  Post Discharge Medication Reconciliation Status: discharge medications reconciled and changed, per note/orders.    I spent 36 minutes with this patient today. All changes were made via collaborative practice agreement with Dr. Tucker. A copy of the visit note was provided to the patient's provider(s).    A summary of these recommendations was sent via [employer].    Domo Bass, PharmD  [facility] Pharmacist    Phone: [phone]     Telemedicine Visit Details  Type of service:  Telephone visit  Start Time: 12:15 PM  End Time: 12:51 PM     Medication Therapy Recommendations  Takes dietary supplements    Current Medication: magnesium (HIGH ABSORPTION MAGNESIUM) 100 MG TABS   Rationale: Dose too high - Dosage too high - Safety   Recommendation: Decrease Frequency   Status: Accepted per CPA          Current Medication: melatonin 5 MG tablet   Rationale: No medical indication at this time - Unnecessary medication therapy - Indication   Recommendation: Decrease Frequency   Status: Accepted - no CPA Needed

## 2024-09-05 NOTE — PROGRESS NOTES
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 Mulvihill. Surgery was uncomplicated and done on pump, extubated and off pressors since 8/18. Weaned to RA 8/22 and discharged on 8/28. This is routine follow up.      1. S/p bilateral sequential lung transplant: no new pulmonary complaints, started pulmonary rehab last week and has been walking outside. Sating 96% on room air.  DSA 8/22 and CMV/EBV 9/3 negative. CXR reviewed today and demonstrates stable changes of transplant. PFTs improved to his post transplant best.   - Nebs: levalbuterol and mucomyst PRN  - Multiple one month labs ordered      Immunosuppression: S/p induction therapy with high dose IV steroid intraoperatively and basiliximab (POD #0 and #4).   - Tacrolimus (goal 8-12)  - MMF 1000 mg BID  - Prednisone 20 mg daily with taper per lung transplant protocol:    Date Daily Dose (mg)   8/24/2024 25   8/31/2024 20   9/21/2024 15   10/12/2024 10   11/2/2024 5     Prophylaxis:   - Bactrim for PJP ppx (donor toxoplasma IgG +)  - VGCV for CMV ppx X 3 months; CMV monitoring per protocol   - Nystatin for oral candidiasis ppx, 6 month course       Donor Recipient Intervention   CMV status - + Valganciclovir POD #8-90   EBV status - + EBV monthly (due 9/15)   HSV status N/A + N/A     2. PHS risk criteria donor: Additional labs required post-transplant (between 4-8 weeks post-op): Hepatitis B, Hepatitis C, and HIV by JOVANY ([id number], due 9/15).  - Ordered for next draw     3. 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  No need for NPO for 6 weeks.  - Continue pantoprazole     4. HTN  HLD: BP in the 110s-130s.   - Continue rosuvastatin and metoprolol    5. Hypomagnesemia: suppressed  absorption d/t CNI.   - Mag glycinate 400 mg BID     6. Hyperkalemia: elevated in setting of tacrolimus use. K is stable today.  - Lokelma PRN     7. Lifelong tremors: worsened in the last 1-2 years, not acutely worsened in his hands since transplant. Likely secondary to prednisone, deconditioning.   - Monitor    8. DM II: AIC of 7.4 on 9/3. Prior to transplant was on Jardiance, dulaglutide, glargine, metformin. BS of 210 in clinic today, at home, working with Endocrine and Pharm D.  - Continue long acting insulin, carb coverage and sliding scale insulin  - Has visit with Peter next Monday and then has visit with Endocrine scheduled for 10/4     9. Hepatic steatosis: intermittently elevated LFTs since 2019, had a liver US in 2017 showing hepatic steatosis. GI felt he was low likelihood advanced fibrosis.   - Add on hepatic panel today    RTC: next week   Vaccinations: will need RSV vaccine and covid/influenza vaccines  Preventative: colonoscopy due 2026; DEXA > July 2026    Flower Medina PA-C  Pulmonary, Allergy, Critical Care and Sleep Medicine        Interval History:     Feeling well, continues to have some significant tremors with his hands. Just had his first rehab evaluation last week, has a session today. [place] [employer], no cough, no new shortness of breath. Has some incision/stitch pain, does have some back pain when he walks as well. Using a heating pad that is helpful. No racing heart. No bloating or gas, stools are semi solid. Good appetite, drinking a lot of water based drinks.           Review of Systems:   Please see HPI, otherwise the complete 10 point ROS is negative.           Past Medical and Surgical History:     Past Medical History:   Diagnosis Date    Administration of long-term prophylactic antibiotics 08/26/2024    Hepatic steatosis 2017    Noted on ultrasound    Hypomagnesemia 08/26/2024    Immunosuppression (H24) 08/26/2024    S/P lung transplant  (H) 08/15/2024    Tremor 05/23/2024     Past Surgical History:   Procedure Laterality Date    BRONCHOSCOPY  08/16/2024    BRONCHOSCOPY FLEXIBLE AND RIGID N/A 8/27/2024    Procedure: Bronchoscopy Inspection;  Surgeon: Oneida Silver MD;  Location:  GI    COLONOSCOPY      CV CORONARY ANGIOGRAM N/A 06/21/2024    Procedure: Coronary Angiogram;  Surgeon: Gabriel Julian MD;  Location:  HEART CARDIAC CATH LAB    CV RIGHT HEART CATH MEASUREMENTS RECORDED N/A 06/21/2024    Procedure: Right Heart Catheterization;  Surgeon: Gabriel Julian MD;  Location:  HEART CARDIAC CATH LAB    PICC DOUBLE LUMEN PLACEMENT Right 08/20/2024    47-3cm, Basilic    TRANSPLANT LUNG RECIPIENT SINGLE X2 Bilateral 08/15/2024    Procedure: Clamshell Incision, On Central Venoarterial Extracorporeal Membranous Oxygenation, Bilateral Lung Transplant Recipient, Left atrial appendage ligation, Intraoperative Flexible Bronchoscopy by Anesthesia;  Surgeon: Mulvihill, Michael, MD;  Location:  OR           Family History:     Family History   Problem Relation Age of Onset    Hypertension Mother     Lung Cancer Father         former smoker    Other - See Comments Sister         daughter 26 years    No Known Problems Maternal Grandmother     Lung Cancer Maternal Grandfather         former smoker    No Known Problems Paternal Grandmother     No Known Problems Paternal Grandfather             Social History:     Social History     Socioeconomic History    Marital status: Single     Spouse name: Not on file    Number of children: Not on file    Years of education: Not on file    Highest education level: Not on file   Occupational History    Not on file   Tobacco Use    Smoking status: Former     Types: Cigarettes    Smokeless tobacco: Never   Substance and Sexual Activity    Alcohol use: Not Currently     Comment: not since 2017    Drug use: Never    Sexual activity: Not on file   Other Topics Concern    Not on file   Social History Narrative     Not on file     Social Determinants of Health     Financial Resource Strain: Low Risk  (8/24/2024)    Financial Resource Strain     Within the past 12 months, have you or your family members you live with been unable to get utilities (heat, electricity) when it was really needed?: No   Food Insecurity: Not on file (9/3/2024)   Transportation Needs: Low Risk  (8/24/2024)    Transportation Needs     Within the past 12 months, has lack of transportation kept you from medical appointments, getting your medicines, non-medical meetings or appointments, work, or from getting things that you need?: No   Physical Activity: Not on File (8/26/2019)    Received from [facility]    Physical Activity     Physical Activity: 0   Stress: Not on File (8/26/2019)    Received from [facility]    Stress     Stress: 0   Social Connections: Not on File (8/26/2019)    Received from [facility]    Social Connections     Social Connections and Isolation: 0   Interpersonal Safety: Low Risk  (8/24/2024)    Interpersonal Safety     Do you feel physically and emotionally safe where you currently live?: Yes     Within the past 12 months, have you been hit, slapped, kicked or otherwise physically hurt by someone?: No     Within the past 12 months, have you been humiliated or emotionally abused in other ways by your partner or ex-partner?: No   Housing Stability: Low Risk  (8/24/2024)    Housing Stability     Do you have housing? : Yes     Are you worried about losing your housing?: No            Medications:     Current Outpatient Medications   Medication Sig Dispense Refill    insulin glargine (LANTUS PEN) 100 UNIT/ML pen Inject 30 Units subcutaneously at bedtime.      metoprolol tartrate (LOPRESSOR) 50 MG tablet Take 1 tablet (50 mg) by mouth or Feeding Tube 2 times daily. 60 tablet 3    pantoprazole (PROTONIX) 40 MG EC tablet Take 1 tablet (40 mg) by mouth every morning (before breakfast). 30 tablet 3    rosuvastatin (CRESTOR) 10 MG tablet Take 1 tablet (10 mg)  by mouth daily. 30 tablet 3    sulfamethoxazole-trimethoprim (BACTRIM) 400-80 MG tablet Take 1 tablet by mouth or NG Tube daily. 30 tablet 3    valGANciclovir (VALCYTE) 450 MG tablet Take 2 tablets (900 mg) by mouth daily. 60 tablet 3    acetaminophen (TYLENOL) 325 MG tablet Take 2 tablets (650 mg) by mouth or Feeding Tube every 4 hours as needed for other (For optimal non-opioid multimodal pain management to improve pain control.). 30 tablet 0    acetylcysteine (MUCOMYST) 20 % neb solution Take 2 mLs by nebulization 4 times daily as needed for mucolysis/respiratory distress. 30 mL 1    calcium carbonate-vitamin D (CALTRATE) 600-10 MG-MCG per tablet Take 1 tablet by mouth or Feeding Tube 2 times daily. 60 tablet 0    Continuous Glucose Sensor (DEXCOM G7 SENSOR) MISC Change every 10 days. 1 each 11    insulin aspart (NOVOLOG PEN) 100 UNIT/ML pen Take 10 units prior to meals, and add sliding scale per instructions.  Up to 50 units daily 15 mL 4    insulin pen needle (32G X 4 MM) 32G X 4 MM miscellaneous Use pen needles daily or as directed. 100 each 3    levalbuterol (XOPENEX) 1.25 MG/3ML neb solution Take 3 mLs (1.25 mg) by nebulization 4 times daily as needed for shortness of breath or wheezing. Use prior to Mucomyst neb. 300 mL 0    magnesium (HIGH ABSORPTION MAGNESIUM) 100 MG TABS Take 400 mg by mouth 2 times daily.      melatonin 5 MG tablet Take 1 tablet (5 mg) by mouth or Feeding Tube every evening. 30 tablet 0    methocarbamol (ROBAXIN) 750 MG tablet Take 1 tablet (750 mg) by mouth or Feeding Tube 4 times daily as needed for muscle spasms. 90 tablet 1    multivitamin, therapeutic (THERA-VIT) TABS tablet Take 1 tablet by mouth daily. 30 tablet 0    mycophenolate (GENERIC EQUIVALENT) 250 MG capsule Take 4 capsules (1,000 mg) by mouth 2 times daily. 240 capsule 11    nystatin (MYCOSTATIN) 153335 UNIT/ML suspension Take 10 mLs (1,000,000 Units) by mouth 4 times daily. 1200 mL 0    polyethylene glycol (MIRALAX) 17  GM/Dose powder Take 17 g by mouth daily as needed for constipation. (Patient not taking: Reported on 9/5/2024) 510 g 0    predniSONE (DELTASONE) 5 MG tablet Take 5 tablets (25 mg) by mouth daily. Taper per lung transplant clinic.  Next taper to 20 mg daily on 8/31 (Patient taking differently: Take 20 mg by mouth daily. Taper per lung transplant clinic.  Next taper to 20 mg daily on 8/31) 150 tablet 3    senna-docusate (SENOKOT-S/PERICOLACE) 8.6-50 MG tablet Take 2 tablets by mouth or Feeding Tube 2 times daily as needed for constipation (use 1st). (Patient not taking: Reported on 9/5/2024) 120 tablet 0    sodium zirconium cyclosilicate (LOKELMA) 10 g PACK packet Take 1 packet (10 g) by mouth daily as needed. 10 packet 1    tacrolimus (GENERIC EQUIVALENT) 0.5 MG capsule Take 1 capsule (0.5 mg) by mouth every morning. Total dose: 3.5 mg in the AM and 4 mg in the PM 30 capsule 11    tacrolimus (GENERIC EQUIVALENT) 1 MG capsule Take 3 capsules (3 mg) by mouth every morning AND 4 capsules (4 mg) every evening. Total dose 3.5 mg in the AM and 4.0 mg in the PM.. 210 capsule 11    traZODone (DESYREL) 50 MG tablet Take 1 tablet (50 mg) by mouth or Feeding Tube nightly as needed for sleep. 30 tablet 0     No current facility-administered medications for this visit.     Facility-Administered Medications Ordered in Other Visits   Medication Dose Route Frequency Provider Last Rate Last Admin    sodium chloride bacteriostatic 0.9 % flush 9 mL  9 mL Intravenous Once NICOLA Pearce MD                Physical Exam:   /74   Pulse 77   SpO2 96%     GENERAL: alert, NAD  HEENT: NCAT, EOMI, no scleral icterus, oral mucosa moist and without lesions  Neck: no cervical or supraclavicular adenopathy  Lungs: good air flow, mainly clear  CV: RRR, S1S2, no murmurs noted  Abdomen: normoactive BS, soft, non tender  Lymph: no edema  Neuro: AAO X 3, CN 2-12 grossly intact  Psychiatric: normal affect, good eye contact  Skin: no rash,  jaundice; incision with staples, no erythema or drainage         Data:   All laboratory and imaging data reviewed.      Recent Results (from the past 168 hour(s))   Hemoglobin A1c    Collection Time: 09/03/24  9:36 AM   Result Value Ref Range    Hemoglobin A1C 7.4 (H) <5.7 %   Comprehensive metabolic panel    Collection Time: 09/03/24  9:36 AM   Result Value Ref Range    Sodium 137 135 - 145 mmol/L    Potassium 5.4 (H) 3.4 - 5.3 mmol/L    Carbon Dioxide (CO2) 27 22 - 29 mmol/L    Anion Gap 10 7 - 15 mmol/L    Urea Nitrogen 23.8 (H) 8.0 - 23.0 mg/dL    Creatinine 0.78 0.67 - 1.17 mg/dL    GFR Estimate >90 >60 mL/min/1.73m2    Calcium 10.3 8.8 - 10.4 mg/dL    Chloride 100 98 - 107 mmol/L    Glucose 207 (H) 70 - 99 mg/dL    Alkaline Phosphatase 139 40 - 150 U/L    AST 33 0 - 45 U/L    ALT 74 (H) 0 - 70 U/L    Protein Total 7.1 6.4 - 8.3 g/dL    Albumin 3.9 3.5 - 5.2 g/dL    Bilirubin Total 0.3 <=1.2 mg/dL   CBC with platelets    Collection Time: 09/03/24  9:36 AM   Result Value Ref Range    WBC Count 11.0 4.0 - 11.0 10e3/uL    RBC Count 4.67 4.40 - 5.90 10e6/uL    Hemoglobin 14.0 13.3 - 17.7 g/dL    Hematocrit 42.7 40.0 - 53.0 %    MCV 91 78 - 100 fL    MCH 30.0 26.5 - 33.0 pg    MCHC 32.8 31.5 - 36.5 g/dL    RDW 14.1 10.0 - 15.0 %    Platelet Count 497 (H) 150 - 450 10e3/uL   CMV Quantitative, PCR    Collection Time: 09/03/24  9:36 AM    Specimen: Arm, Left; Blood   Result Value Ref Range    CMV DNA IU/mL Not Detected Not Detected IU/mL   Magnesium    Collection Time: 09/03/24  9:36 AM   Result Value Ref Range    Magnesium 1.9 1.7 - 2.3 mg/dL   Tacrolimus by Tandem Mass Spectrometry    Collection Time: 09/03/24  9:36 AM   Result Value Ref Range    Tacrolimus by Tandem Mass Spectrometry 12.5 5.0 - 15.0 ug/L    Tacrolimus Last Dose Date 9/2/2024     Tacrolimus Last Dose Time  5:00 PM    Zoltan Barr Virus Quantitative PCR, Plasma    Collection Time: 09/03/24  9:36 AM    Specimen: Arm, Left; Blood   Result Value Ref  Range    EBV DNA IU/mL Not Detected Not Detected IU/mL   General PFT Lab (Please always keep checked)    Collection Time: 09/03/24  9:41 AM   Result Value Ref Range    FVC-Pred 4.25 L    FVC-Pre 2.96 L    FVC-%Pred-Pre 69 %    FEV1-Pre 2.49 L    FEV1-%Pred-Pre 75 %    FEV1FVC-Pred 78 %    FEV1FVC-Pre 84 %    FEFMax-Pred 9.16 L/sec    FEFMax-Pre 7.02 L/sec    FEFMax-%Pred-Pre 76 %    FEF2575-Pred 2.76 L/sec    FEF2575-Pre 2.95 L/sec    [id number]-%Pred-Pre 106 %    ExpTime-Pre 5.48 sec    FIFMax-Pre 3.40 L/sec    FEV1FEV6-Pred 79 %    FEV1FEV6-Pre 84 %   Basic metabolic panel    Collection Time: 09/09/24  7:44 AM   Result Value Ref Range    Sodium 137 135 - 145 mmol/L    Potassium 4.3 3.4 - 5.3 mmol/L    Chloride 99 98 - 107 mmol/L    Carbon Dioxide (CO2) 25 22 - 29 mmol/L    Anion Gap 13 7 - 15 mmol/L    Urea Nitrogen 31.9 (H) 8.0 - 23.0 mg/dL    Creatinine 0.87 0.67 - 1.17 mg/dL    GFR Estimate >90 >60 mL/min/1.73m2    Calcium 10.2 8.8 - 10.4 mg/dL    Glucose 210 (H) 70 - 99 mg/dL   CBC with platelets    Collection Time: 09/09/24  7:44 AM   Result Value Ref Range    WBC Count 9.1 4.0 - 11.0 10e3/uL    RBC Count 4.99 4.40 - 5.90 10e6/uL    Hemoglobin 14.5 13.3 - 17.7 g/dL    Hematocrit 45.3 40.0 - 53.0 %    MCV 91 78 - 100 fL    MCH 29.1 26.5 - 33.0 pg    MCHC 32.0 31.5 - 36.5 g/dL    RDW 14.3 10.0 - 15.0 %    Platelet Count 404 150 - 450 10e3/uL   Magnesium    Collection Time: 09/09/24  7:44 AM   Result Value Ref Range    Magnesium 1.7 1.7 - 2.3 mg/dL   General PFT Lab (Please always keep checked)    Collection Time: 09/09/24  7:46 AM   Result Value Ref Range    FVC-Pred 4.25 L    FVC-Pre 3.28 L    FVC-%Pred-Pre 77 %    FEV1-Pre 2.71 L    FEV1-%Pred-Pre 81 %    FEV1FVC-Pred 78 %    FEV1FVC-Pre 83 %    FEFMax-Pred 9.16 L/sec    FEFMax-Pre 8.03 L/sec    FEFMax-%Pred-Pre 87 %    FEF2575-Pred 2.76 L/sec    FEF2575-Pre 2.97 L/sec    [id number]-%Pred-Pre 107 %    ExpTime-Pre 5.83 sec    FIFMax-Pre 6.39 L/sec    FEV1FEV6-Pred  79 %    FEV1FEV6-Pre 83 %     PFT interpretation:  Maneuver: valid and meets ATS guidelines  No obstruction based on testing  Compared to prior: FEV1 of 2.71 is 220 ml above prior  The decrease in FVC may represent restrictive physiology.  Lung volumes would be necessary to determine.

## 2024-09-05 NOTE — PATIENT INSTRUCTIONS
"Recommendations from today's MTM visit:                                                      Decrease Magnesium glycinate to 400mg twice daily. Levels are normal currently.   Can use Melatonin 5mg as needed.   [place] mail order will call you three weeks post discharge, but if your dose changes, call the number on the back of the pill box to talk to them earlier, [phone]. If your doctor sends over a new prescription to the mail order pharmacy you will have to call them to set up the order. They have an [place] called \"[facility]\" as well.     Follow-up: 9/16    [facility] invites you to share your continuous glucose monitoring (CGM) data using Dexcom Clarity.    Dexcom Clarity displays your CGM data in graphs so you and your clinic can view patterns, trends, and statistics anytime.    To start sharing data with this clinic, enter the clinic code in your Clarity [place], [employer], or at [email or web address]    Clinic code: [email or web address]    It was great speaking with you today.  I value your experience and would be very thankful for your time in providing feedback in our clinic survey. In the next few days, you may receive an email or text message from [employer] with a link to a survey related to your  clinical pharmacist.\"     To schedule another MTM appointment, please call the clinic directly or you may call the MTM scheduling line at [phone] or toll-free at [phone].     My Clinical Pharmacist's contact information:                                                      Please feel free to contact me with any questions or concerns you have.      Domo Bass, [place]  MTM Pharmacist    Phone: [phone]     "

## 2024-09-06 DIAGNOSIS — D84.9 IMMUNOSUPPRESSION (H): Primary | ICD-10-CM

## 2024-09-06 DIAGNOSIS — Z94.2 S/P LUNG TRANSPLANT (H): ICD-10-CM

## 2024-09-09 ENCOUNTER — LAB (OUTPATIENT)
Dept: LAB | Facility: CLINIC | Age: 59
End: 2024-09-09
Payer: COMMERCIAL

## 2024-09-09 ENCOUNTER — ANCILLARY PROCEDURE (OUTPATIENT)
Dept: GENERAL RADIOLOGY | Facility: CLINIC | Age: 59
End: 2024-09-09
Attending: INTERNAL MEDICINE
Payer: COMMERCIAL

## 2024-09-09 ENCOUNTER — OFFICE VISIT (OUTPATIENT)
Dept: PULMONOLOGY | Facility: CLINIC | Age: 59
End: 2024-09-09
Attending: PHYSICIAN ASSISTANT
Payer: COMMERCIAL

## 2024-09-09 ENCOUNTER — HOSPITAL ENCOUNTER (OUTPATIENT)
Dept: CARDIAC REHAB | Facility: CLINIC | Age: 59
Discharge: HOME | End: 2024-09-09
Attending: INTERNAL MEDICINE
Payer: COMMERCIAL

## 2024-09-09 ENCOUNTER — OFFICE VISIT (OUTPATIENT)
Dept: PULMONOLOGY | Facility: CLINIC | Age: 59
End: 2024-09-09
Payer: COMMERCIAL

## 2024-09-09 VITALS — SYSTOLIC BLOOD PRESSURE: 125 MMHG | DIASTOLIC BLOOD PRESSURE: 74 MMHG | HEART RATE: 77 BPM | OXYGEN SATURATION: 96 %

## 2024-09-09 DIAGNOSIS — Z94.2 LUNG REPLACED BY TRANSPLANT (H): ICD-10-CM

## 2024-09-09 DIAGNOSIS — Z94.2 S/P LUNG TRANSPLANT (H): ICD-10-CM

## 2024-09-09 DIAGNOSIS — D84.9 IMMUNOSUPPRESSION (H): ICD-10-CM

## 2024-09-09 DIAGNOSIS — Z79.4 TYPE 2 DIABETES MELLITUS WITHOUT COMPLICATION, WITH LONG-TERM CURRENT USE OF INSULIN (H): ICD-10-CM

## 2024-09-09 DIAGNOSIS — T38.0X5A STEROID-INDUCED HYPERGLYCEMIA: ICD-10-CM

## 2024-09-09 DIAGNOSIS — Z94.2 LUNG REPLACED BY TRANSPLANT (H): Primary | ICD-10-CM

## 2024-09-09 DIAGNOSIS — J84.9 ILD (INTERSTITIAL LUNG DISEASE) (H): ICD-10-CM

## 2024-09-09 DIAGNOSIS — E11.9 TYPE 2 DIABETES MELLITUS WITHOUT COMPLICATION, WITH LONG-TERM CURRENT USE OF INSULIN (H): ICD-10-CM

## 2024-09-09 DIAGNOSIS — R73.9 STEROID-INDUCED HYPERGLYCEMIA: ICD-10-CM

## 2024-09-09 LAB
ALBUMIN SERPL BCG-MCNC: 4.1 G/DL (ref 3.5–5.2)
ALP SERPL-CCNC: 108 U/L (ref 40–150)
ALT SERPL W P-5'-P-CCNC: 58 U/L (ref 0–70)
ANION GAP SERPL CALCULATED.3IONS-SCNC: 13 MMOL/L (ref 7–15)
AST SERPL W P-5'-P-CCNC: 23 U/L (ref 0–45)
BILIRUB DIRECT SERPL-MCNC: <0.2 MG/DL (ref 0–0.3)
BILIRUB SERPL-MCNC: 0.4 MG/DL
BUN SERPL-MCNC: 31.9 MG/DL (ref 8–23)
CALCIUM SERPL-MCNC: 10.2 MG/DL (ref 8.8–10.4)
CHLORIDE SERPL-SCNC: 99 MMOL/L (ref 98–107)
CMV DNA SPEC NAA+PROBE-ACNC: NOT DETECTED IU/ML
CREAT SERPL-MCNC: 0.87 MG/DL (ref 0.67–1.17)
EGFRCR SERPLBLD CKD-EPI 2021: >90 ML/MIN/1.73M2
ERYTHROCYTE [DISTWIDTH] IN BLOOD BY AUTOMATED COUNT: 14.3 % (ref 10–15)
EXPTIME-PRE: 5.83 SEC
FEF2575-%PRED-PRE: 107 %
FEF2575-PRE: 2.97 L/SEC
FEF2575-PRED: 2.76 L/SEC
FEFMAX-%PRED-PRE: 87 %
FEFMAX-PRE: 8.03 L/SEC
FEFMAX-PRED: 9.16 L/SEC
FEV1-%PRED-PRE: 81 %
FEV1-PRE: 2.71 L
FEV1FEV6-PRE: 83 %
FEV1FEV6-PRED: 79 %
FEV1FVC-PRE: 83 %
FEV1FVC-PRED: 78 %
FIFMAX-PRE: 6.39 L/SEC
FVC-%PRED-PRE: 77 %
FVC-PRE: 3.28 L
FVC-PRED: 4.25 L
GLUCOSE SERPL-MCNC: 210 MG/DL (ref 70–99)
HCO3 SERPL-SCNC: 25 MMOL/L (ref 22–29)
HCT VFR BLD AUTO: 45.3 % (ref 40–53)
HGB BLD-MCNC: 14.5 G/DL (ref 13.3–17.7)
MAGNESIUM SERPL-MCNC: 1.7 MG/DL (ref 1.7–2.3)
MCH RBC QN AUTO: 29.1 PG (ref 26.5–33)
MCHC RBC AUTO-ENTMCNC: 32 G/DL (ref 31.5–36.5)
MCV RBC AUTO: 91 FL (ref 78–100)
PLATELET # BLD AUTO: 404 10E3/UL (ref 150–450)
POTASSIUM SERPL-SCNC: 4.3 MMOL/L (ref 3.4–5.3)
PROT SERPL-MCNC: 7 G/DL (ref 6.4–8.3)
RBC # BLD AUTO: 4.99 10E6/UL (ref 4.4–5.9)
SODIUM SERPL-SCNC: 137 MMOL/L (ref 135–145)
TACROLIMUS BLD-MCNC: 10.5 UG/L (ref 5–15)
TME LAST DOSE: NORMAL H
TME LAST DOSE: NORMAL H
WBC # BLD AUTO: 9.1 10E3/UL (ref 4–11)

## 2024-09-09 PROCEDURE — 80053 COMPREHEN METABOLIC PANEL: CPT | Performed by: PATHOLOGY

## 2024-09-09 PROCEDURE — 83735 ASSAY OF MAGNESIUM: CPT | Performed by: PATHOLOGY

## 2024-09-09 PROCEDURE — 99214 OFFICE O/P EST MOD 30 MIN: CPT | Mod: 24 | Performed by: PHYSICIAN ASSISTANT

## 2024-09-09 PROCEDURE — G0463 HOSPITAL OUTPT CLINIC VISIT: HCPCS | Performed by: PHYSICIAN ASSISTANT

## 2024-09-09 PROCEDURE — 71046 X-RAY EXAM CHEST 2 VIEWS: CPT | Mod: GC | Performed by: RADIOLOGY

## 2024-09-09 PROCEDURE — 99000 SPECIMEN HANDLING OFFICE-LAB: CPT | Performed by: PATHOLOGY

## 2024-09-09 PROCEDURE — G0239 OTH RESP PROC, GROUP: HCPCS

## 2024-09-09 PROCEDURE — 80197 ASSAY OF TACROLIMUS: CPT | Performed by: INTERNAL MEDICINE

## 2024-09-09 PROCEDURE — 36415 COLL VENOUS BLD VENIPUNCTURE: CPT | Performed by: PATHOLOGY

## 2024-09-09 PROCEDURE — 85027 COMPLETE CBC AUTOMATED: CPT | Performed by: PATHOLOGY

## 2024-09-09 PROCEDURE — 94375 RESPIRATORY FLOW VOLUME LOOP: CPT | Performed by: PHYSICIAN ASSISTANT

## 2024-09-09 PROCEDURE — 99207 PR NO CHARGE LOS: CPT

## 2024-09-09 PROCEDURE — 82248 BILIRUBIN DIRECT: CPT | Performed by: PATHOLOGY

## 2024-09-09 PROCEDURE — 87799 DETECT AGENT NOS DNA QUANT: CPT | Performed by: INTERNAL MEDICINE

## 2024-09-09 RX ORDER — PANTOPRAZOLE SODIUM 40 MG/1
40 TABLET, DELAYED RELEASE ORAL
Qty: 30 TABLET | Refills: 3 | Status: SHIPPED | OUTPATIENT
Start: 2024-09-09

## 2024-09-09 RX ORDER — VALGANCICLOVIR 450 MG/1
900 TABLET, FILM COATED ORAL DAILY
Qty: 60 TABLET | Refills: 3 | Status: SHIPPED | OUTPATIENT
Start: 2024-09-09

## 2024-09-09 RX ORDER — METOPROLOL TARTRATE 50 MG
50 TABLET ORAL 2 TIMES DAILY
Qty: 60 TABLET | Refills: 3 | Status: SHIPPED | OUTPATIENT
Start: 2024-09-09 | End: 2024-09-24

## 2024-09-09 RX ORDER — SULFAMETHOXAZOLE AND TRIMETHOPRIM 400; 80 MG/1; MG/1
1 TABLET ORAL DAILY
Qty: 30 TABLET | Refills: 3 | Status: SHIPPED | OUTPATIENT
Start: 2024-09-09

## 2024-09-09 RX ORDER — ROSUVASTATIN CALCIUM 10 MG/1
10 TABLET, COATED ORAL DAILY
Qty: 30 TABLET | Refills: 3 | Status: SHIPPED | OUTPATIENT
Start: 2024-09-09

## 2024-09-09 NOTE — LETTER
9/9/2024      Travon Cartwright  [address]  [facility] [address]      Dear Colleague,    Thank you for referring your patient, Travon Cartwright, to the [facility] [place]. Please see a copy of my visit note below.    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 Mulvihill. Surgery was uncomplicated and done on pump, extubated and off pressors since 8/18. Weaned to RA 8/22 and discharged on 8/28. This is routine follow up.      1. S/p bilateral sequential lung transplant: no new pulmonary complaints, started pulmonary rehab last week and has been walking outside. Sating 96% on room air.  DSA 8/22 and CMV/EBV 9/3 negative. CXR reviewed today and demonstrates stable changes of transplant. PFTs improved to his post transplant best.   - Nebs: levalbuterol and mucomyst PRN  - Multiple one month labs ordered      Immunosuppression: S/p induction therapy with high dose IV steroid intraoperatively and basiliximab (POD #0 and #4).   - Tacrolimus (goal 8-12)  - MMF 1000 mg BID  - Prednisone 20 mg daily with taper per lung transplant protocol:    Date Daily Dose (mg)   8/24/2024 25   8/31/2024 20   9/21/2024 15   10/12/2024 10   11/2/2024 5     Prophylaxis:   - Bactrim for PJP ppx (donor toxoplasma IgG +)  - VGCV for CMV ppx X 3 months; CMV monitoring per protocol   - Nystatin for oral candidiasis ppx, 6 month course       Donor Recipient Intervention   CMV status - + Valganciclovir POD #8-90   EBV status - + EBV monthly (due 9/15)   HSV status N/A + N/A     2. PHS risk criteria donor: Additional labs required post-transplant (between 4-8 weeks post-op): Hepatitis B, Hepatitis C, and HIV by JOVANY ([id number], due  9/15).  - Ordered for next draw     3. 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  No need for NPO for 6 weeks.  - Continue pantoprazole     4. HTN  HLD: BP in the 110s-130s.   - Continue rosuvastatin and metoprolol    5. Hypomagnesemia: suppressed absorption d/t CNI.   - Mag glycinate 400 mg BID     6. Hyperkalemia: elevated in setting of tacrolimus use. K is stable today.  - Lokelma PRN     7. Lifelong tremors: worsened in the last 1-2 years, not acutely worsened in his hands since transplant. Likely secondary to prednisone, deconditioning.   - Monitor    8. DM II: AIC of 7.4 on 9/3. Prior to transplant was on Jardiance, dulaglutide, glargine, metformin. BS of 210 in clinic today, at home, working with Endocrine and Pharm D.  - Continue long acting insulin, carb coverage and sliding scale insulin  - Has visit with Peter next Monday and then has visit with Endocrine scheduled for 10/4     9. Hepatic steatosis: intermittently elevated LFTs since 2019, had a liver US in 2017 showing hepatic steatosis. GI felt he was low likelihood advanced fibrosis.   - Add on hepatic panel today    RTC: next week   Vaccinations: will need RSV vaccine and covid/influenza vaccines  Preventative: colonoscopy due 2026; DEXA > July 2026    Flower Medina PA-C  Pulmonary, Allergy, Critical Care and Sleep Medicine        Interval History:     Feeling well, continues to have some significant tremors with his hands. Just had his first rehab evaluation last week, has a session today. [place] [address], no cough, no new shortness of breath. Has some incision/stitch pain, does have some back pain when he walks as well. Using a heating pad that is helpful. No racing heart. No bloating or gas, stools are semi solid. Good appetite, drinking a lot of water based drinks.           Review of Systems:   Please see HPI, otherwise the complete 10 point ROS is negative.            Past Medical and Surgical History:     Past Medical History:   Diagnosis Date     Administration of long-term prophylactic antibiotics 08/26/2024     Hepatic steatosis 2017    Noted on ultrasound     Hypomagnesemia 08/26/2024     Immunosuppression (H24) 08/26/2024     S/P lung transplant (H) 08/15/2024     Tremor 05/23/2024     Past Surgical History:   Procedure Laterality Date     BRONCHOSCOPY  08/16/2024     BRONCHOSCOPY FLEXIBLE AND RIGID N/A 8/27/2024    Procedure: Bronchoscopy Inspection;  Surgeon: Oneida Silver MD;  Location:  GI     COLONOSCOPY       CV CORONARY ANGIOGRAM N/A 06/21/2024    Procedure: Coronary Angiogram;  Surgeon: Gabriel Julian MD;  Location:  HEART CARDIAC CATH LAB     CV RIGHT HEART CATH MEASUREMENTS RECORDED N/A 06/21/2024    Procedure: Right Heart Catheterization;  Surgeon: Gabriel Julian MD;  Location:  HEART CARDIAC CATH LAB     PICC DOUBLE LUMEN PLACEMENT Right 08/20/2024    47-3cm, Basilic     TRANSPLANT LUNG RECIPIENT SINGLE X2 Bilateral 08/15/2024    Procedure: Clamshell Incision, On Central Venoarterial Extracorporeal Membranous Oxygenation, Bilateral Lung Transplant Recipient, Left atrial appendage ligation, Intraoperative Flexible Bronchoscopy by Anesthesia;  Surgeon: Mulvihill, Michael, MD;  Location:  OR           Family History:     Family History   Problem Relation Age of Onset     Hypertension Mother      Lung Cancer Father         former smoker     Other - See Comments Sister         daughter 26 years     No Known Problems Maternal Grandmother      Lung Cancer Maternal Grandfather         former smoker     No Known Problems Paternal Grandmother      No Known Problems Paternal Grandfather             Social History:     Social History     Socioeconomic History     Marital status: Single     Spouse name: Not on file     Number of children: Not on file     Years of education: Not on file     Highest education level: Not on file   Occupational History     Not  on file   Tobacco Use     Smoking status: Former     Types: Cigarettes     Smokeless tobacco: Never   Substance and Sexual Activity     Alcohol use: Not Currently     Comment: not since 2017     Drug use: Never     Sexual activity: Not on file   Other Topics Concern     Not on file   Social History Narrative     Not on file     Social Determinants of Health     Financial Resource Strain: Low Risk  (8/24/2024)    Financial Resource Strain      Within the past 12 months, have you or your family members you live with been unable to get utilities (heat, electricity) when it was really needed?: No   Food Insecurity: Not on file (9/3/2024)   Transportation Needs: Low Risk  (8/24/2024)    Transportation Needs      Within the past 12 months, has lack of transportation kept you from medical appointments, getting your medicines, non-medical meetings or appointments, work, or from getting things that you need?: No   Physical Activity: Not on File (8/26/2019)    Received from [facility]    Physical Activity      Physical Activity: 0   Stress: Not on File (8/26/2019)    Received from [facility]    Stress      Stress: 0   Social Connections: Not on File (8/26/2019)    Received from [facility]    Social Connections      Social Connections and Isolation: 0   Interpersonal Safety: Low Risk  (8/24/2024)    Interpersonal Safety      Do you feel physically and emotionally safe where you currently live?: Yes      Within the past 12 months, have you been hit, slapped, kicked or otherwise physically hurt by someone?: No      Within the past 12 months, have you been humiliated or emotionally abused in other ways by your partner or ex-partner?: No   Housing Stability: Low Risk  (8/24/2024)    Housing Stability      Do you have housing? : Yes      Are you worried about losing your housing?: No            Medications:     Current Outpatient Medications   Medication Sig Dispense Refill     insulin glargine (LANTUS PEN) 100 UNIT/ML pen Inject 30 Units  subcutaneously at bedtime.       metoprolol tartrate (LOPRESSOR) 50 MG tablet Take 1 tablet (50 mg) by mouth or Feeding Tube 2 times daily. 60 tablet 3     pantoprazole (PROTONIX) 40 MG EC tablet Take 1 tablet (40 mg) by mouth every morning (before breakfast). 30 tablet 3     rosuvastatin (CRESTOR) 10 MG tablet Take 1 tablet (10 mg) by mouth daily. 30 tablet 3     sulfamethoxazole-trimethoprim (BACTRIM) 400-80 MG tablet Take 1 tablet by mouth or NG Tube daily. 30 tablet 3     valGANciclovir (VALCYTE) 450 MG tablet Take 2 tablets (900 mg) by mouth daily. 60 tablet 3     acetaminophen (TYLENOL) 325 MG tablet Take 2 tablets (650 mg) by mouth or Feeding Tube every 4 hours as needed for other (For optimal non-opioid multimodal pain management to improve pain control.). 30 tablet 0     acetylcysteine (MUCOMYST) 20 % neb solution Take 2 mLs by nebulization 4 times daily as needed for mucolysis/respiratory distress. 30 mL 1     calcium carbonate-vitamin D (CALTRATE) 600-10 MG-MCG per tablet Take 1 tablet by mouth or Feeding Tube 2 times daily. 60 tablet 0     Continuous Glucose Sensor (DEXCOM G7 SENSOR) MISC Change every 10 days. 1 each 11     insulin aspart (NOVOLOG PEN) 100 UNIT/ML pen Take 10 units prior to meals, and add sliding scale per instructions.  Up to 50 units daily 15 mL 4     insulin pen needle (32G X 4 MM) 32G X 4 MM miscellaneous Use pen needles daily or as directed. 100 each 3     levalbuterol (XOPENEX) 1.25 MG/3ML neb solution Take 3 mLs (1.25 mg) by nebulization 4 times daily as needed for shortness of breath or wheezing. Use prior to Mucomyst neb. 300 mL 0     magnesium (HIGH ABSORPTION MAGNESIUM) 100 MG TABS Take 400 mg by mouth 2 times daily.       melatonin 5 MG tablet Take 1 tablet (5 mg) by mouth or Feeding Tube every evening. 30 tablet 0     methocarbamol (ROBAXIN) 750 MG tablet Take 1 tablet (750 mg) by mouth or Feeding Tube 4 times daily as needed for muscle spasms. 90 tablet 1     multivitamin,  therapeutic (THERA-VIT) TABS tablet Take 1 tablet by mouth daily. 30 tablet 0     mycophenolate (GENERIC EQUIVALENT) 250 MG capsule Take 4 capsules (1,000 mg) by mouth 2 times daily. 240 capsule 11     nystatin (MYCOSTATIN) 495223 UNIT/ML suspension Take 10 mLs (1,000,000 Units) by mouth 4 times daily. 1200 mL 0     polyethylene glycol (MIRALAX) 17 GM/Dose powder Take 17 g by mouth daily as needed for constipation. (Patient not taking: Reported on 9/5/2024) 510 g 0     predniSONE (DELTASONE) 5 MG tablet Take 5 tablets (25 mg) by mouth daily. Taper per lung transplant clinic.  Next taper to 20 mg daily on 8/31 (Patient taking differently: Take 20 mg by mouth daily. Taper per lung transplant clinic.  Next taper to 20 mg daily on 8/31) 150 tablet 3     senna-docusate (SENOKOT-S/PERICOLACE) 8.6-50 MG tablet Take 2 tablets by mouth or Feeding Tube 2 times daily as needed for constipation (use 1st). (Patient not taking: Reported on 9/5/2024) 120 tablet 0     sodium zirconium cyclosilicate (LOKELMA) 10 g PACK packet Take 1 packet (10 g) by mouth daily as needed. 10 packet 1     tacrolimus (GENERIC EQUIVALENT) 0.5 MG capsule Take 1 capsule (0.5 mg) by mouth every morning. Total dose: 3.5 mg in the AM and 4 mg in the PM 30 capsule 11     tacrolimus (GENERIC EQUIVALENT) 1 MG capsule Take 3 capsules (3 mg) by mouth every morning AND 4 capsules (4 mg) every evening. Total dose 3.5 mg in the AM and 4.0 mg in the PM.. 210 capsule 11     traZODone (DESYREL) 50 MG tablet Take 1 tablet (50 mg) by mouth or Feeding Tube nightly as needed for sleep. 30 tablet 0     No current facility-administered medications for this visit.     Facility-Administered Medications Ordered in Other Visits   Medication Dose Route Frequency Provider Last Rate Last Admin     sodium chloride bacteriostatic 0.9 % flush 9 mL  9 mL Intravenous Once NICOLA Pearce MD                Physical Exam:   /74   Pulse 77   SpO2 96%     GENERAL: alert,  NAD  HEENT: NCAT, EOMI, no scleral icterus, oral mucosa moist and without lesions  Neck: no cervical or supraclavicular adenopathy  Lungs: good air flow, mainly clear  CV: RRR, S1S2, no murmurs noted  Abdomen: normoactive BS, soft, non tender  Lymph: no edema  Neuro: AAO X 3, CN 2-12 grossly intact  Psychiatric: normal affect, good eye contact  Skin: no rash, jaundice; incision with staples, no erythema or drainage         Data:   All laboratory and imaging data reviewed.      Recent Results (from the past 168 hour(s))   Hemoglobin A1c    Collection Time: 09/03/24  9:36 AM   Result Value Ref Range    Hemoglobin A1C 7.4 (H) <5.7 %   Comprehensive metabolic panel    Collection Time: 09/03/24  9:36 AM   Result Value Ref Range    Sodium 137 135 - 145 mmol/L    Potassium 5.4 (H) 3.4 - 5.3 mmol/L    Carbon Dioxide (CO2) 27 22 - 29 mmol/L    Anion Gap 10 7 - 15 mmol/L    Urea Nitrogen 23.8 (H) 8.0 - 23.0 mg/dL    Creatinine 0.78 0.67 - 1.17 mg/dL    GFR Estimate >90 >60 mL/min/1.73m2    Calcium 10.3 8.8 - 10.4 mg/dL    Chloride 100 98 - 107 mmol/L    Glucose 207 (H) 70 - 99 mg/dL    Alkaline Phosphatase 139 40 - 150 U/L    AST 33 0 - 45 U/L    ALT 74 (H) 0 - 70 U/L    Protein Total 7.1 6.4 - 8.3 g/dL    Albumin 3.9 3.5 - 5.2 g/dL    Bilirubin Total 0.3 <=1.2 mg/dL   CBC with platelets    Collection Time: 09/03/24  9:36 AM   Result Value Ref Range    WBC Count 11.0 4.0 - 11.0 10e3/uL    RBC Count 4.67 4.40 - 5.90 10e6/uL    Hemoglobin 14.0 13.3 - 17.7 g/dL    Hematocrit 42.7 40.0 - 53.0 %    MCV 91 78 - 100 fL    MCH 30.0 26.5 - 33.0 pg    MCHC 32.8 31.5 - 36.5 g/dL    RDW 14.1 10.0 - 15.0 %    Platelet Count 497 (H) 150 - 450 10e3/uL   CMV Quantitative, PCR    Collection Time: 09/03/24  9:36 AM    Specimen: Arm, Left; Blood   Result Value Ref Range    CMV DNA IU/mL Not Detected Not Detected IU/mL   Magnesium    Collection Time: 09/03/24  9:36 AM   Result Value Ref Range    Magnesium 1.9 1.7 - 2.3 mg/dL   Tacrolimus by  Tandem Mass Spectrometry    Collection Time: 09/03/24  9:36 AM   Result Value Ref Range    Tacrolimus by Tandem Mass Spectrometry 12.5 5.0 - 15.0 ug/L    Tacrolimus Last Dose Date 9/2/2024     Tacrolimus Last Dose Time  5:00 PM    Zoltan Barr Virus Quantitative PCR, Plasma    Collection Time: 09/03/24  9:36 AM    Specimen: Arm, Left; Blood   Result Value Ref Range    EBV DNA IU/mL Not Detected Not Detected IU/mL   General PFT Lab (Please always keep checked)    Collection Time: 09/03/24  9:41 AM   Result Value Ref Range    FVC-Pred 4.25 L    FVC-Pre 2.96 L    FVC-%Pred-Pre 69 %    FEV1-Pre 2.49 L    FEV1-%Pred-Pre 75 %    FEV1FVC-Pred 78 %    FEV1FVC-Pre 84 %    FEFMax-Pred 9.16 L/sec    FEFMax-Pre 7.02 L/sec    FEFMax-%Pred-Pre 76 %    FEF2575-Pred 2.76 L/sec    FEF2575-Pre 2.95 L/sec    BPA7274-%Pred-Pre 106 %    ExpTime-Pre 5.48 sec    FIFMax-Pre 3.40 L/sec    FEV1FEV6-Pred 79 %    FEV1FEV6-Pre 84 %   Basic metabolic panel    Collection Time: 09/09/24  7:44 AM   Result Value Ref Range    Sodium 137 135 - 145 mmol/L    Potassium 4.3 3.4 - 5.3 mmol/L    Chloride 99 98 - 107 mmol/L    Carbon Dioxide (CO2) 25 22 - 29 mmol/L    Anion Gap 13 7 - 15 mmol/L    Urea Nitrogen 31.9 (H) 8.0 - 23.0 mg/dL    Creatinine 0.87 0.67 - 1.17 mg/dL    GFR Estimate >90 >60 mL/min/1.73m2    Calcium 10.2 8.8 - 10.4 mg/dL    Glucose 210 (H) 70 - 99 mg/dL   CBC with platelets    Collection Time: 09/09/24  7:44 AM   Result Value Ref Range    WBC Count 9.1 4.0 - 11.0 10e3/uL    RBC Count 4.99 4.40 - 5.90 10e6/uL    Hemoglobin 14.5 13.3 - 17.7 g/dL    Hematocrit 45.3 40.0 - 53.0 %    MCV 91 78 - 100 fL    MCH 29.1 26.5 - 33.0 pg    MCHC 32.0 31.5 - 36.5 g/dL    RDW 14.3 10.0 - 15.0 %    Platelet Count 404 150 - 450 10e3/uL   Magnesium    Collection Time: 09/09/24  7:44 AM   Result Value Ref Range    Magnesium 1.7 1.7 - 2.3 mg/dL   General PFT Lab (Please always keep checked)    Collection Time: 09/09/24  7:46 AM   Result Value Ref Range     FVC-Pred 4.25 L    FVC-Pre 3.28 L    FVC-%Pred-Pre 77 %    FEV1-Pre 2.71 L    FEV1-%Pred-Pre 81 %    FEV1FVC-Pred 78 %    FEV1FVC-Pre 83 %    FEFMax-Pred 9.16 L/sec    FEFMax-Pre 8.03 L/sec    FEFMax-%Pred-Pre 87 %    FEF2575-Pred 2.76 L/sec    FEF2575-Pre 2.97 L/sec    ODL9883-%Pred-Pre 107 %    ExpTime-Pre 5.83 sec    FIFMax-Pre 6.39 L/sec    FEV1FEV6-Pred 79 %    FEV1FEV6-Pre 83 %     PFT interpretation:  Maneuver: valid and meets ATS guidelines  No obstruction based on testing  Compared to prior: FEV1 of 2.71 is 220 ml above prior  The decrease in FVC may represent restrictive physiology.  Lung volumes would be necessary to determine.    Transplant Coordinator Note    Reason for visit: Post lung transplant follow up visit   Coordinator: Present   Caregiver:  sisterMine    Health concerns addressed today:  1. Respiratory: no cough or shortness of breath  2. GI: good appetite. BM's twice a day.  3. Some soreness around incision   4. Blood sugars around 200    Activity/rehab: Pulm rehab. Walking.  Oxygen needs: Room air  Pain management/RX: Tylenol, oxycodone  Diabetic management: Endocrine (will see patient early Oct), Domo helping manage  Next Bronch due: 9/15  Risk Criteria Labs: due 9/15  CMV status: D-/R+  Valcyte stopped: through POD #90  EBV status: D-/R+, monitor monthly (due 9/15)  DVT/PE: N/A  Post op AFIB/follow up with EP: N/A  AC/asa: none  PJP prophylactic: Bactrim    COVID:  COVID-19 infection (yes/no, date of most recent positive test):   Status/instructions given about COVID-19 vaccine:     Pt Education: medications (use/dose/side effects), how/when to call coordinator, frequency of labs, s/s of infection/rejection, call prior to starting any new medications, lab/vital sign book    Health Maintenance:   Last colonoscopy:   Next colonoscopy due:   Dermatology:  Vaccinations this visit:     Labs, CXR, PFTs reviewed with patient  Medication record reviewed and reconciled  Questions and concerns  addressed    Patient Instructions  1. Continue to hydrate with 60-70 oz fluids daily.  2. Continue to exercise daily or most days of the week.  3. Follow up with your primary care provider for annual gender health maintenance procedures.  4. Follow up with colonoscopy schedule.  5. Follow up with annual dermatology visits.  6. It doesn't seem like the COVID vaccine is working well in lung transplant patients. A number of lung transplant patients have gotten sick with COVID even after receiving the vaccines. Based on our recent experience, it can be life-threatening to get COVID  even after being vaccinated. Please continue to act like you did not get the COVID vaccine - social distancing, wearing a mask, good hand hygiene, etc. If the people around you are vaccinated, it will help reduce the risk of you getting COVID. All members of your household should be vaccinated.  7. You can stop your levalbuterol and mucomyst nebulizers  8. Try to take melatonin earlier in the evening    Next transplant clinic appointment:  2 weeks with CXR, labs and PFTs  Next lab draw: pending tacrolimus    AVS printed at time of check out         Again, thank you for allowing me to participate in the care of your patient.        Sincerely,        Flower Medina PA-C

## 2024-09-09 NOTE — RESULT ENCOUNTER NOTE
Hi Juan J,   Your tacrolimus level was 10.5 at 12 hours on 9/9/24 which is within your goal range of 8-12. No dose change at this time. Please call the transplant office ([phone]) with any questions.   Thanks,   Joi

## 2024-09-09 NOTE — PROGRESS NOTES
Transplant Coordinator Note    Reason for visit: Post lung transplant follow up visit   Coordinator: Present   Caregiver:  sisterMine    Health concerns addressed today:  1. Respiratory: no cough or shortness of breath  2. GI: good appetite. BM's twice a day.  3. Some soreness around incision   4. Blood sugars around 200    Activity/rehab: Pulm rehab. Walking.  Oxygen needs: Room air  Pain management/RX: Tylenol, oxycodone  Diabetic management: Endocrine (will see patient early Oct), Domo helping manage  Next Bronch due: 9/15  Risk Criteria Labs: due 9/15  CMV status: D-/R+  Valcyte stopped: through POD #90  EBV status: D-/R+, monitor monthly (due 9/15)  DVT/PE: N/A  Post op AFIB/follow up with EP: N/A  AC/asa: none  PJP prophylactic: Bactrim    COVID:  COVID-19 infection (yes/no, date of most recent positive test):   Status/instructions given about COVID-19 vaccine:     Pt Education: medications (use/dose/side effects), how/when to call coordinator, frequency of labs, s/s of infection/rejection, call prior to starting any new medications, lab/vital sign book    Health Maintenance:   Last colonoscopy:   Next colonoscopy due:   Dermatology:  Vaccinations this visit:     Labs, CXR, PFTs reviewed with patient  Medication record reviewed and reconciled  Questions and concerns addressed    Patient Instructions  1. Continue to hydrate with 60-70 oz fluids daily.  2. Continue to exercise daily or most days of the week.  3. Follow up with your primary care provider for annual gender health maintenance procedures.  4. Follow up with colonoscopy schedule.  5. Follow up with annual dermatology visits.  6. It doesn't seem like the COVID vaccine is working well in lung transplant patients. A number of lung transplant patients have gotten sick with COVID even after receiving the vaccines. Based on our recent experience, it can be life-threatening to get COVID  even after being vaccinated. Please continue to act like you did  not get the COVID vaccine - social distancing, wearing a mask, good hand hygiene, etc. If the people around you are vaccinated, it will help reduce the risk of you getting COVID. All members of your household should be vaccinated.  7. You can stop your levalbuterol and mucomyst nebulizers  8. Try to take melatonin earlier in the evening    Next transplant clinic appointment:  2 weeks with CXR, labs and PFTs  Next lab draw: pending tacrolimus    AVS printed at time of check out

## 2024-09-09 NOTE — PATIENT INSTRUCTIONS
Patient Instructions  1. Continue to hydrate with 60-70 oz fluids daily.  2. Continue to exercise daily or most days of the week.  3. Follow up with your primary care provider for annual gender health maintenance procedures.  4. Follow up with colonoscopy schedule.  5. Follow up with annual dermatology visits.  6. It doesn't seem like the COVID vaccine is working well in lung transplant patients. A number of lung transplant patients have gotten sick with COVID even after receiving the vaccines. Based on our recent experience, it can be life-threatening to get COVID  even after being vaccinated. Please continue to act like you did not get the COVID vaccine - social distancing, wearing a mask, good hand hygiene, etc. If the people around you are vaccinated, it will help reduce the risk of you getting COVID. All members of your household should be vaccinated.  7. You can stop your levalbuterol and mucomyst nebulizers  8. Try to take melatonin earlier in the evening.    Next transplant clinic appointment: 2 weeks with CXR, labs and PFTs  Next lab draw: pending tacrolimus    ~~~~~~~~~~~~~~~~~~~~~~~~~    Thoracic Transplant Office phone [phone] (alt [phone]), fax [phone]    Office Hours 8:30 - 5:00     For after-hours urgent issues, please dial [phone] (alt [phone]) and ask the  to page the Thoracic Transplant Coordinator On-Call.   --------------------  To expedite your medication refill(s), please contact your pharmacy and have them fax a refill request to: [phone]    *Please allow 3 business days for routine medication refills.  *Please allow 5 business days for controlled substance medication refills.    **For Diabetic medications and supplies refill(s), please contact your pharmacy and have them contact your Endocrine team.  --------------------  For scheduling appointments call [phone] (alt [phone])  --------------------  Please Note: If you are active on MyChart, all  future test results will be sent by [employer] message only, and will no longer be called to patient. You may also receive communication directly from your physician.

## 2024-09-10 ENCOUNTER — TELEPHONE (OUTPATIENT)
Dept: DERMATOLOGY | Facility: CLINIC | Age: 59
End: 2024-09-10
Payer: COMMERCIAL

## 2024-09-10 LAB
EBV DNA SERPL NAA+PROBE-ACNC: NOT DETECTED IU/ML
GLUCOSE BLDC GLUCOMTR-MCNC: 200 MG/DL (ref 70–99)
GLUCOSE BLDC GLUCOMTR-MCNC: 241 MG/DL (ref 70–99)

## 2024-09-11 ENCOUNTER — HOSPITAL ENCOUNTER (OUTPATIENT)
Dept: CARDIAC REHAB | Facility: CLINIC | Age: 59
Discharge: HOME | End: 2024-09-11
Attending: INTERNAL MEDICINE
Payer: COMMERCIAL

## 2024-09-11 PROCEDURE — G0239 OTH RESP PROC, GROUP: HCPCS

## 2024-09-12 DIAGNOSIS — Z94.2 LUNG REPLACED BY TRANSPLANT (H): Primary | ICD-10-CM

## 2024-09-12 LAB
BACTERIA SPEC CULT: NO GROWTH
BACTERIA SPEC CULT: NO GROWTH

## 2024-09-13 LAB — BACTERIA SPEC CULT: NO GROWTH

## 2024-09-15 ENCOUNTER — TELEPHONE (OUTPATIENT)
Dept: TRANSPLANT | Facility: CLINIC | Age: 59
End: 2024-09-15
Payer: COMMERCIAL

## 2024-09-15 VITALS
DIASTOLIC BLOOD PRESSURE: 87 MMHG | WEIGHT: 150 LBS | HEART RATE: 86 BPM | SYSTOLIC BLOOD PRESSURE: 126 MMHG | OXYGEN SATURATION: 96 % | BODY MASS INDEX: 21.52 KG/M2

## 2024-09-15 NOTE — TELEPHONE ENCOUNTER
On call Coordinator note:    Sister Catia called, she reports that Juan J had complained of feeling achy yesterday and which resolved with Tylenol.  Today his lung feel heavy. Denies fever, chills, respiratory or GI  symptom. Appetite very good, weight up 3 lb. Having daily BMs. Sat 96% and tolerating activity. Denies any exposures, and wears a masks when near people. Was outside much of yesterday. She was concerned this was a sign of rejection.     Addressed her concern and recommended that we monitor for now and that she should call with changes in his condition.  Juan J is scheduled for labs and pulmonary rehab tomorrow. Juan J and Catia agree with this plan. Note routed to txp coordinator.

## 2024-09-16 ENCOUNTER — VIRTUAL VISIT (OUTPATIENT)
Dept: PHARMACY | Facility: CLINIC | Age: 59
End: 2024-09-16
Payer: COMMERCIAL

## 2024-09-16 ENCOUNTER — LAB (OUTPATIENT)
Dept: LAB | Facility: CLINIC | Age: 59
End: 2024-09-16
Payer: COMMERCIAL

## 2024-09-16 ENCOUNTER — HOSPITAL ENCOUNTER (OUTPATIENT)
Dept: CARDIAC REHAB | Facility: CLINIC | Age: 59
Discharge: HOME | End: 2024-09-16
Attending: INTERNAL MEDICINE
Payer: COMMERCIAL

## 2024-09-16 DIAGNOSIS — R73.9 STEROID-INDUCED HYPERGLYCEMIA: Primary | ICD-10-CM

## 2024-09-16 DIAGNOSIS — T38.0X5A STEROID-INDUCED HYPERGLYCEMIA: Primary | ICD-10-CM

## 2024-09-16 DIAGNOSIS — Z94.2 LUNG REPLACED BY TRANSPLANT (H): ICD-10-CM

## 2024-09-16 LAB
ANION GAP SERPL CALCULATED.3IONS-SCNC: 12 MMOL/L (ref 7–15)
BUN SERPL-MCNC: 14.4 MG/DL (ref 8–23)
CALCIUM SERPL-MCNC: 9.1 MG/DL (ref 8.8–10.4)
CHLORIDE SERPL-SCNC: 100 MMOL/L (ref 98–107)
CMV DNA SPEC NAA+PROBE-ACNC: NOT DETECTED IU/ML
CREAT SERPL-MCNC: 0.67 MG/DL (ref 0.67–1.17)
EBV DNA SERPL NAA+PROBE-ACNC: NOT DETECTED IU/ML
EGFRCR SERPLBLD CKD-EPI 2021: >90 ML/MIN/1.73M2
ERYTHROCYTE [DISTWIDTH] IN BLOOD BY AUTOMATED COUNT: 14.3 % (ref 10–15)
GLUCOSE SERPL-MCNC: 247 MG/DL (ref 70–99)
HCO3 SERPL-SCNC: 24 MMOL/L (ref 22–29)
HCT VFR BLD AUTO: 41 % (ref 40–53)
HGB BLD-MCNC: 13.4 G/DL (ref 13.3–17.7)
IGG SERPL-MCNC: 510 MG/DL (ref 610–1616)
INR PPP: 1.05 (ref 0.85–1.15)
MAGNESIUM SERPL-MCNC: 1.9 MG/DL (ref 1.7–2.3)
MCH RBC QN AUTO: 30.1 PG (ref 26.5–33)
MCHC RBC AUTO-ENTMCNC: 32.7 G/DL (ref 31.5–36.5)
MCV RBC AUTO: 92 FL (ref 78–100)
PLATELET # BLD AUTO: 230 10E3/UL (ref 150–450)
POTASSIUM SERPL-SCNC: 4.2 MMOL/L (ref 3.4–5.3)
RBC # BLD AUTO: 4.45 10E6/UL (ref 4.4–5.9)
SODIUM SERPL-SCNC: 136 MMOL/L (ref 135–145)
TACROLIMUS BLD-MCNC: 11.5 UG/L (ref 5–15)
TME LAST DOSE: NORMAL H
TME LAST DOSE: NORMAL H
WBC # BLD AUTO: 12.7 10E3/UL (ref 4–11)

## 2024-09-16 PROCEDURE — 80197 ASSAY OF TACROLIMUS: CPT

## 2024-09-16 PROCEDURE — 80048 BASIC METABOLIC PNL TOTAL CA: CPT

## 2024-09-16 PROCEDURE — 86833 HLA CLASS II HIGH DEFIN QUAL: CPT

## 2024-09-16 PROCEDURE — 85014 HEMATOCRIT: CPT

## 2024-09-16 PROCEDURE — 93798 PHYS/QHP OP CAR RHAB W/ECG: CPT

## 2024-09-16 PROCEDURE — 99207 PR NO CHARGE LOS: CPT | Mod: 93 | Performed by: PHARMACIST

## 2024-09-16 PROCEDURE — 86832 HLA CLASS I HIGH DEFIN QUAL: CPT

## 2024-09-16 PROCEDURE — 83735 ASSAY OF MAGNESIUM: CPT

## 2024-09-16 PROCEDURE — 85610 PROTHROMBIN TIME: CPT

## 2024-09-16 PROCEDURE — 36415 COLL VENOUS BLD VENIPUNCTURE: CPT

## 2024-09-16 PROCEDURE — 82784 ASSAY IGA/IGD/IGG/IGM EACH: CPT

## 2024-09-16 PROCEDURE — 87516 HEPATITIS B DNA AMP PROBE: CPT

## 2024-09-16 PROCEDURE — 87799 DETECT AGENT NOS DNA QUANT: CPT

## 2024-09-16 PROCEDURE — G0239 OTH RESP PROC, GROUP: HCPCS

## 2024-09-16 NOTE — PATIENT INSTRUCTIONS
"Recommendations from today's MTM visit:                                                      Increase Novolog 13 units before meals plus sliding scale.  Let me know if your premeal sugars do not drop to <180, also if your sugars continue going over 250 anytime next week.     Follow-up: 11/4, with endocrine 10/4.     It was great speaking with you today.  I value your experience and would be very thankful for your time in providing feedback in our clinic survey. In the next few days, you may receive an email or text message from [employer] with a link to a survey related to your  clinical pharmacist.\"     To schedule another MTM appointment, please call the clinic directly or you may call the MTM scheduling line at [phone] or toll-free at [phone].     My Clinical Pharmacist's contact information:                                                      Please feel free to contact me with any questions or concerns you have.      Domo Bass, PharmD  MTM Pharmacist    Phone: [phone]     "

## 2024-09-16 NOTE — PROGRESS NOTES
Disease State Management Encounter:                          Juan J Cartwright is a 59 year old male called for a follow-up visit. Patient was accompanied by Mine.     Reason for visit: diabetes follow-up .    Diabetes   Lantus 30 units every evening   Novolog 10 units plus sliding scale before meals (180-220: 2 units, 221-260: 3 units, etc  Prednisone 20mg daily, reducing on Saturday to 15 MG.   Patient is not experiencing side effects.  Current diabetes symptoms: none  Blood sugar monitoring: Using dexcom; Ranges: (patient reported)    Stopped NPH yesterday, running 300s today.        Today's Vitals: There were no vitals taken for this visit.    Assessment/Plan:    Increase Novolog 13 units before meals plus sliding scale.  Let me know if your premeal sugars do not drop to <180, also if your sugars continue going over 250 anytime next week.     Follow-up: 11/4, with endocrine 10/4.    I spent 7 minutes with this patient today. All changes were made via collaborative practice agreement with Dr. Tucker. A copy of the visit note was provided to the patient's provider(s).    A summary of these recommendations was sent via [employer].    Domo Bass, PharmD  MT[facility] Pharmacist    Phone: [phone]      Medication Therapy Recommendations  Steroid-induced hyperglycemia    Current Medication: insulin aspart (NOVOLOG PEN) 100 UNIT/ML pen   Rationale: Dose too low - Dosage too low - Effectiveness   Recommendation: Increase Dose   Status: Accepted per CPA

## 2024-09-17 LAB
HBV DNA SERPL QL NAA+PROBE: NORMAL
HCV RNA SERPL QL NAA+PROBE: NORMAL
HIV1+2 RNA SERPL QL NAA+PROBE: NORMAL

## 2024-09-17 NOTE — RESULT ENCOUNTER NOTE
Hi Juan J,   Your tacrolimus level was 11.5 at 12 hours on 9/16/24 which is within your goal range of 8-12. No dose change at this time. Please call the transplant office ([phone]) with any questions.   Thanks,   Joi

## 2024-09-18 ENCOUNTER — HOSPITAL ENCOUNTER (OUTPATIENT)
Dept: CARDIAC REHAB | Facility: CLINIC | Age: 59
Discharge: HOME | End: 2024-09-18
Attending: INTERNAL MEDICINE
Payer: COMMERCIAL

## 2024-09-18 PROCEDURE — G0239 OTH RESP PROC, GROUP: HCPCS

## 2024-09-22 LAB — PROSPERA TRANSPLANT MONITORING: 2.4 %

## 2024-09-23 ENCOUNTER — HOSPITAL ENCOUNTER (OUTPATIENT)
Dept: CARDIAC REHAB | Facility: CLINIC | Age: 59
Discharge: HOME | End: 2024-09-23
Attending: INTERNAL MEDICINE
Payer: COMMERCIAL

## 2024-09-23 PROCEDURE — G0239 OTH RESP PROC, GROUP: HCPCS

## 2024-09-23 NOTE — PROGRESS NOTES
Travon Cartwright is a 59 year old male with a history of idiopathic pulmonary fibrosis, diabetes, hepatosteatosis, and essential tremor. Patient is now s/p BSLT on 8/15/2024 with Dr. Mulvihill.  Plan for staple removal 4 weeks post-op per Dr. Mulvihill. He presents to clinic today for staple removal.     All staples were removed without complication.   Clamshell wound appears clean, dry, and intact without erythema or drainage. Well-approximated and healing. Chest tube sites scabbed, healing, no drainage.  Instructed patient to call transplant team with any signs of infection, fever. Monitor incision and clean it daily.    Reviewed sternal precautions with patient which are to continue 12 weeks post-operatively. Continue pulmonary rehab.    All questions were answered. No further follow up is needed with CVTS.   Follow up with lung transplant team and primary care as scheduled.     Rebecca Hunt PA-C  Cardiovascular Surgery

## 2024-09-24 ENCOUNTER — LAB (OUTPATIENT)
Dept: LAB | Facility: CLINIC | Age: 59
End: 2024-09-24
Payer: COMMERCIAL

## 2024-09-24 ENCOUNTER — OFFICE VISIT (OUTPATIENT)
Dept: TRANSPLANT | Facility: CLINIC | Age: 59
End: 2024-09-24
Attending: INTERNAL MEDICINE
Payer: COMMERCIAL

## 2024-09-24 ENCOUNTER — LAB (OUTPATIENT)
Dept: LAB | Facility: CLINIC | Age: 59
End: 2024-09-24
Attending: INTERNAL MEDICINE
Payer: COMMERCIAL

## 2024-09-24 ENCOUNTER — ANCILLARY PROCEDURE (OUTPATIENT)
Dept: CT IMAGING | Facility: CLINIC | Age: 59
End: 2024-09-24
Attending: INTERNAL MEDICINE
Payer: COMMERCIAL

## 2024-09-24 ENCOUNTER — ANCILLARY PROCEDURE (OUTPATIENT)
Dept: GENERAL RADIOLOGY | Facility: CLINIC | Age: 59
End: 2024-09-24
Attending: INTERNAL MEDICINE
Payer: COMMERCIAL

## 2024-09-24 ENCOUNTER — OFFICE VISIT (OUTPATIENT)
Dept: CARDIOLOGY | Facility: CLINIC | Age: 59
End: 2024-09-24
Attending: STUDENT IN AN ORGANIZED HEALTH CARE EDUCATION/TRAINING PROGRAM
Payer: COMMERCIAL

## 2024-09-24 ENCOUNTER — OFFICE VISIT (OUTPATIENT)
Dept: PULMONOLOGY | Facility: CLINIC | Age: 59
End: 2024-09-24
Attending: INTERNAL MEDICINE
Payer: COMMERCIAL

## 2024-09-24 ENCOUNTER — TELEPHONE (OUTPATIENT)
Dept: TRANSPLANT | Facility: CLINIC | Age: 59
End: 2024-09-24

## 2024-09-24 VITALS
BODY MASS INDEX: 22.53 KG/M2 | WEIGHT: 157 LBS | OXYGEN SATURATION: 99 % | DIASTOLIC BLOOD PRESSURE: 83 MMHG | HEART RATE: 71 BPM | SYSTOLIC BLOOD PRESSURE: 124 MMHG

## 2024-09-24 VITALS
OXYGEN SATURATION: 99 % | DIASTOLIC BLOOD PRESSURE: 84 MMHG | WEIGHT: 157 LBS | HEART RATE: 70 BPM | BODY MASS INDEX: 22.53 KG/M2 | SYSTOLIC BLOOD PRESSURE: 127 MMHG

## 2024-09-24 DIAGNOSIS — Z94.2 S/P LUNG TRANSPLANT (H): ICD-10-CM

## 2024-09-24 DIAGNOSIS — D84.9 IMMUNOSUPPRESSION (H): ICD-10-CM

## 2024-09-24 DIAGNOSIS — J84.9 ILD (INTERSTITIAL LUNG DISEASE) (H): ICD-10-CM

## 2024-09-24 DIAGNOSIS — Z94.2 S/P LUNG TRANSPLANT (H): Primary | ICD-10-CM

## 2024-09-24 DIAGNOSIS — Z94.2 LUNG REPLACED BY TRANSPLANT (H): Primary | ICD-10-CM

## 2024-09-24 DIAGNOSIS — Z94.2 LUNG REPLACED BY TRANSPLANT (H): ICD-10-CM

## 2024-09-24 LAB
ALBUMIN SERPL BCG-MCNC: 3.7 G/DL (ref 3.5–5.2)
ALP SERPL-CCNC: 87 U/L (ref 40–150)
ALT SERPL W P-5'-P-CCNC: 29 U/L (ref 0–70)
ANION GAP SERPL CALCULATED.3IONS-SCNC: 11 MMOL/L (ref 7–15)
AST SERPL W P-5'-P-CCNC: 18 U/L (ref 0–45)
BILIRUB SERPL-MCNC: 0.3 MG/DL
BUN SERPL-MCNC: 13.7 MG/DL (ref 8–23)
CALCIUM SERPL-MCNC: 9.8 MG/DL (ref 8.8–10.4)
CHLORIDE SERPL-SCNC: 101 MMOL/L (ref 98–107)
CMV DNA SPEC NAA+PROBE-ACNC: NOT DETECTED IU/ML
CREAT SERPL-MCNC: 0.78 MG/DL (ref 0.67–1.17)
EBV DNA SERPL NAA+PROBE-ACNC: NOT DETECTED IU/ML
EGFRCR SERPLBLD CKD-EPI 2021: >90 ML/MIN/1.73M2
ERYTHROCYTE [DISTWIDTH] IN BLOOD BY AUTOMATED COUNT: 14.1 % (ref 10–15)
EXPTIME-PRE: 6.01 SEC
FEF2575-%PRED-PRE: 102 %
FEF2575-PRE: 2.84 L/SEC
FEF2575-PRED: 2.76 L/SEC
FEFMAX-%PRED-PRE: 75 %
FEFMAX-PRE: 6.91 L/SEC
FEFMAX-PRED: 9.15 L/SEC
FEV1-%PRED-PRE: 76 %
FEV1-PRE: 2.54 L
FEV1FEV6-PRE: 83 %
FEV1FEV6-PRED: 79 %
FEV1FVC-PRE: 82 %
FEV1FVC-PRED: 78 %
FIFMAX-PRE: 6.01 L/SEC
FVC-%PRED-PRE: 72 %
FVC-PRE: 3.08 L
FVC-PRED: 4.25 L
GLUCOSE SERPL-MCNC: 173 MG/DL (ref 70–99)
HCO3 SERPL-SCNC: 26 MMOL/L (ref 22–29)
HCT VFR BLD AUTO: 40.2 % (ref 40–53)
HGB BLD-MCNC: 12.9 G/DL (ref 13.3–17.7)
IGG SERPL-MCNC: 539 MG/DL (ref 610–1616)
INR PPP: 1.05 (ref 0.85–1.15)
MAGNESIUM SERPL-MCNC: 1.4 MG/DL (ref 1.7–2.3)
MCH RBC QN AUTO: 29.4 PG (ref 26.5–33)
MCHC RBC AUTO-ENTMCNC: 32.1 G/DL (ref 31.5–36.5)
MCV RBC AUTO: 92 FL (ref 78–100)
PLATELET # BLD AUTO: 271 10E3/UL (ref 150–450)
POTASSIUM SERPL-SCNC: 4.1 MMOL/L (ref 3.4–5.3)
PROT SERPL-MCNC: 6.6 G/DL (ref 6.4–8.3)
RBC # BLD AUTO: 4.39 10E6/UL (ref 4.4–5.9)
SODIUM SERPL-SCNC: 138 MMOL/L (ref 135–145)
TACROLIMUS BLD-MCNC: 10.7 UG/L (ref 5–15)
TME LAST DOSE: NORMAL H
TME LAST DOSE: NORMAL H
WBC # BLD AUTO: 9.6 10E3/UL (ref 4–11)

## 2024-09-24 PROCEDURE — 99000 SPECIMEN HANDLING OFFICE-LAB: CPT | Performed by: PATHOLOGY

## 2024-09-24 PROCEDURE — 80197 ASSAY OF TACROLIMUS: CPT | Performed by: INTERNAL MEDICINE

## 2024-09-24 PROCEDURE — 71046 X-RAY EXAM CHEST 2 VIEWS: CPT | Mod: GC | Performed by: RADIOLOGY

## 2024-09-24 PROCEDURE — 36415 COLL VENOUS BLD VENIPUNCTURE: CPT | Performed by: PATHOLOGY

## 2024-09-24 PROCEDURE — 71250 CT THORAX DX C-: CPT | Mod: GC | Performed by: RADIOLOGY

## 2024-09-24 PROCEDURE — G0463 HOSPITAL OUTPT CLINIC VISIT: HCPCS | Performed by: INTERNAL MEDICINE

## 2024-09-24 PROCEDURE — 86832 HLA CLASS I HIGH DEFIN QUAL: CPT | Performed by: INTERNAL MEDICINE

## 2024-09-24 PROCEDURE — 80053 COMPREHEN METABOLIC PANEL: CPT | Performed by: PATHOLOGY

## 2024-09-24 PROCEDURE — 99024 POSTOP FOLLOW-UP VISIT: CPT | Performed by: STUDENT IN AN ORGANIZED HEALTH CARE EDUCATION/TRAINING PROGRAM

## 2024-09-24 PROCEDURE — 99207 PR NO CHARGE LOS: CPT

## 2024-09-24 PROCEDURE — 83735 ASSAY OF MAGNESIUM: CPT | Performed by: PATHOLOGY

## 2024-09-24 PROCEDURE — 94375 RESPIRATORY FLOW VOLUME LOOP: CPT | Performed by: INTERNAL MEDICINE

## 2024-09-24 PROCEDURE — 82784 ASSAY IGA/IGD/IGG/IGM EACH: CPT | Performed by: INTERNAL MEDICINE

## 2024-09-24 PROCEDURE — 99214 OFFICE O/P EST MOD 30 MIN: CPT | Mod: 24 | Performed by: INTERNAL MEDICINE

## 2024-09-24 PROCEDURE — 85610 PROTHROMBIN TIME: CPT | Performed by: PATHOLOGY

## 2024-09-24 PROCEDURE — 86833 HLA CLASS II HIGH DEFIN QUAL: CPT | Performed by: INTERNAL MEDICINE

## 2024-09-24 PROCEDURE — G0463 HOSPITAL OUTPT CLINIC VISIT: HCPCS

## 2024-09-24 PROCEDURE — 87799 DETECT AGENT NOS DNA QUANT: CPT | Performed by: INTERNAL MEDICINE

## 2024-09-24 PROCEDURE — 85027 COMPLETE CBC AUTOMATED: CPT | Performed by: PATHOLOGY

## 2024-09-24 RX ORDER — METOPROLOL TARTRATE 25 MG/1
25 TABLET, FILM COATED ORAL 2 TIMES DAILY
Qty: 60 TABLET | Refills: 3 | Status: SHIPPED | OUTPATIENT
Start: 2024-09-24 | End: 2024-09-24

## 2024-09-24 RX ORDER — METOPROLOL TARTRATE 25 MG/1
25 TABLET, FILM COATED ORAL 2 TIMES DAILY
Qty: 60 TABLET | Refills: 3 | Status: SHIPPED | OUTPATIENT
Start: 2024-09-24

## 2024-09-24 RX ORDER — PROPRANOLOL HYDROCHLORIDE 10 MG/1
10 TABLET ORAL 3 TIMES DAILY
Qty: 30 TABLET | Refills: 0 | Status: SHIPPED | OUTPATIENT
Start: 2024-09-24 | End: 2024-10-04

## 2024-09-24 ASSESSMENT — PAIN SCALES - GENERAL: PAINLEVEL: NO PAIN (0)

## 2024-09-24 NOTE — TELEPHONE ENCOUNTER
Plan for bronch with lavage and Tbbx on 9/26/24. Instructions reviewed with Mine on the phone and sent through [employer]. Will work on finding patient an appointment week of 10/7/24.     You are scheduled for a bronchoscopy on 9/26/24 at 8 at the [facility] on the 3rd floor. Arrive 1 hour early.     Instructions     1. Nothing to eat or drink 8 hours before procedure.  2. Hold your morning medications. Bring them with you to take after the procedure.  3. Have a  available to take you home.   4. If you are a diabetic take 1/2 dose Lantus the night before the bronch. No short acting insulin the morning of the bronch. Check blood sugar before coming to the hospital and let the nurses know what it is.

## 2024-09-24 NOTE — LETTER
9/24/2024      RE: Travon Cartwright  [address] [facility] [address]       Dear Colleague,    Thank you for the opportunity to participate in the care of your patient, Travon Cartwright, at the [facility]. Please see a copy of my visit note below.    Travon Cartwright is a 59 year old male with a history of idiopathic pulmonary fibrosis, diabetes, hepatosteatosis, and essential tremor. Patient is now s/p BSLT on 8/15/2024 with Dr. Mulvihill.  Plan for staple removal 4 weeks post-op per Dr. Mulvihill. He presents to clinic today for staple removal.     All staples were removed without complication.   Clamshell wound appears clean, dry, and intact without erythema or drainage. Well-approximated and healing. Chest tube sites scabbed, healing, no drainage.  Instructed patient to call transplant team with any signs of infection, fever. Monitor incision and clean it daily.    Reviewed sternal precautions with patient which are to continue 12 weeks post-operatively. Continue pulmonary rehab.    All questions were answered. No further follow up is needed with CVTS.   Follow up with lung transplant team and primary care as scheduled.     Rebecca Hunt PA-C  Cardiovascular Surgery       Please do not hesitate to contact me if you have any questions/concerns.     Sincerely,     Cardiovascular Thoracic Surgery

## 2024-09-24 NOTE — PROCEDURES
Transplant Coordinator Note    Reason for visit: Post lung transplant follow up visit   Coordinator: Present   Caregiver:  Wife     Health concerns addressed today:  1. Not sleeping well at night.   2. Eating well. Gained 10 lbs   3. Getting daily activity.   4.  PFTs a little down today.   5. Prospera a little high.   6.     Activity/rehab: pulmonary Rehab   Oxygen needs: none   Pain management/RX: tylenol as needed   Diabetic management: checking blood sugars, and taking Insulin. Pt is diabetic.   Next Bronch due: Now   AC/asa:   PJP prophylactic: Bactrim     COVID:  COVID-19 infection (yes/no, date of most recent positive test):   Status/instructions given about COVID-19 vaccine:     Pt Education: medications (use/dose/side effects), how/when to call coordinator, frequency of labs, s/s of infection/rejection, call prior to starting any new medications, lab/vital sign book      Labs, CXR, PFTs reviewed with patient  Medication record reviewed and reconciled  Questions and concerns addressed    Patient Instructions  1. Continue to hydrate with 60-70 oz fluids daily.  2. Continue to exercise daily or most days of the week.  3. Follow up with your primary care provider for annual gender health maintenance procedures.  4. Follow up with colonoscopy schedule.  5. Follow up with annual dermatology visits.  6. It doesn't seem like the COVID vaccine is working well in lung transplant patients. A number of lung transplant patients have gotten sick with COVID even after receiving the vaccines. Based on our recent experience, it can be life-threatening to get COVID  even after being vaccinated. Please continue to act like you did not get the COVID vaccine - social distancing, wearing a mask, good hand hygiene, etc. If the people around you are vaccinated, it will help reduce the risk of you getting COVID. All members of your household should be vaccinated.  7. Wait 3 months from transplant to get any vaccines.   8. Increase  magnesium to 3 times daily.   9. Tremors should improve slowly after surgery. (It can take up to 6 months)   10. We will set you up with a bronchoscopy in the next 1-2 weeks.   11. Chest CT scan today.  12. You can go home for a couple of days, but come back!   13. Decrease the metoprolol to 25 mg BID   14. Add propranolol 10 mg 3 times daily.       Next transplant clinic appointment:  10/1 with CXR, labs and PFTs  Next lab draw: weekly.     AVS printed at time of check out

## 2024-09-24 NOTE — LETTER
9/24/2024      Travon Cartwright  [address] [facility] [address]      Dear Colleague,    Thank you for referring your patient, Travon Cartwright, to the [facility]. Please see a copy of my visit note below.    Reason for Visit  Travon Cartwright is a 59 year old male who is being seen for RECHECK (Lung - 40 days ago )  Lung TX HPI  The patient was seen and examined by Carlos Johnson MD     Travon Cartwright is a 59 year old male with a history of idiopathic pulmonary fibrosis, diabetes, hepatosteatosis, and essential tremor.  Pt. is now s/p BSLT on 8/15/2024 with Dr. Mulvihill.  Surgery was uncomplicated and done on pump, extubated and off pressors since 8/18.  Weaned to RA 8/22.     Interval history:  He is doing well. Feels SOB with climbing stairs and with longer walks and doing pulm rehab Denies cough, palpitations, chest pain, abdominal pain, or dysuria.  His sister notes his blood sugars are getting better. No leg swelling.   No Post nasal drip or heart burn.   Still has nocturia - 3 - 4 x/night. Sleeps poorly.    Tremors are worse.     Current Outpatient Medications   Medication Sig Dispense Refill     acetaminophen (TYLENOL) 325 MG tablet Take 2 tablets (650 mg) by mouth or Feeding Tube every 4 hours as needed for other (For optimal non-opioid multimodal pain management to improve pain control.). 30 tablet 0     acetylcysteine (MUCOMYST) 20 % neb solution Take 2 mLs by nebulization 4 times daily as needed for mucolysis/respiratory distress. 30 mL 1     calcium carbonate-vitamin D (CALTRATE) 600-10 MG-MCG per tablet Take 1 tablet by mouth or Feeding Tube 2 times daily. 60 tablet 0     Continuous Glucose Sensor (DEXCOM G7 SENSOR) MISC Change every 10 days. 1 each 11     insulin aspart (NOVOLOG PEN) 100 UNIT/ML pen Take 10 units prior to meals, and add sliding scale per instructions.  Up to 50 units daily 15 mL 4     insulin glargine (LANTUS PEN) 100 UNIT/ML pen  Inject 30 Units subcutaneously at bedtime.       insulin pen needle (32G X 4 MM) 32G X 4 MM miscellaneous Use pen needles daily or as directed. 100 each 3     levalbuterol (XOPENEX) 1.25 MG/3ML neb solution Take 3 mLs (1.25 mg) by nebulization 4 times daily as needed for shortness of breath or wheezing. Use prior to Mucomyst neb. 300 mL 0     magnesium (HIGH ABSORPTION MAGNESIUM) 100 MG TABS Take 400 mg by mouth 2 times daily.       melatonin 5 MG tablet Take 1 tablet (5 mg) by mouth or Feeding Tube every evening. 30 tablet 0     methocarbamol (ROBAXIN) 750 MG tablet Take 1 tablet (750 mg) by mouth or Feeding Tube 4 times daily as needed for muscle spasms. 90 tablet 1     metoprolol tartrate (LOPRESSOR) 50 MG tablet Take 1 tablet (50 mg) by mouth or Feeding Tube 2 times daily. 60 tablet 3     multivitamin, therapeutic (THERA-VIT) TABS tablet Take 1 tablet by mouth daily. 30 tablet 0     mycophenolate (GENERIC EQUIVALENT) 250 MG capsule Take 4 capsules (1,000 mg) by mouth 2 times daily. 240 capsule 11     nystatin (MYCOSTATIN) 845448 UNIT/ML suspension Take 10 mLs (1,000,000 Units) by mouth 4 times daily. 1200 mL 4     pantoprazole (PROTONIX) 40 MG EC tablet Take 1 tablet (40 mg) by mouth every morning (before breakfast). 30 tablet 3     polyethylene glycol (MIRALAX) 17 GM/Dose powder Take 17 g by mouth daily as needed for constipation. 510 g 0     predniSONE (DELTASONE) 5 MG tablet Take 5 tablets (25 mg) by mouth daily. Taper per lung transplant clinic.  Next taper to 20 mg daily on 8/31 (Patient taking differently: Take 20 mg by mouth daily. Taper per lung transplant clinic.  Next taper to 15 mg daily on 8/31) 150 tablet 3     rosuvastatin (CRESTOR) 10 MG tablet Take 1 tablet (10 mg) by mouth daily. 30 tablet 3     senna-docusate (SENOKOT-S/PERICOLACE) 8.6-50 MG tablet Take 2 tablets by mouth or Feeding Tube 2 times daily as needed for constipation (use 1st). 120 tablet 0     sodium zirconium cyclosilicate  (LOKELMA) 10 g PACK packet Take 1 packet (10 g) by mouth daily as needed. 10 packet 1     sulfamethoxazole-trimethoprim (BACTRIM) 400-80 MG tablet Take 1 tablet by mouth or NG Tube daily. 30 tablet 3     tacrolimus (GENERIC EQUIVALENT) 0.5 MG capsule Take 1 capsule (0.5 mg) by mouth every morning. Total dose: 3.5 mg in the AM and 4 mg in the PM 30 capsule 11     tacrolimus (GENERIC EQUIVALENT) 1 MG capsule Take 3 capsules (3 mg) by mouth every morning AND 4 capsules (4 mg) every evening. Total dose 3.5 mg in the AM and 4.0 mg in the PM.. 210 capsule 11     traZODone (DESYREL) 50 MG tablet Take 1 tablet (50 mg) by mouth or Feeding Tube nightly as needed for sleep. 30 tablet 0     valGANciclovir (VALCYTE) 450 MG tablet Take 2 tablets (900 mg) by mouth daily. 60 tablet 3     No current facility-administered medications for this visit.     Facility-Administered Medications Ordered in Other Visits   Medication Dose Route Frequency Provider Last Rate Last Admin     sodium chloride bacteriostatic 0.9 % flush 9 mL  9 mL Intravenous Once NICOLA Pearce MD         No Known Allergies  Past Medical History:   Diagnosis Date     Administration of long-term prophylactic antibiotics 08/26/2024     Hepatic steatosis 2017    Noted on ultrasound     Hypomagnesemia 08/26/2024     Immunosuppression (H24) 08/26/2024     S/P lung transplant (H) 08/15/2024     Tremor 05/23/2024       Past Surgical History:   Procedure Laterality Date     BRONCHOSCOPY  08/16/2024     BRONCHOSCOPY FLEXIBLE AND RIGID N/A 8/27/2024    Procedure: Bronchoscopy Inspection;  Surgeon: Oneida Silver MD;  Location:  GI     COLONOSCOPY       CV CORONARY ANGIOGRAM N/A 06/21/2024    Procedure: Coronary Angiogram;  Surgeon: Gabriel Julian MD;  Location:  HEART CARDIAC CATH LAB     CV RIGHT HEART CATH MEASUREMENTS RECORDED N/A 06/21/2024    Procedure: Right Heart Catheterization;  Surgeon: Gabriel Julian MD;  Location:  HEART CARDIAC CATH LAB      PICC DOUBLE LUMEN PLACEMENT Right 08/20/2024    47-3cm, Basilic     TRANSPLANT LUNG RECIPIENT SINGLE X2 Bilateral 08/15/2024    Procedure: Clamshell Incision, On Central Venoarterial Extracorporeal Membranous Oxygenation, Bilateral Lung Transplant Recipient, Left atrial appendage ligation, Intraoperative Flexible Bronchoscopy by Anesthesia;  Surgeon: Mulvihill, Michael, MD;  Location:  OR       Social History     Socioeconomic History     Marital status: Single     Spouse name: Not on file     Number of children: Not on file     Years of education: Not on file     Highest education level: Not on file   Occupational History     Not on file   Tobacco Use     Smoking status: Former     Types: Cigarettes     Smokeless tobacco: Never   Substance and Sexual Activity     Alcohol use: Not Currently     Comment: not since 2017     Drug use: Never     Sexual activity: Not on file   Other Topics Concern     Not on file   Social History Narrative     Not on file     Social Determinants of Health     Financial Resource Strain: Low Risk  (8/24/2024)    Financial Resource Strain      Within the past 12 months, have you or your family members you live with been unable to get utilities (heat, electricity) when it was really needed?: No   Food Insecurity: Not on file (9/3/2024)   Transportation Needs: Low Risk  (8/24/2024)    Transportation Needs      Within the past 12 months, has lack of transportation kept you from medical appointments, getting your medicines, non-medical meetings or appointments, work, or from getting things that you need?: No   Physical Activity: Not on File (8/26/2019)    Received from [employer]    Physical Activity      Physical Activity: 0   Stress: Not on File (8/26/2019)    Received from [employer]    Stress      Stress: 0   Social Connections: Not on File (9/11/2024)    Received from [employer]    Social Connections      Connectedness: 0   Interpersonal Safety: Low Risk  (8/24/2024)    Interpersonal Safety      Do you  feel physically and emotionally safe where you currently live?: Yes      Within the past 12 months, have you been hit, slapped, kicked or otherwise physically hurt by someone?: No      Within the past 12 months, have you been humiliated or emotionally abused in other ways by your partner or ex-partner?: No   Housing Stability: Low Risk  (8/24/2024)    Housing Stability      Do you have housing? : Yes      Are you worried about losing your housing?: No       ROS Pulmonary    A complete ROS was otherwise negative except as noted in the HPI.  /83   Pulse 71   Wt 71.2 kg (157 lb)   SpO2 99%   BMI 22.53 kg/m    Exam:   GENERAL APPEARANCE: Well developed, well nourished, alert, and in no apparent distress. Poor posture.   EYES: PERRL, EOMI  HENT: Nasal mucosa with no edema and no hyperemia. No nasal polyps.  EARS: Canals clear, TMs normal  MOUTH: Oral mucosa is moist, without any lesions, no tonsillar enlargement, no oropharyngeal exudate.  NECK: supple, no masses, no thyromegaly.  LYMPHATICS: No significant axillary, cervical, or supraclavicular nodes.  RESP: normal percussion, good air flow throughout.  No crackles. No rhonchi. No wheezes.  CV: Normal S1, S2, regular rhythm, normal rate. No murmur.  No rub. No gallop. No LE edema.   ABDOMEN:  Bowel sounds normal, soft, nontender, no HSM or masses.   MS: extremities normal. No clubbing. No cyanosis.  SKIN: no rash on limited exam, staples intact without erythema, induration, discharge, or warmth  NEURO: Mentation intact, speech normal, normal strength and tone, normal gait and stance  PSYCH: mentation appears normal. and affect normal/bright.    Results:  Recent Results (from the past 168 hour(s))   Comprehensive metabolic panel    Collection Time: 09/24/24 10:45 AM   Result Value Ref Range    Sodium 138 135 - 145 mmol/L    Potassium 4.1 3.4 - 5.3 mmol/L    Carbon Dioxide (CO2) 26 22 - 29 mmol/L    Anion Gap 11 7 - 15 mmol/L    Urea Nitrogen 13.7 8.0 - 23.0  mg/dL    Creatinine 0.78 0.67 - 1.17 mg/dL    GFR Estimate >90 >60 mL/min/1.73m2    Calcium 9.8 8.8 - 10.4 mg/dL    Chloride 101 98 - 107 mmol/L    Glucose 173 (H) 70 - 99 mg/dL    Alkaline Phosphatase 87 40 - 150 U/L    AST 18 0 - 45 U/L    ALT 29 0 - 70 U/L    Protein Total 6.6 6.4 - 8.3 g/dL    Albumin 3.7 3.5 - 5.2 g/dL    Bilirubin Total 0.3 <=1.2 mg/dL   CBC with platelets    Collection Time: 09/24/24 10:45 AM   Result Value Ref Range    WBC Count 9.6 4.0 - 11.0 10e3/uL    RBC Count 4.39 (L) 4.40 - 5.90 10e6/uL    Hemoglobin 12.9 (L) 13.3 - 17.7 g/dL    Hematocrit 40.2 40.0 - 53.0 %    MCV 92 78 - 100 fL    MCH 29.4 26.5 - 33.0 pg    MCHC 32.1 31.5 - 36.5 g/dL    RDW 14.1 10.0 - 15.0 %    Platelet Count 271 150 - 450 10e3/uL   Magnesium    Collection Time: 09/24/24 10:45 AM   Result Value Ref Range    Magnesium 1.4 (L) 1.7 - 2.3 mg/dL   General PFT Lab (Please always keep checked)    Collection Time: 09/24/24 10:49 AM   Result Value Ref Range    FVC-Pred 4.25 L    FVC-Pre 3.08 L    FVC-%Pred-Pre 72 %    FEV1-Pre 2.54 L    FEV1-%Pred-Pre 76 %    FEV1FVC-Pred 78 %    FEV1FVC-Pre 82 %    FEFMax-Pred 9.15 L/sec    FEFMax-Pre 6.91 L/sec    FEFMax-%Pred-Pre 75 %    FEF2575-Pred 2.76 L/sec    FEF2575-Pre 2.84 L/sec    HGU5006-%Pred-Pre 102 %    ExpTime-Pre 6.01 sec    FIFMax-Pre 6.01 L/sec    FEV1FEV6-Pred 79 %    FEV1FEV6-Pre 83 %         Results as noted above.    PFT Interpretation:  Mild restrictive spirometry.  Declined from previous by 170ml.  Below best since lung transplantation  Valid Maneuver    CXR (9/24/2024), personally reviewed by me: bilateral Lung fields shows haziness. No effusion. Cardiac silhouette is not enlarged.     Assessment and plan: Travon Cartwright is a 59 year old male with a history of idiopathic pulmonary fibrosis, diabetes, hepatosteatosis, and essential tremor.  Pt. is now s/p BSLT on 8/15/2024 with Dr. Mulvihill.  Surgery was uncomplicated and done on pump, extubated and off  pressors since 8/18.  Had delirium. Weaned to RA 8/22.      Discharge recommendations:  - Plan for staple removal 4 weeks post-op per Dr. Mulvihill (see CVTS note 8/23)     #. S/p bilateral sequential lung transplant (BSLT) 8/15/24 for idiopathic pulmonary fibrosis: Explant pathology: left with UIP and organizing pneumonia (5 benign hilar lymph nodes), right with UIP with organizing pneumonia (7 benign hilar lymph nodes).  PGD 1-2.    - Bronch (8/27) with normal surgical anastomosis in right mainstem bronchus, normal surgical anastomosis in left mainstem bronchus and noted pedunculated granulation tissue 6 o'clock, distal to anastomoses airway hyperemic but not friable, and moderate amount of thick, tenacious, clear secretions and fair amount of thin secretions.     Immunosuppression: S/p induction therapy with high dose IV steroid intraoperatively and basiliximab (POD #0 and #4).   - Tacrolimus goal  8-12.  - MMF 1000 mg BID  - Prednisone 25 mg daily with taper per lung transplant protocol:  Date Daily Dose (mg)   9/21/2024 15   10/12/2024 10   11/2/2024 5      9/24/2024: Pulm sx are stable. CXR is stable to better. FEV1 - has mild decline and Cell free DNA was elevated last week.   - Nebs: levalbuterol and Mucomyst QID PRN  - IS and Aerobika   - DSA (8/22) negative, pending from 9/16/24.  - CXR today with stable with streaky Rt base and left lung zone haziness (personally reviewed)  - Plan for staple removal today per CVTS.  - Check Chest CT today and will proceed with bronch/BAL and tBBx given drop in FEV1. He is close to the two month ceasar for surveillance.    #. ID:   Prophylaxis:   - Bactrim for PJP ppx (started POD #1 due to donor toxoplasma IgG +)  - VGCV for CMV ppx (as below), CMV monitoring per protocol (after completion of ppx course, additionally prn)  - Nystatin for oral candidiasis ppx, 6 month course   - See below for serologies and viral ppx:    Donor Recipient Intervention   CMV status - +  Valganciclovir POD #8-90   EBV status - + EBV monthly (due 9/15)   HSV status N/A + N/A         - Donor cultures ([facility]) with Strep constellatus, donor cultures ([facility]) with MSSA.  S/p IV vancomycin 8/15-8/20 and ceftazidime 8/15-8/17.  Noted fever 8/18-8/19.  Blood culture negative 8/19.  C diff negative 8/21.  Report of urinary urgency and frequency, UA unremarkable 8/28.   - IgG at one month (due 9/15)  - Bronch cultures (8/27) NGTD, follow  - ABX: IV Zosyn (8/19-8/28) for 10 day course.     PHS risk criteria donor: Additional labs required post-transplant (between 4-8 weeks post-op): Hepatitis B, Hepatitis C, and HIV by JOVANY ([id number], neg on 9/16).     #. Hx HTN  #. Hx of HLD  - Monitor hemodynamic status. Goal MAP >65, SBP <140  - Continue rosuvastatin 20 daily.  9/24/2024: Will decrease Metoprolol tartrate to 25 mg BID and reassess at next visit, in part as we are adding propanolol for tremors.    #. Hypomagnesemia: Suppressed absorption d/t CNI.   - Mag glycinate 800 mg TID    #. Hyperkalemia: elevated in setting of tacrolimus use.    - lokelma 10g one dose today again. HOpefully improving BS control will treat this too.  - repeat BMP in 1 week     #. Lifelong tremors: He's had a history of lifelong tremors which have worsened in the last 1-2 years.      9/24/2024: Will try propanolol 10mg TID as he difficulty eating (spilling food too). Given 10days worth, If sx are better we will continue.  - Neuro referral- preferable a movement disorder specialist eventually.    #. DM II:  He was diagnosed with diabetes about 3-4 years ago and feels like it is not as well controlled as it could be. Prior to transplant was on Jardiance, dulaglutide, glargine, metformin.   Lantus 30 units every day at 7 pm  To cover prednisone 20 mg daily,   Novolog Fixed meal dose:  Breakfast, lunch and dinner: Give 13 units of Novolog  and ISS.  Give 5 units of Novolog for a snack or supplement before 10 pm, do not cover snacks after 10  pm  9/3/2024: Hgb A1c is was 7.4.  - Interested in CGM, DM appt in 12days.    #. Hepatic Steatosis: He has had intermittently elevated LFTs since 2019, had a liver US in 2017 showing hepatic steatosis. GI felt he was low likelihood advanced fibrosis.   #. Elevated alk phos and ALT: Resolved.    #. HCM:  Dermatology:   Vaccination: Will need RSV vaccine/C19 and Flu vaccine, will be given after 11/15/24.  Cancer screening:  - colonoscopy: due 2026 (last 2016 , no polyps, 10 year follow up)      The longitudinal plan of care for the diagnosis(es)/condition(s) as documented were addressed during this visit. Due to the added complexity in care, I will continue to support Juan J in the subsequent management and with ongoing continuity of care.        Again, thank you for allowing me to participate in the care of your patient.        Sincerely,        Carlos Johnson MD

## 2024-09-24 NOTE — PATIENT INSTRUCTIONS
Patient Instructions  1. Continue to hydrate with 60-70 oz fluids daily.  2. Continue to exercise daily or most days of the week.  3. Follow up with your primary care provider for annual gender health maintenance procedures.  4. Follow up with colonoscopy schedule.  5. Follow up with annual dermatology visits.  6. It doesn't seem like the COVID vaccine is working well in lung transplant patients. A number of lung transplant patients have gotten sick with COVID even after receiving the vaccines. Based on our recent experience, it can be life-threatening to get COVID  even after being vaccinated. Please continue to act like you did not get the COVID vaccine - social distancing, wearing a mask, good hand hygiene, etc. If the people around you are vaccinated, it will help reduce the risk of you getting COVID. All members of your household should be vaccinated.  7. Wait 3 months from transplant to get any vaccines.   8. Increase magnesium to 3 times daily.   9. Tremors should improve slowly after surgery. (It can take up to 6 months)   10. We will set you up with a bronchoscopy in the next 1-2 weeks.   11. Chest CT scan today.  12. You can go home for a couple of days, but come back!   13. Decrease the metoprolol to 25 mg BID   14. Add propranolol 10 mg 3 times daily.       Next transplant clinic appointment:  10/1 with CXR, labs and PFTs  Next lab draw: weekly.     AVS printed at time of check out

## 2024-09-24 NOTE — PROGRESS NOTES
9/26/24 bronchoscopy with lavage and transbronchial biopsies  Reason: high prospera, concern for rejection    Date of transplant 8/15/24   Transplant type bilateral lungs  Date of labs 9/24/24  BUN 13.7 DDAVP no  Plt 271  INR 1.05 Anticoag therapy none Last dose n/a

## 2024-09-24 NOTE — PROGRESS NOTES
Reason for Visit  Travon Cartwright is a 59 year old male who is being seen for RECHECK (Lung - 40 days ago )  Lung TX HPI  The patient was seen and examined by Carlos Johnson MD     Travon Cartwright is a 59 year old male with a history of idiopathic pulmonary fibrosis, diabetes, hepatosteatosis, and essential tremor.  Pt. is now s/p BSLT on 8/15/2024 with Dr. Mulvihill.  Surgery was uncomplicated and done on pump, extubated and off pressors since 8/18.  Weaned to RA 8/22.     Interval history:  He is doing well. Feels SOB with climbing stairs and with longer walks and doing pulm rehab Denies cough, palpitations, chest pain, abdominal pain, or dysuria.  His sister notes his blood sugars are getting better. No leg swelling.   No Post nasal drip or heart burn.   Still has nocturia - 3 - 4 x/night. Sleeps poorly.    Tremors are worse.     Current Outpatient Medications   Medication Sig Dispense Refill    acetaminophen (TYLENOL) 325 MG tablet Take 2 tablets (650 mg) by mouth or Feeding Tube every 4 hours as needed for other (For optimal non-opioid multimodal pain management to improve pain control.). 30 tablet 0    acetylcysteine (MUCOMYST) 20 % neb solution Take 2 mLs by nebulization 4 times daily as needed for mucolysis/respiratory distress. 30 mL 1    calcium carbonate-vitamin D (CALTRATE) 600-10 MG-MCG per tablet Take 1 tablet by mouth or Feeding Tube 2 times daily. 60 tablet 0    Continuous Glucose Sensor (DEXCOM G7 SENSOR) MISC Change every 10 days. 1 each 11    insulin aspart (NOVOLOG PEN) 100 UNIT/ML pen Take 10 units prior to meals, and add sliding scale per instructions.  Up to 50 units daily 15 mL 4    insulin glargine (LANTUS PEN) 100 UNIT/ML pen Inject 30 Units subcutaneously at bedtime.      insulin pen needle (32G X 4 MM) 32G X 4 MM miscellaneous Use pen needles daily or as directed. 100 each 3    levalbuterol (XOPENEX) 1.25 MG/3ML neb solution Take 3 mLs (1.25 mg) by nebulization 4 times daily as  needed for shortness of breath or wheezing. Use prior to Mucomyst neb. 300 mL 0    magnesium (HIGH ABSORPTION MAGNESIUM) 100 MG TABS Take 400 mg by mouth 2 times daily.      melatonin 5 MG tablet Take 1 tablet (5 mg) by mouth or Feeding Tube every evening. 30 tablet 0    methocarbamol (ROBAXIN) 750 MG tablet Take 1 tablet (750 mg) by mouth or Feeding Tube 4 times daily as needed for muscle spasms. 90 tablet 1    metoprolol tartrate (LOPRESSOR) 50 MG tablet Take 1 tablet (50 mg) by mouth or Feeding Tube 2 times daily. 60 tablet 3    multivitamin, therapeutic (THERA-VIT) TABS tablet Take 1 tablet by mouth daily. 30 tablet 0    mycophenolate (GENERIC EQUIVALENT) 250 MG capsule Take 4 capsules (1,000 mg) by mouth 2 times daily. 240 capsule 11    nystatin (MYCOSTATIN) 303941 UNIT/ML suspension Take 10 mLs (1,000,000 Units) by mouth 4 times daily. 1200 mL 4    pantoprazole (PROTONIX) 40 MG EC tablet Take 1 tablet (40 mg) by mouth every morning (before breakfast). 30 tablet 3    polyethylene glycol (MIRALAX) 17 GM/Dose powder Take 17 g by mouth daily as needed for constipation. 510 g 0    predniSONE (DELTASONE) 5 MG tablet Take 5 tablets (25 mg) by mouth daily. Taper per lung transplant clinic.  Next taper to 20 mg daily on 8/31 (Patient taking differently: Take 20 mg by mouth daily. Taper per lung transplant clinic.  Next taper to 15 mg daily on 8/31) 150 tablet 3    rosuvastatin (CRESTOR) 10 MG tablet Take 1 tablet (10 mg) by mouth daily. 30 tablet 3    senna-docusate (SENOKOT-S/PERICOLACE) 8.6-50 MG tablet Take 2 tablets by mouth or Feeding Tube 2 times daily as needed for constipation (use 1st). 120 tablet 0    sodium zirconium cyclosilicate (LOKELMA) 10 g PACK packet Take 1 packet (10 g) by mouth daily as needed. 10 packet 1    sulfamethoxazole-trimethoprim (BACTRIM) 400-80 MG tablet Take 1 tablet by mouth or NG Tube daily. 30 tablet 3    tacrolimus (GENERIC EQUIVALENT) 0.5 MG capsule Take 1 capsule (0.5 mg) by mouth  every morning. Total dose: 3.5 mg in the AM and 4 mg in the PM 30 capsule 11    tacrolimus (GENERIC EQUIVALENT) 1 MG capsule Take 3 capsules (3 mg) by mouth every morning AND 4 capsules (4 mg) every evening. Total dose 3.5 mg in the AM and 4.0 mg in the PM.. 210 capsule 11    traZODone (DESYREL) 50 MG tablet Take 1 tablet (50 mg) by mouth or Feeding Tube nightly as needed for sleep. 30 tablet 0    valGANciclovir (VALCYTE) 450 MG tablet Take 2 tablets (900 mg) by mouth daily. 60 tablet 3     No current facility-administered medications for this visit.     Facility-Administered Medications Ordered in Other Visits   Medication Dose Route Frequency Provider Last Rate Last Admin    sodium chloride bacteriostatic 0.9 % flush 9 mL  9 mL Intravenous Once NICOLA Pearce MD         No Known Allergies  Past Medical History:   Diagnosis Date    Administration of long-term prophylactic antibiotics 08/26/2024    Hepatic steatosis 2017    Noted on ultrasound    Hypomagnesemia 08/26/2024    Immunosuppression (H24) 08/26/2024    S/P lung transplant (H) 08/15/2024    Tremor 05/23/2024       Past Surgical History:   Procedure Laterality Date    BRONCHOSCOPY  08/16/2024    BRONCHOSCOPY FLEXIBLE AND RIGID N/A 8/27/2024    Procedure: Bronchoscopy Inspection;  Surgeon: Oneida Silver MD;  Location:  GI    COLONOSCOPY      CV CORONARY ANGIOGRAM N/A 06/21/2024    Procedure: Coronary Angiogram;  Surgeon: Gabriel Julian MD;  Location:  HEART CARDIAC CATH LAB    CV RIGHT HEART CATH MEASUREMENTS RECORDED N/A 06/21/2024    Procedure: Right Heart Catheterization;  Surgeon: Gabriel Julian MD;  Location:  HEART CARDIAC CATH LAB    PICC DOUBLE LUMEN PLACEMENT Right 08/20/2024    47-3cm, Basilic    TRANSPLANT LUNG RECIPIENT SINGLE X2 Bilateral 08/15/2024    Procedure: Clamshell Incision, On Central Venoarterial Extracorporeal Membranous Oxygenation, Bilateral Lung Transplant Recipient, Left atrial appendage ligation,  Intraoperative Flexible Bronchoscopy by Anesthesia;  Surgeon: Mulvihill, Michael, MD;  Location:  OR       Social History     Socioeconomic History    Marital status: Single     Spouse name: Not on file    Number of children: Not on file    Years of education: Not on file    Highest education level: Not on file   Occupational History    Not on file   Tobacco Use    Smoking status: Former     Types: Cigarettes    Smokeless tobacco: Never   Substance and Sexual Activity    Alcohol use: Not Currently     Comment: not since 2017    Drug use: Never    Sexual activity: Not on file   Other Topics Concern    Not on file   Social History Narrative    Not on file     Social Determinants of Health     Financial Resource Strain: Low Risk  (8/24/2024)    Financial Resource Strain     Within the past 12 months, have you or your family members you live with been unable to get utilities (heat, electricity) when it was really needed?: No   Food Insecurity: Not on file (9/3/2024)   Transportation Needs: Low Risk  (8/24/2024)    Transportation Needs     Within the past 12 months, has lack of transportation kept you from medical appointments, getting your medicines, non-medical meetings or appointments, work, or from getting things that you need?: No   Physical Activity: Not on File (8/26/2019)    Received from [employer]    Physical Activity     Physical Activity: 0   Stress: Not on File (8/26/2019)    Received from [employer]    Stress     Stress: 0   Social Connections: Not on File (9/11/2024)    Received from [employer]    Social Connections     Connectedness: 0   Interpersonal Safety: Low Risk  (8/24/2024)    Interpersonal Safety     Do you feel physically and emotionally safe where you currently live?: Yes     Within the past 12 months, have you been hit, slapped, kicked or otherwise physically hurt by someone?: No     Within the past 12 months, have you been humiliated or emotionally abused in other ways by your partner or ex-partner?: No    Housing Stability: Low Risk  (8/24/2024)    Housing Stability     Do you have housing? : Yes     Are you worried about losing your housing?: No       ROS Pulmonary    A complete ROS was otherwise negative except as noted in the HPI.  /83   Pulse 71   Wt 71.2 kg (157 lb)   SpO2 99%   BMI 22.53 kg/m    Exam:   GENERAL APPEARANCE: Well developed, well nourished, alert, and in no apparent distress. Poor posture.   EYES: PERRL, EOMI  HENT: Nasal mucosa with no edema and no hyperemia. No nasal polyps.  EARS: Canals clear, TMs normal  MOUTH: Oral mucosa is moist, without any lesions, no tonsillar enlargement, no oropharyngeal exudate.  NECK: supple, no masses, no thyromegaly.  LYMPHATICS: No significant axillary, cervical, or supraclavicular nodes.  RESP: normal percussion, good air flow throughout.  No crackles. No rhonchi. No wheezes.  CV: Normal S1, S2, regular rhythm, normal rate. No murmur.  No rub. No gallop. No LE edema.   ABDOMEN:  Bowel sounds normal, soft, nontender, no HSM or masses.   MS: extremities normal. No clubbing. No cyanosis.  SKIN: no rash on limited exam, staples intact without erythema, induration, discharge, or warmth  NEURO: Mentation intact, speech normal, normal strength and tone, normal gait and stance  PSYCH: mentation appears normal. and affect normal/bright.    Results:  Recent Results (from the past 168 hour(s))   Comprehensive metabolic panel    Collection Time: 09/24/24 10:45 AM   Result Value Ref Range    Sodium 138 135 - 145 mmol/L    Potassium 4.1 3.4 - 5.3 mmol/L    Carbon Dioxide (CO2) 26 22 - 29 mmol/L    Anion Gap 11 7 - 15 mmol/L    Urea Nitrogen 13.7 8.0 - 23.0 mg/dL    Creatinine 0.78 0.67 - 1.17 mg/dL    GFR Estimate >90 >60 mL/min/1.73m2    Calcium 9.8 8.8 - 10.4 mg/dL    Chloride 101 98 - 107 mmol/L    Glucose 173 (H) 70 - 99 mg/dL    Alkaline Phosphatase 87 40 - 150 U/L    AST 18 0 - 45 U/L    ALT 29 0 - 70 U/L    Protein Total 6.6 6.4 - 8.3 g/dL    Albumin 3.7  3.5 - 5.2 g/dL    Bilirubin Total 0.3 <=1.2 mg/dL   CBC with platelets    Collection Time: 09/24/24 10:45 AM   Result Value Ref Range    WBC Count 9.6 4.0 - 11.0 10e3/uL    RBC Count 4.39 (L) 4.40 - 5.90 10e6/uL    Hemoglobin 12.9 (L) 13.3 - 17.7 g/dL    Hematocrit 40.2 40.0 - 53.0 %    MCV 92 78 - 100 fL    MCH 29.4 26.5 - 33.0 pg    MCHC 32.1 31.5 - 36.5 g/dL    RDW 14.1 10.0 - 15.0 %    Platelet Count 271 150 - 450 10e3/uL   Magnesium    Collection Time: 09/24/24 10:45 AM   Result Value Ref Range    Magnesium 1.4 (L) 1.7 - 2.3 mg/dL   General PFT Lab (Please always keep checked)    Collection Time: 09/24/24 10:49 AM   Result Value Ref Range    FVC-Pred 4.25 L    FVC-Pre 3.08 L    FVC-%Pred-Pre 72 %    FEV1-Pre 2.54 L    FEV1-%Pred-Pre 76 %    FEV1FVC-Pred 78 %    FEV1FVC-Pre 82 %    FEFMax-Pred 9.15 L/sec    FEFMax-Pre 6.91 L/sec    FEFMax-%Pred-Pre 75 %    FEF2575-Pred 2.76 L/sec    FEF2575-Pre 2.84 L/sec    OSH6000-%Pred-Pre 102 %    ExpTime-Pre 6.01 sec    FIFMax-Pre 6.01 L/sec    FEV1FEV6-Pred 79 %    FEV1FEV6-Pre 83 %         Results as noted above.    PFT Interpretation:  Mild restrictive spirometry.  Declined from previous by 170ml.  Below best since lung transplantation  Valid Maneuver    CXR (9/24/2024), personally reviewed by me: bilateral Lung fields shows haziness. No effusion. Cardiac silhouette is not enlarged.     Assessment and plan: Travon Cartwright is a 59 year old male with a history of idiopathic pulmonary fibrosis, diabetes, hepatosteatosis, and essential tremor.  Pt. is now s/p BSLT on 8/15/2024 with Dr. Mulvihill.  Surgery was uncomplicated and done on pump, extubated and off pressors since 8/18.  Had delirium. Weaned to RA 8/22.      Discharge recommendations:  - Plan for staple removal 4 weeks post-op per Dr. Mulvihill (see CVTS note 8/23)     #. S/p bilateral sequential lung transplant (BSLT) 8/15/24 for idiopathic pulmonary fibrosis: Explant pathology: left with UIP and organizing  pneumonia (5 benign hilar lymph nodes), right with UIP with organizing pneumonia (7 benign hilar lymph nodes).  PGD 1-2.    - Bronch (8/27) with normal surgical anastomosis in right mainstem bronchus, normal surgical anastomosis in left mainstem bronchus and noted pedunculated granulation tissue 6 o'clock, distal to anastomoses airway hyperemic but not friable, and moderate amount of thick, tenacious, clear secretions and fair amount of thin secretions.     Immunosuppression: S/p induction therapy with high dose IV steroid intraoperatively and basiliximab (POD #0 and #4).   - Tacrolimus goal  8-12.  - MMF 1000 mg BID  - Prednisone 25 mg daily with taper per lung transplant protocol:  Date Daily Dose (mg)   9/21/2024 15   10/12/2024 10   11/2/2024 5      9/24/2024: Pulm sx are stable. CXR is stable to better. FEV1 - has mild decline and Cell free DNA was elevated last week.   - Nebs: levalbuterol and Mucomyst QID PRN  - IS and Aerobika   - DSA (8/22) negative, pending from 9/16/24.  - CXR today with stable with streaky Rt base and left lung zone haziness (personally reviewed)  - Plan for staple removal today per CVTS.  - Check Chest CT today and will proceed with bronch/BAL and tBBx given drop in FEV1. He is close to the two month ceasar for surveillance.    #. ID:   Prophylaxis:   - Bactrim for PJP ppx (started POD #1 due to donor toxoplasma IgG +)  - VGCV for CMV ppx (as below), CMV monitoring per protocol (after completion of ppx course, additionally prn)  - Nystatin for oral candidiasis ppx, 6 month course   - See below for serologies and viral ppx:    Donor Recipient Intervention   CMV status - + Valganciclovir POD #8-90   EBV status - + EBV monthly (due 9/15)   HSV status N/A + N/A         - Donor cultures ([facility]) with Strep constellatus, donor cultures ([facility]) with MSSA.  S/p IV vancomycin 8/15-8/20 and ceftazidime 8/15-8/17.  Noted fever 8/18-8/19.  Blood culture negative 8/19.  C diff negative 8/21.  Report of  urinary urgency and frequency, UA unremarkable 8/28.   - IgG at one month (due 9/15)  - Bronch cultures (8/27) NGTD, follow  - ABX: IV Zosyn (8/19-8/28) for 10 day course.     PHS risk criteria donor: Additional labs required post-transplant (between 4-8 weeks post-op): Hepatitis B, Hepatitis C, and HIV by JOVANY ([id number], neg on 9/16).     #. Hx HTN  #. Hx of HLD  - Monitor hemodynamic status. Goal MAP >65, SBP <140  - Continue rosuvastatin 20 daily.  9/24/2024: Will decrease Metoprolol tartrate to 25 mg BID and reassess at next visit, in part as we are adding propanolol for tremors.    #. Hypomagnesemia: Suppressed absorption d/t CNI.   - Mag glycinate 800 mg TID    #. Hyperkalemia: elevated in setting of tacrolimus use.    - lokelma 10g one dose today again. HOpefully improving BS control will treat this too.  - repeat BMP in 1 week     #. Lifelong tremors: He's had a history of lifelong tremors which have worsened in the last 1-2 years.      9/24/2024: Will try propanolol 10mg TID as he difficulty eating (spilling food too). Given 10days worth, If sx are better we will continue.  - Neuro referral- preferable a movement disorder specialist eventually.    #. DM II:  He was diagnosed with diabetes about 3-4 years ago and feels like it is not as well controlled as it could be. Prior to transplant was on Jardiance, dulaglutide, glargine, metformin.   Lantus 30 units every day at 7 pm  To cover prednisone 20 mg daily,   Novolog Fixed meal dose:  Breakfast, lunch and dinner: Give 13 units of Novolog  and ISS.  Give 5 units of Novolog for a snack or supplement before 10 pm, do not cover snacks after 10 pm  9/3/2024: Hgb A1c is was 7.4.  - Interested in CGM, DM appt in 12days.    #. Hepatic Steatosis: He has had intermittently elevated LFTs since 2019, had a liver US in 2017 showing hepatic steatosis. GI felt he was low likelihood advanced fibrosis.   #. Elevated alk phos and ALT: Resolved.    #. HCM:  Dermatology:    Vaccination: Will need RSV vaccine/C19 and Flu vaccine, will be given after 11/15/24.  Cancer screening:  - colonoscopy: due 2026 (last 2016 , no polyps, 10 year follow up)      The longitudinal plan of care for the diagnosis(es)/condition(s) as documented were addressed during this visit. Due to the added complexity in care, I will continue to support Juan J in the subsequent management and with ongoing continuity of care.

## 2024-09-25 ENCOUNTER — HOSPITAL ENCOUNTER (OUTPATIENT)
Dept: CARDIAC REHAB | Facility: CLINIC | Age: 59
Discharge: HOME | End: 2024-09-25
Attending: INTERNAL MEDICINE
Payer: COMMERCIAL

## 2024-09-25 LAB
DONOR IDENTIFICATION: NORMAL
DSA COMMENTS: NORMAL
DSA PRESENT: NO
DSA TEST METHOD: NORMAL
ORGAN: NORMAL
SA 1  COMMENTS: NORMAL
SA 1 CELL: NORMAL
SA 1 TEST METHOD: NORMAL
SA 2 CELL: NORMAL
SA 2 COMMENTS: NORMAL
SA 2 TEST METHOD: NORMAL
SA1 HI RISK ABY: NORMAL
SA1 MOD RISK ABY: NORMAL
SA2 HI RISK ABY: NORMAL
SA2 MOD RISK ABY: NORMAL
UNACCEPTABLE ANTIGENS: NORMAL
UNOS CPRA: 0

## 2024-09-25 PROCEDURE — G0239 OTH RESP PROC, GROUP: HCPCS

## 2024-09-25 NOTE — RESULT ENCOUNTER NOTE
Tacrolimus level 10.7 at 12 hours, on 9/24/24.  Goal 8-12.   Current dose 3.5 mg in AM, 4 mg in PM    Level at goal, no change in dose.    [employer] message sent

## 2024-09-26 ENCOUNTER — HOSPITAL ENCOUNTER (OUTPATIENT)
Facility: CLINIC | Age: 59
Discharge: HOME | End: 2024-09-26
Attending: INTERNAL MEDICINE | Admitting: INTERNAL MEDICINE
Payer: COMMERCIAL

## 2024-09-26 ENCOUNTER — APPOINTMENT (OUTPATIENT)
Dept: GENERAL RADIOLOGY | Facility: CLINIC | Age: 59
End: 2024-09-26
Attending: INTERNAL MEDICINE
Payer: COMMERCIAL

## 2024-09-26 VITALS
SYSTOLIC BLOOD PRESSURE: 134 MMHG | HEART RATE: 83 BPM | DIASTOLIC BLOOD PRESSURE: 86 MMHG | OXYGEN SATURATION: 93 % | RESPIRATION RATE: 17 BRPM

## 2024-09-26 DIAGNOSIS — Z94.2 LUNG REPLACED BY TRANSPLANT (H): ICD-10-CM

## 2024-09-26 LAB
APPEARANCE FLD: CLEAR
BACTERIA SPEC CULT: ABNORMAL
BRONCHOSCOPY: NORMAL
CELL COUNT BODY FLUID SOURCE: NORMAL
COLOR FLD: COLORLESS
GLUCOSE BLDC GLUCOMTR-MCNC: 177 MG/DL (ref 70–99)
GRAM STAIN RESULT: ABNORMAL
GRAM STAIN RESULT: ABNORMAL
LYMPHOCYTES NFR FLD MANUAL: 12 %
MONOS+MACROS NFR FLD MANUAL: 0 %
NEUTS BAND NFR FLD MANUAL: 14 %
OTHER CELLS FLD MANUAL: 75 %
PATH REPORT.COMMENTS IMP SPEC: NORMAL
PATH REPORT.FINAL DX SPEC: NORMAL
PATH REPORT.FINAL DX SPEC: NORMAL
PATH REPORT.GROSS SPEC: NORMAL
PATH REPORT.GROSS SPEC: NORMAL
PATH REPORT.MICROSCOPIC SPEC OTHER STN: NORMAL
PATH REPORT.MICROSCOPIC SPEC OTHER STN: NORMAL
PATH REPORT.RELEVANT HX SPEC: NORMAL
PATH REPORT.RELEVANT HX SPEC: NORMAL
PHOTO IMAGE: NORMAL
WBC # FLD AUTO: 178 /UL

## 2024-09-26 PROCEDURE — 88108 CYTOPATH CONCENTRATE TECH: CPT | Mod: 26 | Performed by: PATHOLOGY

## 2024-09-26 PROCEDURE — 87206 SMEAR FLUORESCENT/ACID STAI: CPT | Performed by: INTERNAL MEDICINE

## 2024-09-26 PROCEDURE — 82962 GLUCOSE BLOOD TEST: CPT

## 2024-09-26 PROCEDURE — 250N000011 HC RX IP 250 OP 636: Performed by: INTERNAL MEDICINE

## 2024-09-26 PROCEDURE — 88312 SPECIAL STAINS GROUP 1: CPT | Mod: 26 | Performed by: PATHOLOGY

## 2024-09-26 PROCEDURE — 88305 TISSUE EXAM BY PATHOLOGIST: CPT | Mod: 26 | Performed by: STUDENT IN AN ORGANIZED HEALTH CARE EDUCATION/TRAINING PROGRAM

## 2024-09-26 PROCEDURE — 31624 DX BRONCHOSCOPE/LAVAGE: CPT | Performed by: INTERNAL MEDICINE

## 2024-09-26 PROCEDURE — G0500 MOD SEDAT ENDO SERVICE >5YRS: HCPCS | Performed by: INTERNAL MEDICINE

## 2024-09-26 PROCEDURE — 88305 TISSUE EXAM BY PATHOLOGIST: CPT | Mod: TC | Performed by: INTERNAL MEDICINE

## 2024-09-26 PROCEDURE — 87102 FUNGUS ISOLATION CULTURE: CPT | Performed by: INTERNAL MEDICINE

## 2024-09-26 PROCEDURE — 87205 SMEAR GRAM STAIN: CPT | Performed by: INTERNAL MEDICINE

## 2024-09-26 PROCEDURE — 250N000009 HC RX 250: Performed by: INTERNAL MEDICINE

## 2024-09-26 PROCEDURE — 99153 MOD SED SAME PHYS/QHP EA: CPT | Performed by: INTERNAL MEDICINE

## 2024-09-26 PROCEDURE — 31624 DX BRONCHOSCOPE/LAVAGE: CPT | Mod: GC | Performed by: INTERNAL MEDICINE

## 2024-09-26 PROCEDURE — 31628 BRONCHOSCOPY/LUNG BX EACH: CPT | Mod: GC | Performed by: INTERNAL MEDICINE

## 2024-09-26 PROCEDURE — 999N000180 XR SURGERY CARM FLUORO LESS THAN 5 MIN: Mod: TC

## 2024-09-26 PROCEDURE — 89051 BODY FLUID CELL COUNT: CPT | Performed by: INTERNAL MEDICINE

## 2024-09-26 PROCEDURE — 87252 VIRUS INOCULATION TISSUE: CPT | Performed by: INTERNAL MEDICINE

## 2024-09-26 PROCEDURE — 999N000065 XR CHEST 2 VIEWS

## 2024-09-26 PROCEDURE — 71046 X-RAY EXAM CHEST 2 VIEWS: CPT | Mod: 26 | Performed by: RADIOLOGY

## 2024-09-26 PROCEDURE — 99152 MOD SED SAME PHYS/QHP 5/>YRS: CPT | Mod: GC | Performed by: INTERNAL MEDICINE

## 2024-09-26 PROCEDURE — 88312 SPECIAL STAINS GROUP 1: CPT | Mod: TC | Performed by: INTERNAL MEDICINE

## 2024-09-26 PROCEDURE — 87496 CYTOMEG DNA AMP PROBE: CPT | Performed by: INTERNAL MEDICINE

## 2024-09-26 PROCEDURE — 87077 CULTURE AEROBIC IDENTIFY: CPT | Performed by: INTERNAL MEDICINE

## 2024-09-26 RX ORDER — LIDOCAINE HYDROCHLORIDE 10 MG/ML
INJECTION, SOLUTION INFILTRATION; PERINEURAL PRN
Status: DISCONTINUED | OUTPATIENT
Start: 2024-09-26 | End: 2024-09-26 | Stop reason: HOSPADM

## 2024-09-26 RX ORDER — LIDOCAINE HYDROCHLORIDE AND EPINEPHRINE 10; 10 MG/ML; UG/ML
INJECTION, SOLUTION INFILTRATION; PERINEURAL PRN
Status: DISCONTINUED | OUTPATIENT
Start: 2024-09-26 | End: 2024-09-26 | Stop reason: HOSPADM

## 2024-09-26 RX ORDER — MAGNESIUM HYDROXIDE 1200 MG/15ML
LIQUID ORAL PRN
Status: DISCONTINUED | OUTPATIENT
Start: 2024-09-26 | End: 2024-09-26 | Stop reason: HOSPADM

## 2024-09-26 RX ORDER — LIDOCAINE HYDROCHLORIDE 40 MG/ML
INJECTION, SOLUTION RETROBULBAR PRN
Status: DISCONTINUED | OUTPATIENT
Start: 2024-09-26 | End: 2024-09-26 | Stop reason: HOSPADM

## 2024-09-26 RX ORDER — FENTANYL CITRATE 50 UG/ML
INJECTION, SOLUTION INTRAMUSCULAR; INTRAVENOUS PRN
Status: DISCONTINUED | OUTPATIENT
Start: 2024-09-26 | End: 2024-09-26 | Stop reason: HOSPADM

## 2024-09-26 ASSESSMENT — ACTIVITIES OF DAILY LIVING (ADL)
ADLS_ACUITY_SCORE: 37

## 2024-09-26 NOTE — DISCHARGE INSTRUCTIONS
Discharge Instructions after Bronchoscopy    Activity  ___ You had medicine to relax and for pain. You may feel dizzy or sleepy.  For 24 hours:   Do not drive or use heavy equipment.   Do not make important decisions.   Do not drink any alcohol.    Diet  ___ When you can swallow easily, you may go back to your regular diet, medicines  and light exercise.    Follow-up  ___ We took small tissue or fluid samples to study. We will call you with the results in about 10 business days.    Call right away if you have:   Unusual chest pain   Temperature above 100.6  F (37.5  C)   Coughing that does not stop.    If you have severe pain, bleeding, or shortness of breath, go to an emergency room.    If you have questions, call:  Monday to Friday, 8 a.m. to 4:30 p.m.  Adult Pulmonology Clinic: [phone]    After hours:  Hospital: [phone] (Ask for the pulmonary fellow on call)

## 2024-09-27 ENCOUNTER — TELEPHONE (OUTPATIENT)
Dept: PHARMACY | Facility: CLINIC | Age: 59
End: 2024-09-27
Payer: COMMERCIAL

## 2024-09-27 DIAGNOSIS — Z94.2 LUNG REPLACED BY TRANSPLANT (H): Primary | ICD-10-CM

## 2024-09-27 LAB
ACID FAST STAIN (ARUP): NORMAL
ACID FAST STAIN (ARUP): NORMAL

## 2024-09-29 LAB
BACTERIA SPEC CULT: ABNORMAL
BACTERIA SPEC CULT: ABNORMAL
CMV DNA SPEC QL NAA+PROBE: NOT DETECTED

## 2024-09-30 ENCOUNTER — HOSPITAL ENCOUNTER (OUTPATIENT)
Dept: CARDIAC REHAB | Facility: CLINIC | Age: 59
Discharge: HOME | End: 2024-09-30
Attending: INTERNAL MEDICINE
Payer: COMMERCIAL

## 2024-09-30 LAB
BACTERIA BRONCH: ABNORMAL
BACTERIA BRONCH: ABNORMAL

## 2024-09-30 PROCEDURE — G0239 OTH RESP PROC, GROUP: HCPCS

## 2024-10-01 LAB — PROSPERA TRANSPLANT MONITORING: 1.36 %

## 2024-10-02 ENCOUNTER — HOSPITAL ENCOUNTER (OUTPATIENT)
Dept: CARDIAC REHAB | Facility: CLINIC | Age: 59
Discharge: HOME | End: 2024-10-02
Attending: INTERNAL MEDICINE
Payer: COMMERCIAL

## 2024-10-02 PROCEDURE — G0239 OTH RESP PROC, GROUP: HCPCS

## 2024-10-04 ENCOUNTER — VIRTUAL VISIT (OUTPATIENT)
Dept: ENDOCRINOLOGY | Facility: CLINIC | Age: 59
End: 2024-10-04
Payer: COMMERCIAL

## 2024-10-04 DIAGNOSIS — Z79.4 TYPE 2 DIABETES MELLITUS WITHOUT COMPLICATION, WITH LONG-TERM CURRENT USE OF INSULIN (H): Primary | ICD-10-CM

## 2024-10-04 DIAGNOSIS — E11.9 TYPE 2 DIABETES MELLITUS WITHOUT COMPLICATION, WITH LONG-TERM CURRENT USE OF INSULIN (H): Primary | ICD-10-CM

## 2024-10-04 PROCEDURE — 99213 OFFICE O/P EST LOW 20 MIN: CPT | Mod: 95 | Performed by: NURSE PRACTITIONER

## 2024-10-04 PROCEDURE — G2211 COMPLEX E/M VISIT ADD ON: HCPCS | Mod: 95 | Performed by: NURSE PRACTITIONER

## 2024-10-04 NOTE — Clinical Note
"10/4/2024      Travon Cartwright  [address] [facility] [address]      Dear Colleague,    Thank you for referring your patient, Travon Cartwright, to the [facility]. Please see a copy of my visit note below.                                                  Virtual Visit Details    Type of service:  Video Visit   Video Start Time: {video visit start/end time for provider to select:137011}  Video End Time:{video visit start/end time for provider to select:540605}    Originating Location (pt. Location): {video visit patient location:931417::\"Home\"}  {PROVIDER LOCATION On-site should be selected for visits conducted from your clinic location or adjoining [facility] hospital, academic office, or other nearby [facility] building. Off-site should be selected for all other provider locations, including home:979544}  Distant Location (provider location):  {virtual location provider:665309}  Platform used for Video Visit: {Virtual Visit Platforms:027132::\"irisnote\"}       Again, thank you for allowing me to participate in the care of your patient.        Sincerely,        Sara Reilly NP  "

## 2024-10-08 ENCOUNTER — ANCILLARY PROCEDURE (OUTPATIENT)
Dept: GENERAL RADIOLOGY | Facility: CLINIC | Age: 59
End: 2024-10-08
Attending: INTERNAL MEDICINE
Payer: COMMERCIAL

## 2024-10-08 ENCOUNTER — TELEPHONE (OUTPATIENT)
Dept: TRANSPLANT | Facility: CLINIC | Age: 59
End: 2024-10-08

## 2024-10-08 ENCOUNTER — OFFICE VISIT (OUTPATIENT)
Dept: PULMONOLOGY | Facility: CLINIC | Age: 59
End: 2024-10-08
Payer: COMMERCIAL

## 2024-10-08 ENCOUNTER — LAB (OUTPATIENT)
Dept: LAB | Facility: CLINIC | Age: 59
End: 2024-10-08
Attending: INTERNAL MEDICINE
Payer: COMMERCIAL

## 2024-10-08 ENCOUNTER — OFFICE VISIT (OUTPATIENT)
Dept: TRANSPLANT | Facility: CLINIC | Age: 59
End: 2024-10-08
Attending: INTERNAL MEDICINE
Payer: COMMERCIAL

## 2024-10-08 VITALS
BODY MASS INDEX: 23.39 KG/M2 | DIASTOLIC BLOOD PRESSURE: 78 MMHG | HEART RATE: 75 BPM | WEIGHT: 163 LBS | OXYGEN SATURATION: 98 % | SYSTOLIC BLOOD PRESSURE: 145 MMHG

## 2024-10-08 DIAGNOSIS — D84.9 IMMUNOSUPPRESSION (H): ICD-10-CM

## 2024-10-08 DIAGNOSIS — Z94.2 S/P LUNG TRANSPLANT (H): Primary | ICD-10-CM

## 2024-10-08 DIAGNOSIS — Z94.2 LUNG REPLACED BY TRANSPLANT (H): ICD-10-CM

## 2024-10-08 DIAGNOSIS — Z94.2 S/P LUNG TRANSPLANT (H): ICD-10-CM

## 2024-10-08 DIAGNOSIS — J84.9 ILD (INTERSTITIAL LUNG DISEASE) (H): ICD-10-CM

## 2024-10-08 LAB
ALBUMIN SERPL BCG-MCNC: 4 G/DL (ref 3.5–5.2)
ALP SERPL-CCNC: 75 U/L (ref 40–150)
ALT SERPL W P-5'-P-CCNC: 27 U/L (ref 0–70)
ANION GAP SERPL CALCULATED.3IONS-SCNC: 12 MMOL/L (ref 7–15)
AST SERPL W P-5'-P-CCNC: 23 U/L (ref 0–45)
BILIRUB SERPL-MCNC: 0.4 MG/DL
BUN SERPL-MCNC: 20.7 MG/DL (ref 8–23)
CALCIUM SERPL-MCNC: 9.8 MG/DL (ref 8.8–10.4)
CHLORIDE SERPL-SCNC: 103 MMOL/L (ref 98–107)
CMV DNA SPEC NAA+PROBE-ACNC: NOT DETECTED IU/ML
CREAT SERPL-MCNC: 1.06 MG/DL (ref 0.67–1.17)
EGFRCR SERPLBLD CKD-EPI 2021: 80 ML/MIN/1.73M2
ERYTHROCYTE [DISTWIDTH] IN BLOOD BY AUTOMATED COUNT: 14.1 % (ref 10–15)
EXPTIME-PRE: 5.17 SEC
FEF2575-%PRED-PRE: 109 %
FEF2575-PRE: 3.03 L/SEC
FEF2575-PRED: 2.76 L/SEC
FEFMAX-%PRED-PRE: 82 %
FEFMAX-PRE: 7.53 L/SEC
FEFMAX-PRED: 9.15 L/SEC
FEV1-%PRED-PRE: 76 %
FEV1-PRE: 2.55 L
FEV1FEV6-PRE: 84 %
FEV1FEV6-PRED: 79 %
FEV1FVC-PRE: 84 %
FEV1FVC-PRED: 78 %
FIFMAX-PRE: 6.59 L/SEC
FVC-%PRED-PRE: 71 %
FVC-PRE: 3.05 L
FVC-PRED: 4.25 L
GLUCOSE SERPL-MCNC: 143 MG/DL (ref 70–99)
HCO3 SERPL-SCNC: 25 MMOL/L (ref 22–29)
HCT VFR BLD AUTO: 43.1 % (ref 40–53)
HGB BLD-MCNC: 13.7 G/DL (ref 13.3–17.7)
MAGNESIUM SERPL-MCNC: 1.6 MG/DL (ref 1.7–2.3)
MCH RBC QN AUTO: 29.1 PG (ref 26.5–33)
MCHC RBC AUTO-ENTMCNC: 31.8 G/DL (ref 31.5–36.5)
MCV RBC AUTO: 92 FL (ref 78–100)
PLATELET # BLD AUTO: 277 10E3/UL (ref 150–450)
POTASSIUM SERPL-SCNC: 4.6 MMOL/L (ref 3.4–5.3)
PROT SERPL-MCNC: 6.6 G/DL (ref 6.4–8.3)
RBC # BLD AUTO: 4.71 10E6/UL (ref 4.4–5.9)
SODIUM SERPL-SCNC: 140 MMOL/L (ref 135–145)
TACROLIMUS BLD-MCNC: 13.7 UG/L (ref 5–15)
TME LAST DOSE: NORMAL H
TME LAST DOSE: NORMAL H
WBC # BLD AUTO: 7.5 10E3/UL (ref 4–11)

## 2024-10-08 PROCEDURE — 80053 COMPREHEN METABOLIC PANEL: CPT | Performed by: PATHOLOGY

## 2024-10-08 PROCEDURE — 94375 RESPIRATORY FLOW VOLUME LOOP: CPT | Performed by: INTERNAL MEDICINE

## 2024-10-08 PROCEDURE — 80197 ASSAY OF TACROLIMUS: CPT | Performed by: INTERNAL MEDICINE

## 2024-10-08 PROCEDURE — 36415 COLL VENOUS BLD VENIPUNCTURE: CPT | Performed by: PATHOLOGY

## 2024-10-08 PROCEDURE — 99214 OFFICE O/P EST MOD 30 MIN: CPT | Mod: 24 | Performed by: INTERNAL MEDICINE

## 2024-10-08 PROCEDURE — 83735 ASSAY OF MAGNESIUM: CPT | Performed by: PATHOLOGY

## 2024-10-08 PROCEDURE — 71046 X-RAY EXAM CHEST 2 VIEWS: CPT | Mod: GC | Performed by: RADIOLOGY

## 2024-10-08 PROCEDURE — G0463 HOSPITAL OUTPT CLINIC VISIT: HCPCS | Performed by: INTERNAL MEDICINE

## 2024-10-08 PROCEDURE — 85027 COMPLETE CBC AUTOMATED: CPT | Performed by: PATHOLOGY

## 2024-10-08 PROCEDURE — 99000 SPECIMEN HANDLING OFFICE-LAB: CPT | Performed by: PATHOLOGY

## 2024-10-08 RX ORDER — HEPARIN SODIUM,PORCINE 10 UNIT/ML
5-20 VIAL (ML) INTRAVENOUS DAILY PRN
Status: CANCELLED | OUTPATIENT
Start: 2024-10-08

## 2024-10-08 RX ORDER — PREDNISONE 10 MG/1
TABLET ORAL
Qty: 55 TABLET | Refills: 0 | Status: SHIPPED | OUTPATIENT
Start: 2024-10-08 | End: 2024-11-04

## 2024-10-08 RX ORDER — EPINEPHRINE 1 MG/ML
0.3 INJECTION, SOLUTION, CONCENTRATE INTRAVENOUS EVERY 5 MIN PRN
Status: CANCELLED | OUTPATIENT
Start: 2024-10-08

## 2024-10-08 RX ORDER — METOPROLOL TARTRATE 25 MG/1
50 TABLET, FILM COATED ORAL 2 TIMES DAILY
Qty: 60 TABLET | Refills: 11 | Status: SHIPPED | OUTPATIENT
Start: 2024-10-08

## 2024-10-08 RX ORDER — MEPERIDINE HYDROCHLORIDE 25 MG/ML
25 INJECTION INTRAMUSCULAR; INTRAVENOUS; SUBCUTANEOUS EVERY 30 MIN PRN
Status: CANCELLED | OUTPATIENT
Start: 2024-10-08

## 2024-10-08 RX ORDER — PRIMIDONE 50 MG/1
50 TABLET ORAL 2 TIMES DAILY
Qty: 60 TABLET | Refills: 0 | Status: SHIPPED | OUTPATIENT
Start: 2024-10-08 | End: 2024-11-04

## 2024-10-08 RX ORDER — TACROLIMUS 0.5 MG/1
0.5 CAPSULE ORAL EVERY EVENING
Qty: 30 CAPSULE | Refills: 11 | Status: SHIPPED | OUTPATIENT
Start: 2024-10-08

## 2024-10-08 RX ORDER — METHYLPREDNISOLONE SODIUM SUCCINATE 125 MG/2ML
125 INJECTION INTRAMUSCULAR; INTRAVENOUS
Status: CANCELLED
Start: 2024-10-08

## 2024-10-08 RX ORDER — TACROLIMUS 1 MG/1
3 CAPSULE ORAL 2 TIMES DAILY
Qty: 180 CAPSULE | Refills: 11 | Status: SHIPPED | OUTPATIENT
Start: 2024-10-08

## 2024-10-08 RX ORDER — ALBUTEROL SULFATE 0.83 MG/ML
2.5 SOLUTION RESPIRATORY (INHALATION)
Status: CANCELLED | OUTPATIENT
Start: 2024-10-08

## 2024-10-08 RX ORDER — HEPARIN SODIUM (PORCINE) LOCK FLUSH IV SOLN 100 UNIT/ML 100 UNIT/ML
5 SOLUTION INTRAVENOUS
Status: CANCELLED | OUTPATIENT
Start: 2024-10-08

## 2024-10-08 RX ORDER — ALBUTEROL SULFATE 90 UG/1
1-2 INHALANT RESPIRATORY (INHALATION)
Status: CANCELLED
Start: 2024-10-08

## 2024-10-08 RX ORDER — DIPHENHYDRAMINE HYDROCHLORIDE 50 MG/ML
50 INJECTION INTRAMUSCULAR; INTRAVENOUS
Status: CANCELLED
Start: 2024-10-08

## 2024-10-08 ASSESSMENT — PAIN SCALES - GENERAL: PAINLEVEL: NO PAIN (0)

## 2024-10-08 NOTE — NURSING NOTE
"Chief Complaint   Patient presents with    RECHECK     Vital signs:      BP: (!) 145/78 Pulse: 75     SpO2: 98 %       Weight: 73.9 kg (163 lb)  Estimated body mass index is 23.39 kg/m  as calculated from the following:    Height as of 8/15/24: 1.778 m (5' 10\").    Weight as of this encounter: 73.9 kg (163 lb).      Radha Lema CMA   10/8/2024 10:13 AM    "

## 2024-10-08 NOTE — PROGRESS NOTES
Travon Cartwright comes into clinic today at the request of Dr Johnson , for spirometry     Tolerated testing well. No adverse reactions. Left lab in no distress.        MAEGAN GARCIA

## 2024-10-08 NOTE — TELEPHONE ENCOUNTER
Tacrolimus level 13.7 at 12 hours, on 10/8/24.  Goal 8-12.   Current dose 3.5 mg in AM, 4 mg in PM    Dose changed to 3 mg in AM, 3.5 mg in PM   Recheck level on Friday.     Discussed with patient and Mine via MyChart.

## 2024-10-08 NOTE — TELEPHONE ENCOUNTER
Plan to treat A1 and high Prospera with IV solumedrol and steroid taper per Dr. Johnson. Start at 80 mg per Dr. Johnson. Discussed with Alka Blount message sent. She will notify endocrine of the dose of steroids he'll be getting.     Acute Rejection Prednisone Taper     DAY PRED AM DOSE PRED PM DOSE   1 IV SOLUMEDROL (1 gram) NONE   2 IV SOLUMEDROL (1 gram) NONE   3 IV SOLUMEDROL (1 gram) NONE   4 40 40   5 40 35   6 35 35   7 35 30   8 30 30   9 30 25   10 25 25   11 20 20   12 15 15   13 10 10   Then back to steroid taper plan of 10 mg daily, taper as planned to 5 mg daily on 11/2/24.      Recheck labs later this week due to starting Primidone, possible interaction with tacrolimus.     Return in 1 month with bronchoscopy.

## 2024-10-08 NOTE — PATIENT INSTRUCTIONS
Patient Instructions  1. Continue to hydrate with 60-70 oz fluids daily.  2. Continue to exercise daily or most days of the week.  3. Follow up with your primary care provider for annual gender health maintenance procedures.  4. Follow up with colonoscopy schedule.  5. Follow up with annual dermatology visits.  6. It doesn't seem like the COVID vaccine is working well in lung transplant patients. A number of lung transplant patients have gotten sick with COVID even after receiving the vaccines. Based on our recent experience, it can be life-threatening to get COVID  even after being vaccinated. Please continue to act like you did not get the COVID vaccine - social distancing, wearing a mask, good hand hygiene, etc. If the people around you are vaccinated, it will help reduce the risk of you getting COVID. All members of your household should be vaccinated.  7. We  will set you up with IV steroids followed by a 10 day oral steroid burst. This will start with 3 day of IV steroids in the infusion center.   8. Talk to your endocrinologist about managing your blood sugars while in the higher dose steroids.   9. Increase the metoprolol to 50 mg BID when you stop the propranolol.   10. Take primidone twice daily for the shakes.   11. We will schedule you for a bronch in about a month.       Next transplant clinic appointment:  3-4 weeks with CXR, labs and PFTs  Next lab draw: weekly.

## 2024-10-08 NOTE — CONFIDENTIAL NOTE
Transplant Coordinator Note    Reason for visit: Post lung transplant follow up visit   Coordinator: Present   Caregiver:  WIfe     Health concerns addressed today:  1. PFTs are stable but not improved.    2. Will treat A1 with IV and oral steroids. Will need to keep an eye on blood sugars.   3. Pt is overall feeling well.      Activity/rehab: active at home. Pt has finished with Pulm Rehab.   Oxygen needs: none   Pain management/RX: tylenol as needed   Diabetic management: follow by endocrinology   Next Bronch due:   PJP prophylactic: Bactrim       Pt Education: medications (use/dose/side effects), how/when to call coordinator, frequency of labs, s/s of infection/rejection, call prior to starting any new medications, lab/vital sign book    Health Maintenance:   Last colonoscopy:   Next colonoscopy due:   Dermatology:  Vaccinations this visit:     Labs, CXR, PFTs reviewed with patient  Medication record reviewed and reconciled  Questions and concerns addressed    Patient Instructions  1. Continue to hydrate with 60-70 oz fluids daily.  2. Continue to exercise daily or most days of the week.  3. Follow up with your primary care provider for annual gender health maintenance procedures.  4. Follow up with colonoscopy schedule.  5. Follow up with annual dermatology visits.  6. It doesn't seem like the COVID vaccine is working well in lung transplant patients. A number of lung transplant patients have gotten sick with COVID even after receiving the vaccines. Based on our recent experience, it can be life-threatening to get COVID  even after being vaccinated. Please continue to act like you did not get the COVID vaccine - social distancing, wearing a mask, good hand hygiene, etc. If the people around you are vaccinated, it will help reduce the risk of you getting COVID. All members of your household should be vaccinated.  7. We  will set you up with IV steroids followed by a 10 day oral steroid burst. This will start with  3 day of IV steroids in the infusion center.   8. Talk to your endocrinologist about managing your blood sugars while in the higher dose steroids.   9. Increase the metoprolol to 50 mg BID when you stop the propranolol.   10. Take primidone twice daily for the shakes.   11. We will schedule you for a bronch in about a month.       Next transplant clinic appointment:  3-4 weeks with CXR, labs and PFTs  Next lab draw: weekly.     AVS printed at time of check out

## 2024-10-08 NOTE — Clinical Note
10/8/2024      Travon Cartwright  [address] [facility] [address]      Dear Colleague,    Thank you for referring your patient, Travon Cartwright, to the [facility]. Please see a copy of my visit note below.    Reason for Visit  Travon Cartwright is a 59 year old male who is being seen for RECHECK  Lung TX HPI  The patient was seen and examined by Carlos Johnson MD     Travon Cartwright is a 59 year old male with a history of idiopathic pulmonary fibrosis, diabetes, hepatosteatosis, and essential tremor.  Pt. is now s/p BSLT on 8/15/2024 with Dr. Mulvihill.  Surgery was uncomplicated and done on pump, extubated and off pressors since 8/18.  Weaned to RA 8/22.     Since the last visit had bronch on 9/26/24 and it grew Steno, treated with levaquin.     Interval history:  He is doing well. Feels SOB with climbing stairs and with longer walks and completed pulm rehab. Denies cough, palpitations, chest pain, abdominal pain, or dysuria.  His sister notes his blood sugars are getting better. No leg swelling.   No Post nasal drip or heart burn.   Still has nocturia - 3 - 4 x/night. Sleeps poorly.    Tremors are worse, did not benefit from propanolol.    Current Outpatient Medications   Medication Sig Dispense Refill    acetaminophen (TYLENOL) 325 MG tablet Take 2 tablets (650 mg) by mouth or Feeding Tube every 4 hours as needed for other (For optimal non-opioid multimodal pain management to improve pain control.). 30 tablet 0    acetylcysteine (MUCOMYST) 20 % neb solution Take 2 mLs by nebulization 4 times daily as needed for mucolysis/respiratory distress. 30 mL 1    calcium carbonate-vitamin D (CALTRATE) 600-10 MG-MCG per tablet Take 1 tablet by mouth or Feeding Tube 2 times daily. 60 tablet 0    Continuous Glucose Sensor (DEXCOM G7 SENSOR) MISC Change every 10 days. 1 each 11    insulin aspart (NOVOLOG PEN) 100 UNIT/ML pen Take 10 units prior to meals, and add sliding scale per  instructions.  Up to 50 units daily 15 mL 4    insulin glargine (LANTUS PEN) 100 UNIT/ML pen Inject 30 Units subcutaneously at bedtime.      insulin pen needle (32G X 4 MM) 32G X 4 MM miscellaneous Use pen needles daily or as directed. 100 each 3    levalbuterol (XOPENEX) 1.25 MG/3ML neb solution Take 3 mLs (1.25 mg) by nebulization 4 times daily as needed for shortness of breath or wheezing. Use prior to Mucomyst neb. 300 mL 0    levofloxacin (LEVAQUIN) 500 MG tablet Take 1 tablet (500 mg) by mouth daily for 14 days. 14 tablet 0    magnesium (HIGH ABSORPTION MAGNESIUM) 100 MG TABS Take 400 mg by mouth 3 times daily. 360 tablet 11    melatonin 5 MG tablet Take 1 tablet (5 mg) by mouth or Feeding Tube every evening. 30 tablet 0    methocarbamol (ROBAXIN) 750 MG tablet Take 1 tablet (750 mg) by mouth or Feeding Tube 4 times daily as needed for muscle spasms. 90 tablet 1    metoprolol tartrate (LOPRESSOR) 25 MG tablet Take 1 tablet (25 mg) by mouth or Feeding Tube 2 times daily. 60 tablet 3    multivitamin, therapeutic (THERA-VIT) TABS tablet Take 1 tablet by mouth daily. 30 tablet 0    mycophenolate (GENERIC EQUIVALENT) 250 MG capsule Take 4 capsules (1,000 mg) by mouth 2 times daily. 240 capsule 11    nystatin (MYCOSTATIN) 357774 UNIT/ML suspension Take 10 mLs (1,000,000 Units) by mouth 4 times daily. 1200 mL 4    pantoprazole (PROTONIX) 40 MG EC tablet Take 1 tablet (40 mg) by mouth every morning (before breakfast). 30 tablet 3    polyethylene glycol (MIRALAX) 17 GM/Dose powder Take 17 g by mouth daily as needed for constipation. 510 g 0    predniSONE (DELTASONE) 5 MG tablet Take 5 tablets (25 mg) by mouth daily. Taper per lung transplant clinic.  Next taper to 20 mg daily on 8/31 (Patient taking differently: Take 20 mg by mouth daily. Taper per lung transplant clinic.  Next taper to 15 mg daily on 8/31) 150 tablet 3    propranolol (INDERAL) 10 MG tablet Take 1 tablet (10 mg) by mouth 3 times daily for 10 days. 30  tablet 0    rosuvastatin (CRESTOR) 10 MG tablet Take 1 tablet (10 mg) by mouth daily. 30 tablet 3    senna-docusate (SENOKOT-S/PERICOLACE) 8.6-50 MG tablet Take 2 tablets by mouth or Feeding Tube 2 times daily as needed for constipation (use 1st). 120 tablet 0    sodium zirconium cyclosilicate (LOKELMA) 10 g PACK packet Take 1 packet (10 g) by mouth daily as needed. 10 packet 1    sulfamethoxazole-trimethoprim (BACTRIM) 400-80 MG tablet Take 1 tablet by mouth or NG Tube daily. 30 tablet 3    tacrolimus (GENERIC EQUIVALENT) 0.5 MG capsule Take 1 capsule (0.5 mg) by mouth every morning. Total dose: 3.5 mg in the AM and 4 mg in the PM 30 capsule 11    tacrolimus (GENERIC EQUIVALENT) 1 MG capsule Take 3 capsules (3 mg) by mouth every morning AND 4 capsules (4 mg) every evening. Total dose 3.5 mg in the AM and 4.0 mg in the PM.. 210 capsule 11    traZODone (DESYREL) 50 MG tablet Take 1 tablet (50 mg) by mouth or Feeding Tube nightly as needed for sleep. 30 tablet 0    valGANciclovir (VALCYTE) 450 MG tablet Take 2 tablets (900 mg) by mouth daily. 60 tablet 3     No current facility-administered medications for this visit.     Facility-Administered Medications Ordered in Other Visits   Medication Dose Route Frequency Provider Last Rate Last Admin    sodium chloride bacteriostatic 0.9 % flush 9 mL  9 mL Intravenous Once NICOLA Pearce MD         No Known Allergies  Past Medical History:   Diagnosis Date    Administration of long-term prophylactic antibiotics 08/26/2024    Hepatic steatosis 2017    Noted on ultrasound    Hypomagnesemia 08/26/2024    Immunosuppression (H) 08/26/2024    S/P lung transplant (H) 08/15/2024    Tremor 05/23/2024       Past Surgical History:   Procedure Laterality Date    BRONCHOSCOPY  08/16/2024    BRONCHOSCOPY (RIGID OR FLEXIBLE), DIAGNOSTIC N/A 9/26/2024    Procedure: BRONCHOSCOPY, WITH BRONCHOALVEOLAR LAVAGE AND BIOPSIES;  Surgeon: Oneida Silver MD;  Location:  GI    BRONCHOSCOPY  FLEXIBLE AND RIGID N/A 8/27/2024    Procedure: Bronchoscopy Inspection;  Surgeon: Oneida Silver MD;  Location:  GI    COLONOSCOPY      CV CORONARY ANGIOGRAM N/A 06/21/2024    Procedure: Coronary Angiogram;  Surgeon: Gabriel Julian MD;  Location:  HEART CARDIAC CATH LAB    CV RIGHT HEART CATH MEASUREMENTS RECORDED N/A 06/21/2024    Procedure: Right Heart Catheterization;  Surgeon: Gabriel Julian MD;  Location:  HEART CARDIAC CATH LAB    PICC DOUBLE LUMEN PLACEMENT Right 08/20/2024    47-3cm, Basilic    TRANSPLANT LUNG RECIPIENT SINGLE X2 Bilateral 08/15/2024    Procedure: Clamshell Incision, On Central Venoarterial Extracorporeal Membranous Oxygenation, Bilateral Lung Transplant Recipient, Left atrial appendage ligation, Intraoperative Flexible Bronchoscopy by Anesthesia;  Surgeon: Mulvihill, Michael, MD;  Location:  OR       Social History     Socioeconomic History    Marital status: Single     Spouse name: Not on file    Number of children: Not on file    Years of education: Not on file    Highest education level: Not on file   Occupational History    Not on file   Tobacco Use    Smoking status: Former     Types: Cigarettes    Smokeless tobacco: Never   Substance and Sexual Activity    Alcohol use: Not Currently     Comment: not since 2017    Drug use: Never    Sexual activity: Not on file   Other Topics Concern    Not on file   Social History Narrative    Not on file     Social Determinants of Health     Financial Resource Strain: Low Risk  (8/24/2024)    Financial Resource Strain     Within the past 12 months, have you or your family members you live with been unable to get utilities (heat, electricity) when it was really needed?: No   Food Insecurity: Not on File (9/26/2024)    Received from [facility]    Food Insecurity     Food: 0   Transportation Needs: Low Risk  (8/24/2024)    Transportation Needs     Within the past 12 months, has lack of transportation kept you from medical appointments,  getting your medicines, non-medical meetings or appointments, work, or from getting things that you need?: No   Physical Activity: Not on File (8/26/2019)    Received from [facility]    Physical Activity     Physical Activity: 0   Stress: Not on File (8/26/2019)    Received from [facility]    Stress     Stress: 0   Social Connections: Not on File (9/11/2024)    Received from [facility]    Social Connections     Connectedness: 0   Interpersonal Safety: Low Risk  (9/26/2024)    Interpersonal Safety     Do you feel physically and emotionally safe where you currently live?: Yes     Within the past 12 months, have you been hit, slapped, kicked or otherwise physically hurt by someone?: No     Within the past 12 months, have you been humiliated or emotionally abused in other ways by your partner or ex-partner?: No   Housing Stability: Low Risk  (8/24/2024)    Housing Stability     Do you have housing? : Yes     Are you worried about losing your housing?: No       ROS Pulmonary    A complete ROS was otherwise negative except as noted in the HPI.  BP (!) 145/78 (BP Location: Right arm, Cuff Size: Adult Regular)   Pulse 75   Wt 73.9 kg (163 lb)   SpO2 98%   BMI 23.39 kg/m    Exam:   GENERAL APPEARANCE: Well developed, well nourished, alert, and in no apparent distress. Poor posture.   EYES: PERRL, EOMI  HENT: Nasal mucosa with no edema and no hyperemia. No nasal polyps.  EARS: Canals clear, TMs normal  MOUTH: Oral mucosa is moist, without any lesions, no tonsillar enlargement, no oropharyngeal exudate.  NECK: supple, no masses, no thyromegaly.  LYMPHATICS: No significant axillary, cervical, or supraclavicular nodes.  RESP: normal percussion, good air flow throughout.  No crackles. No rhonchi. No wheezes.  CV: Normal S1, S2, regular rhythm, normal rate. No murmur.  No rub. No gallop. No LE edema.   ABDOMEN:  Bowel sounds normal, soft, nontender, no HSM or masses.   MS: extremities normal. No clubbing. No cyanosis.  SKIN: no rash on  limited exam, staples intact without erythema, induration, discharge, or warmth  NEURO: Mentation intact, speech normal, normal strength and tone, normal gait and stance  PSYCH: mentation appears normal. and affect normal/bright.    Results:  Recent Results (from the past 168 hour(s))   CBC with platelets    Collection Time: 10/08/24  9:12 AM   Result Value Ref Range    WBC Count 7.5 4.0 - 11.0 10e3/uL    RBC Count 4.71 4.40 - 5.90 10e6/uL    Hemoglobin 13.7 13.3 - 17.7 g/dL    Hematocrit 43.1 40.0 - 53.0 %    MCV 92 78 - 100 fL    MCH 29.1 26.5 - 33.0 pg    MCHC 31.8 31.5 - 36.5 g/dL    RDW 14.1 10.0 - 15.0 %    Platelet Count 277 150 - 450 10e3/uL   Magnesium    Collection Time: 10/08/24  9:12 AM   Result Value Ref Range    Magnesium 1.6 (L) 1.7 - 2.3 mg/dL   Comprehensive metabolic panel    Collection Time: 10/08/24  9:12 AM   Result Value Ref Range    Sodium 140 135 - 145 mmol/L    Potassium 4.6 3.4 - 5.3 mmol/L    Carbon Dioxide (CO2) 25 22 - 29 mmol/L    Anion Gap 12 7 - 15 mmol/L    Urea Nitrogen 20.7 8.0 - 23.0 mg/dL    Creatinine 1.06 0.67 - 1.17 mg/dL    GFR Estimate 80 >60 mL/min/1.73m2    Calcium 9.8 8.8 - 10.4 mg/dL    Chloride 103 98 - 107 mmol/L    Glucose 143 (H) 70 - 99 mg/dL    Alkaline Phosphatase 75 40 - 150 U/L    AST 23 0 - 45 U/L    ALT 27 0 - 70 U/L    Protein Total 6.6 6.4 - 8.3 g/dL    Albumin 4.0 3.5 - 5.2 g/dL    Bilirubin Total 0.4 <=1.2 mg/dL   General PFT Lab (Please always keep checked)    Collection Time: 10/08/24  9:19 AM   Result Value Ref Range    FVC-Pred 4.25 L    FVC-Pre 3.05 L    FVC-%Pred-Pre 71 %    FEV1-Pre 2.55 L    FEV1-%Pred-Pre 76 %    FEV1FVC-Pred 78 %    FEV1FVC-Pre 84 %    FEFMax-Pred 9.15 L/sec    FEFMax-Pre 7.53 L/sec    FEFMax-%Pred-Pre 82 %    FEF2575-Pred 2.76 L/sec    FEF2575-Pre 3.03 L/sec    ZLB5881-%Pred-Pre 109 %    ExpTime-Pre 5.17 sec    FIFMax-Pre 6.59 L/sec    FEV1FEV6-Pred 79 %    FEV1FEV6-Pre 84 %         Results as noted above.    PFT  Interpretation:  Mild restrictive spirometry.  Stable FEV1 since last visit.  Below best since lung transplantation  Valid Maneuver    CXR (10/8/2024), personally reviewed by me: bilateral Lung fields shows haziness. No effusion. Cardiac silhouette is not enlarged.     Chest CT (9/24/24):  Postsurgical changes of bilateral lung transplantation. Multifocal areas of air trapping demonstrated throughout the lungs in expiration which can represent obliterative bronchiolitis or CLAD. Pleural-based 8 x 8 mm solid nodule in the left lower lobe may represent an irregular area of atelectasis. Recommend continued attention on follow-up.     Assessment and plan: Travon Cartwright is a 59 year old male with a history of idiopathic pulmonary fibrosis, diabetes, hepatosteatosis, and essential tremor.  Pt. is now s/p BSLT on 8/15/2024 with Dr. Mulvihill.  Surgery was uncomplicated and done on pump, extubated and off pressors since 8/18.  Had delirium. Weaned to RA 8/22.      Discharge recommendations:  - Plan for staple removal 4 weeks post-op per Dr. Mulvihill (see CVTS note 8/23)     #. S/p bilateral sequential lung transplant (BSLT) 8/15/24 for idiopathic pulmonary fibrosis: Explant pathology: left with UIP and organizing pneumonia (5 benign hilar lymph nodes), right with UIP with organizing pneumonia (7 benign hilar lymph nodes).  PGD 1-2.    - Bronch (8/27) with normal surgical anastomosis in right mainstem bronchus, normal surgical anastomosis in left mainstem bronchus and noted pedunculated granulation tissue 6 o'clock, distal to anastomoses airway hyperemic but not friable, and moderate amount of thick, tenacious, clear secretions and fair amount of thin secretions.     Immunosuppression: S/p induction therapy with high dose IV steroid intraoperatively and basiliximab (POD #0 and #4).   - Tacrolimus goal  8-12.  - MMF 1000 mg BID  - Prednisone 25 mg daily with taper per lung transplant protocol:  Date Daily Dose (mg)    9/21/2024 15   10/12/2024 10   11/2/2024 5      9/24/2024: Pulm sx are stable. CXR is stable to better. FEV1 - has mild decline and Cell free DNA remained elevated. Bronch grew Steno and despite treatment FEV1 remains low. Chest CT was more consistent with diffuse airway disease/bronchiolitis.   -  We will proceed with ACR treatment and if there is no improvement. Discuss with Endo about managing his BS during this burst.   - We will consider starting Azithromycin 250mg daily at next visit.    -  Will repeat Chest CT to follow nodule.   - Check cell free DNA with next visit.  - Nebs: levalbuterol and Mucomyst QID PRN  - IS and Aerobika   - DSA neg on 9/24/24.       #. ID:   Prophylaxis:   - Bactrim for PJP ppx (started POD #1 due to donor toxoplasma IgG +)  - VGCV for CMV ppx (as below), CMV monitoring per protocol (after completion of ppx course, additionally prn)  - Nystatin for oral candidiasis ppx, 6 month course   - See below for serologies and viral ppx:    Donor Recipient Intervention   CMV status - + Valganciclovir POD #8-90   EBV status - + EBV monthly (due 9/15)   HSV status N/A + N/A         - Donor cultures ([facility]) with Strep constellatus, donor cultures ([facility]) with MSSA.  S/p IV vancomycin 8/15-8/20 and ceftazidime 8/15-8/17.  Noted fever 8/18-8/19.  Blood culture negative 8/19.  C diff negative 8/21.  Report of urinary urgency and frequency, UA unremarkable 8/28.     Steno (9/24/24): Treated with levaquin for 14 days    PHS risk criteria donor: Additional labs required post-transplant (between 4-8 weeks post-op): Hepatitis B, Hepatitis C, and HIV by JOVANY ([id number], neg on 9/16).     #. Hx HTN  #. Hx of HLD  - Monitor hemodynamic status. Goal MAP >65, SBP <140  - Continue rosuvastatin 20 daily.  10/8/2024: Will increase Metoprolol tartrate to 50 mg BID and reassess at next visit.     #. Hypomagnesemia: Suppressed absorption d/t CNI.   - Mag glycinate 800 mg TID    #. Hyperkalemia: elevated in setting of  tacrolimus use.  REquired lokelma previously.    #. Lifelong tremors: He's had a history of lifelong tremors which have worsened in the last 1-2 years.  - Trialled Propanolol 10mg TID with no benefit.  - Will try Primidone 50mg BID.     - Neuro referral- preferable a movement disorder specialist eventually.    #. DM II:  He was diagnosed with diabetes about 3-4 years ago and feels like it is not as well controlled as it could be. Prior to transplant was on Jardiance, dulaglutide, glargine, metformin.   Lantus 30 units every day at 7 pm  To cover prednisone 20 mg daily,   Novolog Fixed meal dose:  Breakfast, lunch and dinner: Give 13 units of Novolog  and ISS.  Give 5 units of Novolog for a snack or supplement before 10 pm, do not cover snacks after 10 pm  10/8/2024: Hgb A1c is was 7.4. Followed by Endo.     #. Hepatic Steatosis: He has had intermittently elevated LFTs since 2019, had a liver US in 2017 showing hepatic steatosis. GI felt he was low likelihood advanced fibrosis.   #. Elevated alk phos and ALT: Resolved.    #. HCM:  Dermatology:   Vaccination: Will need RSV vaccine/C19 and Flu vaccine, will be given after 11/15/24.  Cancer screening:  - colonoscopy: due 2026 (last 2016 , no polyps, 10 year follow up)      The longitudinal plan of care for the diagnosis(es)/condition(s) as documented were addressed during this visit. Due to the added complexity in care, I will continue to support Juan J in the subsequent management and with ongoing continuity of care.          Again, thank you for allowing me to participate in the care of your patient.        Sincerely,        Carlos Johnson MD

## 2024-10-08 NOTE — PROGRESS NOTES
Reason for Visit  Travon Cartwright is a 59 year old male who is being seen for RECHECK  Lung TX HPI  The patient was seen and examined by Carlos Johnson MD     Travon Cartwright is a 59 year old male with a history of idiopathic pulmonary fibrosis, diabetes, hepatosteatosis, and essential tremor.  Pt. is now s/p BSLT on 8/15/2024 with Dr. Mulvihill.  Surgery was uncomplicated and done on pump, extubated and off pressors since 8/18.  Weaned to RA 8/22.     Since the last visit had bronch on 9/26/24 and it grew Steno, treated with levaquin.     Interval history:  He is doing well. Feels SOB with climbing stairs and with longer walks and completed pulm rehab. Denies cough, palpitations, chest pain, abdominal pain, or dysuria.  His sister notes his blood sugars are getting better. No leg swelling.   No Post nasal drip or heart burn.   Still has nocturia - 3 - 4 x/night. Sleeps poorly.    Tremors are worse, did not benefit from propanolol.    Current Outpatient Medications   Medication Sig Dispense Refill    acetaminophen (TYLENOL) 325 MG tablet Take 2 tablets (650 mg) by mouth or Feeding Tube every 4 hours as needed for other (For optimal non-opioid multimodal pain management to improve pain control.). 30 tablet 0    acetylcysteine (MUCOMYST) 20 % neb solution Take 2 mLs by nebulization 4 times daily as needed for mucolysis/respiratory distress. 30 mL 1    calcium carbonate-vitamin D (CALTRATE) 600-10 MG-MCG per tablet Take 1 tablet by mouth or Feeding Tube 2 times daily. 60 tablet 0    Continuous Glucose Sensor (DEXCOM G7 SENSOR) MISC Change every 10 days. 1 each 11    insulin aspart (NOVOLOG PEN) 100 UNIT/ML pen Take 10 units prior to meals, and add sliding scale per instructions.  Up to 50 units daily 15 mL 4    insulin glargine (LANTUS PEN) 100 UNIT/ML pen Inject 30 Units subcutaneously at bedtime.      insulin pen needle (32G X 4 MM) 32G X 4 MM miscellaneous Use pen needles daily or as directed. 100 each 3     levalbuterol (XOPENEX) 1.25 MG/3ML neb solution Take 3 mLs (1.25 mg) by nebulization 4 times daily as needed for shortness of breath or wheezing. Use prior to Mucomyst neb. 300 mL 0    levofloxacin (LEVAQUIN) 500 MG tablet Take 1 tablet (500 mg) by mouth daily for 14 days. 14 tablet 0    magnesium (HIGH ABSORPTION MAGNESIUM) 100 MG TABS Take 400 mg by mouth 3 times daily. 360 tablet 11    melatonin 5 MG tablet Take 1 tablet (5 mg) by mouth or Feeding Tube every evening. 30 tablet 0    methocarbamol (ROBAXIN) 750 MG tablet Take 1 tablet (750 mg) by mouth or Feeding Tube 4 times daily as needed for muscle spasms. 90 tablet 1    metoprolol tartrate (LOPRESSOR) 25 MG tablet Take 1 tablet (25 mg) by mouth or Feeding Tube 2 times daily. 60 tablet 3    multivitamin, therapeutic (THERA-VIT) TABS tablet Take 1 tablet by mouth daily. 30 tablet 0    mycophenolate (GENERIC EQUIVALENT) 250 MG capsule Take 4 capsules (1,000 mg) by mouth 2 times daily. 240 capsule 11    nystatin (MYCOSTATIN) 981119 UNIT/ML suspension Take 10 mLs (1,000,000 Units) by mouth 4 times daily. 1200 mL 4    pantoprazole (PROTONIX) 40 MG EC tablet Take 1 tablet (40 mg) by mouth every morning (before breakfast). 30 tablet 3    polyethylene glycol (MIRALAX) 17 GM/Dose powder Take 17 g by mouth daily as needed for constipation. 510 g 0    predniSONE (DELTASONE) 5 MG tablet Take 5 tablets (25 mg) by mouth daily. Taper per lung transplant clinic.  Next taper to 20 mg daily on 8/31 (Patient taking differently: Take 20 mg by mouth daily. Taper per lung transplant clinic.  Next taper to 15 mg daily on 8/31) 150 tablet 3    propranolol (INDERAL) 10 MG tablet Take 1 tablet (10 mg) by mouth 3 times daily for 10 days. 30 tablet 0    rosuvastatin (CRESTOR) 10 MG tablet Take 1 tablet (10 mg) by mouth daily. 30 tablet 3    senna-docusate (SENOKOT-S/PERICOLACE) 8.6-50 MG tablet Take 2 tablets by mouth or Feeding Tube 2 times daily as needed for constipation (use 1st).  120 tablet 0    sodium zirconium cyclosilicate (LOKELMA) 10 g PACK packet Take 1 packet (10 g) by mouth daily as needed. 10 packet 1    sulfamethoxazole-trimethoprim (BACTRIM) 400-80 MG tablet Take 1 tablet by mouth or NG Tube daily. 30 tablet 3    tacrolimus (GENERIC EQUIVALENT) 0.5 MG capsule Take 1 capsule (0.5 mg) by mouth every morning. Total dose: 3.5 mg in the AM and 4 mg in the PM 30 capsule 11    tacrolimus (GENERIC EQUIVALENT) 1 MG capsule Take 3 capsules (3 mg) by mouth every morning AND 4 capsules (4 mg) every evening. Total dose 3.5 mg in the AM and 4.0 mg in the PM.. 210 capsule 11    traZODone (DESYREL) 50 MG tablet Take 1 tablet (50 mg) by mouth or Feeding Tube nightly as needed for sleep. 30 tablet 0    valGANciclovir (VALCYTE) 450 MG tablet Take 2 tablets (900 mg) by mouth daily. 60 tablet 3     No current facility-administered medications for this visit.     Facility-Administered Medications Ordered in Other Visits   Medication Dose Route Frequency Provider Last Rate Last Admin    sodium chloride bacteriostatic 0.9 % flush 9 mL  9 mL Intravenous Once NICOLA Pearce MD         No Known Allergies  Past Medical History:   Diagnosis Date    Administration of long-term prophylactic antibiotics 08/26/2024    Hepatic steatosis 2017    Noted on ultrasound    Hypomagnesemia 08/26/2024    Immunosuppression (H) 08/26/2024    S/P lung transplant (H) 08/15/2024    Tremor 05/23/2024       Past Surgical History:   Procedure Laterality Date    BRONCHOSCOPY  08/16/2024    BRONCHOSCOPY (RIGID OR FLEXIBLE), DIAGNOSTIC N/A 9/26/2024    Procedure: BRONCHOSCOPY, WITH BRONCHOALVEOLAR LAVAGE AND BIOPSIES;  Surgeon: Oneida Silver MD;  Location: [facility]    BRONCHOSCOPY FLEXIBLE AND RIGID N/A 8/27/2024    Procedure: Bronchoscopy Inspection;  Surgeon: Oneida Silver MD;  Location: [facility]    COLONOSCOPY      CV CORONARY ANGIOGRAM N/A 06/21/2024    Procedure: Coronary Angiogram;  Surgeon: Gabriel Julian MD;   Location:  [facility]    CV RIGHT HEART CATH MEASUREMENTS RECORDED N/A 06/21/2024    Procedure: Right Heart Catheterization;  Surgeon: Gabriel Julian MD;  Location:  [facility]    PICC DOUBLE LUMEN PLACEMENT Right 08/20/2024    47-3cm, Basilic    TRANSPLANT LUNG RECIPIENT SINGLE X2 Bilateral 08/15/2024    Procedure: Clamshell Incision, On Central Venoarterial Extracorporeal Membranous Oxygenation, Bilateral Lung Transplant Recipient, Left atrial appendage ligation, Intraoperative Flexible Bronchoscopy by Anesthesia;  Surgeon: Mulvihill, Michael, MD;  Location:  OR       Social History     Socioeconomic History    Marital status: Single     Spouse name: Not on file    Number of children: Not on file    Years of education: Not on file    Highest education level: Not on file   Occupational History    Not on file   Tobacco Use    Smoking status: Former     Types: Cigarettes    Smokeless tobacco: Never   Substance and Sexual Activity    Alcohol use: Not Currently     Comment: not since 2017    Drug use: Never    Sexual activity: Not on file   Other Topics Concern    Not on file   Social History Narrative    Not on file     Social Determinants of Health     Financial Resource Strain: Low Risk  (8/24/2024)    Financial Resource Strain     Within the past 12 months, have you or your family members you live with been unable to get utilities (heat, electricity) when it was really needed?: No   Food Insecurity: Not on File (9/26/2024)    Received from [facility]    Food Insecurity     Food: 0   Transportation Needs: Low Risk  (8/24/2024)    Transportation Needs     Within the past 12 months, has lack of transportation kept you from medical appointments, getting your medicines, non-medical meetings or appointments, work, or from getting things that you need?: No   Physical Activity: Not on File (8/26/2019)    Received from [facility]    Physical Activity     Physical Activity: 0   Stress: Not on File  (8/26/2019)    Received from [facility]    Stress     Stress: 0   Social Connections: Not on File (9/11/2024)    Received from [facility]    Social Connections     Connectedness: 0   Interpersonal Safety: Low Risk  (9/26/2024)    Interpersonal Safety     Do you feel physically and emotionally safe where you currently live?: Yes     Within the past 12 months, have you been hit, slapped, kicked or otherwise physically hurt by someone?: No     Within the past 12 months, have you been humiliated or emotionally abused in other ways by your partner or ex-partner?: No   Housing Stability: Low Risk  (8/24/2024)    Housing Stability     Do you have housing? : Yes     Are you worried about losing your housing?: No       ROS Pulmonary    A complete ROS was otherwise negative except as noted in the HPI.  BP (!) 145/78 (BP Location: Right arm, Cuff Size: Adult Regular)   Pulse 75   Wt 73.9 kg (163 lb)   SpO2 98%   BMI 23.39 kg/m    Exam:   GENERAL APPEARANCE: Well developed, well nourished, alert, and in no apparent distress. Poor posture.   EYES: PERRL, EOMI  HENT: Nasal mucosa with no edema and no hyperemia. No nasal polyps.  EARS: Canals clear, TMs normal  MOUTH: Oral mucosa is moist, without any lesions, no tonsillar enlargement, no oropharyngeal exudate.  NECK: supple, no masses, no thyromegaly.  LYMPHATICS: No significant axillary, cervical, or supraclavicular nodes.  RESP: normal percussion, good air flow throughout.  No crackles. No rhonchi. No wheezes.  CV: Normal S1, S2, regular rhythm, normal rate. No murmur.  No rub. No gallop. No LE edema.   ABDOMEN:  Bowel sounds normal, soft, nontender, no HSM or masses.   MS: extremities normal. No clubbing. No cyanosis.  SKIN: no rash on limited exam, staples intact without erythema, induration, discharge, or warmth  NEURO: Mentation intact, speech normal, normal strength and tone, normal gait and stance  PSYCH: mentation appears normal. and affect normal/bright.    Results:  Recent  Results (from the past 168 hour(s))   CBC with platelets    Collection Time: 10/08/24  9:12 AM   Result Value Ref Range    WBC Count 7.5 4.0 - 11.0 10e3/uL    RBC Count 4.71 4.40 - 5.90 10e6/uL    Hemoglobin 13.7 13.3 - 17.7 g/dL    Hematocrit 43.1 40.0 - 53.0 %    MCV 92 78 - 100 fL    MCH 29.1 26.5 - 33.0 pg    MCHC 31.8 31.5 - 36.5 g/dL    RDW 14.1 10.0 - 15.0 %    Platelet Count 277 150 - 450 10e3/uL   Magnesium    Collection Time: 10/08/24  9:12 AM   Result Value Ref Range    Magnesium 1.6 (L) 1.7 - 2.3 mg/dL   Comprehensive metabolic panel    Collection Time: 10/08/24  9:12 AM   Result Value Ref Range    Sodium 140 135 - 145 mmol/L    Potassium 4.6 3.4 - 5.3 mmol/L    Carbon Dioxide (CO2) 25 22 - 29 mmol/L    Anion Gap 12 7 - 15 mmol/L    Urea Nitrogen 20.7 8.0 - 23.0 mg/dL    Creatinine 1.06 0.67 - 1.17 mg/dL    GFR Estimate 80 >60 mL/min/1.73m2    Calcium 9.8 8.8 - 10.4 mg/dL    Chloride 103 98 - 107 mmol/L    Glucose 143 (H) 70 - 99 mg/dL    Alkaline Phosphatase 75 40 - 150 U/L    AST 23 0 - 45 U/L    ALT 27 0 - 70 U/L    Protein Total 6.6 6.4 - 8.3 g/dL    Albumin 4.0 3.5 - 5.2 g/dL    Bilirubin Total 0.4 <=1.2 mg/dL   General PFT Lab (Please always keep checked)    Collection Time: 10/08/24  9:19 AM   Result Value Ref Range    FVC-Pred 4.25 L    FVC-Pre 3.05 L    FVC-%Pred-Pre 71 %    FEV1-Pre 2.55 L    FEV1-%Pred-Pre 76 %    FEV1FVC-Pred 78 %    FEV1FVC-Pre 84 %    FEFMax-Pred 9.15 L/sec    FEFMax-Pre 7.53 L/sec    FEFMax-%Pred-Pre 82 %    FEF2575-Pred 2.76 L/sec    FEF2575-Pre 3.03 L/sec    UJX6656-%Pred-Pre 109 %    ExpTime-Pre 5.17 sec    FIFMax-Pre 6.59 L/sec    FEV1FEV6-Pred 79 %    FEV1FEV6-Pre 84 %         Results as noted above.    PFT Interpretation:  Mild restrictive spirometry.  Stable FEV1 since last visit.  Below best since lung transplantation  Valid Maneuver    CXR (10/8/2024), personally reviewed by me: bilateral Lung fields shows haziness. No effusion. Cardiac silhouette is not enlarged.      Chest CT (9/24/24):  Postsurgical changes of bilateral lung transplantation. Multifocal areas of air trapping demonstrated throughout the lungs in expiration which can represent obliterative bronchiolitis or CLAD. Pleural-based 8 x 8 mm solid nodule in the left lower lobe may represent an irregular area of atelectasis. Recommend continued attention on follow-up.     Assessment and plan: Travon Cartwright is a 59 year old male with a history of idiopathic pulmonary fibrosis, diabetes, hepatosteatosis, and essential tremor.  Pt. is now s/p BSLT on 8/15/2024 with Dr. Mulvihill.  Surgery was uncomplicated and done on pump, extubated and off pressors since 8/18.  Had delirium. Weaned to RA 8/22.      Discharge recommendations:  - Plan for staple removal 4 weeks post-op per Dr. Mulvihill (see CVTS note 8/23)     #. S/p bilateral sequential lung transplant (BSLT) 8/15/24 for idiopathic pulmonary fibrosis: Explant pathology: left with UIP and organizing pneumonia (5 benign hilar lymph nodes), right with UIP with organizing pneumonia (7 benign hilar lymph nodes).  PGD 1-2.    - Bronch (8/27) with normal surgical anastomosis in right mainstem bronchus, normal surgical anastomosis in left mainstem bronchus and noted pedunculated granulation tissue 6 o'clock, distal to anastomoses airway hyperemic but not friable, and moderate amount of thick, tenacious, clear secretions and fair amount of thin secretions.     Immunosuppression: S/p induction therapy with high dose IV steroid intraoperatively and basiliximab (POD #0 and #4).   - Tacrolimus goal  8-12.  - MMF 1000 mg BID  - Prednisone 25 mg daily with taper per lung transplant protocol:  Date Daily Dose (mg)   9/21/2024 15   10/12/2024 10   11/2/2024 5      9/24/2024: Pulm sx are stable. CXR is stable to better. FEV1 - has mild decline and Cell free DNA remained elevated. Bronch grew Steno and despite treatment FEV1 remains low. Chest CT was more consistent with diffuse  airway disease/bronchiolitis.   -  We will proceed with ACR treatment and if there is no improvement. Discuss with Endo about managing his BS during this burst.   - We will consider starting Azithromycin 250mg daily at next visit.    -  Will repeat Chest CT to follow nodule.   - Check cell free DNA with next visit.  - Nebs: levalbuterol and Mucomyst QID PRN  - IS and Aerobika   - DSA neg on 9/24/24.       #. ID:   Prophylaxis:   - Bactrim for PJP ppx (started POD #1 due to donor toxoplasma IgG +)  - VGCV for CMV ppx (as below), CMV monitoring per protocol (after completion of ppx course, additionally prn)  - Nystatin for oral candidiasis ppx, 6 month course   - See below for serologies and viral ppx:    Donor Recipient Intervention   CMV status - + Valganciclovir POD #8-90   EBV status - + EBV monthly (due 9/15)   HSV status N/A + N/A         - Donor cultures ([facility]) with Strep constellatus, donor cultures ([facility]) with MSSA.  S/p IV vancomycin 8/15-8/20 and ceftazidime 8/15-8/17.  Noted fever 8/18-8/19.  Blood culture negative 8/19.  C diff negative 8/21.  Report of urinary urgency and frequency, UA unremarkable 8/28.     Steno (9/24/24): Treated with levaquin for 14 days    PHS risk criteria donor: Additional labs required post-transplant (between 4-8 weeks post-op): Hepatitis B, Hepatitis C, and HIV by JOVANY ([id number], neg on 9/16).     #. Hx HTN  #. Hx of HLD  - Monitor hemodynamic status. Goal MAP >65, SBP <140  - Continue rosuvastatin 20 daily.  10/8/2024: Will increase Metoprolol tartrate to 50 mg BID and reassess at next visit.     #. Hypomagnesemia: Suppressed absorption d/t CNI.   - Mag glycinate 800 mg TID    #. Hyperkalemia: elevated in setting of tacrolimus use.  REquired lokelma previously.    #. Lifelong tremors: He's had a history of lifelong tremors which have worsened in the last 1-2 years.  - Trialled Propanolol 10mg TID with no benefit.  - Will try Primidone 50mg BID.     - Neuro referral- preferable a  movement disorder specialist eventually.    #. DM II:  He was diagnosed with diabetes about 3-4 years ago and feels like it is not as well controlled as it could be. Prior to transplant was on Jardiance, dulaglutide, glargine, metformin.   Lantus 30 units every day at 7 pm  To cover prednisone 20 mg daily,   Novolog Fixed meal dose:  Breakfast, lunch and dinner: Give 13 units of Novolog  and ISS.  Give 5 units of Novolog for a snack or supplement before 10 pm, do not cover snacks after 10 pm  10/8/2024: Hgb A1c is was 7.4. Followed by Endo.     #. Hepatic Steatosis: He has had intermittently elevated LFTs since 2019, had a liver US in 2017 showing hepatic steatosis. GI felt he was low likelihood advanced fibrosis.   #. Elevated alk phos and ALT: Resolved.    #. HCM:  Dermatology:   Vaccination: Will need RSV vaccine/C19 and Flu vaccine, will be given after 11/15/24.  Cancer screening:  - colonoscopy: due 2026 (last 2016 , no polyps, 10 year follow up)      The longitudinal plan of care for the diagnosis(es)/condition(s) as documented were addressed during this visit. Due to the added complexity in care, I will continue to support Juan J in the subsequent management and with ongoing continuity of care.

## 2024-10-09 ENCOUNTER — INFUSION THERAPY VISIT (OUTPATIENT)
Dept: INFUSION THERAPY | Facility: CLINIC | Age: 59
End: 2024-10-09
Attending: INTERNAL MEDICINE
Payer: COMMERCIAL

## 2024-10-09 VITALS
OXYGEN SATURATION: 98 % | RESPIRATION RATE: 16 BRPM | SYSTOLIC BLOOD PRESSURE: 124 MMHG | HEART RATE: 89 BPM | TEMPERATURE: 97.5 F | DIASTOLIC BLOOD PRESSURE: 80 MMHG

## 2024-10-09 DIAGNOSIS — Z94.2 S/P LUNG TRANSPLANT (H): Primary | ICD-10-CM

## 2024-10-09 LAB
ALBUMIN SERPL BCG-MCNC: 3.8 G/DL (ref 3.5–5.2)
ALP SERPL-CCNC: 86 U/L (ref 40–150)
ALT SERPL W P-5'-P-CCNC: 28 U/L (ref 0–70)
ANION GAP SERPL CALCULATED.3IONS-SCNC: 13 MMOL/L (ref 7–15)
AST SERPL W P-5'-P-CCNC: 34 U/L (ref 0–45)
BASOPHILS # BLD AUTO: 0 10E3/UL (ref 0–0.2)
BASOPHILS NFR BLD AUTO: 0 %
BILIRUB DIRECT SERPL-MCNC: NORMAL MG/DL
BILIRUB SERPL-MCNC: 0.2 MG/DL
BUN SERPL-MCNC: 25.1 MG/DL (ref 8–23)
CALCIUM SERPL-MCNC: 9.7 MG/DL (ref 8.8–10.4)
CHLORIDE SERPL-SCNC: 103 MMOL/L (ref 98–107)
CREAT SERPL-MCNC: 0.97 MG/DL (ref 0.67–1.17)
EGFRCR SERPLBLD CKD-EPI 2021: 89 ML/MIN/1.73M2
EOSINOPHIL # BLD AUTO: 0 10E3/UL (ref 0–0.7)
EOSINOPHIL NFR BLD AUTO: 0 %
ERYTHROCYTE [DISTWIDTH] IN BLOOD BY AUTOMATED COUNT: 14 % (ref 10–15)
GLUCOSE SERPL-MCNC: 237 MG/DL (ref 70–99)
HCO3 SERPL-SCNC: 20 MMOL/L (ref 22–29)
HCT VFR BLD AUTO: 38.6 % (ref 40–53)
HGB BLD-MCNC: 12.9 G/DL (ref 13.3–17.7)
IMM GRANULOCYTES # BLD: 0.1 10E3/UL
IMM GRANULOCYTES NFR BLD: 2 %
LYMPHOCYTES # BLD AUTO: 0.9 10E3/UL (ref 0.8–5.3)
LYMPHOCYTES NFR BLD AUTO: 11 %
MCH RBC QN AUTO: 29.5 PG (ref 26.5–33)
MCHC RBC AUTO-ENTMCNC: 33.4 G/DL (ref 31.5–36.5)
MCV RBC AUTO: 88 FL (ref 78–100)
MONOCYTES # BLD AUTO: 0.2 10E3/UL (ref 0–1.3)
MONOCYTES NFR BLD AUTO: 3 %
NEUTROPHILS # BLD AUTO: 6.9 10E3/UL (ref 1.6–8.3)
NEUTROPHILS NFR BLD AUTO: 85 %
NRBC # BLD AUTO: 0 10E3/UL
NRBC BLD AUTO-RTO: 0 /100
PLATELET # BLD AUTO: 265 10E3/UL (ref 150–450)
POTASSIUM SERPL-SCNC: 5.4 MMOL/L (ref 3.4–5.3)
PROT SERPL-MCNC: 6.5 G/DL (ref 6.4–8.3)
RBC # BLD AUTO: 4.37 10E6/UL (ref 4.4–5.9)
SODIUM SERPL-SCNC: 136 MMOL/L (ref 135–145)
WBC # BLD AUTO: 8.1 10E3/UL (ref 4–11)

## 2024-10-09 PROCEDURE — 36415 COLL VENOUS BLD VENIPUNCTURE: CPT | Performed by: INTERNAL MEDICINE

## 2024-10-09 PROCEDURE — 258N000003 HC RX IP 258 OP 636: Performed by: INTERNAL MEDICINE

## 2024-10-09 PROCEDURE — 96365 THER/PROPH/DIAG IV INF INIT: CPT

## 2024-10-09 PROCEDURE — 82040 ASSAY OF SERUM ALBUMIN: CPT | Performed by: INTERNAL MEDICINE

## 2024-10-09 PROCEDURE — 250N000011 HC RX IP 250 OP 636: Performed by: INTERNAL MEDICINE

## 2024-10-09 PROCEDURE — 80048 BASIC METABOLIC PNL TOTAL CA: CPT | Performed by: INTERNAL MEDICINE

## 2024-10-09 PROCEDURE — 85025 COMPLETE CBC W/AUTO DIFF WBC: CPT | Performed by: INTERNAL MEDICINE

## 2024-10-09 RX ORDER — EPINEPHRINE 1 MG/ML
0.3 INJECTION, SOLUTION INTRAMUSCULAR; SUBCUTANEOUS EVERY 5 MIN PRN
Status: CANCELLED | OUTPATIENT
Start: 2024-10-10

## 2024-10-09 RX ORDER — ALBUTEROL SULFATE 0.83 MG/ML
2.5 SOLUTION RESPIRATORY (INHALATION)
Status: CANCELLED | OUTPATIENT
Start: 2024-10-10

## 2024-10-09 RX ORDER — HEPARIN SODIUM (PORCINE) LOCK FLUSH IV SOLN 100 UNIT/ML 100 UNIT/ML
5 SOLUTION INTRAVENOUS
Status: CANCELLED | OUTPATIENT
Start: 2024-10-10

## 2024-10-09 RX ORDER — ALBUTEROL SULFATE 90 UG/1
1-2 INHALANT RESPIRATORY (INHALATION)
Status: CANCELLED
Start: 2024-10-10

## 2024-10-09 RX ORDER — HEPARIN SODIUM,PORCINE 10 UNIT/ML
5-20 VIAL (ML) INTRAVENOUS DAILY PRN
Status: CANCELLED | OUTPATIENT
Start: 2024-10-10

## 2024-10-09 RX ORDER — DIPHENHYDRAMINE HYDROCHLORIDE 50 MG/ML
50 INJECTION INTRAMUSCULAR; INTRAVENOUS
Status: CANCELLED
Start: 2024-10-10

## 2024-10-09 RX ORDER — METHYLPREDNISOLONE SODIUM SUCCINATE 125 MG/2ML
125 INJECTION INTRAMUSCULAR; INTRAVENOUS
Status: CANCELLED
Start: 2024-10-10

## 2024-10-09 RX ORDER — MEPERIDINE HYDROCHLORIDE 25 MG/ML
25 INJECTION INTRAMUSCULAR; INTRAVENOUS; SUBCUTANEOUS EVERY 30 MIN PRN
Status: CANCELLED | OUTPATIENT
Start: 2024-10-10

## 2024-10-09 RX ADMIN — SODIUM CHLORIDE 1000 MG: 9 INJECTION, SOLUTION INTRAVENOUS at 14:15

## 2024-10-09 NOTE — PROGRESS NOTES
"Infusion Nursing Note:  Travon Cartwright presents today for IV Solu-Medrol dose 1/3.    Patient seen by provider today: No   present during visit today: Not Applicable.    Note: Solu-Medrol infused over 1 hour.      Intravenous Access:  Labs drawn without difficulty.  Peripheral IV placed.    Treatment Conditions:  Not Applicable.      Post Infusion Assessment:  Patient tolerated infusion without incident.  Blood return noted pre and post infusion.  Site patent and intact, free from redness, edema or discomfort.  No evidence of extravasations.  Access discontinued per protocol.       Discharge Plan:   AVS to patient via MYCHART.  Patient will return tomorrow for next appointment.   Patient discharged in stable condition accompanied by: self.  Departure Mode: Ambulatory.    Administrations This Visit       methylPREDNISolone sodium succinate (solu-MEDROL) 1,000 mg in sodium chloride 0.9 % 283 mL intermittent infusion       Admin Date  10/09/2024 Action  $New Bag Dose  1,000 mg Rate  283 mL/hr Route  Intravenous Documented By  Alexsander Ziegler RN                    Vital signs:  Temp: 97.5  F (36.4  C) Temp src: Oral BP: 117/82 Pulse: 87   Resp: 16 SpO2: 98 % O2 Device: None (Room air)        Estimated body mass index is 23.39 kg/m  as calculated from the following:    Height as of 8/15/24: 1.778 m (5' 10\").    Weight as of 10/8/24: 73.9 kg (163 lb).          "

## 2024-10-09 NOTE — PATIENT INSTRUCTIONS
Dear Travon Cartwright    Thank you for choosing [facility] for your infusion.  The following information is a summary of our appointment as well as important reminders.      Please check for cancellations tomorrow to move up Friday appt time.     If you have any questions on your upcoming Specialty [facility]fusion appointments, please call scheduling at [phone].  It was a pleasure taking care of you today.    Sincerely,    [facility]  [facility]  [address] [place] [address]  [id number]  Phone:  [phone]

## 2024-10-10 ENCOUNTER — INFUSION THERAPY VISIT (OUTPATIENT)
Dept: INFUSION THERAPY | Facility: CLINIC | Age: 59
End: 2024-10-10
Attending: INTERNAL MEDICINE
Payer: COMMERCIAL

## 2024-10-10 VITALS
RESPIRATION RATE: 16 BRPM | HEART RATE: 94 BPM | DIASTOLIC BLOOD PRESSURE: 74 MMHG | OXYGEN SATURATION: 98 % | SYSTOLIC BLOOD PRESSURE: 122 MMHG | TEMPERATURE: 98 F

## 2024-10-10 DIAGNOSIS — Z94.2 S/P LUNG TRANSPLANT (H): Primary | ICD-10-CM

## 2024-10-10 LAB — GLUCOSE BLDC GLUCOMTR-MCNC: 314 MG/DL (ref 70–99)

## 2024-10-10 PROCEDURE — 258N000003 HC RX IP 258 OP 636: Performed by: INTERNAL MEDICINE

## 2024-10-10 PROCEDURE — 96365 THER/PROPH/DIAG IV INF INIT: CPT

## 2024-10-10 PROCEDURE — 82962 GLUCOSE BLOOD TEST: CPT

## 2024-10-10 PROCEDURE — 250N000011 HC RX IP 250 OP 636: Performed by: INTERNAL MEDICINE

## 2024-10-10 RX ORDER — METHYLPREDNISOLONE SODIUM SUCCINATE 125 MG/2ML
125 INJECTION INTRAMUSCULAR; INTRAVENOUS
Status: CANCELLED
Start: 2024-10-11

## 2024-10-10 RX ORDER — ALBUTEROL SULFATE 90 UG/1
1-2 INHALANT RESPIRATORY (INHALATION)
Status: CANCELLED
Start: 2024-10-11

## 2024-10-10 RX ORDER — HEPARIN SODIUM (PORCINE) LOCK FLUSH IV SOLN 100 UNIT/ML 100 UNIT/ML
5 SOLUTION INTRAVENOUS
Status: CANCELLED | OUTPATIENT
Start: 2024-10-11

## 2024-10-10 RX ORDER — MEPERIDINE HYDROCHLORIDE 25 MG/ML
25 INJECTION INTRAMUSCULAR; INTRAVENOUS; SUBCUTANEOUS EVERY 30 MIN PRN
Status: CANCELLED | OUTPATIENT
Start: 2024-10-11

## 2024-10-10 RX ORDER — DIPHENHYDRAMINE HYDROCHLORIDE 50 MG/ML
50 INJECTION INTRAMUSCULAR; INTRAVENOUS
Status: CANCELLED
Start: 2024-10-11

## 2024-10-10 RX ORDER — EPINEPHRINE 1 MG/ML
0.3 INJECTION, SOLUTION INTRAMUSCULAR; SUBCUTANEOUS EVERY 5 MIN PRN
Status: CANCELLED | OUTPATIENT
Start: 2024-10-11

## 2024-10-10 RX ORDER — HEPARIN SODIUM,PORCINE 10 UNIT/ML
5-20 VIAL (ML) INTRAVENOUS DAILY PRN
Status: CANCELLED | OUTPATIENT
Start: 2024-10-11

## 2024-10-10 RX ORDER — ALBUTEROL SULFATE 0.83 MG/ML
2.5 SOLUTION RESPIRATORY (INHALATION)
Status: CANCELLED | OUTPATIENT
Start: 2024-10-11

## 2024-10-10 RX ADMIN — SODIUM CHLORIDE 1000 MG: 9 INJECTION, SOLUTION INTRAVENOUS at 11:12

## 2024-10-10 ASSESSMENT — PAIN SCALES - GENERAL: PAINLEVEL: NO PAIN (0)

## 2024-10-10 NOTE — PROGRESS NOTES
Infusion Nursing Note:  Travon Cartwright presents today for   Chief Complaint   Patient presents with    Infusion     SOT - Methylprednisolone (Solumedrol)       Patient seen by provider today: No   present during visit today: Not Applicable.    Note:   -Orders from Carlos Johnson MD completed. 3 consecutive days; today is day 2 of 3.  -1000mg Solu-medrol IV over 60min.    Intravenous Access:  Peripheral IV placed in Rt wrist by RN.    Treatment Conditions:  -POCT 315. Dr. Johnson notified via MySQUAR at 1500.    Post Infusion Assessment:  Patient tolerated infusion without incident.  Blood return noted pre and post infusion.  Site patent and intact, free from redness, edema or discomfort.  No evidence of extravasations.  Access discontinued per protocol.     Discharge Plan:   Discharge instructions reviewed with: Patient.  Patient and/or family verbalized understanding of discharge instructions and all questions answered.  AVS to patient via [employer].      Future Appointments   Date Time Provider Department Center   10/11/2024  9:30 AM [facility] INFUSION NURSE [facility]     Patient discharged in stable condition accompanied by: self.  Departure Mode: Ambulatory.      Oneida Clark RN    /74 (BP Location: Right arm, Cuff Size: Adult Regular)   Pulse 94   Temp 98  F (36.7  C)   Resp 16   SpO2 98%     Administrations This Visit       methylPREDNISolone sodium succinate (solu-MEDROL) 1,000 mg in sodium chloride 0.9 % 283 mL intermittent infusion       Admin Date  10/10/2024 Action  $New Bag Dose  1,000 mg Rate  283 mL/hr Route  Intravenous Documented By  Oneida Clark RN

## 2024-10-11 ENCOUNTER — INFUSION THERAPY VISIT (OUTPATIENT)
Dept: INFUSION THERAPY | Facility: CLINIC | Age: 59
End: 2024-10-11
Attending: INTERNAL MEDICINE
Payer: COMMERCIAL

## 2024-10-11 VITALS
SYSTOLIC BLOOD PRESSURE: 120 MMHG | DIASTOLIC BLOOD PRESSURE: 74 MMHG | OXYGEN SATURATION: 97 % | RESPIRATION RATE: 16 BRPM

## 2024-10-11 DIAGNOSIS — Z94.2 S/P LUNG TRANSPLANT (H): Primary | ICD-10-CM

## 2024-10-11 LAB
ACID FAST STAIN (ARUP): NORMAL
ANION GAP SERPL CALCULATED.3IONS-SCNC: 16 MMOL/L (ref 7–15)
BASOPHILS # BLD AUTO: 0 10E3/UL (ref 0–0.2)
BASOPHILS NFR BLD AUTO: 0 %
BUN SERPL-MCNC: 37.9 MG/DL (ref 8–23)
CALCIUM SERPL-MCNC: 9.6 MG/DL (ref 8.8–10.4)
CHLORIDE SERPL-SCNC: 101 MMOL/L (ref 98–107)
CREAT SERPL-MCNC: 0.88 MG/DL (ref 0.67–1.17)
EGFRCR SERPLBLD CKD-EPI 2021: >90 ML/MIN/1.73M2
EOSINOPHIL # BLD AUTO: 0 10E3/UL (ref 0–0.7)
EOSINOPHIL NFR BLD AUTO: 0 %
ERYTHROCYTE [DISTWIDTH] IN BLOOD BY AUTOMATED COUNT: 14.3 % (ref 10–15)
GLUCOSE SERPL-MCNC: 447 MG/DL (ref 70–99)
HCO3 SERPL-SCNC: 20 MMOL/L (ref 22–29)
HCT VFR BLD AUTO: 38 % (ref 40–53)
HGB BLD-MCNC: 12.8 G/DL (ref 13.3–17.7)
IMM GRANULOCYTES # BLD: 0.2 10E3/UL
IMM GRANULOCYTES NFR BLD: 1 %
LYMPHOCYTES # BLD AUTO: 0.6 10E3/UL (ref 0.8–5.3)
LYMPHOCYTES NFR BLD AUTO: 3 %
MCH RBC QN AUTO: 29.6 PG (ref 26.5–33)
MCHC RBC AUTO-ENTMCNC: 33.7 G/DL (ref 31.5–36.5)
MCV RBC AUTO: 88 FL (ref 78–100)
MONOCYTES # BLD AUTO: 0.6 10E3/UL (ref 0–1.3)
MONOCYTES NFR BLD AUTO: 3 %
NEUTROPHILS # BLD AUTO: 16.7 10E3/UL (ref 1.6–8.3)
NEUTROPHILS NFR BLD AUTO: 92 %
NRBC # BLD AUTO: 0 10E3/UL
NRBC BLD AUTO-RTO: 0 /100
PLATELET # BLD AUTO: 306 10E3/UL (ref 150–450)
POTASSIUM SERPL-SCNC: 4.1 MMOL/L (ref 3.4–5.3)
RBC # BLD AUTO: 4.33 10E6/UL (ref 4.4–5.9)
SODIUM SERPL-SCNC: 137 MMOL/L (ref 135–145)
TACROLIMUS BLD-MCNC: 12 UG/L (ref 5–15)
TME LAST DOSE: NORMAL H
TME LAST DOSE: NORMAL H
WBC # BLD AUTO: 18.2 10E3/UL (ref 4–11)

## 2024-10-11 PROCEDURE — 85014 HEMATOCRIT: CPT | Performed by: INTERNAL MEDICINE

## 2024-10-11 PROCEDURE — 82310 ASSAY OF CALCIUM: CPT | Performed by: INTERNAL MEDICINE

## 2024-10-11 PROCEDURE — 250N000011 HC RX IP 250 OP 636: Performed by: INTERNAL MEDICINE

## 2024-10-11 PROCEDURE — 96365 THER/PROPH/DIAG IV INF INIT: CPT

## 2024-10-11 PROCEDURE — 80048 BASIC METABOLIC PNL TOTAL CA: CPT | Performed by: INTERNAL MEDICINE

## 2024-10-11 PROCEDURE — 80197 ASSAY OF TACROLIMUS: CPT

## 2024-10-11 PROCEDURE — 85004 AUTOMATED DIFF WBC COUNT: CPT | Performed by: INTERNAL MEDICINE

## 2024-10-11 PROCEDURE — 258N000003 HC RX IP 258 OP 636: Performed by: INTERNAL MEDICINE

## 2024-10-11 PROCEDURE — 36415 COLL VENOUS BLD VENIPUNCTURE: CPT | Performed by: INTERNAL MEDICINE

## 2024-10-11 RX ORDER — ALBUTEROL SULFATE 0.83 MG/ML
2.5 SOLUTION RESPIRATORY (INHALATION)
Status: CANCELLED | OUTPATIENT
Start: 2024-10-11

## 2024-10-11 RX ORDER — MEPERIDINE HYDROCHLORIDE 25 MG/ML
25 INJECTION INTRAMUSCULAR; INTRAVENOUS; SUBCUTANEOUS EVERY 30 MIN PRN
Status: CANCELLED | OUTPATIENT
Start: 2024-10-11

## 2024-10-11 RX ORDER — HEPARIN SODIUM,PORCINE 10 UNIT/ML
5-20 VIAL (ML) INTRAVENOUS DAILY PRN
Status: CANCELLED | OUTPATIENT
Start: 2024-10-11

## 2024-10-11 RX ORDER — METHYLPREDNISOLONE SODIUM SUCCINATE 125 MG/2ML
125 INJECTION INTRAMUSCULAR; INTRAVENOUS
Status: CANCELLED
Start: 2024-10-11

## 2024-10-11 RX ORDER — ALBUTEROL SULFATE 90 UG/1
1-2 INHALANT RESPIRATORY (INHALATION)
Status: CANCELLED
Start: 2024-10-11

## 2024-10-11 RX ORDER — HEPARIN SODIUM (PORCINE) LOCK FLUSH IV SOLN 100 UNIT/ML 100 UNIT/ML
5 SOLUTION INTRAVENOUS
Status: CANCELLED | OUTPATIENT
Start: 2024-10-11

## 2024-10-11 RX ORDER — DIPHENHYDRAMINE HYDROCHLORIDE 50 MG/ML
50 INJECTION INTRAMUSCULAR; INTRAVENOUS
Status: CANCELLED
Start: 2024-10-11

## 2024-10-11 RX ORDER — EPINEPHRINE 1 MG/ML
0.3 INJECTION, SOLUTION INTRAMUSCULAR; SUBCUTANEOUS EVERY 5 MIN PRN
Status: CANCELLED | OUTPATIENT
Start: 2024-10-11

## 2024-10-11 RX ADMIN — SODIUM CHLORIDE 1000 MG: 9 INJECTION, SOLUTION INTRAVENOUS at 09:49

## 2024-10-11 RX ADMIN — SODIUM CHLORIDE 50 ML: 9 INJECTION, SOLUTION INTRAVENOUS at 09:49

## 2024-10-11 NOTE — PROGRESS NOTES
Nursing Note  Travon Cartwright [facility] for:   Chief Complaint   Patient presents with    Infusion     Methylprednisolone     During today's [facility] appointment, orders from Dr. Johnson were completed.  Frequency: today is dose 3 of 3 total    Progress note:  Patient identification verified by name and date of birth.  Assessment completed.  Vitals recorded in Doc Flowsheets.  Patient was provided with education regarding medication/procedure and possible side effects.  Patient verbalized understanding.     present during visit today: Not Applicable.    Treatment Conditions: Patient denies fever, chills, signs of infection, recent illness, antibiotics use, productive cough or elevated temperature.  Premedications: were not ordered.  Drug Waste Record: No  Infusion length and rate:  63 ml/hr., over one hour  Labs: were drawn per orders.   Vascular access: peripheral IV placed today.  Is the next appt scheduled? No, completed plan of care of solumedrol today    Post Infusion Assessment:  Patient tolerated infusion without incident.  Site patent and intact, free from redness, edema or discomfort.  No evidence of extravasations.  Access discontinued per protocol.     Discharge Plan:   Follow up plan of care with: ordering provider as scheduled.  Discharge instructions were reviewed with patient.  Patient/representative verbalized understanding of discharge instructions and all questions answered.  Patient discharged [facility] in stable condition.    Oneida Simon RN    Administrations This Visit       methylPREDNISolone sodium succinate (solu-MEDROL) 1,000 mg in sodium chloride 0.9 % 63 mL intermittent infusion       Admin Date  10/11/2024 Action  $New Bag Dose  1,000 mg Rate  63 mL/hr Route  Intravenous Documented By  Oneida Simon RN              sodium chloride 0.9% BOLUS 250 mL        Admin Date  10/11/2024 Action  $New Bag Dose  50 mL Route  Intravenous Documented By  Oneida Simon RN                    /74   Resp 16   SpO2 97%

## 2024-10-11 NOTE — RESULT ENCOUNTER NOTE
Noted elevated blood glucose.  Patient is following closely with endocrine for adjustments while he is on the prednisone burst and taper.

## 2024-10-17 DIAGNOSIS — D84.9 IMMUNOSUPPRESSION (H): Primary | ICD-10-CM

## 2024-10-17 DIAGNOSIS — Z94.2 S/P LUNG TRANSPLANT (H): ICD-10-CM

## 2024-10-17 PROBLEM — T86.810 LUNG TRANSPLANT REJECTION (H): Status: ACTIVE | Noted: 2024-09-26

## 2024-10-22 LAB
ACID FAST STAIN (ARUP): NORMAL

## 2024-10-24 LAB
BACTERIA BRONCH: NO GROWTH
BACTERIA BRONCH: NO GROWTH

## 2024-10-29 DIAGNOSIS — Z94.2 S/P LUNG TRANSPLANT (H): ICD-10-CM

## 2024-10-31 LAB — ORGANISM (ARUP): ABNORMAL

## 2024-11-01 DIAGNOSIS — Z94.2 S/P LUNG TRANSPLANT (H): ICD-10-CM

## 2024-11-04 ENCOUNTER — LAB (OUTPATIENT)
Dept: LAB | Facility: CLINIC | Age: 59
End: 2024-11-04
Attending: INTERNAL MEDICINE
Payer: COMMERCIAL

## 2024-11-04 ENCOUNTER — OFFICE VISIT (OUTPATIENT)
Dept: TRANSPLANT | Facility: CLINIC | Age: 59
End: 2024-11-04
Attending: INTERNAL MEDICINE
Payer: COMMERCIAL

## 2024-11-04 ENCOUNTER — OFFICE VISIT (OUTPATIENT)
Dept: PULMONOLOGY | Facility: CLINIC | Age: 59
End: 2024-11-04
Attending: INTERNAL MEDICINE
Payer: COMMERCIAL

## 2024-11-04 ENCOUNTER — VIRTUAL VISIT (OUTPATIENT)
Dept: PHARMACY | Facility: CLINIC | Age: 59
End: 2024-11-04
Payer: COMMERCIAL

## 2024-11-04 ENCOUNTER — ANCILLARY PROCEDURE (OUTPATIENT)
Dept: GENERAL RADIOLOGY | Facility: CLINIC | Age: 59
End: 2024-11-04
Attending: INTERNAL MEDICINE
Payer: COMMERCIAL

## 2024-11-04 VITALS
HEIGHT: 70 IN | RESPIRATION RATE: 22 BRPM | OXYGEN SATURATION: 99 % | WEIGHT: 167 LBS | TEMPERATURE: 97.6 F | BODY MASS INDEX: 23.91 KG/M2 | DIASTOLIC BLOOD PRESSURE: 83 MMHG | HEART RATE: 67 BPM | SYSTOLIC BLOOD PRESSURE: 130 MMHG

## 2024-11-04 DIAGNOSIS — T38.0X5A STEROID-INDUCED HYPERGLYCEMIA: ICD-10-CM

## 2024-11-04 DIAGNOSIS — Z78.9 TAKES DIETARY SUPPLEMENTS: ICD-10-CM

## 2024-11-04 DIAGNOSIS — Z94.2 S/P LUNG TRANSPLANT (H): ICD-10-CM

## 2024-11-04 DIAGNOSIS — E11.9 TYPE 2 DIABETES MELLITUS WITHOUT COMPLICATION, WITH LONG-TERM CURRENT USE OF INSULIN (H): ICD-10-CM

## 2024-11-04 DIAGNOSIS — R73.9 STEROID-INDUCED HYPERGLYCEMIA: ICD-10-CM

## 2024-11-04 DIAGNOSIS — D84.9 IMMUNOSUPPRESSION (H): ICD-10-CM

## 2024-11-04 DIAGNOSIS — Z94.2 LUNG REPLACED BY TRANSPLANT (H): ICD-10-CM

## 2024-11-04 DIAGNOSIS — R19.5 LOOSE STOOLS: ICD-10-CM

## 2024-11-04 DIAGNOSIS — Z94.2 S/P LUNG TRANSPLANT (H): Primary | ICD-10-CM

## 2024-11-04 DIAGNOSIS — Z79.4 TYPE 2 DIABETES MELLITUS WITHOUT COMPLICATION, WITH LONG-TERM CURRENT USE OF INSULIN (H): ICD-10-CM

## 2024-11-04 DIAGNOSIS — D84.9 IMMUNOSUPPRESSION (H): Primary | ICD-10-CM

## 2024-11-04 LAB
ALBUMIN SERPL BCG-MCNC: 3.8 G/DL (ref 3.5–5.2)
ALP SERPL-CCNC: 73 U/L (ref 40–150)
ALT SERPL W P-5'-P-CCNC: 40 U/L (ref 0–70)
ANION GAP SERPL CALCULATED.3IONS-SCNC: 9 MMOL/L (ref 7–15)
AST SERPL W P-5'-P-CCNC: 24 U/L (ref 0–45)
BASOPHILS # BLD AUTO: 0.1 10E3/UL (ref 0–0.2)
BASOPHILS NFR BLD AUTO: 1 %
BILIRUB SERPL-MCNC: 0.4 MG/DL
BUN SERPL-MCNC: 24 MG/DL (ref 8–23)
CALCIUM SERPL-MCNC: 9.3 MG/DL (ref 8.8–10.4)
CHLORIDE SERPL-SCNC: 103 MMOL/L (ref 98–107)
CMV DNA SPEC NAA+PROBE-ACNC: NOT DETECTED IU/ML
CMV DNA SPEC NAA+PROBE-ACNC: NOT DETECTED IU/ML
CREAT SERPL-MCNC: 0.99 MG/DL (ref 0.67–1.17)
EGFRCR SERPLBLD CKD-EPI 2021: 87 ML/MIN/1.73M2
EOSINOPHIL # BLD AUTO: 0.1 10E3/UL (ref 0–0.7)
EOSINOPHIL NFR BLD AUTO: 1 %
ERYTHROCYTE [DISTWIDTH] IN BLOOD BY AUTOMATED COUNT: 15.3 % (ref 10–15)
EXPTIME-PRE: 7.55 SEC
FEF2575-%PRED-PRE: 111 %
FEF2575-PRE: 3.07 L/SEC
FEF2575-PRED: 2.75 L/SEC
FEFMAX-%PRED-PRE: 78 %
FEFMAX-PRE: 7.16 L/SEC
FEFMAX-PRED: 9.14 L/SEC
FEV1-%PRED-PRE: 82 %
FEV1-PRE: 2.72 L
FEV1FEV6-PRE: 83 %
FEV1FEV6-PRED: 79 %
FEV1FVC-PRE: 82 %
FEV1FVC-PRED: 78 %
FIFMAX-PRE: 5.05 L/SEC
FVC-%PRED-PRE: 77 %
FVC-PRE: 3.3 L
FVC-PRED: 4.25 L
GLUCOSE SERPL-MCNC: 69 MG/DL (ref 70–99)
HCO3 SERPL-SCNC: 27 MMOL/L (ref 22–29)
HCT VFR BLD AUTO: 39.8 % (ref 40–53)
HGB BLD-MCNC: 13 G/DL (ref 13.3–17.7)
IMM GRANULOCYTES # BLD: 0.2 10E3/UL
IMM GRANULOCYTES NFR BLD: 4 %
INR PPP: 1.04 (ref 0.85–1.15)
LYMPHOCYTES # BLD AUTO: 1.9 10E3/UL (ref 0.8–5.3)
LYMPHOCYTES NFR BLD AUTO: 32 %
MAGNESIUM SERPL-MCNC: 1.7 MG/DL (ref 1.7–2.3)
MCH RBC QN AUTO: 30 PG (ref 26.5–33)
MCHC RBC AUTO-ENTMCNC: 32.7 G/DL (ref 31.5–36.5)
MCV RBC AUTO: 92 FL (ref 78–100)
MONOCYTES # BLD AUTO: 0.4 10E3/UL (ref 0–1.3)
MONOCYTES NFR BLD AUTO: 7 %
NEUTROPHILS # BLD AUTO: 3.4 10E3/UL (ref 1.6–8.3)
NEUTROPHILS NFR BLD AUTO: 56 %
NRBC # BLD AUTO: 0 10E3/UL
NRBC BLD AUTO-RTO: 0 /100
PHOSPHATE SERPL-MCNC: 3.7 MG/DL (ref 2.5–4.5)
PLATELET # BLD AUTO: 178 10E3/UL (ref 150–450)
POTASSIUM SERPL-SCNC: 4.3 MMOL/L (ref 3.4–5.3)
PROT SERPL-MCNC: 5.9 G/DL (ref 6.4–8.3)
RBC # BLD AUTO: 4.34 10E6/UL (ref 4.4–5.9)
SODIUM SERPL-SCNC: 139 MMOL/L (ref 135–145)
TACROLIMUS BLD-MCNC: 11.8 UG/L (ref 5–15)
TME LAST DOSE: NORMAL H
TME LAST DOSE: NORMAL H
WBC # BLD AUTO: 6 10E3/UL (ref 4–11)

## 2024-11-04 PROCEDURE — 85610 PROTHROMBIN TIME: CPT | Performed by: PATHOLOGY

## 2024-11-04 PROCEDURE — 99207 PR NO CHARGE LOS: CPT | Mod: 93 | Performed by: PHARMACIST

## 2024-11-04 PROCEDURE — 36415 COLL VENOUS BLD VENIPUNCTURE: CPT | Performed by: PATHOLOGY

## 2024-11-04 PROCEDURE — 99215 OFFICE O/P EST HI 40 MIN: CPT | Mod: 25 | Performed by: INTERNAL MEDICINE

## 2024-11-04 PROCEDURE — 99000 SPECIMEN HANDLING OFFICE-LAB: CPT | Performed by: PATHOLOGY

## 2024-11-04 PROCEDURE — 80053 COMPREHEN METABOLIC PANEL: CPT | Performed by: PATHOLOGY

## 2024-11-04 PROCEDURE — 71046 X-RAY EXAM CHEST 2 VIEWS: CPT | Performed by: RADIOLOGY

## 2024-11-04 PROCEDURE — 84100 ASSAY OF PHOSPHORUS: CPT | Performed by: PATHOLOGY

## 2024-11-04 PROCEDURE — 99207 PR NO CHARGE LOS: CPT

## 2024-11-04 PROCEDURE — G0463 HOSPITAL OUTPT CLINIC VISIT: HCPCS | Performed by: INTERNAL MEDICINE

## 2024-11-04 PROCEDURE — 83735 ASSAY OF MAGNESIUM: CPT | Performed by: PATHOLOGY

## 2024-11-04 PROCEDURE — 85025 COMPLETE CBC W/AUTO DIFF WBC: CPT | Performed by: PATHOLOGY

## 2024-11-04 PROCEDURE — 80197 ASSAY OF TACROLIMUS: CPT | Performed by: INTERNAL MEDICINE

## 2024-11-04 RX ORDER — PREDNISONE 5 MG/1
TABLET ORAL
Status: SHIPPED
Start: 2024-11-04 | End: 2024-11-26

## 2024-11-04 RX ORDER — MYCOPHENOLIC ACID 360 MG/1
720 TABLET, DELAYED RELEASE ORAL 2 TIMES DAILY
Qty: 360 TABLET | Refills: 3 | Status: SHIPPED | OUTPATIENT
Start: 2024-11-04

## 2024-11-04 RX ORDER — PRIMIDONE 50 MG/1
TABLET ORAL
Status: SHIPPED
Start: 2024-11-04 | End: 2024-11-04

## 2024-11-04 ASSESSMENT — PAIN SCALES - GENERAL: PAINLEVEL_OUTOF10: NO PAIN (0)

## 2024-11-04 NOTE — PROGRESS NOTES
Disease State Management Encounter:                          Juan J Cartwright is a 59 year old male called for a follow-up visit. Patient was accompanied by Mine.     Reason for visit: 3 months post.    Allergies/ADRs: Reviewed in chart  Past Medical History: Reviewed in chart  Tobacco: He reports that he has quit smoking. His smoking use included cigarettes. He has never used smokeless tobacco.  Alcohol: not currently using   Medication Adherence/Access: no issues reported.    Lung Transplant:    Tacrolimus 3 mg every morning, 3.5 mg every evening    Mycophenolic acid 720 mg twice daily (switched today)  Prednisone 5 mg twice daily  Pt reports Tremors predates transplant, but improved currently. Some loose stools..  Transplant date: 8/15/24  CMV prophylaxis: CrCl >/=60 mL/minute: Valcyte 900mg daily Treat for 3 months stopping 11/15  Valcyte birth defects discussed: Yes   PJP prophylaxis: Bactrim SS daily  Thrush prophylaxis:Nystatin 4 times daily 6 months post tx  Nebs: not using Levalbuterol and Acetylcysteine  PPI use: Pantoprazole 40mg daily. Denies GERD sx.   Tx Coordinator: Raffaele Lambert MD: Dr. Tucker, Using Med Card: Yes  Immunizations: annual flu shot 2023; [id number]:  n/a; Prevnar 20: 2022; TDaP:  2017; Shingrix: unknown, HBV: no immunity, COVID: Primary x 3    Estimated Creatinine Clearance: 84 mL/min (based on SCr of 0.99 mg/dL).     Supplements:   Mag glycinate 400mg 3 times daily ( 2 hours from MMF)  Calcium/D twice daily   MVI daily.  Lab Results   Component Value Date    MAG 1.7 11/04/2024    MAG 1.6 (L) 10/08/2024     Loose stools:   Having watery loose stools, but sometimes formed. Usually 3 BMs daily.     Diabetes   Lantus 36 units every evening   Novolog 13 units plus sliding scale before meals (180-220: 2 units, 221-260: 3 units, etc  Patient is not experiencing side effects.  Current diabetes symptoms: none  Blood sugar monitoring: Using dexcom; Ranges: (patient reported)               Today's Vitals: There were no vitals taken for this visit.    Assessment/Plan:  Patient has been having snacks before bed, leading to higher sugars overnight. Will have him start giving Novolog prior to his evening snacks. Patient is following with endocrinology.     Start giving 3 units of Novolog before snacks, if the snack has carbs.   Start Metamucil 1 teaspoonful daily (psyllium fiber).  At 6 months post transplant due for Shingrix (2 doses 8 weeks apart) and then RSV vaccine.     Follow-up: as needed.    I spent 15 minutes with this patient today. All changes were made via collaborative practice agreement with Dr. Tucker. A copy of the visit note was provided to the patient's provider(s).    A summary of these recommendations was sent via [employer].    Domo Bass, PharmD  [facility] Pharmacist    Phone: [phone]      Medication Therapy Recommendations  Loose stools   1 Rationale: Untreated condition - Needs additional medication therapy - Indication   Recommendation: Start Medication - Metamucil 48.57 % Powd   Status: Accepted - no CPA Needed   Identified Date: 11/4/2024 Completed Date: 11/4/2024         S/P lung transplant (H)   1 Rationale: Preventive therapy - Needs additional medication therapy - Indication   Recommendation: Order Vaccine - Shingrix 50 MCG/0.5ML Susr   Status: Accepted - no CPA Needed   Identified Date: 11/4/2024 Completed Date: 11/4/2024         Type 2 diabetes mellitus without complications (H)   1 Current Medication: insulin aspart (NOVOLOG PEN) 100 UNIT/ML pen   Current Medication Sig: Take 13 units prior to meals, and add sliding scale per instructions.  Up to 60 units daily   Rationale: Dose too low - Dosage too low - Effectiveness   Recommendation: Increase Frequency   Status: Accepted per CPA   Identified Date: 11/4/2024 Completed Date: 11/4/2024

## 2024-11-04 NOTE — PROGRESS NOTES
11/5/2024 bronchoscopy with lavage and transbronchial biopsies  Reason: Follow-up A1 rejection from last biopsy, status post ACR treatment.    Date of transplant 8/15/24   Transplant type bilateral lungs  Date of labs 11/4/24  BUN 24 DDAVP no  Plt 178  INR 1.04 Anticoag therapy n/a Last dose n/a  Comment

## 2024-11-04 NOTE — Clinical Note
11/4/2024      Travon Cartwright  [address] [facility] [address]      Dear Colleague,    Thank you for referring your patient, Travon Cartwright, to the [facility]. Please see a copy of my visit note below.    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 Time documented is excluding time spent for PFT interpretation.    The longitudinal plan of care for the diagnosis(es)/condition(s) as documented were addressed during this visit. Due to the added complexity in care, I will continue to support Juan J in the subsequent management and with ongoing continuity of care.      Francisco Obregon MD  Contact via [employer]    Note: Chart documentation done in part with Dragon Voice Recognition software. Although reviewed after completion, some word and grammatical errors may remain. Please consider this when interpreting information in this chart.     Lung TX HPI  Transplants:  8/15/2024 (Lung), Postoperative day:  81    The patient was seen and examined by Francisco Obregon MD   Following the last visit, the patient was treated with a steroid burst and taper for elevated Prastera and decreased PFTs.  He is near the end of his steroid taper.  Biopsy prior to the steroids is a 1B0.    The patient has been well since his last visit.  Breathing is comfortable at rest and with all usual activity.  He is very [place] around [place].  No cough.  Minimal clear sputum in the morning.  Mild soreness at the sternum since transplant.  No fever, chills or night sweats.    Review of  systems:  Appetite is very good.  No ear pain, sore throat, sinus pain or rhinorrhea  No visual changes  No palpitation  No nausea, vomiting abdominal pain.  He reports frequent loose stools, typically moving his bowels after every meal often with fecal urgency.  Reports hemorrhoids with some burning associated with that the frequent bowel movements.  No dysuria or hematuria  No rashes  No swollen lymph nodes  No bone or joint pain  No bleeding or bruising.  A complete ROS was otherwise negative except as noted in the HPI.    Current Outpatient Medications   Medication Sig Dispense Refill    acetaminophen (TYLENOL) 325 MG tablet Take 2 tablets (650 mg) by mouth or Feeding Tube every 4 hours as needed for other (For optimal non-opioid multimodal pain management to improve pain control.). 30 tablet 0    acetylcysteine (MUCOMYST) 20 % neb solution Take 2 mLs by nebulization 4 times daily as needed for mucolysis/respiratory distress. 30 mL 1    calcium carbonate-vitamin D (CALTRATE) 600-10 MG-MCG per tablet Take 1 tablet by mouth or Feeding Tube 2 times daily. 60 tablet 0    Continuous Glucose Sensor (DEXCOM G7 SENSOR) MISC Change every 10 days. 1 each 11    insulin aspart (NOVOLOG PEN) 100 UNIT/ML pen Take 12 units prior to meals, and add sliding scale per instructions.  Up to 60 units daily 45 mL 0    insulin glargine (LANTUS PEN) 100 UNIT/ML pen Inject 36 Units subcutaneously at bedtime. 30 mL 0    insulin pen needle (32G X 4 MM) 32G X 4 MM miscellaneous Use pen needles daily or as directed. 100 each 3    levalbuterol (XOPENEX) 1.25 MG/3ML neb solution Take 3 mLs (1.25 mg) by nebulization 4 times daily as needed for shortness of breath or wheezing. Use prior to Mucomyst neb. 300 mL 0    magnesium (HIGH ABSORPTION MAGNESIUM) 100 MG TABS Take 400 mg by mouth 3 times daily. 360 tablet 11    metoprolol tartrate (LOPRESSOR) 25 MG tablet Take 2 tablets (50 mg) by mouth or Feeding Tube 2 times daily. 60 tablet 11     multivitamin, therapeutic (THERA-VIT) TABS tablet Take 1 tablet by mouth daily. 30 tablet 0    mycophenolate (GENERIC EQUIVALENT) 250 MG capsule Take 4 capsules (1,000 mg) by mouth 2 times daily. 240 capsule 11    nystatin (MYCOSTATIN) 243748 UNIT/ML suspension Take 10 mLs (1,000,000 Units) by mouth 4 times daily. 1200 mL 4    pantoprazole (PROTONIX) 40 MG EC tablet Take 1 tablet (40 mg) by mouth every morning (before breakfast). 30 tablet 3    polyethylene glycol (MIRALAX) 17 GM/Dose powder Take 17 g by mouth daily as needed for constipation. 510 g 0    predniSONE (DELTASONE) 10 MG tablet Take 8 tablets (80 mg) by mouth daily for 1 day, THEN 7.5 tablets (75 mg) daily for 1 day, THEN 7 tablets (70 mg) daily for 1 day, THEN 6.5 tablets (65 mg) daily for 1 day, THEN 6 tablets (60 mg) daily for 1 day, THEN 5.5 tablets (55 mg) daily for 1 day, THEN 5 tablets (50 mg) daily for 1 day, THEN 4 tablets (40 mg) daily for 1 day, THEN 3 tablets (30 mg) daily for 1 day, THEN 2 tablets (20 mg) daily for 1 day. 55 tablet 0    predniSONE (DELTASONE) 5 MG tablet Take 5 tablets (25 mg) by mouth daily. Taper per lung transplant clinic.  Next taper to 20 mg daily on 8/31 (Patient taking differently: Take 20 mg by mouth daily. Taper per lung transplant clinic.  Next taper to 15 mg daily on 8/31) 150 tablet 3    primidone (MYSOLINE) 50 MG tablet Take 1 tablet (50 mg) by mouth 2 times daily. 60 tablet 0    rosuvastatin (CRESTOR) 10 MG tablet Take 1 tablet (10 mg) by mouth daily. 30 tablet 3    sodium zirconium cyclosilicate (LOKELMA) 10 g PACK packet Take 1 packet (10 g) by mouth daily as needed. 10 packet 1    sulfamethoxazole-trimethoprim (BACTRIM) 400-80 MG tablet Take 1 tablet by mouth or NG Tube daily. 30 tablet 3    tacrolimus (GENERIC EQUIVALENT) 0.5 MG capsule Take 1 capsule (0.5 mg) by mouth every evening. Total dose: 3 mg in the AM and 3.5 mg in the PM 30 capsule 11    tacrolimus (GENERIC EQUIVALENT) 1 MG capsule Take 3 capsules  (3 mg) by mouth 2 times daily. Total dose: 3 mg in the AM and 3.5 mg in the  capsule 11    valGANciclovir (VALCYTE) 450 MG tablet Take 2 tablets (900 mg) by mouth daily. 60 tablet 3     No current facility-administered medications for this visit.     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    S/P lung transplant (H) 08/15/2024    Tremor 05/23/2024       Past Surgical History:   Procedure Laterality Date    BRONCHOSCOPY  08/16/2024    BRONCHOSCOPY (RIGID OR FLEXIBLE), DIAGNOSTIC N/A 9/26/2024    Procedure: BRONCHOSCOPY, WITH BRONCHOALVEOLAR LAVAGE AND BIOPSIES;  Surgeon: Oneida Silver MD;  Location:  GI    BRONCHOSCOPY FLEXIBLE AND RIGID N/A 8/27/2024    Procedure: Bronchoscopy Inspection;  Surgeon: Oneida Silver MD;  Location: [facility]    COLONOSCOPY      CV CORONARY ANGIOGRAM N/A 06/21/2024    Procedure: Coronary Angiogram;  Surgeon: Gabriel Julian MD;  Location:  HEART CARDIAC CATH LAB    CV RIGHT HEART CATH MEASUREMENTS RECORDED N/A 06/21/2024    Procedure: Right Heart Catheterization;  Surgeon: Gabriel Julian MD;  Location:  HEART CARDIAC CATH LAB    PICC DOUBLE LUMEN PLACEMENT Right 08/20/2024    47-3cm, Basilic    TRANSPLANT LUNG RECIPIENT SINGLE X2 Bilateral 08/15/2024    Procedure: Clamshell Incision, On Central Venoarterial Extracorporeal Membranous Oxygenation, Bilateral Lung Transplant Recipient, Left atrial appendage ligation, Intraoperative Flexible Bronchoscopy by Anesthesia;  Surgeon: Mulvihill, Michael, MD;   Location:  OR       Social History     Socioeconomic History    Marital status: Single     Spouse name: Not on file    Number of children: Not on file    Years of education: Not on file    Highest education level: Not on file   Occupational History    Not on file   Tobacco Use    Smoking status: Former     Types: Cigarettes    Smokeless tobacco: Never   Substance and Sexual Activity    Alcohol use: Not Currently     Comment: not since 2017    Drug use: Never    Sexual activity: Not on file   Other Topics Concern    Not on file   Social History Narrative    Not on file     Social Drivers of Health     Financial Resource Strain: Low Risk  (8/24/2024)    Financial Resource Strain     Within the past 12 months, have you or your family members you live with been unable to get utilities (heat, electricity) when it was really needed?: No   Food Insecurity: Not on File (9/26/2024)    Received from [employer]    Food Insecurity     Food: 0   Transportation Needs: Low Risk  (8/24/2024)    Transportation Needs     Within the past 12 months, has lack of transportation kept you from medical appointments, getting your medicines, non-medical meetings or appointments, work, or from getting things that you need?: No   Physical Activity: Not on File (8/26/2019)    Received from [employer]    Physical Activity     Physical Activity: 0   Stress: Not on File (8/26/2019)    Received from [facility]    Stress     Stress: 0   Social Connections: Not on File (9/11/2024)    Received from [facility]    Social Connections     Connectedness: 0   Interpersonal Safety: Low Risk  (9/26/2024)    Interpersonal Safety     Do you feel physically and emotionally safe where you currently live?: Yes     Within the past 12 months, have you been hit, slapped, kicked or otherwise physically hurt by someone?: No     Within the past 12 months, have you been humiliated or emotionally abused in other ways by your partner or ex-partner?: No   Housing Stability: Low Risk   "(8/24/2024)    Housing Stability     Do you have housing? : Yes     Are you worried about losing your housing?: No         /83 (BP Location: Right arm, Patient Position: Sitting, Cuff Size: Adult Regular)   Pulse 67   Temp 97.6  F (36.4  C) (Oral)   Resp 22   Ht 1.784 m (5' 10.25\")   Wt 75.8 kg (167 lb)   SpO2 99%   BMI 23.79 kg/m    Body mass index is 23.79 kg/m .  Exam:   GENERAL APPEARANCE: Well developed, well nourished, alert, and in no apparent distress.  EYES: PERRL, EOMI  HENT: Nasal mucosa with edema and no hyperemia. No nasal polyps.  EARS: Canals clear, TMs normal, partially obscured by cerumen  MOUTH: Oral mucosa is moist, without any lesions, no tonsillar enlargement, no oropharyngeal exudate.  NECK: supple, no masses, no thyromegaly.  LYMPHATICS: No significant axillary, cervical, or supraclavicular nodes.  RESP: normal percussion, good air flow throughout.  No crackles. No rhonchi. No wheezes.  CV: Normal S1, S2, regular rhythm, normal rate. No murmur.  No rub. No gallop. No LE edema.   ABDOMEN:  Bowel sounds normal, soft, nontender, no HSM or masses.   MS: extremities normal. No clubbing. No cyanosis.  SKIN: no rash on limited exam  NEURO: Mentation intact, speech normal, normal strength and tone, normal gait and stance  PSYCH: mentation appears normal. and affect normal/bright  Results:  Recent Results (from the past week)   Magnesium    Collection Time: 11/04/24  7:37 AM   Result Value Ref Range    Magnesium 1.7 1.7 - 2.3 mg/dL   Comprehensive metabolic panel    Collection Time: 11/04/24  7:37 AM   Result Value Ref Range    Sodium 139 135 - 145 mmol/L    Potassium 4.3 3.4 - 5.3 mmol/L    Carbon Dioxide (CO2) 27 22 - 29 mmol/L    Anion Gap 9 7 - 15 mmol/L    Urea Nitrogen 24.0 (H) 8.0 - 23.0 mg/dL    Creatinine 0.99 0.67 - 1.17 mg/dL    GFR Estimate 87 >60 mL/min/1.73m2    Calcium 9.3 8.8 - 10.4 mg/dL    Chloride 103 98 - 107 mmol/L    Glucose 69 (L) 70 - 99 mg/dL    Alkaline " Phosphatase 73 40 - 150 U/L    AST 24 0 - 45 U/L    ALT 40 0 - 70 U/L    Protein Total 5.9 (L) 6.4 - 8.3 g/dL    Albumin 3.8 3.5 - 5.2 g/dL    Bilirubin Total 0.4 <=1.2 mg/dL   INR    Collection Time: 11/04/24  7:37 AM   Result Value Ref Range    INR 1.04 0.85 - 1.15   Phosphorus    Collection Time: 11/04/24  7:37 AM   Result Value Ref Range    Phosphorus 3.7 2.5 - 4.5 mg/dL   CBC with platelets and differential    Collection Time: 11/04/24  7:37 AM   Result Value Ref Range    WBC Count 6.0 4.0 - 11.0 10e3/uL    RBC Count 4.34 (L) 4.40 - 5.90 10e6/uL    Hemoglobin 13.0 (L) 13.3 - 17.7 g/dL    Hematocrit 39.8 (L) 40.0 - 53.0 %    MCV 92 78 - 100 fL    MCH 30.0 26.5 - 33.0 pg    MCHC 32.7 31.5 - 36.5 g/dL    RDW 15.3 (H) 10.0 - 15.0 %    Platelet Count 178 150 - 450 10e3/uL    % Neutrophils 56 %    % Lymphocytes 32 %    % Monocytes 7 %    % Eosinophils 1 %    % Basophils 1 %    % Immature Granulocytes 4 %    NRBCs per 100 WBC 0 <1 /100    Absolute Neutrophils 3.4 1.6 - 8.3 10e3/uL    Absolute Lymphocytes 1.9 0.8 - 5.3 10e3/uL    Absolute Monocytes 0.4 0.0 - 1.3 10e3/uL    Absolute Eosinophils 0.1 0.0 - 0.7 10e3/uL    Absolute Basophils 0.1 0.0 - 0.2 10e3/uL    Absolute Immature Granulocytes 0.2 <=0.4 10e3/uL    Absolute NRBCs 0.0 10e3/uL   General PFT Lab (Please always keep checked)    Collection Time: 11/04/24  7:44 AM   Result Value Ref Range    FVC-Pred 4.25 L    FVC-Pre 3.30 L    FVC-%Pred-Pre 77 %    FEV1-Pre 2.72 L    FEV1-%Pred-Pre 82 %    FEV1FVC-Pred 78 %    FEV1FVC-Pre 82 %    FEFMax-Pred 9.14 L/sec    FEFMax-Pre 7.16 L/sec    FEFMax-%Pred-Pre 78 %    FEF2575-Pred 2.75 L/sec    FEF2575-Pre 3.07 L/sec    JSG6710-%Pred-Pre 111 %    ExpTime-Pre 7.55 sec    FIFMax-Pre 5.05 L/sec    FEV1FEV6-Pred 79 %    FEV1FEV6-Pre 83 %                         Results as noted above.    PFT Interpretation:  {Sabas PFT interpretation:868096}  {:028456}  {JDPFT:658778}  Valid Maneuver                  Again, thank you for  allowing me to participate in the care of your patient.        Sincerely,        Francisco Obregon MD

## 2024-11-04 NOTE — PATIENT INSTRUCTIONS
"Recommendations from today's MTM visit:                                                      Start giving 3 units of Novolog before snacks, if the snack has carbs.   Start Metamucil 1 teaspoonful daily (psyllium fiber).  At 6 months post transplant due for Shingrix (2 doses 8 weeks apart) and then RSV vaccine.     Follow-up: as needed.     It was great speaking with you today.  I value your experience and would be very thankful for your time in providing feedback in our clinic survey. In the next few days, you may receive an email or text message from [employer] with a link to a survey related to your  clinical pharmacist.\"     To schedule another MTM appointment, please call the clinic directly or you may call the MTM scheduling line at [phone] or toll-free at [phone].     My Clinical Pharmacist's contact information:                                                      Please feel free to contact me with any questions or concerns you have.      Domo Bass, PharmD  MTM Pharmacist    Phone: [phone]     "

## 2024-11-04 NOTE — NURSING NOTE
Transplant Coordinator Note    Reason for visit: Post lung transplant follow up visit   Coordinator: Present (Maggie in person)  Caregiver:  Pt's sister, Mine    Health concerns addressed today:  1. Respiratory: no shortness of breath with activity; small amount of sputum in AM - clear  2. Activity: completed pulm rehab and staying active at home  3. GI/: good appetite; urgent diarrhea 2-3x per day - switch from MMF to myfortic  4. ENT: no concerns  5. Tremor improving since decreasing prednisone - plan to hold off taking primidone at this time (never started)    Activity/rehab: Up ad jasmyne  Oxygen needs: Room air  Pain management/RX: N/a  Diabetic management: Managed by endocrine  Next Bronch due: 11/5  CMV status: D-/R+  Valcyte stopped: through POD #90  EBV status: D-/R+, monitor monthly  DVT/PE: N/A  Post op AFIB/follow up with EP: N/A  AC/asa: none  PJP prophylactic: Bactrim    COVID:  COVID-19 infection (yes/no, date of most recent positive test):   Status/instructions given about COVID-19 vaccine:     Pt Education: medications (use/dose/side effects), how/when to call coordinator, frequency of labs, s/s of infection/rejection, call prior to starting any new medications, lab/vital sign book    Health Maintenance:   Last colonoscopy:   Next colonoscopy due:   Dermatology:  Vaccinations this visit:     Labs, CXR, PFTs reviewed with patient  Medication record reviewed and reconciled  Questions and concerns addressed    Patient Instructions  1. Continue to hydrate with 60-70 oz fluids daily.  2. Continue to exercise daily or most days of the week.  3. Follow up with your primary care provider for annual gender health maintenance procedures.  4. Follow up with colonoscopy schedule.  5. Follow up with annual dermatology visits.  6. It doesn't seem like the COVID vaccine is working well in lung transplant patients. A number of lung transplant patients have gotten sick with COVID even after receiving the vaccines.  Based on our recent experience, it can be life-threatening to get COVID  even after being vaccinated. Please continue to act like you did not get the COVID vaccine - social distancing, wearing a mask, good hand hygiene, etc. If the people around you are vaccinated, it will help reduce the risk of you getting COVID. All members of your household should be vaccinated.  7. Take nystatin until 6 months post transplant.  8. Continue to take prednisone 5 mg twice per day until 11/16, then decrease to 5 mg daily.  9. Stop valcyte on 11/15.  10. Continue current dose of magnesium.  11. Switch from mycophenolate to myfortic 720 mg twice per day.  This should help your diarrhea.    You are scheduled for a bronchoscopy on Tuesday, November 5th at 8:00 AM at the [facility] on the 3rd floor. Arrive 1 hour early.     Instructions     1. Nothing to eat or drink 8 hours before procedure.  2. Hold your morning medications. Bring them with you to take after the procedure.  3. Have a  available to take you home.   4. If you are a diabetic, take 1/2 dose of long acting insulin night before procedure.  5. Take metoprolol with a sip of water the AM of the procedure.    Next transplant clinic appointment:  12/17 with CXR, labs and PFTs  Next lab draw: pending tacrolimus level    AVS printed at time of check out

## 2024-11-04 NOTE — RESULT ENCOUNTER NOTE
Hi Juan J,   Your tacrolimus level was 11.8 at 12 hours on 11/4/24 which is within your goal range of 8-12. No dose change at this time. Please call the transplant office ([phone]) with any questions.   Thanks,   Joi

## 2024-11-04 NOTE — NURSING NOTE
"Chief Complaint   Patient presents with    RECHECK     post lung transplant 8/15/2024     Vital signs:  Temp: 97.6  F (36.4  C) Temp src: Oral BP: 130/83 Pulse: 67   Resp: 22 SpO2: 99 % (RA)     Height: 178.4 cm (5' 10.25\") Weight: 75.8 kg (167 lb)  Estimated body mass index is 23.79 kg/m  as calculated from the following:    Height as of this encounter: 1.784 m (5' 10.25\").    Weight as of this encounter: 75.8 kg (167 lb).      Gini Mancera, [facility]  11/4/2024 8:34 AM    "

## 2024-11-04 NOTE — PATIENT INSTRUCTIONS
Patient Instructions  1. Continue to hydrate with 60-70 oz fluids daily.  2. Continue to exercise daily or most days of the week.  3. Follow up with your primary care provider for annual gender health maintenance procedures.  4. Follow up with colonoscopy schedule.  5. Follow up with annual dermatology visits.  6. It doesn't seem like the COVID vaccine is working well in lung transplant patients. A number of lung transplant patients have gotten sick with COVID even after receiving the vaccines. Based on our recent experience, it can be life-threatening to get COVID  even after being vaccinated. Please continue to act like you did not get the COVID vaccine - social distancing, wearing a mask, good hand hygiene, etc. If the people around you are vaccinated, it will help reduce the risk of you getting COVID. All members of your household should be vaccinated.  7. Take nystatin until 6 months post transplant.  8. Continue to take prednisone 5 mg twice per day until 11/16, then decrease to 5 mg daily.  9. Stop valcyte on 11/15.  10. Continue current dose of magnesium.  11. Switch from mycophenolate to myfortic 720 mg twice per day.  This should help your diarrhea.    You are scheduled for a bronchoscopy on Tuesday, November 5th at 8:00 AM at the [facility] on the 3rd floor. Arrive 1 hour early.     Instructions     1. Nothing to eat or drink 8 hours before procedure.  2. Hold your morning medications. Bring them with you to take after the procedure.  3. Have a  available to take you home.   4. If you are a diabetic, take 1/2 dose of long acting insulin night before procedure.  5. Take metoprolol with a sip of water the AM of the procedure.    Next transplant clinic appointment:  12/17 with CXR, labs and PFTs  Next lab draw: pending tacrolimus level    ~~~~~~~~~~~~~~~~~~~~~~~~~    Thoracic Transplant Office phone [phone] (alt [phone]), fax [phone]    Office Hours  8:30 - 5:00     For after-hours urgent issues, please dial [phone] (alt [phone]) and ask the  to page the Thoracic Transplant Coordinator On-Call.   --------------------  To expedite your medication refill(s), please contact your pharmacy and have them fax a refill request to: [phone]    *Please allow 3 business days for routine medication refills.  *Please allow 5 business days for controlled substance medication refills.    **For Diabetic medications and supplies refill(s), please contact your pharmacy and have them contact your Endocrine team.  --------------------  For scheduling appointments call [phone] (alt [phone])  --------------------  Please Note: If you are active on [employer], all future test results will be sent by [employer] message only, and will no longer be called to patient. You may also receive communication directly from your physician.

## 2024-11-04 NOTE — PROGRESS NOTES
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 Time documented is excluding time spent for PFT interpretation.    The longitudinal plan of care for the diagnosis(es)/condition(s) as documented were addressed during this visit. Due to the added complexity in care, I will continue to support Juan J in the subsequent management and with ongoing continuity of care.      Francisco Obregon MD  Contact via [employer]    Note: Chart documentation done in part with Dragon Voice Recognition software. Although reviewed after completion, some word and grammatical errors may remain. Please consider this when interpreting information in this chart.     Lung TX HPI  Transplants:  8/15/2024 (Lung), Postoperative day:  81    The patient was seen and examined by Francisco Obregon MD   Following the last visit, the patient was treated with a steroid burst and taper for elevated Prastera and decreased PFTs.  He is near the end of his steroid taper.  Biopsy prior to the steroids is a 1B0.    The patient has been well since his last visit.  Breathing is comfortable at rest and with all usual activity.  He is very [place] around [place].  No cough.  Minimal clear sputum in the morning.  Mild soreness at the sternum since transplant.  No fever, chills or night sweats.    Review of systems:  Appetite is very good.  No ear pain, sore throat, sinus pain or rhinorrhea  No visual changes  No palpitation  No nausea, vomiting abdominal pain.  He reports frequent loose stools, typically moving his bowels after every meal often with fecal  urgency.  Reports hemorrhoids with some burning associated with that the frequent bowel movements.  No dysuria or hematuria  No rashes  No swollen lymph nodes  No bone or joint pain  No bleeding or bruising.  A complete ROS was otherwise negative except as noted in the HPI.    Current Outpatient Medications   Medication Sig Dispense Refill    acetaminophen (TYLENOL) 325 MG tablet Take 2 tablets (650 mg) by mouth or Feeding Tube every 4 hours as needed for other (For optimal non-opioid multimodal pain management to improve pain control.). 30 tablet 0    acetylcysteine (MUCOMYST) 20 % neb solution Take 2 mLs by nebulization 4 times daily as needed for mucolysis/respiratory distress. 30 mL 1    calcium carbonate-vitamin D (CALTRATE) 600-10 MG-MCG per tablet Take 1 tablet by mouth or Feeding Tube 2 times daily. 60 tablet 0    Continuous Glucose Sensor (DEXCOM G7 SENSOR) MISC Change every 10 days. 1 each 11    insulin aspart (NOVOLOG PEN) 100 UNIT/ML pen Take 12 units prior to meals, and add sliding scale per instructions.  Up to 60 units daily 45 mL 0    insulin glargine (LANTUS PEN) 100 UNIT/ML pen Inject 36 Units subcutaneously at bedtime. 30 mL 0    insulin pen needle (32G X 4 MM) 32G X 4 MM miscellaneous Use pen needles daily or as directed. 100 each 3    levalbuterol (XOPENEX) 1.25 MG/3ML neb solution Take 3 mLs (1.25 mg) by nebulization 4 times daily as needed for shortness of breath or wheezing. Use prior to Mucomyst neb. 300 mL 0    magnesium (HIGH ABSORPTION MAGNESIUM) 100 MG TABS Take 400 mg by mouth 3 times daily. 360 tablet 11    metoprolol tartrate (LOPRESSOR) 25 MG tablet Take 2 tablets (50 mg) by mouth or Feeding Tube 2 times daily. 60 tablet 11    multivitamin, therapeutic (THERA-VIT) TABS tablet Take 1 tablet by mouth daily. 30 tablet 0    mycophenolate (GENERIC EQUIVALENT) 250 MG capsule Take 4 capsules (1,000 mg) by mouth 2 times daily. 240 capsule 11    nystatin (MYCOSTATIN) 338997 UNIT/ML suspension  Take 10 mLs (1,000,000 Units) by mouth 4 times daily. 1200 mL 4    pantoprazole (PROTONIX) 40 MG EC tablet Take 1 tablet (40 mg) by mouth every morning (before breakfast). 30 tablet 3    polyethylene glycol (MIRALAX) 17 GM/Dose powder Take 17 g by mouth daily as needed for constipation. 510 g 0    predniSONE (DELTASONE) 10 MG tablet Take 8 tablets (80 mg) by mouth daily for 1 day, THEN 7.5 tablets (75 mg) daily for 1 day, THEN 7 tablets (70 mg) daily for 1 day, THEN 6.5 tablets (65 mg) daily for 1 day, THEN 6 tablets (60 mg) daily for 1 day, THEN 5.5 tablets (55 mg) daily for 1 day, THEN 5 tablets (50 mg) daily for 1 day, THEN 4 tablets (40 mg) daily for 1 day, THEN 3 tablets (30 mg) daily for 1 day, THEN 2 tablets (20 mg) daily for 1 day. 55 tablet 0    predniSONE (DELTASONE) 5 MG tablet Take 5 tablets (25 mg) by mouth daily. Taper per lung transplant clinic.  Next taper to 20 mg daily on 8/31 (Patient taking differently: Take 20 mg by mouth daily. Taper per lung transplant clinic.  Next taper to 15 mg daily on 8/31) 150 tablet 3    primidone (MYSOLINE) 50 MG tablet Take 1 tablet (50 mg) by mouth 2 times daily. 60 tablet 0    rosuvastatin (CRESTOR) 10 MG tablet Take 1 tablet (10 mg) by mouth daily. 30 tablet 3    sodium zirconium cyclosilicate (LOKELMA) 10 g PACK packet Take 1 packet (10 g) by mouth daily as needed. 10 packet 1    sulfamethoxazole-trimethoprim (BACTRIM) 400-80 MG tablet Take 1 tablet by mouth or NG Tube daily. 30 tablet 3    tacrolimus (GENERIC EQUIVALENT) 0.5 MG capsule Take 1 capsule (0.5 mg) by mouth every evening. Total dose: 3 mg in the AM and 3.5 mg in the PM 30 capsule 11    tacrolimus (GENERIC EQUIVALENT) 1 MG capsule Take 3 capsules (3 mg) by mouth 2 times daily. Total dose: 3 mg in the AM and 3.5 mg in the  capsule 11    valGANciclovir (VALCYTE) 450 MG tablet Take 2 tablets (900 mg) by mouth daily. 60 tablet 3     No current facility-administered medications for this visit.      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    S/P lung transplant (H) 08/15/2024    Tremor 05/23/2024       Past Surgical History:   Procedure Laterality Date    BRONCHOSCOPY  08/16/2024    BRONCHOSCOPY (RIGID OR FLEXIBLE), DIAGNOSTIC N/A 9/26/2024    Procedure: BRONCHOSCOPY, WITH BRONCHOALVEOLAR LAVAGE AND BIOPSIES;  Surgeon: Oneida Silver MD;  Location:  GI    BRONCHOSCOPY FLEXIBLE AND RIGID N/A 8/27/2024    Procedure: Bronchoscopy Inspection;  Surgeon: Oneida Silver MD;  Location:  GI    COLONOSCOPY      CV CORONARY ANGIOGRAM N/A 06/21/2024    Procedure: Coronary Angiogram;  Surgeon: Gabriel Julian MD;  Location:  HEART CARDIAC CATH LAB    CV RIGHT HEART CATH MEASUREMENTS RECORDED N/A 06/21/2024    Procedure: Right Heart Catheterization;  Surgeon: Gabriel Julian MD;  Location:  HEART CARDIAC CATH LAB    PICC DOUBLE LUMEN PLACEMENT Right 08/20/2024    47-3cm, Basilic    TRANSPLANT LUNG RECIPIENT SINGLE X2 Bilateral 08/15/2024    Procedure: Clamshell Incision, On Central Venoarterial Extracorporeal Membranous Oxygenation, Bilateral Lung Transplant Recipient, Left atrial appendage ligation, Intraoperative Flexible Bronchoscopy by Anesthesia;  Surgeon: Mulvihill, Michael, MD;  Location:  OR       Social History     Socioeconomic History    Marital status: Single     Spouse name: Not on file    Number of children: Not on file    Years of education: Not on file    Highest education level: Not on file   Occupational History     Not on file   Tobacco Use    Smoking status: Former     Types: Cigarettes    Smokeless tobacco: Never   Substance and Sexual Activity    Alcohol use: Not Currently     Comment: not since 2017    Drug use: Never    Sexual activity: Not on file   Other Topics Concern    Not on file   Social History Narrative    Not on file     Social Drivers of Health     Financial Resource Strain: Low Risk  (8/24/2024)    Financial Resource Strain     Within the past 12 months, have you or your family members you live with been unable to get utilities (heat, electricity) when it was really needed?: No   Food Insecurity: Not on File (9/26/2024)    Received from [facility]    Food Insecurity     Food: 0   Transportation Needs: Low Risk  (8/24/2024)    Transportation Needs     Within the past 12 months, has lack of transportation kept you from medical appointments, getting your medicines, non-medical meetings or appointments, work, or from getting things that you need?: No   Physical Activity: Not on File (8/26/2019)    Received from [facility]    Physical Activity     Physical Activity: 0   Stress: Not on File (8/26/2019)    Received from [facility]    Stress     Stress: 0   Social Connections: Not on File (9/11/2024)    Received from [facility]    Social Connections     Connectedness: 0   Interpersonal Safety: Low Risk  (9/26/2024)    Interpersonal Safety     Do you feel physically and emotionally safe where you currently live?: Yes     Within the past 12 months, have you been hit, slapped, kicked or otherwise physically hurt by someone?: No     Within the past 12 months, have you been humiliated or emotionally abused in other ways by your partner or ex-partner?: No   Housing Stability: Low Risk  (8/24/2024)    Housing Stability     Do you have housing? : Yes     Are you worried about losing your housing?: No         /83 (BP Location: Right arm, Patient Position: Sitting, Cuff Size: Adult Regular)   Pulse 67   Temp 97.6  F (36.4  C) (Oral)    "Resp 22   Ht 1.784 m (5' 10.25\")   Wt 75.8 kg (167 lb)   SpO2 99%   BMI 23.79 kg/m    Body mass index is 23.79 kg/m .  Exam:   GENERAL APPEARANCE: Well developed, well nourished, alert, and in no apparent distress.  EYES: PERRL, EOMI  HENT: Nasal mucosa with edema and no hyperemia. No nasal polyps.  EARS: Canals clear, TMs normal, partially obscured by cerumen  MOUTH: Oral mucosa is moist, without any lesions, no tonsillar enlargement, no oropharyngeal exudate.  NECK: supple, no masses, no thyromegaly.  LYMPHATICS: No significant axillary, cervical, or supraclavicular nodes.  RESP: normal percussion, good air flow throughout.  No crackles. No rhonchi. No wheezes.  CV: Normal S1, S2, regular rhythm, normal rate. No murmur.  No rub. No gallop. No LE edema.   ABDOMEN:  Bowel sounds normal, soft, nontender, no HSM or masses.   MS: extremities normal. No clubbing. No cyanosis.  SKIN: no rash on limited exam  NEURO: Mentation intact, speech normal, normal strength and tone, normal gait and stance  PSYCH: mentation appears normal. and affect normal/bright  Results:  Recent Results (from the past week)   Magnesium    Collection Time: 11/04/24  7:37 AM   Result Value Ref Range    Magnesium 1.7 1.7 - 2.3 mg/dL   Comprehensive metabolic panel    Collection Time: 11/04/24  7:37 AM   Result Value Ref Range    Sodium 139 135 - 145 mmol/L    Potassium 4.3 3.4 - 5.3 mmol/L    Carbon Dioxide (CO2) 27 22 - 29 mmol/L    Anion Gap 9 7 - 15 mmol/L    Urea Nitrogen 24.0 (H) 8.0 - 23.0 mg/dL    Creatinine 0.99 0.67 - 1.17 mg/dL    GFR Estimate 87 >60 mL/min/1.73m2    Calcium 9.3 8.8 - 10.4 mg/dL    Chloride 103 98 - 107 mmol/L    Glucose 69 (L) 70 - 99 mg/dL    Alkaline Phosphatase 73 40 - 150 U/L    AST 24 0 - 45 U/L    ALT 40 0 - 70 U/L    Protein Total 5.9 (L) 6.4 - 8.3 g/dL    Albumin 3.8 3.5 - 5.2 g/dL    Bilirubin Total 0.4 <=1.2 mg/dL   INR    Collection Time: 11/04/24  7:37 AM   Result Value Ref Range    INR 1.04 0.85 - 1.15 "   Phosphorus    Collection Time: 11/04/24  7:37 AM   Result Value Ref Range    Phosphorus 3.7 2.5 - 4.5 mg/dL   CBC with platelets and differential    Collection Time: 11/04/24  7:37 AM   Result Value Ref Range    WBC Count 6.0 4.0 - 11.0 10e3/uL    RBC Count 4.34 (L) 4.40 - 5.90 10e6/uL    Hemoglobin 13.0 (L) 13.3 - 17.7 g/dL    Hematocrit 39.8 (L) 40.0 - 53.0 %    MCV 92 78 - 100 fL    MCH 30.0 26.5 - 33.0 pg    MCHC 32.7 31.5 - 36.5 g/dL    RDW 15.3 (H) 10.0 - 15.0 %    Platelet Count 178 150 - 450 10e3/uL    % Neutrophils 56 %    % Lymphocytes 32 %    % Monocytes 7 %    % Eosinophils 1 %    % Basophils 1 %    % Immature Granulocytes 4 %    NRBCs per 100 WBC 0 <1 /100    Absolute Neutrophils 3.4 1.6 - 8.3 10e3/uL    Absolute Lymphocytes 1.9 0.8 - 5.3 10e3/uL    Absolute Monocytes 0.4 0.0 - 1.3 10e3/uL    Absolute Eosinophils 0.1 0.0 - 0.7 10e3/uL    Absolute Basophils 0.1 0.0 - 0.2 10e3/uL    Absolute Immature Granulocytes 0.2 <=0.4 10e3/uL    Absolute NRBCs 0.0 10e3/uL   General PFT Lab (Please always keep checked)    Collection Time: 11/04/24  7:44 AM   Result Value Ref Range    FVC-Pred 4.25 L    FVC-Pre 3.30 L    FVC-%Pred-Pre 77 %    FEV1-Pre 2.72 L    FEV1-%Pred-Pre 82 %    FEV1FVC-Pred 78 %    FEV1FVC-Pre 82 %    FEFMax-Pred 9.14 L/sec    FEFMax-Pre 7.16 L/sec    FEFMax-%Pred-Pre 78 %    FEF2575-Pred 2.75 L/sec    FEF2575-Pre 3.07 L/sec    TZQ0506-%Pred-Pre 111 %    ExpTime-Pre 7.55 sec    FIFMax-Pre 5.05 L/sec    FEV1FEV6-Pred 79 %    FEV1FEV6-Pre 83 %                         Results as noted above.    PFT Interpretation:  {Sabas PFT interpretation:248914}  {:480658}  {JDPFT:588142}  Valid Maneuver               0.67 - 1.17 mg/dL    GFR Estimate 87 >60 mL/min/1.73m2    Calcium 9.3 8.8 - 10.4 mg/dL    Chloride 103 98 - 107 mmol/L    Glucose 69 (L) 70 - 99 mg/dL    Alkaline Phosphatase 73 40 - 150 U/L    AST 24 0 - 45 U/L    ALT 40 0 - 70 U/L    Protein Total 5.9 (L) 6.4 - 8.3 g/dL    Albumin 3.8 3.5 - 5.2 g/dL    Bilirubin Total 0.4 <=1.2 mg/dL   INR    Collection Time: 11/04/24  7:37 AM   Result Value Ref Range    INR 1.04 0.85 - 1.15   Phosphorus    Collection Time: 11/04/24  7:37 AM   Result Value Ref Range    Phosphorus 3.7 2.5 - 4.5 mg/dL   CBC with platelets and differential    Collection Time: 11/04/24  7:37 AM   Result Value Ref Range    WBC Count 6.0 4.0 - 11.0 10e3/uL    RBC Count 4.34 (L) 4.40 - 5.90 10e6/uL    Hemoglobin 13.0 (L) 13.3 - 17.7 g/dL    Hematocrit 39.8 (L) 40.0 - 53.0 %    MCV 92 78 - 100 fL    MCH 30.0 26.5 - 33.0 pg    MCHC 32.7 31.5 - 36.5 g/dL    RDW 15.3 (H) 10.0 - 15.0 %    Platelet Count 178 150 - 450 10e3/uL    % Neutrophils 56 %    % Lymphocytes 32 %    % Monocytes 7 %    % Eosinophils 1 %    % Basophils 1 %    % Immature Granulocytes 4 %    NRBCs per 100 WBC 0 <1 /100    Absolute Neutrophils 3.4 1.6 - 8.3 10e3/uL    Absolute Lymphocytes 1.9 0.8 - 5.3 10e3/uL    Absolute Monocytes 0.4 0.0 - 1.3 10e3/uL    Absolute Eosinophils 0.1 0.0 - 0.7 10e3/uL    Absolute Basophils 0.1 0.0 - 0.2 10e3/uL    Absolute Immature Granulocytes 0.2 <=0.4 10e3/uL    Absolute NRBCs 0.0 10e3/uL   General PFT Lab (Please always keep checked)    Collection Time: 11/04/24  7:44 AM   Result Value Ref Range    FVC-Pred 4.25 L    FVC-Pre 3.30 L    FVC-%Pred-Pre 77 %    FEV1-Pre 2.72 L    FEV1-%Pred-Pre 82 %    FEV1FVC-Pred 78 %    FEV1FVC-Pre 82 %    FEFMax-Pred 9.14 L/sec    FEFMax-Pre 7.16 L/sec    FEFMax-%Pred-Pre 78 %    FEF2575-Pred 2.75 L/sec    FEF2575-Pre 3.07 L/sec    WQC7678-%Pred-Pre 111 %    ExpTime-Pre 7.55 sec    FIFMax-Pre 5.05 L/sec    FEV1FEV6-Pred 79 %    FEV1FEV6-Pre 83 %         Results as noted  above.    PFT Interpretation:  Restrictive ventilatory defect.  Increased from previous.  Similar to recent best.  Valid Maneuver

## 2024-11-05 ENCOUNTER — APPOINTMENT (OUTPATIENT)
Dept: GENERAL RADIOLOGY | Facility: CLINIC | Age: 59
End: 2024-11-05
Attending: INTERNAL MEDICINE
Payer: COMMERCIAL

## 2024-11-05 ENCOUNTER — HOSPITAL ENCOUNTER (OUTPATIENT)
Facility: CLINIC | Age: 59
Discharge: HOME | End: 2024-11-05
Attending: INTERNAL MEDICINE | Admitting: INTERNAL MEDICINE
Payer: COMMERCIAL

## 2024-11-05 VITALS
DIASTOLIC BLOOD PRESSURE: 80 MMHG | SYSTOLIC BLOOD PRESSURE: 130 MMHG | HEART RATE: 76 BPM | OXYGEN SATURATION: 95 % | RESPIRATION RATE: 14 BRPM

## 2024-11-05 DIAGNOSIS — Z94.2 S/P LUNG TRANSPLANT (H): ICD-10-CM

## 2024-11-05 LAB
APPEARANCE FLD: CLEAR
BACTERIA SPEC CULT: ABNORMAL
BRONCHOSCOPY: NORMAL
CELL COUNT BODY FLUID SOURCE: NORMAL
COLOR FLD: COLORLESS
GLUCOSE BLDC GLUCOMTR-MCNC: 113 MG/DL (ref 70–99)
GRAM STAIN RESULT: ABNORMAL
GRAM STAIN RESULT: ABNORMAL
LYMPHOCYTES NFR FLD MANUAL: 0 %
MONOS+MACROS NFR FLD MANUAL: 0 %
NEUTS BAND NFR FLD MANUAL: 4 %
OTHER CELLS FLD MANUAL: 96 %
PATH REPORT.COMMENTS IMP SPEC: NORMAL
PATH REPORT.FINAL DX SPEC: NORMAL
PATH REPORT.FINAL DX SPEC: NORMAL
PATH REPORT.GROSS SPEC: NORMAL
PATH REPORT.GROSS SPEC: NORMAL
PATH REPORT.MICROSCOPIC SPEC OTHER STN: NORMAL
PATH REPORT.MICROSCOPIC SPEC OTHER STN: NORMAL
PATH REPORT.RELEVANT HX SPEC: NORMAL
PATH REPORT.RELEVANT HX SPEC: NORMAL
PHOTO IMAGE: NORMAL
WBC # FLD AUTO: 103 /UL

## 2024-11-05 PROCEDURE — 88108 CYTOPATH CONCENTRATE TECH: CPT | Mod: TC | Performed by: INTERNAL MEDICINE

## 2024-11-05 PROCEDURE — 99152 MOD SED SAME PHYS/QHP 5/>YRS: CPT | Mod: GC | Performed by: INTERNAL MEDICINE

## 2024-11-05 PROCEDURE — 88108 CYTOPATH CONCENTRATE TECH: CPT | Mod: 26 | Performed by: PATHOLOGY

## 2024-11-05 PROCEDURE — 89051 BODY FLUID CELL COUNT: CPT | Performed by: INTERNAL MEDICINE

## 2024-11-05 PROCEDURE — 88305 TISSUE EXAM BY PATHOLOGIST: CPT | Mod: 26 | Performed by: STUDENT IN AN ORGANIZED HEALTH CARE EDUCATION/TRAINING PROGRAM

## 2024-11-05 PROCEDURE — 87077 CULTURE AEROBIC IDENTIFY: CPT | Performed by: INTERNAL MEDICINE

## 2024-11-05 PROCEDURE — 87186 SC STD MICRODIL/AGAR DIL: CPT | Performed by: INTERNAL MEDICINE

## 2024-11-05 PROCEDURE — 31624 DX BRONCHOSCOPE/LAVAGE: CPT | Performed by: INTERNAL MEDICINE

## 2024-11-05 PROCEDURE — 88312 SPECIAL STAINS GROUP 1: CPT | Mod: 26 | Performed by: PATHOLOGY

## 2024-11-05 PROCEDURE — 71046 X-RAY EXAM CHEST 2 VIEWS: CPT

## 2024-11-05 PROCEDURE — 87102 FUNGUS ISOLATION CULTURE: CPT | Performed by: INTERNAL MEDICINE

## 2024-11-05 PROCEDURE — 82962 GLUCOSE BLOOD TEST: CPT

## 2024-11-05 PROCEDURE — 88305 TISSUE EXAM BY PATHOLOGIST: CPT | Mod: TC | Performed by: INTERNAL MEDICINE

## 2024-11-05 PROCEDURE — 88312 SPECIAL STAINS GROUP 1: CPT | Mod: TC | Performed by: INTERNAL MEDICINE

## 2024-11-05 PROCEDURE — 87252 VIRUS INOCULATION TISSUE: CPT | Performed by: INTERNAL MEDICINE

## 2024-11-05 PROCEDURE — G0500 MOD SEDAT ENDO SERVICE >5YRS: HCPCS | Performed by: INTERNAL MEDICINE

## 2024-11-05 PROCEDURE — 31624 DX BRONCHOSCOPE/LAVAGE: CPT | Mod: GC | Performed by: INTERNAL MEDICINE

## 2024-11-05 PROCEDURE — 250N000009 HC RX 250: Performed by: INTERNAL MEDICINE

## 2024-11-05 PROCEDURE — 250N000011 HC RX IP 250 OP 636: Performed by: INTERNAL MEDICINE

## 2024-11-05 PROCEDURE — 99153 MOD SED SAME PHYS/QHP EA: CPT | Performed by: INTERNAL MEDICINE

## 2024-11-05 PROCEDURE — 89050 BODY FLUID CELL COUNT: CPT | Performed by: INTERNAL MEDICINE

## 2024-11-05 PROCEDURE — 87206 SMEAR FLUORESCENT/ACID STAI: CPT | Performed by: INTERNAL MEDICINE

## 2024-11-05 PROCEDURE — 87205 SMEAR GRAM STAIN: CPT | Performed by: INTERNAL MEDICINE

## 2024-11-05 PROCEDURE — 31628 BRONCHOSCOPY/LUNG BX EACH: CPT | Mod: GC | Performed by: INTERNAL MEDICINE

## 2024-11-05 PROCEDURE — 71046 X-RAY EXAM CHEST 2 VIEWS: CPT | Mod: 26 | Performed by: RADIOLOGY

## 2024-11-05 PROCEDURE — 87015 SPECIMEN INFECT AGNT CONCNTJ: CPT | Performed by: INTERNAL MEDICINE

## 2024-11-05 PROCEDURE — 87070 CULTURE OTHR SPECIMN AEROBIC: CPT | Performed by: INTERNAL MEDICINE

## 2024-11-05 PROCEDURE — 87116 MYCOBACTERIA CULTURE: CPT | Performed by: INTERNAL MEDICINE

## 2024-11-05 RX ORDER — LIDOCAINE HYDROCHLORIDE 40 MG/ML
INJECTION, SOLUTION RETROBULBAR PRN
Status: DISCONTINUED | OUTPATIENT
Start: 2024-11-05 | End: 2024-11-05 | Stop reason: HOSPADM

## 2024-11-05 RX ORDER — MAGNESIUM HYDROXIDE 1200 MG/15ML
LIQUID ORAL PRN
Status: DISCONTINUED | OUTPATIENT
Start: 2024-11-05 | End: 2024-11-05 | Stop reason: HOSPADM

## 2024-11-05 RX ORDER — FENTANYL CITRATE 50 UG/ML
INJECTION, SOLUTION INTRAMUSCULAR; INTRAVENOUS PRN
Status: DISCONTINUED | OUTPATIENT
Start: 2024-11-05 | End: 2024-11-05 | Stop reason: HOSPADM

## 2024-11-05 RX ORDER — LIDOCAINE HYDROCHLORIDE AND EPINEPHRINE 10; 10 MG/ML; UG/ML
INJECTION, SOLUTION INFILTRATION; PERINEURAL PRN
Status: DISCONTINUED | OUTPATIENT
Start: 2024-11-05 | End: 2024-11-05 | Stop reason: HOSPADM

## 2024-11-05 RX ORDER — LIDOCAINE HYDROCHLORIDE 10 MG/ML
INJECTION, SOLUTION INFILTRATION; PERINEURAL PRN
Status: DISCONTINUED | OUTPATIENT
Start: 2024-11-05 | End: 2024-11-05 | Stop reason: HOSPADM

## 2024-11-05 ASSESSMENT — ACTIVITIES OF DAILY LIVING (ADL)
ADLS_ACUITY_SCORE: 0

## 2024-11-05 NOTE — DISCHARGE INSTRUCTIONS
Discharge Instructions after Bronchoscopy    Activity  You had medicine to relax and for pain. You may feel dizzy or sleepy.  For 24 hours:   Do not drive or use heavy equipment.   Do not make important decisions.   Do not drink any alcohol.    Diet  When you can swallow easily, you may go back to your regular diet, medicines  and light exercise.    Follow-up  We took small tissue or fluid samples to study. We will call you with the results in about 10 business days.    Call right away if you have:   Unusual chest pain   Temperature above 100.6  F (37.5  C)   Coughing that does not stop.    If you have severe pain, bleeding, or shortness of breath, go to an emergency room.    If you have questions, call:  Monday to Friday, 8 a.m. to 4:30 p.m.  Adult Pulmonology Clinic: [phone]    After hours:  [facility]: [phone] (Ask for the pulmonary fellow on call)

## 2024-11-05 NOTE — OR NURSING
Pt tolerated bronchoscopy with BAL and Biopsies, very well, under conscious sedation. O2 was titrated to keep SaO2 >92%. Pt was returned to the recovery on RA. Pt to have a CXR at 0930

## 2024-11-06 LAB
ACID FAST STAIN (ARUP): NORMAL
ACID FAST STAIN (ARUP): NORMAL

## 2024-11-07 LAB — SCANNED LAB RESULT: NORMAL

## 2024-11-08 ENCOUNTER — TELEPHONE (OUTPATIENT)
Dept: TRANSPLANT | Facility: CLINIC | Age: 59
End: 2024-11-08

## 2024-11-08 DIAGNOSIS — Z94.2 S/P LUNG TRANSPLANT (H): ICD-10-CM

## 2024-11-08 NOTE — TELEPHONE ENCOUNTER
Cannot confirm coverage for CT scan. Peer to peer pending.   CT for today cancelled. Pt states he feels great; no concerns. Willing to do locally or wait until 11/26 when he is back in [facility].

## 2024-11-09 LAB
BACTERIA BRONCH: ABNORMAL
BACTERIA BRONCH: ABNORMAL

## 2024-11-10 LAB — PROSPERA TRANSPLANT MONITORING: 0.9 %

## 2024-11-11 DIAGNOSIS — A49.8 INFECTION DUE TO MULTIDRUG-RESISTANT STENOTROPHOMONAS MALTOPHILIA: Primary | ICD-10-CM

## 2024-11-11 DIAGNOSIS — Z94.2 S/P LUNG TRANSPLANT (H): ICD-10-CM

## 2024-11-11 DIAGNOSIS — A49.8 INFECTION DUE TO MULTIDRUG-RESISTANT STENOTROPHOMONAS MALTOPHILIA: ICD-10-CM

## 2024-11-11 DIAGNOSIS — Z16.24 INFECTION DUE TO MULTIDRUG-RESISTANT STENOTROPHOMONAS MALTOPHILIA: ICD-10-CM

## 2024-11-11 DIAGNOSIS — Z16.24 INFECTION DUE TO MULTIDRUG-RESISTANT STENOTROPHOMONAS MALTOPHILIA: Primary | ICD-10-CM

## 2024-11-11 RX ORDER — MINOCYCLINE HYDROCHLORIDE 100 MG/1
100 TABLET ORAL 2 TIMES DAILY
Qty: 28 TABLET | Refills: 0 | Status: SHIPPED | OUTPATIENT
Start: 2024-11-11 | End: 2024-11-12

## 2024-11-12 ENCOUNTER — TELEPHONE (OUTPATIENT)
Dept: PULMONOLOGY | Facility: CLINIC | Age: 59
End: 2024-11-12
Payer: COMMERCIAL

## 2024-11-12 DIAGNOSIS — Z94.2 S/P LUNG TRANSPLANT (H): ICD-10-CM

## 2024-11-12 DIAGNOSIS — A49.8 INFECTION DUE TO STENOTROPHOMONAS MALTOPHILIA: Primary | ICD-10-CM

## 2024-11-12 RX ORDER — MINOCYCLINE HYDROCHLORIDE 100 MG/1
TABLET ORAL
Qty: 28 TABLET | Refills: 0 | OUTPATIENT
Start: 2024-11-12

## 2024-11-12 RX ORDER — MINOCYCLINE HYDROCHLORIDE 100 MG/1
100 CAPSULE ORAL 2 TIMES DAILY
Qty: 28 CAPSULE | Refills: 0 | Status: SHIPPED | OUTPATIENT
Start: 2024-11-12 | End: 2024-11-26

## 2024-11-13 NOTE — TELEPHONE ENCOUNTER
PA Initiation    Medication: MINOCYCLINE  MG PO TABS  Insurance Company: [place] Medicaid ([facility] [employer]) - Phone [phone] Fax [phone]  Pharmacy Filling the Rx:    Filling Pharmacy Phone:    Filling Pharmacy Fax:    Start Date: 11/13/2024

## 2024-11-14 LAB — BACTERIA BRONCH: ABNORMAL

## 2024-11-18 NOTE — TELEPHONE ENCOUNTER
Prior Authorization Not Needed per Insurance    Medication: MINOCYCLINE  MG PO TABS  Insurance Company: [place] [employer] [facility] [facility] [employer] - Phone [phone] Fax [phone]  Expected CoPay: $ 0  Pharmacy Filling the Rx:    Pharmacy Notified: yes  Patient Notified: no  Pharmacy received a pa claim  no pa needed

## 2024-11-21 LAB
ACID FAST STAIN (ARUP): NORMAL
BACTERIA BRONCH: NORMAL

## 2024-11-26 ENCOUNTER — LAB (OUTPATIENT)
Dept: LAB | Facility: CLINIC | Age: 59
End: 2024-11-26
Attending: INTERNAL MEDICINE
Payer: COMMERCIAL

## 2024-11-26 ENCOUNTER — OFFICE VISIT (OUTPATIENT)
Dept: TRANSPLANT | Facility: CLINIC | Age: 59
End: 2024-11-26
Attending: INTERNAL MEDICINE
Payer: COMMERCIAL

## 2024-11-26 ENCOUNTER — ANCILLARY PROCEDURE (OUTPATIENT)
Dept: GENERAL RADIOLOGY | Facility: CLINIC | Age: 59
End: 2024-11-26
Attending: INTERNAL MEDICINE
Payer: COMMERCIAL

## 2024-11-26 ENCOUNTER — OFFICE VISIT (OUTPATIENT)
Dept: PULMONOLOGY | Facility: CLINIC | Age: 59
End: 2024-11-26
Attending: INTERNAL MEDICINE
Payer: COMMERCIAL

## 2024-11-26 VITALS
OXYGEN SATURATION: 97 % | WEIGHT: 177 LBS | SYSTOLIC BLOOD PRESSURE: 130 MMHG | BODY MASS INDEX: 25.34 KG/M2 | DIASTOLIC BLOOD PRESSURE: 81 MMHG | HEIGHT: 70 IN | HEART RATE: 58 BPM

## 2024-11-26 DIAGNOSIS — D84.9 IMMUNOSUPPRESSION (H): ICD-10-CM

## 2024-11-26 DIAGNOSIS — Z94.2 LUNG REPLACED BY TRANSPLANT (H): ICD-10-CM

## 2024-11-26 DIAGNOSIS — Z94.2 S/P LUNG TRANSPLANT (H): ICD-10-CM

## 2024-11-26 LAB
ALBUMIN SERPL BCG-MCNC: 4.2 G/DL (ref 3.5–5.2)
ALP SERPL-CCNC: 100 U/L (ref 40–150)
ALT SERPL W P-5'-P-CCNC: 25 U/L (ref 0–70)
ANION GAP SERPL CALCULATED.3IONS-SCNC: 9 MMOL/L (ref 7–15)
AST SERPL W P-5'-P-CCNC: 22 U/L (ref 0–45)
BASOPHILS # BLD AUTO: 0.1 10E3/UL (ref 0–0.2)
BASOPHILS NFR BLD AUTO: 1 %
BILIRUB SERPL-MCNC: 0.3 MG/DL
BUN SERPL-MCNC: 28.8 MG/DL (ref 8–23)
CALCIUM SERPL-MCNC: 9.8 MG/DL (ref 8.8–10.4)
CHLORIDE SERPL-SCNC: 105 MMOL/L (ref 98–107)
CMV DNA SPEC NAA+PROBE-ACNC: NOT DETECTED IU/ML
CREAT SERPL-MCNC: 1.32 MG/DL (ref 0.67–1.17)
EBV DNA SERPL NAA+PROBE-ACNC: NOT DETECTED IU/ML
EGFRCR SERPLBLD CKD-EPI 2021: 61 ML/MIN/1.73M2
EOSINOPHIL # BLD AUTO: 0 10E3/UL (ref 0–0.7)
EOSINOPHIL NFR BLD AUTO: 0 %
ERYTHROCYTE [DISTWIDTH] IN BLOOD BY AUTOMATED COUNT: 14.3 % (ref 10–15)
EXPTIME-PRE: 10.5 SEC
EXPTIME-PRE: 7.55 SEC
FEF2575-%PRED-PRE: 111 %
FEF2575-%PRED-PRE: 76 %
FEF2575-PRE: 2.11 L/SEC
FEF2575-PRE: 3.07 L/SEC
FEF2575-PRED: 2.75 L/SEC
FEF2575-PRED: 2.75 L/SEC
FEFMAX-%PRED-PRE: 78 %
FEFMAX-%PRED-PRE: 79 %
FEFMAX-PRE: 7.16 L/SEC
FEFMAX-PRE: 7.22 L/SEC
FEFMAX-PRED: 9.14 L/SEC
FEFMAX-PRED: 9.14 L/SEC
FEV1-%PRED-PRE: 77 %
FEV1-%PRED-PRE: 82 %
FEV1-PRE: 2.56 L
FEV1-PRE: 2.72 L
FEV1FEV6-PRE: 81 %
FEV1FEV6-PRE: 83 %
FEV1FEV6-PRED: 79 %
FEV1FEV6-PRED: 79 %
FEV1FVC-PRE: 77 %
FEV1FVC-PRE: 82 %
FEV1FVC-PRED: 78 %
FEV1FVC-PRED: 78 %
FIFMAX-PRE: 5.05 L/SEC
FIFMAX-PRE: 5.48 L/SEC
FVC-%PRED-PRE: 77 %
FVC-%PRED-PRE: 78 %
FVC-PRE: 3.3 L
FVC-PRE: 3.34 L
FVC-PRED: 4.24 L
FVC-PRED: 4.25 L
GLUCOSE SERPL-MCNC: 199 MG/DL (ref 70–99)
HCO3 SERPL-SCNC: 27 MMOL/L (ref 22–29)
HCT VFR BLD AUTO: 41.2 % (ref 40–53)
HGB BLD-MCNC: 13.2 G/DL (ref 13.3–17.7)
IMM GRANULOCYTES # BLD: 0.1 10E3/UL
IMM GRANULOCYTES NFR BLD: 1 %
LYMPHOCYTES # BLD AUTO: 1.1 10E3/UL (ref 0.8–5.3)
LYMPHOCYTES NFR BLD AUTO: 15 %
MAGNESIUM SERPL-MCNC: 1.7 MG/DL (ref 1.7–2.3)
MCH RBC QN AUTO: 29.2 PG (ref 26.5–33)
MCHC RBC AUTO-ENTMCNC: 32 G/DL (ref 31.5–36.5)
MCV RBC AUTO: 91 FL (ref 78–100)
MONOCYTES # BLD AUTO: 0.4 10E3/UL (ref 0–1.3)
MONOCYTES NFR BLD AUTO: 6 %
NEUTROPHILS # BLD AUTO: 5.6 10E3/UL (ref 1.6–8.3)
NEUTROPHILS NFR BLD AUTO: 77 %
NRBC # BLD AUTO: 0 10E3/UL
NRBC BLD AUTO-RTO: 0 /100
PHOSPHATE SERPL-MCNC: 3.6 MG/DL (ref 2.5–4.5)
PLATELET # BLD AUTO: 274 10E3/UL (ref 150–450)
POTASSIUM SERPL-SCNC: 5.6 MMOL/L (ref 3.4–5.3)
PROT SERPL-MCNC: 6.7 G/DL (ref 6.4–8.3)
RBC # BLD AUTO: 4.52 10E6/UL (ref 4.4–5.9)
SODIUM SERPL-SCNC: 141 MMOL/L (ref 135–145)
SPECIMEN TYPE: NORMAL
TACROLIMUS BLD-MCNC: 17.5 UG/L (ref 5–15)
TME LAST DOSE: ABNORMAL H
TME LAST DOSE: ABNORMAL H
WBC # BLD AUTO: 7.4 10E3/UL (ref 4–11)

## 2024-11-26 PROCEDURE — 85025 COMPLETE CBC W/AUTO DIFF WBC: CPT | Performed by: PATHOLOGY

## 2024-11-26 PROCEDURE — 36415 COLL VENOUS BLD VENIPUNCTURE: CPT | Performed by: PATHOLOGY

## 2024-11-26 PROCEDURE — 84100 ASSAY OF PHOSPHORUS: CPT | Performed by: PATHOLOGY

## 2024-11-26 PROCEDURE — G0463 HOSPITAL OUTPT CLINIC VISIT: HCPCS | Performed by: INTERNAL MEDICINE

## 2024-11-26 PROCEDURE — 71046 X-RAY EXAM CHEST 2 VIEWS: CPT | Performed by: RADIOLOGY

## 2024-11-26 PROCEDURE — 90480 ADMN SARSCOV2 VAC 1/ONLY CMP: CPT | Performed by: INTERNAL MEDICINE

## 2024-11-26 PROCEDURE — 91320 SARSCV2 VAC 30MCG TRS-SUC IM: CPT | Performed by: INTERNAL MEDICINE

## 2024-11-26 PROCEDURE — 80053 COMPREHEN METABOLIC PANEL: CPT | Performed by: PATHOLOGY

## 2024-11-26 PROCEDURE — 83735 ASSAY OF MAGNESIUM: CPT | Performed by: PATHOLOGY

## 2024-11-26 PROCEDURE — 99000 SPECIMEN HANDLING OFFICE-LAB: CPT | Performed by: PATHOLOGY

## 2024-11-26 PROCEDURE — 87799 DETECT AGENT NOS DNA QUANT: CPT | Performed by: INTERNAL MEDICINE

## 2024-11-26 PROCEDURE — 250N000011 HC RX IP 250 OP 636: Performed by: INTERNAL MEDICINE

## 2024-11-26 PROCEDURE — 99215 OFFICE O/P EST HI 40 MIN: CPT | Mod: 25 | Performed by: INTERNAL MEDICINE

## 2024-11-26 PROCEDURE — 80197 ASSAY OF TACROLIMUS: CPT | Performed by: INTERNAL MEDICINE

## 2024-11-26 RX ORDER — PREDNISONE 5 MG/1
5 TABLET ORAL DAILY
Status: SHIPPED
Start: 2024-11-26

## 2024-11-26 RX ADMIN — COVID-19 VACCINE, MRNA 30 MCG: 0.04 INJECTION, SUSPENSION INTRAMUSCULAR at 11:47

## 2024-11-26 ASSESSMENT — PAIN SCALES - GENERAL: PAINLEVEL_OUTOF10: NO PAIN (0)

## 2024-11-26 NOTE — PATIENT INSTRUCTIONS
"Patient Education   Learning About Low-Potassium Foods  What foods are low in potassium?     The foods you eat contain nutrients, such as vitamins and minerals. Potassium is a nutrient. Your body needs the right amount to stay healthy and work as it should. You can use the list below to help you make choices about which foods to eat.  The foods in this list have less than 200 milligrams (mg) of potassium per serving.  Fruits  Applesauce,   cup  Blueberries,   cup  Grapes, 9 grapes  Pineapple,   cup  Raspberries,   cup  Watermelon,   cup  Vegetables  Cucumber (peeled),   cup  Eggplant,   cup  Green beans,   cup  Lettuce, 1 cup  Peas,   cup  Radish, 1 radish  Grains  Bagel (plain), 4-inch  Bread, 1 slice  Cereal (puffed rice or puffed wheat), 1 cup  Oatmeal (cooked),   cup  Pasta and noodles (cooked),   cup  Rice (cooked),   cup  Tortilla (flour or corn), 1 tortilla  Dairy and dairy alternatives  Butter, 1 Tbsp  Cheese, 1 oz  Meats and other protein foods  Eggs, 1 egg  Hotdogs (beef or pork), 1 hotdog  Work with your doctor to find out how much of this nutrient you need. Depending on your health, you may need more or less of it in your diet.  Where can you learn more?  Go to https://www.[employer].net/patiented  Enter P485 in the search box to learn more about \"Learning About Low-Potassium Foods.\"  Current as of: September 20, 2023  Content Version: 14.2 [facility]24 [facility].   Care instructions adapted under license by your healthcare professional. If you have questions about a medical condition or this instruction, always ask your healthcare professional. [employer] disclaims any warranty or liability for your use of this information.     Patient Instructions  1. Continue to hydrate with 60-70 oz fluids daily.  2. Continue to exercise daily or most days of the week.  3. Follow up with your primary care provider for annual gender health maintenance procedures.  4. Follow up with colonoscopy " schedule.  5. Follow up with annual dermatology visits.  6. It doesn't seem like the COVID vaccine is working well in lung transplant patients. A number of lung transplant patients have gotten sick with COVID even after receiving the vaccines. Based on our recent experience, it can be life-threatening to get COVID  even after being vaccinated. Please continue to act like you did not get the COVID vaccine - social distancing, wearing a mask, good hand hygiene, etc. If the people around you are vaccinated, it will help reduce the risk of you getting COVID. All members of your household should be vaccinated.  7. 1 tsp metamucil with water per day may help normalize stools.   8. Take Lokelma twice daily x2 doses. Follow a low potassium diet.   9. We'll check if Jardiance is okay with your transplant meds.     Next transplant clinic appointment: 12/17 with CXR, labs and PFTs  Next lab draw: few days to recheck potassium

## 2024-11-26 NOTE — Clinical Note
11/26/2024      Travon Cartwright  [address] [facility] [address]      Dear Colleague,    Thank you for referring your patient, Travon Cartwright, to the [facility]. Please see a copy of my visit note below.    Reason for Visit  Travon Cartwright is a 59 year old year old male who is being seen for No chief complaint on file.      Assessment and plan: ***      Last colonoscopy:    I, Francisco Obregon, have spent *** minutes on the day of the visit to review the chart, interview and examine the patient, review labs and imaging, formulate a plan, document and submit orders. Time documented is excluding time spent for PFT interpretation.    The longitudinal plan of care for the diagnosis(es)/condition(s) as documented were addressed during this visit. Due to the added complexity in care, I will continue to support Juan J in the subsequent management and with ongoing continuity of care.      Francisco Obregon MD  Contact via [facility]    Note: Chart documentation done in part with Dragon Voice Recognition software. Although reviewed after completion, some word and grammatical errors may remain. Please consider this when interpreting information in this chart.     Lung TX HPI  Transplants:  8/15/2024 (Lung), Postoperative day:  103    The patient was seen and examined by Francisco Obregon MD     A complete ROS was otherwise negative except as noted in the HPI.    Current Outpatient Medications   Medication Sig Dispense Refill    acetylcysteine (MUCOMYST) 20 % neb solution Take 2 mLs by nebulization 4 times daily as needed for mucolysis/respiratory distress. (Patient not taking: Reported on 11/4/2024) 30 mL 1    calcium carbonate-vitamin D (CALTRATE) 600-10 MG-MCG per tablet Take 1 tablet by mouth or Feeding Tube 2 times daily. 60 tablet 0    Continuous Glucose Sensor (DEXCOM G7 SENSOR) MISC Change every 10 days. 1 each 11    insulin aspart (NOVOLOG PEN) 100 UNIT/ML pen Take 13 units prior  to meals, and add sliding scale per instructions.  Up to 60 units daily 60 mL 0    insulin glargine (LANTUS PEN) 100 UNIT/ML pen Inject 36 Units subcutaneously at bedtime. 30 mL 0    insulin pen needle (32G X 4 MM) 32G X 4 MM miscellaneous Use pen needles daily or as directed. 100 each 3    levalbuterol (XOPENEX) 1.25 MG/3ML neb solution Take 3 mLs (1.25 mg) by nebulization 4 times daily as needed for shortness of breath or wheezing. Use prior to Mucomyst neb. (Patient not taking: Reported on 11/4/2024) 300 mL 0    magnesium (HIGH ABSORPTION MAGNESIUM) 100 MG TABS Take 400 mg by mouth 3 times daily. 360 tablet 11    metoprolol tartrate (LOPRESSOR) 25 MG tablet Take 2 tablets (50 mg) by mouth or Feeding Tube 2 times daily. 60 tablet 11    minocycline (MINOCIN) 100 MG capsule Take 1 capsule (100 mg) by mouth 2 times daily for 14 days. 28 capsule 0    multivitamin, therapeutic (THERA-VIT) TABS tablet Take 1 tablet by mouth daily. 30 tablet 0    mycophenolate (GENERIC EQUIVALENT) 250 MG capsule Take 4 capsules (1,000 mg) by mouth 2 times daily. 240 capsule 11    mycophenolic acid (GENERIC EQUIVALENT) 360 MG EC tablet Take 2 tablets (720 mg) by mouth 2 times daily. 360 tablet 3    nystatin (MYCOSTATIN) 547428 UNIT/ML suspension Take 10 mLs (1,000,000 Units) by mouth 4 times daily. 1200 mL 4    pantoprazole (PROTONIX) 40 MG EC tablet Take 1 tablet (40 mg) by mouth every morning (before breakfast). 30 tablet 3    predniSONE (DELTASONE) 5 MG tablet 5mg BID thru 11/16 then 5mg daily      psyllium (METAMUCIL/KONSYL) 58.6 % powder Take 1 teaspoonful by mouth daily.      rosuvastatin (CRESTOR) 10 MG tablet Take 1 tablet (10 mg) by mouth daily. 30 tablet 3    sodium zirconium cyclosilicate (LOKELMA) 10 g PACK packet Take 1 packet (10 g) by mouth daily as needed. (Patient not taking: Reported on 11/4/2024) 10 packet 1    sulfamethoxazole-trimethoprim (BACTRIM) 400-80 MG tablet Take 1 tablet by mouth or NG Tube daily. 30 tablet 3     tacrolimus (GENERIC EQUIVALENT) 0.5 MG capsule Take 1 capsule (0.5 mg) by mouth every evening. Total dose: 3 mg in the AM and 3.5 mg in the PM 30 capsule 11    tacrolimus (GENERIC EQUIVALENT) 1 MG capsule Take 3 capsules (3 mg) by mouth 2 times daily. Total dose: 3 mg in the AM and 3.5 mg in the  capsule 11    valGANciclovir (VALCYTE) 450 MG tablet Take 2 tablets (900 mg) by mouth daily. 60 tablet 3     No current facility-administered medications for this visit.     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    S/P lung transplant (H) 08/15/2024    Tremor 05/23/2024       Past Surgical History:   Procedure Laterality Date    BRONCHOSCOPY  08/16/2024    BRONCHOSCOPY (RIGID OR FLEXIBLE), DIAGNOSTIC N/A 9/26/2024    Procedure: BRONCHOSCOPY, WITH BRONCHOALVEOLAR LAVAGE AND BIOPSIES;  Surgeon: Oneida Silver MD;  Location:  GI    BRONCHOSCOPY (RIGID OR FLEXIBLE), DIAGNOSTIC N/A 11/5/2024    Procedure: BRONCHOSCOPY, WITH BRONCHOALVEOLAR LAVAGE AND BIOPSIES;  Surgeon: Tera Jiménez MD;  Location:  GI    BRONCHOSCOPY FLEXIBLE AND RIGID N/A 8/27/2024    Procedure: Bronchoscopy Inspection;  Surgeon: Oneida Silver MD;  Location:  GI    COLONOSCOPY      CV CORONARY ANGIOGRAM N/A 06/21/2024    Procedure: Coronary Angiogram;  Surgeon: Gabriel Julian MD;  Location:  HEART CARDIAC CATH LAB    CV RIGHT HEART CATH MEASUREMENTS RECORDED N/A 06/21/2024    Procedure: Right Heart Catheterization;  Surgeon: Gabriel Julian MD;  Location:   HEART CARDIAC CATH LAB    PICC DOUBLE LUMEN PLACEMENT Right 08/20/2024    47-3cm, Basilic    TRANSPLANT LUNG RECIPIENT SINGLE X2 Bilateral 08/15/2024    Procedure: Clamshell Incision, On Central Venoarterial Extracorporeal Membranous Oxygenation, Bilateral Lung Transplant Recipient, Left atrial appendage ligation, Intraoperative Flexible Bronchoscopy by Anesthesia;  Surgeon: Mulvihill, Michael, MD;  Location:  OR       Social History     Socioeconomic History    Marital status: Single     Spouse name: Not on file    Number of children: Not on file    Years of education: Not on file    Highest education level: Not on file   Occupational History    Not on file   Tobacco Use    Smoking status: Former     Types: Cigarettes    Smokeless tobacco: Never   Substance and Sexual Activity    Alcohol use: Not Currently     Comment: not since 2017    Drug use: Never    Sexual activity: Not on file   Other Topics Concern    Not on file   Social History Narrative    Not on file     Social Drivers of Health     Financial Resource Strain: Low Risk  (8/24/2024)    Financial Resource Strain     Within the past 12 months, have you or your family members you live with been unable to get utilities (heat, electricity) when it was really needed?: No   Food Insecurity: Not on File (9/26/2024)    Received from [employer]    Food Insecurity     Food: 0   Transportation Needs: Low Risk  (8/24/2024)    Transportation Needs     Within the past 12 months, has lack of transportation kept you from medical appointments, getting your medicines, non-medical meetings or appointments, work, or from getting things that you need?: No   Physical Activity: Not on File (8/26/2019)    Received from [facility]    Physical Activity     Physical Activity: 0   Stress: Not on File (8/26/2019)    Received from [facility]    Stress     Stress: 0   Social Connections: Not on File (9/11/2024)    Received from [facility]    Social Connections     Connectedness: 0   Interpersonal Safety:  Unknown (11/5/2024)    Interpersonal Safety     Do you feel physically and emotionally safe where you currently live?: Patient unable to answer     Within the past 12 months, have you been hit, slapped, kicked or otherwise physically hurt by someone?: Patient unable to answer     Within the past 12 months, have you been humiliated or emotionally abused in other ways by your partner or ex-partner?: Patient unable to answer   Housing Stability: Low Risk  (8/24/2024)    Housing Stability     Do you have housing? : Yes     Are you worried about losing your housing?: No         There were no vitals taken for this visit.  There is no height or weight on file to calculate BMI.  Exam:   GENERAL APPEARANCE: Well developed, well nourished, alert, and in no apparent distress.  EYES: PERRL, EOMI  HENT: Nasal mucosa with no edema and no hyperemia. No nasal polyps.  EARS: Canals clear, TMs normal  MOUTH: Oral mucosa is moist, without any lesions, no tonsillar enlargement, no oropharyngeal exudate.  NECK: supple, no masses, no thyromegaly.  LYMPHATICS: No significant axillary, cervical, or supraclavicular nodes.  RESP: normal percussion, good air flow throughout.  No crackles. No rhonchi. No wheezes.  CV: Normal S1, S2, regular rhythm, normal rate. No murmur.  No rub. No gallop. No LE edema.   ABDOMEN:  Bowel sounds normal, soft, nontender, no HSM or masses.   MS: extremities normal. No clubbing. No cyanosis.  SKIN: no rash on limited exam  NEURO: Mentation intact, speech normal, normal strength and tone, normal gait and stance  PSYCH: mentation appears normal. and affect normal/bright  Results:  No results found for this or any previous visit (from the past week).                      Results as noted above.    PFT Interpretation:  {[facility] PFT interpretation:577092}  {:851808}  {JDPFT:889949}  Valid Maneuver                  Again, thank you for allowing me to participate in the care of your patient.         Sincerely,        Francisco Obregon MD

## 2024-11-26 NOTE — NURSING NOTE
"Chief Complaint   Patient presents with    RECHECK     Post lung txp     /81 (BP Location: Right arm, Cuff Size: Adult Regular)   Pulse 58   Ht 1.778 m (5' 10\")   Wt 80.3 kg (177 lb)   SpO2 97%   BMI 25.40 kg/m    Tonya Edwards CMA on 11/26/2024 at 10:19 AM    "

## 2024-11-26 NOTE — NURSING NOTE
Transplant Coordinator Note    Reason for visit: Post lung transplant follow up visit   Coordinator: Present  Caregiver: Pt's sisterMine    Health concerns addressed today:  1. Respiratory: feeling well, no concerns. Little mucous in the morning, clear sputum if any comes up.   2. Pt is wanting to go to [place].    Activity/rehab: Up ad jasmyne  Oxygen needs: Room air  Pain management/RX: N/a  Diabetic management: Managed by endocrine  Next Bronch due: 11/5  CMV status: D-/R+  Valcyte stopped: through POD #90  EBV status: D-/R+, monitor monthly  DVT/PE: N/A  Post op AFIB/follow up with EP: N/A  AC/asa: none  PJP prophylactic: Bactrim    COVID:  COVID-19 infection (yes/no, date of most recent positive test):   Status/instructions given about COVID-19 vaccine:     Pt Education: medications (use/dose/side effects), how/when to call coordinator, frequency of labs, s/s of infection/rejection, call prior to starting any new medications, lab/vital sign book    Health Maintenance:   Last colonoscopy:   Next colonoscopy due:   Dermatology:  Vaccinations this visit: COVID vaccine    Labs, CXR, PFTs reviewed with patient  Medication record reviewed and reconciled  Questions and concerns addressed     Patient Instructions  1. Continue to hydrate with 60-70 oz fluids daily.  2. Continue to exercise daily or most days of the week.  3. Follow up with your primary care provider for annual gender health maintenance procedures.  4. Follow up with colonoscopy schedule.  5. Follow up with annual dermatology visits.  6. It doesn't seem like the COVID vaccine is working well in lung transplant patients. A number of lung transplant patients have gotten sick with COVID even after receiving the vaccines. Based on our recent experience, it can be life-threatening to get COVID  even after being vaccinated. Please continue to act like you did not get the COVID vaccine - social distancing, wearing a mask, good hand hygiene, etc. If the people  around you are vaccinated, it will help reduce the risk of you getting COVID. All members of your household should be vaccinated.  7. 1 tsp metamucil with water per day may help normalize stools.   8. Take Lokelma twice daily x2 doses. Follow a low potassium diet.   9. We'll check if Jardiance is okay with your transplant meds.     Next transplant clinic appointment: 12/17 with CXR, labs and PFTs  Next lab draw: few days to recheck potassium     AVS printed at time of check out

## 2024-11-26 NOTE — PROGRESS NOTES
Reason for Visit  Travon Cartwright is a 59 year old year old male who is being seen for RECHECK (Post lung txp)      Assessment and plan: ***      Last colonoscopy:    I, Francisco Obregon, have spent *** minutes on the day of the visit to review the chart, interview and examine the patient, review labs and imaging, formulate a plan, document and submit orders. Time documented is excluding time spent for PFT interpretation.    The longitudinal plan of care for the diagnosis(es)/condition(s) as documented were addressed during this visit. Due to the added complexity in care, I will continue to support Juan J in the subsequent management and with ongoing continuity of care.      Francisco Obregon MD  Contact via [employer]    Note: Chart documentation done in part with Dragon Voice Recognition software. Although reviewed after completion, some word and grammatical errors may remain. Please consider this when interpreting information in this chart.     Lung TX HPI  Transplants:  8/15/2024 (Lung), Postoperative day:  103    The patient was seen and examined by Francisco Obregon MD   No illnesses since the last visit.  Breathing is comfortable at rest.  No dyspnea with usual activities.  Exercise tolerance gradually improving.  Morning cough productive of minimal clear sputum.  Mild incisional pain, gradually improving.  No fever, chills or night sweats.    Review of systems:  Appetite is good  Clear rhinorrhea  No visual changes, blind spots or double vision  No palpitations  No nausea, vomiting or abdominal pain.  Intermittent loose stool, no significant change with Myfortic.  Morning blood sugars 1 20-1 40.  Daytime blood sugars generally less than 200.  A complete ROS was otherwise negative except as noted in the HPI.    Current Outpatient Medications   Medication Sig Dispense Refill    predniSONE (DELTASONE) 5 MG tablet Take 1 tablet (5 mg) by mouth daily.      acetylcysteine (MUCOMYST) 20 % neb solution Take 2 mLs by  nebulization 4 times daily as needed for mucolysis/respiratory distress. (Patient not taking: Reported on 11/4/2024) 30 mL 1    calcium carbonate-vitamin D (CALTRATE) 600-10 MG-MCG per tablet Take 1 tablet by mouth or Feeding Tube 2 times daily. 60 tablet 0    Continuous Glucose Sensor (DEXCOM G7 SENSOR) MISC Change every 10 days. 1 each 11    insulin aspart (NOVOLOG PEN) 100 UNIT/ML pen Take 13 units prior to meals, and add sliding scale per instructions.  Up to 60 units daily 60 mL 0    insulin glargine (LANTUS PEN) 100 UNIT/ML pen Inject 36 Units subcutaneously at bedtime. 30 mL 0    insulin pen needle (32G X 4 MM) 32G X 4 MM miscellaneous Use pen needles daily or as directed. 100 each 3    levalbuterol (XOPENEX) 1.25 MG/3ML neb solution Take 3 mLs (1.25 mg) by nebulization 4 times daily as needed for shortness of breath or wheezing. Use prior to Mucomyst neb. (Patient not taking: Reported on 11/4/2024) 300 mL 0    magnesium (HIGH ABSORPTION MAGNESIUM) 100 MG TABS Take 400 mg by mouth 3 times daily. 360 tablet 11    metoprolol tartrate (LOPRESSOR) 25 MG tablet Take 2 tablets (50 mg) by mouth or Feeding Tube 2 times daily. 60 tablet 11    multivitamin, therapeutic (THERA-VIT) TABS tablet Take 1 tablet by mouth daily. 30 tablet 0    mycophenolic acid (GENERIC EQUIVALENT) 360 MG EC tablet Take 2 tablets (720 mg) by mouth 2 times daily. 360 tablet 3    nystatin (MYCOSTATIN) 317709 UNIT/ML suspension Take 10 mLs (1,000,000 Units) by mouth 4 times daily. 1200 mL 4    pantoprazole (PROTONIX) 40 MG EC tablet Take 1 tablet (40 mg) by mouth every morning (before breakfast). 30 tablet 3    psyllium (METAMUCIL/KONSYL) 58.6 % powder Take 1 teaspoonful by mouth daily.      rosuvastatin (CRESTOR) 10 MG tablet Take 1 tablet (10 mg) by mouth daily. 30 tablet 3    sodium zirconium cyclosilicate (LOKELMA) 10 g PACK packet Take 1 packet (10 g) by mouth daily as needed. (Patient not taking: Reported on 11/4/2024) 10 packet 1     sulfamethoxazole-trimethoprim (BACTRIM) 400-80 MG tablet Take 1 tablet by mouth or NG Tube daily. 30 tablet 3    tacrolimus (GENERIC EQUIVALENT) 0.5 MG capsule Take 1 capsule (0.5 mg) by mouth every evening. Total dose: 3 mg in the AM and 3.5 mg in the PM 30 capsule 11    tacrolimus (GENERIC EQUIVALENT) 1 MG capsule Take 3 capsules (3 mg) by mouth 2 times daily. Total dose: 3 mg in the AM and 3.5 mg in the  capsule 11     Current Facility-Administered Medications   Medication Dose Route Frequency Provider Last Rate Last Admin    COVID-19 mRNA vaccine 12+y (COMIRNATY (PFIZER_) injection 30 mcg  30 mcg Intramuscular Once          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    S/P lung transplant (H) 08/15/2024    Tremor 05/23/2024       Past Surgical History:   Procedure Laterality Date    BRONCHOSCOPY  08/16/2024    BRONCHOSCOPY (RIGID OR FLEXIBLE), DIAGNOSTIC N/A 9/26/2024    Procedure: BRONCHOSCOPY, WITH BRONCHOALVEOLAR LAVAGE AND BIOPSIES;  Surgeon: Oneida Silver MD;  Location:  GI    BRONCHOSCOPY (RIGID OR FLEXIBLE), DIAGNOSTIC N/A 11/5/2024    Procedure: BRONCHOSCOPY, WITH BRONCHOALVEOLAR LAVAGE AND BIOPSIES;  Surgeon: Tera Jiménez MD;  Location:  GI    BRONCHOSCOPY FLEXIBLE AND RIGID N/A 8/27/2024    Procedure: Bronchoscopy Inspection;  Surgeon: Oneida Silver MD;  Location:  GI    COLONOSCOPY      CV CORONARY ANGIOGRAM N/A 06/21/2024    Procedure: Coronary Angiogram;  Surgeon: Gabriel Julian MD;  Location:  HEART CARDIAC  CATH LAB    CV RIGHT HEART CATH MEASUREMENTS RECORDED N/A 06/21/2024    Procedure: Right Heart Catheterization;  Surgeon: Gabriel Julian MD;  Location:  HEART CARDIAC CATH LAB    PICC DOUBLE LUMEN PLACEMENT Right 08/20/2024    47-3cm, Basilic    TRANSPLANT LUNG RECIPIENT SINGLE X2 Bilateral 08/15/2024    Procedure: Clamshell Incision, On Central Venoarterial Extracorporeal Membranous Oxygenation, Bilateral Lung Transplant Recipient, Left atrial appendage ligation, Intraoperative Flexible Bronchoscopy by Anesthesia;  Surgeon: Mulvihill, Michael, MD;  Location:  OR       Social History     Socioeconomic History    Marital status: Single     Spouse name: Not on file    Number of children: Not on file    Years of education: Not on file    Highest education level: Not on file   Occupational History    Not on file   Tobacco Use    Smoking status: Former     Types: Cigarettes    Smokeless tobacco: Never   Substance and Sexual Activity    Alcohol use: Not Currently     Comment: not since 2017    Drug use: Never    Sexual activity: Not on file   Other Topics Concern    Not on file   Social History Narrative    Not on file     Social Drivers of Health     Financial Resource Strain: Low Risk  (8/24/2024)    Financial Resource Strain     Within the past 12 months, have you or your family members you live with been unable to get utilities (heat, electricity) when it was really needed?: No   Food Insecurity: Not on File (9/26/2024)    Received from [employer]    Food Insecurity     Food: 0   Transportation Needs: Low Risk  (8/24/2024)    Transportation Needs     Within the past 12 months, has lack of transportation kept you from medical appointments, getting your medicines, non-medical meetings or appointments, work, or from getting things that you need?: No   Physical Activity: Not on File (8/26/2019)    Received from [employer]    Physical Activity     Physical Activity: 0   Stress: Not on File (8/26/2019)    Received from [employer]     "Stress     Stress: 0   Social Connections: Not on File (9/11/2024)    Received from [employer]    Social Connections     Connectedness: 0   Interpersonal Safety: Unknown (11/5/2024)    Interpersonal Safety     Do you feel physically and emotionally safe where you currently live?: Patient unable to answer     Within the past 12 months, have you been hit, slapped, kicked or otherwise physically hurt by someone?: Patient unable to answer     Within the past 12 months, have you been humiliated or emotionally abused in other ways by your partner or ex-partner?: Patient unable to answer   Housing Stability: Low Risk  (8/24/2024)    Housing Stability     Do you have housing? : Yes     Are you worried about losing your housing?: No         /81 (BP Location: Right arm, Cuff Size: Adult Regular)   Pulse 58   Ht 1.778 m (5' 10\")   Wt 80.3 kg (177 lb)   SpO2 97%   BMI 25.40 kg/m    Body mass index is 25.4 kg/m .  Exam:   GENERAL APPEARANCE: Well developed, well nourished, alert, and in no apparent distress.  EYES: PERRL, EOMI  HENT: Nasal mucosa with no edema and no hyperemia. No nasal polyps.  EARS: Right canal is clear with normal tympanic membrane, left is obscured by cerumen.  MOUTH: Oral mucosa is moist, without any lesions, no tonsillar enlargement, no oropharyngeal exudate.  NECK: supple, no masses, no thyromegaly.  LYMPHATICS: No significant axillary, cervical, or supraclavicular nodes.  RESP: normal percussion, good air flow throughout.  No crackles. No rhonchi. No wheezes.  CV: Normal S1, S2, regular rhythm, normal rate.  1/6 systolic murmur.  No rub. No gallop. No LE edema.   ABDOMEN:  Bowel sounds normal, soft, nontender, no HSM or masses.   MS: extremities normal. (+) clubbing. No cyanosis.  SKIN: no rash on limited exam  NEURO: Mentation intact, speech normal, normal strength and tone, normal gait and stance  PSYCH: mentation appears normal. and affect normal/bright  Results:  Recent Results (from the past " week)   Magnesium    Collection Time: 11/26/24  8:55 AM   Result Value Ref Range    Magnesium 1.7 1.7 - 2.3 mg/dL   Comprehensive metabolic panel    Collection Time: 11/26/24  8:55 AM   Result Value Ref Range    Sodium 141 135 - 145 mmol/L    Potassium 5.6 (H) 3.4 - 5.3 mmol/L    Carbon Dioxide (CO2) 27 22 - 29 mmol/L    Anion Gap 9 7 - 15 mmol/L    Urea Nitrogen 28.8 (H) 8.0 - 23.0 mg/dL    Creatinine 1.32 (H) 0.67 - 1.17 mg/dL    GFR Estimate 61 >60 mL/min/1.73m2    Calcium 9.8 8.8 - 10.4 mg/dL    Chloride 105 98 - 107 mmol/L    Glucose 199 (H) 70 - 99 mg/dL    Alkaline Phosphatase 100 40 - 150 U/L    AST 22 0 - 45 U/L    ALT 25 0 - 70 U/L    Protein Total 6.7 6.4 - 8.3 g/dL    Albumin 4.2 3.5 - 5.2 g/dL    Bilirubin Total 0.3 <=1.2 mg/dL   Phosphorus    Collection Time: 11/26/24  8:55 AM   Result Value Ref Range    Phosphorus 3.6 2.5 - 4.5 mg/dL   CBC with platelets and differential    Collection Time: 11/26/24  8:55 AM   Result Value Ref Range    WBC Count 7.4 4.0 - 11.0 10e3/uL    RBC Count 4.52 4.40 - 5.90 10e6/uL    Hemoglobin 13.2 (L) 13.3 - 17.7 g/dL    Hematocrit 41.2 40.0 - 53.0 %    MCV 91 78 - 100 fL    MCH 29.2 26.5 - 33.0 pg    MCHC 32.0 31.5 - 36.5 g/dL    RDW 14.3 10.0 - 15.0 %    Platelet Count 274 150 - 450 10e3/uL    % Neutrophils 77 %    % Lymphocytes 15 %    % Monocytes 6 %    % Eosinophils 0 %    % Basophils 1 %    % Immature Granulocytes 1 %    NRBCs per 100 WBC 0 <1 /100    Absolute Neutrophils 5.6 1.6 - 8.3 10e3/uL    Absolute Lymphocytes 1.1 0.8 - 5.3 10e3/uL    Absolute Monocytes 0.4 0.0 - 1.3 10e3/uL    Absolute Eosinophils 0.0 0.0 - 0.7 10e3/uL    Absolute Basophils 0.1 0.0 - 0.2 10e3/uL    Absolute Immature Granulocytes 0.1 <=0.4 10e3/uL    Absolute NRBCs 0.0 10e3/uL   General PFT Lab (Please always keep checked)    Collection Time: 11/26/24  9:09 AM   Result Value Ref Range    FVC-Pred 4.24 L    FVC-Pre 3.34 L    FVC-%Pred-Pre 78 %    FEV1-Pre 2.56 L    FEV1-%Pred-Pre 77 %     FEV1FVC-Pred 78 %    FEV1FVC-Pre 77 %    FEFMax-Pred 9.14 L/sec    FEFMax-Pre 7.22 L/sec    FEFMax-%Pred-Pre 79 %    FEF2575-Pred 2.75 L/sec    FEF2575-Pre 2.11 L/sec    LUV3507-%Pred-Pre 76 %    ExpTime-Pre 10.50 sec    FIFMax-Pre 5.48 L/sec    FEV1FEV6-Pred 79 %    FEV1FEV6-Pre 81 %                   Results as noted above.    PFT Interpretation:  Restrictive ventilatory defect.  Decreased from previous.  Below recent best.   Valid Maneuver               199 (H) 70 - 99 mg/dL    Alkaline Phosphatase 100 40 - 150 U/L    AST 22 0 - 45 U/L    ALT 25 0 - 70 U/L    Protein Total 6.7 6.4 - 8.3 g/dL    Albumin 4.2 3.5 - 5.2 g/dL    Bilirubin Total 0.3 <=1.2 mg/dL   Phosphorus    Collection Time: 11/26/24  8:55 AM   Result Value Ref Range    Phosphorus 3.6 2.5 - 4.5 mg/dL   CBC with platelets and differential    Collection Time: 11/26/24  8:55 AM   Result Value Ref Range    WBC Count 7.4 4.0 - 11.0 10e3/uL    RBC Count 4.52 4.40 - 5.90 10e6/uL    Hemoglobin 13.2 (L) 13.3 - 17.7 g/dL    Hematocrit 41.2 40.0 - 53.0 %    MCV 91 78 - 100 fL    MCH 29.2 26.5 - 33.0 pg    MCHC 32.0 31.5 - 36.5 g/dL    RDW 14.3 10.0 - 15.0 %    Platelet Count 274 150 - 450 10e3/uL    % Neutrophils 77 %    % Lymphocytes 15 %    % Monocytes 6 %    % Eosinophils 0 %    % Basophils 1 %    % Immature Granulocytes 1 %    NRBCs per 100 WBC 0 <1 /100    Absolute Neutrophils 5.6 1.6 - 8.3 10e3/uL    Absolute Lymphocytes 1.1 0.8 - 5.3 10e3/uL    Absolute Monocytes 0.4 0.0 - 1.3 10e3/uL    Absolute Eosinophils 0.0 0.0 - 0.7 10e3/uL    Absolute Basophils 0.1 0.0 - 0.2 10e3/uL    Absolute Immature Granulocytes 0.1 <=0.4 10e3/uL    Absolute NRBCs 0.0 10e3/uL   General PFT Lab (Please always keep checked)    Collection Time: 11/26/24  9:09 AM   Result Value Ref Range    FVC-Pred 4.24 L    FVC-Pre 3.34 L    FVC-%Pred-Pre 78 %    FEV1-Pre 2.56 L    FEV1-%Pred-Pre 77 %    FEV1FVC-Pred 78 %    FEV1FVC-Pre 77 %    FEFMax-Pred 9.14 L/sec    FEFMax-Pre 7.22 L/sec    FEFMax-%Pred-Pre 79 %    FEF2575-Pred 2.75 L/sec    FEF2575-Pre 2.11 L/sec    [id number]-%Pred-Pre 76 %    ExpTime-Pre 10.50 sec    FIFMax-Pre 5.48 L/sec    FEV1FEV6-Pred 79 %    FEV1FEV6-Pre 81 %                   Results as noted above.    PFT Interpretation:  Restrictive ventilatory defect.  Decreased from previous.  Below recent best.   Valid Maneuver

## 2024-11-26 NOTE — LETTER
PHYSICIAN ORDERS      DATE & TIME ISSUED: 2024 11:43 AM  PATIENT NAME: Travon Cartwright   : 1965     [facility] MR# [if applicable]: [id number]     DIAGNOSIS:  Lung Transplant  Z94.2    Please check BMP on 24    Any questions please call: Joi [phone]    Please fax these results to [phone].      .  Francisco Obregon MD  Division of Pulmonary, Allergy, Critical Care and Sleep Medicine  Professor of Medicine

## 2024-11-26 NOTE — LETTER
"safe where you currently live?: Patient unable to answer      Within the past 12 months, have you been hit, slapped, kicked or otherwise physically hurt by someone?: Patient unable to answer      Within the past 12 months, have you been humiliated or emotionally abused in other ways by your partner or ex-partner?: Patient unable to answer   Housing Stability: Low Risk  (8/24/2024)    Housing Stability      Do you have housing? : Yes      Are you worried about losing your housing?: No         /81 (BP Location: Right arm, Cuff Size: Adult Regular)   Pulse 58   Ht 1.778 m (5' 10\")   Wt 80.3 kg (177 lb)   SpO2 97%   BMI 25.40 kg/m    Body mass index is 25.4 kg/m .  Exam:   GENERAL APPEARANCE: Well developed, well nourished, alert, and in no apparent distress.  EYES: PERRL, EOMI  HENT: Nasal mucosa with no edema and no hyperemia. No nasal polyps.  EARS: Right canal is clear with normal tympanic membrane, left is obscured by cerumen.  MOUTH: Oral mucosa is moist, without any lesions, no tonsillar enlargement, no oropharyngeal exudate.  NECK: supple, no masses, no thyromegaly.  LYMPHATICS: No significant axillary, cervical, or supraclavicular nodes.  RESP: normal percussion, good air flow throughout.  No crackles. No rhonchi. No wheezes.  CV: Normal S1, S2, regular rhythm, normal rate.  1/6 systolic murmur.  No rub. No gallop. No LE edema.   ABDOMEN:  Bowel sounds normal, soft, nontender, no HSM or masses.   MS: extremities normal. (+) clubbing. No cyanosis.  SKIN: no rash on limited exam  NEURO: Mentation intact, speech normal, normal strength and tone, normal gait and stance  PSYCH: mentation appears normal. and affect normal/bright  Results:  Recent Results (from the past week)   Magnesium    Collection Time: 11/26/24  8:55 AM   Result Value Ref Range    Magnesium 1.7 1.7 - 2.3 mg/dL   Comprehensive metabolic panel    Collection Time: 11/26/24  8:55 AM   Result Value Ref Range    Sodium 141 135 - 145 mmol/L "    Potassium 5.6 (H) 3.4 - 5.3 mmol/L    Carbon Dioxide (CO2) 27 22 - 29 mmol/L    Anion Gap 9 7 - 15 mmol/L    Urea Nitrogen 28.8 (H) 8.0 - 23.0 mg/dL    Creatinine 1.32 (H) 0.67 - 1.17 mg/dL    GFR Estimate 61 >60 mL/min/1.73m2    Calcium 9.8 8.8 - 10.4 mg/dL    Chloride 105 98 - 107 mmol/L    Glucose 199 (H) 70 - 99 mg/dL    Alkaline Phosphatase 100 40 - 150 U/L    AST 22 0 - 45 U/L    ALT 25 0 - 70 U/L    Protein Total 6.7 6.4 - 8.3 g/dL    Albumin 4.2 3.5 - 5.2 g/dL    Bilirubin Total 0.3 <=1.2 mg/dL   Phosphorus    Collection Time: 11/26/24  8:55 AM   Result Value Ref Range    Phosphorus 3.6 2.5 - 4.5 mg/dL   CBC with platelets and differential    Collection Time: 11/26/24  8:55 AM   Result Value Ref Range    WBC Count 7.4 4.0 - 11.0 10e3/uL    RBC Count 4.52 4.40 - 5.90 10e6/uL    Hemoglobin 13.2 (L) 13.3 - 17.7 g/dL    Hematocrit 41.2 40.0 - 53.0 %    MCV 91 78 - 100 fL    MCH 29.2 26.5 - 33.0 pg    MCHC 32.0 31.5 - 36.5 g/dL    RDW 14.3 10.0 - 15.0 %    Platelet Count 274 150 - 450 10e3/uL    % Neutrophils 77 %    % Lymphocytes 15 %    % Monocytes 6 %    % Eosinophils 0 %    % Basophils 1 %    % Immature Granulocytes 1 %    NRBCs per 100 WBC 0 <1 /100    Absolute Neutrophils 5.6 1.6 - 8.3 10e3/uL    Absolute Lymphocytes 1.1 0.8 - 5.3 10e3/uL    Absolute Monocytes 0.4 0.0 - 1.3 10e3/uL    Absolute Eosinophils 0.0 0.0 - 0.7 10e3/uL    Absolute Basophils 0.1 0.0 - 0.2 10e3/uL    Absolute Immature Granulocytes 0.1 <=0.4 10e3/uL    Absolute NRBCs 0.0 10e3/uL   General PFT Lab (Please always keep checked)    Collection Time: 11/26/24  9:09 AM   Result Value Ref Range    FVC-Pred 4.24 L    FVC-Pre 3.34 L    FVC-%Pred-Pre 78 %    FEV1-Pre 2.56 L    FEV1-%Pred-Pre 77 %    FEV1FVC-Pred 78 %    FEV1FVC-Pre 77 %    FEFMax-Pred 9.14 L/sec    FEFMax-Pre 7.22 L/sec    FEFMax-%Pred-Pre 79 %    FEF2575-Pred 2.75 L/sec    FEF2575-Pre 2.11 L/sec    [id number]-%Pred-Pre 76 %    ExpTime-Pre 10.50 sec    FIFMax-Pre 5.48 L/sec     FEV1FEV6-Pred 79 %    FEV1FEV6-Pre 81 %                   Results as noted above.    PFT Interpretation:  Restrictive ventilatory defect.  Decreased from previous.  Below recent best.   Valid Maneuver                Again, thank you for allowing me to participate in the care of your patient.        Sincerely,        Francisco Obregon MD

## 2024-11-27 ENCOUNTER — TELEPHONE (OUTPATIENT)
Dept: PHARMACY | Facility: CLINIC | Age: 59
End: 2024-11-27
Payer: COMMERCIAL

## 2024-11-27 NOTE — TELEPHONE ENCOUNTER
Clinical Pharmacy Consult:                                                      Transplant Specific: 2 Month Post-Transplant Medication Review  Date of Transplant: 8/15/24  Type of Transplant: lung  First Transplant: yes  History of rejection: no    Immunosuppression Regimen   TAC 2.5mg qAM & 3mg qPM, Prednisone 5mg qAM, and Myfortic 720mg qAM & 720mg qPM  Patient specific goal: 8-12  Most recent level: 17.5, date 11/26/2024  Immunosuppressant Levels: Supratherapeutic  Pt adherent to lab draws: yes  Scr:   Lab Results   Component Value Date    CR 0.62 08/27/2024     Side effects: None reported    Prophylactic Medications  PJP Prophylactic: Bactrim SS daily  Scheduled Discontinue Date: Lifelong     Antifungal: Nystatin  Scheduled Discontinue Date: 6 months Anticipated date: 2/15/25    CMV Prophylactic: CrCl >/=60 mL/minute: Valcyte 900mg daily   Scheduled Discontinue Date: 3 months Anticipated date: 11/15/24    Acid Reducer: Protonix (pantoprazole)  Scheduled Reviewed Date: Lifelong    Vascular Prophylactic: Heparin injection  Scheduled Discontinue Date:  upon discharge   - completed      Blood Pressure Management  Frequency of home Blood Pressure checks: twice daily  Most recent home BP: 131/78  Patient Blood pressure goal: <140/90  Patient blood pressure at goal:  yes  Hospitalizations/ER visits since last assessment: 0      Med rec/DUR performed: yes  Med Rec Discrepancies: No    Spoke with Juan J's sister Mine today via phone. She reports that he is doing well. She is responsible for managing Juan J's medications with the help of the medication box and card. Mine says that Juan J doesn't miss any of his anti-rejection medications but will occasionally miss his magnesium if he falls asleep before taking his bedtime dose. Mine denies any side effects to any of his medications.     Mine reports Juan J checks his blood pressure twice a day. It is usually around 131/78 which is at goal.    No questions or concerns  today. Next follow up in 3 months.     Time spent: 15 minutes    Kamaljit Reza, PharmD  [phone]

## 2024-11-28 LAB — BACTERIA BRONCH: NORMAL

## 2024-12-02 ENCOUNTER — DOCUMENTATION ONLY (OUTPATIENT)
Dept: TRANSPLANT | Facility: CLINIC | Age: 59
End: 2024-12-02
Payer: COMMERCIAL

## 2024-12-02 DIAGNOSIS — Z94.2 S/P LUNG TRANSPLANT (H): ICD-10-CM

## 2024-12-03 LAB
BACTERIA BRONCH: ABNORMAL
BACTERIA BRONCH: NO GROWTH

## 2024-12-15 ENCOUNTER — HEALTH MAINTENANCE LETTER (OUTPATIENT)
Age: 59
End: 2024-12-15

## 2024-12-16 NOTE — PROGRESS NOTES
Transplant Coordinator Note    Reason for visit: Post lung transplant follow up visit   Coordinator: Present  Caregiver: Pt's sister Tianna Sen    Health concerns addressed today:  1. Respiratory: doing well, feeling good. Breathing feels good and getting better all the time he feels like. Just using nebs PRN for congestion. Will get up some clear sputum if this happens.   2. CT done today, recommended 6 month follow up.     Activity/rehab: Up ad jasmyne, walking 2 miles a few times/day.   Oxygen needs: Room air  Pain management/RX: N/a  Diabetic management: Managed by endocrine, 130-170s recently.   Next Bronch due: 1/16/25  CMV status: D-/R+  Valcyte stopped: through POD #90  EBV status: D-/R+, monitor monthly  DVT/PE: N/A  Post op AFIB/follow up with EP: N/A  AC/asa: none  PJP prophylactic: Bactrim    COVID:  COVID-19 infection (yes/no, date of most recent positive test):   Status/instructions given about COVID-19 vaccine:     Pt Education: medications (use/dose/side effects), how/when to call coordinator, frequency of labs, s/s of infection/rejection, call prior to starting any new medications, lab/vital sign book    Health Maintenance:   Last colonoscopy:   Next colonoscopy due:   Dermatology:  Vaccinations this visit:     Labs, CXR, PFTs reviewed with patient  Medication record reviewed and reconciled  Questions and concerns addressed    Patient Instructions  1. Continue to hydrate with 60-70 oz fluids daily.  2. Continue to exercise daily or most days of the week.  3. Follow up with your primary care provider for annual gender health maintenance procedures.  4. Follow up with colonoscopy schedule.  5. Follow up with annual dermatology visits.  6. It doesn't seem like the COVID vaccine is working well in lung transplant patients. A number of lung transplant patients have gotten sick with COVID even after receiving the vaccines. Based on our recent experience, it can be life-threatening to get COVID even after  being vaccinated. Please continue to act like you did not get the COVID vaccine - social distancing, wearing a mask, good hand hygiene, etc. If the people around you are vaccinated, it will help reduce the risk of you getting COVID. All members of your household should be vaccinated.  7. Let Joi know if you have any questions!    Next transplant clinic appointment: 1/15/25 with CXR, labs and PFTs  Next lab draw: pending tacrolimus level    AVS printed at time of check out

## 2024-12-17 ENCOUNTER — LAB (OUTPATIENT)
Dept: LAB | Facility: CLINIC | Age: 59
End: 2024-12-17
Attending: INTERNAL MEDICINE
Payer: COMMERCIAL

## 2024-12-17 ENCOUNTER — ANCILLARY PROCEDURE (OUTPATIENT)
Dept: CT IMAGING | Facility: CLINIC | Age: 59
End: 2024-12-17
Attending: INTERNAL MEDICINE
Payer: COMMERCIAL

## 2024-12-17 ENCOUNTER — OFFICE VISIT (OUTPATIENT)
Dept: PULMONOLOGY | Facility: CLINIC | Age: 59
End: 2024-12-17
Attending: INTERNAL MEDICINE
Payer: COMMERCIAL

## 2024-12-17 ENCOUNTER — OFFICE VISIT (OUTPATIENT)
Dept: TRANSPLANT | Facility: CLINIC | Age: 59
End: 2024-12-17
Attending: INTERNAL MEDICINE
Payer: COMMERCIAL

## 2024-12-17 VITALS
WEIGHT: 177.13 LBS | DIASTOLIC BLOOD PRESSURE: 97 MMHG | OXYGEN SATURATION: 95 % | HEART RATE: 69 BPM | SYSTOLIC BLOOD PRESSURE: 156 MMHG | HEIGHT: 70 IN | BODY MASS INDEX: 25.36 KG/M2

## 2024-12-17 DIAGNOSIS — D84.9 IMMUNOSUPPRESSION (H): ICD-10-CM

## 2024-12-17 DIAGNOSIS — Z94.2 S/P LUNG TRANSPLANT (H): ICD-10-CM

## 2024-12-17 LAB
ALBUMIN SERPL BCG-MCNC: 4.3 G/DL (ref 3.5–5.2)
ALP SERPL-CCNC: 82 U/L (ref 40–150)
ALT SERPL W P-5'-P-CCNC: 29 U/L (ref 0–70)
ANION GAP SERPL CALCULATED.3IONS-SCNC: 11 MMOL/L (ref 7–15)
AST SERPL W P-5'-P-CCNC: 23 U/L (ref 0–45)
BILIRUB SERPL-MCNC: 0.4 MG/DL
BUN SERPL-MCNC: 22 MG/DL (ref 8–23)
CALCIUM SERPL-MCNC: 9.7 MG/DL (ref 8.8–10.4)
CHLORIDE SERPL-SCNC: 104 MMOL/L (ref 98–107)
CREAT SERPL-MCNC: 1.32 MG/DL (ref 0.67–1.17)
EGFRCR SERPLBLD CKD-EPI 2021: 61 ML/MIN/1.73M2
ERYTHROCYTE [DISTWIDTH] IN BLOOD BY AUTOMATED COUNT: 13.2 % (ref 10–15)
EXPTIME-PRE: 6.25 SEC
FEF2575-%PRED-PRE: 63 %
FEF2575-PRE: 1.75 L/SEC
FEF2575-PRED: 2.75 L/SEC
FEFMAX-%PRED-PRE: 72 %
FEFMAX-PRE: 6.65 L/SEC
FEFMAX-PRED: 9.13 L/SEC
FEV1-%PRED-PRE: 73 %
FEV1-PRE: 2.43 L
FEV1FEV6-PRE: 74 %
FEV1FEV6-PRED: 79 %
FEV1FVC-PRE: 74 %
FEV1FVC-PRED: 78 %
FIFMAX-PRE: 6.07 L/SEC
FVC-%PRED-PRE: 76 %
FVC-PRE: 3.27 L
FVC-PRED: 4.24 L
GLUCOSE SERPL-MCNC: 149 MG/DL (ref 70–99)
HCO3 SERPL-SCNC: 25 MMOL/L (ref 22–29)
HCT VFR BLD AUTO: 39.1 % (ref 40–53)
HGB BLD-MCNC: 12.9 G/DL (ref 13.3–17.7)
MAGNESIUM SERPL-MCNC: 1.7 MG/DL (ref 1.7–2.3)
MCH RBC QN AUTO: 29.6 PG (ref 26.5–33)
MCHC RBC AUTO-ENTMCNC: 33 G/DL (ref 31.5–36.5)
MCV RBC AUTO: 90 FL (ref 78–100)
PHOSPHATE SERPL-MCNC: 3.6 MG/DL (ref 2.5–4.5)
PLATELET # BLD AUTO: 228 10E3/UL (ref 150–450)
POTASSIUM SERPL-SCNC: 4.8 MMOL/L (ref 3.4–5.3)
PROT SERPL-MCNC: 6.9 G/DL (ref 6.4–8.3)
RBC # BLD AUTO: 4.36 10E6/UL (ref 4.4–5.9)
SODIUM SERPL-SCNC: 140 MMOL/L (ref 135–145)
TACROLIMUS BLD-MCNC: 10.3 UG/L (ref 5–15)
TME LAST DOSE: NORMAL H
TME LAST DOSE: NORMAL H
WBC # BLD AUTO: 6.3 10E3/UL (ref 4–11)

## 2024-12-17 PROCEDURE — 80053 COMPREHEN METABOLIC PANEL: CPT | Performed by: PATHOLOGY

## 2024-12-17 PROCEDURE — 85027 COMPLETE CBC AUTOMATED: CPT | Performed by: PATHOLOGY

## 2024-12-17 PROCEDURE — 71250 CT THORAX DX C-: CPT | Performed by: RADIOLOGY

## 2024-12-17 PROCEDURE — G0463 HOSPITAL OUTPT CLINIC VISIT: HCPCS | Performed by: STUDENT IN AN ORGANIZED HEALTH CARE EDUCATION/TRAINING PROGRAM

## 2024-12-17 PROCEDURE — 80197 ASSAY OF TACROLIMUS: CPT | Performed by: INTERNAL MEDICINE

## 2024-12-17 PROCEDURE — 94375 RESPIRATORY FLOW VOLUME LOOP: CPT | Performed by: STUDENT IN AN ORGANIZED HEALTH CARE EDUCATION/TRAINING PROGRAM

## 2024-12-17 PROCEDURE — 99000 SPECIMEN HANDLING OFFICE-LAB: CPT | Performed by: PATHOLOGY

## 2024-12-17 PROCEDURE — 99417 PROLNG OP E/M EACH 15 MIN: CPT | Performed by: STUDENT IN AN ORGANIZED HEALTH CARE EDUCATION/TRAINING PROGRAM

## 2024-12-17 PROCEDURE — 84100 ASSAY OF PHOSPHORUS: CPT | Performed by: PATHOLOGY

## 2024-12-17 PROCEDURE — 36415 COLL VENOUS BLD VENIPUNCTURE: CPT | Performed by: PATHOLOGY

## 2024-12-17 PROCEDURE — 99215 OFFICE O/P EST HI 40 MIN: CPT | Mod: 25 | Performed by: STUDENT IN AN ORGANIZED HEALTH CARE EDUCATION/TRAINING PROGRAM

## 2024-12-17 PROCEDURE — 83735 ASSAY OF MAGNESIUM: CPT | Performed by: PATHOLOGY

## 2024-12-17 ASSESSMENT — PAIN SCALES - GENERAL: PAINLEVEL_OUTOF10: NO PAIN (0)

## 2024-12-17 NOTE — PROGRESS NOTES
Reason for Visit  Travon Cartwright is a 59 year old year old male who is being seen for follow up lung transplant.  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  Postoperative course complicated by delirium.  He had pretransplant diabetes, hepatic steatosis and essential tremor.  Stenotrophomonas on 9/26/2024 bronchoscopy treated with levofloxacin.  Treated with Solu-Medrol and prednisone taper in early October 2024 for ACR based on A1B0 on surveillance biopsy, elevated cell free DNA and decreased PFTs.     Pulmonary/lung transplant: PFTs have been low. Asymptomatic at this time and feeling better. Very active and reports walking 10,000 steps per day. Does have air trapping on expiratory CT images today and will start ICS-LABA inhaler, may consider addition of azithromycin at next visit. Will be getting bronch and surveillance biopsies following next visit. Did have granulation tissue with 30% stenosis of right anastomosis on 11/5 bronch. Prospera and DSA pending. Prospera had been elevated but was coming down. DSAs have been negative.     Immunosuppression  - Tacrolimus, goal 8-12  - Myfortic, changed from CellCept due to loose stools  - Prednisone 10 mg daily     Prophylaxis:   - Bactrim for PJP and nocardia prophylaxis  - CMV and EBV monitoring per protocol, s/p Valcyte prophylaxis  - Nystatin for thrush prophylaxis.       Donor Recipient Intervention   CMV status  - + Valganciclovir POD #8-90  (Negative 11/26, pending)   EBV status - + EBV monthly   (Negative 11/26)   HSV status N/A + N/A         Lung nodule: 8 x 8 mm nodule in the left lower lobe noted on CT on September 24.  CT today with stable nodule.   - Repeat CT in 6 months for follow up (~6/17/25)    GI: Persistent loose stool.  Previously switched from CellCept to Myfortic.  Metamucil added at last visit.       Hypomagnesemia: Level 1.7. Continue current  replacement.    Hyperkalemia: Elevated to 5.6 at last visit and received 2 doses of Lokelma.  Low potassium diet initiated.  4.9 on recheck several days later and 4.8 today, will continue to monitor.     Diabetes mellitus: Blood sugars have been elevated.    - Will be restarting Jardiance likely this week    Hypertension: Blood pressure is well-controlled with current metoprolol.    GERD: Adequately controlled with current pantoprazole.    Hyperlipidemia: Continue current rosuvastatin.  Check lipid battery annually with annual studies.    Tremors: Possibly a little better today.  If persistent/progressive, could consider neurology consultation or a trial of primidone.    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  Subsequent AFB cultures have been negative.  No indication for treatment at this time.  Continue AFB cultures with all bronchoscopies.     Hepatic steatosis: LFTs within normal limits.  Continue close monitoring and pursue hepatology consultation if elevated.       Healthcare maintenance:   - Colonoscopy in 2016, repeat due in 2026.    - Vaccinations: S/p COVID, Flu    Changes Today  - CT Chest in 6 months to follow up nodule  - ICS-LABA inhaler given air trapping on CT  - Will be restarting Jardiance  - Bronch with biopsies following next visit     Jing De La Garza MD PhD  [facility]  [facility]   Pulmonary Transplant     I personally spent 65 minutes on the date of the encounter in documentation, the interview and exam, and review of the chart/labs/imaging not including time spent interpreting spirometry.     The longitudinal plan of care for lung transplant and complications of high risk medication management was addressed during this visit. Due to the added complexity in care, I will continue to support this patient in the subsequent management of this condition(s) and with the ongoing continuity of care of this  condition.        Lung TX HPI  Transplants:  8/15/2024 (Lung), Postoperative day:  124    Breathing feeling better all the time, muscles better. No coughing or wheezing. Some pains around incision still, none in back. Will do nebs if feels congestion and felt cleared up. Cough up very little clear when does nebs. Feels like congestion, sometimes noses runs. No allergies. When walks usually in low 90s. Try to walk 10,000 steps per day. BPs checking at home, mostly SBP 130s. Planning to continue in the winter. No abdominal pain, diarrhea, constipation, no reflux. Taking metamucil. Blood sugars getting better. 130-170 usually. Weights stable, gaining weight. Tremor does not seem as bad.     Current Outpatient Medications   Medication Sig Dispense Refill    calcium carbonate-vitamin D (CALTRATE) 600-10 MG-MCG per tablet Take 1 tablet by mouth or Feeding Tube 2 times daily. 60 tablet 0    Continuous Glucose Sensor (DEXCOM G7 SENSOR) MISC Change every 10 days. 1 each 11    empagliflozin (JARDIANCE) 10 MG TABS tablet Take 1 tablet (10 mg) by mouth daily. 90 tablet 1    empagliflozin (JARDIANCE) 10 MG TABS tablet Take 1 tablet (10 mg) by mouth daily. 90 tablet 0    insulin aspart (NOVOLOG PEN) 100 UNIT/ML pen Take 13 units prior to meals, and add sliding scale per instructions.  Up to 60 units daily 60 mL 0    insulin glargine (LANTUS PEN) 100 UNIT/ML pen Inject 36 Units subcutaneously at bedtime. 30 mL 0    insulin pen needle (32G X 4 MM) 32G X 4 MM miscellaneous Use pen needles daily or as directed. 100 each 3    magnesium (HIGH ABSORPTION MAGNESIUM) 100 MG TABS Take 400 mg by mouth 3 times daily. 360 tablet 11    metoprolol tartrate (LOPRESSOR) 25 MG tablet Take 2 tablets (50 mg) by mouth or Feeding Tube 2 times daily. 60 tablet 11    multivitamin, therapeutic (THERA-VIT) TABS tablet Take 1 tablet by mouth daily. 30 tablet 0    mycophenolic acid (GENERIC EQUIVALENT) 360 MG EC tablet Take 2 tablets (720 mg) by mouth 2  times daily. 360 tablet 3    nystatin (MYCOSTATIN) 204933 UNIT/ML suspension Take 10 mLs (1,000,000 Units) by mouth 4 times daily. 1200 mL 4    pantoprazole (PROTONIX) 40 MG EC tablet Take 1 tablet (40 mg) by mouth every morning (before breakfast). 30 tablet 3    predniSONE (DELTASONE) 5 MG tablet Take 1 tablet (5 mg) by mouth daily. 30 tablet 11    psyllium (METAMUCIL/KONSYL) 58.6 % powder Take 1 teaspoonful by mouth daily.      rosuvastatin (CRESTOR) 10 MG tablet Take 1 tablet (10 mg) by mouth daily. 30 tablet 3    sulfamethoxazole-trimethoprim (BACTRIM) 400-80 MG tablet Take 1 tablet by mouth or NG Tube daily. 30 tablet 3    tacrolimus (GENERIC EQUIVALENT) 0.5 MG capsule Take 1 capsule (0.5 mg) by mouth every morning. Total dose: 2.5 mg in the AM and 3 mg in the PM 90 capsule 3    tacrolimus (GENERIC EQUIVALENT) 1 MG capsule Take 2 capsules (2 mg) by mouth every morning AND 3 capsules (3 mg) every evening. Total dose: 2.5 mg in the AM and 3 mg in the PM. 450 capsule 3    acetylcysteine (MUCOMYST) 20 % neb solution Take 2 mLs by nebulization 4 times daily as needed for mucolysis/respiratory distress. (Patient not taking: Reported on 12/17/2024) 30 mL 1    levalbuterol (XOPENEX) 1.25 MG/3ML neb solution Take 3 mLs (1.25 mg) by nebulization 4 times daily as needed for shortness of breath or wheezing. Use prior to Mucomyst neb. (Patient not taking: Reported on 12/17/2024) 300 mL 0    sodium zirconium cyclosilicate (LOKELMA) 10 g PACK packet Take 1 packet (10 g) by mouth daily as needed. (Patient not taking: Reported on 12/17/2024) 10 packet 1     No current facility-administered medications for this visit.     Facility-Administered Medications Ordered in Other Visits   Medication Dose Route Frequency Provider Last Rate Last Admin    sodium chloride bacteriostatic 0.9 % flush 9 mL  9 mL Intravenous Once NICOLA Pearce MD         No Known Allergies  Past Medical History:   Diagnosis Date    Administration of  long-term prophylactic antibiotics 08/26/2024    Hepatic steatosis 2017    Noted on ultrasound    Hypomagnesemia 08/26/2024    Immunosuppression (H) 08/26/2024    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    S/P lung transplant (H) 08/15/2024    Tremor 05/23/2024       Past Surgical History:   Procedure Laterality Date    BRONCHOSCOPY  08/16/2024    BRONCHOSCOPY (RIGID OR FLEXIBLE), DIAGNOSTIC N/A 9/26/2024    Procedure: BRONCHOSCOPY, WITH BRONCHOALVEOLAR LAVAGE AND BIOPSIES;  Surgeon: Oneida Silver MD;  Location:  GI    BRONCHOSCOPY (RIGID OR FLEXIBLE), DIAGNOSTIC N/A 11/5/2024    Procedure: BRONCHOSCOPY, WITH BRONCHOALVEOLAR LAVAGE AND BIOPSIES;  Surgeon: Tera Jiménez MD;  Location:  GI    BRONCHOSCOPY FLEXIBLE AND RIGID N/A 8/27/2024    Procedure: Bronchoscopy Inspection;  Surgeon: Oneida Silver MD;  Location:  GI    COLONOSCOPY      CV CORONARY ANGIOGRAM N/A 06/21/2024    Procedure: Coronary Angiogram;  Surgeon: Gabriel Julian MD;  Location:  HEART CARDIAC CATH LAB    CV RIGHT HEART CATH MEASUREMENTS RECORDED N/A 06/21/2024    Procedure: Right Heart Catheterization;  Surgeon: Gabriel Julian MD;  Location:  HEART CARDIAC CATH LAB    PICC DOUBLE LUMEN PLACEMENT Right 08/20/2024    47-3cm, Basilic    TRANSPLANT LUNG RECIPIENT SINGLE X2 Bilateral 08/15/2024    Procedure: Clamshell Incision, On Central Venoarterial Extracorporeal Membranous Oxygenation, Bilateral Lung Transplant Recipient, Left atrial appendage ligation, Intraoperative Flexible Bronchoscopy by Anesthesia;  Surgeon: Mulvihill, Michael, MD;  Location:  OR     Social History     Socioeconomic History    Marital status: Single     Spouse name: Not on file    Number of children: Not on file    Years of education: Not on file    Highest education level: Not on file   Occupational History    Not on file   Tobacco Use    Smoking status: Former     Types: Cigarettes    Smokeless tobacco: Never   Substance and Sexual Activity     "Alcohol use: Not Currently     Comment: not since 2017    Drug use: Never    Sexual activity: Not on file   Other Topics Concern    Not on file   Social History Narrative    Not on file     Social Drivers of Health     Financial Resource Strain: Low Risk  (8/24/2024)    Financial Resource Strain     Within the past 12 months, have you or your family members you live with been unable to get utilities (heat, electricity) when it was really needed?: No   Food Insecurity: Not on File (9/26/2024)    Received from [facility]    Food Insecurity     Food: 0   Transportation Needs: Low Risk  (8/24/2024)    Transportation Needs     Within the past 12 months, has lack of transportation kept you from medical appointments, getting your medicines, non-medical meetings or appointments, work, or from getting things that you need?: No   Physical Activity: Not on File (8/26/2019)    Received from [facility]    Physical Activity     Physical Activity: 0   Stress: Not on File (8/26/2019)    Received from [facility]    Stress     Stress: 0   Social Connections: Not on File (9/11/2024)    Received from [facility]    Social Connections     Connectedness: 0   Interpersonal Safety: Unknown (11/5/2024)    Interpersonal Safety     Do you feel physically and emotionally safe where you currently live?: Patient unable to answer     Within the past 12 months, have you been hit, slapped, kicked or otherwise physically hurt by someone?: Patient unable to answer     Within the past 12 months, have you been humiliated or emotionally abused in other ways by your partner or ex-partner?: Patient unable to answer   Housing Stability: Low Risk  (8/24/2024)    Housing Stability     Do you have housing? : Yes     Are you worried about losing your housing?: No     BP (!) 156/97 (BP Location: Right arm, Cuff Size: Adult Regular)   Pulse 69   Ht 1.778 m (5' 10\")   Wt 80.3 kg (177 lb 2 oz)   SpO2 95%   BMI 25.41 kg/m    Body mass index is 25.41 kg/m .  Exam:   GENERAL " APPEARANCE: Well developed, well nourished, alert, and in no apparent distress.  EYES: PERRL, EOMI  HENT: Nasal mucosa with no edema and no hyperemia. No nasal polyps.  EARS: Right canal is clear with normal tympanic membrane, left is obscured by cerumen.  MOUTH: Oral mucosa is moist, without any lesions, no tonsillar enlargement, no oropharyngeal exudate.  NECK: supple, no masses, no thyromegaly.  LYMPHATICS: No significant axillary, cervical, or supraclavicular nodes.  RESP: normal percussion, good air flow throughout.  No crackles. No rhonchi. No wheezes.  CV: Normal S1, S2, regular rhythm, normal rate.  1/6 systolic murmur.  No rub. No gallop. No LE edema.   ABDOMEN:  Bowel sounds normal, soft, nontender, no HSM or masses.   MS: extremities normal. (+) clubbing. No cyanosis.  SKIN: no rash on limited exam  NEURO: Mentation intact, speech normal, normal strength and tone, normal gait and stance  PSYCH: mentation appears normal. and affect normal/bright  Results:  Recent Results (from the past week)   Comprehensive metabolic panel    Collection Time: 12/17/24  2:02 PM   Result Value Ref Range    Sodium 140 135 - 145 mmol/L    Potassium 4.8 3.4 - 5.3 mmol/L    Carbon Dioxide (CO2) 25 22 - 29 mmol/L    Anion Gap 11 7 - 15 mmol/L    Urea Nitrogen 22.0 8.0 - 23.0 mg/dL    Creatinine 1.32 (H) 0.67 - 1.17 mg/dL    GFR Estimate 61 >60 mL/min/1.73m2    Calcium 9.7 8.8 - 10.4 mg/dL    Chloride 104 98 - 107 mmol/L    Glucose 149 (H) 70 - 99 mg/dL    Alkaline Phosphatase 82 40 - 150 U/L    AST 23 0 - 45 U/L    ALT 29 0 - 70 U/L    Protein Total 6.9 6.4 - 8.3 g/dL    Albumin 4.3 3.5 - 5.2 g/dL    Bilirubin Total 0.4 <=1.2 mg/dL   CBC with platelets    Collection Time: 12/17/24  2:02 PM   Result Value Ref Range    WBC Count 6.3 4.0 - 11.0 10e3/uL    RBC Count 4.36 (L) 4.40 - 5.90 10e6/uL    Hemoglobin 12.9 (L) 13.3 - 17.7 g/dL    Hematocrit 39.1 (L) 40.0 - 53.0 %    MCV 90 78 - 100 fL    MCH 29.6 26.5 - 33.0 pg    MCHC 33.0  31.5 - 36.5 g/dL    RDW 13.2 10.0 - 15.0 %    Platelet Count 228 150 - 450 10e3/uL   Magnesium    Collection Time: 12/17/24  2:02 PM   Result Value Ref Range    Magnesium 1.7 1.7 - 2.3 mg/dL   Phosphorus    Collection Time: 12/17/24  2:02 PM   Result Value Ref Range    Phosphorus 3.6 2.5 - 4.5 mg/dL   Tacrolimus by Tandem Mass Spectrometry    Collection Time: 12/17/24  2:02 PM   Result Value Ref Range    Tacrolimus by Tandem Mass Spectrometry 10.3 5.0 - 15.0 ug/L    Tacrolimus Last Dose Date 12/17/2024     Tacrolimus Last Dose Time 12:00 AM    General PFT Lab (Please always keep checked)    Collection Time: 12/17/24  2:16 PM   Result Value Ref Range    FVC-Pred 4.24 L    FVC-Pre 3.27 L    FVC-%Pred-Pre 76 %    FEV1-Pre 2.43 L    FEV1-%Pred-Pre 73 %    FEV1FVC-Pred 78 %    FEV1FVC-Pre 74 %    FEFMax-Pred 9.13 L/sec    FEFMax-Pre 6.65 L/sec    FEFMax-%Pred-Pre 72 %    FEF2575-Pred 2.75 L/sec    FEF2575-Pre 1.75 L/sec    RBU6048-%Pred-Pre 63 %    ExpTime-Pre 6.25 sec    FIFMax-Pre 6.07 L/sec    FEV1FEV6-Pred 79 %    FEV1FEV6-Pre 74 %     PFT Interpretation:   FVC and FEV1 are reduced suggestive of restriction, lung volumes would be needed to confirm  Decreased from previous.  Below recent best.   Valid Maneuver    CT Chest 12/17/24: Personally reviewed, left pleural based nodule now measures about 7x8 mm, air trapping on expiratory images

## 2024-12-17 NOTE — NURSING NOTE
"Chief Complaint   Patient presents with    RECHECK     Follow up Lung post return TXP     BP (!) 156/97 (BP Location: Right arm, Cuff Size: Adult Regular)   Pulse 69   Ht 1.778 m (5' 10\")   Wt 80.3 kg (177 lb 2 oz)   SpO2 95%   BMI 25.41 kg/m    Tonya Edwards, CMA on 12/17/2024 at 2:50 PM    "

## 2024-12-17 NOTE — PATIENT INSTRUCTIONS
Patient Instructions  1. Continue to hydrate with 60-70 oz fluids daily.  2. Continue to exercise daily or most days of the week.  3. Follow up with your primary care provider for annual gender health maintenance procedures.  4. Follow up with colonoscopy schedule.  5. Follow up with annual dermatology visits.  6. It doesn't seem like the COVID vaccine is working well in lung transplant patients. A number of lung transplant patients have gotten sick with COVID even after receiving the vaccines. Based on our recent experience, it can be life-threatening to get COVID even after being vaccinated. Please continue to act like you did not get the COVID vaccine - social distancing, wearing a mask, good hand hygiene, etc. If the people around you are vaccinated, it will help reduce the risk of you getting COVID. All members of your household should be vaccinated.  7. Let Joi know if you have any questions!    Next transplant clinic appointment: 1/15/25 with CXR, labs and PFTs  Next lab draw: pending tacrolimus level

## 2024-12-17 NOTE — PROGRESS NOTES
Travon Cartwright comes into clinic today at the request of Dr De La Garza , for spirometry     Tolerated testing well. No adverse reactions. Left lab in no distress.        MAEGAN GARCIA

## 2024-12-17 NOTE — LETTER
12/17/2024      Travon Cartwright  [address]  [facility] [address]      Dear Colleague,    Thank you for referring your patient, Travon Cartwright, to the [facility]. Please see a copy of my visit note below.    Transplant Coordinator Note    Reason for visit: Post lung transplant follow up visit   Coordinator: Present  Caregiver: Pt's sister Tianna Sen    Health concerns addressed today:  1. Respiratory: doing well, feeling good. Breathing feels good and getting better all the time he feels like. Just using nebs PRN for congestion. Will get up some clear sputum if this happens.   2. CT done today, recommended 6 month follow up.     Activity/rehab: Up ad jasmyne, walking 2 miles a few times/day.   Oxygen needs: Room air  Pain management/RX: N/a  Diabetic management: Managed by endocrine, 130-170s recently.   Next Bronch due: 1/16/25  CMV status: D-/R+  Valcyte stopped: through POD #90  EBV status: D-/R+, monitor monthly  DVT/PE: N/A  Post op AFIB/follow up with EP: N/A  AC/asa: none  PJP prophylactic: Bactrim    COVID:  COVID-19 infection (yes/no, date of most recent positive test):   Status/instructions given about COVID-19 vaccine:     Pt Education: medications (use/dose/side effects), how/when to call coordinator, frequency of labs, s/s of infection/rejection, call prior to starting any new medications, lab/vital sign book    Health Maintenance:   Last colonoscopy:   Next colonoscopy due:   Dermatology:  Vaccinations this visit:     Labs, CXR, PFTs reviewed with patient  Medication record reviewed and reconciled  Questions and concerns addressed    Patient Instructions  1. Continue to hydrate with 60-70 oz fluids daily.  2. Continue to exercise daily or most days of the week.  3. Follow up with your primary care provider for annual gender health maintenance procedures.  4. Follow up with colonoscopy schedule.  5. Follow up with annual dermatology visits.  6. It doesn't seem like  the COVID vaccine is working well in lung transplant patients. A number of lung transplant patients have gotten sick with COVID even after receiving the vaccines. Based on our recent experience, it can be life-threatening to get COVID even after being vaccinated. Please continue to act like you did not get the COVID vaccine - social distancing, wearing a mask, good hand hygiene, etc. If the people around you are vaccinated, it will help reduce the risk of you getting COVID. All members of your household should be vaccinated.  7. Let Joi know if you have any questions!    Next transplant clinic appointment: 1/15/25 with CXR, labs and PFTs  Next lab draw: pending tacrolimus level    AVS printed at time of check out    Reason for Visit  Travon Cartwright is a 59 year old year old male who is being seen for follow up lung transplant.  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  Postoperative course complicated by delirium.  He had pretransplant diabetes, hepatic steatosis and essential tremor.  Stenotrophomonas on 9/26/2024 bronchoscopy treated with levofloxacin.  Treated with Solu-Medrol and prednisone taper in early October 2024 for ACR based on A1B0 on surveillance biopsy, elevated cell free DNA and decreased PFTs.     Pulmonary/lung transplant: PFTs have been low. Asymptomatic at this time and feeling better. Very active and reports walking 10,000 steps per day. Does have air trapping on expiratory CT images today and will start ICS-LABA inhaler, may consider addition of azithromycin at next visit. Will be getting bronch and surveillance biopsies following next visit. Did have granulation tissue with 30% stenosis of right anastomosis on 11/5 bronch. Prospera and DSA pending. Prospera had been elevated but was coming down. DSAs have been negative.     Immunosuppression  - Tacrolimus, goal 8-12  - Myfortic, changed from CellCept due  to loose stools  - Prednisone 10 mg daily     Prophylaxis:   - Bactrim for PJP and nocardia prophylaxis  - CMV and EBV monitoring per protocol, s/p Valcyte prophylaxis  - Nystatin for thrush prophylaxis.       Donor Recipient Intervention   CMV status  - + Valganciclovir POD #8-90  (Negative 11/26, pending)   EBV status - + EBV monthly   (Negative 11/26)   HSV status N/A + N/A         Lung nodule: 8 x 8 mm nodule in the left lower lobe noted on CT on September 24.  CT today with stable nodule.   - Repeat CT in 6 months for follow up (~6/17/25)    GI: Persistent loose stool.  Previously switched from CellCept to Myfortic.  Metamucil added at last visit.       Hypomagnesemia: Level 1.7. Continue current replacement.    Hyperkalemia: Elevated to 5.6 at last visit and received 2 doses of Lokelma.  Low potassium diet initiated.  4.9 on recheck several days later and 4.8 today, will continue to monitor.     Diabetes mellitus: Blood sugars have been elevated.    - Will be restarting Jardiance likely this week    Hypertension: Blood pressure is well-controlled with current metoprolol.    GERD: Adequately controlled with current pantoprazole.    Hyperlipidemia: Continue current rosuvastatin.  Check lipid battery annually with annual studies.    Tremors: Possibly a little better today.  If persistent/progressive, could consider neurology consultation or a trial of primidone.    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  Subsequent AFB cultures have been negative.  No indication for treatment at this time.  Continue AFB cultures with all bronchoscopies.     Hepatic steatosis: LFTs within normal limits.  Continue close monitoring and pursue hepatology consultation if elevated.       Healthcare maintenance:   - Colonoscopy in 2016, repeat due in 2026.    - Vaccinations: S/p COVID, Flu    Changes Today  - CT Chest in 6 months to follow up nodule  - ICS-LABA inhaler given air trapping on CT  - Will  be restarting Jardiance  - Bronch with biopsies following next visit     Jing De La Garza MD PhD  [facility]  [facility]   Pulmonary Transplant     I personally spent 65 minutes on the date of the encounter in documentation, the interview and exam, and review of the chart/labs/imaging not including time spent interpreting spirometry.     The longitudinal plan of care for lung transplant and complications of high risk medication management was addressed during this visit. Due to the added complexity in care, I will continue to support this patient in the subsequent management of this condition(s) and with the ongoing continuity of care of this condition.        Lung TX HPI  Transplants:  8/15/2024 (Lung), Postoperative day:  124    Breathing feeling better all the time, muscles better. No coughing or wheezing. Some pains around incision still, none in back. Will do nebs if feels congestion and felt cleared up. Cough up very little clear when does nebs. Feels like congestion, sometimes noses runs. No allergies. When walks usually in low 90s. Try to walk 10,000 steps per day. BPs checking at home, mostly SBP 130s. Planning to continue in the winter. No abdominal pain, diarrhea, constipation, no reflux. Taking metamucil. Blood sugars getting better. 130-170 usually. Weights stable, gaining weight. Tremor does not seem as bad.     Current Outpatient Medications   Medication Sig Dispense Refill     calcium carbonate-vitamin D (CALTRATE) 600-10 MG-MCG per tablet Take 1 tablet by mouth or Feeding Tube 2 times daily. 60 tablet 0     Continuous Glucose Sensor (DEXCOM G7 SENSOR) MISC Change every 10 days. 1 each 11     empagliflozin (JARDIANCE) 10 MG TABS tablet Take 1 tablet (10 mg) by mouth daily. 90 tablet 1     empagliflozin (JARDIANCE) 10 MG TABS tablet Take 1 tablet (10 mg) by mouth daily. 90 tablet 0     insulin aspart (NOVOLOG PEN) 100 UNIT/ML pen Take 13 units prior to  meals, and add sliding scale per instructions.  Up to 60 units daily 60 mL 0     insulin glargine (LANTUS PEN) 100 UNIT/ML pen Inject 36 Units subcutaneously at bedtime. 30 mL 0     insulin pen needle (32G X 4 MM) 32G X 4 MM miscellaneous Use pen needles daily or as directed. 100 each 3     magnesium (HIGH ABSORPTION MAGNESIUM) 100 MG TABS Take 400 mg by mouth 3 times daily. 360 tablet 11     metoprolol tartrate (LOPRESSOR) 25 MG tablet Take 2 tablets (50 mg) by mouth or Feeding Tube 2 times daily. 60 tablet 11     multivitamin, therapeutic (THERA-VIT) TABS tablet Take 1 tablet by mouth daily. 30 tablet 0     mycophenolic acid (GENERIC EQUIVALENT) 360 MG EC tablet Take 2 tablets (720 mg) by mouth 2 times daily. 360 tablet 3     nystatin (MYCOSTATIN) 944827 UNIT/ML suspension Take 10 mLs (1,000,000 Units) by mouth 4 times daily. 1200 mL 4     pantoprazole (PROTONIX) 40 MG EC tablet Take 1 tablet (40 mg) by mouth every morning (before breakfast). 30 tablet 3     predniSONE (DELTASONE) 5 MG tablet Take 1 tablet (5 mg) by mouth daily. 30 tablet 11     psyllium (METAMUCIL/KONSYL) 58.6 % powder Take 1 teaspoonful by mouth daily.       rosuvastatin (CRESTOR) 10 MG tablet Take 1 tablet (10 mg) by mouth daily. 30 tablet 3     sulfamethoxazole-trimethoprim (BACTRIM) 400-80 MG tablet Take 1 tablet by mouth or NG Tube daily. 30 tablet 3     tacrolimus (GENERIC EQUIVALENT) 0.5 MG capsule Take 1 capsule (0.5 mg) by mouth every morning. Total dose: 2.5 mg in the AM and 3 mg in the PM 90 capsule 3     tacrolimus (GENERIC EQUIVALENT) 1 MG capsule Take 2 capsules (2 mg) by mouth every morning AND 3 capsules (3 mg) every evening. Total dose: 2.5 mg in the AM and 3 mg in the PM. 450 capsule 3     acetylcysteine (MUCOMYST) 20 % neb solution Take 2 mLs by nebulization 4 times daily as needed for mucolysis/respiratory distress. (Patient not taking: Reported on 12/17/2024) 30 mL 1     levalbuterol (XOPENEX) 1.25 MG/3ML neb solution Take 3  mLs (1.25 mg) by nebulization 4 times daily as needed for shortness of breath or wheezing. Use prior to Mucomyst neb. (Patient not taking: Reported on 12/17/2024) 300 mL 0     sodium zirconium cyclosilicate (LOKELMA) 10 g PACK packet Take 1 packet (10 g) by mouth daily as needed. (Patient not taking: Reported on 12/17/2024) 10 packet 1     No current facility-administered medications for this visit.     Facility-Administered Medications Ordered in Other Visits   Medication Dose Route Frequency Provider Last Rate Last Admin     sodium chloride bacteriostatic 0.9 % flush 9 mL  9 mL Intravenous Once NICOLA Pearce MD         No Known Allergies  Past Medical History:   Diagnosis Date     Administration of long-term prophylactic antibiotics 08/26/2024     Hepatic steatosis 2017    Noted on ultrasound     Hypomagnesemia 08/26/2024     Immunosuppression (H) 08/26/2024     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     S/P lung transplant (H) 08/15/2024     Tremor 05/23/2024       Past Surgical History:   Procedure Laterality Date     BRONCHOSCOPY  08/16/2024     BRONCHOSCOPY (RIGID OR FLEXIBLE), DIAGNOSTIC N/A 9/26/2024    Procedure: BRONCHOSCOPY, WITH BRONCHOALVEOLAR LAVAGE AND BIOPSIES;  Surgeon: Oneida Silver MD;  Location:  GI     BRONCHOSCOPY (RIGID OR FLEXIBLE), DIAGNOSTIC N/A 11/5/2024    Procedure: BRONCHOSCOPY, WITH BRONCHOALVEOLAR LAVAGE AND BIOPSIES;  Surgeon: Tera Jiménez MD;  Location:  GI     BRONCHOSCOPY FLEXIBLE AND RIGID N/A 8/27/2024    Procedure: Bronchoscopy Inspection;  Surgeon: Oneida Silver MD;  Location:  GI     COLONOSCOPY       CV CORONARY ANGIOGRAM N/A 06/21/2024    Procedure: Coronary Angiogram;  Surgeon: Gabriel Julian MD;  Location:  HEART CARDIAC CATH LAB     CV RIGHT HEART CATH MEASUREMENTS RECORDED N/A 06/21/2024    Procedure: Right Heart Catheterization;  Surgeon: Gabriel Julian MD;  Location:  HEART CARDIAC CATH LAB     PICC DOUBLE LUMEN PLACEMENT Right  08/20/2024    47-3cm, Basilic     TRANSPLANT LUNG RECIPIENT SINGLE X2 Bilateral 08/15/2024    Procedure: Clamshell Incision, On Central Venoarterial Extracorporeal Membranous Oxygenation, Bilateral Lung Transplant Recipient, Left atrial appendage ligation, Intraoperative Flexible Bronchoscopy by Anesthesia;  Surgeon: Mulvihill, Michael, MD;  Location:  OR     Social History     Socioeconomic History     Marital status: Single     Spouse name: Not on file     Number of children: Not on file     Years of education: Not on file     Highest education level: Not on file   Occupational History     Not on file   Tobacco Use     Smoking status: Former     Types: Cigarettes     Smokeless tobacco: Never   Substance and Sexual Activity     Alcohol use: Not Currently     Comment: not since 2017     Drug use: Never     Sexual activity: Not on file   Other Topics Concern     Not on file   Social History Narrative     Not on file     Social Drivers of Health     Financial Resource Strain: Low Risk  (8/24/2024)    Financial Resource Strain      Within the past 12 months, have you or your family members you live with been unable to get utilities (heat, electricity) when it was really needed?: No   Food Insecurity: Not on File (9/26/2024)    Received from [employer]    Food Insecurity      Food: 0   Transportation Needs: Low Risk  (8/24/2024)    Transportation Needs      Within the past 12 months, has lack of transportation kept you from medical appointments, getting your medicines, non-medical meetings or appointments, work, or from getting things that you need?: No   Physical Activity: Not on File (8/26/2019)    Received from [employer]    Physical Activity      Physical Activity: 0   Stress: Not on File (8/26/2019)    Received from [employer]    Stress      Stress: 0   Social Connections: Not on File (9/11/2024)    Received from [employer]    Social Connections      Connectedness: 0   Interpersonal Safety: Unknown (11/5/2024)    Interpersonal  "Safety      Do you feel physically and emotionally safe where you currently live?: Patient unable to answer      Within the past 12 months, have you been hit, slapped, kicked or otherwise physically hurt by someone?: Patient unable to answer      Within the past 12 months, have you been humiliated or emotionally abused in other ways by your partner or ex-partner?: Patient unable to answer   Housing Stability: Low Risk  (8/24/2024)    Housing Stability      Do you have housing? : Yes      Are you worried about losing your housing?: No     BP (!) 156/97 (BP Location: Right arm, Cuff Size: Adult Regular)   Pulse 69   Ht 1.778 m (5' 10\")   Wt 80.3 kg (177 lb 2 oz)   SpO2 95%   BMI 25.41 kg/m    Body mass index is 25.41 kg/m .  Exam:   GENERAL APPEARANCE: Well developed, well nourished, alert, and in no apparent distress.  EYES: PERRL, EOMI  HENT: Nasal mucosa with no edema and no hyperemia. No nasal polyps.  EARS: Right canal is clear with normal tympanic membrane, left is obscured by cerumen.  MOUTH: Oral mucosa is moist, without any lesions, no tonsillar enlargement, no oropharyngeal exudate.  NECK: supple, no masses, no thyromegaly.  LYMPHATICS: No significant axillary, cervical, or supraclavicular nodes.  RESP: normal percussion, good air flow throughout.  No crackles. No rhonchi. No wheezes.  CV: Normal S1, S2, regular rhythm, normal rate.  1/6 systolic murmur.  No rub. No gallop. No LE edema.   ABDOMEN:  Bowel sounds normal, soft, nontender, no HSM or masses.   MS: extremities normal. (+) clubbing. No cyanosis.  SKIN: no rash on limited exam  NEURO: Mentation intact, speech normal, normal strength and tone, normal gait and stance  PSYCH: mentation appears normal. and affect normal/bright  Results:  Recent Results (from the past week)   Comprehensive metabolic panel    Collection Time: 12/17/24  2:02 PM   Result Value Ref Range    Sodium 140 135 - 145 mmol/L    Potassium 4.8 3.4 - 5.3 mmol/L    Carbon Dioxide " (CO2) 25 22 - 29 mmol/L    Anion Gap 11 7 - 15 mmol/L    Urea Nitrogen 22.0 8.0 - 23.0 mg/dL    Creatinine 1.32 (H) 0.67 - 1.17 mg/dL    GFR Estimate 61 >60 mL/min/1.73m2    Calcium 9.7 8.8 - 10.4 mg/dL    Chloride 104 98 - 107 mmol/L    Glucose 149 (H) 70 - 99 mg/dL    Alkaline Phosphatase 82 40 - 150 U/L    AST 23 0 - 45 U/L    ALT 29 0 - 70 U/L    Protein Total 6.9 6.4 - 8.3 g/dL    Albumin 4.3 3.5 - 5.2 g/dL    Bilirubin Total 0.4 <=1.2 mg/dL   CBC with platelets    Collection Time: 12/17/24  2:02 PM   Result Value Ref Range    WBC Count 6.3 4.0 - 11.0 10e3/uL    RBC Count 4.36 (L) 4.40 - 5.90 10e6/uL    Hemoglobin 12.9 (L) 13.3 - 17.7 g/dL    Hematocrit 39.1 (L) 40.0 - 53.0 %    MCV 90 78 - 100 fL    MCH 29.6 26.5 - 33.0 pg    MCHC 33.0 31.5 - 36.5 g/dL    RDW 13.2 10.0 - 15.0 %    Platelet Count 228 150 - 450 10e3/uL   Magnesium    Collection Time: 12/17/24  2:02 PM   Result Value Ref Range    Magnesium 1.7 1.7 - 2.3 mg/dL   Phosphorus    Collection Time: 12/17/24  2:02 PM   Result Value Ref Range    Phosphorus 3.6 2.5 - 4.5 mg/dL   Tacrolimus by Tandem Mass Spectrometry    Collection Time: 12/17/24  2:02 PM   Result Value Ref Range    Tacrolimus by Tandem Mass Spectrometry 10.3 5.0 - 15.0 ug/L    Tacrolimus Last Dose Date 12/17/2024     Tacrolimus Last Dose Time 12:00 AM    General PFT Lab (Please always keep checked)    Collection Time: 12/17/24  2:16 PM   Result Value Ref Range    FVC-Pred 4.24 L    FVC-Pre 3.27 L    FVC-%Pred-Pre 76 %    FEV1-Pre 2.43 L    FEV1-%Pred-Pre 73 %    FEV1FVC-Pred 78 %    FEV1FVC-Pre 74 %    FEFMax-Pred 9.13 L/sec    FEFMax-Pre 6.65 L/sec    FEFMax-%Pred-Pre 72 %    FEF2575-Pred 2.75 L/sec    FEF2575-Pre 1.75 L/sec    DDO5028-%Pred-Pre 63 %    ExpTime-Pre 6.25 sec    FIFMax-Pre 6.07 L/sec    FEV1FEV6-Pred 79 %    FEV1FEV6-Pre 74 %     PFT Interpretation:   FVC and FEV1 are reduced suggestive of restriction, lung volumes would be needed to confirm  Decreased from previous.  Below  recent best.   Valid Maneuver    CT Chest 12/17/24: Personally reviewed, left pleural based nodule now measures about 7x8 mm, air trapping on expiratory images              Again, thank you for allowing me to participate in the care of your patient.        Sincerely,        Jnig De La Garza MD

## 2024-12-18 LAB
CMV DNA SPEC NAA+PROBE-ACNC: NOT DETECTED IU/ML
SPECIMEN TYPE: NORMAL

## 2024-12-18 NOTE — RESULT ENCOUNTER NOTE
Tacrolimus level 10.3 at 14 hours on 12/17  Goal 8-12  Level likely within goal at 12 hour ceasar, no change in dose

## 2024-12-23 ENCOUNTER — TELEPHONE (OUTPATIENT)
Dept: PULMONOLOGY | Facility: CLINIC | Age: 59
End: 2024-12-23
Payer: COMMERCIAL

## 2024-12-23 NOTE — TELEPHONE ENCOUNTER
[facility]  Fax:[phone]  Spec: [phone]  MO: [phone]

## 2024-12-24 LAB — PROSPERA TRANSPLANT MONITORING: 2.52 %

## 2024-12-26 DIAGNOSIS — Z94.2 S/P LUNG TRANSPLANT (H): ICD-10-CM

## 2024-12-26 NOTE — TELEPHONE ENCOUNTER
"Hello,  Provider is required to sign PA form.  Please provide clinic's fax# for PA to be faxed over for provider's \"wet signature\".  Thanks   "

## 2024-12-26 NOTE — TELEPHONE ENCOUNTER
Faxed PA form to clinic for provider's signature (sign line 21 and date line 22 of PA).  Please fax signed PA back to the PA Team at [phone].

## 2024-12-31 LAB
ACID FAST STAIN (ARUP): NORMAL

## 2025-01-02 NOTE — TELEPHONE ENCOUNTER
Per call to [place] medicaid [employer], PA was not received.  Re-fax PA and cover sheet to [place] Medicaid.

## 2025-01-03 ENCOUNTER — TRANSFERRED RECORDS (OUTPATIENT)
Dept: HEALTH INFORMATION MANAGEMENT | Facility: CLINIC | Age: 60
End: 2025-01-03

## 2025-01-08 NOTE — TELEPHONE ENCOUNTER
Per call to [place] Medicaid/ [facility] [employer]. They did not receive PA. Verified fax# is correct.  Re-faxing PA and cover sheet.

## 2025-01-09 NOTE — TELEPHONE ENCOUNTER
PRIOR AUTHORIZATION DENIED    Medication: BREO ELLIPTA 100-25 MCG/ACT IN AEPB  Insurance Company: [place] [employer] [facility] [employer] - Phone [phone] Fax [phone]  Denial Date: 1/9/2025    Denial Reason(s):   Per insurance, patient needs to use the preferred medications or has allergic reactions to the preferred medications.

## 2025-01-09 NOTE — TELEPHONE ENCOUNTER
Hello,  P- stands for preferred  Highlighted yellow- monthly changes to the preferred drug list.

## 2025-01-13 DIAGNOSIS — Z79.4 TYPE 2 DIABETES MELLITUS WITHOUT COMPLICATION, WITH LONG-TERM CURRENT USE OF INSULIN (H): ICD-10-CM

## 2025-01-13 DIAGNOSIS — E11.9 TYPE 2 DIABETES MELLITUS WITHOUT COMPLICATION, WITH LONG-TERM CURRENT USE OF INSULIN (H): ICD-10-CM

## 2025-01-13 DIAGNOSIS — T38.0X5A STEROID-INDUCED HYPERGLYCEMIA: ICD-10-CM

## 2025-01-13 DIAGNOSIS — R73.9 STEROID-INDUCED HYPERGLYCEMIA: ICD-10-CM

## 2025-01-14 DIAGNOSIS — Z94.2 S/P LUNG TRANSPLANT (H): ICD-10-CM

## 2025-01-14 DIAGNOSIS — J84.9 ILD (INTERSTITIAL LUNG DISEASE) (H): ICD-10-CM

## 2025-01-14 RX ORDER — SULFAMETHOXAZOLE AND TRIMETHOPRIM 400; 80 MG/1; MG/1
1 TABLET ORAL DAILY
Qty: 30 TABLET | Refills: 11 | Status: SHIPPED | OUTPATIENT
Start: 2025-01-14

## 2025-01-14 RX ORDER — ROSUVASTATIN CALCIUM 10 MG/1
10 TABLET, COATED ORAL DAILY
Qty: 30 TABLET | Refills: 11 | Status: SHIPPED | OUTPATIENT
Start: 2025-01-14

## 2025-01-14 RX ORDER — INSULIN GLARGINE 100 [IU]/ML
30 INJECTION, SOLUTION SUBCUTANEOUS AT BEDTIME
Qty: 15 ML | Refills: 0 | Status: SHIPPED | OUTPATIENT
Start: 2025-01-14 | End: 2025-01-15

## 2025-01-14 NOTE — PROGRESS NOTES
"Transplant Coordinator Note    Reason for visit: Post lung transplant follow up visit   Coordinator: Present   Caregiver: Tianna    Health concerns addressed today:  1. Walking a lot, 2-3 miles/day. Mall walking.   2. Feels like he is doing really well. Occasional cough w/clear phlegm in the AM.   3. No shortness of breath reported, \"stable.\"  4. Still has frequent diarrhea, more liquid throughout the day.   5. Good PO intake, only taking around 32 oz daily.   6. Pt reports he has gained some weight.   7.     Activity/rehab: Up ad jasmyne, walking 2 miles a few times/day.   Oxygen needs: Room air  Pain management/RX: N/a  Diabetic management: Managed by endocrine.    Next Bronch due: 1/16/25  CMV status: D-/R+  Valcyte stopped: through POD #90  EBV status: D-/R+, monitor monthly  DVT/PE: N/A  Post op AFIB/follow up with EP: N/A  AC/asa: none  PJP prophylactic: Bactrim    COVID:  COVID-19 infection (yes/no, date of most recent positive test):   Status/instructions given about COVID-19 vaccine:     Pt Education: medications (use/dose/side effects), how/when to call coordinator, frequency of labs, s/s of infection/rejection, call prior to starting any new medications, lab/vital sign book    Health Maintenance:   Last colonoscopy:   Next colonoscopy due:   Dermatology:  Vaccinations this visit:     Labs, CXR, PFTs reviewed with patient  Medication record reviewed and reconciled  Questions and concerns addressed    Patient Instructions  1. Continue to hydrate with 70 oz fluids daily.  2. Continue to exercise daily or most days of the week.  3. Follow up with your primary care provider for annual gender health maintenance procedures.  4. Follow up with colonoscopy schedule.  5. Follow up with annual dermatology visits.  6. It doesn't seem like the COVID vaccine is working well in lung transplant patients. A number of lung transplant patients have gotten sick with COVID even after receiving the vaccines. Based on our recent " experience, it can be life-threatening to get COVID  even after being vaccinated. Please continue to act like you did not get the COVID vaccine - social distancing, wearing a mask, good hand hygiene, etc. If the people around you are vaccinated, it will help reduce the risk of you getting COVID. All members of your household should be vaccinated.  7. We will give you 1 liter of fluids during your br0nch tomorrow.   8. 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 We will start you on Azithromycin daily, Advair inhaler twice daily, and Singular daily. You will be on these medications long term.   9. We will discuss the shingles vaccine at your next visit.   10. Please get your RSV vaccine as soon as you can.   11. We will get an EKG today, this is needed before you start Azithromycin.     You are scheduled for a bronchoscopy on 1/16 at 9 am at the [facility] on the 3rd floor. Arrive 1 hour early (8am)    Instructions     1. Nothing to eat or drink 8 hours before procedure.  2. Hold your morning medications. Bring them with you to take after the procedure.  3. Have a  available to take you home.   4. Hold your morning insulin. Take half of your usual evening dose of Lantus, please only take 18 units of insulin tonight.     Next transplant clinic appointment:  2/13 with CXR, labs and PFTs  Next lab draw: Friday, 1/17.    AVS printed at time of check out

## 2025-01-14 NOTE — TELEPHONE ENCOUNTER
Requested Prescriptions   Pending Prescriptions Disp Refills    LANTUS SOLOSTAR 100 UNIT/ML soln [Pharmacy Med Name: LANTUS SOLOSTAR 100UNIT/ML SOPN] 30 mL 0     Sig: INJECT 36 UNITS UNDER THE SKIN AT BEDTIME.       Insulin Protocol Failed - 1/14/2025 11:47 AM        Failed - Chart review required     Review Chart.    Do not approve if insulin is used in a pump.  Instead, direct refill request to the patient's endocrinologist.  If the patient doesn't have an endocrinologist, then send the refill to the patient's PCP for review            Passed - HgbA1C in past 3 or 6 months     If HgbA1C is 8 or greater, it needs to be on file within the past 3 months.  If less than 8, must be on file within the past 6 months.     Recent Labs   Lab Test 09/03/24  0936   A1C 7.4*             Passed - Medication is active on med list        Passed - Recent (6 mo) or future (90 days) visit within the authorizing provider's specialty     The patient must have completed an in-person or virtual visit within the past 6 months or has a future visit scheduled within the next 90 days with the authorizing provider s specialty.  Urgent care and e-visits do not quality as an office visit for this protocol.          Passed - Medication indicated for associated diagnosis     Medication is associated with one or more of the following diagnoses:   - Type 1 diabetes mellitus  - Type 2 diabetes mellitus  - Diabetic nephropathy; Prophylaxis  - Neuropathy due to diabetes mellitus; Prophylaxis  - Retinopathy due to diabetes mellitus; Prophylaxis  - Diabetes mellitus associated with cystic fibrosis  - Disorder of cardiovascular system; Prophylaxis - Type 1 diabetes mellitus   - Disorder of cardiovascular system; Prophylaxis - Type 2 diabetes mellitus            Passed - Patient is 18 years of age or older

## 2025-01-14 NOTE — PROGRESS NOTES
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  Postoperative course complicated by delirium, stenotrophomonas on BAL s/p levofloxacin and ACR (A1B0 with elevated prospera) in October 2024) s/p solu-medrol and prednisone taper. Other history notable for diabetes, hepatic steatosis and essential tremor.      1. S/p bilateral lung transplant:  TOMAS (on imaging): notes he is walking quite a bit, up to 2-3 miles/day. Good endurance, minimal cough and no recent illnesses. Sating 98% on room air. CT chest at last visit with air trapping, was supposed to start a a ICS-LABA inhaler (only?), but this did not happen. DSA and CMV 12/17 negative; prospera of 2.52 on 12/17. S/p bronch 11/5 with steno per above and no evidence of rejection (A0B0), but did have 30% stenosis of right anastomosis CXR reviewed today and demonstrates stable transplant. PFTs improved to near his post transplant best.   - Start CLAD therapy: azithromycin, Advair and montelukast  - Prospera pending for today     Immunosuppression  - Tacrolimus, goal 8-12  - Myfortic (consider change to azathioprine if ongoing loose stools at next visit, will check TPMT)  - Prednisone 5 mg daily     Prophylaxis:   - Bactrim for PJP and nocardia prophylaxis  - Nystatin for thrush prophylaxis to end today       Donor Recipient Intervention   CMV status  - + Valganciclovir POD #8-90  (Negative 11/26, pending)   EBV status - + EBV monthly   (Negative on 11/26)   HSV status N/A + N/A      2. LLL lung nodule: 8 x 8 mm nodule in the left lower lobe noted on CT on September 24. CT tat last visit was stable.  - Repeat CT in 6 months (~6/17/25)     3. Loose stools: mainly in the am, 2-3 times, worse after meds and food. Does feel like metamucil has helped.   - Consider changing to aza per  "above     4. Hypomagnesemia: level of 2.0 today.   - Continue magnesium supplementation    5. Hypertension: BP controlled.  - Continue metoprolol     6. GERD: no current complaints.   - Continue pantoprazole.    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 Subsequent AFB cultures have been negative.    - Continue AFB cultures with all bronchoscopies    8. Elevated Cr: baseline Cr 0.5-0.6, was 0.8-0.9 two months ago, no increased to 1.33 today. Likely secondary to prerenal as patient is not taking in much water.  - Encourage 70-80 oz daily  - Will give 1 L IVF during bronch tomorrow    Chronic non transplant issues:  1. Hepatic steatosis  2. Tremor  3. DMII  4. HLD     RTC: next month as scheduled  Vaccinations: patient states his is up to date; will get RSV vaccine; discussed Shingrix in February  Preventative: will need to see Derm; colonoscopy due in 2026    Flower Medina PA-C  Pulmonary, Allergy, Critical Care and Sleep Medicine        Interval History:     Walking up to 2-3 miles per day, feeling well and endurance is improving. Still has some chest discomfort with coughing in the am, clear mucous. Cough is stable, no new or unexpected shortness of breath. No chest pain or palpitations. Still has diarrhea \"quite often\", but notes some looser stools pre transplant. Stools are \"cow pile\" to liquid throught out day. On metamucil and that helps with his stools. Getting in 32 oz water per day, appetite is good.           Review of Systems:   Please see HPI, otherwise the complete 10 point ROS is negative.           Past Medical and Surgical History:     Past Medical History:   Diagnosis Date    Administration of long-term prophylactic antibiotics 08/26/2024    Hepatic steatosis 2017    Noted on ultrasound    Hypomagnesemia 08/26/2024    Immunosuppression 08/26/2024    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    S/P lung transplant (H) 08/15/2024    Tremor 05/23/2024     Past Surgical " History:   Procedure Laterality Date    BRONCHOSCOPY  08/16/2024    BRONCHOSCOPY (RIGID OR FLEXIBLE), DIAGNOSTIC N/A 9/26/2024    Procedure: BRONCHOSCOPY, WITH BRONCHOALVEOLAR LAVAGE AND BIOPSIES;  Surgeon: Oneida Silver MD;  Location:  GI    BRONCHOSCOPY (RIGID OR FLEXIBLE), DIAGNOSTIC N/A 11/5/2024    Procedure: BRONCHOSCOPY, WITH BRONCHOALVEOLAR LAVAGE AND BIOPSIES;  Surgeon: Tera Jiménez MD;  Location:  GI    BRONCHOSCOPY FLEXIBLE AND RIGID N/A 8/27/2024    Procedure: Bronchoscopy Inspection;  Surgeon: Oneida Silver MD;  Location:  GI    COLONOSCOPY      CV CORONARY ANGIOGRAM N/A 06/21/2024    Procedure: Coronary Angiogram;  Surgeon: Gabriel Julian MD;  Location:  HEART CARDIAC CATH LAB    CV RIGHT HEART CATH MEASUREMENTS RECORDED N/A 06/21/2024    Procedure: Right Heart Catheterization;  Surgeon: Gabriel Julian MD;  Location:  HEART CARDIAC CATH LAB    PICC DOUBLE LUMEN PLACEMENT Right 08/20/2024    47-3cm, Basilic    TRANSPLANT LUNG RECIPIENT SINGLE X2 Bilateral 08/15/2024    Procedure: Clamshell Incision, On Central Venoarterial Extracorporeal Membranous Oxygenation, Bilateral Lung Transplant Recipient, Left atrial appendage ligation, Intraoperative Flexible Bronchoscopy by Anesthesia;  Surgeon: Mulvihill, Michael, MD;  Location:  OR           Family History:     Family History   Problem Relation Age of Onset    Hypertension Mother     Lung Cancer Father         former smoker    Other - See Comments Sister         daughter 26 years    No Known Problems Maternal Grandmother     Lung Cancer Maternal Grandfather         former smoker    No Known Problems Paternal Grandmother     No Known Problems Paternal Grandfather             Social History:     Social History     Socioeconomic History    Marital status: Single     Spouse name: Not on file    Number of children: Not on file    Years of education: Not on file    Highest education level: Not on file   Occupational History    Not  on file   Tobacco Use    Smoking status: Former     Types: Cigarettes    Smokeless tobacco: Never   Substance and Sexual Activity    Alcohol use: Not Currently     Comment: not since 2017    Drug use: Never    Sexual activity: Not on file   Other Topics Concern    Not on file   Social History Narrative    Not on file     Social Drivers of Health     Financial Resource Strain: Low Risk  (8/24/2024)    Financial Resource Strain     Within the past 12 months, have you or your family members you live with been unable to get utilities (heat, electricity) when it was really needed?: No   Food Insecurity: Not on File (9/26/2024)    Received from [facility]    Food Insecurity     Food: 0   Transportation Needs: Low Risk  (8/24/2024)    Transportation Needs     Within the past 12 months, has lack of transportation kept you from medical appointments, getting your medicines, non-medical meetings or appointments, work, or from getting things that you need?: No   Physical Activity: Not on File (8/26/2019)    Received from [facility]    Physical Activity     Physical Activity: 0   Stress: Not on File (8/26/2019)    Received from [facility]    Stress     Stress: 0   Social Connections: Not on File (9/11/2024)    Received from [facility]    Social Connections     Connectedness: 0   Interpersonal Safety: Unknown (11/5/2024)    Interpersonal Safety     Do you feel physically and emotionally safe where you currently live?: Patient unable to answer     Within the past 12 months, have you been hit, slapped, kicked or otherwise physically hurt by someone?: Patient unable to answer     Within the past 12 months, have you been humiliated or emotionally abused in other ways by your partner or ex-partner?: Patient unable to answer   Housing Stability: Low Risk  (8/24/2024)    Housing Stability     Do you have housing? : Yes     Are you worried about losing your housing?: No            Medications:     Current Outpatient Medications   Medication Sig Dispense  Refill    azithromycin (ZITHROMAX) 250 MG tablet Take 1 tablet (250 mg) by mouth daily. 30 tablet 11    fluticasone-salmeterol (ADVAIR) 250-50 MCG/ACT inhaler Inhale 1 puff into the lungs 2 times daily. 60 each 11    insulin glargine (LANTUS SOLOSTAR) 100 UNIT/ML pen Inject 36 Units subcutaneously at bedtime.      montelukast (SINGULAIR) 10 MG tablet Take 1 tablet (10 mg) by mouth at bedtime. 30 tablet 11    calcium carbonate-vitamin D (CALTRATE) 600-10 MG-MCG per tablet Take 1 tablet by mouth or Feeding Tube 2 times daily. 60 tablet 0    Continuous Glucose Sensor (DEXCOM G7 SENSOR) MISC Change every 10 days. 1 each 11    empagliflozin (JARDIANCE) 10 MG TABS tablet Take 1 tablet (10 mg) by mouth daily. 90 tablet 1    insulin aspart (NOVOLOG PEN) 100 UNIT/ML pen Take 13 units prior to meals, and add sliding scale per instructions.  Up to 60 units daily 60 mL 0    insulin pen needle (32G X 4 MM) 32G X 4 MM miscellaneous Use pen needles daily or as directed. 100 each 3    levalbuterol (XOPENEX) 1.25 MG/3ML neb solution Take 3 mLs (1.25 mg) by nebulization 4 times daily as needed for shortness of breath or wheezing. Use prior to Mucomyst neb. (Patient not taking: Reported on 12/17/2024) 300 mL 0    magnesium (HIGH ABSORPTION MAGNESIUM) 100 MG TABS Take 400 mg by mouth 3 times daily. 360 tablet 11    metoprolol tartrate (LOPRESSOR) 25 MG tablet Take 2 tablets (50 mg) by mouth or Feeding Tube 2 times daily. 60 tablet 11    multivitamin, therapeutic (THERA-VIT) TABS tablet Take 1 tablet by mouth daily. 30 tablet 0    mycophenolic acid (GENERIC EQUIVALENT) 360 MG EC tablet Take 2 tablets (720 mg) by mouth 2 times daily. 360 tablet 3    pantoprazole (PROTONIX) 40 MG EC tablet Take 1 tablet (40 mg) by mouth every morning (before breakfast). 30 tablet 3    predniSONE (DELTASONE) 5 MG tablet Take 1 tablet (5 mg) by mouth daily. 30 tablet 11    psyllium (METAMUCIL/KONSYL) 58.6 % powder Take 1 teaspoonful by mouth daily.       "rosuvastatin (CRESTOR) 10 MG tablet Take 1 tablet (10 mg) by mouth daily. 30 tablet 11    sodium zirconium cyclosilicate (LOKELMA) 10 g PACK packet Take 1 packet (10 g) by mouth daily as needed. (Patient not taking: Reported on 12/17/2024) 10 packet 1    sulfamethoxazole-trimethoprim (BACTRIM) 400-80 MG tablet Take 1 tablet by mouth or NG Tube daily. 30 tablet 11    tacrolimus (GENERIC EQUIVALENT) 0.5 MG capsule Take 1 capsule (0.5 mg) by mouth every morning. Total dose: 2.5 mg in the AM and 3 mg in the PM 90 capsule 3    tacrolimus (GENERIC EQUIVALENT) 1 MG capsule Take 2 capsules (2 mg) by mouth every morning AND 3 capsules (3 mg) every evening. Total dose: 2.5 mg in the AM and 3 mg in the PM. 450 capsule 3     No current facility-administered medications for this visit.     Facility-Administered Medications Ordered in Other Visits   Medication Dose Route Frequency Provider Last Rate Last Admin    sodium chloride bacteriostatic 0.9 % flush 9 mL  9 mL Intravenous Once NICOLA Pearce MD                Physical Exam:   /65 (BP Location: Right arm, Patient Position: Chair, Cuff Size: Adult Regular)   Pulse 78   Ht 1.784 m (5' 10.25\")   Wt 80.7 kg (178 lb)   SpO2 98%   BMI 25.36 kg/m      GENERAL: alert, NAD  HEENT: NCAT, EOMI, no scleral icterus, oral mucosa moist and without lesions  Neck: no cervical or supraclavicular adenopathy  Lungs: moderate airflow, mainly clear with slight decrease in base  CV: RRR, S1S2, no murmurs noted  Abdomen: normoactive BS, soft  Lymph: no edema  Neuro: AAO X 3, CN 2-12 grossly intact  Psychiatric: normal affect, good eye contact  Skin: no rash, jaundice or lesions on limited exam         Data:   All laboratory and imaging data reviewed.      Recent Results (from the past week)   Comprehensive metabolic panel    Collection Time: 01/15/25  9:49 AM   Result Value Ref Range    Sodium 140 135 - 145 mmol/L    Potassium 4.3 3.4 - 5.3 mmol/L    Carbon Dioxide (CO2) 25 22 - 29 " mmol/L    Anion Gap 11 7 - 15 mmol/L    Urea Nitrogen 27.9 (H) 8.0 - 23.0 mg/dL    Creatinine 1.33 (H) 0.67 - 1.17 mg/dL    GFR Estimate 61 >60 mL/min/1.73m2    Calcium 10.0 8.8 - 10.4 mg/dL    Chloride 104 98 - 107 mmol/L    Glucose 80 70 - 99 mg/dL    Alkaline Phosphatase 90 40 - 150 U/L    AST 23 0 - 45 U/L    ALT 27 0 - 70 U/L    Protein Total 7.3 6.4 - 8.3 g/dL    Albumin 4.5 3.5 - 5.2 g/dL    Bilirubin Total 0.4 <=1.2 mg/dL   CBC with platelets    Collection Time: 01/15/25  9:49 AM   Result Value Ref Range    WBC Count 11.5 (H) 4.0 - 11.0 10e3/uL    RBC Count 4.94 4.40 - 5.90 10e6/uL    Hemoglobin 14.2 13.3 - 17.7 g/dL    Hematocrit 43.6 40.0 - 53.0 %    MCV 88 78 - 100 fL    MCH 28.7 26.5 - 33.0 pg    MCHC 32.6 31.5 - 36.5 g/dL    RDW 12.4 10.0 - 15.0 %    Platelet Count 352 150 - 450 10e3/uL   Magnesium    Collection Time: 01/15/25  9:49 AM   Result Value Ref Range    Magnesium 2.0 1.7 - 2.3 mg/dL   INR    Collection Time: 01/15/25  9:49 AM   Result Value Ref Range    INR 1.00 0.85 - 1.15   Phosphorus    Collection Time: 01/15/25  9:49 AM   Result Value Ref Range    Phosphorus 2.5 2.5 - 4.5 mg/dL   General PFT Lab (Please always keep checked)    Collection Time: 01/15/25 10:13 AM   Result Value Ref Range    FVC-Pred 4.24 L    FVC-Pre 3.29 L    FVC-%Pred-Pre 77 %    FEV1-Pre 2.69 L    FEV1-%Pred-Pre 81 %    FEV1FVC-Pred 78 %    FEV1FVC-Pre 82 %    FEFMax-Pred 9.13 L/sec    FEFMax-Pre 6.89 L/sec    FEFMax-%Pred-Pre 75 %    FEF2575-Pred 2.74 L/sec    FEF2575-Pre 2.64 L/sec    UJA4546-%Pred-Pre 96 %    ExpTime-Pre 5.44 sec    FIFMax-Pre 5.96 L/sec    FEV1FEV6-Pred 79 %    FEV1FEV6-Pre 81 %   EKG 12-lead complete w/read - Clinics    Collection Time: 01/15/25 11:44 AM   Result Value Ref Range    Systolic Blood Pressure  mmHg    Diastolic Blood Pressure  mmHg    Ventricular Rate 74 BPM    Atrial Rate 74 BPM    WI Interval 132 ms    QRS Duration 92 ms     ms    QTc 408 ms    P Axis 65 degrees    R AXIS 1  degrees    T Axis 27 degrees    Interpretation ECG       Sinus rhythm  Normal ECG  When compared with ECG of 19-Aug-2024 09:27,  Nonspecific T wave abnormality no longer evident in Anterolateral leads       PFT interpretation:  Maneuver: valid and meets ATS guidelines  Normal spirometry

## 2025-01-15 ENCOUNTER — TELEPHONE (OUTPATIENT)
Dept: TRANSPLANT | Facility: CLINIC | Age: 60
End: 2025-01-15

## 2025-01-15 ENCOUNTER — OFFICE VISIT (OUTPATIENT)
Dept: PULMONOLOGY | Facility: CLINIC | Age: 60
End: 2025-01-15
Attending: INTERNAL MEDICINE
Payer: COMMERCIAL

## 2025-01-15 ENCOUNTER — ANCILLARY PROCEDURE (OUTPATIENT)
Dept: GENERAL RADIOLOGY | Facility: CLINIC | Age: 60
End: 2025-01-15
Attending: INTERNAL MEDICINE
Payer: COMMERCIAL

## 2025-01-15 ENCOUNTER — TELEPHONE (OUTPATIENT)
Dept: ENDOCRINOLOGY | Facility: CLINIC | Age: 60
End: 2025-01-15

## 2025-01-15 ENCOUNTER — LAB (OUTPATIENT)
Dept: LAB | Facility: CLINIC | Age: 60
End: 2025-01-15
Attending: INTERNAL MEDICINE
Payer: COMMERCIAL

## 2025-01-15 VITALS
BODY MASS INDEX: 25.48 KG/M2 | DIASTOLIC BLOOD PRESSURE: 65 MMHG | WEIGHT: 178 LBS | SYSTOLIC BLOOD PRESSURE: 126 MMHG | HEART RATE: 78 BPM | HEIGHT: 70 IN | OXYGEN SATURATION: 98 %

## 2025-01-15 DIAGNOSIS — D84.9 IMMUNOSUPPRESSION: ICD-10-CM

## 2025-01-15 DIAGNOSIS — Z79.4 TYPE 2 DIABETES MELLITUS WITHOUT COMPLICATION, WITH LONG-TERM CURRENT USE OF INSULIN (H): ICD-10-CM

## 2025-01-15 DIAGNOSIS — R73.9 STEROID-INDUCED HYPERGLYCEMIA: ICD-10-CM

## 2025-01-15 DIAGNOSIS — Z94.2 S/P LUNG TRANSPLANT (H): ICD-10-CM

## 2025-01-15 DIAGNOSIS — J84.9 ILD (INTERSTITIAL LUNG DISEASE) (H): ICD-10-CM

## 2025-01-15 DIAGNOSIS — T38.0X5A STEROID-INDUCED HYPERGLYCEMIA: ICD-10-CM

## 2025-01-15 DIAGNOSIS — E11.9 TYPE 2 DIABETES MELLITUS WITHOUT COMPLICATION, WITH LONG-TERM CURRENT USE OF INSULIN (H): ICD-10-CM

## 2025-01-15 LAB
ALBUMIN SERPL BCG-MCNC: 4.5 G/DL (ref 3.5–5.2)
ALP SERPL-CCNC: 90 U/L (ref 40–150)
ALT SERPL W P-5'-P-CCNC: 27 U/L (ref 0–70)
ANION GAP SERPL CALCULATED.3IONS-SCNC: 11 MMOL/L (ref 7–15)
AST SERPL W P-5'-P-CCNC: 23 U/L (ref 0–45)
BILIRUB SERPL-MCNC: 0.4 MG/DL
BUN SERPL-MCNC: 27.9 MG/DL (ref 8–23)
CALCIUM SERPL-MCNC: 10 MG/DL (ref 8.8–10.4)
CHLORIDE SERPL-SCNC: 104 MMOL/L (ref 98–107)
CMV DNA SPEC NAA+PROBE-ACNC: NOT DETECTED IU/ML
CREAT SERPL-MCNC: 1.33 MG/DL (ref 0.67–1.17)
EGFRCR SERPLBLD CKD-EPI 2021: 61 ML/MIN/1.73M2
ERYTHROCYTE [DISTWIDTH] IN BLOOD BY AUTOMATED COUNT: 12.4 % (ref 10–15)
EST. AVERAGE GLUCOSE BLD GHB EST-MCNC: 171 MG/DL
EXPTIME-PRE: 5.44 SEC
FEF2575-%PRED-PRE: 96 %
FEF2575-PRE: 2.64 L/SEC
FEF2575-PRED: 2.74 L/SEC
FEFMAX-%PRED-PRE: 75 %
FEFMAX-PRE: 6.89 L/SEC
FEFMAX-PRED: 9.13 L/SEC
FEV1-%PRED-PRE: 81 %
FEV1-PRE: 2.69 L
FEV1FEV6-PRE: 81 %
FEV1FEV6-PRED: 79 %
FEV1FVC-PRE: 82 %
FEV1FVC-PRED: 78 %
FIFMAX-PRE: 5.96 L/SEC
FVC-%PRED-PRE: 77 %
FVC-PRE: 3.29 L
FVC-PRED: 4.24 L
GLUCOSE SERPL-MCNC: 80 MG/DL (ref 70–99)
HBA1C MFR BLD: 7.6 %
HCO3 SERPL-SCNC: 25 MMOL/L (ref 22–29)
HCT VFR BLD AUTO: 43.6 % (ref 40–53)
HGB BLD-MCNC: 14.2 G/DL (ref 13.3–17.7)
INR PPP: 1 (ref 0.85–1.15)
LDLC SERPL DIRECT ASSAY-MCNC: 61 MG/DL
MAGNESIUM SERPL-MCNC: 2 MG/DL (ref 1.7–2.3)
MCH RBC QN AUTO: 28.7 PG (ref 26.5–33)
MCHC RBC AUTO-ENTMCNC: 32.6 G/DL (ref 31.5–36.5)
MCV RBC AUTO: 88 FL (ref 78–100)
PHOSPHATE SERPL-MCNC: 2.5 MG/DL (ref 2.5–4.5)
PLATELET # BLD AUTO: 352 10E3/UL (ref 150–450)
POTASSIUM SERPL-SCNC: 4.3 MMOL/L (ref 3.4–5.3)
PROT SERPL-MCNC: 7.3 G/DL (ref 6.4–8.3)
RBC # BLD AUTO: 4.94 10E6/UL (ref 4.4–5.9)
SODIUM SERPL-SCNC: 140 MMOL/L (ref 135–145)
SPECIMEN TYPE: NORMAL
TACROLIMUS BLD-MCNC: 13.1 UG/L (ref 5–15)
TME LAST DOSE: NORMAL H
TME LAST DOSE: NORMAL H
WBC # BLD AUTO: 11.5 10E3/UL (ref 4–11)

## 2025-01-15 PROCEDURE — 99000 SPECIMEN HANDLING OFFICE-LAB: CPT | Performed by: PATHOLOGY

## 2025-01-15 PROCEDURE — 71046 X-RAY EXAM CHEST 2 VIEWS: CPT | Performed by: STUDENT IN AN ORGANIZED HEALTH CARE EDUCATION/TRAINING PROGRAM

## 2025-01-15 PROCEDURE — 85027 COMPLETE CBC AUTOMATED: CPT | Performed by: PATHOLOGY

## 2025-01-15 PROCEDURE — 83721 ASSAY OF BLOOD LIPOPROTEIN: CPT | Performed by: NURSE PRACTITIONER

## 2025-01-15 PROCEDURE — 36415 COLL VENOUS BLD VENIPUNCTURE: CPT | Performed by: PATHOLOGY

## 2025-01-15 PROCEDURE — 83036 HEMOGLOBIN GLYCOSYLATED A1C: CPT | Performed by: NURSE PRACTITIONER

## 2025-01-15 PROCEDURE — 80197 ASSAY OF TACROLIMUS: CPT | Performed by: INTERNAL MEDICINE

## 2025-01-15 PROCEDURE — 85610 PROTHROMBIN TIME: CPT | Performed by: PATHOLOGY

## 2025-01-15 PROCEDURE — 84100 ASSAY OF PHOSPHORUS: CPT | Performed by: PATHOLOGY

## 2025-01-15 PROCEDURE — 83735 ASSAY OF MAGNESIUM: CPT | Performed by: PATHOLOGY

## 2025-01-15 PROCEDURE — G0463 HOSPITAL OUTPT CLINIC VISIT: HCPCS | Performed by: PHYSICIAN ASSISTANT

## 2025-01-15 PROCEDURE — 80053 COMPREHEN METABOLIC PANEL: CPT | Performed by: PATHOLOGY

## 2025-01-15 PROCEDURE — 94375 RESPIRATORY FLOW VOLUME LOOP: CPT | Performed by: PHYSICIAN ASSISTANT

## 2025-01-15 PROCEDURE — 99214 OFFICE O/P EST MOD 30 MIN: CPT | Mod: 25 | Performed by: PHYSICIAN ASSISTANT

## 2025-01-15 PROCEDURE — 99207 PR NO CHARGE LOS: CPT

## 2025-01-15 RX ORDER — AZITHROMYCIN 250 MG/1
250 TABLET, FILM COATED ORAL DAILY
Qty: 30 TABLET | Refills: 11 | Status: SHIPPED | OUTPATIENT
Start: 2025-01-15

## 2025-01-15 RX ORDER — MONTELUKAST SODIUM 10 MG/1
10 TABLET ORAL AT BEDTIME
Qty: 30 TABLET | Refills: 11 | Status: SHIPPED | OUTPATIENT
Start: 2025-01-15

## 2025-01-15 RX ORDER — PANTOPRAZOLE SODIUM 40 MG/1
40 TABLET, DELAYED RELEASE ORAL
Qty: 30 TABLET | Refills: 3 | Status: SHIPPED | OUTPATIENT
Start: 2025-01-15

## 2025-01-15 RX ORDER — INSULIN GLARGINE 100 [IU]/ML
36 INJECTION, SOLUTION SUBCUTANEOUS AT BEDTIME
Status: SHIPPED
Start: 2025-01-15 | End: 2025-01-16

## 2025-01-15 RX ORDER — TACROLIMUS 1 MG/1
2 CAPSULE ORAL 2 TIMES DAILY
Qty: 360 CAPSULE | Refills: 3 | Status: SHIPPED | OUTPATIENT
Start: 2025-01-15

## 2025-01-15 RX ORDER — FLUTICASONE PROPIONATE AND SALMETEROL 250; 50 UG/1; UG/1
1 POWDER RESPIRATORY (INHALATION) 2 TIMES DAILY
Qty: 60 EACH | Refills: 11 | Status: SHIPPED | OUTPATIENT
Start: 2025-01-15

## 2025-01-15 RX ORDER — TACROLIMUS 0.5 MG/1
0.5 CAPSULE ORAL 2 TIMES DAILY
Qty: 90 CAPSULE | Refills: 3 | Status: SHIPPED | OUTPATIENT
Start: 2025-01-15

## 2025-01-15 ASSESSMENT — PAIN SCALES - GENERAL: PAINLEVEL_OUTOF10: NO PAIN (0)

## 2025-01-15 NOTE — PROGRESS NOTES
Travon Cartwright comes into clinic today at the request of Carol PADGETT , for spirometry     Tolerated testing well. No adverse reactions. Left lab in no distress.        MAEGAN GARCIA

## 2025-01-15 NOTE — TELEPHONE ENCOUNTER
Hello,     It looks like Juan J's Lantus insulin was changed at today's visit due to what the patient reported, but no new order was sent to the pharmacy. When the order was canceled in Epic, the existing order that we had in process for him got canceled.      Please send a new ERx for Lantus with the current dosing to  [facility] so that we can add it back to the order we are sending him with all of his transplant meds on Fri 1/17.     For questions, please call [phone].     Thank you.   [facility]

## 2025-01-15 NOTE — LETTER
1/15/2025      Travon Cartwright  [address]  [facility] [address]      Dear Colleague,    Thank you for referring your patient, Travon Cartwright, to the [facility] [place]. Please see a copy of my visit note below.    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  Postoperative course complicated by delirium.  He had pretransplant diabetes, hepatic steatosis and essential tremor.  Stenotrophomonas on 9/26/2024 bronchoscopy treated with levofloxacin.  Treated with Solu-Medrol and prednisone taper in early October 2024 for ACR based on A1B0 on surveillance biopsy, elevated cell free DNA and decreased PFTs.     Pulmonary/lung transplant: PFTs have been low. Asymptomatic at this time and feeling better. Very active and reports walking 10,000 steps per day. Does have air trapping on expiratory CT images today and will start ICS-LABA inhaler, may consider addition of azithromycin at next visit. Will be getting bronch and surveillance biopsies following next visit. Did have granulation tissue with 30% stenosis of right anastomosis on 11/5 bronch. Prospera and DSA pending. Prospera had been elevated but was coming down. DSA and CMV 12/17. CXR      Immunosuppression  - Tacrolimus, goal 8-12  - Myfortic, changed from CellCept due to loose stools  - Prednisone 5 mg daily     Prophylaxis:   - Bactrim for PJP and nocardia prophylaxis  - CMV and EBV monitoring per protocol, s/p Valcyte prophylaxis  - Nystatin for thrush prophylaxis.       Donor Recipient Intervention   CMV status  - + Valganciclovir POD #8-90  (Negative 11/26, pending)   EBV status - + EBV monthly   (Negative on 11/26)   HSV status N/A + N/A         Lung nodule: 8 x 8 mm nodule in the left lower lobe noted on CT on September 24.  " CT today with stable nodule.   - Repeat CT in 6 months for follow up (~6/17/25)     GI: Persistent loose stool.  Previously switched from CellCept to Myfortic.  Metamucil added at last visit.       Hypomagnesemia: Level 1.7. Continue current replacement.    Diabetes mellitus: ACI fo   - Continue   - Will be restarting Jardiance likely this week     Hypertension: Blood pressure is well-controlled with current metoprolol.     GERD: Adequately controlled with current pantoprazole.     Hyperlipidemia: Continue current rosuvastatin.  Check lipid battery annually with annual studies.     Tremors: Possibly a little better today.  If persistent/progressive, could consider neurology consultation or a trial of primidone.     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  Subsequent AFB cultures have been negative.  No indication for treatment at this time.  Continue AFB cultures with all bronchoscopies.     Hepatic steatosis: LFTs within normal limits.  Continue close monitoring and pursue hepatology consultation if elevated.        Healthcare maintenance:   - Colonoscopy in 2016, repeat due in 2026.    - Vaccinations: S/p COVID, Flu    RTC:   Vaccinations: patient states his is up to date; will get RSV vaccine; discussed Shingrix in February  Preventative:    Flower Medina PA-C  Pulmonary, Allergy, Critical Care and Sleep Medicine        Interval History:     Walking up to 2-3 miles per day, feeling well and endurance is improving. Still has some chest discomfort with coughing in the am, clear mucous. Cough is stable, no new or unexpected shortness of breath. No chest pain or palpitations. Still has diarrhea \"quite often\", but notes some looser stools pre transplant. Stools are \"cow pile\" to liquid throught out day. On metamucil and that helps with his stools. Getting in 32 oz water per day, appetite is good.           Review of Systems:   Please see HPI, otherwise the complete 10 point ROS is " negative.           Past Medical and Surgical History:     Past Medical History:   Diagnosis Date     Administration of long-term prophylactic antibiotics 08/26/2024     Hepatic steatosis 2017    Noted on ultrasound     Hypomagnesemia 08/26/2024     Immunosuppression 08/26/2024     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     S/P lung transplant (H) 08/15/2024     Tremor 05/23/2024     Past Surgical History:   Procedure Laterality Date     BRONCHOSCOPY  08/16/2024     BRONCHOSCOPY (RIGID OR FLEXIBLE), DIAGNOSTIC N/A 9/26/2024    Procedure: BRONCHOSCOPY, WITH BRONCHOALVEOLAR LAVAGE AND BIOPSIES;  Surgeon: Oneida Silver MD;  Location:  GI     BRONCHOSCOPY (RIGID OR FLEXIBLE), DIAGNOSTIC N/A 11/5/2024    Procedure: BRONCHOSCOPY, WITH BRONCHOALVEOLAR LAVAGE AND BIOPSIES;  Surgeon: Tera Jiménez MD;  Location:  GI     BRONCHOSCOPY FLEXIBLE AND RIGID N/A 8/27/2024    Procedure: Bronchoscopy Inspection;  Surgeon: Oneida Silver MD;  Location:  GI     COLONOSCOPY       CV CORONARY ANGIOGRAM N/A 06/21/2024    Procedure: Coronary Angiogram;  Surgeon: Gabriel Julian MD;  Location:  HEART CARDIAC CATH LAB     CV RIGHT HEART CATH MEASUREMENTS RECORDED N/A 06/21/2024    Procedure: Right Heart Catheterization;  Surgeon: Gabriel Julian MD;  Location:  HEART CARDIAC CATH LAB     PICC DOUBLE LUMEN PLACEMENT Right 08/20/2024    47-3cm, Basilic     TRANSPLANT LUNG RECIPIENT SINGLE X2 Bilateral 08/15/2024    Procedure: Clamshell Incision, On Central Venoarterial Extracorporeal Membranous Oxygenation, Bilateral Lung Transplant Recipient, Left atrial appendage ligation, Intraoperative Flexible Bronchoscopy by Anesthesia;  Surgeon: Mulvihill, Michael, MD;  Location:  OR           Family History:     Family History   Problem Relation Age of Onset     Hypertension Mother      Lung Cancer Father         former smoker     Other - See Comments Sister         daughter 26 years     No Known Problems Maternal  Grandmother      Lung Cancer Maternal Grandfather         former smoker     No Known Problems Paternal Grandmother      No Known Problems Paternal Grandfather             Social History:     Social History     Socioeconomic History     Marital status: Single     Spouse name: Not on file     Number of children: Not on file     Years of education: Not on file     Highest education level: Not on file   Occupational History     Not on file   Tobacco Use     Smoking status: Former     Types: Cigarettes     Smokeless tobacco: Never   Substance and Sexual Activity     Alcohol use: Not Currently     Comment: not since 2017     Drug use: Never     Sexual activity: Not on file   Other Topics Concern     Not on file   Social History Narrative     Not on file     Social Drivers of Health     Financial Resource Strain: Low Risk  (8/24/2024)    Financial Resource Strain      Within the past 12 months, have you or your family members you live with been unable to get utilities (heat, electricity) when it was really needed?: No   Food Insecurity: Not on File (9/26/2024)    Received from [facility]    Food Insecurity      Food: 0   Transportation Needs: Low Risk  (8/24/2024)    Transportation Needs      Within the past 12 months, has lack of transportation kept you from medical appointments, getting your medicines, non-medical meetings or appointments, work, or from getting things that you need?: No   Physical Activity: Not on File (8/26/2019)    Received from [facility]    Physical Activity      Physical Activity: 0   Stress: Not on File (8/26/2019)    Received from [facility]    Stress      Stress: 0   Social Connections: Not on File (9/11/2024)    Received from [facility]    Social Connections      Connectedness: 0   Interpersonal Safety: Unknown (11/5/2024)    Interpersonal Safety      Do you feel physically and emotionally safe where you currently live?: Patient unable to answer      Within the past 12 months, have you been hit, slapped, kicked or  otherwise physically hurt by someone?: Patient unable to answer      Within the past 12 months, have you been humiliated or emotionally abused in other ways by your partner or ex-partner?: Patient unable to answer   Housing Stability: Low Risk  (8/24/2024)    Housing Stability      Do you have housing? : Yes      Are you worried about losing your housing?: No            Medications:     Current Outpatient Medications   Medication Sig Dispense Refill     azithromycin (ZITHROMAX) 250 MG tablet Take 1 tablet (250 mg) by mouth daily. 30 tablet 11     fluticasone-salmeterol (ADVAIR) 250-50 MCG/ACT inhaler Inhale 1 puff into the lungs 2 times daily. 60 each 11     insulin glargine (LANTUS SOLOSTAR) 100 UNIT/ML pen Inject 36 Units subcutaneously at bedtime.       montelukast (SINGULAIR) 10 MG tablet Take 1 tablet (10 mg) by mouth at bedtime. 30 tablet 11     calcium carbonate-vitamin D (CALTRATE) 600-10 MG-MCG per tablet Take 1 tablet by mouth or Feeding Tube 2 times daily. 60 tablet 0     Continuous Glucose Sensor (DEXCOM G7 SENSOR) MISC Change every 10 days. 1 each 11     empagliflozin (JARDIANCE) 10 MG TABS tablet Take 1 tablet (10 mg) by mouth daily. 90 tablet 1     insulin aspart (NOVOLOG PEN) 100 UNIT/ML pen Take 13 units prior to meals, and add sliding scale per instructions.  Up to 60 units daily 60 mL 0     insulin pen needle (32G X 4 MM) 32G X 4 MM miscellaneous Use pen needles daily or as directed. 100 each 3     levalbuterol (XOPENEX) 1.25 MG/3ML neb solution Take 3 mLs (1.25 mg) by nebulization 4 times daily as needed for shortness of breath or wheezing. Use prior to Mucomyst neb. (Patient not taking: Reported on 12/17/2024) 300 mL 0     magnesium (HIGH ABSORPTION MAGNESIUM) 100 MG TABS Take 400 mg by mouth 3 times daily. 360 tablet 11     metoprolol tartrate (LOPRESSOR) 25 MG tablet Take 2 tablets (50 mg) by mouth or Feeding Tube 2 times daily. 60 tablet 11     multivitamin, therapeutic (THERA-VIT) TABS  "tablet Take 1 tablet by mouth daily. 30 tablet 0     mycophenolic acid (GENERIC EQUIVALENT) 360 MG EC tablet Take 2 tablets (720 mg) by mouth 2 times daily. 360 tablet 3     pantoprazole (PROTONIX) 40 MG EC tablet Take 1 tablet (40 mg) by mouth every morning (before breakfast). 30 tablet 3     predniSONE (DELTASONE) 5 MG tablet Take 1 tablet (5 mg) by mouth daily. 30 tablet 11     psyllium (METAMUCIL/KONSYL) 58.6 % powder Take 1 teaspoonful by mouth daily.       rosuvastatin (CRESTOR) 10 MG tablet Take 1 tablet (10 mg) by mouth daily. 30 tablet 11     sodium zirconium cyclosilicate (LOKELMA) 10 g PACK packet Take 1 packet (10 g) by mouth daily as needed. (Patient not taking: Reported on 12/17/2024) 10 packet 1     sulfamethoxazole-trimethoprim (BACTRIM) 400-80 MG tablet Take 1 tablet by mouth or NG Tube daily. 30 tablet 11     tacrolimus (GENERIC EQUIVALENT) 0.5 MG capsule Take 1 capsule (0.5 mg) by mouth every morning. Total dose: 2.5 mg in the AM and 3 mg in the PM 90 capsule 3     tacrolimus (GENERIC EQUIVALENT) 1 MG capsule Take 2 capsules (2 mg) by mouth every morning AND 3 capsules (3 mg) every evening. Total dose: 2.5 mg in the AM and 3 mg in the PM. 450 capsule 3     No current facility-administered medications for this visit.     Facility-Administered Medications Ordered in Other Visits   Medication Dose Route Frequency Provider Last Rate Last Admin     sodium chloride bacteriostatic 0.9 % flush 9 mL  9 mL Intravenous Once NICOLA Pearce MD                Physical Exam:   /65 (BP Location: Right arm, Patient Position: Chair, Cuff Size: Adult Regular)   Pulse 78   Ht 1.784 m (5' 10.25\")   Wt 80.7 kg (178 lb)   SpO2 98%   BMI 25.36 kg/m      GENERAL: alert, NAD  HEENT: NCAT, EOMI, no scleral icterus, oral mucosa moist and without lesions  Neck: no cervical or supraclavicular adenopathy  Lungs: good air flow, no crackles, rhonchi or wheezing  CV: RRR, S1S2, no murmurs noted  Abdomen: " normoactive BS, soft, non tender and no organomegaly  Lymph: no edema  Neuro: AAO X 3, CN 2-12 grossly intact  Psychiatric: normal affect, good eye contact  Skin: no rash, jaundice or lesions on limited exam         Data:   All laboratory and imaging data reviewed.      Recent Results (from the past week)   Comprehensive metabolic panel    Collection Time: 01/15/25  9:49 AM   Result Value Ref Range    Sodium 140 135 - 145 mmol/L    Potassium 4.3 3.4 - 5.3 mmol/L    Carbon Dioxide (CO2) 25 22 - 29 mmol/L    Anion Gap 11 7 - 15 mmol/L    Urea Nitrogen 27.9 (H) 8.0 - 23.0 mg/dL    Creatinine 1.33 (H) 0.67 - 1.17 mg/dL    GFR Estimate 61 >60 mL/min/1.73m2    Calcium 10.0 8.8 - 10.4 mg/dL    Chloride 104 98 - 107 mmol/L    Glucose 80 70 - 99 mg/dL    Alkaline Phosphatase 90 40 - 150 U/L    AST 23 0 - 45 U/L    ALT 27 0 - 70 U/L    Protein Total 7.3 6.4 - 8.3 g/dL    Albumin 4.5 3.5 - 5.2 g/dL    Bilirubin Total 0.4 <=1.2 mg/dL   CBC with platelets    Collection Time: 01/15/25  9:49 AM   Result Value Ref Range    WBC Count 11.5 (H) 4.0 - 11.0 10e3/uL    RBC Count 4.94 4.40 - 5.90 10e6/uL    Hemoglobin 14.2 13.3 - 17.7 g/dL    Hematocrit 43.6 40.0 - 53.0 %    MCV 88 78 - 100 fL    MCH 28.7 26.5 - 33.0 pg    MCHC 32.6 31.5 - 36.5 g/dL    RDW 12.4 10.0 - 15.0 %    Platelet Count 352 150 - 450 10e3/uL   Magnesium    Collection Time: 01/15/25  9:49 AM   Result Value Ref Range    Magnesium 2.0 1.7 - 2.3 mg/dL   INR    Collection Time: 01/15/25  9:49 AM   Result Value Ref Range    INR 1.00 0.85 - 1.15   Phosphorus    Collection Time: 01/15/25  9:49 AM   Result Value Ref Range    Phosphorus 2.5 2.5 - 4.5 mg/dL   General PFT Lab (Please always keep checked)    Collection Time: 01/15/25 10:13 AM   Result Value Ref Range    FVC-Pred 4.24 L    FVC-Pre 3.29 L    FVC-%Pred-Pre 77 %    FEV1-Pre 2.69 L    FEV1-%Pred-Pre 81 %    FEV1FVC-Pred 78 %    FEV1FVC-Pre 82 %    FEFMax-Pred 9.13 L/sec    FEFMax-Pre 6.89 L/sec    FEFMax-%Pred-Pre 75  %    FEF2575-Pred 2.74 L/sec    FEF2575-Pre 2.64 L/sec    WCM7051-%Pred-Pre 96 %    ExpTime-Pre 5.44 sec    FIFMax-Pre 5.96 L/sec    FEV1FEV6-Pred 79 %    FEV1FEV6-Pre 81 %       PFT interpretation:  Maneuver: valid and meets ATS guidelines  Obstruction (FEV1): Mild: >70%                   Moderate: 60-69%          Moderate severe: 50-59%          Severe: 35-49%          Very severe: < 35  Compared to prior:   The decrease in FVC may represent air trapping v. restrictive physiology.  Lung volumes would be necessary to determine.    Transplant Coordinator Note    Reason for visit: Post lung transplant follow up visit   Coordinator: Present   Caregiver:      Health concerns addressed today:  1. ***  2. ***  3. ***     Activity/rehab: Up ad jasmyne, walking 2 miles a few times/day.   Oxygen needs: Room air  Pain management/RX: N/a  Diabetic management: Managed by endocrine, 130-170s recently.   Next Bronch due: 1/16/25  CMV status: D-/R+  Valcyte stopped: through POD #90  EBV status: D-/R+, monitor monthly  DVT/PE: N/A  Post op AFIB/follow up with EP: N/A  AC/asa: none  PJP prophylactic: Bactrim    COVID:  COVID-19 infection (yes/no, date of most recent positive test):   Status/instructions given about COVID-19 vaccine:     Pt Education: medications (use/dose/side effects), how/when to call coordinator, frequency of labs, s/s of infection/rejection, call prior to starting any new medications, lab/vital sign book    Health Maintenance:   Last colonoscopy:   Next colonoscopy due:   Dermatology:  Vaccinations this visit:     Labs, CXR, PFTs reviewed with patient  Medication record reviewed and reconciled  Questions and concerns addressed    Patient Instructions  1. Continue to hydrate with 60-70 oz fluids daily.  2. Continue to exercise daily or most days of the week.  3. Follow up with your primary care provider for annual gender health maintenance procedures.  4. Follow up with colonoscopy schedule.  5. Follow up with  "annual dermatology visits.  6. It doesn't seem like the COVID vaccine is working well in lung transplant patients. A number of lung transplant patients have gotten sick with COVID even after receiving the vaccines. Based on our recent experience, it can be life-threatening to get COVID  even after being vaccinated. Please continue to act like you did not get the COVID vaccine - social distancing, wearing a mask, good hand hygiene, etc. If the people around you are vaccinated, it will help reduce the risk of you getting COVID. All members of your household should be vaccinated.  7.     Next transplant clinic appointment:  with CXR, labs and PFTs  Next lab draw:     AVS printed at time of check out        Transplant Coordinator Note    Reason for visit: Post lung transplant follow up visit   Coordinator: Present   Caregiver: Tianna    Health concerns addressed today:  1. Walking a lot, 2-3 miles/day. Mall walking.   2. Feels like he is doing really well. Occasional cough w/clear phlegm in the AM.   3. No shortness of breath reported, \"stable.\"  4. Still has frequent diarrhea, more liquid throughout the day.   5. Good PO intake, only taking around 32 oz daily.   6. Pt reports he has gained some weight.   7.     Activity/rehab: Up ad jasmyne, walking 2 miles a few times/day.   Oxygen needs: Room air  Pain management/RX: N/a  Diabetic management: Managed by endocrine.    Next Bronch due: 1/16/25  CMV status: D-/R+  Valcyte stopped: through POD #90  EBV status: D-/R+, monitor monthly  DVT/PE: N/A  Post op AFIB/follow up with EP: N/A  AC/asa: none  PJP prophylactic: Bactrim    COVID:  COVID-19 infection (yes/no, date of most recent positive test):   Status/instructions given about COVID-19 vaccine:     Pt Education: medications (use/dose/side effects), how/when to call coordinator, frequency of labs, s/s of infection/rejection, call prior to starting any new medications, lab/vital sign book    Health Maintenance:   Last " colonoscopy:   Next colonoscopy due:   Dermatology:  Vaccinations this visit:     Labs, CXR, PFTs reviewed with patient  Medication record reviewed and reconciled  Questions and concerns addressed    Patient Instructions  1. Continue to hydrate with 70 oz fluids daily.  2. Continue to exercise daily or most days of the week.  3. Follow up with your primary care provider for annual gender health maintenance procedures.  4. Follow up with colonoscopy schedule.  5. Follow up with annual dermatology visits.  6. It doesn't seem like the COVID vaccine is working well in lung transplant patients. A number of lung transplant patients have gotten sick with COVID even after receiving the vaccines. Based on our recent experience, it can be life-threatening to get COVID  even after being vaccinated. Please continue to act like you did not get the COVID vaccine - social distancing, wearing a mask, good hand hygiene, etc. If the people around you are vaccinated, it will help reduce the risk of you getting COVID. All members of your household should be vaccinated.  7. We will give you 1 liter of fluids during your br0nch tomorrow.   8. 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 We will start you on Azithromycin daily, Advair inhaler twice daily, and Singular daily. You will be on these medications long term.   9. We will discuss the shingles vaccine at your next visit.   10. Please get your RSV vaccine as soon as you can.   11. We will get an EKG today, this is needed before you start Azithromycin.     You are scheduled for a bronchoscopy on 1/16 at 9 am at the [facility] on the 3rd floor. Arrive 1 hour early (8am)    Instructions     1. Nothing to eat or drink 8 hours before procedure.  2. Hold your morning medications. Bring them with you to take after the procedure.  3. Have a  available to take you home.   4. Hold your morning insulin. Take half of your usual evening dose of  Lantus, please only take 18 units of insulin tonight.     Next transplant clinic appointment:  2/13 with CXR, labs and PFTs  Next lab draw: Friday, 1/17.    AVS printed at time of check out        Again, thank you for allowing me to participate in the care of your patient.        Sincerely,        Flower Medina PA-C    Electronically signed

## 2025-01-15 NOTE — PROGRESS NOTES
Date of transplant: 8/15/2024  Transplant type: Bilateral lung  Date of labs: 1/15/2025  BUN: 27.9,  DDAVP: No  Plt: 352  INR: 1.0, Anticoag therapy: NA Last dose: NA  Comment: Routine bronchoscopy with lavage and biopsy.

## 2025-01-15 NOTE — TELEPHONE ENCOUNTER
Calling patient for a few things:  Did he have success in changing flight: yes (Saturday)    Tacrolimus level 13.1 at 12 hours, on 1/15/25.  Goal 8-12.   Current dose 2.5 mg in AM, 3 mg in PM  Dose changed to 2.5 mg in AM, 2.5 mg in PM   Recheck level in 1 week February 14th. Will check then states patient.   Lab appointment made for Friday at 9:45 am at [facility]  Bronchoscopy tomorrow 1/16   normal...

## 2025-01-15 NOTE — NURSING NOTE
Chief Complaint   Patient presents with    Follow Up     Post Lung       Vitals were taken.    Rajesh Cervantes, Clinic Assistant   11:07 AM

## 2025-01-15 NOTE — PATIENT INSTRUCTIONS
Patient Instructions  1. Continue to hydrate with 70 oz fluids daily.  2. Continue to exercise daily or most days of the week.  3. Follow up with your primary care provider for annual gender health maintenance procedures.  4. Follow up with colonoscopy schedule.  5. Follow up with annual dermatology visits.  6. It doesn't seem like the COVID vaccine is working well in lung transplant patients. A number of lung transplant patients have gotten sick with COVID even after receiving the vaccines. Based on our recent experience, it can be life-threatening to get COVID  even after being vaccinated. Please continue to act like you did not get the COVID vaccine - social distancing, wearing a mask, good hand hygiene, etc. If the people around you are vaccinated, it will help reduce the risk of you getting COVID. All members of your household should be vaccinated.  7. We will give you 1 liter of fluids during your br0nch tomorrow.   8. 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 We will start you on Azithromycin daily, Advair inhaler twice daily, and Singular daily. You will be on these medications long term.   9. We will discuss the shingles vaccine at your next visit.   10. Please get your RSV vaccine as soon as you can.   11. We will get an EKG today, this is needed before you start Azithromycin.     You are scheduled for a bronchoscopy on 1/16 at 9 am at the [facility] on the 3rd floor. Arrive 1 hour early (8am)    Instructions     1. Nothing to eat or drink 8 hours before procedure.  2. Hold your morning medications. Bring them with you to take after the procedure.  3. Have a  available to take you home.   4. Hold your morning insulin. Take half of your usual evening dose of Lantus, please only take 18 units of insulin tonight.     Next transplant clinic appointment:  2/13 with CXR, labs and PFTs  Next lab draw: Friday, 1/17.    AVS printed at time of check out

## 2025-01-16 ENCOUNTER — TELEPHONE (OUTPATIENT)
Dept: TRANSPLANT | Facility: CLINIC | Age: 60
End: 2025-01-16

## 2025-01-16 ENCOUNTER — HOSPITAL ENCOUNTER (OUTPATIENT)
Facility: CLINIC | Age: 60
Discharge: HOME | End: 2025-01-16
Attending: INTERNAL MEDICINE | Admitting: INTERNAL MEDICINE
Payer: COMMERCIAL

## 2025-01-16 ENCOUNTER — APPOINTMENT (OUTPATIENT)
Dept: GENERAL RADIOLOGY | Facility: CLINIC | Age: 60
End: 2025-01-16
Attending: INTERNAL MEDICINE
Payer: COMMERCIAL

## 2025-01-16 VITALS
RESPIRATION RATE: 16 BRPM | OXYGEN SATURATION: 90 % | SYSTOLIC BLOOD PRESSURE: 139 MMHG | HEART RATE: 82 BPM | DIASTOLIC BLOOD PRESSURE: 86 MMHG

## 2025-01-16 DIAGNOSIS — Z94.2 S/P LUNG TRANSPLANT (H): ICD-10-CM

## 2025-01-16 LAB
APPEARANCE FLD: ABNORMAL
ATRIAL RATE - MUSE: 74 BPM
BACTERIA SPEC CULT: NORMAL
BACTERIA SPEC CULT: NORMAL
CELL COUNT BODY FLUID SOURCE: ABNORMAL
COLOR FLD: COLORLESS
DIASTOLIC BLOOD PRESSURE - MUSE: NORMAL MMHG
GLUCOSE BLDC GLUCOMTR-MCNC: 167 MG/DL (ref 70–99)
GRAM STAIN RESULT: NORMAL
INTERPRETATION ECG - MUSE: NORMAL
LYMPHOCYTES NFR FLD MANUAL: 1 %
MONOS+MACROS NFR FLD MANUAL: 0 %
NEUTS BAND NFR FLD MANUAL: 4 %
OTHER CELLS FLD MANUAL: 96 %
P AXIS - MUSE: 65 DEGREES
PATH REPORT.COMMENTS IMP SPEC: ABNORMAL
PATH REPORT.COMMENTS IMP SPEC: NORMAL
PATH REPORT.COMMENTS IMP SPEC: NORMAL
PATH REPORT.COMMENTS IMP SPEC: YES
PATH REPORT.FINAL DX SPEC: ABNORMAL
PATH REPORT.FINAL DX SPEC: NORMAL
PATH REPORT.GROSS SPEC: ABNORMAL
PATH REPORT.GROSS SPEC: NORMAL
PATH REPORT.MICROSCOPIC SPEC OTHER STN: ABNORMAL
PATH REPORT.MICROSCOPIC SPEC OTHER STN: NORMAL
PATH REPORT.RELEVANT HX SPEC: ABNORMAL
PATH REPORT.RELEVANT HX SPEC: NORMAL
PHOTO IMAGE: NORMAL
PR INTERVAL - MUSE: 132 MS
QRS DURATION - MUSE: 92 MS
QT - MUSE: 368 MS
QTC - MUSE: 408 MS
R AXIS - MUSE: 1 DEGREES
SYSTOLIC BLOOD PRESSURE - MUSE: NORMAL MMHG
T AXIS - MUSE: 27 DEGREES
VENTRICULAR RATE- MUSE: 74 BPM
WBC # FLD AUTO: 68 /UL

## 2025-01-16 PROCEDURE — 87070 CULTURE OTHR SPECIMN AEROBIC: CPT | Performed by: INTERNAL MEDICINE

## 2025-01-16 PROCEDURE — 87102 FUNGUS ISOLATION CULTURE: CPT | Performed by: INTERNAL MEDICINE

## 2025-01-16 PROCEDURE — 71046 X-RAY EXAM CHEST 2 VIEWS: CPT | Mod: 26 | Performed by: RADIOLOGY

## 2025-01-16 PROCEDURE — 87496 CYTOMEG DNA AMP PROBE: CPT | Performed by: INTERNAL MEDICINE

## 2025-01-16 PROCEDURE — 999N000180 XR SURGERY CARM FLUORO LESS THAN 5 MIN

## 2025-01-16 PROCEDURE — 31632 BRONCHOSCOPY/LUNG BX ADDL: CPT | Mod: GC | Performed by: INTERNAL MEDICINE

## 2025-01-16 PROCEDURE — 88305 TISSUE EXAM BY PATHOLOGIST: CPT | Mod: TC | Performed by: INTERNAL MEDICINE

## 2025-01-16 PROCEDURE — 31628 BRONCHOSCOPY/LUNG BX EACH: CPT | Mod: GC | Performed by: INTERNAL MEDICINE

## 2025-01-16 PROCEDURE — 99153 MOD SED SAME PHYS/QHP EA: CPT | Performed by: INTERNAL MEDICINE

## 2025-01-16 PROCEDURE — 999N000065 XR CHEST 2 VIEWS

## 2025-01-16 PROCEDURE — G0500 MOD SEDAT ENDO SERVICE >5YRS: HCPCS | Performed by: INTERNAL MEDICINE

## 2025-01-16 PROCEDURE — 88312 SPECIAL STAINS GROUP 1: CPT | Mod: 26 | Performed by: PATHOLOGY

## 2025-01-16 PROCEDURE — 87206 SMEAR FLUORESCENT/ACID STAI: CPT | Performed by: INTERNAL MEDICINE

## 2025-01-16 PROCEDURE — 88305 TISSUE EXAM BY PATHOLOGIST: CPT | Mod: 26 | Performed by: PATHOLOGY

## 2025-01-16 PROCEDURE — 88108 CYTOPATH CONCENTRATE TECH: CPT | Mod: 26 | Performed by: PATHOLOGY

## 2025-01-16 PROCEDURE — 99152 MOD SED SAME PHYS/QHP 5/>YRS: CPT | Mod: GC | Performed by: INTERNAL MEDICINE

## 2025-01-16 PROCEDURE — 31624 DX BRONCHOSCOPE/LAVAGE: CPT | Mod: GC | Performed by: INTERNAL MEDICINE

## 2025-01-16 PROCEDURE — 82962 GLUCOSE BLOOD TEST: CPT

## 2025-01-16 PROCEDURE — 87205 SMEAR GRAM STAIN: CPT | Performed by: INTERNAL MEDICINE

## 2025-01-16 PROCEDURE — 31624 DX BRONCHOSCOPE/LAVAGE: CPT | Performed by: INTERNAL MEDICINE

## 2025-01-16 PROCEDURE — 87116 MYCOBACTERIA CULTURE: CPT | Performed by: INTERNAL MEDICINE

## 2025-01-16 PROCEDURE — 250N000009 HC RX 250: Performed by: INTERNAL MEDICINE

## 2025-01-16 PROCEDURE — 258N000003 HC RX IP 258 OP 636: Performed by: PHYSICIAN ASSISTANT

## 2025-01-16 PROCEDURE — 250N000011 HC RX IP 250 OP 636: Performed by: INTERNAL MEDICINE

## 2025-01-16 PROCEDURE — 88312 SPECIAL STAINS GROUP 1: CPT | Mod: TC | Performed by: INTERNAL MEDICINE

## 2025-01-16 PROCEDURE — 87252 VIRUS INOCULATION TISSUE: CPT | Performed by: INTERNAL MEDICINE

## 2025-01-16 PROCEDURE — 89050 BODY FLUID CELL COUNT: CPT | Performed by: INTERNAL MEDICINE

## 2025-01-16 RX ORDER — MAGNESIUM HYDROXIDE 1200 MG/15ML
LIQUID ORAL PRN
Status: DISCONTINUED | OUTPATIENT
Start: 2025-01-16 | End: 2025-01-16 | Stop reason: HOSPADM

## 2025-01-16 RX ORDER — LIDOCAINE HYDROCHLORIDE AND EPINEPHRINE 10; 10 MG/ML; UG/ML
INJECTION, SOLUTION INFILTRATION; PERINEURAL PRN
Status: DISCONTINUED | OUTPATIENT
Start: 2025-01-16 | End: 2025-01-16 | Stop reason: HOSPADM

## 2025-01-16 RX ORDER — LIDOCAINE HYDROCHLORIDE 40 MG/ML
INJECTION, SOLUTION RETROBULBAR PRN
Status: DISCONTINUED | OUTPATIENT
Start: 2025-01-16 | End: 2025-01-16 | Stop reason: HOSPADM

## 2025-01-16 RX ORDER — INSULIN GLARGINE 100 [IU]/ML
36 INJECTION, SOLUTION SUBCUTANEOUS AT BEDTIME
Qty: 15 ML | Refills: 3 | Status: SHIPPED | OUTPATIENT
Start: 2025-01-16

## 2025-01-16 RX ORDER — LIDOCAINE HYDROCHLORIDE 10 MG/ML
INJECTION, SOLUTION INFILTRATION; PERINEURAL PRN
Status: DISCONTINUED | OUTPATIENT
Start: 2025-01-16 | End: 2025-01-16 | Stop reason: HOSPADM

## 2025-01-16 RX ORDER — FENTANYL CITRATE 50 UG/ML
INJECTION, SOLUTION INTRAMUSCULAR; INTRAVENOUS PRN
Status: DISCONTINUED | OUTPATIENT
Start: 2025-01-16 | End: 2025-01-16 | Stop reason: HOSPADM

## 2025-01-16 RX ADMIN — SODIUM CHLORIDE 1000 ML: 9 INJECTION, SOLUTION INTRAVENOUS at 08:38

## 2025-01-16 ASSESSMENT — ACTIVITIES OF DAILY LIVING (ADL)
ADLS_ACUITY_SCORE: 54

## 2025-01-16 NOTE — DISCHARGE INSTRUCTIONS
Discharge Instructions after Bronchoscopy    Activity  ___ You had medicine to relax and for pain. You may feel dizzy or sleepy.  For 24 hours:   Do not drive or use heavy equipment.   Do not make important decisions.   Do not drink any alcohol.    Diet  ___ When you can swallow easily, you may go back to your regular diet, medicines  and light exercise.    Follow-up  ___ We took small tissue or fluid samples to study. We will call you with the results in about 10 business days.    Call right away if you have:   Unusual chest pain   Temperature above 100.6  F (37.5  C)   Coughing that does not stop.    If you have severe pain, bleeding, or shortness of breath, go to an emergency room.    If you have questions, call:  Monday to Friday, 8 a.m. to 4:30 p.m.  Adult Pulmonology Clinic: [phone]    After hours:  Hospital: [phone] (Ask for the pulmonary fellow on call)

## 2025-01-16 NOTE — TELEPHONE ENCOUNTER
Tianna calls regarding patient.  After he got home from his bronch he was feeling chilled and vomited x 1 a few hours ago, temp normal at that time.  She checked his temperature recently though and it was 101.5.  He is feeling better now though and Tianna reports his coloring is looking better.  Advised to watch things closely overnight and if he starts feeling bad again or his temperature goes up anymore he should call the on-call coordinator for further instruction or come to the ER for evaluation.  Writer will check in again tomorrow.  She inquired about a direct admission if the patient is not feeling well.  Advised that we would not have time to wait for direct admission if he is not feeling good s/p bronchoscopy, we need to have him evaluated timely in the ER.

## 2025-01-16 NOTE — TELEPHONE ENCOUNTER
His order for Lantus will not go out at this time because it was canceled at his appointment. Once that happens we have no active orders for Lantus in our system unless another order is sent, and the provider canceled but did not resend because the new dose was patient reported.    Please send a new ERx for Lantus to  [facility].    Thank you.

## 2025-01-17 ENCOUNTER — LAB (OUTPATIENT)
Dept: LAB | Facility: CLINIC | Age: 60
End: 2025-01-17
Payer: COMMERCIAL

## 2025-01-17 DIAGNOSIS — Z94.2 S/P LUNG TRANSPLANT (H): ICD-10-CM

## 2025-01-17 LAB
ANION GAP SERPL CALCULATED.3IONS-SCNC: 12 MMOL/L (ref 7–15)
BUN SERPL-MCNC: 17 MG/DL (ref 8–23)
CALCIUM SERPL-MCNC: 10 MG/DL (ref 8.8–10.4)
CHLORIDE SERPL-SCNC: 103 MMOL/L (ref 98–107)
CREAT SERPL-MCNC: 1.23 MG/DL (ref 0.67–1.17)
EGFRCR SERPLBLD CKD-EPI 2021: 67 ML/MIN/1.73M2
GLUCOSE SERPL-MCNC: 168 MG/DL (ref 70–99)
HCO3 SERPL-SCNC: 23 MMOL/L (ref 22–29)
POTASSIUM SERPL-SCNC: 4.4 MMOL/L (ref 3.4–5.3)
SODIUM SERPL-SCNC: 138 MMOL/L (ref 135–145)

## 2025-01-17 PROCEDURE — 80048 BASIC METABOLIC PNL TOTAL CA: CPT | Performed by: PATHOLOGY

## 2025-01-17 PROCEDURE — 36415 COLL VENOUS BLD VENIPUNCTURE: CPT | Performed by: PATHOLOGY

## 2025-01-18 LAB
ACID FAST STAIN (ARUP): NORMAL
BACTERIA SPEC CULT: ABNORMAL
BACTERIA SPEC CULT: ABNORMAL

## 2025-01-19 ENCOUNTER — HEALTH MAINTENANCE LETTER (OUTPATIENT)
Age: 60
End: 2025-01-19

## 2025-01-19 LAB — CMV DNA SPEC QL NAA+PROBE: NOT DETECTED

## 2025-01-21 LAB
BRONCHOSCOPY: NORMAL
PROSPERA TRANSPLANT MONITORING: 1.5 %

## 2025-01-22 LAB
BACTERIA BRONCH: ABNORMAL
BACTERIA BRONCH: ABNORMAL

## 2025-01-23 LAB
BACTERIA BRONCH: NORMAL
BACTERIA BRONCH: NORMAL
BACTERIA SPEC CULT: NORMAL
BACTERIA SPEC CULT: NORMAL

## 2025-01-31 ENCOUNTER — VIRTUAL VISIT (OUTPATIENT)
Dept: ENDOCRINOLOGY | Facility: CLINIC | Age: 60
End: 2025-01-31
Payer: COMMERCIAL

## 2025-01-31 DIAGNOSIS — T38.0X5A STEROID-INDUCED HYPERGLYCEMIA: ICD-10-CM

## 2025-01-31 DIAGNOSIS — Z79.4 TYPE 2 DIABETES MELLITUS WITHOUT COMPLICATION, WITH LONG-TERM CURRENT USE OF INSULIN (H): Primary | ICD-10-CM

## 2025-01-31 DIAGNOSIS — E11.9 TYPE 2 DIABETES MELLITUS WITHOUT COMPLICATION, WITH LONG-TERM CURRENT USE OF INSULIN (H): Primary | ICD-10-CM

## 2025-01-31 DIAGNOSIS — R73.9 STEROID-INDUCED HYPERGLYCEMIA: ICD-10-CM

## 2025-01-31 PROCEDURE — 98006 SYNCH AUDIO-VIDEO EST MOD 30: CPT | Performed by: NURSE PRACTITIONER

## 2025-01-31 PROCEDURE — G2211 COMPLEX E/M VISIT ADD ON: HCPCS | Performed by: NURSE PRACTITIONER

## 2025-01-31 NOTE — LETTER
1/31/2025      Travon Cartwright  [address] [facility] [address]      Dear Colleague,    Thank you for referring your patient, Travon Cartwright, to the [facility] [place]. Please see a copy of my visit note below.      [facility]    Diabetes Note 2/4/2025    Travon Cartwright, 1965, [id number]          Reason for visit      1. Type 2 diabetes mellitus without complication, with long-term current use of insulin (H)        HPI     Travon Cartwright is a very pleasant 60 year old old male who presents for follow up.  SUMMARY:    Juan J is seen via Video Visit in follow-up for DM 2. His current A1c is 7.6 and similar to the previous of 7.4. He is using the Dexcom G7 CGM, which was downloaded and data was reviewed.     TIR was 23% and above, 77%. He had no hypoglycemia recorded. GMI of 8.9.     We had dialed back his Lantus when we started the Jardiance trying to avoid any hypoglycemia. Fortunately, he had none, but unfortunately, his management suffered. He is just higher across the board. He is currently taking 36 units. He takes 9 units of Novolog + sliding scale prior to meals.       Renal function remains stable.     He is being followed by the Transplant team post Lung transplant.       Blood glucose data:      Past Medical History     Patient Active Problem List   Diagnosis     Abnormal levels of other serum enzymes     Other emphysema (H)     Other specified anxiety disorders     Type 2 diabetes mellitus without complications (H)     Tremor     Status post coronary angiogram     S/P lung transplant (H)     Hypomagnesemia     Administration of long-term prophylactic antibiotics     Immunosuppression     Steroid-induced hyperglycemia     Lung transplant rejection (H)        Family History       family history includes Hypertension in his mother; Lung Cancer in his father and maternal grandfather; No Known Problems in his maternal grandmother, paternal  "grandfather, and paternal grandmother; Other - See Comments in his sister.    Social History      reports that he has quit smoking. His smoking use included cigarettes. He has never used smokeless tobacco. He reports that he does not currently use alcohol. He reports that he does not use drugs.      Review of Systems     Patient has no polyuria or polydipsia, no chest pain, dyspnea or TIA's, no numbness, tingling or pain in extremities  Remainder negative except as noted in HPI.    Vital Signs     There were no vitals taken for this visit.  Wt Readings from Last 3 Encounters:   01/15/25 80.7 kg (178 lb)   12/17/24 80.3 kg (177 lb 2 oz)   11/26/24 80.3 kg (177 lb)       Physical Exam     Constitutional:  Well developed, Well nourished  HENT:  Normocephalic,   Neck: normal in appearance  Eyes:  PERRL, Conjunctiva pink  Respiratory:  No respiratory distress  Skin: No acanthosis nigricans, lipoatrophy or lipodystrophy  Neurologic:  Alert & oriented x 3, nonfocal  Psychiatric:  Affect, Mood, Insight appropriate        Assessment     1. Type 2 diabetes mellitus without complication, with long-term current use of insulin (H)        Plan     Instructed to increase his Lantus dose back to 46 units. No other changes today. Follow-up with me in 6 months.           Sara Reilly NP  [facility]  2/4/2025  6:52 AM      Lab Results     No results found for: \"HGBA1C\", \"CREATININE\", \"MICROALBUR\"    Cholesterol   Date Value Ref Range Status   06/18/2024 105 <200 mg/dL Final     Direct Measure HDL   Date Value Ref Range Status   06/18/2024 50 >=40 mg/dL Final     Triglycerides   Date Value Ref Range Status   06/18/2024 174 (H) <150 mg/dL Final       [unfilled]      Current Medications     Outpatient Medications Prior to Visit   Medication Sig Dispense Refill     azithromycin (ZITHROMAX) 250 MG tablet Take 1 tablet (250 mg) by mouth daily. 30 tablet 11     calcium carbonate-vitamin D (CALTRATE) 600-10 MG-MCG per tablet Take 1 " tablet by mouth or Feeding Tube 2 times daily. 60 tablet 0     Continuous Glucose Sensor (DEXCOM G7 SENSOR) MISC Change every 10 days. 1 each 11     empagliflozin (JARDIANCE) 10 MG TABS tablet Take 1 tablet (10 mg) by mouth daily. 90 tablet 1     fluticasone-salmeterol (ADVAIR) 250-50 MCG/ACT inhaler Inhale 1 puff into the lungs 2 times daily. 60 each 11     insulin aspart (NOVOLOG PEN) 100 UNIT/ML pen Take 13 units prior to meals, and add sliding scale per instructions.  Up to 60 units daily 60 mL 0     insulin glargine (LANTUS SOLOSTAR) 100 UNIT/ML pen Inject 36 Units subcutaneously at bedtime. 15 mL 3     insulin pen needle (32G X 4 MM) 32G X 4 MM miscellaneous Use pen needles daily or as directed. 100 each 3     magnesium (HIGH ABSORPTION MAGNESIUM) 100 MG TABS Take 400 mg by mouth 3 times daily. 360 tablet 11     metoprolol tartrate (LOPRESSOR) 25 MG tablet Take 2 tablets (50 mg) by mouth or Feeding Tube 2 times daily. 60 tablet 11     montelukast (SINGULAIR) 10 MG tablet Take 1 tablet (10 mg) by mouth at bedtime. 30 tablet 11     multivitamin, therapeutic (THERA-VIT) TABS tablet Take 1 tablet by mouth daily. 30 tablet 0     mycophenolic acid (GENERIC EQUIVALENT) 360 MG EC tablet Take 2 tablets (720 mg) by mouth 2 times daily. 360 tablet 3     pantoprazole (PROTONIX) 40 MG EC tablet Take 1 tablet (40 mg) by mouth every morning (before breakfast). 30 tablet 3     predniSONE (DELTASONE) 5 MG tablet Take 1 tablet (5 mg) by mouth daily. 30 tablet 11     psyllium (METAMUCIL/KONSYL) 58.6 % powder Take 1 teaspoonful by mouth daily.       rosuvastatin (CRESTOR) 10 MG tablet Take 1 tablet (10 mg) by mouth daily. 30 tablet 11     sulfamethoxazole-trimethoprim (BACTRIM) 400-80 MG tablet Take 1 tablet by mouth or NG Tube daily. 30 tablet 11     tacrolimus (GENERIC EQUIVALENT) 0.5 MG capsule Take 1 capsule (0.5 mg) by mouth 2 times daily. Total dose: 2.5 mg in the AM and 2.5 mg in the PM 90 capsule 3     tacrolimus (GENERIC  "EQUIVALENT) 1 MG capsule Take 2 capsules (2 mg) by mouth 2 times daily. Total dose: 2.5 mg in the AM and 2.5 mg in the  capsule 3     levalbuterol (XOPENEX) 1.25 MG/3ML neb solution Take 3 mLs (1.25 mg) by nebulization 4 times daily as needed for shortness of breath or wheezing. Use prior to Mucomyst neb. (Patient not taking: Reported on 1/31/2025) 300 mL 0     sodium zirconium cyclosilicate (LOKELMA) 10 g PACK packet Take 1 packet (10 g) by mouth daily as needed. (Patient not taking: Reported on 11/4/2024) 10 packet 1     Facility-Administered Medications Prior to Visit   Medication Dose Route Frequency Provider Last Rate Last Admin     sodium chloride 0.9% BOLUS 1,000 mL  1,000 mL Intravenous Once Flower Medina PA-C         sodium chloride bacteriostatic 0.9 % flush 9 mL  9 mL Intravenous Once NICOLA Pearce MD                     Virtual Visit Details    Type of service:  Video Visit   Video Start Time: {video visit start/end time for provider to select:560099}  Video End Time:{video visit start/end time for provider to select:574441}    Originating Location (pt. Location): {video visit patient location:771940::\"Home\"}  {PROVIDER LOCATION On-site should be selected for visits conducted from your clinic location or adjoining [facility] hospital, academic office, or other nearby [facility] building. Off-site should be selected for all other provider locations, including home:509[address]}  Distant Location (provider location):  {virtual location provider:185530}  Platform used for Video Visit: {Virtual Visit Platforms:666507::\"Kids Movie\"}      Again, thank you for allowing me to participate in the care of your patient.        Sincerely,        Sara Reilly NP    Electronically signed"

## 2025-01-31 NOTE — PROGRESS NOTES
[facility]    Diabetes Note 2/4/2025    Travon Cartwright, 1965, [id number]          Reason for visit      1. Type 2 diabetes mellitus without complication, with long-term current use of insulin (H)        SURAJ     Travon Cartwright is a very pleasant 60 year old old male who presents for follow up.  SUMMARY:    Juan J is seen via Video Visit in follow-up for DM 2. His current A1c is 7.6 and similar to the previous of 7.4. He is using the Dexcom G7 CGM, which was downloaded and data was reviewed.     TIR was 23% and above, 77%. He had no hypoglycemia recorded. GMI of 8.9.     We had dialed back his Lantus when we started the Jardiance trying to avoid any hypoglycemia. Fortunately, he had none, but unfortunately, his management suffered. He is just higher across the board. He is currently taking 36 units. He takes 9 units of Novolog + sliding scale prior to meals.       Renal function remains stable.     He is being followed by the Transplant team post Lung transplant.       Blood glucose data:      Past Medical History     Patient Active Problem List   Diagnosis    Abnormal levels of other serum enzymes    Other emphysema (H)    Other specified anxiety disorders    Type 2 diabetes mellitus without complications (H)    Tremor    Status post coronary angiogram    S/P lung transplant (H)    Hypomagnesemia    Administration of long-term prophylactic antibiotics    Immunosuppression    Steroid-induced hyperglycemia    Lung transplant rejection (H)        Family History       family history includes Hypertension in his mother; Lung Cancer in his father and maternal grandfather; No Known Problems in his maternal grandmother, paternal grandfather, and paternal grandmother; Other - See Comments in his sister.    Social History      reports that he has quit smoking. His smoking use included cigarettes. He has never used smokeless tobacco. He reports that he does not currently use alcohol. He reports  "that he does not use drugs.      Review of Systems     Patient has no polyuria or polydipsia, no chest pain, dyspnea or TIA's, no numbness, tingling or pain in extremities  Remainder negative except as noted in HPI.    Vital Signs     There were no vitals taken for this visit.  Wt Readings from Last 3 Encounters:   01/15/25 80.7 kg (178 lb)   12/17/24 80.3 kg (177 lb 2 oz)   11/26/24 80.3 kg (177 lb)       Physical Exam     Constitutional:  Well developed, Well nourished  HENT:  Normocephalic,   Neck: normal in appearance  Eyes:  PERRL, Conjunctiva pink  Respiratory:  No respiratory distress  Skin: No acanthosis nigricans, lipoatrophy or lipodystrophy  Neurologic:  Alert & oriented x 3, nonfocal  Psychiatric:  Affect, Mood, Insight appropriate        Assessment     1. Type 2 diabetes mellitus without complication, with long-term current use of insulin (H)        Plan     Instructed to increase his Lantus dose back to 46 units. No other changes today. Follow-up with me in 6 months.           Sara Reilly NP  [facility]  2/4/2025  6:52 AM      Lab Results     No results found for: \"HGBA1C\", \"CREATININE\", \"MICROALBUR\"    Cholesterol   Date Value Ref Range Status   06/18/2024 105 <200 mg/dL Final     Direct Measure HDL   Date Value Ref Range Status   06/18/2024 50 >=40 mg/dL Final     Triglycerides   Date Value Ref Range Status   06/18/2024 174 (H) <150 mg/dL Final       [unfilled]      Current Medications     Outpatient Medications Prior to Visit   Medication Sig Dispense Refill    azithromycin (ZITHROMAX) 250 MG tablet Take 1 tablet (250 mg) by mouth daily. 30 tablet 11    calcium carbonate-vitamin D (CALTRATE) 600-10 MG-MCG per tablet Take 1 tablet by mouth or Feeding Tube 2 times daily. 60 tablet 0    Continuous Glucose Sensor (DEXCOM G7 SENSOR) MISC Change every 10 days. 1 each 11    empagliflozin (JARDIANCE) 10 MG TABS tablet Take 1 tablet (10 mg) by mouth daily. 90 tablet 1    fluticasone-salmeterol " (ADVAIR) 250-50 MCG/ACT inhaler Inhale 1 puff into the lungs 2 times daily. 60 each 11    insulin aspart (NOVOLOG PEN) 100 UNIT/ML pen Take 13 units prior to meals, and add sliding scale per instructions.  Up to 60 units daily 60 mL 0    insulin glargine (LANTUS SOLOSTAR) 100 UNIT/ML pen Inject 36 Units subcutaneously at bedtime. 15 mL 3    insulin pen needle (32G X 4 MM) 32G X 4 MM miscellaneous Use pen needles daily or as directed. 100 each 3    magnesium (HIGH ABSORPTION MAGNESIUM) 100 MG TABS Take 400 mg by mouth 3 times daily. 360 tablet 11    metoprolol tartrate (LOPRESSOR) 25 MG tablet Take 2 tablets (50 mg) by mouth or Feeding Tube 2 times daily. 60 tablet 11    montelukast (SINGULAIR) 10 MG tablet Take 1 tablet (10 mg) by mouth at bedtime. 30 tablet 11    multivitamin, therapeutic (THERA-VIT) TABS tablet Take 1 tablet by mouth daily. 30 tablet 0    mycophenolic acid (GENERIC EQUIVALENT) 360 MG EC tablet Take 2 tablets (720 mg) by mouth 2 times daily. 360 tablet 3    pantoprazole (PROTONIX) 40 MG EC tablet Take 1 tablet (40 mg) by mouth every morning (before breakfast). 30 tablet 3    predniSONE (DELTASONE) 5 MG tablet Take 1 tablet (5 mg) by mouth daily. 30 tablet 11    psyllium (METAMUCIL/KONSYL) 58.6 % powder Take 1 teaspoonful by mouth daily.      rosuvastatin (CRESTOR) 10 MG tablet Take 1 tablet (10 mg) by mouth daily. 30 tablet 11    sulfamethoxazole-trimethoprim (BACTRIM) 400-80 MG tablet Take 1 tablet by mouth or NG Tube daily. 30 tablet 11    tacrolimus (GENERIC EQUIVALENT) 0.5 MG capsule Take 1 capsule (0.5 mg) by mouth 2 times daily. Total dose: 2.5 mg in the AM and 2.5 mg in the PM 90 capsule 3    tacrolimus (GENERIC EQUIVALENT) 1 MG capsule Take 2 capsules (2 mg) by mouth 2 times daily. Total dose: 2.5 mg in the AM and 2.5 mg in the  capsule 3    levalbuterol (XOPENEX) 1.25 MG/3ML neb solution Take 3 mLs (1.25 mg) by nebulization 4 times daily as needed for shortness of breath or wheezing.  Use prior to Mucomyst neb. (Patient not taking: Reported on 1/31/2025) 300 mL 0    sodium zirconium cyclosilicate (LOKELMA) 10 g PACK packet Take 1 packet (10 g) by mouth daily as needed. (Patient not taking: Reported on 11/4/2024) 10 packet 1     Facility-Administered Medications Prior to Visit   Medication Dose Route Frequency Provider Last Rate Last Admin    sodium chloride 0.9% BOLUS 1,000 mL  1,000 mL Intravenous Once Flower Medina PA-C        sodium chloride bacteriostatic 0.9 % flush 9 mL  9 mL Intravenous Once NICOLA Pearce MD                     Virtual Visit Details    Type of service:  Video Visit   Video Start Time:  1400  Video End Time: 1420    Originating Location (pt. Location): Home    Distant Location (provider location):  On-site  Platform used for Video Visit: Chris

## 2025-02-04 RX ORDER — INSULIN GLARGINE 100 [IU]/ML
INJECTION, SOLUTION SUBCUTANEOUS
Qty: 15 ML | Refills: 3 | Status: SHIPPED | OUTPATIENT
Start: 2025-02-04

## 2025-02-06 ENCOUNTER — TELEPHONE (OUTPATIENT)
Dept: TRANSPLANT | Facility: CLINIC | Age: 60
End: 2025-02-06
Payer: COMMERCIAL

## 2025-02-06 DIAGNOSIS — Z94.2 S/P LUNG TRANSPLANT (H): ICD-10-CM

## 2025-02-06 DIAGNOSIS — D84.9 IMMUNOSUPPRESSION: ICD-10-CM

## 2025-02-06 RX ORDER — TACROLIMUS 1 MG/1
2 CAPSULE ORAL 2 TIMES DAILY
Qty: 360 CAPSULE | Refills: 3 | Status: SHIPPED | OUTPATIENT
Start: 2025-02-06

## 2025-02-06 NOTE — TELEPHONE ENCOUNTER
Tacrolimus level 14.1 at 12 hours, on 1/29/25.  Goal 8-12.   Current dose 2.5 mg in AM, 2.5 mg in PM    Dose changed to 2 mg in AM, 2 mg in PM   Recheck level in 7 days.     Discussed with patient.

## 2025-02-11 ENCOUNTER — DOCUMENTATION ONLY (OUTPATIENT)
Dept: TRANSPLANT | Facility: CLINIC | Age: 60
End: 2025-02-11
Payer: COMMERCIAL

## 2025-02-11 DIAGNOSIS — Z94.2 S/P LUNG TRANSPLANT (H): ICD-10-CM

## 2025-02-11 ASSESSMENT — ENCOUNTER SYMPTOMS: NEW SYMPTOMS OF CORONARY ARTERY DISEASE: 0

## 2025-02-13 ENCOUNTER — OFFICE VISIT (OUTPATIENT)
Dept: TRANSPLANT | Facility: CLINIC | Age: 60
End: 2025-02-13
Attending: INTERNAL MEDICINE
Payer: COMMERCIAL

## 2025-02-13 ENCOUNTER — LAB (OUTPATIENT)
Dept: LAB | Facility: CLINIC | Age: 60
End: 2025-02-13
Attending: INTERNAL MEDICINE
Payer: COMMERCIAL

## 2025-02-13 VITALS
HEIGHT: 70 IN | DIASTOLIC BLOOD PRESSURE: 89 MMHG | BODY MASS INDEX: 25.2 KG/M2 | TEMPERATURE: 97.4 F | OXYGEN SATURATION: 94 % | HEART RATE: 66 BPM | WEIGHT: 176 LBS | SYSTOLIC BLOOD PRESSURE: 151 MMHG

## 2025-02-13 DIAGNOSIS — Z94.2 S/P LUNG TRANSPLANT (H): ICD-10-CM

## 2025-02-13 DIAGNOSIS — Z94.2 S/P LUNG TRANSPLANT (H): Primary | ICD-10-CM

## 2025-02-13 DIAGNOSIS — Z79.4 TYPE 2 DIABETES MELLITUS WITHOUT COMPLICATION, WITH LONG-TERM CURRENT USE OF INSULIN (H): ICD-10-CM

## 2025-02-13 DIAGNOSIS — D84.9 IMMUNOSUPPRESSION: ICD-10-CM

## 2025-02-13 DIAGNOSIS — T38.0X5A STEROID-INDUCED HYPERGLYCEMIA: ICD-10-CM

## 2025-02-13 DIAGNOSIS — R73.9 STEROID-INDUCED HYPERGLYCEMIA: ICD-10-CM

## 2025-02-13 DIAGNOSIS — E11.9 TYPE 2 DIABETES MELLITUS WITHOUT COMPLICATION, WITH LONG-TERM CURRENT USE OF INSULIN (H): ICD-10-CM

## 2025-02-13 LAB
6 MIN WALK (FT): 1220 FT
6 MIN WALK (M): 372 M
ALBUMIN SERPL BCG-MCNC: 4.5 G/DL (ref 3.5–5.2)
ALP SERPL-CCNC: 76 U/L (ref 40–150)
ALT SERPL W P-5'-P-CCNC: 33 U/L (ref 0–70)
ANION GAP SERPL CALCULATED.3IONS-SCNC: 9 MMOL/L (ref 7–15)
AST SERPL W P-5'-P-CCNC: 27 U/L (ref 0–45)
BACTERIA BRONCH: NO GROWTH
BACTERIA BRONCH: NO GROWTH
BACTERIA SPEC CULT: NO GROWTH
BACTERIA SPEC CULT: NO GROWTH
BILIRUB SERPL-MCNC: 0.4 MG/DL
BUN SERPL-MCNC: 19.8 MG/DL (ref 8–23)
CALCIUM SERPL-MCNC: 9.9 MG/DL (ref 8.8–10.4)
CHLORIDE SERPL-SCNC: 108 MMOL/L (ref 98–107)
CMV DNA SPEC NAA+PROBE-ACNC: NOT DETECTED IU/ML
CREAT SERPL-MCNC: 1.11 MG/DL (ref 0.67–1.17)
EBV DNA SERPL NAA+PROBE-ACNC: NOT DETECTED IU/ML
EGFRCR SERPLBLD CKD-EPI 2021: 75 ML/MIN/1.73M2
ERYTHROCYTE [DISTWIDTH] IN BLOOD BY AUTOMATED COUNT: 13.4 % (ref 10–15)
EXPTIME-PRE: 5.35 SEC
FEF2575-%PRED-PRE: 107 %
FEF2575-PRE: 2.94 L/SEC
FEF2575-PRED: 2.74 L/SEC
FEFMAX-%PRED-PRE: 81 %
FEFMAX-PRE: 7.43 L/SEC
FEFMAX-PRED: 9.12 L/SEC
FEV1-%PRED-PRE: 82 %
FEV1-PRE: 2.72 L
FEV1FEV6-PRE: 82 %
FEV1FEV6-PRED: 79 %
FEV1FVC-PRE: 82 %
FEV1FVC-PRED: 78 %
FIFMAX-PRE: 6.42 L/SEC
FVC-%PRED-PRE: 78 %
FVC-PRE: 3.31 L
FVC-PRED: 4.24 L
GLUCOSE SERPL-MCNC: 76 MG/DL (ref 70–99)
HCO3 SERPL-SCNC: 26 MMOL/L (ref 22–29)
HCT VFR BLD AUTO: 45.8 % (ref 40–53)
HGB BLD-MCNC: 14.8 G/DL (ref 13.3–17.7)
IGG SERPL-MCNC: 447 MG/DL (ref 610–1616)
MAGNESIUM SERPL-MCNC: 1.7 MG/DL (ref 1.7–2.3)
MCH RBC QN AUTO: 28 PG (ref 26.5–33)
MCHC RBC AUTO-ENTMCNC: 32.3 G/DL (ref 31.5–36.5)
MCV RBC AUTO: 87 FL (ref 78–100)
PHOSPHATE SERPL-MCNC: 2.8 MG/DL (ref 2.5–4.5)
PLATELET # BLD AUTO: 281 10E3/UL (ref 150–450)
POTASSIUM SERPL-SCNC: 4.1 MMOL/L (ref 3.4–5.3)
PROT SERPL-MCNC: 7 G/DL (ref 6.4–8.3)
RBC # BLD AUTO: 5.28 10E6/UL (ref 4.4–5.9)
SODIUM SERPL-SCNC: 143 MMOL/L (ref 135–145)
SPECIMEN TYPE: NORMAL
TACROLIMUS BLD-MCNC: 9.9 UG/L (ref 5–15)
TME LAST DOSE: NORMAL H
TME LAST DOSE: NORMAL H
WBC # BLD AUTO: 7.9 10E3/UL (ref 4–11)

## 2025-02-13 PROCEDURE — 250N000021 HC RX MED A9270 GY (STAT IND- M) 250: Performed by: INTERNAL MEDICINE

## 2025-02-13 PROCEDURE — 99000 SPECIMEN HANDLING OFFICE-LAB: CPT | Performed by: PATHOLOGY

## 2025-02-13 PROCEDURE — 87799 DETECT AGENT NOS DNA QUANT: CPT | Performed by: INTERNAL MEDICINE

## 2025-02-13 PROCEDURE — 80197 ASSAY OF TACROLIMUS: CPT | Performed by: INTERNAL MEDICINE

## 2025-02-13 PROCEDURE — 80053 COMPREHEN METABOLIC PANEL: CPT | Performed by: PATHOLOGY

## 2025-02-13 PROCEDURE — 90750 HZV VACC RECOMBINANT IM: CPT | Performed by: INTERNAL MEDICINE

## 2025-02-13 PROCEDURE — 85027 COMPLETE CBC AUTOMATED: CPT | Performed by: PATHOLOGY

## 2025-02-13 PROCEDURE — G0463 HOSPITAL OUTPT CLINIC VISIT: HCPCS | Performed by: INTERNAL MEDICINE

## 2025-02-13 PROCEDURE — 36415 COLL VENOUS BLD VENIPUNCTURE: CPT | Performed by: PATHOLOGY

## 2025-02-13 PROCEDURE — 90471 IMMUNIZATION ADMIN: CPT | Performed by: INTERNAL MEDICINE

## 2025-02-13 PROCEDURE — 86316 IMMUNOASSAY TUMOR OTHER: CPT | Performed by: INTERNAL MEDICINE

## 2025-02-13 PROCEDURE — 84100 ASSAY OF PHOSPHORUS: CPT | Performed by: PATHOLOGY

## 2025-02-13 PROCEDURE — 83735 ASSAY OF MAGNESIUM: CPT | Performed by: PATHOLOGY

## 2025-02-13 PROCEDURE — 82784 ASSAY IGA/IGD/IGG/IGM EACH: CPT | Performed by: INTERNAL MEDICINE

## 2025-02-13 RX ORDER — INSULIN GLARGINE 100 [IU]/ML
49 INJECTION, SOLUTION SUBCUTANEOUS EVERY MORNING
Status: SHIPPED
Start: 2025-02-13

## 2025-02-13 RX ADMIN — ZOSTER VACCINE RECOMBINANT, ADJUVANTED 0.5 ML: KIT at 16:28

## 2025-02-13 ASSESSMENT — PAIN SCALES - GENERAL: PAINLEVEL_OUTOF10: NO PAIN (0)

## 2025-02-13 NOTE — PROGRESS NOTES
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  Postoperative course complicated by delirium.  He had pretransplant diabetes, hepatic steatosis and essential tremor.  Stenotrophomonas on 9/26/2024 bronchoscopy treated with levofloxacin.  Treated with Solu-Medrol and prednisone taper in early October 2024 for ACR based on A1B0 on surveillance biopsy, elevated cell free DNA and decreased PFTs.    Interval history:   After the last bronchoscopy he felt a little ill and was treated with Levaquin for the Pseudomonas which grew from the BAL.  Last week he felt he had a viral URI that resolved fairly quickly.  With a viral URI he had some chest congestion, mild cough with minimal phlegm and rhinorrhea.  All of the symptoms resolved.  Overall is feeling good without any shortness of breath, chest pain, cough or wheezing.  No pedal edema.    He states his appetite is good with no nausea vomiting or heartburn.  Denies any abdominal pain.  He does have some loose stools about a couple of times a day at the most.  He does not have any significant issues with dysuria or hematuria.    He denies any numbness or tingling in the hands or legs.  He denies any headaches.    His sinuses are well-controlled.         Current Outpatient Medications   Medication Sig Dispense Refill    azithromycin (ZITHROMAX) 250 MG tablet Take 1 tablet (250 mg) by mouth daily. 30 tablet 11    calcium carbonate-vitamin D (CALTRATE) 600-10 MG-MCG per tablet Take 1 tablet by mouth or Feeding Tube 2 times daily. 60 tablet 0    Continuous Glucose Sensor (DEXCOM G7 SENSOR) MISC Change every 10 days. 1 each 11    empagliflozin (JARDIANCE) 10 MG TABS tablet Take 1 tablet (10 mg) by mouth daily. 90 tablet 1     fluticasone-salmeterol (ADVAIR) 250-50 MCG/ACT inhaler Inhale 1 puff into the lungs 2 times daily. 60 each 11    insulin aspart (NOVOLOG PEN) 100 UNIT/ML pen Take 13 units prior to meals, and add sliding scale per instructions.  Up to 60 units daily 60 mL 0    insulin glargine (LANTUS SOLOSTAR) 100 UNIT/ML pen Take 46 units daily 15 mL 3    insulin pen needle (32G X 4 MM) 32G X 4 MM miscellaneous Use pen needles daily or as directed. 100 each 3    levalbuterol (XOPENEX) 1.25 MG/3ML neb solution Take 3 mLs (1.25 mg) by nebulization 4 times daily as needed for shortness of breath or wheezing. Use prior to Mucomyst neb. (Patient not taking: Reported on 1/31/2025) 300 mL 0    magnesium (HIGH ABSORPTION MAGNESIUM) 100 MG TABS Take 400 mg by mouth 3 times daily. 360 tablet 11    metoprolol tartrate (LOPRESSOR) 25 MG tablet Take 2 tablets (50 mg) by mouth or Feeding Tube 2 times daily. 60 tablet 11    montelukast (SINGULAIR) 10 MG tablet Take 1 tablet (10 mg) by mouth at bedtime. 30 tablet 11    multivitamin, therapeutic (THERA-VIT) TABS tablet Take 1 tablet by mouth daily. 30 tablet 0    mycophenolic acid (GENERIC EQUIVALENT) 360 MG EC tablet Take 2 tablets (720 mg) by mouth 2 times daily. 360 tablet 3    pantoprazole (PROTONIX) 40 MG EC tablet Take 1 tablet (40 mg) by mouth every morning (before breakfast). 30 tablet 3    predniSONE (DELTASONE) 5 MG tablet Take 1 tablet (5 mg) by mouth daily. 30 tablet 11    psyllium (METAMUCIL/KONSYL) 58.6 % powder Take 1 teaspoonful by mouth daily.      rosuvastatin (CRESTOR) 10 MG tablet Take 1 tablet (10 mg) by mouth daily. 30 tablet 11    sodium zirconium cyclosilicate (LOKELMA) 10 g PACK packet Take 1 packet (10 g) by mouth daily as needed. (Patient not taking: Reported on 11/4/2024) 10 packet 1    sulfamethoxazole-trimethoprim (BACTRIM) 400-80 MG tablet Take 1 tablet by mouth or NG Tube daily. 30 tablet 11    tacrolimus (GENERIC EQUIVALENT) 1 MG capsule Take 2 capsules (2 mg) by  mouth 2 times daily. 360 capsule 3     Current Facility-Administered Medications   Medication Dose Route Frequency Provider Last Rate Last Admin    sodium chloride 0.9% BOLUS 1,000 mL  1,000 mL Intravenous Once Flower Medina PA-C         Facility-Administered Medications Ordered in Other Visits   Medication Dose Route Frequency Provider Last Rate Last Admin    sodium chloride bacteriostatic 0.9 % flush 9 mL  9 mL Intravenous Once NICOLA Pearce MD         No Known Allergies  Past Medical History:   Diagnosis Date    Administration of long-term prophylactic antibiotics 08/26/2024    Hepatic steatosis 2017    Noted on ultrasound    Hypomagnesemia 08/26/2024    Immunosuppression 08/26/2024    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    S/P lung transplant (H) 08/15/2024    Tremor 05/23/2024       Past Surgical History:   Procedure Laterality Date    BRONCHOSCOPY  08/16/2024    BRONCHOSCOPY (RIGID OR FLEXIBLE), DIAGNOSTIC N/A 9/26/2024    Procedure: BRONCHOSCOPY, WITH BRONCHOALVEOLAR LAVAGE AND BIOPSIES;  Surgeon: Oneida Silver MD;  Location:  GI    BRONCHOSCOPY (RIGID OR FLEXIBLE), DIAGNOSTIC N/A 11/5/2024    Procedure: BRONCHOSCOPY, WITH BRONCHOALVEOLAR LAVAGE AND BIOPSIES;  Surgeon: Tera Jiménez MD;  Location:  GI    BRONCHOSCOPY (RIGID OR FLEXIBLE), DIAGNOSTIC N/A 1/16/2025    Procedure: BRONCHOSCOPY, WITH BRONCHOALVEOLAR LAVAGE AND BIOPSIES;  Surgeon: Florian Steinberg MD;  Location:  GI    BRONCHOSCOPY FLEXIBLE AND RIGID N/A 8/27/2024    Procedure: Bronchoscopy Inspection;  Surgeon: Oneida Silver MD;  Location: [facility]    COLONOSCOPY      CV CORONARY ANGIOGRAM N/A 06/21/2024    Procedure: Coronary Angiogram;  Surgeon: Gabriel Julian MD;  Location:  [facility]ARD[facility] CATH LAB    CV RIGHT HEART CATH MEASUREMENTS RECORDED N/A 06/21/2024    Procedure: Right Heart Catheterization;  Surgeon: Gabriel Julian MD;  Location:  HEART [facility]ARD[facility] CATH LAB    PICC DOUBLE LUMEN PLACEMENT Right  08/20/2024    47-3cm, Basilic    TRANSPLANT LUNG RECIPIENT SINGLE X2 Bilateral 08/15/2024    Procedure: Clamshell Incision, On Central Venoarterial Extracorporeal Membranous Oxygenation, Bilateral Lung Transplant Recipient, Left atrial appendage ligation, Intraoperative Flexible Bronchoscopy by Anesthesia;  Surgeon: Mulvihill, Michael, MD;  Location:  OR       Social History     Socioeconomic History    Marital status: Single     Spouse name: Not on file    Number of children: Not on file    Years of education: Not on file    Highest education level: Not on file   Occupational History    Not on file   Tobacco Use    Smoking status: Former     Types: Cigarettes    Smokeless tobacco: Never   Substance and Sexual Activity    Alcohol use: Not Currently     Comment: not since 2017    Drug use: Never    Sexual activity: Not on file   Other Topics Concern    Not on file   Social History Narrative    Not on file     Social Drivers of Health     Financial Resource Strain: Low Risk  (8/24/2024)    Financial Resource Strain     Within the past 12 months, have you or your family members you live with been unable to get utilities (heat, electricity) when it was really needed?: No   Food Insecurity: Not on File (9/26/2024)    Received from [employer]    Food Insecurity     Food: 0   Transportation Needs: Low Risk  (8/24/2024)    Transportation Needs     Within the past 12 months, has lack of transportation kept you from medical appointments, getting your medicines, non-medical meetings or appointments, work, or from getting things that you need?: No   Physical Activity: Not on File (8/26/2019)    Received from [employer]    Physical Activity     Physical Activity: 0   Stress: Not on File (8/26/2019)    Received from [employer]    Stress     Stress: 0   Social Connections: Not on File (9/11/2024)    Received from [employer]    Social Connections     Connectedness: 0   Interpersonal Safety: Unknown (1/16/2025)    Interpersonal Safety     Do you feel  "physically and emotionally safe where you currently live?: Patient unable to answer     Within the past 12 months, have you been hit, slapped, kicked or otherwise physically hurt by someone?: Patient unable to answer     Within the past 12 months, have you been humiliated or emotionally abused in other ways by your partner or ex-partner?: Patient unable to answer   Housing Stability: Low Risk  (8/24/2024)    Housing Stability     Do you have housing? : Yes     Are you worried about losing your housing?: No       ROS Pulmonary  Constitutional- Negative  Eyes- Negative  Ear, nose and throat- Negative  Cardiac- Negative  Pulm- See HPI  GI- Negative  Genitourinary- Negative  Musculoskeletal- Negative  Neurology- Negative  Dermatology- Negative  Endocrine- Negative  Lymphatic- Negative  Psychiatry- Negative    A complete ROS was otherwise negative except as noted in the HPI.  BP (!) 151/89   Pulse 66   Temp 97.4  F (36.3  C) (Oral)   Ht 1.778 m (5' 10\")   Wt 79.8 kg (176 lb)   SpO2 94%   BMI 25.25 kg/m    Exam:   GENERAL APPEARANCE: Well developed, well nourished, alert, and in no apparent distress.  EYES: PERRL, EOMI  HENT: Nasal mucosa with no edema and no hyperemia. No nasal polyps.  EARS: Canals clear, TMs normal  MOUTH: Oral mucosa is moist, without any lesions, no tonsillar enlargement, no oropharyngeal exudate.  NECK: supple, no masses, no thyromegaly.  LYMPHATICS: No significant axillary, cervical, or supraclavicular nodes.  RESP: normal percussion, good air flow throughout.  No crackles. No rhonchi. No wheezes.  CV: Normal S1, S2, regular rhythm, normal rate. No murmur.  No rub. No gallop. No LE edema.   ABDOMEN:  Bowel sounds normal, soft, nontender, no HSM or masses.   MS: extremities normal. No clubbing. No cyanosis.  SKIN: no rash on limited exam  NEURO: Mentation intact, speech normal, normal strength and tone, normal gait and stance  PSYCH: mentation appears normal. and affect " normal/bright.    Results:    Recent Results (from the past week)   6 minute walk test    Collection Time: 02/13/25 12:00 AM   Result Value Ref Range    6 min walk (FT) 1,220 1,541 ft    6 Min Walk (M) 372 470 m   Comprehensive metabolic panel    Collection Time: 02/13/25 11:11 AM   Result Value Ref Range    Sodium 143 135 - 145 mmol/L    Potassium 4.1 3.4 - 5.3 mmol/L    Carbon Dioxide (CO2) 26 22 - 29 mmol/L    Anion Gap 9 7 - 15 mmol/L    Urea Nitrogen 19.8 8.0 - 23.0 mg/dL    Creatinine 1.11 0.67 - 1.17 mg/dL    GFR Estimate 75 >60 mL/min/1.73m2    Calcium 9.9 8.8 - 10.4 mg/dL    Chloride 108 (H) 98 - 107 mmol/L    Glucose 76 70 - 99 mg/dL    Alkaline Phosphatase 76 40 - 150 U/L    AST 27 0 - 45 U/L    ALT 33 0 - 70 U/L    Protein Total 7.0 6.4 - 8.3 g/dL    Albumin 4.5 3.5 - 5.2 g/dL    Bilirubin Total 0.4 <=1.2 mg/dL   CBC with platelets    Collection Time: 02/13/25 11:11 AM   Result Value Ref Range    WBC Count 7.9 4.0 - 11.0 10e3/uL    RBC Count 5.28 4.40 - 5.90 10e6/uL    Hemoglobin 14.8 13.3 - 17.7 g/dL    Hematocrit 45.8 40.0 - 53.0 %    MCV 87 78 - 100 fL    MCH 28.0 26.5 - 33.0 pg    MCHC 32.3 31.5 - 36.5 g/dL    RDW 13.4 10.0 - 15.0 %    Platelet Count 281 150 - 450 10e3/uL   Magnesium    Collection Time: 02/13/25 11:11 AM   Result Value Ref Range    Magnesium 1.7 1.7 - 2.3 mg/dL   IgG    Collection Time: 02/13/25 11:11 AM   Result Value Ref Range    Immunoglobulin G 447 (L) 610 - 1,616 mg/dL   Phosphorus    Collection Time: 02/13/25 11:11 AM   Result Value Ref Range    Phosphorus 2.8 2.5 - 4.5 mg/dL   General PFT Lab (Please always keep checked)    Collection Time: 02/13/25 11:53 AM   Result Value Ref Range    FVC-Pred 4.24 L    FVC-Pre 3.31 L    FVC-%Pred-Pre 78 %    FEV1-Pre 2.72 L    FEV1-%Pred-Pre 82 %    FEV1FVC-Pred 78 %    FEV1FVC-Pre 82 %    FEFMax-Pred 9.12 L/sec    FEFMax-Pre 7.43 L/sec    FEFMax-%Pred-Pre 81 %    FEF2575-Pred 2.74 L/sec    FEF2575-Pre 2.94 L/sec    WRF1019-%Pred-Pre 107 %     ExpTime-Pre 5.35 sec    FIFMax-Pre 6.42 L/sec    FEV1FEV6-Pred 79 %    FEV1FEV6-Pre 82 %                 Results as noted above.    PFT Interpretation:  Mild restrictive ventilatory defect.  Unchanged from previous.   At best since lung transplantation of 2.7L.  Valid Maneuver      CXR (2/13/2025), personally reviewed by me: The Lung fields with increased markings but stable. No effusion. Cardiac silhouette is wnl.     6MWT (2/13/2025. personally reviewed by me): Walked 1220ft, 372m on RA. 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  Postoperative course complicated by delirium.  He had pretransplant diabetes, hepatic steatosis and essential tremor.  Stenotrophomonas on 9/26/2024 bronchoscopy treated with levofloxacin.  Treated with Solu-Medrol and prednisone taper in early October 2024 for ACR based on A1B0 on surveillance biopsy, elevated cell free DNA and decreased PFTs.     #. Pulmonary/lung transplant: PFTs have been low.     Immunosuppression  - Tacrolimus, goal 8-12  - Myfortic 720mg BID, changed from CellCept due to loose stools  - Prednisone 10 mg daily    CLAD: Based on imaging from 12/2024 - On ICS/LABA and azithromycin along with Montelukast.     Bronch:  9/26/24: A1B0 - Treated with IV solumedrol/pred taper. Cell free DNA was elevated (9/16: 2.4, 9/24: 1.36)  11/5/24: A0B0  1/16/25: A0B0.    2/13/2025: He was noted to  have air trapping on expiratory CT images on 12/17/24 and started ICS-LABA inhaler.  The cell free DNA was elevated at 2.5 on 12/17 and decreased to 1.5 on 1/15/25.   - DSAs have been negative, last checked on 12/17/24.    - Interestingly last bronch was neg for ACR but mildly elevated cell free DNA. If still positive from today we will re-do steroid trial.  - He is known to have granulation tissue with 30% stenosis of right anastomosis on 11/5 bronch. Bronch (1/16/25) showed thick  adherent white mucoid secretions at the Rt anastomosis.  - Will review Donor history.   - Spirometry is stable along with pulmonary complaints.  Chest x-ray stable.  Will continue with the current I-S regimen.      #. ID:   Prophylaxis:   - Bactrim for PJP and nocardia prophylaxis  - CMV and EBV monitoring per protocol, s/p Valcyte prophylaxis  - Nystatin for thrush prophylaxis, completed.       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  Subsequent AFB cultures have been negative.  No indication for treatment at this time.  Continue AFB cultures with all bronchoscopies.    Stenotrophomonas (9/26, 11/5/24 - BAL): Treated with Minocycline in mid 11/2025.  Ps. A (1/16/25): Treated with 7 day course of levaquin.    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 Cultures neg to date. Will monitor.     #. Lung nodule: 8 x 8 mm nodule in the left lower lobe noted on CT on September 24.  CT today with stable nodule.   - Repeat CT in 6 months for follow up (~6/17/25)    #. GI:   Persistent loose stool: Previously switched from CellCept to Myfortic.  Metamucil added at last visit.    GERD: Adequately controlled with current pantoprazole.  Hepatic steatosis: LFTs within normal limits.  Continue close monitoring and pursue hepatology consultation if elevated.    #. Elevated Cr: baseline Cr 0.5-0.6, was 0.8-0.9 three months ago.  - Likely secondary to prerenal as patient is not taking in much water.  - Encourage 70-80 oz daily    #. Hypomagnesemia: Level wnl. Continue current replacement.    #. Hyperkalemia: Low potassium diet initiated.  K is good. Will continue to monitor.     #. Diabetes mellitus: Blood sugars have been elevated.    - On Jardiance.    #. Hypertension: Blood pressure is well-controlled with current  metoprolol.    #. Hyperlipidemia: Continue current rosuvastatin.  Check lipid battery annually with annual studies.    #. Tremors:  If persistent/progressive, could consider neurology consultation or a trial of primidone.    #. Healthcare maintenance:   - Colonoscopy in 2016, repeat due in 2026.    - Vaccinations: S/p COVID, Flu. will get RSV vaccine at pharmacy. Will give Shingrix first dose today (2/13/2025).    Carlos Johnson MD.  Pulmonary and Critical Care.  02/13/2025  12:08 PM    The longitudinal plan of care for the diagnosis(es)/condition(s) as documented were addressed during this visit. Due to the added complexity in care, I will continue to support Juan J in the subsequent management and with ongoing continuity of care.

## 2025-02-13 NOTE — PATIENT INSTRUCTIONS
Patient Instructions  1. Continue to hydrate with 70 oz fluids daily.  2. Continue to exercise daily or most days of the week.  3. Follow up with your primary care provider for annual gender health maintenance procedures.  4. Follow up with colonoscopy schedule.  5. Follow up with annual dermatology visits.  6. It doesn't seem like the COVID vaccine is working well in lung transplant patients. A number of lung transplant patients have gotten sick with COVID even after receiving the vaccines. Based on our recent experience, it can be life-threatening to get COVID even after being vaccinated. Please continue to act like you did not get the COVID vaccine - social distancing, wearing a mask, good hand hygiene, etc. If the people around you are vaccinated, it will help reduce the risk of you getting COVID. All members of your household should be vaccinated.  7. We'll give you the Shingrix vaccine today. Get the RSV vaccine at any pharmacy. Check with your primary if you've had all the pneumonia vaccines.    8. Double check your med list against your paper copies.     Next transplant clinic appointment: 3/6/25 with CXR, labs and PFTs  Next lab draw: pending tacrolimus level

## 2025-02-13 NOTE — PROGRESS NOTES
Transplant Coordinator Note    Reason for visit: Post lung transplant follow up visit   Coordinator: Present   Caregiver: Not present     Health concerns addressed today:  1. Resp: feeling great. Was sick after last bronch when he went to [place], having some mucous and cough, treated for PsA. Last week he felt sick, went from his chest to his head then it was gone, did have some wheezing. SOB with lots of walking. Cough resolved.   2.     Activity/rehab: Up ad jasmyne, walking 2-3 miles a few times/day.   Oxygen needs: Room air  Pain management/RX: N/a  Diabetic management: Managed by endocrine.    Next Bronch due: April 2025  CMV status: D-/R+  Valcyte stopped: through POD #90  EBV status: D-/R+, monitor monthly  DVT/PE: N/A  Post op AFIB/follow up with EP: N/A  AC/asa: none  PJP prophylactic: Bactrim    COVID:  COVID-19 infection (yes/no, date of most recent positive test):   Status/instructions given about COVID-19 vaccine:      Pt Education: medications (use/dose/side effects), how/when to call coordinator, frequency of labs, s/s of infection/rejection, call prior to starting any new medications, lab/vital sign book    Health Maintenance:   Last colonoscopy:   Next colonoscopy due:   Dermatology:  Vaccinations this visit: Shingrix today    Labs, CXR, PFTs reviewed with patient  Medication record reviewed and reconciled  Questions and concerns addressed    Patient Instructions  1. Continue to hydrate with 70 oz fluids daily.  2. Continue to exercise daily or most days of the week.  3. Follow up with your primary care provider for annual gender health maintenance procedures.  4. Follow up with colonoscopy schedule.  5. Follow up with annual dermatology visits.  6. It doesn't seem like the COVID vaccine is working well in lung transplant patients. A number of lung transplant patients have gotten sick with COVID even after receiving the vaccines. Based on our recent experience, it can be life-threatening to get COVID even  after being vaccinated. Please continue to act like you did not get the COVID vaccine - social distancing, wearing a mask, good hand hygiene, etc. If the people around you are vaccinated, it will help reduce the risk of you getting COVID. All members of your household should be vaccinated.  7. We'll give you the Shingrix vaccine today. Get the RSV vaccine at any pharmacy. Check with your primary if you've had all the pneumonia vaccines.    8. Double check your med list against your paper copies.     Next transplant clinic appointment: 3/6/25 with CXR, labs and PFTs  Next lab draw: pending tacrolimus level     AVS printed at time of check out

## 2025-02-13 NOTE — Clinical Note
2/13/2025      Travon Cartwright  [address]  [facility] [address]      Dear Colleague,    Thank you for referring your patient, Travon Cartwright, to the [facility]. Please see a copy of my visit note below.    Transplant Coordinator Note    Reason for visit: Post lung transplant follow up visit   Coordinator: Present   Caregiver: Not present     Health concerns addressed today:  1. Resp: feeling great. Was sick after last bronch when he went to [place], having some mucous and cough, treated for PsA. Last week he felt sick, went from his chest to his head then it was gone, did have some wheezing. SOB with lots of walking. Cough resolved.   2.     Activity/rehab: Up ad jasmyne, walking 2-3 miles a few times/day.   Oxygen needs: Room air  Pain management/RX: N/a  Diabetic management: Managed by endocrine.    Next Bronch due: April 2025  CMV status: D-/R+  Valcyte stopped: through POD #90  EBV status: D-/R+, monitor monthly  DVT/PE: N/A  Post op AFIB/follow up with EP: N/A  AC/asa: none  PJP prophylactic: Bactrim    COVID:  COVID-19 infection (yes/no, date of most recent positive test):   Status/instructions given about COVID-19 vaccine:      Pt Education: medications (use/dose/side effects), how/when to call coordinator, frequency of labs, s/s of infection/rejection, call prior to starting any new medications, lab/vital sign book    Health Maintenance:   Last colonoscopy:   Next colonoscopy due:   Dermatology:  Vaccinations this visit: Shingrix today    Labs, CXR, PFTs reviewed with patient  Medication record reviewed and reconciled  Questions and concerns addressed    Patient Instructions  1. Continue to hydrate with 70 oz fluids daily.  2. Continue to exercise daily or most days of the week.  3. Follow up with your primary care provider for annual gender health maintenance procedures.  4. Follow up with colonoscopy schedule.  5. Follow up with annual dermatology visits.  6. It doesn't  seem like the COVID vaccine is working well in lung transplant patients. A number of lung transplant patients have gotten sick with COVID even after receiving the vaccines. Based on our recent experience, it can be life-threatening to get COVID even after being vaccinated. Please continue to act like you did not get the COVID vaccine - social distancing, wearing a mask, good hand hygiene, etc. If the people around you are vaccinated, it will help reduce the risk of you getting COVID. All members of your household should be vaccinated.  7. We'll give you the Shingrix vaccine today. Get the RSV vaccine at any pharmacy. Check with your primary if you've had all the pneumonia vaccines.    8. Double check your med list against your paper copies.     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  Postoperative course complicated by delirium.  He had pretransplant diabetes, hepatic steatosis and essential tremor.  Stenotrophomonas on 9/26/2024 bronchoscopy treated with levofloxacin.  Treated with Solu-Medrol and prednisone taper in early October 2024 for ACR based on A1B0 on surveillance biopsy, elevated cell free DNA and decreased PFTs.    Interval history:   After the last bronchoscopy he felt a little ill and was treated with Levaquin for the Pseudomonas which grew from the BAL.  Last week he felt he had a viral URI that resolved fairly quickly.  With a viral URI he had some chest congestion, mild cough with minimal phlegm and rhinorrhea.  All of the symptoms resolved.  Overall is feeling good without any shortness of breath, chest pain,  cough or wheezing.  No pedal edema.    He states his appetite is good with no nausea vomiting or heartburn.  Denies any abdominal pain.  He does have some loose stools about a couple of times a day at the most.  He does not have any significant issues with dysuria or hematuria.    He denies any numbness or tingling in the hands or legs.  He denies any headaches.    His sinuses are well-controlled.         Current Outpatient Medications   Medication Sig Dispense Refill    azithromycin (ZITHROMAX) 250 MG tablet Take 1 tablet (250 mg) by mouth daily. 30 tablet 11    calcium carbonate-vitamin D (CALTRATE) 600-10 MG-MCG per tablet Take 1 tablet by mouth or Feeding Tube 2 times daily. 60 tablet 0    Continuous Glucose Sensor (DEXCOM G7 SENSOR) MISC Change every 10 days. 1 each 11    empagliflozin (JARDIANCE) 10 MG TABS tablet Take 1 tablet (10 mg) by mouth daily. 90 tablet 1    fluticasone-salmeterol (ADVAIR) 250-50 MCG/ACT inhaler Inhale 1 puff into the lungs 2 times daily. 60 each 11    insulin aspart (NOVOLOG PEN) 100 UNIT/ML pen Take 13 units prior to meals, and add sliding scale per instructions.  Up to 60 units daily 60 mL 0    insulin glargine (LANTUS SOLOSTAR) 100 UNIT/ML pen Take 46 units daily 15 mL 3    insulin pen needle (32G X 4 MM) 32G X 4 MM miscellaneous Use pen needles daily or as directed. 100 each 3    levalbuterol (XOPENEX) 1.25 MG/3ML neb solution Take 3 mLs (1.25 mg) by nebulization 4 times daily as needed for shortness of breath or wheezing. Use prior to Mucomyst neb. (Patient not taking: Reported on 1/31/2025) 300 mL 0    magnesium (HIGH ABSORPTION MAGNESIUM) 100 MG TABS Take 400 mg by mouth 3 times daily. 360 tablet 11    metoprolol tartrate (LOPRESSOR) 25 MG tablet Take 2 tablets (50 mg) by mouth or Feeding Tube 2 times daily. 60 tablet 11    montelukast (SINGULAIR) 10 MG tablet Take 1 tablet (10 mg) by mouth at bedtime. 30 tablet 11    multivitamin, therapeutic (THERA-VIT) TABS tablet Take 1  tablet by mouth daily. 30 tablet 0    mycophenolic acid (GENERIC EQUIVALENT) 360 MG EC tablet Take 2 tablets (720 mg) by mouth 2 times daily. 360 tablet 3    pantoprazole (PROTONIX) 40 MG EC tablet Take 1 tablet (40 mg) by mouth every morning (before breakfast). 30 tablet 3    predniSONE (DELTASONE) 5 MG tablet Take 1 tablet (5 mg) by mouth daily. 30 tablet 11    psyllium (METAMUCIL/KONSYL) 58.6 % powder Take 1 teaspoonful by mouth daily.      rosuvastatin (CRESTOR) 10 MG tablet Take 1 tablet (10 mg) by mouth daily. 30 tablet 11    sodium zirconium cyclosilicate (LOKELMA) 10 g PACK packet Take 1 packet (10 g) by mouth daily as needed. (Patient not taking: Reported on 11/4/2024) 10 packet 1    sulfamethoxazole-trimethoprim (BACTRIM) 400-80 MG tablet Take 1 tablet by mouth or NG Tube daily. 30 tablet 11    tacrolimus (GENERIC EQUIVALENT) 1 MG capsule Take 2 capsules (2 mg) by mouth 2 times daily. 360 capsule 3     Current Facility-Administered Medications   Medication Dose Route Frequency Provider Last Rate Last Admin    sodium chloride 0.9% BOLUS 1,000 mL  1,000 mL Intravenous Once Flower Medina PA-C         Facility-Administered Medications Ordered in Other Visits   Medication Dose Route Frequency Provider Last Rate Last Admin    sodium chloride bacteriostatic 0.9 % flush 9 mL  9 mL Intravenous Once NICOLA Pearce MD         No Known Allergies  Past Medical History:   Diagnosis Date    Administration of long-term prophylactic antibiotics 08/26/2024    Hepatic steatosis 2017    Noted on ultrasound    Hypomagnesemia 08/26/2024    Immunosuppression 08/26/2024    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    S/P lung transplant (H) 08/15/2024    Tremor 05/23/2024       Past Surgical History:   Procedure Laterality Date    BRONCHOSCOPY  08/16/2024    BRONCHOSCOPY (RIGID OR FLEXIBLE), DIAGNOSTIC N/A 9/26/2024    Procedure: BRONCHOSCOPY, WITH BRONCHOALVEOLAR LAVAGE AND BIOPSIES;  Surgeon: Oneida Silver,  MD;  Location:  GI    BRONCHOSCOPY (RIGID OR FLEXIBLE), DIAGNOSTIC N/A 11/5/2024    Procedure: BRONCHOSCOPY, WITH BRONCHOALVEOLAR LAVAGE AND BIOPSIES;  Surgeon: Tera Jiménez MD;  Location:  GI    BRONCHOSCOPY (RIGID OR FLEXIBLE), DIAGNOSTIC N/A 1/16/2025    Procedure: BRONCHOSCOPY, WITH BRONCHOALVEOLAR LAVAGE AND BIOPSIES;  Surgeon: Florian Steinberg MD;  Location:  GI    BRONCHOSCOPY FLEXIBLE AND RIGID N/A 8/27/2024    Procedure: Bronchoscopy Inspection;  Surgeon: Oneida Silver MD;  Location:  GI    COLONOSCOPY      CV CORONARY ANGIOGRAM N/A 06/21/2024    Procedure: Coronary Angiogram;  Surgeon: Gabriel Julian MD;  Location:  HEART CARDIAC CATH LAB    CV RIGHT HEART CATH MEASUREMENTS RECORDED N/A 06/21/2024    Procedure: Right Heart Catheterization;  Surgeon: Gabriel Julian MD;  Location:  HEART CARDIAC CATH LAB    PICC DOUBLE LUMEN PLACEMENT Right 08/20/2024    47-3cm, Basilic    TRANSPLANT LUNG RECIPIENT SINGLE X2 Bilateral 08/15/2024    Procedure: Clamshell Incision, On Central Venoarterial Extracorporeal Membranous Oxygenation, Bilateral Lung Transplant Recipient, Left atrial appendage ligation, Intraoperative Flexible Bronchoscopy by Anesthesia;  Surgeon: Mulvihill, Michael, MD;  Location:  OR       Social History     Socioeconomic History    Marital status: Single     Spouse name: Not on file    Number of children: Not on file    Years of education: Not on file    Highest education level: Not on file   Occupational History    Not on file   Tobacco Use    Smoking status: Former     Types: Cigarettes    Smokeless tobacco: Never   Substance and Sexual Activity    Alcohol use: Not Currently     Comment: not since 2017    Drug use: Never    Sexual activity: Not on file   Other Topics Concern    Not on file   Social History Narrative    Not on file     Social Drivers of Health     Financial Resource Strain: Low Risk  (8/24/2024)    Financial Resource Strain     Within the past 12 months,  "have you or your family members you live with been unable to get utilities (heat, electricity) when it was really needed?: No   Food Insecurity: Not on File (9/26/2024)    Received from [facility]    Food Insecurity     Food: 0   Transportation Needs: Low Risk  (8/24/2024)    Transportation Needs     Within the past 12 months, has lack of transportation kept you from medical appointments, getting your medicines, non-medical meetings or appointments, work, or from getting things that you need?: No   Physical Activity: Not on File (8/26/2019)    Received from [facility]    Physical Activity     Physical Activity: 0   Stress: Not on File (8/26/2019)    Received from [facility]    Stress     Stress: 0   Social Connections: Not on File (9/11/2024)    Received from [facility]    Social Connections     Connectedness: 0   Interpersonal Safety: Unknown (1/16/2025)    Interpersonal Safety     Do you feel physically and emotionally safe where you currently live?: Patient unable to answer     Within the past 12 months, have you been hit, slapped, kicked or otherwise physically hurt by someone?: Patient unable to answer     Within the past 12 months, have you been humiliated or emotionally abused in other ways by your partner or ex-partner?: Patient unable to answer   Housing Stability: Low Risk  (8/24/2024)    Housing Stability     Do you have housing? : Yes     Are you worried about losing your housing?: No       ROS Pulmonary  Constitutional- Negative  Eyes- Negative  Ear, nose and throat- Negative  Cardiac- Negative  Pulm- See HPI  GI- Negative  Genitourinary- Negative  Musculoskeletal- Negative  Neurology- Negative  Dermatology- Negative  Endocrine- Negative  Lymphatic- Negative  Psychiatry- Negative    A complete ROS was otherwise negative except as noted in the HPI.  BP (!) 151/89   Pulse 66   Temp 97.4  F (36.3  C) (Oral)   Ht 1.778 m (5' 10\")   Wt 79.8 kg (176 lb)   SpO2 94%   BMI 25.25 kg/m    Exam:   GENERAL APPEARANCE: Well " developed, well nourished, alert, and in no apparent distress.  EYES: PERRL, EOMI  HENT: Nasal mucosa with no edema and no hyperemia. No nasal polyps.  EARS: Canals clear, TMs normal  MOUTH: Oral mucosa is moist, without any lesions, no tonsillar enlargement, no oropharyngeal exudate.  NECK: supple, no masses, no thyromegaly.  LYMPHATICS: No significant axillary, cervical, or supraclavicular nodes.  RESP: normal percussion, good air flow throughout.  No crackles. No rhonchi. No wheezes.  CV: Normal S1, S2, regular rhythm, normal rate. No murmur.  No rub. No gallop. No LE edema.   ABDOMEN:  Bowel sounds normal, soft, nontender, no HSM or masses.   MS: extremities normal. No clubbing. No cyanosis.  SKIN: no rash on limited exam  NEURO: Mentation intact, speech normal, normal strength and tone, normal gait and stance  PSYCH: mentation appears normal. and affect normal/bright.    Results:    Recent Results (from the past week)   6 minute walk test    Collection Time: 02/13/25 12:00 AM   Result Value Ref Range    6 min walk (FT) 1,220 1,541 ft    6 Min Walk (M) 372 470 m   Comprehensive metabolic panel    Collection Time: 02/13/25 11:11 AM   Result Value Ref Range    Sodium 143 135 - 145 mmol/L    Potassium 4.1 3.4 - 5.3 mmol/L    Carbon Dioxide (CO2) 26 22 - 29 mmol/L    Anion Gap 9 7 - 15 mmol/L    Urea Nitrogen 19.8 8.0 - 23.0 mg/dL    Creatinine 1.11 0.67 - 1.17 mg/dL    GFR Estimate 75 >60 mL/min/1.73m2    Calcium 9.9 8.8 - 10.4 mg/dL    Chloride 108 (H) 98 - 107 mmol/L    Glucose 76 70 - 99 mg/dL    Alkaline Phosphatase 76 40 - 150 U/L    AST 27 0 - 45 U/L    ALT 33 0 - 70 U/L    Protein Total 7.0 6.4 - 8.3 g/dL    Albumin 4.5 3.5 - 5.2 g/dL    Bilirubin Total 0.4 <=1.2 mg/dL   CBC with platelets    Collection Time: 02/13/25 11:11 AM   Result Value Ref Range    WBC Count 7.9 4.0 - 11.0 10e3/uL    RBC Count 5.28 4.40 - 5.90 10e6/uL    Hemoglobin 14.8 13.3 - 17.7 g/dL    Hematocrit 45.8 40.0 - 53.0 %    MCV 87 78 -  100 fL    MCH 28.0 26.5 - 33.0 pg    MCHC 32.3 31.5 - 36.5 g/dL    RDW 13.4 10.0 - 15.0 %    Platelet Count 281 150 - 450 10e3/uL   Magnesium    Collection Time: 02/13/25 11:11 AM   Result Value Ref Range    Magnesium 1.7 1.7 - 2.3 mg/dL   IgG    Collection Time: 02/13/25 11:11 AM   Result Value Ref Range    Immunoglobulin G 447 (L) 610 - 1,616 mg/dL   Phosphorus    Collection Time: 02/13/25 11:11 AM   Result Value Ref Range    Phosphorus 2.8 2.5 - 4.5 mg/dL   General PFT Lab (Please always keep checked)    Collection Time: 02/13/25 11:53 AM   Result Value Ref Range    FVC-Pred 4.24 L    FVC-Pre 3.31 L    FVC-%Pred-Pre 78 %    FEV1-Pre 2.72 L    FEV1-%Pred-Pre 82 %    FEV1FVC-Pred 78 %    FEV1FVC-Pre 82 %    FEFMax-Pred 9.12 L/sec    FEFMax-Pre 7.43 L/sec    FEFMax-%Pred-Pre 81 %    FEF2575-Pred 2.74 L/sec    FEF2575-Pre 2.94 L/sec    VQI8741-%Pred-Pre 107 %    ExpTime-Pre 5.35 sec    FIFMax-Pre 6.42 L/sec    FEV1FEV6-Pred 79 %    FEV1FEV6-Pre 82 %                 Results as noted above.    PFT Interpretation:  Mild restrictive ventilatory defect.  Unchanged from previous.   At best since lung transplantation of 2.7L.  Valid Maneuver      CXR (2/13/2025), personally reviewed by me: The Lung fields with increased markings but stable. No effusion. Cardiac silhouette is wnl.     6MWT (2/13/2025. personally reviewed by me): Walked 1220ft, 372m on RA. 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  Postoperative course complicated by delirium.  He had pretransplant diabetes, hepatic steatosis and essential tremor.  Stenotrophomonas on 9/26/2024 bronchoscopy treated with levofloxacin.  Treated with Solu-Medrol and prednisone taper in early October 2024 for ACR based on A1B0 on surveillance biopsy, elevated cell free DNA and decreased PFTs.     #. Pulmonary/lung transplant: PFTs have been low.     Immunosuppression  -  Tacrolimus, goal 8-12  - Myfortic 720mg BID, changed from CellCept due to loose stools  - Prednisone 10 mg daily    CLAD: Based on imaging from 12/2024 - On ICS/LABA and azithromycin along with Montelukast.     Bronch:  9/26/24: A1B0 - Treated with IV solumedrol/pred taper. Cell free DNA was elevated (9/16: 2.4, 9/24: 1.36)  11/5/24: A0B0  1/16/25: A0B0.    2/13/2025: He was noted to  have air trapping on expiratory CT images on 12/17/24 and started ICS-LABA inhaler.  The cell free DNA was elevated at 2.5 on 12/17 and decreased to 1.5 on 1/15/25.   - DSAs have been negative, last checked on 12/17/24.    - Interestingly last bronch was neg for ACR but mildly elevated cell free DNA. If still positive from today we will re-do steroid trial.  - He is known to have granulation tissue with 30% stenosis of right anastomosis on 11/5 bronch. Bronch (1/16/25) showed thick adherent white mucoid secretions at the Rt anastomosis.  - Will review Donor history.   - Spirometry is stable along with pulmonary complaints.  Chest x-ray stable.  Will continue with the current I-S regimen.      #. ID:   Prophylaxis:   - Bactrim for PJP and nocardia prophylaxis  - CMV and EBV monitoring per protocol, s/p Valcyte prophylaxis  - Nystatin for thrush prophylaxis, completed.       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  Subsequent AFB cultures have been negative.  No indication for treatment at this time.  Continue AFB cultures with all bronchoscopies.    Stenotrophomonas (9/26, 11/5/24 - BAL): Treated with Minocycline in mid 11/2025.  Ps. A (1/16/25): Treated with 7 day course of levaquin.    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 Cultures neg to date. Will monitor.     #. Lung nodule: 8 x 8 mm nodule in the left lower  lobe noted on CT on September 24.  CT today with stable nodule.   - Repeat CT in 6 months for follow up (~6/17/25)    #. GI:   Persistent loose stool: Previously switched from CellCept to Myfortic.  Metamucil added at last visit.    GERD: Adequately controlled with current pantoprazole.  Hepatic steatosis: LFTs within normal limits.  Continue close monitoring and pursue hepatology consultation if elevated.    #. Elevated Cr: baseline Cr 0.5-0.6, was 0.8-0.9 three months ago.  - Likely secondary to prerenal as patient is not taking in much water.  - Encourage 70-80 oz daily    #. Hypomagnesemia: Level wnl. Continue current replacement.    #. Hyperkalemia: Low potassium diet initiated.  K is good. Will continue to monitor.     #. Diabetes mellitus: Blood sugars have been elevated.    - On Jardiance.    #. Hypertension: Blood pressure is well-controlled with current metoprolol.    #. Hyperlipidemia: Continue current rosuvastatin.  Check lipid battery annually with annual studies.    #. Tremors:  If persistent/progressive, could consider neurology consultation or a trial of primidone.    #. Healthcare maintenance:   - Colonoscopy in 2016, repeat due in 2026.    - Vaccinations: S/p COVID, Flu. will get RSV vaccine at pharmacy. Will give Shingrix first dose today (2/13/2025).    Carlos Johnson MD.  Pulmonary and Critical Care.  02/13/2025  12:08 PM    The longitudinal plan of care for the diagnosis(es)/condition(s) as documented were addressed during this visit. Due to the added complexity in care, I will continue to support Juan J in the subsequent management and with ongoing continuity of care.          Again, thank you for allowing me to participate in the care of your patient.        Sincerely,        Carlos Johnson MD    Electronically signed

## 2025-02-13 NOTE — NURSING NOTE
"Chief Complaint   Patient presents with    RECHECK     Follow Up     BP (!) 151/89   Pulse 66   Temp 97.4  F (36.3  C) (Oral)   Ht 1.778 m (5' 10\")   Wt 79.8 kg (176 lb)   SpO2 94%   BMI 25.25 kg/m    Ivelisse Edmondson on 2/13/2025 at 3:49 PM    "

## 2025-02-14 NOTE — RESULT ENCOUNTER NOTE
Hi Juan J,   Your tacrolimus level was 9.9 at 12 hours on 2/13/25 which is within your goal range of 8-12. No dose change at this time. Please call the transplant office ([phone]) with any questions.   Thanks,   Joi

## 2025-02-18 LAB — PROSPERA TRANSPLANT MONITORING: 1.74 %

## 2025-02-26 ENCOUNTER — LAB REQUISITION (OUTPATIENT)
Dept: LAB | Facility: CLINIC | Age: 60
End: 2025-02-26
Payer: COMMERCIAL

## 2025-03-03 ENCOUNTER — TELEPHONE (OUTPATIENT)
Dept: PHARMACY | Facility: CLINIC | Age: 60
End: 2025-03-03
Payer: COMMERCIAL

## 2025-03-03 NOTE — TELEPHONE ENCOUNTER
Clinical Pharmacy Consult:                                                      Transplant Specific: 3 Month Post-Transplant Medication Review  Date of Transplant: 8/15/24  Type of Transplant: lung  First Transplant: yes  History of rejection: no    Immunosuppression Regimen   TAC 2mg qAM & 2mg qPM, Prednisone 5mg qAM, and Myfortic 720mg qAM & 720mg qPM  Patient specific goal: 8-12  Most recent level: 9.9, date 2/13/25  Immunosuppressant Levels: therapeutic  Pt adherent to lab draws: yes  Scr:   Lab Results   Component Value Date    CR 0.62 08/27/2024     Side effects: tremors    Prophylactic Medications  PJP Prophylactic: Bactrim SS daily  Scheduled Discontinue Date: Lifelong     Antifungal: Nystatin  Scheduled Discontinue Date: 6 months Anticipated date: completed    CMV Prophylactic: CrCl >/=60 mL/minute: Valcyte 900mg daily   Scheduled Discontinue Date: 3 months Anticipated date: Completed    Acid Reducer: Protonix (pantoprazole)  Scheduled Reviewed Date: Lifelong    Vascular Prophylactic: Heparin injection  Scheduled Discontinue Date:  upon discharge   - completed      Blood Pressure Management  Frequency of home Blood Pressure checks: daily  Most recent home BP: 130's/70's  Patient Blood pressure goal: <140/90  Patient blood pressure at goal:  yes  Hospitalizations/ER visits since last assessment: 0      Med rec/DUR performed: yes  Med Rec Discrepancies: No    Spoke with sisterCatia today via phone. Reported Juan J is doing very well. Walking about 3 miles a day now. He is starting to manage most of his medications. Denied any missed doses. Still has tremors in hands, otherwise no other side effects.  Denied N/V/D or any GI symptoms with pantoprazole.  No pain or tenderness at incision site. Denied fever, anxiety or SoB. Reported some fatigue, potentially due to trying to do too much. Advised sister to tell patient to decrease activity to see if fatigue improves.  Tac is at goal.  BP at goal.     No other  questions or concerns today. Follow up in 6 months.    Contreras Sellers, Pharm D  [facility]

## 2025-03-04 ENCOUNTER — TELEPHONE (OUTPATIENT)
Dept: TRANSPLANT | Facility: CLINIC | Age: 60
End: 2025-03-04
Payer: COMMERCIAL

## 2025-03-04 DIAGNOSIS — J84.9 ILD (INTERSTITIAL LUNG DISEASE) (H): ICD-10-CM

## 2025-03-04 DIAGNOSIS — Z94.2 S/P LUNG TRANSPLANT (H): ICD-10-CM

## 2025-03-04 NOTE — TELEPHONE ENCOUNTER
/90s  HR he doesn't check but asked that he check this. Will review with Dr. Johnson.   Pt needs oral steroid burst if PRA negative. Pt reports he doesn't have insurance now till 4/1/25 so he isn't able to make his appointment on 3/6/25.     DSA negative. Plan to do pred burst with Valcyte prophy (3 weeks) once patient has insurance.

## 2025-03-05 ENCOUNTER — TELEPHONE (OUTPATIENT)
Dept: TRANSPLANT | Facility: CLINIC | Age: 60
End: 2025-03-05
Payer: COMMERCIAL

## 2025-03-05 DIAGNOSIS — Z94.2 S/P LUNG TRANSPLANT (H): ICD-10-CM

## 2025-03-05 LAB
SCANNED LAB RESULT: NORMAL
SCANNED LAB RESULT: NORMAL

## 2025-03-05 NOTE — TELEPHONE ENCOUNTER
Transplant Social Work Services Progress Note    Per request of transplant coordinator, phone call to patient to discuss financial concerns.  Patient let insurance lapse.  He had received a notice by mail in November that his [employer] would be ending as his income was too high.  He also has too many assets to qualify for social security or straight MA. Yesterday, he was able to enroll in a [employer] plan through the market place, but it will not be active until April 1.  Patient states he has enough of most of his medications to last him till April 1st, but will be short 5 days on 3 or 4 meds.  Transplant coordinator had suggested that patient may need donated funds to help pay for these medications.  Explained to patient our funds are limited and that he had actually used all of his anthony money to pay for his apartment after transplant. Juan J assured me that was OK and that he could afford to pay out of pocket for the medications at the end of the month.    Reenforced the importance of always checking his mail.      [facility]  Lung Transplant   Phone: [phone]     Joaquim

## 2025-03-05 NOTE — TELEPHONE ENCOUNTER
Call out to patient to determine if he has any Valcyte at home. Pt went through discontinued medication supply at home, he does not have Valcyte. Per Dr. Johnson, no plan for pred burst without Valcyte available, will reassess on 4/1 when pts insurance is active- pt aware.     Pt wanting to talk w/SW regarding assistance w/1 week supply of a few meds to help bridge while he does not have insurance. Message sent to Carol Ann, at this time.     Pt's BP reviewed by Dr. Johnson, plan to increase Metoprolol to 75 mg BID, pt updated, he confirmed he has enough supply of the med for the dose increase.

## 2025-03-06 RX ORDER — METOPROLOL TARTRATE 25 MG/1
75 TABLET, FILM COATED ORAL 2 TIMES DAILY
Qty: 180 TABLET | Refills: 11 | Status: SHIPPED | OUTPATIENT
Start: 2025-03-06

## 2025-03-10 LAB
SCANNED LAB RESULT: NORMAL

## 2025-03-10 NOTE — PROGRESS NOTES
[facility], Procedure Note          Extubation:       Travon Cartwright  MRN# [id number]   August 18, 2024, 9:16 PM         Patient extubated at: August 18, 2024, 9:17 PM   Supplemental Oxygen: Via Oxymask at 6 liters per minute   Cough: The cough is strong   Secretion Mode: PRN suction by self   Secretion Amount: Small amount, thin and tan in color   Respiratory Exam:: Breath sounds: equal and clear     Location: bilaterally   Skin Exam:: Patient color: natural   Patient Status: Currently anxious   Arterial Blood Gasses:        Checked air leak pt has positive air leak and effective cough, Suctioned oral and ETT before extubation,pt looks comfortable,able to suction himself and on 6l Oxymask. HFNC on Stand By per provider verbal order.      Recorded by  David Benjamin RRT   Yvonne Rosenthal  Critical Care Medicine  [address], [address]  [place] [address]  Phone: [phone]  Fax: [phone]  Follow Up Time:

## 2025-03-13 LAB
ACID FAST STAIN (ARUP): NORMAL
ACID FAST STAIN (ARUP): NORMAL

## 2025-03-17 ENCOUNTER — ENROLLMENT (OUTPATIENT)
Dept: HOME HEALTH SERVICES | Facility: HOME HEALTH | Age: 60
End: 2025-03-17

## 2025-03-17 ENCOUNTER — TELEPHONE (OUTPATIENT)
Dept: TRANSPLANT | Facility: CLINIC | Age: 60
End: 2025-03-17

## 2025-03-17 DIAGNOSIS — T86.810 ANTIBODY MEDIATED REJECTION OF LUNG TRANSPLANT (H): ICD-10-CM

## 2025-03-17 DIAGNOSIS — E86.0 DEHYDRATION: Primary | ICD-10-CM

## 2025-03-17 DIAGNOSIS — Z94.2 LUNG REPLACED BY TRANSPLANT (H): ICD-10-CM

## 2025-03-17 DIAGNOSIS — Z94.2 S/P LUNG TRANSPLANT (H): ICD-10-CM

## 2025-03-17 RX ORDER — DIPHENHYDRAMINE HYDROCHLORIDE 50 MG/ML
25 INJECTION, SOLUTION INTRAMUSCULAR; INTRAVENOUS
Start: 2025-03-17

## 2025-03-17 RX ORDER — ACETAMINOPHEN 325 MG/1
650 TABLET ORAL ONCE
Start: 2025-03-17

## 2025-03-17 RX ORDER — ALBUTEROL SULFATE 90 UG/1
1-2 INHALANT RESPIRATORY (INHALATION)
Start: 2025-03-17

## 2025-03-17 RX ORDER — VALGANCICLOVIR 450 MG/1
900 TABLET, FILM COATED ORAL DAILY
Qty: 60 TABLET | Refills: 2 | Status: SHIPPED | OUTPATIENT
Start: 2025-03-17

## 2025-03-17 RX ORDER — METHYLPREDNISOLONE SODIUM SUCCINATE 125 MG/2ML
125 INJECTION INTRAMUSCULAR; INTRAVENOUS ONCE
OUTPATIENT
Start: 2025-03-17

## 2025-03-17 RX ORDER — VORICONAZOLE 200 MG/1
200 TABLET, FILM COATED ORAL 2 TIMES DAILY
Qty: 60 TABLET | Refills: 2 | Status: SHIPPED | OUTPATIENT
Start: 2025-03-17

## 2025-03-17 RX ORDER — EPINEPHRINE 1 MG/ML
0.3 INJECTION, SOLUTION, CONCENTRATE INTRAVENOUS EVERY 5 MIN PRN
OUTPATIENT
Start: 2025-03-17

## 2025-03-17 RX ORDER — DIPHENHYDRAMINE HCL 25 MG
25 CAPSULE ORAL ONCE
OUTPATIENT
Start: 2025-03-17

## 2025-03-17 RX ORDER — HEPARIN SODIUM,PORCINE 10 UNIT/ML
5-20 VIAL (ML) INTRAVENOUS DAILY PRN
OUTPATIENT
Start: 2025-03-17

## 2025-03-17 RX ORDER — HEPARIN SODIUM (PORCINE) LOCK FLUSH IV SOLN 100 UNIT/ML 100 UNIT/ML
5 SOLUTION INTRAVENOUS
OUTPATIENT
Start: 2025-03-17

## 2025-03-17 RX ORDER — ACETAMINOPHEN 325 MG/1
650 TABLET ORAL ONCE
OUTPATIENT
Start: 2025-03-17

## 2025-03-17 RX ORDER — MEPERIDINE HYDROCHLORIDE 25 MG/ML
25 INJECTION INTRAMUSCULAR; INTRAVENOUS; SUBCUTANEOUS
OUTPATIENT
Start: 2025-03-17

## 2025-03-17 RX ORDER — ALBUTEROL SULFATE 0.83 MG/ML
2.5 SOLUTION RESPIRATORY (INHALATION)
OUTPATIENT
Start: 2025-03-17

## 2025-03-17 RX ORDER — DIPHENHYDRAMINE HYDROCHLORIDE 50 MG/ML
50 INJECTION, SOLUTION INTRAMUSCULAR; INTRAVENOUS
Start: 2025-03-17

## 2025-03-17 RX ORDER — HYDROCORTISONE SODIUM SUCCINATE 100 MG/2ML
100 INJECTION INTRAMUSCULAR; INTRAVENOUS ONCE
OUTPATIENT
Start: 2025-03-17

## 2025-03-17 RX ORDER — DIPHENHYDRAMINE HCL 25 MG
50 CAPSULE ORAL ONCE
OUTPATIENT
Start: 2025-03-17

## 2025-03-17 NOTE — PLAN OF CARE
Goal Outcome Evaluation:       Admission          8/14/2024  9:08 PM  -----------------------------------------------------------  Reason for admission: Possible Bilateral Lung Tx  Primary team notified of pt arrival: Yes  Admitted from: Home  Via: Wheelchair  Accompanied by: Sister  Belongings: With sister  Admission Profile: complete  Teaching: orientation to unit and call light- call light within reach, call don't fall, use of console, meal times, when to call for the RN, and enforced importance of safety   Access: PIV  Telemetry: no tele orders  Ht./Wt.: complete  Code Status verified on armband: yes  2 RN Skin Assessment Completed By: MALLORIE Bee/Glo NOBLES  Med Rec completed: yes  Suction/Ambu bag/Flowmeter at bedside: yes  Is patient having diarrhea upon admission- if YES fill out testing algorithm : no    Pt status:    Temp:  [97.4  F (36.3  C)-98  F (36.7  C)] 97.4  F (36.3  C)  Pulse:  [99] 99  Resp:  [20] 20  BP: (100-109)/(71-74) 109/71  SpO2:  [92 %-94 %] 92 %    Neuro: A&Ox4.   Cardiac: Not on tele. VSS.   Respiratory: Sating >92% on RA.  GI/: Adequate urine output. No BM this shift.  Diet/appetite: NPO except meds.  Activity:  Independent   Pain: Denies   Skin: No new deficits noted. 1x Chlorhexydine bath/shower done by pt.  LDA's: R PIV TKO    Plan: Pending sputum specimen, first specimen sent was contaminated. Pending consent from day team. Continue with POC. Notify primary team with changes.                    The patient is a 45y Male complaining of pain, chest.

## 2025-03-17 NOTE — LETTER
PHYSICIAN ORDERS      DATE & TIME ISSUED: 2025 1:13 PM  PATIENT NAME: Travon Cartwright   : 1965     [facility] MR# [if applicable]: [id number]     DIAGNOSIS:  Lung Transplant  Z94.2    Labs 2025:  CMP, quantiferon TB gold plus    12 lead EKG with read     Any questions please call: Joi     Please fax these results to [phone].         Carey Tucker MD  Pulmonary Disease

## 2025-03-17 NOTE — TELEPHONE ENCOUNTER
Estimated Creatinine Clearance: 77.9 mL/min (based on SCr of 1.11 mg/dL).    Plan to start toci/IVIG for positive non-HLA antibody. Repeat non-HLA antibodies after 2-3 months. Discussed with patient who agrees with plan. Will try to make first infusion for mid-April when he's in town for clinic.   Pt's local infusion center in [place] can give both of these mediations. Once first toci infusion is scheduled, will call them to schedule subsequent infusions.     Discussed need for Valcyte/vori ppx upon starting these infusions. Sent medications to the pharmacy, pt will call to order once he has insurance on 4/1/25.     Get labs upon returning to [place] on 4/10/25 for TB test to ensure negative before starting toci. Will also get EKG. Orders faxed to local lab.     P: [phone]  Fax: [phone] (need to fax orders, med list, recent clinic visit note, demographics)    Local infusion center in [facility], just need PA through [facility]    [employer] health plan f: [phone] p: [phone]  Non affiliated provider portion and transplant  [facility] p: [phone]    ID: [id number]

## 2025-03-27 DIAGNOSIS — Z94.2 S/P LUNG TRANSPLANT (H): ICD-10-CM

## 2025-03-27 DIAGNOSIS — D84.9 IMMUNOSUPPRESSION: Primary | ICD-10-CM

## 2025-03-31 ENCOUNTER — TELEPHONE (OUTPATIENT)
Dept: TRANSPLANT | Facility: CLINIC | Age: 60
End: 2025-03-31

## 2025-03-31 DIAGNOSIS — Z94.2 S/P LUNG TRANSPLANT (H): ICD-10-CM

## 2025-03-31 RX ORDER — PREDNISONE 10 MG/1
TABLET ORAL
Qty: 50 TABLET | Refills: 0 | Status: SHIPPED | OUTPATIENT
Start: 2025-03-31 | End: 2025-04-01

## 2025-03-31 NOTE — TELEPHONE ENCOUNTER
Plan to start the following pred taper per Dr. Johnson/Garrett for high prospera while working on getting toci/IVIG started. Pt notified to order meds tomorrow once he has insurance so he can start upon his return to [place]. To notify endo of this burst as well.     Day 1: 80 mg  Day 2: 75 mg  Day 3: 70 mg  Day 4: 65 mg   Day 5: 60 mg  Day 6: 50 mg  Day 7: 40 mg  Day 8: 30 mg  Day 9: 20 mg   Day 10: 10 mg     Then back to baseline dose of 5 mg daily. Ppx Valcyte dosing of 900 mg daily sent as well to continue for 2 weeks after the taper finishes, continue this until you run out (will need this for toci infusions as well so may be on this ongoing for now).     Estimated Creatinine Clearance: 77.9 mL/min (based on SCr of 1.11 mg/dL).

## 2025-04-01 ENCOUNTER — ENROLLMENT (OUTPATIENT)
Dept: HOME HEALTH SERVICES | Facility: HOME HEALTH | Age: 60
End: 2025-04-01
Payer: COMMERCIAL

## 2025-04-08 ENCOUNTER — TELEPHONE (OUTPATIENT)
Dept: ENDOCRINOLOGY | Facility: CLINIC | Age: 60
End: 2025-04-08
Payer: COMMERCIAL

## 2025-04-08 ENCOUNTER — TELEPHONE (OUTPATIENT)
Dept: TRANSPLANT | Facility: CLINIC | Age: 60
End: 2025-04-08
Payer: COMMERCIAL

## 2025-04-08 DIAGNOSIS — Z94.2 S/P LUNG TRANSPLANT (H): ICD-10-CM

## 2025-04-08 NOTE — TELEPHONE ENCOUNTER
Provider Call: General  Route to LPN    Reason for call: Has questions re a prescription to review with Joi     Call back needed? Yes    Return Call Needed  Same as documented in contacts section  When to return call?: Same day: Route High Priority

## 2025-04-08 NOTE — TELEPHONE ENCOUNTER
Pt is requesting new rx for    Ros ortega 100unit/ml sopn- ins preferred    Did not see on active med list please verify and send new rx. Thank you!     [facility]  [phone]

## 2025-04-09 ENCOUNTER — TELEPHONE (OUTPATIENT)
Dept: TRANSPLANT | Facility: CLINIC | Age: 60
End: 2025-04-09
Payer: COMMERCIAL

## 2025-04-09 DIAGNOSIS — Z94.2 S/P LUNG TRANSPLANT (H): ICD-10-CM

## 2025-04-09 NOTE — TELEPHONE ENCOUNTER
Provider Call: General  Route to LPN    Reason for call: She is helping organizing Infusion  Has questions.      Call back needed? Yes    Return Call Needed  Same as documented in contacts section  When to return call?: Same day: Route High Priority

## 2025-04-10 ENCOUNTER — TELEPHONE (OUTPATIENT)
Dept: PULMONOLOGY | Facility: CLINIC | Age: 60
End: 2025-04-10
Payer: COMMERCIAL

## 2025-04-10 ENCOUNTER — TELEPHONE (OUTPATIENT)
Dept: ENDOCRINOLOGY | Facility: CLINIC | Age: 60
End: 2025-04-10

## 2025-04-10 DIAGNOSIS — R73.9 STEROID-INDUCED HYPERGLYCEMIA: ICD-10-CM

## 2025-04-10 DIAGNOSIS — T38.0X5A STEROID-INDUCED HYPERGLYCEMIA: ICD-10-CM

## 2025-04-10 DIAGNOSIS — Z94.2 S/P LUNG TRANSPLANT (H): ICD-10-CM

## 2025-04-10 DIAGNOSIS — E11.9 TYPE 2 DIABETES MELLITUS WITHOUT COMPLICATION, WITH LONG-TERM CURRENT USE OF INSULIN (H): Primary | ICD-10-CM

## 2025-04-10 DIAGNOSIS — E11.9 TYPE 2 DIABETES MELLITUS WITHOUT COMPLICATION, WITH LONG-TERM CURRENT USE OF INSULIN (H): ICD-10-CM

## 2025-04-10 DIAGNOSIS — Z79.4 TYPE 2 DIABETES MELLITUS WITHOUT COMPLICATION, WITH LONG-TERM CURRENT USE OF INSULIN (H): ICD-10-CM

## 2025-04-10 DIAGNOSIS — Z79.4 TYPE 2 DIABETES MELLITUS WITHOUT COMPLICATION, WITH LONG-TERM CURRENT USE OF INSULIN (H): Primary | ICD-10-CM

## 2025-04-10 RX ORDER — INSULIN LISPRO-AABC 100 [IU]/ML
50 INJECTION, SOLUTION SUBCUTANEOUS DAILY
Qty: 15 ML | Refills: 3 | Status: SHIPPED | OUTPATIENT
Start: 2025-04-10

## 2025-04-10 NOTE — TELEPHONE ENCOUNTER
We received rx for lantus solostar with 2 different daily doses. 49 units and 46 units. Please clarify and resend rx. Thank you, Maggie Reyes [facility]

## 2025-04-10 NOTE — TELEPHONE ENCOUNTER
Spoke with Nicole, patient is okay to start Actemra as soon as he is completed his steroid burst.   She reports he had an out of network request from [facility].  [facility] infusion [facility].  Patient is 100% coverage for home infusion but they does have the await the prior authorization and to see if they can get nursing in his area.  She will keep writer posted with the status.  She was going to reach out to [facility] regarding this.

## 2025-04-10 NOTE — TELEPHONE ENCOUNTER
Pt is requesting the lyumjev quick pen please  and thank you and also ins is requesting this also

## 2025-04-15 RX ORDER — INSULIN GLARGINE 100 [IU]/ML
46 INJECTION, SOLUTION SUBCUTANEOUS EVERY MORNING
Qty: 14 ML | Refills: 0 | Status: SHIPPED | OUTPATIENT
Start: 2025-04-15

## 2025-04-16 ENCOUNTER — ANCILLARY PROCEDURE (OUTPATIENT)
Dept: GENERAL RADIOLOGY | Facility: CLINIC | Age: 60
End: 2025-04-16
Attending: INTERNAL MEDICINE
Payer: COMMERCIAL

## 2025-04-16 ENCOUNTER — OFFICE VISIT (OUTPATIENT)
Dept: PULMONOLOGY | Facility: CLINIC | Age: 60
End: 2025-04-16
Attending: INTERNAL MEDICINE
Payer: COMMERCIAL

## 2025-04-16 ENCOUNTER — LAB (OUTPATIENT)
Dept: LAB | Facility: CLINIC | Age: 60
End: 2025-04-16
Attending: INTERNAL MEDICINE
Payer: COMMERCIAL

## 2025-04-16 VITALS
DIASTOLIC BLOOD PRESSURE: 83 MMHG | BODY MASS INDEX: 25.77 KG/M2 | OXYGEN SATURATION: 97 % | WEIGHT: 180 LBS | HEIGHT: 70 IN | HEART RATE: 71 BPM | SYSTOLIC BLOOD PRESSURE: 145 MMHG

## 2025-04-16 DIAGNOSIS — Z94.2 S/P LUNG TRANSPLANT (H): ICD-10-CM

## 2025-04-16 DIAGNOSIS — D84.9 IMMUNOSUPPRESSION: ICD-10-CM

## 2025-04-16 DIAGNOSIS — Z13.9 SCREENING FOR CONDITION: Primary | ICD-10-CM

## 2025-04-16 DIAGNOSIS — Z13.9 SCREENING FOR CONDITION: ICD-10-CM

## 2025-04-16 LAB
ALBUMIN SERPL BCG-MCNC: 4 G/DL (ref 3.5–5.2)
ALP SERPL-CCNC: 74 U/L (ref 40–150)
ALT SERPL W P-5'-P-CCNC: 34 U/L (ref 0–70)
ANION GAP SERPL CALCULATED.3IONS-SCNC: 10 MMOL/L (ref 7–15)
AST SERPL W P-5'-P-CCNC: 23 U/L (ref 0–45)
BILIRUB SERPL-MCNC: 0.4 MG/DL
BUN SERPL-MCNC: 21.5 MG/DL (ref 8–23)
CALCIUM SERPL-MCNC: 9.5 MG/DL (ref 8.8–10.4)
CHLORIDE SERPL-SCNC: 102 MMOL/L (ref 98–107)
CHOLEST SERPL-MCNC: 163 MG/DL
CMV DNA SPEC NAA+PROBE-ACNC: NOT DETECTED IU/ML
CREAT SERPL-MCNC: 1.17 MG/DL (ref 0.67–1.17)
EGFRCR SERPLBLD CKD-EPI 2021: 70 ML/MIN/1.73M2
ERYTHROCYTE [DISTWIDTH] IN BLOOD BY AUTOMATED COUNT: 14.1 % (ref 10–15)
EST. AVERAGE GLUCOSE BLD GHB EST-MCNC: 206 MG/DL
EXPTIME-PRE: 6.96 SEC
FEF2575-%PRED-PRE: 116 %
FEF2575-PRE: 3.18 L/SEC
FEF2575-PRED: 2.73 L/SEC
FEFMAX-%PRED-PRE: 84 %
FEFMAX-PRE: 7.73 L/SEC
FEFMAX-PRED: 9.11 L/SEC
FEV1-%PRED-PRE: 84 %
FEV1-PRE: 2.78 L
FEV1FEV6-PRE: 83 %
FEV1FEV6-PRED: 79 %
FEV1FVC-PRE: 83 %
FEV1FVC-PRED: 78 %
FIFMAX-PRE: 7.27 L/SEC
FVC-%PRED-PRE: 79 %
FVC-PRE: 3.37 L
FVC-PRED: 4.23 L
GLUCOSE SERPL-MCNC: 144 MG/DL (ref 70–99)
HBA1C MFR BLD: 8.8 %
HCO3 SERPL-SCNC: 28 MMOL/L (ref 22–29)
HCT VFR BLD AUTO: 48.2 % (ref 40–53)
HDLC SERPL-MCNC: 85 MG/DL
HGB BLD-MCNC: 15.4 G/DL (ref 13.3–17.7)
INR PPP: 0.97 (ref 0.85–1.15)
LDLC SERPL CALC-MCNC: 39 MG/DL
MAGNESIUM SERPL-MCNC: 1.7 MG/DL (ref 1.7–2.3)
MCH RBC QN AUTO: 27.1 PG (ref 26.5–33)
MCHC RBC AUTO-ENTMCNC: 32 G/DL (ref 31.5–36.5)
MCV RBC AUTO: 85 FL (ref 78–100)
NONHDLC SERPL-MCNC: 78 MG/DL
PHOSPHATE SERPL-MCNC: 2.8 MG/DL (ref 2.5–4.5)
PLATELET # BLD AUTO: 202 10E3/UL (ref 150–450)
POTASSIUM SERPL-SCNC: 4.5 MMOL/L (ref 3.4–5.3)
PROT SERPL-MCNC: 6.2 G/DL (ref 6.4–8.3)
PSA SERPL DL<=0.01 NG/ML-MCNC: 0.27 NG/ML (ref 0–4.5)
RBC # BLD AUTO: 5.68 10E6/UL (ref 4.4–5.9)
SODIUM SERPL-SCNC: 140 MMOL/L (ref 135–145)
SPECIMEN TYPE: NORMAL
TACROLIMUS BLD-MCNC: 10.2 UG/L (ref 5–15)
TME LAST DOSE: NORMAL H
TME LAST DOSE: NORMAL H
TRIGL SERPL-MCNC: 195 MG/DL
WBC # BLD AUTO: 8.8 10E3/UL (ref 4–11)

## 2025-04-16 PROCEDURE — 90471 IMMUNIZATION ADMIN: CPT | Performed by: PHYSICIAN ASSISTANT

## 2025-04-16 PROCEDURE — 90750 HZV VACC RECOMBINANT IM: CPT | Performed by: PHYSICIAN ASSISTANT

## 2025-04-16 PROCEDURE — 250N000021 HC RX MED A9270 GY (STAT IND- M) 250: Performed by: PHYSICIAN ASSISTANT

## 2025-04-16 PROCEDURE — 71046 X-RAY EXAM CHEST 2 VIEWS: CPT | Performed by: RADIOLOGY

## 2025-04-16 PROCEDURE — 84100 ASSAY OF PHOSPHORUS: CPT | Performed by: PATHOLOGY

## 2025-04-16 PROCEDURE — 87799 DETECT AGENT NOS DNA QUANT: CPT | Performed by: INTERNAL MEDICINE

## 2025-04-16 PROCEDURE — 99213 OFFICE O/P EST LOW 20 MIN: CPT | Performed by: PHYSICIAN ASSISTANT

## 2025-04-16 PROCEDURE — 84153 ASSAY OF PSA TOTAL: CPT | Performed by: PATHOLOGY

## 2025-04-16 PROCEDURE — 85610 PROTHROMBIN TIME: CPT | Performed by: PATHOLOGY

## 2025-04-16 PROCEDURE — 99000 SPECIMEN HANDLING OFFICE-LAB: CPT | Performed by: PATHOLOGY

## 2025-04-16 PROCEDURE — 85027 COMPLETE CBC AUTOMATED: CPT | Performed by: PATHOLOGY

## 2025-04-16 PROCEDURE — 80197 ASSAY OF TACROLIMUS: CPT | Performed by: PHYSICIAN ASSISTANT

## 2025-04-16 PROCEDURE — 36415 COLL VENOUS BLD VENIPUNCTURE: CPT | Performed by: PATHOLOGY

## 2025-04-16 PROCEDURE — 80061 LIPID PANEL: CPT | Performed by: PATHOLOGY

## 2025-04-16 PROCEDURE — 80053 COMPREHEN METABOLIC PANEL: CPT | Performed by: PATHOLOGY

## 2025-04-16 PROCEDURE — 83036 HEMOGLOBIN GLYCOSYLATED A1C: CPT | Performed by: PHYSICIAN ASSISTANT

## 2025-04-16 PROCEDURE — 83735 ASSAY OF MAGNESIUM: CPT | Performed by: PATHOLOGY

## 2025-04-16 PROCEDURE — 86481 TB AG RESPONSE T-CELL SUSP: CPT | Performed by: INTERNAL MEDICINE

## 2025-04-16 RX ORDER — CARVEDILOL 6.25 MG/1
6.25 TABLET ORAL 2 TIMES DAILY WITH MEALS
Qty: 60 TABLET | Refills: 3 | Status: SHIPPED | OUTPATIENT
Start: 2025-04-16

## 2025-04-16 RX ORDER — LIDOCAINE 40 MG/G
CREAM TOPICAL
OUTPATIENT
Start: 2025-04-17

## 2025-04-16 RX ADMIN — ZOSTER VACCINE RECOMBINANT, ADJUVANTED 0.5 ML: KIT at 10:53

## 2025-04-16 ASSESSMENT — PAIN SCALES - GENERAL: PAINLEVEL_OUTOF10: NO PAIN (0)

## 2025-04-16 NOTE — PATIENT INSTRUCTIONS
You are scheduled for a bronchoscopy on 4/17/2025 at 9:00am at the [facility] Suite on the 3rd floor. Arrive 1 hour early.     Instructions     1. Nothing to eat or drink 8 hours before procedure.  2. Hold your morning medications. Bring them with you to take after the procedure.  3. Have a  available to take you home.   4. If you are a diabetic - take 1/2 of Lantus dose tonight. Do not take any insulin in the morning.   5. Stop Aspirin 7 days before procedure.  6. If you are taking coumadin you will need contact your coumadin clinic for instructions.  7. If taking medications for atrial fib NA.

## 2025-04-16 NOTE — LETTER
4/16/2025      Travon Cartwright  [address]  [place] [facility] [address]      Dear Colleague,    Thank you for referring your patient, Travon Cartwright, to the [facility] [place]. Please see a copy of my visit note below.    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 Postoperative course complicated by delirium, stenotrophomonas on BAL s/p levofloxacin and ACR (A1B0 with elevated prospera in October 2024) s/p solu-medrol and prednisone taper. Other history notable for diabetes, hepatic steatosis and essential tremor.      1. S/p bilateral lung transplant:  TOMAS (on imaging):   R anastomosis stenosis: feeling really well after completing the prednisone burst and taper two days ago. Dyspnea improved and has good energy. Sating 97% on room air. CT chest 12/17/24 with air trapping. DSA and CMV 2/13 negative; prospera of 1.74 on 2/13. CXR reviewed today and demonstrates stable transplant. PFTs improved to his post transplant best.   - CLAD therapy: azithromycin, Advair and montelukast  - Prospera pending for today  - Surveillance bronch scheduled for tomorrow    Immunosuppression  - Tacrolimus (goal 8-12)  - Myfortic 720 mg BID (room to increase)  - Prednisone 5 mg daily (also room to increase)     Prophylaxis:   - Bactrim for PJP and nocardia prophylaxis  - CMV D-/R+  - EBV D-/R+     2. LLL lung nodule: 8 x 8 mm nodule in the left lower lobe noted on CT on September 24. CT last visit was stable.  - Repeat CT in 6 months (~6/17/25)    3. + Non HLA: plan is to start on toci and IVIG as OP. Hasn't done so yet, awaiting final confirmation of plan.   - Messaged Dr. Johnson today to confirm plan  - If toci/IVIG to start, will plan for both preventative valganciclovir and  voriconazole     4. Hypomagnesemia: level of 1.7 today  - Continue magnesium    5. Hypertension: BP has been running higher, consistently in the 140s.   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 Subsequent AFB cultures have been negative.    - Continue AFB cultures with all bronchoscopies    Chronic non transplant issues:  1. Hepatic steatosis  2. Tremor  3. DMII  4. HLD     RTC: next month as scheduled  Vaccinations: patient states his is up to date; will get RSV vaccine; discussed Shingrix in February  Preventative: will need to see Derm; colonoscopy due in 2026    Flower Medina PA-C  Pulmonary, Allergy, Critical Care and Sleep Medicine        Interval History:     Feeling well, completed the prednisone burst/taper two days ago in anticipation of infusions. Breathing has improved, shortness of breath has improved a lot an stamina has improved as well. No congestion, cough or tightness. Does pee a lot and poop a lot, thinks it might be related to the steroids, does have a follow up with Endocrine. Drinking a lot of fluids.           Review of Systems:   Please see HPI, otherwise the complete 10 point ROS is negative.           Past Medical and Surgical History:     Past Medical History:   Diagnosis Date     Administration of long-term prophylactic antibiotics 08/26/2024     Diabetes (H)      Hepatic steatosis 2017    Noted on ultrasound     Hypomagnesemia 08/26/2024     Immunosuppression 08/26/2024     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     S/P lung transplant (H) 08/15/2024     Tremor 05/23/2024     Past Surgical History:   Procedure Laterality Date     BRONCHOSCOPY  08/16/2024     BRONCHOSCOPY (RIGID OR FLEXIBLE), DIAGNOSTIC N/A 9/26/2024    Procedure: BRONCHOSCOPY, WITH BRONCHOALVEOLAR LAVAGE AND BIOPSIES;  Surgeon: Oneida Silver MD;  Location:  GI     BRONCHOSCOPY (RIGID OR FLEXIBLE), DIAGNOSTIC N/A  11/5/2024    Procedure: BRONCHOSCOPY, WITH BRONCHOALVEOLAR LAVAGE AND BIOPSIES;  Surgeon: Tera Jiménez MD;  Location:  GI     BRONCHOSCOPY (RIGID OR FLEXIBLE), DIAGNOSTIC N/A 1/16/2025    Procedure: BRONCHOSCOPY, WITH BRONCHOALVEOLAR LAVAGE AND BIOPSIES;  Surgeon: Florian Steinberg MD;  Location:  GI     BRONCHOSCOPY FLEXIBLE AND RIGID N/A 8/27/2024    Procedure: Bronchoscopy Inspection;  Surgeon: Oneida Silver MD;  Location:  GI     COLONOSCOPY       CV CORONARY ANGIOGRAM N/A 06/21/2024    Procedure: Coronary Angiogram;  Surgeon: Gabriel Julian MD;  Location:  HEART CARDIAC CATH LAB     CV RIGHT HEART CATH MEASUREMENTS RECORDED N/A 06/21/2024    Procedure: Right Heart Catheterization;  Surgeon: Gabriel Julian MD;  Location:  HEART CARDIAC CATH LAB     PICC DOUBLE LUMEN PLACEMENT Right 08/20/2024    47-3cm, Basilic     TRANSPLANT LUNG RECIPIENT SINGLE X2 Bilateral 08/15/2024    Procedure: Clamshell Incision, On Central Venoarterial Extracorporeal Membranous Oxygenation, Bilateral Lung Transplant Recipient, Left atrial appendage ligation, Intraoperative Flexible Bronchoscopy by Anesthesia;  Surgeon: Mulvihill, Michael, MD;  Location:  OR           Family History:     Family History   Problem Relation Age of Onset     Hypertension Mother      Lung Cancer Father         former smoker     Other - See Comments Sister         daughter 26 years     No Known Problems Maternal Grandmother      Lung Cancer Maternal Grandfather         former smoker     No Known Problems Paternal Grandmother      No Known Problems Paternal Grandfather             Social History:     Social History     Socioeconomic History     Marital status: Single     Spouse name: Not on file     Number of children: Not on file     Years of education: Not on file     Highest education level: Not on file   Occupational History     Not on file   Tobacco Use     Smoking status: Former     Types: Cigarettes     Smokeless tobacco: Never    Substance and Sexual Activity     Alcohol use: Not Currently     Comment: not since 2017     Drug use: Never     Sexual activity: Not on file   Other Topics Concern     Not on file   Social History Narrative     Not on file     Social Drivers of Health     Financial Resource Strain: Low Risk  (8/24/2024)    Financial Resource Strain      Within the past 12 months, have you or your family members you live with been unable to get utilities (heat, electricity) when it was really needed?: No   Food Insecurity: Not on File (9/26/2024)    Received from [email or web address]    Food Insecurity      Food: 0   Transportation Needs: Low Risk  (8/24/2024)    Transportation Needs      Within the past 12 months, has lack of transportation kept you from medical appointments, getting your medicines, non-medical meetings or appointments, work, or from getting things that you need?: No   Physical Activity: Not on File (8/26/2019)    Received from [email or web address]    Physical Activity      Physical Activity: 0   Stress: Not on File (8/26/2019)    Received from [email or web address]    Stress      Stress: 0   Social Connections: Not on File (9/11/2024)    Received from [email or web address]    Social Connections      Connectedness: 0   Interpersonal Safety: Unknown (1/16/2025)    Interpersonal Safety      Do you feel physically and emotionally safe where you currently live?: Patient unable to answer      Within the past 12 months, have you been hit, slapped, kicked or otherwise physically hurt by someone?: Patient unable to answer      Within the past 12 months, have you been humiliated or emotionally abused in other ways by your partner or ex-partner?: Patient unable to answer   Housing Stability: Low Risk  (8/24/2024)    Housing Stability      Do you have housing? : Yes      Are you worried about losing your housing?: No            Medications:     Current Outpatient Medications   Medication Sig Dispense Refill     azithromycin (ZITHROMAX) 250 MG tablet Take 1 tablet (250 mg) by mouth  daily. 30 tablet 11     calcium carbonate-vitamin D (CALTRATE) 600-10 MG-MCG per tablet Take 1 tablet by mouth or Feeding Tube 2 times daily. 60 tablet 0     carvedilol (COREG) 6.25 MG tablet Take 1 tablet (6.25 mg) by mouth 2 times daily (with meals). 60 tablet 3     Continuous Glucose Sensor (DEXCOM G7 SENSOR) MISC Change every 10 days. 1 each 11     empagliflozin (JARDIANCE) 10 MG TABS tablet Take 1 tablet (10 mg) by mouth daily. 90 tablet 1     fluticasone-salmeterol (ADVAIR) 250-50 MCG/ACT inhaler Inhale 1 puff into the lungs 2 times daily. 60 each 11     insulin aspart (NOVOLOG PEN) 100 UNIT/ML pen Take 13 units prior to meals, and add sliding scale per instructions.  Up to 60 units daily 60 mL 0     insulin glargine (LANTUS SOLOSTAR) 100 UNIT/ML pen Inject 46 Units subcutaneously every morning. (Patient taking differently: Inject 46 Units subcutaneously at bedtime.) 14 mL 0     Insulin Lispro-aabc, 1 U Dial, (SAQIBUMCHRISS KWIKPEN) 100 UNIT/ML SOPN Inject 50 Units subcutaneously daily. Use UP TO 50 units daily. Take as directed with sliding scale. 15 mL 3     insulin pen needle (32G X 4 MM) 32G X 4 MM miscellaneous Use pen needles daily or as directed. 100 each 3     levalbuterol (XOPENEX) 1.25 MG/3ML neb solution Take 3 mLs (1.25 mg) by nebulization 4 times daily as needed for shortness of breath or wheezing. Use prior to Mucomyst neb. 300 mL 0     magnesium (HIGH ABSORPTION MAGNESIUM) 100 MG TABS Take 400 mg by mouth 3 times daily. 360 tablet 11     metoprolol tartrate (LOPRESSOR) 25 MG tablet Take 3 tablets (75 mg) by mouth 2 times daily. 180 tablet 11     montelukast (SINGULAIR) 10 MG tablet Take 1 tablet (10 mg) by mouth at bedtime. 30 tablet 11     multivitamin, therapeutic (THERA-VIT) TABS tablet Take 1 tablet by mouth daily. 30 tablet 0     mycophenolic acid (GENERIC EQUIVALENT) 360 MG EC tablet Take 2 tablets (720 mg) by mouth 2 times daily. 360 tablet 3     pantoprazole (PROTONIX) 40 MG EC tablet Take 1  "tablet (40 mg) by mouth every morning (before breakfast). 30 tablet 3     predniSONE (DELTASONE) 5 MG tablet Take 1 tablet (5 mg) by mouth daily. 30 tablet 11     psyllium (METAMUCIL/KONSYL) 58.6 % powder Take 1 teaspoonful by mouth daily.       rosuvastatin (CRESTOR) 10 MG tablet Take 1 tablet (10 mg) by mouth daily. 30 tablet 11     sodium zirconium cyclosilicate (LOKELMA) 10 g PACK packet Take 1 packet (10 g) by mouth daily as needed. 10 packet 1     sulfamethoxazole-trimethoprim (BACTRIM) 400-80 MG tablet Take 1 tablet by mouth or NG Tube daily. 30 tablet 11     tacrolimus (GENERIC EQUIVALENT) 1 MG capsule Take 2 capsules (2 mg) by mouth 2 times daily. 360 capsule 3     valGANciclovir (VALCYTE) 450 MG tablet Take 2 tablets (900 mg) by mouth daily. 60 tablet 2     voriconazole (VFEND) 200 MG tablet Take 1 tablet (200 mg) by mouth 2 times daily. Don't take until instructed by transplant team 60 tablet 2     Current Facility-Administered Medications   Medication Dose Route Frequency Provider Last Rate Last Admin     sodium chloride 0.9% BOLUS 1,000 mL  1,000 mL Intravenous Once Flower Medina PA-C         Facility-Administered Medications Ordered in Other Visits   Medication Dose Route Frequency Provider Last Rate Last Admin     sodium chloride bacteriostatic 0.9 % flush 9 mL  9 mL Intravenous Once NICOLA Pearce MD                Physical Exam:   BP (!) 145/83   Pulse 71   Ht 1.784 m (5' 10.25\")   Wt 81.6 kg (180 lb)   SpO2 97%   BMI 25.64 kg/m      GENERAL: alert, NAD  HEENT: NCAT, EOMI, no scleral icterus, oral mucosa moist and without lesions  Neck: no cervical or supraclavicular adenopathy  Lungs: moderate airflow, mainly clear with slight decrease in bases  CV: RRR, S1S2, no murmurs noted  Abdomen: normoactive BS, soft  Lymph: no edema  Neuro: AAO X 3, CN 2-12 grossly intact  Psychiatric: normal affect, good eye contact  Skin: no rash, jaundice or lesions on limited exam         Data:   All " laboratory and imaging data reviewed.      Recent Results (from the past week)   EKG 12-lead complete w/read - Clinics    Collection Time: 04/16/25  8:43 AM   Result Value Ref Range    Systolic Blood Pressure  mmHg    Diastolic Blood Pressure  mmHg    Ventricular Rate 69 BPM    Atrial Rate 69 BPM    IA Interval 128 ms    QRS Duration 104 ms     ms    QTc 411 ms    P Axis 57 degrees    R AXIS -2 degrees    T Axis 29 degrees    Interpretation ECG       Sinus rhythm  Possible Left atrial enlargement  Minimal voltage criteria for LVH, may be normal variant ( R in aVL )  Borderline ECG  When compared with ECG of 15-Darion-2025 11:44,  T wave amplitude has decreased in Anterior leads     Comprehensive metabolic panel    Collection Time: 04/16/25  9:16 AM   Result Value Ref Range    Sodium 140 135 - 145 mmol/L    Potassium 4.5 3.4 - 5.3 mmol/L    Carbon Dioxide (CO2) 28 22 - 29 mmol/L    Anion Gap 10 7 - 15 mmol/L    Urea Nitrogen 21.5 8.0 - 23.0 mg/dL    Creatinine 1.17 0.67 - 1.17 mg/dL    GFR Estimate 70 >60 mL/min/1.73m2    Calcium 9.5 8.8 - 10.4 mg/dL    Chloride 102 98 - 107 mmol/L    Glucose 144 (H) 70 - 99 mg/dL    Alkaline Phosphatase 74 40 - 150 U/L    AST 23 0 - 45 U/L    ALT 34 0 - 70 U/L    Protein Total 6.2 (L) 6.4 - 8.3 g/dL    Albumin 4.0 3.5 - 5.2 g/dL    Bilirubin Total 0.4 <=1.2 mg/dL   CBC with platelets    Collection Time: 04/16/25  9:16 AM   Result Value Ref Range    WBC Count 8.8 4.0 - 11.0 10e3/uL    RBC Count 5.68 4.40 - 5.90 10e6/uL    Hemoglobin 15.4 13.3 - 17.7 g/dL    Hematocrit 48.2 40.0 - 53.0 %    MCV 85 78 - 100 fL    MCH 27.1 26.5 - 33.0 pg    MCHC 32.0 31.5 - 36.5 g/dL    RDW 14.1 10.0 - 15.0 %    Platelet Count 202 150 - 450 10e3/uL   Magnesium    Collection Time: 04/16/25  9:16 AM   Result Value Ref Range    Magnesium 1.7 1.7 - 2.3 mg/dL   Phosphorus    Collection Time: 04/16/25  9:16 AM   Result Value Ref Range    Phosphorus 2.8 2.5 - 4.5 mg/dL   INR    Collection Time: 04/16/25   9:16 AM   Result Value Ref Range    INR 0.97 0.85 - 1.15   Lipid panel reflex to direct LDL Fasting    Collection Time: 04/16/25  9:16 AM   Result Value Ref Range    Cholesterol 163 <200 mg/dL    Triglycerides 195 (H) <150 mg/dL    Direct Measure HDL 85 >=40 mg/dL    LDL Cholesterol Calculated 39 <100 mg/dL    Non HDL Cholesterol 78 <130 mg/dL   Hemoglobin A1c    Collection Time: 04/16/25  9:16 AM   Result Value Ref Range    Estimated Average Glucose 206 (H) <117 mg/dL    Hemoglobin A1C 8.8 (H) <5.7 %   PSA tumor marker    Collection Time: 04/16/25  9:16 AM   Result Value Ref Range    PSA Tumor Marker 0.27 0.00 - 4.50 ng/mL   General PFT Lab (Please always keep checked)    Collection Time: 04/16/25  9:52 AM   Result Value Ref Range    FVC-Pred 4.23 L    FVC-Pre 3.37 L    FVC-%Pred-Pre 79 %    FEV1-Pre 2.78 L    FEV1-%Pred-Pre 84 %    FEV1FVC-Pred 78 %    FEV1FVC-Pre 83 %    FEFMax-Pred 9.11 L/sec    FEFMax-Pre 7.73 L/sec    FEFMax-%Pred-Pre 84 %    FEF2575-Pred 2.73 L/sec    FEF2575-Pre 3.18 L/sec    [id number]-%Pred-Pre 116 %    ExpTime-Pre 6.96 sec    FIFMax-Pre 7.27 L/sec    FEV1FEV6-Pred 79 %    FEV1FEV6-Pre 83 %     PFT interpretation:  Maneuver: valid and meets ATS guidelines  Normal spirometry    You are scheduled for a bronchoscopy on 4/17/2025 at 9:00am at the [facility] on the 3rd floor. Arrive 1 hour early.     Instructions     1. Nothing to eat or drink 8 hours before procedure.  2. Hold your morning medications. Bring them with you to take after the procedure.  3. Have a  available to take you home.   4. If you are a diabetic - take 1/2 of Lantus dose tonight. Do not take any insulin in the morning.   5. Stop Aspirin 7 days before procedure.  6. If you are taking coumadin you will need contact your coumadin clinic for instructions.  7. If taking medications for atrial fib NA.        Again, thank you for allowing me to participate in the care of your patient.         Sincerely,        Flower Medina PA-C    Electronically signed

## 2025-04-16 NOTE — PROGRESS NOTES
Date of transplant 8/14/24   Transplant type Lung  Date of labs 4/16/25  BUN 21.5 DDAVP no  Plt 202  INR 0.97 Anticoag therapy none Last dose na  Comment page Flower Medina with any issues [phone]

## 2025-04-16 NOTE — PROGRESS NOTES
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 Postoperative course complicated by delirium, stenotrophomonas on BAL s/p levofloxacin and ACR (A1B0 with elevated prospera in October 2024) s/p solu-medrol and prednisone taper. Other history notable for diabetes, hepatic steatosis and essential tremor.      1. S/p bilateral lung transplant:  TOMAS (on imaging):   R anastomosis stenosis: feeling really well after completing the prednisone burst and taper two days ago. Dyspnea improved and has good energy. Sating 97% on room air. CT chest 12/17/24 with air trapping. DSA and CMV 2/13 negative; prospera of 1.74 on 2/13. CXR reviewed today and demonstrates stable transplant. PFTs improved to his post transplant best.   - CLAD therapy: azithromycin, Advair and montelukast  - Prospera pending for today  - Surveillance bronch scheduled for tomorrow    Immunosuppression  - Tacrolimus (goal 8-12)  - Myfortic 720 mg BID (room to increase)  - Prednisone 5 mg daily (also room to increase)     Prophylaxis:   - Bactrim for PJP and nocardia prophylaxis  - CMV D-/R+  - EBV D-/R+     2. LLL lung nodule: 8 x 8 mm nodule in the left lower lobe noted on CT on September 24. CT last visit was stable.  - Repeat CT in 6 months (~6/17/25)    3. + Non HLA: plan is to start on toci and IVIG as OP. Hasn't done so yet, awaiting final confirmation of plan.   - Messaged Dr. Johnson today to confirm plan  - If toci/IVIG to start, will plan for both preventative valganciclovir and voriconazole     4. Hypomagnesemia: level of 1.7 today  - Continue magnesium    5. Hypertension: BP has been running higher, consistently in the 140s.   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 Subsequent AFB cultures have been negative.    - Continue AFB cultures with all bronchoscopies    Chronic non transplant issues:  1. Hepatic steatosis  2. Tremor  3. DMII  4. HLD     RTC: next month as scheduled  Vaccinations: patient states his is up to date; will get RSV vaccine; discussed Shingrix in February  Preventative: will need to see Derm; colonoscopy due in 2026    Flower Medina PA-C  Pulmonary, Allergy, Critical Care and Sleep Medicine        Interval History:     Feeling well, completed the prednisone burst/taper two days ago in anticipation of infusions. Breathing has improved, shortness of breath has improved a lot an stamina has improved as well. No congestion, cough or tightness. Does pee a lot and poop a lot, thinks it might be related to the steroids, does have a follow up with Endocrine. Drinking a lot of fluids.           Review of Systems:   Please see HPI, otherwise the complete 10 point ROS is negative.           Past Medical and Surgical History:     Past Medical History:   Diagnosis Date    Administration of long-term prophylactic antibiotics 08/26/2024    Diabetes (H)     Hepatic steatosis 2017    Noted on ultrasound    Hypomagnesemia 08/26/2024    Immunosuppression 08/26/2024    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    S/P lung transplant (H) 08/15/2024    Tremor 05/23/2024     Past Surgical History:   Procedure Laterality Date    BRONCHOSCOPY  08/16/2024    BRONCHOSCOPY (RIGID OR FLEXIBLE), DIAGNOSTIC N/A 9/26/2024    Procedure: BRONCHOSCOPY, WITH BRONCHOALVEOLAR LAVAGE AND BIOPSIES;  Surgeon: Oneida Silver MD;  Location:  GI    BRONCHOSCOPY (RIGID OR FLEXIBLE), DIAGNOSTIC N/A 11/5/2024    Procedure: BRONCHOSCOPY, WITH BRONCHOALVEOLAR LAVAGE AND BIOPSIES;  Surgeon: Tera Jiménez MD;  Location: [facility]    BRONCHOSCOPY (RIGID OR FLEXIBLE), DIAGNOSTIC N/A 1/16/2025    Procedure: BRONCHOSCOPY, WITH BRONCHOALVEOLAR LAVAGE AND BIOPSIES;  Surgeon: Florian Steinberg MD;   Location: [facility]    BRONCHOSCOPY FLEXIBLE AND RIGID N/A 8/27/2024    Procedure: Bronchoscopy Inspection;  Surgeon: Oneida Silver MD;  Location: [facility] GI    COLONOSCOPY      CV CORONARY ANGIOGRAM N/A 06/21/2024    Procedure: Coronary Angiogram;  Surgeon: Gabriel Julian MD;  Location:  HEART CARDIAC CATH LAB    CV RIGHT HEART CATH MEASUREMENTS RECORDED N/A 06/21/2024    Procedure: Right Heart Catheterization;  Surgeon: Gabriel Julian MD;  Location:  HEART CARDIAC CATH LAB    PICC DOUBLE LUMEN PLACEMENT Right 08/20/2024    47-3cm, Basilic    TRANSPLANT LUNG RECIPIENT SINGLE X2 Bilateral 08/15/2024    Procedure: Clamshell Incision, On Central Venoarterial Extracorporeal Membranous Oxygenation, Bilateral Lung Transplant Recipient, Left atrial appendage ligation, Intraoperative Flexible Bronchoscopy by Anesthesia;  Surgeon: Mulvihill, Michael, MD;  Location:  OR           Family History:     Family History   Problem Relation Age of Onset    Hypertension Mother     Lung Cancer Father         former smoker    Other - See Comments Sister         daughter 26 years    No Known Problems Maternal Grandmother     Lung Cancer Maternal Grandfather         former smoker    No Known Problems Paternal Grandmother     No Known Problems Paternal Grandfather             Social History:     Social History     Socioeconomic History    Marital status: Single     Spouse name: Not on file    Number of children: Not on file    Years of education: Not on file    Highest education level: Not on file   Occupational History    Not on file   Tobacco Use    Smoking status: Former     Types: Cigarettes    Smokeless tobacco: Never   Substance and Sexual Activity    Alcohol use: Not Currently     Comment: not since 2017    Drug use: Never    Sexual activity: Not on file   Other Topics Concern    Not on file   Social History Narrative    Not on file     Social Drivers of Health     Financial Resource Strain: Low Risk  (8/24/2024)     Financial Resource Strain     Within the past 12 months, have you or your family members you live with been unable to get utilities (heat, electricity) when it was really needed?: No   Food Insecurity: Not on File (9/26/2024)    Received from [employer]    Food Insecurity     Food: 0   Transportation Needs: Low Risk  (8/24/2024)    Transportation Needs     Within the past 12 months, has lack of transportation kept you from medical appointments, getting your medicines, non-medical meetings or appointments, work, or from getting things that you need?: No   Physical Activity: Not on File (8/26/2019)    Received from [employer]    Physical Activity     Physical Activity: 0   Stress: Not on File (8/26/2019)    Received from [employer]    Stress     Stress: 0   Social Connections: Not on File (9/11/2024)    Received from [employer]    Social Connections     Connectedness: 0   Interpersonal Safety: Unknown (1/16/2025)    Interpersonal Safety     Do you feel physically and emotionally safe where you currently live?: Patient unable to answer     Within the past 12 months, have you been hit, slapped, kicked or otherwise physically hurt by someone?: Patient unable to answer     Within the past 12 months, have you been humiliated or emotionally abused in other ways by your partner or ex-partner?: Patient unable to answer   Housing Stability: Low Risk  (8/24/2024)    Housing Stability     Do you have housing? : Yes     Are you worried about losing your housing?: No            Medications:     Current Outpatient Medications   Medication Sig Dispense Refill    azithromycin (ZITHROMAX) 250 MG tablet Take 1 tablet (250 mg) by mouth daily. 30 tablet 11    calcium carbonate-vitamin D (CALTRATE) 600-10 MG-MCG per tablet Take 1 tablet by mouth or Feeding Tube 2 times daily. 60 tablet 0    carvedilol (COREG) 6.25 MG tablet Take 1 tablet (6.25 mg) by mouth 2 times daily (with meals). 60 tablet 3    Continuous Glucose Sensor (DEXCOM G7 SENSOR) MISC Change  every 10 days. 1 each 11    empagliflozin (JARDIANCE) 10 MG TABS tablet Take 1 tablet (10 mg) by mouth daily. 90 tablet 1    fluticasone-salmeterol (ADVAIR) 250-50 MCG/ACT inhaler Inhale 1 puff into the lungs 2 times daily. 60 each 11    insulin aspart (NOVOLOG PEN) 100 UNIT/ML pen Take 13 units prior to meals, and add sliding scale per instructions.  Up to 60 units daily 60 mL 0    insulin glargine (LANTUS SOLOSTAR) 100 UNIT/ML pen Inject 46 Units subcutaneously every morning. (Patient taking differently: Inject 46 Units subcutaneously at bedtime.) 14 mL 0    Insulin Lispro-aabc, 1 U Dial, (LYUMJEV KWIKPEN) 100 UNIT/ML SOPN Inject 50 Units subcutaneously daily. Use UP TO 50 units daily. Take as directed with sliding scale. 15 mL 3    insulin pen needle (32G X 4 MM) 32G X 4 MM miscellaneous Use pen needles daily or as directed. 100 each 3    levalbuterol (XOPENEX) 1.25 MG/3ML neb solution Take 3 mLs (1.25 mg) by nebulization 4 times daily as needed for shortness of breath or wheezing. Use prior to Mucomyst neb. 300 mL 0    magnesium (HIGH ABSORPTION MAGNESIUM) 100 MG TABS Take 400 mg by mouth 3 times daily. 360 tablet 11    metoprolol tartrate (LOPRESSOR) 25 MG tablet Take 3 tablets (75 mg) by mouth 2 times daily. 180 tablet 11    montelukast (SINGULAIR) 10 MG tablet Take 1 tablet (10 mg) by mouth at bedtime. 30 tablet 11    multivitamin, therapeutic (THERA-VIT) TABS tablet Take 1 tablet by mouth daily. 30 tablet 0    mycophenolic acid (GENERIC EQUIVALENT) 360 MG EC tablet Take 2 tablets (720 mg) by mouth 2 times daily. 360 tablet 3    pantoprazole (PROTONIX) 40 MG EC tablet Take 1 tablet (40 mg) by mouth every morning (before breakfast). 30 tablet 3    predniSONE (DELTASONE) 5 MG tablet Take 1 tablet (5 mg) by mouth daily. 30 tablet 11    psyllium (METAMUCIL/KONSYL) 58.6 % powder Take 1 teaspoonful by mouth daily.      rosuvastatin (CRESTOR) 10 MG tablet Take 1 tablet (10 mg) by mouth daily. 30 tablet 11    sodium  "zirconium cyclosilicate (LOKELMA) 10 g PACK packet Take 1 packet (10 g) by mouth daily as needed. 10 packet 1    sulfamethoxazole-trimethoprim (BACTRIM) 400-80 MG tablet Take 1 tablet by mouth or NG Tube daily. 30 tablet 11    tacrolimus (GENERIC EQUIVALENT) 1 MG capsule Take 2 capsules (2 mg) by mouth 2 times daily. 360 capsule 3    valGANciclovir (VALCYTE) 450 MG tablet Take 2 tablets (900 mg) by mouth daily. 60 tablet 2    voriconazole (VFEND) 200 MG tablet Take 1 tablet (200 mg) by mouth 2 times daily. Don't take until instructed by transplant team 60 tablet 2     Current Facility-Administered Medications   Medication Dose Route Frequency Provider Last Rate Last Admin    sodium chloride 0.9% BOLUS 1,000 mL  1,000 mL Intravenous Once Flower Medina PA-C         Facility-Administered Medications Ordered in Other Visits   Medication Dose Route Frequency Provider Last Rate Last Admin    sodium chloride bacteriostatic 0.9 % flush 9 mL  9 mL Intravenous Once NICOLA Pearce MD                Physical Exam:   BP (!) 145/83   Pulse 71   Ht 1.784 m (5' 10.25\")   Wt 81.6 kg (180 lb)   SpO2 97%   BMI 25.64 kg/m      GENERAL: alert, NAD  HEENT: NCAT, EOMI, no scleral icterus, oral mucosa moist and without lesions  Neck: no cervical or supraclavicular adenopathy  Lungs: moderate airflow, mainly clear with slight decrease in bases  CV: RRR, S1S2, no murmurs noted  Abdomen: normoactive BS, soft  Lymph: no edema  Neuro: AAO X 3, CN 2-12 grossly intact  Psychiatric: normal affect, good eye contact  Skin: no rash, jaundice or lesions on limited exam         Data:   All laboratory and imaging data reviewed.      Recent Results (from the past week)   EKG 12-lead complete w/read - Clinics    Collection Time: 04/16/25  8:43 AM   Result Value Ref Range    Systolic Blood Pressure  mmHg    Diastolic Blood Pressure  mmHg    Ventricular Rate 69 BPM    Atrial Rate 69 BPM    CO Interval 128 ms    QRS Duration 104 ms    QT " 384 ms    QTc 411 ms    P Axis 57 degrees    R AXIS -2 degrees    T Axis 29 degrees    Interpretation ECG       Sinus rhythm  Possible Left atrial enlargement  Minimal voltage criteria for LVH, may be normal variant ( R in aVL )  Borderline ECG  When compared with ECG of 15-Darion-2025 11:44,  T wave amplitude has decreased in Anterior leads     Comprehensive metabolic panel    Collection Time: 04/16/25  9:16 AM   Result Value Ref Range    Sodium 140 135 - 145 mmol/L    Potassium 4.5 3.4 - 5.3 mmol/L    Carbon Dioxide (CO2) 28 22 - 29 mmol/L    Anion Gap 10 7 - 15 mmol/L    Urea Nitrogen 21.5 8.0 - 23.0 mg/dL    Creatinine 1.17 0.67 - 1.17 mg/dL    GFR Estimate 70 >60 mL/min/1.73m2    Calcium 9.5 8.8 - 10.4 mg/dL    Chloride 102 98 - 107 mmol/L    Glucose 144 (H) 70 - 99 mg/dL    Alkaline Phosphatase 74 40 - 150 U/L    AST 23 0 - 45 U/L    ALT 34 0 - 70 U/L    Protein Total 6.2 (L) 6.4 - 8.3 g/dL    Albumin 4.0 3.5 - 5.2 g/dL    Bilirubin Total 0.4 <=1.2 mg/dL   CBC with platelets    Collection Time: 04/16/25  9:16 AM   Result Value Ref Range    WBC Count 8.8 4.0 - 11.0 10e3/uL    RBC Count 5.68 4.40 - 5.90 10e6/uL    Hemoglobin 15.4 13.3 - 17.7 g/dL    Hematocrit 48.2 40.0 - 53.0 %    MCV 85 78 - 100 fL    MCH 27.1 26.5 - 33.0 pg    MCHC 32.0 31.5 - 36.5 g/dL    RDW 14.1 10.0 - 15.0 %    Platelet Count 202 150 - 450 10e3/uL   Magnesium    Collection Time: 04/16/25  9:16 AM   Result Value Ref Range    Magnesium 1.7 1.7 - 2.3 mg/dL   Phosphorus    Collection Time: 04/16/25  9:16 AM   Result Value Ref Range    Phosphorus 2.8 2.5 - 4.5 mg/dL   INR    Collection Time: 04/16/25  9:16 AM   Result Value Ref Range    INR 0.97 0.85 - 1.15   Lipid panel reflex to direct LDL Fasting    Collection Time: 04/16/25  9:16 AM   Result Value Ref Range    Cholesterol 163 <200 mg/dL    Triglycerides 195 (H) <150 mg/dL    Direct Measure HDL 85 >=40 mg/dL    LDL Cholesterol Calculated 39 <100 mg/dL    Non HDL Cholesterol 78 <130 mg/dL    Hemoglobin A1c    Collection Time: 04/16/25  9:16 AM   Result Value Ref Range    Estimated Average Glucose 206 (H) <117 mg/dL    Hemoglobin A1C 8.8 (H) <5.7 %   PSA tumor marker    Collection Time: 04/16/25  9:16 AM   Result Value Ref Range    PSA Tumor Marker 0.27 0.00 - 4.50 ng/mL   General PFT Lab (Please always keep checked)    Collection Time: 04/16/25  9:52 AM   Result Value Ref Range    FVC-Pred 4.23 L    FVC-Pre 3.37 L    FVC-%Pred-Pre 79 %    FEV1-Pre 2.78 L    FEV1-%Pred-Pre 84 %    FEV1FVC-Pred 78 %    FEV1FVC-Pre 83 %    FEFMax-Pred 9.11 L/sec    FEFMax-Pre 7.73 L/sec    FEFMax-%Pred-Pre 84 %    FEF2575-Pred 2.73 L/sec    FEF2575-Pre 3.18 L/sec    LCP3752-%Pred-Pre 116 %    ExpTime-Pre 6.96 sec    FIFMax-Pre 7.27 L/sec    FEV1FEV6-Pred 79 %    FEV1FEV6-Pre 83 %     PFT interpretation:  Maneuver: valid and meets ATS guidelines  Normal spirometry

## 2025-04-16 NOTE — PROGRESS NOTES
You are scheduled for a bronchoscopy on 4/17/2025 at 9:00am at the [facility] on the 3rd floor. Arrive 1 hour early.     Instructions     1. Nothing to eat or drink 8 hours before procedure.  2. Hold your morning medications. Bring them with you to take after the procedure.  3. Have a  available to take you home.   4. If you are a diabetic - take 1/2 of Lantus dose tonight. Do not take any insulin in the morning.   5. Stop Aspirin 7 days before procedure.  6. If you are taking coumadin you will need contact your coumadin clinic for instructions.  7. If taking medications for atrial fib NA.

## 2025-04-16 NOTE — NURSING NOTE
Chief Complaint   Patient presents with    RECHECK     Return Lung TX     Medications reviewed and vital signs taken.   Polo Radford, CMA

## 2025-04-17 ENCOUNTER — APPOINTMENT (OUTPATIENT)
Dept: GENERAL RADIOLOGY | Facility: CLINIC | Age: 60
End: 2025-04-17
Attending: STUDENT IN AN ORGANIZED HEALTH CARE EDUCATION/TRAINING PROGRAM
Payer: COMMERCIAL

## 2025-04-17 ENCOUNTER — HOSPITAL ENCOUNTER (OUTPATIENT)
Facility: CLINIC | Age: 60
Discharge: HOME | End: 2025-04-17
Attending: STUDENT IN AN ORGANIZED HEALTH CARE EDUCATION/TRAINING PROGRAM | Admitting: STUDENT IN AN ORGANIZED HEALTH CARE EDUCATION/TRAINING PROGRAM
Payer: COMMERCIAL

## 2025-04-17 VITALS
DIASTOLIC BLOOD PRESSURE: 90 MMHG | RESPIRATION RATE: 16 BRPM | SYSTOLIC BLOOD PRESSURE: 141 MMHG | HEART RATE: 81 BPM | OXYGEN SATURATION: 92 %

## 2025-04-17 LAB
APPEARANCE FLD: CLEAR
ATRIAL RATE - MUSE: 69 BPM
BACTERIA SPEC CULT: NORMAL
BRONCHOSCOPY: NORMAL
COLOR FLD: COLORLESS
DIASTOLIC BLOOD PRESSURE - MUSE: NORMAL MMHG
EBV DNA SERPL NAA+PROBE-ACNC: NOT DETECTED IU/ML
GAMMA INTERFERON BACKGROUND BLD IA-ACNC: 0.01 IU/ML
GRAM STAIN RESULT: NORMAL
GRAM STAIN RESULT: NORMAL
INTERPRETATION ECG - MUSE: NORMAL
LYMPHOCYTES NFR FLD MANUAL: 2 %
M TB IFN-G BLD-IMP: NEGATIVE
M TB IFN-G CD4+ BCKGRND COR BLD-ACNC: 9.99 IU/ML
MITOGEN IGNF BCKGRD COR BLD-ACNC: 0 IU/ML
MITOGEN IGNF BCKGRD COR BLD-ACNC: 0.01 IU/ML
MONOS+MACROS NFR FLD MANUAL: 96 %
NEUTS BAND NFR FLD MANUAL: 2 %
P AXIS - MUSE: 57 DEGREES
PATH REPORT.COMMENTS IMP SPEC: NORMAL
PATH REPORT.FINAL DX SPEC: NORMAL
PATH REPORT.FINAL DX SPEC: NORMAL
PATH REPORT.GROSS SPEC: NORMAL
PATH REPORT.GROSS SPEC: NORMAL
PATH REPORT.MICROSCOPIC SPEC OTHER STN: NORMAL
PATH REPORT.MICROSCOPIC SPEC OTHER STN: NORMAL
PATH REPORT.RELEVANT HX SPEC: NORMAL
PATH REPORT.RELEVANT HX SPEC: NORMAL
PHOTO IMAGE: NORMAL
PR INTERVAL - MUSE: 128 MS
QRS DURATION - MUSE: 104 MS
QT - MUSE: 384 MS
QTC - MUSE: 411 MS
QUANTIFERON MITOGEN: 10 IU/ML
QUANTIFERON NIL TUBE: 0.01 IU/ML
QUANTIFERON TB1 TUBE: 0.02 IU/ML
QUANTIFERON TB2 TUBE: 0.01
R AXIS - MUSE: -2 DEGREES
SPECIMEN SOURCE FLD: NORMAL
SYSTOLIC BLOOD PRESSURE - MUSE: NORMAL MMHG
T AXIS - MUSE: 29 DEGREES
VENTRICULAR RATE- MUSE: 69 BPM
WBC # FLD AUTO: 86 /UL

## 2025-04-17 PROCEDURE — 87205 SMEAR GRAM STAIN: CPT | Performed by: STUDENT IN AN ORGANIZED HEALTH CARE EDUCATION/TRAINING PROGRAM

## 2025-04-17 PROCEDURE — 87305 ASPERGILLUS AG IA: CPT | Performed by: STUDENT IN AN ORGANIZED HEALTH CARE EDUCATION/TRAINING PROGRAM

## 2025-04-17 PROCEDURE — 87496 CYTOMEG DNA AMP PROBE: CPT | Performed by: STUDENT IN AN ORGANIZED HEALTH CARE EDUCATION/TRAINING PROGRAM

## 2025-04-17 PROCEDURE — 87070 CULTURE OTHR SPECIMN AEROBIC: CPT | Performed by: STUDENT IN AN ORGANIZED HEALTH CARE EDUCATION/TRAINING PROGRAM

## 2025-04-17 PROCEDURE — 87015 SPECIMEN INFECT AGNT CONCNTJ: CPT | Performed by: STUDENT IN AN ORGANIZED HEALTH CARE EDUCATION/TRAINING PROGRAM

## 2025-04-17 PROCEDURE — 88305 TISSUE EXAM BY PATHOLOGIST: CPT | Mod: TC | Performed by: STUDENT IN AN ORGANIZED HEALTH CARE EDUCATION/TRAINING PROGRAM

## 2025-04-17 PROCEDURE — 87102 FUNGUS ISOLATION CULTURE: CPT | Performed by: STUDENT IN AN ORGANIZED HEALTH CARE EDUCATION/TRAINING PROGRAM

## 2025-04-17 PROCEDURE — 88108 CYTOPATH CONCENTRATE TECH: CPT | Mod: TC | Performed by: STUDENT IN AN ORGANIZED HEALTH CARE EDUCATION/TRAINING PROGRAM

## 2025-04-17 PROCEDURE — 999N000180 XR SURGERY CARM FLUORO LESS THAN 5 MIN

## 2025-04-17 PROCEDURE — 88312 SPECIAL STAINS GROUP 1: CPT | Mod: TC | Performed by: STUDENT IN AN ORGANIZED HEALTH CARE EDUCATION/TRAINING PROGRAM

## 2025-04-17 PROCEDURE — 71046 X-RAY EXAM CHEST 2 VIEWS: CPT | Mod: 26 | Performed by: RADIOLOGY

## 2025-04-17 PROCEDURE — 88305 TISSUE EXAM BY PATHOLOGIST: CPT | Mod: 26 | Performed by: STUDENT IN AN ORGANIZED HEALTH CARE EDUCATION/TRAINING PROGRAM

## 2025-04-17 PROCEDURE — 31624 DX BRONCHOSCOPE/LAVAGE: CPT | Performed by: STUDENT IN AN ORGANIZED HEALTH CARE EDUCATION/TRAINING PROGRAM

## 2025-04-17 PROCEDURE — G0500 MOD SEDAT ENDO SERVICE >5YRS: HCPCS | Performed by: STUDENT IN AN ORGANIZED HEALTH CARE EDUCATION/TRAINING PROGRAM

## 2025-04-17 PROCEDURE — 31628 BRONCHOSCOPY/LUNG BX EACH: CPT

## 2025-04-17 PROCEDURE — 250N000011 HC RX IP 250 OP 636: Performed by: STUDENT IN AN ORGANIZED HEALTH CARE EDUCATION/TRAINING PROGRAM

## 2025-04-17 PROCEDURE — 250N000009 HC RX 250: Performed by: STUDENT IN AN ORGANIZED HEALTH CARE EDUCATION/TRAINING PROGRAM

## 2025-04-17 PROCEDURE — 99153 MOD SED SAME PHYS/QHP EA: CPT | Performed by: STUDENT IN AN ORGANIZED HEALTH CARE EDUCATION/TRAINING PROGRAM

## 2025-04-17 PROCEDURE — 88108 CYTOPATH CONCENTRATE TECH: CPT | Mod: 26 | Performed by: PATHOLOGY

## 2025-04-17 PROCEDURE — 31632 BRONCHOSCOPY/LUNG BX ADDL: CPT

## 2025-04-17 PROCEDURE — 87206 SMEAR FLUORESCENT/ACID STAI: CPT | Performed by: STUDENT IN AN ORGANIZED HEALTH CARE EDUCATION/TRAINING PROGRAM

## 2025-04-17 PROCEDURE — 89050 BODY FLUID CELL COUNT: CPT | Performed by: STUDENT IN AN ORGANIZED HEALTH CARE EDUCATION/TRAINING PROGRAM

## 2025-04-17 PROCEDURE — 999N000065 XR CHEST 2 VIEWS

## 2025-04-17 PROCEDURE — 88312 SPECIAL STAINS GROUP 1: CPT | Mod: 26 | Performed by: PATHOLOGY

## 2025-04-17 PROCEDURE — 87252 VIRUS INOCULATION TISSUE: CPT | Performed by: STUDENT IN AN ORGANIZED HEALTH CARE EDUCATION/TRAINING PROGRAM

## 2025-04-17 RX ORDER — LIDOCAINE HYDROCHLORIDE 10 MG/ML
INJECTION, SOLUTION INFILTRATION; PERINEURAL PRN
Status: DISCONTINUED | OUTPATIENT
Start: 2025-04-17 | End: 2025-04-17 | Stop reason: HOSPADM

## 2025-04-17 RX ORDER — FENTANYL CITRATE 50 UG/ML
INJECTION, SOLUTION INTRAMUSCULAR; INTRAVENOUS PRN
Status: DISCONTINUED | OUTPATIENT
Start: 2025-04-17 | End: 2025-04-17 | Stop reason: HOSPADM

## 2025-04-17 RX ORDER — LIDOCAINE HYDROCHLORIDE AND EPINEPHRINE 10; 10 MG/ML; UG/ML
INJECTION, SOLUTION INFILTRATION; PERINEURAL PRN
Status: DISCONTINUED | OUTPATIENT
Start: 2025-04-17 | End: 2025-04-17 | Stop reason: HOSPADM

## 2025-04-17 RX ORDER — LIDOCAINE HYDROCHLORIDE 40 MG/ML
INJECTION, SOLUTION RETROBULBAR PRN
Status: DISCONTINUED | OUTPATIENT
Start: 2025-04-17 | End: 2025-04-17 | Stop reason: HOSPADM

## 2025-04-17 RX ORDER — MAGNESIUM HYDROXIDE 1200 MG/15ML
LIQUID ORAL PRN
Status: DISCONTINUED | OUTPATIENT
Start: 2025-04-17 | End: 2025-04-17 | Stop reason: HOSPADM

## 2025-04-17 ASSESSMENT — ACTIVITIES OF DAILY LIVING (ADL)
ADLS_ACUITY_SCORE: 54

## 2025-04-17 NOTE — DISCHARGE INSTRUCTIONS
Discharge Instructions after Bronchoscopy    Activity  ___ You had medicine to relax and for pain. You may feel dizzy or sleepy.  For 24 hours:   Do not drive or use heavy equipment.   Do not make important decisions.   Do not drink any alcohol.    Diet  ___ When you can swallow easily, you may go back to your regular diet, medicines  and light exercise.    Follow-up  ___ We took small tissue or fluid samples to study. We will call you with the results in about 10 business days.    Call right away if you have:   Unusual chest pain   Temperature above 100.6  F (37.5  C)   Coughing that does not stop.    If you have severe pain, bleeding, or shortness of breath, go to an emergency room.    If you have questions, call:  Monday to Friday, 8 a.m. to 4:30 p.m.  Adult Pulmonology Clinic: [phone]    After hours:  Hospital: [phone] (Ask for the pulmonary fellow on call)

## 2025-04-18 LAB
ACID FAST STAIN (ARUP): NORMAL
ACID FAST STAIN (ARUP): NORMAL

## 2025-04-19 LAB
GALACTOMANNAN AG BAL QL: NEGATIVE
GALACTOMANNAN AG SPEC IA-ACNC: 0.13

## 2025-04-20 LAB — CMV DNA SPEC QL NAA+PROBE: NOT DETECTED

## 2025-04-21 ENCOUNTER — TELEPHONE (OUTPATIENT)
Dept: TRANSPLANT | Facility: CLINIC | Age: 60
End: 2025-04-21
Payer: COMMERCIAL

## 2025-04-21 DIAGNOSIS — D84.9 IMMUNOSUPPRESSION: ICD-10-CM

## 2025-04-21 DIAGNOSIS — Z94.2 S/P LUNG TRANSPLANT (H): ICD-10-CM

## 2025-04-21 LAB — PROSPERA TRANSPLANT MONITORING: 0.7 %

## 2025-04-21 RX ORDER — MYCOPHENOLIC ACID 360 MG/1
720 TABLET, DELAYED RELEASE ORAL 2 TIMES DAILY
Qty: 360 TABLET | Refills: 3 | Status: CANCELLED | OUTPATIENT
Start: 2025-04-21

## 2025-04-21 RX ORDER — MYCOPHENOLIC ACID 180 MG/1
900 TABLET, DELAYED RELEASE ORAL 2 TIMES DAILY
Qty: 300 TABLET | Refills: 11 | Status: SHIPPED | OUTPATIENT
Start: 2025-04-21

## 2025-04-22 ENCOUNTER — TELEPHONE (OUTPATIENT)
Dept: TRANSPLANT | Facility: CLINIC | Age: 60
End: 2025-04-22
Payer: COMMERCIAL

## 2025-04-22 DIAGNOSIS — Z94.2 S/P LUNG TRANSPLANT (H): ICD-10-CM

## 2025-04-22 NOTE — LETTER
PHYSICIAN ORDERS      DATE & TIME ISSUED: 2025 10:24 AM  PATIENT NAME: Travon Cartwright   : 1965     [facility] MR# [if applicable]: [id number]     DIAGNOSIS:  Lung Transplant  Z94.2    CBC with diff weekly x3    Any questions please call:     Please fax these results to [phone].         Carey Tucker MD  Pulmonary Disease

## 2025-04-22 NOTE — TELEPHONE ENCOUNTER
Prospera 0.70 on 4/16/25. Per team, plan to hold off toci/IVIG, increase Myfortic to 900 mg bid (monitor CBC with diff weekly for a few weeks), and recheck non-HLA antibodies (need to confirm when to recheck these). Pt and home infusion notified. Pt notified he can stop his Valcyte in 1 week which will be 2 weeks after he completed his pred taper.

## 2025-04-24 ENCOUNTER — TELEPHONE (OUTPATIENT)
Dept: TRANSPLANT | Facility: CLINIC | Age: 60
End: 2025-04-24
Payer: COMMERCIAL

## 2025-04-24 DIAGNOSIS — Z94.2 S/P LUNG TRANSPLANT (H): ICD-10-CM

## 2025-04-24 LAB
BACTERIA BRONCH: ABNORMAL
BACTERIA BRONCH: NORMAL

## 2025-04-24 NOTE — TELEPHONE ENCOUNTER
Bronch cultures from 4/17/25 growing aspergillus versicolor. Per Flower, plan to get a non contrast chest CT. Add sensitivities to aspergillus. Message sent to patient. Further plan pending imaging.     PA pending for toci to treat pseudomonas.

## 2025-04-24 NOTE — TELEPHONE ENCOUNTER
Faxed additional information to [facility] [place] at [phone].        Thank you,  Kacie Arce Oncology/Transplant Liaison  Phone: [phone]  Fax: [phone]

## 2025-04-24 NOTE — LETTER
PHYSICIAN ORDERS      DATE & TIME ISSUED: 2025 3:14 PM  PATIENT NAME: Travon Cartwright   : 1965     [facility] MR# [if applicable]: [id number]     DIAGNOSIS:  Lung Transplant  Z94.2    Patient needs a non contrast chest CT. Can you please call him to schedule? Thanks!     Any questions please call: Joi     Please fax these results to [phone].        Flower Medina PA-C    PATIENT DEMOGRAPHICS:   Name: Travon Cartwright MRN: [id number]   Address: [address] Sex: Male   City/St/Zip: [place] [address] : 1965   Home Phone: [phone] Work Phone:     [facility]: [place] Cell Phone: [phone]    Language:  English [1]             EMERGENCY CONTACT:  Contact Name: Mine Crook Relationship: Sister   Home Phone:   Work Phone:         Cell Phone: [phone]      GUARANTOR INFORMATION: Relationship to Patient: Self  Name: Travon Cartwright       [address]  Account Type: Personal/family   [place] [address] Employer:     Home Phone: [phone] Work Phone:          COVERAGE INFORMATION:  Primary Payor: Commercial Plan: [employer]   Subscriber: Travon Cartwright Group #: [id number]   Subscriber Sex: Male       Subscriber : 1965 Patient Rel'ship: Self   Subscriber Effective Date:   Member Effective Date: 2025    Subscriber ID [id number] Member ID: [id number]   Medicaid Number:             Secondary Payor:   Plan:     Subscriber:   Group #:     Subscriber Sex:         Subscriber :   Patient Rel'ship:     Subscriber Effective Date:   Member Effective Date:     Subscriber ID   Member ID:     Medicaid Number:             PROVIDER INFORMATION:  Referring Physician:   Referring Address:     Referring Phone: N/A Referring Fax:     Primary Physician: Dwayne Thomas Primary Address: [address]*   Primary Phone: [phone] Primary Fax: [phone]

## 2025-04-24 NOTE — TELEPHONE ENCOUNTER
Prior Authorization Approval    Medication: TOBRAMYCIN (MARK) 300 MG/5ML IN NEBU  Authorization Effective Date: 4/24/2025  Authorization Expiration Date: 4/23/2026  Approved Dose/Quantity: 280/28  Reference #: [id number]   Insurance Company: [employer] [facility] - Phone [phone] Fax [phone]  Expected CoPay: $ 150  CoPay Card Available:      Financial Assistance Needed: N/A  Which Pharmacy is filling the prescription: [facility] - [address]  Pharmacy Notified: N/A - Notified care team  Patient Notified: N/A - Notified care team        Thank you,  Kacie Arce Oncology/Transplant Liaison  Phone: [phone]  Fax: [phone]

## 2025-04-24 NOTE — TELEPHONE ENCOUNTER
Dora nebs approved. Plan to treat pseudomonas growing from 4/17 bronch culture with dora nebs 300 mg bid x28 days per Flower.     Need to see if [facility] department can do a test dose. Spoke with RT who asked that we call back tomorrow and talk to manager Pineda to see if this is something that can be done. [phone]    Message sent to patient explaining plan. Rxs sent, but need to figure out test dose first.

## 2025-04-24 NOTE — TELEPHONE ENCOUNTER
PA Initiation    Medication: TOBRAMYCIN (MARK) 300 MG/5ML IN NEBU  Insurance Company: [employer] [facility] - Phone [phone] Fax [phone]  Pharmacy Filling the Rx: [facility] - [address]  Start Date: 4/24/2025        Thank you,  Kacie Arce Oncology/Transplant Liaison  Phone: [phone]  Fax: [phone]

## 2025-04-24 NOTE — TELEPHONE ENCOUNTER
[facility] at [phone] for expedited request      Thank you,  Kacie Arce Oncology/Transplant Liaison  Phone: [phone]  Fax: [phone]

## 2025-04-28 ENCOUNTER — MYC MEDICAL ADVICE (OUTPATIENT)
Dept: TRANSPLANT | Facility: CLINIC | Age: 60
End: 2025-04-28
Payer: COMMERCIAL

## 2025-04-28 DIAGNOSIS — A49.8 INFECTION, PSEUDOMONAS: ICD-10-CM

## 2025-04-28 DIAGNOSIS — Z94.2 S/P LUNG TRANSPLANT (H): ICD-10-CM

## 2025-04-28 LAB
BACTERIA BRONCH: ABNORMAL
BACTERIA BRONCH: ABNORMAL

## 2025-04-28 RX ORDER — TOBRAMYCIN INHALATION SOLUTION 300 MG/5ML
300 INHALANT RESPIRATORY (INHALATION) 2 TIMES DAILY
Qty: 280 ML | Refills: 0 | OUTPATIENT
Start: 2025-04-28 | End: 2025-04-28

## 2025-04-28 RX ORDER — TOBRAMYCIN INHALATION SOLUTION 300 MG/5ML
300 INHALANT RESPIRATORY (INHALATION) 2 TIMES DAILY
Qty: 280 ML | Refills: 0 | Status: SHIPPED | OUTPATIENT
Start: 2025-04-28

## 2025-04-28 RX ORDER — ALBUTEROL SULFATE 0.83 MG/ML
2.5 SOLUTION RESPIRATORY (INHALATION)
Qty: 168 ML | Refills: 0 | Status: SHIPPED | OUTPATIENT
Start: 2025-04-28 | End: 2025-04-28

## 2025-04-28 RX ORDER — ALBUTEROL SULFATE 0.83 MG/ML
2.5 SOLUTION RESPIRATORY (INHALATION)
Qty: 168 ML | Refills: 0 | Status: SHIPPED | OUTPATIENT
Start: 2025-04-28

## 2025-05-01 LAB — BACTERIA BRONCH: NORMAL

## 2025-05-07 ENCOUNTER — RESULTS FOLLOW-UP (OUTPATIENT)
Dept: TRANSPLANT | Facility: CLINIC | Age: 60
End: 2025-05-07

## 2025-05-07 DIAGNOSIS — Z94.2 S/P LUNG TRANSPLANT (H): ICD-10-CM

## 2025-05-08 LAB — BACTERIA BRONCH: NORMAL

## 2025-05-09 ENCOUNTER — VIRTUAL VISIT (OUTPATIENT)
Dept: ENDOCRINOLOGY | Facility: CLINIC | Age: 60
End: 2025-05-09
Payer: COMMERCIAL

## 2025-05-09 DIAGNOSIS — E11.9 TYPE 2 DIABETES MELLITUS WITHOUT COMPLICATION, WITH LONG-TERM CURRENT USE OF INSULIN (H): Primary | ICD-10-CM

## 2025-05-09 DIAGNOSIS — Z79.4 TYPE 2 DIABETES MELLITUS WITHOUT COMPLICATION, WITH LONG-TERM CURRENT USE OF INSULIN (H): Primary | ICD-10-CM

## 2025-05-09 PROCEDURE — 98005 SYNCH AUDIO-VIDEO EST LOW 20: CPT | Performed by: NURSE PRACTITIONER

## 2025-05-09 PROCEDURE — G2211 COMPLEX E/M VISIT ADD ON: HCPCS | Performed by: NURSE PRACTITIONER

## 2025-05-09 PROCEDURE — 95251 CONT GLUC MNTR ANALYSIS I&R: CPT | Performed by: NURSE PRACTITIONER

## 2025-05-09 RX ORDER — INSULIN GLARGINE-YFGN 100 [IU]/ML
INJECTION, SOLUTION SUBCUTANEOUS
COMMUNITY
Start: 2025-04-15

## 2025-05-09 NOTE — PROGRESS NOTES
[facility] ENDOCRINOLOGY    Diabetes Note 5/9/2025    Travon Cartwright, 1965, [id number]          Reason for visit      1. Type 2 diabetes mellitus without complication, with long-term current use of insulin (H)        SURAJ     Travon Cartwright is a very pleasant 60 year old old male who presents for follow up.  SUMMARY:    Juan J is seen today via Video Visit in follow-up for DM 2. His current A1c is 8.8 and up from his previous of 7.6. The rise occurred while he was taking Prednisone again. He continues to use the Dexcom CGM, which was downloaded and data was reviewed.     TIR was 34%, Above, 66%. He had no hypoglycemia noted. GMI of 8.6%.     He has been missing prandial insulin doses. 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 Encouraged to make the effort as his BG are sitting between 200 and 300 between lunch and about 0300.     He takes a base dose (when he takes it) of 9 units + sliding scale for meals. He is taking Semglee, 46 units daily. He is also taking Jardiance, 10 units daily.     We had stopped Trulicity prior to his lung transplant per request, and for whatever reason, it did not get restarted. He would like to do that, please.     CMP in February showed normal Renal and Hepatic function.       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    Social History      reports that he has quit smoking. His smoking use included cigarettes. He has never used smokeless tobacco. He reports that he does not currently use alcohol. He reports that he does not use drugs.      Review of Systems     Patient has no polyuria or polydipsia, no chest pain, dyspnea or TIA's, no numbness, tingling or pain in extremities  Remainder negative except as noted in HPI.    Vital Signs     There were no vitals taken for this visit.  Wt Readings from Last 3 Encounters:   04/16/25 81.6 kg (180 lb)   02/13/25 79.8 kg (176 lb)   01/15/25 80.7 kg (178 lb)       Physical Exam     Constitutional:  Well developed, Well nourished  HENT:  Normocephalic,   Neck: normal in appearance  Eyes:  PERRL, Conjunctiva pink  Respiratory:  No respiratory distress  Skin: No acanthosis nigricans, lipoatrophy or lipodystrophy  Neurologic:  Alert & oriented x 3, nonfocal  Psychiatric:  Affect, Mood, Insight appropriate      Assessment     1. Type 2 diabetes mellitus without complication, with long-term current use of insulin (H)        Plan     Will restart the Trulicity at 0.75 mg weekly. No other changes today. Follow-up with me in 3 months.         Sara Reilly NP  [facility]  5/9/2025  12:57 PM      Lab Results     No results found for: \"HGBA1C\", \"CREATININE\", \"MICROALBUR\"    Cholesterol   Date Value Ref Range Status   04/16/2025 163 <200 mg/dL Final     Direct Measure HDL   Date Value Ref Range Status   04/16/2025 85 >=40 mg/dL Final     Triglycerides   Date Value Ref Range Status   04/16/2025 195 (H) <150 mg/dL Final       [unfilled]      Current Medications     Outpatient Medications Prior to Visit   Medication Sig Dispense Refill    albuterol (PROVENTIL) (2.5 MG/3ML) 0.083% " neb solution Take 1 vial (2.5 mg) by nebulization two times daily. Use prior to dora nebs 168 mL 0    azithromycin (ZITHROMAX) 250 MG tablet Take 1 tablet (250 mg) by mouth daily. 30 tablet 11    calcium carbonate-vitamin D (CALTRATE) 600-10 MG-MCG per tablet Take 1 tablet by mouth or Feeding Tube 2 times daily. 60 tablet 0    carvedilol (COREG) 6.25 MG tablet Take 1 tablet (6.25 mg) by mouth 2 times daily (with meals). 60 tablet 3    Continuous Glucose Sensor (DEXCOM G7 SENSOR) MISC Change every 10 days. 1 each 11    empagliflozin (JARDIANCE) 10 MG TABS tablet Take 1 tablet (10 mg) by mouth daily. 90 tablet 1    fluticasone-salmeterol (ADVAIR) 250-50 MCG/ACT inhaler Inhale 1 puff into the lungs 2 times daily. 60 each 11    Insulin Lispro-aabc, 1 U Dial, (SAQIBUMJEMARCELL BURNETTIKPEN) 100 UNIT/ML SOPN Inject 50 Units subcutaneously daily. Use UP TO 50 units daily. Take as directed with sliding scale. 15 mL 3    insulin pen needle (32G X 4 MM) 32G X 4 MM miscellaneous Use pen needles daily or as directed. 100 each 3    magnesium (HIGH ABSORPTION MAGNESIUM) 100 MG TABS Take 400 mg by mouth 3 times daily. 360 tablet 11    montelukast (SINGULAIR) 10 MG tablet Take 1 tablet (10 mg) by mouth at bedtime. 30 tablet 11    multivitamin, therapeutic (THERA-VIT) TABS tablet Take 1 tablet by mouth daily. 30 tablet 0    mycophenolic acid (GENERIC EQUIVALENT) 180 MG EC tablet Take 5 tablets (900 mg) by mouth 2 times daily. 300 tablet 11    predniSONE (DELTASONE) 5 MG tablet Take 1 tablet (5 mg) by mouth daily. 30 tablet 11    rosuvastatin (CRESTOR) 10 MG tablet Take 1 tablet (10 mg) by mouth daily. 30 tablet 11    SEMGLEE, YFGN, 100 UNIT/ML SOPN       sulfamethoxazole-trimethoprim (BACTRIM) 400-80 MG tablet Take 1 tablet by mouth or NG Tube daily. 30 tablet 11    tacrolimus (GENERIC EQUIVALENT) 1 MG capsule Take 2 capsules (2 mg) by mouth 2 times daily. 360 capsule 3    tobramycin, PF, (DORA) 300 MG/5ML neb solution Take 5 mLs (300 mg) by  nebulization 2 times daily. Use albuterol neb prior to dora neb 280 mL 0    valGANciclovir (VALCYTE) 450 MG tablet Take 2 tablets (900 mg) by mouth daily. 60 tablet 2    insulin glargine (LANTUS SOLOSTAR) 100 UNIT/ML pen Inject 46 Units subcutaneously every morning. 14 mL 0    metoprolol tartrate (LOPRESSOR) 25 MG tablet Take 3 tablets (75 mg) by mouth 2 times daily. 180 tablet 11    pantoprazole (PROTONIX) 40 MG EC tablet Take 1 tablet (40 mg) by mouth every morning (before breakfast). 30 tablet 3    insulin aspart (NOVOLOG PEN) 100 UNIT/ML pen Take 13 units prior to meals, and add sliding scale per instructions.  Up to 60 units daily (Patient not taking: Reported on 5/9/2025) 60 mL 0    levalbuterol (XOPENEX) 1.25 MG/3ML neb solution Take 3 mLs (1.25 mg) by nebulization 4 times daily as needed for shortness of breath or wheezing. Use prior to Mucomyst neb. (Patient not taking: Reported on 5/9/2025) 300 mL 0    psyllium (METAMUCIL/KONSYL) 58.6 % powder Take 1 teaspoonful by mouth daily. (Patient not taking: Reported on 5/9/2025)      sodium zirconium cyclosilicate (LOKELMA) 10 g PACK packet Take 1 packet (10 g) by mouth daily as needed. (Patient not taking: Reported on 5/9/2025) 10 packet 1     Facility-Administered Medications Prior to Visit   Medication Dose Route Frequency Provider Last Rate Last Admin    sodium chloride 0.9% BOLUS 1,000 mL  1,000 mL Intravenous Once Flower Medina PA-C        sodium chloride bacteriostatic 0.9 % flush 9 mL  9 mL Intravenous Once NICOLA Pearce MD                 Visit start time: 1300    Visit stop time: 1320        Virtual Visit Details    Type of service:  Video Visit     Originating Location (pt. Location): Home    Distant Location (provider location):  On-site  Platform used for Video Visit: Chris

## 2025-05-09 NOTE — Clinical Note
5/9/2025      Travon Cartwright  [address] [facility] [address]      Dear Colleague,    Thank you for referring your patient, Travon Cartwright, to the [facility] [place]. Please see a copy of my visit note below.    [facility] ENDOCRINOLOGY    Diabetes Note 5/9/2025    Travon Cartwright, 1965, [id number]          Reason for visit      No diagnosis found.    HPI     Travon Cartwright is a very pleasant 60 year old old male who presents for follow up.  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    Social History      reports that he has quit smoking. His smoking use included cigarettes. He has never used smokeless tobacco. He reports that he does not currently use alcohol. He reports that he does not use drugs.      Review of Systems     Patient has {:[id number]}  Remainder negative except as noted in HPI.    Vital Signs     There were no vitals taken for this visit.  Wt Readings from Last 3 Encounters:   04/16/25 81.6 kg (180 lb)   02/13/25 79.8 kg (176 lb)   01/15/25 80.7 kg (178 lb)       Physical Exam     [unfilled]      Assessment     No diagnosis found.    Plan       Sara  "SENDY Reilly   Endocrinology  5/9/2025  12:57 PM    This note has been dictated using voice recognition software.  Any grammatical or context distortions are unintentional and inherent to the software.       Lab Results     No results found for: \"HGBA1C\", \"CREATININE\", \"MICROALBUR\"    Cholesterol   Date Value Ref Range Status   04/16/2025 163 <200 mg/dL Final     Direct Measure HDL   Date Value Ref Range Status   04/16/2025 85 >=40 mg/dL Final     Triglycerides   Date Value Ref Range Status   04/16/2025 195 (H) <150 mg/dL Final       [unfilled]      Current Medications     Outpatient Medications Prior to Visit   Medication Sig Dispense Refill    albuterol (PROVENTIL) (2.5 MG/3ML) 0.083% neb solution Take 1 vial (2.5 mg) by nebulization two times daily. Use prior to dora nebs 168 mL 0    azithromycin (ZITHROMAX) 250 MG tablet Take 1 tablet (250 mg) by mouth daily. 30 tablet 11    calcium carbonate-vitamin D (CALTRATE) 600-10 MG-MCG per tablet Take 1 tablet by mouth or Feeding Tube 2 times daily. 60 tablet 0    carvedilol (COREG) 6.25 MG tablet Take 1 tablet (6.25 mg) by mouth 2 times daily (with meals). 60 tablet 3    Continuous Glucose Sensor (DEXCOM G7 SENSOR) MISC Change every 10 days. 1 each 11    empagliflozin (JARDIANCE) 10 MG TABS tablet Take 1 tablet (10 mg) by mouth daily. 90 tablet 1    fluticasone-salmeterol (ADVAIR) 250-50 MCG/ACT inhaler Inhale 1 puff into the lungs 2 times daily. 60 each 11    insulin glargine (LANTUS SOLOSTAR) 100 UNIT/ML pen Inject 46 Units subcutaneously every morning. 14 mL 0    Insulin Lispro-aabc, 1 U Dial, (LYUMJEV KWIKPEN) 100 UNIT/ML SOPN Inject 50 Units subcutaneously daily. Use UP TO 50 units daily. Take as directed with sliding scale. 15 mL 3    insulin pen needle (32G X 4 MM) 32G X 4 MM miscellaneous Use pen needles daily or as directed. 100 each 3    magnesium (HIGH ABSORPTION MAGNESIUM) 100 MG TABS Take 400 mg by mouth 3 times daily. 360 tablet 11    metoprolol tartrate " (LOPRESSOR) 25 MG tablet Take 3 tablets (75 mg) by mouth 2 times daily. 180 tablet 11    montelukast (SINGULAIR) 10 MG tablet Take 1 tablet (10 mg) by mouth at bedtime. 30 tablet 11    multivitamin, therapeutic (THERA-VIT) TABS tablet Take 1 tablet by mouth daily. 30 tablet 0    mycophenolic acid (GENERIC EQUIVALENT) 180 MG EC tablet Take 5 tablets (900 mg) by mouth 2 times daily. 300 tablet 11    pantoprazole (PROTONIX) 40 MG EC tablet Take 1 tablet (40 mg) by mouth every morning (before breakfast). 30 tablet 3    predniSONE (DELTASONE) 5 MG tablet Take 1 tablet (5 mg) by mouth daily. 30 tablet 11    rosuvastatin (CRESTOR) 10 MG tablet Take 1 tablet (10 mg) by mouth daily. 30 tablet 11    sulfamethoxazole-trimethoprim (BACTRIM) 400-80 MG tablet Take 1 tablet by mouth or NG Tube daily. 30 tablet 11    tacrolimus (GENERIC EQUIVALENT) 1 MG capsule Take 2 capsules (2 mg) by mouth 2 times daily. 360 capsule 3    tobramycin, PF, (MARK) 300 MG/5ML neb solution Take 5 mLs (300 mg) by nebulization 2 times daily. Use albuterol neb prior to mark neb 280 mL 0    valGANciclovir (VALCYTE) 450 MG tablet Take 2 tablets (900 mg) by mouth daily. 60 tablet 2    insulin aspart (NOVOLOG PEN) 100 UNIT/ML pen Take 13 units prior to meals, and add sliding scale per instructions.  Up to 60 units daily (Patient not taking: Reported on 5/9/2025) 60 mL 0    levalbuterol (XOPENEX) 1.25 MG/3ML neb solution Take 3 mLs (1.25 mg) by nebulization 4 times daily as needed for shortness of breath or wheezing. Use prior to Mucomyst neb. (Patient not taking: Reported on 5/9/2025) 300 mL 0    psyllium (METAMUCIL/KONSYL) 58.6 % powder Take 1 teaspoonful by mouth daily. (Patient not taking: Reported on 5/9/2025)      sodium zirconium cyclosilicate (LOKELMA) 10 g PACK packet Take 1 packet (10 g) by mouth daily as needed. (Patient not taking: Reported on 5/9/2025) 10 packet 1     Facility-Administered Medications Prior to Visit   Medication Dose Route  "Frequency Provider Last Rate Last Admin    sodium chloride 0.9% BOLUS 1,000 mL  1,000 mL Intravenous Once Flower Medina PA-C        sodium chloride bacteriostatic 0.9 % flush 9 mL  9 mL Intravenous Once NICOLA Pearce MD                 Virtual Visit Details    Type of service:  Video Visit     Originating Location (pt. Location): {video visit patient location:532099::\"Home\"}  {PROVIDER LOCATION On-site should be selected for visits conducted from your clinic location or adjoining [facility] hospital, academic office, or other nearby [facility] building. Off-site should be selected for all other provider locations, including home:002759}  Distant Location (provider location):  {virtual location provider:007814}  Platform used for Video Visit: {Virtual Visit Platforms:891557::\"TRADE TO REBATE\"}              Again, thank you for allowing me to participate in the care of your patient.        Sincerely,        Sara Reilly NP    Electronically signed"

## 2025-05-13 ENCOUNTER — TRANSFERRED RECORDS (OUTPATIENT)
Dept: HEALTH INFORMATION MANAGEMENT | Facility: CLINIC | Age: 60
End: 2025-05-13
Payer: COMMERCIAL

## 2025-05-15 ENCOUNTER — RESULTS FOLLOW-UP (OUTPATIENT)
Dept: TRANSPLANT | Facility: CLINIC | Age: 60
End: 2025-05-15

## 2025-05-15 DIAGNOSIS — Z94.2 S/P LUNG TRANSPLANT (H): ICD-10-CM

## 2025-05-15 LAB
BACTERIA BRONCH: ABNORMAL
BACTERIA BRONCH: ABNORMAL
BACTERIA BRONCH: NO GROWTH

## 2025-05-19 DIAGNOSIS — Z94.2 S/P LUNG TRANSPLANT (H): ICD-10-CM

## 2025-05-19 NOTE — LETTER
PHYSICIAN ORDERS      DATE & TIME ISSUED: May 19, 2025 2:36 PM  PATIENT NAME: Travon Cartwright   : 1965     [facility] MR# [if applicable]: [id number]     DIAGNOSIS:  Lung Transplant  Z94.2    Patient needs 2L oxygen via NC with sleep. Length of need: lifetime  See attached overnight oximetry.     Any questions please call: Joi     Please fax these results to [phone].         Carey Tucker MD  Pulmonary Disease

## 2025-05-20 NOTE — PROGRESS NOTES
Overnight oximetry from 5/13/25 reviewed by Dr. Tucker. Plan to start 2L oxygen with sleep and refer to sleep medicine. Orders faxed to [facility] in [place]. Message sent to patient. He's also due for CBC check, asked that he get labs done soon. Awaiting where patient would like to see sleep medicine to send referral.

## 2025-05-22 ENCOUNTER — TELEPHONE (OUTPATIENT)
Dept: TRANSPLANT | Facility: CLINIC | Age: 60
End: 2025-05-22
Payer: COMMERCIAL

## 2025-05-22 DIAGNOSIS — Z94.2 S/P LUNG TRANSPLANT (H): ICD-10-CM

## 2025-05-22 NOTE — TELEPHONE ENCOUNTER
Spoke with patient regarding overnight oxygen and labs.  He has not done any labs yet.  It seems as though he did not know he needed to call his lab to get an appointment for this.  Will check labs on Tuesday.    Patient seems a little reluctant about using oxygen at night.  He has been sleeping really good the last few nights and slept through the night when usually he has to wake up a couple times to go to the bathroom.  Will review further in clinic next week, orders are in his chart and ready to be faxed if he is agreeable.  Needs a sleep medicine referral as well.    Patient will finish his MARK nebs sometime next week.

## 2025-05-23 NOTE — PROGRESS NOTES
"Transplant Coordinator Note    Reason for visit: Post lung transplant follow up visit   Coordinator: Present   Caregiver:  Sister Blount    Health concerns addressed today:  1. Respiratory - a little shortness of breath \"walking up and down hills\", with exertion. No coughing, wheezing, shortness of breath.   2. GI: appetite good. No BM. No acid reflux.   3.  ENT: no issues, no ear ringing - on dora nebs  4. BPs 140s consistently - new on carvedilol   5. BGs: AM: 140s, throughout the day - up to 170s  6. \"Tired often\" - NU qualifies for CPAP. Wake up in AM tired. Feels like sleep has improved recently- (\"sleeping longer and harder\").     Discuss NU with Dr. Tucker    Activity/rehab: Up ad jasmyne, walking 2-3 miles a few times/day.   Oxygen needs: Room air, NU suggest 2L of O2 needed  Pain management/RX: N/a  Diabetic management: Managed by endocrine.    Next Bronch due: April 2025  CMV status: D-/R+  Valcyte stopped: through POD #90  EBV status: D-/R+, monitor monthly  DVT/PE: N/A  Post op AFIB/follow up with EP: N/A  AC/asa: none  PJP prophylactic: Bactrim    COVID:  COVID-19 infection (yes/no, date of most recent positive test):   Status/instructions given about COVID-19 vaccine:     Pt Education: medications (use/dose/side effects), how/when to call coordinator, frequency of labs, s/s of infection/rejection, call prior to starting any new medications, lab/vital sign book    Health Maintenance:   Last colonoscopy:   Next colonoscopy due:   Dermatology:  Vaccinations this visit:     Labs, CXR, PFTs reviewed with patient  Medication record reviewed and reconciled  Questions and concerns addressed    Patient Instructions  1. Continue to hydrate with 60-70 oz fluids daily.  2. Continue to exercise daily or most days of the week.  3. Follow up with your primary care provider for annual gender health maintenance procedures.  4. Follow up with colonoscopy schedule.  5. Follow up with annual dermatology visits.  6. It " doesn't seem like the COVID vaccine is working well in lung transplant patients. A number of lung transplant patients have gotten sick with COVID even after receiving the vaccines. Based on our recent experience, it can be life-threatening to get COVID  even after being vaccinated. Please continue to act like you did not get the COVID vaccine - social distancing, wearing a mask, good hand hygiene, etc. If the people around you are vaccinated, it will help reduce the risk of you getting COVID. All members of your household should be vaccinated.  7.     Next transplant clinic appointment: 6/20 with CXR, labs and PFTs before visit with Dr. Tucker  Next lab draw: pending tacrolimus level, otherwise per Joi CAMACHO printed at time of check out

## 2025-05-23 NOTE — PATIENT INSTRUCTIONS
Patient Instructions  1. Continue to hydrate with 70-80 oz fluids daily.  2. Continue to exercise daily or most days of the week.  3. Follow up with your primary care provider for annual gender health maintenance procedures.  4. Follow up with colonoscopy schedule.  5. Follow up with annual dermatology visits.  6. It doesn't seem like the COVID vaccine is working well in lung transplant patients. A number of lung transplant patients have gotten sick with COVID even after receiving the vaccines. Based on our recent experience, it can be life-threatening to get COVID  even after being vaccinated. Please continue to act like you did not get the COVID vaccine - social distancing, wearing a mask, good hand hygiene, etc. If the people around you are vaccinated, it will help reduce the risk of you getting COVID. All members of your household should be vaccinated.  7. Consider stop drinking fluids after 4-6pm, no caffeine after 2pm. This might decrease urination overnight.   8. Try sleeping with Oxygen and see if it helps with your sleep. We'll put an order in for a sleep study as well.   9. Hold Pantoprazole - let us know if you have increase coughing, post nasal drip, acid taste in your mouth in the morning.   10. Increase carvedilol to 12.5mg twice a day.   11. Start amlodipine 5mg in the evening.   12. We are going to get you set up for an IVIG infusion for your low IgG level.     Next transplant clinic appointment: 6/20 with CXR, labs and PFTs before visit with Dr. Tucker  Next lab draw: pending tacrolimus level, otherwise per Joi     ~~~~~~~~~~~~~~~~~~~~~~~~~    Thoracic Transplant Office phone [phone] (alt [phone]), fax [phone]    Office Hours 8:30 - 5:00     For after-hours urgent issues, please dial [phone] (alt [phone]) and ask the  to page the Thoracic Transplant Coordinator On-Call.   --------------------  To expedite your medication refill(s), please contact your pharmacy and  have them fax a refill request to: [phone]    *Please allow 3 business days for routine medication refills.  *Please allow 5 business days for controlled substance medication refills.    **For Diabetic medications and supplies refill(s), please contact your pharmacy and have them contact your Endocrine team.  --------------------  For scheduling appointments call [phone] (alt [phone])  --------------------  Please Note: If you are active on Movable, all future test results will be sent by Movable message only, and will no longer be called to patient. You may also receive communication directly from your physician.

## 2025-05-25 NOTE — PROGRESS NOTES
[facility]  Pulmonary Transplant Follow Up Visit  May 27, 2025    Reason for Visit  Travon Cartwright is a 60 year old who is being seen for lung transplant follow up             Lung Tx Summary:     Transplants:  8/15/2024 (Lung), Postoperative day:  283    Travon Cartwright is a 60 year old who underwent BSLTx transplant on 8/15/2024 (Lung) for IPF, currently postoperative day:  283, course complicated by delirium, stenotrophomonas, ACR (A1B0 with elevated prospera in 10/2024). Other history notable for diabetes, hepatic steatosis, and essential tremor.          Interval Histories:   May 27, 2025  May 27, 2025  Blood glucoses are much better controlled, on trulicity. He is still feeling overall kind of tired which is new.   He is a very light sleeper. Feels tired upon waking. Doesn't snore. No sleep apnea signs that have been noticed. His NU did show about 11 minutes <88%. He doesn't know if he'll be able to sleep with oxygen on. He does feel like his sleep is improving. He's not waking up as much to urinate.   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 No fevers/chills, sweats. No n/v, constipation. Has one loose stool after morning meds. Tremors are improving. No coughing. No wheezing. No chest tightness. No acid reflux/heartburn.     The patient was seen and examined by Carey Tucker MD     A complete ROS was otherwise negative except as noted in the HPI.           Medications:   Meds were reviewed during this visit and updated below    Outpatient Encounter Medications as of 5/27/2025   Medication Sig Dispense Refill    amLODIPine (NORVASC) 5 MG tablet Take 1 tablet (5 mg) by mouth daily. 30 tablet 11    carvedilol (COREG) 6.25 MG tablet Take 2 tablets (12.5 mg) by mouth 2 times daily (with meals). 120 tablet 11    pantoprazole (PROTONIX) 40 MG EC  tablet Start holding 5/27/2025 30 tablet 3    albuterol (PROVENTIL) (2.5 MG/3ML) 0.083% neb solution Take 1 vial (2.5 mg) by nebulization two times daily. Use prior to dora nebs 168 mL 0    azithromycin (ZITHROMAX) 250 MG tablet Take 1 tablet (250 mg) by mouth daily. 30 tablet 11    calcium carbonate-vitamin D (CALTRATE) 600-10 MG-MCG per tablet Take 1 tablet by mouth or Feeding Tube 2 times daily. 60 tablet 0    Continuous Glucose Sensor (DEXCOM G7 SENSOR) MISC Change every 10 days. 1 each 11    dulaglutide (TRULICITY) 0.75 MG/0.5ML pen Inject 0.75 mg subcutaneously every 7 days. 2 mL 2    empagliflozin (JARDIANCE) 10 MG TABS tablet Take 1 tablet (10 mg) by mouth daily. 90 tablet 1    fluticasone-salmeterol (ADVAIR) 250-50 MCG/ACT inhaler Inhale 1 puff into the lungs 2 times daily. 60 each 11    insulin aspart (NOVOLOG PEN) 100 UNIT/ML pen Take 13 units prior to meals, and add sliding scale per instructions.  Up to 60 units daily (Patient not taking: Reported on 5/9/2025) 60 mL 0    Insulin Lispro-aabc, 1 U Dial, (LYUMJEV KWIKPEN) 100 UNIT/ML SOPN Inject 50 Units subcutaneously daily. Use UP TO 50 units daily. Take as directed with sliding scale. 15 mL 3    insulin pen needle (32G X 4 MM) 32G X 4 MM miscellaneous Use pen needles daily or as directed. 100 each 3    magnesium (HIGH ABSORPTION MAGNESIUM) 100 MG TABS Take 400 mg by mouth 3 times daily. 360 tablet 11    montelukast (SINGULAIR) 10 MG tablet Take 1 tablet (10 mg) by mouth at bedtime. 30 tablet 11    multivitamin, therapeutic (THERA-VIT) TABS tablet Take 1 tablet by mouth daily. 30 tablet 0    mycophenolic acid (GENERIC EQUIVALENT) 180 MG EC tablet Take 5 tablets (900 mg) by mouth 2 times daily. 300 tablet 11    predniSONE (DELTASONE) 5 MG tablet Take 1 tablet (5 mg) by mouth daily. 30 tablet 11    psyllium (METAMUCIL/KONSYL) 58.6 % powder Take 1 teaspoonful by mouth daily. (Patient not taking: Reported on 5/9/2025)      rosuvastatin (CRESTOR) 10 MG tablet  Take 1 tablet (10 mg) by mouth daily. 30 tablet 11    SEMGLEE, YFGN, 100 UNIT/ML SOPN INJECT 46 UNITS UNDER THE SKIN EVERY MORNING 15 mL 0    SEMGLEE, YFGN, 100 UNIT/ML SOPN       sodium zirconium cyclosilicate (LOKELMA) 10 g PACK packet Take 1 packet (10 g) by mouth daily as needed. (Patient not taking: Reported on 5/9/2025) 10 packet 1    sulfamethoxazole-trimethoprim (BACTRIM) 400-80 MG tablet Take 1 tablet by mouth or NG Tube daily. 30 tablet 11    tacrolimus (GENERIC EQUIVALENT) 1 MG capsule Take 2 capsules (2 mg) by mouth 2 times daily. 360 capsule 3    tobramycin, PF, (MARK) 300 MG/5ML neb solution Take 5 mLs (300 mg) by nebulization 2 times daily. Use albuterol neb prior to mrak neb 280 mL 0    [DISCONTINUED] carvedilol (COREG) 6.25 MG tablet Take 1 tablet (6.25 mg) by mouth 2 times daily (with meals). 60 tablet 3    [DISCONTINUED] levalbuterol (XOPENEX) 1.25 MG/3ML neb solution Take 3 mLs (1.25 mg) by nebulization 4 times daily as needed for shortness of breath or wheezing. Use prior to Mucomyst neb. (Patient not taking: Reported on 5/9/2025) 300 mL 0    [DISCONTINUED] pantoprazole (PROTONIX) 40 MG EC tablet Take 1 tablet (40 mg) by mouth every morning (before breakfast). 30 tablet 3     Facility-Administered Encounter Medications as of 5/27/2025   Medication Dose Route Frequency Provider Last Rate Last Admin    sodium chloride 0.9% BOLUS 1,000 mL  1,000 mL Intravenous Once Flower Medina PA-C        sodium chloride bacteriostatic 0.9 % flush 9 mL  9 mL Intravenous Once NICOLA Pearce MD                   Allergies:     No Known Allergies         Past Medical and Past Surgical History:     Past Medical History:   Diagnosis Date    Administration of long-term prophylactic antibiotics 08/26/2024    Diabetes (H)     Hepatic steatosis 2017    Noted on ultrasound    Hypomagnesemia 08/26/2024    Immunosuppression 08/26/2024    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    S/P lung transplant (H)  08/15/2024    Tremor 05/23/2024       Past Surgical History:   Procedure Laterality Date    BRONCHOSCOPY  08/16/2024    BRONCHOSCOPY (RIGID OR FLEXIBLE), DIAGNOSTIC N/A 9/26/2024    Procedure: BRONCHOSCOPY, WITH BRONCHOALVEOLAR LAVAGE AND BIOPSIES;  Surgeon: Oneida Silver MD;  Location:  GI    BRONCHOSCOPY (RIGID OR FLEXIBLE), DIAGNOSTIC N/A 11/5/2024    Procedure: BRONCHOSCOPY, WITH BRONCHOALVEOLAR LAVAGE AND BIOPSIES;  Surgeon: Tera Jiménez MD;  Location:  GI    BRONCHOSCOPY (RIGID OR FLEXIBLE), DIAGNOSTIC N/A 1/16/2025    Procedure: BRONCHOSCOPY, WITH BRONCHOALVEOLAR LAVAGE AND BIOPSIES;  Surgeon: Florian Steinberg MD;  Location:  GI    BRONCHOSCOPY (RIGID OR FLEXIBLE), DIAGNOSTIC N/A 4/17/2025    Procedure: BRONCHOSCOPY, WITH BRONCHOALVEOLAR LAVAGE and biopsy;  Surgeon: Jing De La Garza MD;  Location:  GI    BRONCHOSCOPY FLEXIBLE AND RIGID N/A 8/27/2024    Procedure: Bronchoscopy Inspection;  Surgeon: Oneida Silver MD;  Location:  GI    COLONOSCOPY      CV CORONARY ANGIOGRAM N/A 06/21/2024    Procedure: Coronary Angiogram;  Surgeon: Gabriel Julian MD;  Location:  HEART CARDIAC CATH LAB    CV RIGHT HEART CATH MEASUREMENTS RECORDED N/A 06/21/2024    Procedure: Right Heart Catheterization;  Surgeon: Gabriel Julian MD;  Location:  HEART CARDIAC CATH LAB    PICC DOUBLE LUMEN PLACEMENT Right 08/20/2024    47-3cm, Basilic    TRANSPLANT LUNG RECIPIENT SINGLE X2 Bilateral 08/15/2024    Procedure: Clamshell Incision, On Central Venoarterial Extracorporeal Membranous Oxygenation, Bilateral Lung Transplant Recipient, Left atrial appendage ligation, Intraoperative Flexible Bronchoscopy by Anesthesia;  Surgeon: Mulvihill, Michael, MD;  Location:  OR             Social History:   Social Updates:          Rejection and Infection History     Rejection Hx    DATE INDICATION  PATH BAL/MICRO TREATMENT                Infectious Hx           Exam:     BP (!) 161/98   Pulse 88   Temp 97.6   F (36.4  C) (Oral)   Wt 83.9 kg (185 lb)   SpO2 97%   BMI 26.36 kg/m    Body mass index is 26.36 kg/m .      GENERAL APPEARANCE: Well developed, well nourished, alert, and in no apparent distress.  EYES: PERRL, EOMI  HENT: Nasal mucosa with no edema and no hyperemia. No nasal polyps.  EARS: Canals clear, TMs normal  MOUTH: Oral mucosa is moist, without any lesions, no tonsillar enlargement, no oropharyngeal exudate.  NECK: supple, no masses, no thyromegaly.  LYMPHATICS: No significant axillary, cervical, or supraclavicular nodes.  RESP: normal percussion, good air flow throughout.  No crackles. No rhonchi. No wheezes.  CV: Normal S1, S2, regular rhythm, normal rate. No murmur.  No rub. No gallop. No LE edema.   ABDOMEN:  Bowel sounds normal, soft, nontender, no HSM or masses.   MS: extremities normal. No clubbing. No cyanosis.  SKIN: no rash on limited exam  NEURO: Mentation intact, speech normal, normal strength and tone, normal gait and stance, no significant tremor at rest  PSYCH: mentation appears normal. and affect normal/bright         Data:     Results:  Recent Results (from the past week)   Comprehensive metabolic panel    Collection Time: 05/27/25  8:24 AM   Result Value Ref Range    Sodium 141 135 - 145 mmol/L    Potassium 4.1 3.4 - 5.3 mmol/L    Carbon Dioxide (CO2) 24 22 - 29 mmol/L    Anion Gap 12 7 - 15 mmol/L    Urea Nitrogen 14.5 8.0 - 23.0 mg/dL    Creatinine 1.22 (H) 0.67 - 1.17 mg/dL    GFR Estimate 67 >60 mL/min/1.73m2    Calcium 9.7 8.8 - 10.4 mg/dL    Chloride 105 98 - 107 mmol/L    Glucose 124 (H) 70 - 99 mg/dL    Alkaline Phosphatase 75 40 - 150 U/L    AST 30 0 - 45 U/L    ALT 42 0 - 70 U/L    Protein Total 6.7 6.4 - 8.3 g/dL    Albumin 4.6 3.5 - 5.2 g/dL    Bilirubin Total 0.4 <=1.2 mg/dL   CBC with platelets    Collection Time: 05/27/25  8:24 AM   Result Value Ref Range    WBC Count 5.0 4.0 - 11.0 10e3/uL    RBC Count 5.46 4.40 - 5.90 10e6/uL    Hemoglobin 15.0 13.3 - 17.7 g/dL     Hematocrit 47.3 40.0 - 53.0 %    MCV 87 78 - 100 fL    MCH 27.5 26.5 - 33.0 pg    MCHC 31.7 31.5 - 36.5 g/dL    RDW 14.5 10.0 - 15.0 %    Platelet Count 208 150 - 450 10e3/uL   Magnesium    Collection Time: 05/27/25  8:24 AM   Result Value Ref Range    Magnesium 1.8 1.7 - 2.3 mg/dL   INR    Collection Time: 05/27/25  8:24 AM   Result Value Ref Range    INR 0.94 0.85 - 1.15    PT 12.8 11.8 - 14.8 Seconds   IgG    Collection Time: 05/27/25  8:24 AM   Result Value Ref Range    Immunoglobulin G 361 (L) 610 - 1,616 mg/dL   Phosphorus    Collection Time: 05/27/25  8:24 AM   Result Value Ref Range    Phosphorus 2.6 2.5 - 4.5 mg/dL   General PFT Lab (Please always keep checked)    Collection Time: 05/27/25  9:00 AM   Result Value Ref Range    FVC-Pred 4.23 L    FVC-Pre 3.51 L    FVC-%Pred-Pre 83 %    FEV1-Pre 2.94 L    FEV1-%Pred-Pre 89 %    FEV1FVC-Pred 78 %    FEV1FVC-Pre 84 %    FEFMax-Pred 9.10 L/sec    FEFMax-Pre 7.94 L/sec    FEFMax-%Pred-Pre 87 %    FEF2575-Pred 2.72 L/sec    FEF2575-Pre 3.48 L/sec    KDD4493-%Pred-Pre 127 %    ExpTime-Pre 4.82 sec    FIFMax-Pre 6.97 L/sec    FEV1FEV6-Pred 79 %    FEV1FEV6-Pre 84 %           Date Place TLC (%) FVC (%) FEV1 (%) FEV1/FVC DLCO (%) Note                                                               Results as noted above.    May 27, 2025    Spirometry interpretation:  The spirometry is normal.  When compared to 4/16/25, the FEV1 and FVC have increased.  The testing meets ATS criteria.  Still establishing baseline  Best PFTs since transplant              Assessment and Plan:     Transplants:    Travon Cartwright is a 60 year old who underwent BSLTx transplant on 8/15/2024 (Lung), currently postoperative day:  283, course complicated by delirium, stenotrophomonas, ACR (A1B0 with elevated prospera in 10/2024). Other history notable for diabetes, hepatic steatosis, and essential tremor.     PULMONARY    Transplant:       Allograft Function: Overnight oximetry done 5/14/25  with SPO2 <88% for 11 minutes. Qualifies for 2L NC nocturnal oxygen, can also consider PSG to evaluate for LEONILA.  Spirometry is the best since transplant.   Oxygen: Qualifies for oxygen today, will need 2 LPM nocturnally. Spoke to patient about oxygen and my nurse do further education.  Imaging: CXR clear without new infiltrates or effusions   TOMAS: Noted on imaging, s/p solumedrol pulse and taper with CT on 12/17/24 demonstrating air trapping iwht A1B0 and elevated prospera in 10/2024. He felt better post steroids.   CLAD therapy: Azithro, advair, and montelukast  AW: R anastomosis stenosis.   Prospera: 4/16/25 0.70   Bronch: Bronch done in 4/17/25, A0B0C0      Immunosuppression:  - Tacrolimus goal 8-12  - Myfortic 900 mg bid   - Prednisone 5 mg daily    Prophylaxis:   PJP:  Bactrim (also for nocardia prophy)  CMV: D-/R+   EBV: D-/R+     LLL Nodule: 8x8 mm nodule in LLL on CT 9/24/24.   - Repeat 6/17/25    Hypogammaglobulinemia: IgG 361.  - Will need to be replaced    Non HLA+ Domingo: cfDNA came back within normal limits, but nonHLA domingo endothelial cell and AT1R were positive from 2/2025 but also positive even prior to lung transplant (8/14/24). Given the normal prospera elected to do the following:  - Increased myfortic to 900 (tolerating well)   - Reassess nonHLA today, sooner if PFTs start to fall or prospera goes up and at that time may consider toci/IVIG    Hypertension:   - Carvedilol 6.25 will increase to 12.5 mg bid  - Start amlodipine 5 mg qhs    CATHERINE: Baseline Cr around 0.9, now up to 1.22.     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   - Continue AFB cultures on bronch washes  - Subsequent AFBs have been negative    Tremor: Improving     Hepatic Steatosis  - Following LFTs    DM II  - Following up with endo    HLD  - Statin        Maintenance:  Derm Exam: Due Annually, needs to see  Colon Ca Screening: due in 2026  DEXA:   Imms: RSV needed        CHANGES TODAY    - TSH, EBV, Vitamin D   -  Sleep medicine referral  - Trial off the PPI for now   - Increase the carvedilol to 12.5 mg bid   - Start amlodipine 5 mg at bedtime   - Increase fluid intake   - IgG repletion  - nonHLA domingo resent today, not starting toci   - 1 month follow up as scheduled, next bronch at 1 year ceasar     The longitudinal plan of care for lung transplant and complications of high risk medication management was addressed during this visit. Due to the added complexity in care, I will continue to support this patient in the subsequent management of this condition(s) and with the ongoing continuity of care of this condition     Approximately 35 minutes of non face-to-face time were spent in review of the patient's medical record on 5/26/25.  This included review of previous: clinic visits, hospital records, lab results, imaging studies, and procedural documentation.  The findings from this review are summarized in the above note.    I personally spent 40 minutes in documentation, the interview and exam, and review of the chart/labs/imaging on May 27, 2025 not including time spent interpreting spirometry.         Carey Tucker MD  [facility]   Pulmonary Transplant   Post Transplant Coordinator:   Fax: [phone]  Ph: [phone]

## 2025-05-26 PROCEDURE — 99358 PROLONG SERVICE W/O CONTACT: CPT | Performed by: INTERNAL MEDICINE

## 2025-05-27 ENCOUNTER — OFFICE VISIT (OUTPATIENT)
Dept: TRANSPLANT | Facility: CLINIC | Age: 60
End: 2025-05-27
Attending: INTERNAL MEDICINE
Payer: COMMERCIAL

## 2025-05-27 ENCOUNTER — RESULTS FOLLOW-UP (OUTPATIENT)
Dept: TRANSPLANT | Facility: CLINIC | Age: 60
End: 2025-05-27

## 2025-05-27 ENCOUNTER — LAB (OUTPATIENT)
Dept: LAB | Facility: CLINIC | Age: 60
End: 2025-05-27
Attending: INTERNAL MEDICINE
Payer: COMMERCIAL

## 2025-05-27 ENCOUNTER — ANCILLARY PROCEDURE (OUTPATIENT)
Dept: GENERAL RADIOLOGY | Facility: CLINIC | Age: 60
End: 2025-05-27
Attending: INTERNAL MEDICINE
Payer: COMMERCIAL

## 2025-05-27 VITALS
OXYGEN SATURATION: 97 % | WEIGHT: 185 LBS | HEART RATE: 88 BPM | BODY MASS INDEX: 26.36 KG/M2 | DIASTOLIC BLOOD PRESSURE: 98 MMHG | TEMPERATURE: 97.6 F | SYSTOLIC BLOOD PRESSURE: 161 MMHG

## 2025-05-27 DIAGNOSIS — Z94.2 S/P LUNG TRANSPLANT (H): ICD-10-CM

## 2025-05-27 DIAGNOSIS — G47.00 INSOMNIA, UNSPECIFIED TYPE: Primary | ICD-10-CM

## 2025-05-27 DIAGNOSIS — I15.8 OTHER SECONDARY HYPERTENSION: ICD-10-CM

## 2025-05-27 DIAGNOSIS — R53.83 OTHER FATIGUE: ICD-10-CM

## 2025-05-27 DIAGNOSIS — D84.9 IMMUNOSUPPRESSION: ICD-10-CM

## 2025-05-27 DIAGNOSIS — R53.83 FATIGUE: ICD-10-CM

## 2025-05-27 DIAGNOSIS — Z94.2 S/P LUNG TRANSPLANT (H): Primary | ICD-10-CM

## 2025-05-27 DIAGNOSIS — J84.9 ILD (INTERSTITIAL LUNG DISEASE) (H): ICD-10-CM

## 2025-05-27 DIAGNOSIS — D80.1 HYPOGAMMAGLOBULINEMIA: Primary | ICD-10-CM

## 2025-05-27 LAB
ALBUMIN SERPL BCG-MCNC: 4.6 G/DL (ref 3.5–5.2)
ALP SERPL-CCNC: 75 U/L (ref 40–150)
ALT SERPL W P-5'-P-CCNC: 42 U/L (ref 0–70)
ANION GAP SERPL CALCULATED.3IONS-SCNC: 12 MMOL/L (ref 7–15)
AST SERPL W P-5'-P-CCNC: 30 U/L (ref 0–45)
BILIRUB SERPL-MCNC: 0.4 MG/DL
BUN SERPL-MCNC: 14.5 MG/DL (ref 8–23)
CALCIUM SERPL-MCNC: 9.7 MG/DL (ref 8.8–10.4)
CHLORIDE SERPL-SCNC: 105 MMOL/L (ref 98–107)
CMV DNA SPEC NAA+PROBE-ACNC: NOT DETECTED IU/ML
CREAT SERPL-MCNC: 1.22 MG/DL (ref 0.67–1.17)
EBV DNA SERPL NAA+PROBE-ACNC: NOT DETECTED IU/ML
EGFRCR SERPLBLD CKD-EPI 2021: 67 ML/MIN/1.73M2
ERYTHROCYTE [DISTWIDTH] IN BLOOD BY AUTOMATED COUNT: 14.5 % (ref 10–15)
EXPTIME-PRE: 4.82 SEC
FEF2575-%PRED-PRE: 127 %
FEF2575-PRE: 3.48 L/SEC
FEF2575-PRED: 2.72 L/SEC
FEFMAX-%PRED-PRE: 87 %
FEFMAX-PRE: 7.94 L/SEC
FEFMAX-PRED: 9.1 L/SEC
FEV1-%PRED-PRE: 89 %
FEV1-PRE: 2.94 L
FEV1FEV6-PRE: 84 %
FEV1FEV6-PRED: 79 %
FEV1FVC-PRE: 84 %
FEV1FVC-PRED: 78 %
FIFMAX-PRE: 6.97 L/SEC
FVC-%PRED-PRE: 83 %
FVC-PRE: 3.51 L
FVC-PRED: 4.23 L
GLUCOSE SERPL-MCNC: 124 MG/DL (ref 70–99)
HCO3 SERPL-SCNC: 24 MMOL/L (ref 22–29)
HCT VFR BLD AUTO: 47.3 % (ref 40–53)
HGB BLD-MCNC: 15 G/DL (ref 13.3–17.7)
IGG SERPL-MCNC: 361 MG/DL (ref 610–1616)
INR PPP: 0.94 (ref 0.85–1.15)
MAGNESIUM SERPL-MCNC: 1.8 MG/DL (ref 1.7–2.3)
MCH RBC QN AUTO: 27.5 PG (ref 26.5–33)
MCHC RBC AUTO-ENTMCNC: 31.7 G/DL (ref 31.5–36.5)
MCV RBC AUTO: 87 FL (ref 78–100)
PHOSPHATE SERPL-MCNC: 2.6 MG/DL (ref 2.5–4.5)
PLATELET # BLD AUTO: 208 10E3/UL (ref 150–450)
POTASSIUM SERPL-SCNC: 4.1 MMOL/L (ref 3.4–5.3)
PROT SERPL-MCNC: 6.7 G/DL (ref 6.4–8.3)
PROTHROMBIN TIME: 12.8 SECONDS (ref 11.8–14.8)
RBC # BLD AUTO: 5.46 10E6/UL (ref 4.4–5.9)
SODIUM SERPL-SCNC: 141 MMOL/L (ref 135–145)
SPECIMEN TYPE: NORMAL
TACROLIMUS BLD-MCNC: 10.1 UG/L (ref 5–15)
TME LAST DOSE: NORMAL H
TME LAST DOSE: NORMAL H
TSH SERPL DL<=0.005 MIU/L-ACNC: 1.98 UIU/ML (ref 0.3–4.2)
VIT D+METAB SERPL-MCNC: 40 NG/ML (ref 20–50)
WBC # BLD AUTO: 5 10E3/UL (ref 4–11)

## 2025-05-27 PROCEDURE — 84443 ASSAY THYROID STIM HORMONE: CPT | Performed by: PATHOLOGY

## 2025-05-27 PROCEDURE — 80053 COMPREHEN METABOLIC PANEL: CPT | Performed by: PATHOLOGY

## 2025-05-27 PROCEDURE — 84433 ASY THIOPURIN S-MTHYLTRNSFRS: CPT | Mod: 90 | Performed by: PATHOLOGY

## 2025-05-27 PROCEDURE — 87799 DETECT AGENT NOS DNA QUANT: CPT | Performed by: INTERNAL MEDICINE

## 2025-05-27 PROCEDURE — 71046 X-RAY EXAM CHEST 2 VIEWS: CPT | Performed by: RADIOLOGY

## 2025-05-27 PROCEDURE — 82784 ASSAY IGA/IGD/IGG/IGM EACH: CPT | Performed by: INTERNAL MEDICINE

## 2025-05-27 PROCEDURE — 94375 RESPIRATORY FLOW VOLUME LOOP: CPT | Performed by: INTERNAL MEDICINE

## 2025-05-27 PROCEDURE — 86316 IMMUNOASSAY TUMOR OTHER: CPT | Performed by: INTERNAL MEDICINE

## 2025-05-27 PROCEDURE — 99212 OFFICE O/P EST SF 10 MIN: CPT | Performed by: INTERNAL MEDICINE

## 2025-05-27 PROCEDURE — 85027 COMPLETE CBC AUTOMATED: CPT | Performed by: PATHOLOGY

## 2025-05-27 PROCEDURE — 80197 ASSAY OF TACROLIMUS: CPT | Performed by: INTERNAL MEDICINE

## 2025-05-27 PROCEDURE — 99000 SPECIMEN HANDLING OFFICE-LAB: CPT | Performed by: PATHOLOGY

## 2025-05-27 PROCEDURE — 84100 ASSAY OF PHOSPHORUS: CPT | Performed by: PATHOLOGY

## 2025-05-27 PROCEDURE — 82306 VITAMIN D 25 HYDROXY: CPT | Performed by: INTERNAL MEDICINE

## 2025-05-27 PROCEDURE — 85610 PROTHROMBIN TIME: CPT | Performed by: PATHOLOGY

## 2025-05-27 PROCEDURE — 83735 ASSAY OF MAGNESIUM: CPT | Performed by: PATHOLOGY

## 2025-05-27 PROCEDURE — 36415 COLL VENOUS BLD VENIPUNCTURE: CPT | Performed by: PATHOLOGY

## 2025-05-27 PROCEDURE — 99214 OFFICE O/P EST MOD 30 MIN: CPT | Mod: 25 | Performed by: INTERNAL MEDICINE

## 2025-05-27 RX ORDER — MEPERIDINE HYDROCHLORIDE 25 MG/ML
25 INJECTION INTRAMUSCULAR; INTRAVENOUS; SUBCUTANEOUS
OUTPATIENT
Start: 2025-05-27

## 2025-05-27 RX ORDER — HUMAN IMMUNOGLOBULIN G 20 G/200ML
0.5 LIQUID INTRAVENOUS ONCE
OUTPATIENT
Start: 2025-05-27 | End: 2025-05-27

## 2025-05-27 RX ORDER — EPINEPHRINE 1 MG/ML
0.3 INJECTION, SOLUTION, CONCENTRATE INTRAVENOUS EVERY 5 MIN PRN
OUTPATIENT
Start: 2025-05-27

## 2025-05-27 RX ORDER — AMLODIPINE BESYLATE 5 MG/1
5 TABLET ORAL DAILY
Qty: 30 TABLET | Refills: 11 | Status: SHIPPED | OUTPATIENT
Start: 2025-05-27

## 2025-05-27 RX ORDER — METHYLPREDNISOLONE SODIUM SUCCINATE 40 MG/ML
40 INJECTION INTRAMUSCULAR; INTRAVENOUS
Start: 2025-05-27

## 2025-05-27 RX ORDER — ALBUTEROL SULFATE 0.83 MG/ML
2.5 SOLUTION RESPIRATORY (INHALATION)
OUTPATIENT
Start: 2025-05-27

## 2025-05-27 RX ORDER — HEPARIN SODIUM (PORCINE) LOCK FLUSH IV SOLN 100 UNIT/ML 100 UNIT/ML
5 SOLUTION INTRAVENOUS
OUTPATIENT
Start: 2025-05-27

## 2025-05-27 RX ORDER — ALBUTEROL SULFATE 90 UG/1
1-2 INHALANT RESPIRATORY (INHALATION)
Start: 2025-05-27

## 2025-05-27 RX ORDER — PANTOPRAZOLE SODIUM 40 MG/1
TABLET, DELAYED RELEASE ORAL
Qty: 30 TABLET | Refills: 3 | Status: SHIPPED | OUTPATIENT
Start: 2025-05-27

## 2025-05-27 RX ORDER — HEPARIN SODIUM,PORCINE 10 UNIT/ML
5-20 VIAL (ML) INTRAVENOUS DAILY PRN
OUTPATIENT
Start: 2025-05-27

## 2025-05-27 RX ORDER — DIPHENHYDRAMINE HYDROCHLORIDE 50 MG/ML
50 INJECTION, SOLUTION INTRAMUSCULAR; INTRAVENOUS
Start: 2025-05-27

## 2025-05-27 RX ORDER — CARVEDILOL 6.25 MG/1
12.5 TABLET ORAL 2 TIMES DAILY WITH MEALS
Qty: 120 TABLET | Refills: 11 | Status: SHIPPED | OUTPATIENT
Start: 2025-05-27

## 2025-05-27 RX ORDER — DIPHENHYDRAMINE HYDROCHLORIDE 50 MG/ML
25 INJECTION, SOLUTION INTRAMUSCULAR; INTRAVENOUS
Start: 2025-05-27

## 2025-05-27 ASSESSMENT — PAIN SCALES - GENERAL: PAINLEVEL_OUTOF10: NO PAIN (0)

## 2025-05-27 NOTE — LETTER
"5/27/2025      Travon Cartwright  [address]  [facility] [address]      Dear Colleague,    Thank you for referring your patient, Travon Cartwright, to the [facility]. Please see a copy of my visit note below.    Transplant Coordinator Note    Reason for visit: Post lung transplant follow up visit   Coordinator: Present   Caregiver:  Sister Blount    Health concerns addressed today:  1. Respiratory - a little shortness of breath \"walking up and down hills\", with exertion. No coughing, wheezing, shortness of breath.   2. GI: appetite good. No BM. No acid reflux.   3.  ENT: no issues, no ear ringing - on dora nebs  4. BPs 140s consistently - new on carvedilol   5. BGs: AM: 140s, throughout the day - up to 170s  6. \"Tired often\" - NU qualifies for CPAP. Wake up in AM tired. Feels like sleep has improved recently- (\"sleeping longer and harder\").     Discuss NU with Dr. Tucker    Activity/rehab: Up ad jasmyne, walking 2-3 miles a few times/day.   Oxygen needs: Room air, NU suggest 2L of O2 needed  Pain management/RX: N/a  Diabetic management: Managed by endocrine.    Next Bronch due: April 2025  CMV status: D-/R+  Valcyte stopped: through POD #90  EBV status: D-/R+, monitor monthly  DVT/PE: N/A  Post op AFIB/follow up with EP: N/A  AC/asa: none  PJP prophylactic: Bactrim    COVID:  COVID-19 infection (yes/no, date of most recent positive test):   Status/instructions given about COVID-19 vaccine:     Pt Education: medications (use/dose/side effects), how/when to call coordinator, frequency of labs, s/s of infection/rejection, call prior to starting any new medications, lab/vital sign book    Health Maintenance:   Last colonoscopy:   Next colonoscopy due:   Dermatology:  Vaccinations this visit:     Labs, CXR, PFTs reviewed with patient  Medication record reviewed and reconciled  Questions and concerns addressed    Patient Instructions  1. Continue to hydrate with 60-70 oz fluids " daily.  2. Continue to exercise daily or most days of the week.  3. Follow up with your primary care provider for annual gender health maintenance procedures.  4. Follow up with colonoscopy schedule.  5. Follow up with annual dermatology visits.  6. It doesn't seem like the COVID vaccine is working well in lung transplant patients. A number of lung transplant patients have gotten sick with COVID even after receiving the vaccines. Based on our recent experience, it can be life-threatening to get COVID  even after being vaccinated. Please continue to act like you did not get the COVID vaccine - social distancing, wearing a mask, good hand hygiene, etc. If the people around you are vaccinated, it will help reduce the risk of you getting COVID. All members of your household should be vaccinated.  7.     Next transplant clinic appointment: 6/20 with CXR, labs and PFTs before visit with Dr. Garrett Goodman lab draw: pending tacrolimus level, otherwise per Joi CAMACHO printed at time of check out        [facility]  Pulmonary Transplant Follow Up Visit  May 27, 2025    Reason for Visit  Travon Cartwright is a 60 year old who is being seen for lung transplant follow up             Lung Tx Summary:     Transplants:  8/15/2024 (Lung), Postoperative day:  283    Travon Cartwright is a 60 year old who underwent BSLTx transplant on 8/15/2024 (Lung) for IPF, currently postoperative day:  283, course complicated by delirium, stenotrophomonas, ACR (A1B0 with elevated prospera in 10/2024). Other history notable for diabetes, hepatic steatosis, and essential tremor.          Interval Histories:   May 27, 2025  May 27, 2025  Blood glucoses are much better controlled, on trulicity. He is still feeling overall kind of tired which is new.   He is a very light sleeper. Feels tired upon waking. Doesn't snore. No sleep apnea signs that have been noticed. His NU did show about 11  minutes <88%. He doesn't know if he'll be able to sleep with oxygen on. He does feel like his sleep is improving. He's not waking up as much to urinate.   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 No fevers/chills, sweats. No n/v, constipation. Has one loose stool after morning meds. Tremors are improving. No coughing. No wheezing. No chest tightness. No acid reflux/heartburn.     The patient was seen and examined by Carey Tucker MD     A complete ROS was otherwise negative except as noted in the HPI.           Medications:   Meds were reviewed during this visit and updated below    Outpatient Encounter Medications as of 5/27/2025   Medication Sig Dispense Refill     amLODIPine (NORVASC) 5 MG tablet Take 1 tablet (5 mg) by mouth daily. 30 tablet 11     carvedilol (COREG) 6.25 MG tablet Take 2 tablets (12.5 mg) by mouth 2 times daily (with meals). 120 tablet 11     pantoprazole (PROTONIX) 40 MG EC tablet Start holding 5/27/2025 30 tablet 3     albuterol (PROVENTIL) (2.5 MG/3ML) 0.083% neb solution Take 1 vial (2.5 mg) by nebulization two times daily. Use prior to dora nebs 168 mL 0     azithromycin (ZITHROMAX) 250 MG tablet Take 1 tablet (250 mg) by mouth daily. 30 tablet 11     calcium carbonate-vitamin D (CALTRATE) 600-10 MG-MCG per tablet Take 1 tablet by mouth or Feeding Tube 2 times daily. 60 tablet 0     Continuous Glucose Sensor (DEXCOM G7 SENSOR) MISC Change every 10 days. 1 each 11     dulaglutide (TRULICITY) 0.75 MG/0.5ML pen Inject 0.75 mg subcutaneously every 7 days. 2 mL 2     empagliflozin (JARDIANCE) 10 MG TABS tablet Take 1 tablet (10 mg) by mouth daily. 90 tablet 1     fluticasone-salmeterol (ADVAIR) 250-50 MCG/ACT inhaler Inhale 1 puff into the lungs 2 times daily. 60 each 11     insulin aspart (NOVOLOG PEN) 100 UNIT/ML pen Take 13 units prior to meals, and add sliding scale per instructions.  Up to  60 units daily (Patient not taking: Reported on 5/9/2025) 60 mL 0     Insulin Lispro-aabc, 1 U Dial, (CASSANDRACHRISS BURNETTSTARLAKALANI) 100 UNIT/ML SOPN Inject 50 Units subcutaneously daily. Use UP TO 50 units daily. Take as directed with sliding scale. 15 mL 3     insulin pen needle (32G X 4 MM) 32G X 4 MM miscellaneous Use pen needles daily or as directed. 100 each 3     magnesium (HIGH ABSORPTION MAGNESIUM) 100 MG TABS Take 400 mg by mouth 3 times daily. 360 tablet 11     montelukast (SINGULAIR) 10 MG tablet Take 1 tablet (10 mg) by mouth at bedtime. 30 tablet 11     multivitamin, therapeutic (THERA-VIT) TABS tablet Take 1 tablet by mouth daily. 30 tablet 0     mycophenolic acid (GENERIC EQUIVALENT) 180 MG EC tablet Take 5 tablets (900 mg) by mouth 2 times daily. 300 tablet 11     predniSONE (DELTASONE) 5 MG tablet Take 1 tablet (5 mg) by mouth daily. 30 tablet 11     psyllium (METAMUCIL/KONSYL) 58.6 % powder Take 1 teaspoonful by mouth daily. (Patient not taking: Reported on 5/9/2025)       rosuvastatin (CRESTOR) 10 MG tablet Take 1 tablet (10 mg) by mouth daily. 30 tablet 11     SEMGLEE, YFGN, 100 UNIT/ML SOPN INJECT 46 UNITS UNDER THE SKIN EVERY MORNING 15 mL 0     SEMGLEE, YFGN, 100 UNIT/ML SOPN        sodium zirconium cyclosilicate (LOKELMA) 10 g PACK packet Take 1 packet (10 g) by mouth daily as needed. (Patient not taking: Reported on 5/9/2025) 10 packet 1     sulfamethoxazole-trimethoprim (BACTRIM) 400-80 MG tablet Take 1 tablet by mouth or NG Tube daily. 30 tablet 11     tacrolimus (GENERIC EQUIVALENT) 1 MG capsule Take 2 capsules (2 mg) by mouth 2 times daily. 360 capsule 3     tobramycin, PF, (MARK) 300 MG/5ML neb solution Take 5 mLs (300 mg) by nebulization 2 times daily. Use albuterol neb prior to mark neb 280 mL 0     [DISCONTINUED] carvedilol (COREG) 6.25 MG tablet Take 1 tablet (6.25 mg) by mouth 2 times daily (with meals). 60 tablet 3     [DISCONTINUED] levalbuterol (XOPENEX) 1.25 MG/3ML neb solution Take 3 mLs  (1.25 mg) by nebulization 4 times daily as needed for shortness of breath or wheezing. Use prior to Mucomyst neb. (Patient not taking: Reported on 5/9/2025) 300 mL 0     [DISCONTINUED] pantoprazole (PROTONIX) 40 MG EC tablet Take 1 tablet (40 mg) by mouth every morning (before breakfast). 30 tablet 3     Facility-Administered Encounter Medications as of 5/27/2025   Medication Dose Route Frequency Provider Last Rate Last Admin     sodium chloride 0.9% BOLUS 1,000 mL  1,000 mL Intravenous Once Flower Medina PA-C         sodium chloride bacteriostatic 0.9 % flush 9 mL  9 mL Intravenous Once NICOLA Pearce MD                   Allergies:     No Known Allergies         Past Medical and Past Surgical History:     Past Medical History:   Diagnosis Date     Administration of long-term prophylactic antibiotics 08/26/2024     Diabetes (H)      Hepatic steatosis 2017    Noted on ultrasound     Hypomagnesemia 08/26/2024     Immunosuppression 08/26/2024     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     S/P lung transplant (H) 08/15/2024     Tremor 05/23/2024       Past Surgical History:   Procedure Laterality Date     BRONCHOSCOPY  08/16/2024     BRONCHOSCOPY (RIGID OR FLEXIBLE), DIAGNOSTIC N/A 9/26/2024    Procedure: BRONCHOSCOPY, WITH BRONCHOALVEOLAR LAVAGE AND BIOPSIES;  Surgeon: Oneida Silevr MD;  Location: [facility]     BRONCHOSCOPY (RIGID OR FLEXIBLE), DIAGNOSTIC N/A 11/5/2024    Procedure: BRONCHOSCOPY, WITH BRONCHOALVEOLAR LAVAGE AND BIOPSIES;  Surgeon: Tera Jiménez MD;  Location: [facility]     BRONCHOSCOPY (RIGID OR FLEXIBLE), DIAGNOSTIC N/A 1/16/2025    Procedure: BRONCHOSCOPY, WITH BRONCHOALVEOLAR LAVAGE AND BIOPSIES;  Surgeon: Florian Steinberg MD;  Location: [facility]     BRONCHOSCOPY (RIGID OR FLEXIBLE), DIAGNOSTIC N/A 4/17/2025    Procedure: BRONCHOSCOPY, WITH BRONCHOALVEOLAR LAVAGE and biopsy;  Surgeon: Jing De La Garza MD;  [facility]     BRONCHOSCOPY FLEXIBLE AND RIGID N/A 8/27/2024     Procedure: Bronchoscopy Inspection;  Surgeon: Oneida Silver MD;  Location:  GI     COLONOSCOPY       CV CORONARY ANGIOGRAM N/A 06/21/2024    Procedure: Coronary Angiogram;  Surgeon: Gabriel Julian MD;  Location:  HEART CARDIAC CATH LAB     CV RIGHT HEART CATH MEASUREMENTS RECORDED N/A 06/21/2024    Procedure: Right Heart Catheterization;  Surgeon: Gabriel Julian MD;  Location:  HEART CARDIAC CATH LAB     PICC DOUBLE LUMEN PLACEMENT Right 08/20/2024    47-3cm, Basilic     TRANSPLANT LUNG RECIPIENT SINGLE X2 Bilateral 08/15/2024    Procedure: Clamshell Incision, On Central Venoarterial Extracorporeal Membranous Oxygenation, Bilateral Lung Transplant Recipient, Left atrial appendage ligation, Intraoperative Flexible Bronchoscopy by Anesthesia;  Surgeon: Mulvihill, Michael, MD;  Location:  OR             Social History:   Social Updates:          Rejection and Infection History     Rejection Hx    DATE INDICATION  PATH BAL/MICRO TREATMENT                Infectious Hx           Exam:     BP (!) 161/98   Pulse 88   Temp 97.6  F (36.4  C) (Oral)   Wt 83.9 kg (185 lb)   SpO2 97%   BMI 26.36 kg/m    Body mass index is 26.36 kg/m .      GENERAL APPEARANCE: Well developed, well nourished, alert, and in no apparent distress.  EYES: PERRL, EOMI  HENT: Nasal mucosa with no edema and no hyperemia. No nasal polyps.  EARS: Canals clear, TMs normal  MOUTH: Oral mucosa is moist, without any lesions, no tonsillar enlargement, no oropharyngeal exudate.  NECK: supple, no masses, no thyromegaly.  LYMPHATICS: No significant axillary, cervical, or supraclavicular nodes.  RESP: normal percussion, good air flow throughout.  No crackles. No rhonchi. No wheezes.  CV: Normal S1, S2, regular rhythm, normal rate. No murmur.  No rub. No gallop. No LE edema.   ABDOMEN:  Bowel sounds normal, soft, nontender, no HSM or masses.   MS: extremities normal. No clubbing. No cyanosis.  SKIN: no rash on limited exam  NEURO: Mentation  intact, speech normal, normal strength and tone, normal gait and stance, no significant tremor at rest  PSYCH: mentation appears normal. and affect normal/bright         Data:     Results:  Recent Results (from the past week)   Comprehensive metabolic panel    Collection Time: 05/27/25  8:24 AM   Result Value Ref Range    Sodium 141 135 - 145 mmol/L    Potassium 4.1 3.4 - 5.3 mmol/L    Carbon Dioxide (CO2) 24 22 - 29 mmol/L    Anion Gap 12 7 - 15 mmol/L    Urea Nitrogen 14.5 8.0 - 23.0 mg/dL    Creatinine 1.22 (H) 0.67 - 1.17 mg/dL    GFR Estimate 67 >60 mL/min/1.73m2    Calcium 9.7 8.8 - 10.4 mg/dL    Chloride 105 98 - 107 mmol/L    Glucose 124 (H) 70 - 99 mg/dL    Alkaline Phosphatase 75 40 - 150 U/L    AST 30 0 - 45 U/L    ALT 42 0 - 70 U/L    Protein Total 6.7 6.4 - 8.3 g/dL    Albumin 4.6 3.5 - 5.2 g/dL    Bilirubin Total 0.4 <=1.2 mg/dL   CBC with platelets    Collection Time: 05/27/25  8:24 AM   Result Value Ref Range    WBC Count 5.0 4.0 - 11.0 10e3/uL    RBC Count 5.46 4.40 - 5.90 10e6/uL    Hemoglobin 15.0 13.3 - 17.7 g/dL    Hematocrit 47.3 40.0 - 53.0 %    MCV 87 78 - 100 fL    MCH 27.5 26.5 - 33.0 pg    MCHC 31.7 31.5 - 36.5 g/dL    RDW 14.5 10.0 - 15.0 %    Platelet Count 208 150 - 450 10e3/uL   Magnesium    Collection Time: 05/27/25  8:24 AM   Result Value Ref Range    Magnesium 1.8 1.7 - 2.3 mg/dL   INR    Collection Time: 05/27/25  8:24 AM   Result Value Ref Range    INR 0.94 0.85 - 1.15    PT 12.8 11.8 - 14.8 Seconds   IgG    Collection Time: 05/27/25  8:24 AM   Result Value Ref Range    Immunoglobulin G 361 (L) 610 - 1,616 mg/dL   Phosphorus    Collection Time: 05/27/25  8:24 AM   Result Value Ref Range    Phosphorus 2.6 2.5 - 4.5 mg/dL   General PFT Lab (Please always keep checked)    Collection Time: 05/27/25  9:00 AM   Result Value Ref Range    FVC-Pred 4.23 L    FVC-Pre 3.51 L    FVC-%Pred-Pre 83 %    FEV1-Pre 2.94 L    FEV1-%Pred-Pre 89 %    FEV1FVC-Pred 78 %    FEV1FVC-Pre 84 %     FEFMax-Pred 9.10 L/sec    FEFMax-Pre 7.94 L/sec    FEFMax-%Pred-Pre 87 %    FEF2575-Pred 2.72 L/sec    FEF2575-Pre 3.48 L/sec    MQT8491-%Pred-Pre 127 %    ExpTime-Pre 4.82 sec    FIFMax-Pre 6.97 L/sec    FEV1FEV6-Pred 79 %    FEV1FEV6-Pre 84 %           Date Place TLC (%) FVC (%) FEV1 (%) FEV1/FVC DLCO (%) Note                                                               Results as noted above.    May 27, 2025    Spirometry interpretation:  The spirometry is normal.  When compared to 4/16/25, the FEV1 and FVC have increased.  The testing meets ATS criteria.  Still establishing baseline  Best PFTs since transplant              Assessment and Plan:     Transplants:    Travon Cartwright is a 60 year old who underwent BSLTx transplant on 8/15/2024 (Lung), currently postoperative day:  283, course complicated by delirium, stenotrophomonas, ACR (A1B0 with elevated prospera in 10/2024). Other history notable for diabetes, hepatic steatosis, and essential tremor.     PULMONARY    Transplant:       Allograft Function: Overnight oximetry done 5/14/25 with SPO2 <88% for 11 minutes. Qualifies for 2L NC nocturnal oxygen, can also consider PSG to evaluate for LEONILA.  Spirometry is the best since transplant.   Oxygen: Qualifies for oxygen today, will need 2 LPM nocturnally. Spoke to patient about oxygen and my nurse do further education.  Imaging: CXR clear without new infiltrates or effusions   TOMAS: Noted on imaging, s/p solumedrol pulse and taper with CT on 12/17/24 demonstrating air trapping iwht A1B0 and elevated prospera in 10/2024. He felt better post steroids.   CLAD therapy: Azithro, advair, and montelukast  AW: R anastomosis stenosis.   Prospera: 4/16/25 0.70   Bronch: Bronch done in 4/17/25, A0B0C0      Immunosuppression:  - Tacrolimus goal 8-12  - Myfortic 900 mg bid   - Prednisone 5 mg daily    Prophylaxis:   PJP:  Bactrim (also for nocardia prophy)  CMV: D-/R+   EBV: D-/R+     LLL Nodule: 8x8 mm nodule in LLL on CT  9/24/24.   - Repeat 6/17/25    Hypogammaglobulinemia: IgG 361.  - Will need to be replaced    Non HLA+ Domingo: cfDNA came back within normal limits, but nonHLA domingo endothelial cell and AT1R were positive from 2/2025 but also positive even prior to lung transplant (8/14/24). Given the normal prospera elected to do the following:  - Increased myfortic to 900 (tolerating well)   - Reassess nonHLA today, sooner if PFTs start to fall or prospera goes up and at that time may consider toci/IVIG    Hypertension:   - Carvedilol 6.25 will increase to 12.5 mg bid  - Start amlodipine 5 mg qhs    CATHERINE: Baseline Cr around 0.9, now up to 1.22.     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   - Continue AFB cultures on bronch washes  - Subsequent AFBs have been negative    Tremor: Improving     Hepatic Steatosis  - Following LFTs    DM II  - Following up with endo    HLD  - Statin        Maintenance:  Derm Exam: Due Annually, needs to see  Colon Ca Screening: due in 2026  DEXA:   Imms: RSV needed        CHANGES TODAY    - TSH, EBV, Vitamin D   - Sleep medicine referral  - Trial off the PPI for now   - Increase the carvedilol to 12.5 mg bid   - Start amlodipine 5 mg at bedtime   - Increase fluid intake   - IgG repletion  - nonHLA domingo resent today, not starting toci   - 1 month follow up as scheduled, next bronch at 1 year ceasar     The longitudinal plan of care for lung transplant and complications of high risk medication management was addressed during this visit. Due to the added complexity in care, I will continue to support this patient in the subsequent management of this condition(s) and with the ongoing continuity of care of this condition     Approximately 35 minutes of non face-to-face time were spent in review of the patient's medical record on 5/26/25.  This included review of previous: clinic visits, hospital records, lab results, imaging studies, and procedural documentation.  The findings from this review are  summarized in the above note.    I personally spent 40 minutes in documentation, the interview and exam, and review of the chart/labs/imaging on May 27, 2025 not including time spent interpreting spirometry.         Carey Tucker MD  [facility]   Pulmonary Transplant   Post Transplant Coordinator:   Fax: [phone]  Ph: [phone]        Again, thank you for allowing me to participate in the care of your patient.        Sincerely,        Carey Tucker MD    Electronically signed

## 2025-05-27 NOTE — RESULT ENCOUNTER NOTE
Hi Juan J,   Your tacrolimus level was 10.1 at 12 hours on 5/27/25 which is within your goal range of 8-12. No dose change at this time. Please call the transplant office ([phone]) with any questions.   Thanks,   Joi

## 2025-05-27 NOTE — PROGRESS NOTES
IgG 361 on 5/27/25. Plan to give IVIG per Dr. Tucker. Order faxed to patient's local infusion center.

## 2025-05-28 ENCOUNTER — TELEPHONE (OUTPATIENT)
Dept: DERMATOLOGY | Facility: CLINIC | Age: 60
End: 2025-05-28
Payer: COMMERCIAL

## 2025-05-28 ENCOUNTER — PATIENT OUTREACH (OUTPATIENT)
Dept: CARE COORDINATION | Facility: CLINIC | Age: 60
End: 2025-05-28
Payer: COMMERCIAL

## 2025-05-28 DIAGNOSIS — Z94.2 S/P LUNG TRANSPLANT (H): ICD-10-CM

## 2025-05-28 NOTE — TELEPHONE ENCOUNTER
5/28 Left Voicemail (1st Attempt) and Sent Mychart (1st Attempt) for the patient to call back and schedule the following:    Appointment type: New Dermatology  Provider: Rafiq  Return date: 07/11/25  Specialty phone number: [phone]  Additional appointment(s) needed: na  Additonal Notes: skin check per Luanne

## 2025-06-01 LAB — TPMT BLD-CCNC: 28 U/ML

## 2025-06-02 LAB — PROSPERA TRANSPLANT MONITORING: 0.89 %

## 2025-06-11 DIAGNOSIS — R73.9 STEROID-INDUCED HYPERGLYCEMIA: ICD-10-CM

## 2025-06-11 DIAGNOSIS — E11.9 TYPE 2 DIABETES MELLITUS WITHOUT COMPLICATION, WITH LONG-TERM CURRENT USE OF INSULIN (H): ICD-10-CM

## 2025-06-11 DIAGNOSIS — T38.0X5A STEROID-INDUCED HYPERGLYCEMIA: ICD-10-CM

## 2025-06-11 DIAGNOSIS — Z79.4 TYPE 2 DIABETES MELLITUS WITHOUT COMPLICATION, WITH LONG-TERM CURRENT USE OF INSULIN (H): ICD-10-CM

## 2025-06-12 RX ORDER — INSULIN GLARGINE-YFGN 100 [IU]/ML
INJECTION, SOLUTION SUBCUTANEOUS
Qty: 45 ML | Refills: 0 | Status: SHIPPED | OUTPATIENT
Start: 2025-06-12

## 2025-06-12 NOTE — TELEPHONE ENCOUNTER
Requested Prescriptions   Pending Prescriptions Disp Refills    SEMGLEE (YFGN) 100 UNIT/ML SOPN [Pharmacy Med Name: SEMGLEE (YFGN) 100UNIT/ML SOPN] 15 mL 0     Sig: INJECT 46 UNITS UNDER THE SKIN EVERY MORNING       Insulin Protocol Failed - 6/12/2025 10:15 AM        Failed - Chart review required     Review Chart.    Do not approve if insulin is used in a pump.  Instead, direct refill request to the patient's endocrinologist.  If the patient doesn't have an endocrinologist, then send the refill to the patient's PCP for review            Passed - HgbA1C in past 3 or 6 months     If HgbA1C is 8 or greater, it needs to be on file within the past 3 months.  If less than 8, must be on file within the past 6 months.     Recent Labs   Lab Test 04/16/25  0916   A1C 8.8*             Passed - Medication is active on med list and the sig matches. RN to manually verify dose and sig if red X/fail.     If the protocol passes (green check), you do not need to verify med dose and sig.    A prescription matches if they are the same clinical intention.    For Example: once daily and every morning are the same.    The protocol can not identify upper and lower case letters as matching and will fail.     For Example: Take 1 tablet (50 mg) by mouth daily     TAKE 1 TABLET (50 MG) BY MOUTH DAILY    For all fails (red x), verify dose and sig.    If the refill does match what is on file, the RN can still proceed to approve the refill request.       If they do not match, route to the appropriate provider.             Passed - Recent (6 month) or future (90 days) visit with the authorizing provider's specialty (provided they have been seen in the past 9 months)     The patient must have completed an in-person or virtual visit within the past 6 months or has a future visit scheduled within the next 90 days with the authorizing provider s specialty.  Urgent care and e-visits do not quality as an office visit for this protocol.          Passed  - Medication indicated for associated diagnosis     Medication is associated with one or more of the following diagnoses:   - Type 1 diabetes mellitus  - Type 2 diabetes mellitus  - Diabetic nephropathy; Prophylaxis  - Neuropathy due to diabetes mellitus; Prophylaxis  - Retinopathy due to diabetes mellitus; Prophylaxis  - Diabetes mellitus associated with cystic fibrosis  - Disorder of cardiovascular system; Prophylaxis - Type 1 diabetes mellitus   - Disorder of cardiovascular system; Prophylaxis - Type 2 diabetes mellitus            Passed - Patient is 18 years of age or older

## 2025-06-16 ENCOUNTER — TELEPHONE (OUTPATIENT)
Dept: ENDOCRINOLOGY | Facility: CLINIC | Age: 60
End: 2025-06-16
Payer: COMMERCIAL

## 2025-06-16 DIAGNOSIS — Z79.4 TYPE 2 DIABETES MELLITUS WITHOUT COMPLICATION, WITH LONG-TERM CURRENT USE OF INSULIN (H): Primary | ICD-10-CM

## 2025-06-16 DIAGNOSIS — E11.9 TYPE 2 DIABETES MELLITUS WITHOUT COMPLICATION, WITH LONG-TERM CURRENT USE OF INSULIN (H): Primary | ICD-10-CM

## 2025-06-16 NOTE — TELEPHONE ENCOUNTER
[facility] Call Center    Phone Message    May a detailed message be left on voicemail: yes     Reason for Call: Medication Question or concern regarding medication   Prescription Clarification  Name of Medication:   Insulin Glargine-yfgn (SEMGLEE, YFSTEPHON,) 100 UNIT/ML SOPN [[id number]]    Prescribing Provider: Melba   Pharmacy: [facility] - [place], [place] - [address]   What on the order needs clarification? Rx is on backorders.  Can switch to Basaglar?   [phone]        Action Taken: Other: endo    Travel Screening: Not Applicable     Date of Service:

## 2025-06-20 ENCOUNTER — ANCILLARY PROCEDURE (OUTPATIENT)
Dept: CT IMAGING | Facility: CLINIC | Age: 60
End: 2025-06-20
Attending: INTERNAL MEDICINE
Payer: COMMERCIAL

## 2025-06-20 DIAGNOSIS — Z94.2 S/P LUNG TRANSPLANT (H): ICD-10-CM

## 2025-06-20 PROCEDURE — 99000 SPECIMEN HANDLING OFFICE-LAB: CPT | Performed by: PATHOLOGY

## 2025-06-20 PROCEDURE — 71250 CT THORAX DX C-: CPT | Mod: GC | Performed by: RADIOLOGY

## 2025-06-20 PROCEDURE — 87799 DETECT AGENT NOS DNA QUANT: CPT | Performed by: INTERNAL MEDICINE

## 2025-06-24 ENCOUNTER — RESULTS FOLLOW-UP (OUTPATIENT)
Dept: TRANSPLANT | Facility: CLINIC | Age: 60
End: 2025-06-24

## 2025-06-24 DIAGNOSIS — Z94.2 S/P LUNG TRANSPLANT (H): ICD-10-CM

## 2025-06-27 ENCOUNTER — LAB REQUISITION (OUTPATIENT)
Dept: LAB | Facility: CLINIC | Age: 60
End: 2025-06-27
Payer: COMMERCIAL

## 2025-07-02 ENCOUNTER — RESULTS FOLLOW-UP (OUTPATIENT)
Dept: TRANSPLANT | Facility: CLINIC | Age: 60
End: 2025-07-02

## 2025-07-02 DIAGNOSIS — Z94.2 S/P LUNG TRANSPLANT (H): ICD-10-CM

## 2025-07-02 LAB
SCANNED LAB RESULT: NORMAL

## 2025-07-08 DIAGNOSIS — E11.9 TYPE 2 DIABETES MELLITUS WITHOUT COMPLICATION, WITH LONG-TERM CURRENT USE OF INSULIN (H): ICD-10-CM

## 2025-07-08 DIAGNOSIS — Z79.4 TYPE 2 DIABETES MELLITUS WITHOUT COMPLICATION, WITH LONG-TERM CURRENT USE OF INSULIN (H): ICD-10-CM

## 2025-07-08 NOTE — PROGRESS NOTES
Transplant Coordinator Note     Reason for visit: Post lung transplant follow up visit   Coordinator: Present   Caregiver:  Sister Blount     Health concerns addressed today:  1. Respiratory:      Activity/rehab: Up ad jasmyne, walking 2-3 miles a few times/day.   Oxygen needs: Room air, NU suggest 2L of O2 needed  Pain management/RX: N/a  Diabetic management: Managed by endocrine.    Next Bronch due: April 2025  CMV status: D-/R+  Valcyte stopped: through POD #90  EBV status: D-/R+, monitor monthly  DVT/PE: N/A  Post op AFIB/follow up with EP: N/A  AC/asa: none  PJP prophylactic: Bactrim    COVID:  COVID-19 infection (yes/no, date of most recent positive test):   Status/instructions given about COVID-19 vaccine:     Pt Education: medications (use/dose/side effects), how/when to call coordinator, frequency of labs, s/s of infection/rejection, call prior to starting any new medications, lab/vital sign book    Health Maintenance:   Last colonoscopy:   Next colonoscopy due:   Dermatology:  Vaccinations this visit:      Labs, CXR, PFTs reviewed with patient  Medication record reviewed and reconciled  Questions and concerns addressed     Patient Instructions  1. Continue to hydrate with 60-70 oz fluids daily.  2. Continue to exercise daily or most days of the week.  3. Follow up with your primary care provider for annual gender health maintenance procedures.  4. Follow up with colonoscopy schedule.  5. Follow up with annual dermatology visits.  6. It doesn't seem like the COVID vaccine is working well in lung transplant patients. A number of lung transplant patients have gotten sick with COVID even after receiving the vaccines. Based on our recent experience, it can be life-threatening to get COVID even after being vaccinated. Please continue to act like you did not get the COVID vaccine - social distancing, wearing a mask, good hand hygiene, etc. If the people around you are vaccinated, it will help reduce the risk of  you getting COVID. All members of your household should be vaccinated.  7. Let Joi know if BP is running consistently >140/90s.    Next transplant clinic appointment: 8/19 with CXR, labs and PFTs before appt   Next lab draw: pending tacrolimus level, otherwise per Joi     AVS printed at time of check out

## 2025-07-10 PROCEDURE — 99358 PROLONG SERVICE W/O CONTACT: CPT | Performed by: STUDENT IN AN ORGANIZED HEALTH CARE EDUCATION/TRAINING PROGRAM

## 2025-07-10 NOTE — PROGRESS NOTES
[facility]  Pulmonary Transplant Follow Up Visit  Jul 11, 2025    Reason for Visit  Travon Cartwright is a 60 year old who is being seen for lung transplant follow up         Lung Tx Summary:     Transplants:  8/15/2024 (Lung), Postoperative day:  331    Travon Cartwright is a 60 year old who underwent BSLTx transplant on 8/15/2024 (Lung) for IPF, currently postoperative day:  331, course complicated by delirium, stenotrophomonas, ACR (A1B0 with elevated prospera in 10/2024). Other history notable for diabetes, hepatic steatosis, and essential tremor.        Interval Histories:   Jul 11, 2025  Breathing stable, no cough or chest pain. Could not sleep when had the nasal cannula oxygen, too much noise. No sinus issues. No fevers, chills, night sweats.   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 BPs were 130-140s, now 120-140s. Tremor is better than used to be. Blood sugars better on Trulicity. About 70% in range. Water intake is, 24 oz x2. Appetite is good, don't eat enough. No nausea, vomiting, diarrhea, or constipation. Occasional reflux depends on what eats.       6/20/25  BPs still elevated in 140s/90s. Still gets a little breathless with the resort hill.   Not using the oxygen overnight, he did try it for a week and felt like he was worse. Now at goal with BGs around 70% of the time compared to 40% of the time. Sleeping much better overall, and not urinating as well. Taking in a lot of water, at least 100 oz of water a day. No n/v/d or heartburn. No fevers/chills/sweats, no constipation. No wheezing, chest tightness or cough. Still has one large loose stool in the morning. Wondering if he would go to [place], [place] at end of November and usually goes for 4 months but is willing to come back. Tremors are improved. Shots are still hard to pour (tends bar). No alcohol intake. Fatigue is better overall. Weight has been  stable/maintained. [place] needs to be reordered in [place]. 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     A complete ROS was otherwise negative except as noted in the HPI.         Medications:   Meds were reviewed during this visit and updated below    Outpatient Encounter Medications as of 7/11/2025   Medication Sig Dispense Refill    albuterol (PROVENTIL) (2.5 MG/3ML) 0.083% neb solution Take 1 vial (2.5 mg) by nebulization two times daily. Use prior to dora nebs 168 mL 0    amLODIPine (NORVASC) 5 MG tablet Take 1 tablet (5 mg) by mouth daily. 60 tablet 11    azithromycin (ZITHROMAX) 250 MG tablet Take 1 tablet (250 mg) by mouth daily. 30 tablet 11    calcium carbonate-vitamin D (CALTRATE) 600-10 MG-MCG per tablet Take 1 tablet by mouth or Feeding Tube 2 times daily. 60 tablet 0    carvedilol (COREG) 6.25 MG tablet Take 2 tablets (12.5 mg) by mouth 2 times daily (with meals). 120 tablet 11    Continuous Glucose Sensor (DEXCOM G7 SENSOR) MISC Change every 10 days. 1 each 11    dulaglutide (TRULICITY) 0.75 MG/0.5ML pen Inject 0.75 mg subcutaneously every 7 days. 2 mL 2    empagliflozin (JARDIANCE) 10 MG TABS tablet Take 1 tablet (10 mg) by mouth daily. 90 tablet 1    fluticasone-salmeterol (ADVAIR) 250-50 MCG/ACT inhaler Inhale 1 puff into the lungs 2 times daily. 60 each 11    insulin aspart (NOVOLOG PEN) 100 UNIT/ML pen Take 13 units prior to meals, and add sliding scale per instructions.  Up to 60 units daily 60 mL 0    insulin glargine (BASAGLAR PEN) 100 UNIT/ML pen Inject 46 Units subcutaneously every morning. 45 mL 0    Insulin Lispro-aabc, 1 U Dial, (ROBERTA RIVERAPEN) 100 UNIT/ML SOPN Inject 50 Units subcutaneously daily. Use UP TO 50 units daily. Take as directed with sliding scale. 15 mL 3    insulin pen needle (32G X 4 MM) 32G X 4 MM miscellaneous Use pen needles daily or as directed. 100 each 3    magnesium (HIGH ABSORPTION MAGNESIUM) 100 MG TABS Take 400 mg  by mouth 3 times daily. 360 tablet 11    montelukast (SINGULAIR) 10 MG tablet Take 1 tablet (10 mg) by mouth at bedtime. 30 tablet 11    multivitamin, therapeutic (THERA-VIT) TABS tablet Take 1 tablet by mouth daily. 30 tablet 0    mycophenolic acid (GENERIC EQUIVALENT) 180 MG EC tablet Take 5 tablets (900 mg) by mouth 2 times daily. 300 tablet 11    predniSONE (DELTASONE) 5 MG tablet Take 1 tablet (5 mg) by mouth daily. 30 tablet 11    rosuvastatin (CRESTOR) 10 MG tablet Take 1 tablet (10 mg) by mouth daily. 30 tablet 11    SEMGLEE, YFGN, 100 UNIT/ML SOPN       sodium zirconium cyclosilicate (LOKELMA) 10 g PACK packet Take 1 packet (10 g) by mouth daily as needed. (Patient not taking: Reported on 5/9/2025) 10 packet 1    sulfamethoxazole-trimethoprim (BACTRIM) 400-80 MG tablet Take 1 tablet by mouth or NG Tube daily. 30 tablet 11    tacrolimus (GENERIC EQUIVALENT) 1 MG capsule Take 2 capsules (2 mg) by mouth 2 times daily. 360 capsule 3    tobramycin, PF, (MARK) 300 MG/5ML neb solution Take 5 mLs (300 mg) by nebulization 2 times daily. Use albuterol neb prior to mark neb 280 mL 0    [DISCONTINUED] insulin aspart (NOVOLOG PEN) 100 UNIT/ML pen Take 13 units prior to meals, and add sliding scale per instructions.  Up to 60 units daily (Patient not taking: Reported on 5/9/2025) 60 mL 0     Facility-Administered Encounter Medications as of 7/11/2025   Medication Dose Route Frequency Provider Last Rate Last Admin    sodium chloride 0.9% BOLUS 1,000 mL  1,000 mL Intravenous Once Flower Medina PA-C        sodium chloride bacteriostatic 0.9 % flush 9 mL  9 mL Intravenous Once NICOLA Pearce MD               Allergies:     No Known Allergies         Past Medical and Past Surgical History:     Past Medical History:   Diagnosis Date    Administration of long-term prophylactic antibiotics 08/26/2024    Diabetes (H)     Hepatic steatosis 2017    Noted on ultrasound    Hypomagnesemia 08/26/2024    Immunosuppression  08/26/2024    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    S/P lung transplant (H) 08/15/2024    Tremor 05/23/2024     Past Surgical History:   Procedure Laterality Date    BRONCHOSCOPY  08/16/2024    BRONCHOSCOPY (RIGID OR FLEXIBLE), DIAGNOSTIC N/A 9/26/2024    Procedure: BRONCHOSCOPY, WITH BRONCHOALVEOLAR LAVAGE AND BIOPSIES;  Surgeon: Oneida Silver MD;  Location:  GI    BRONCHOSCOPY (RIGID OR FLEXIBLE), DIAGNOSTIC N/A 11/5/2024    Procedure: BRONCHOSCOPY, WITH BRONCHOALVEOLAR LAVAGE AND BIOPSIES;  Surgeon: Tera Jiménez MD;  Location:  GI    BRONCHOSCOPY (RIGID OR FLEXIBLE), DIAGNOSTIC N/A 1/16/2025    Procedure: BRONCHOSCOPY, WITH BRONCHOALVEOLAR LAVAGE AND BIOPSIES;  Surgeon: Florian Steinberg MD;  Location:  GI    BRONCHOSCOPY (RIGID OR FLEXIBLE), DIAGNOSTIC N/A 4/17/2025    Procedure: BRONCHOSCOPY, WITH BRONCHOALVEOLAR LAVAGE and biopsy;  Surgeon: Jing De La Garza MD;  Location:  GI    BRONCHOSCOPY FLEXIBLE AND RIGID N/A 8/27/2024    Procedure: Bronchoscopy Inspection;  Surgeon: Oneida Silver MD;  Location:  GI    COLONOSCOPY      CV CORONARY ANGIOGRAM N/A 06/21/2024    Procedure: Coronary Angiogram;  Surgeon: Gabriel Julian MD;  Location:  HEART CARDIAC CATH LAB    CV RIGHT HEART CATH MEASUREMENTS RECORDED N/A 06/21/2024    Procedure: Right Heart Catheterization;  Surgeon: Gabriel Julian MD;  Location:  HEART CARDIAC CATH LAB    PICC DOUBLE LUMEN PLACEMENT Right 08/20/2024    47-3cm, Basilic    TRANSPLANT LUNG RECIPIENT SINGLE X2 Bilateral 08/15/2024    Procedure: Clamshell Incision, On Central Venoarterial Extracorporeal Membranous Oxygenation, Bilateral Lung Transplant Recipient, Left atrial appendage ligation, Intraoperative Flexible Bronchoscopy by Anesthesia;  Surgeon: Mulvihill, Michael, MD;  Location:  OR         Social History:   Social Updates:        Rejection and Infection History     Rejection Hx    DATE INDICATION  PATH BAL/MICRO TREATMENT            Non  HLA Amparo:  5/27/25: AT1R 7U/mL Decreasing from previous       Exam:     /80   Pulse 94   Wt 82.4 kg (181 lb 9.6 oz)   SpO2 97%   BMI 26.06 kg/m    Body mass index is 26.06 kg/m .      GENERAL APPEARANCE: Well developed, well nourished, alert, and in no apparent distress.  EYES: PERRL, EOMI  HENT: Nasal mucosa with no edema and no hyperemia. No nasal polyps.  EARS: Canals clear, TMs normal  MOUTH: Oral mucosa is moist, without any lesions, no tonsillar enlargement, no oropharyngeal exudate.  NECK: supple, no masses, no thyromegaly.  LYMPHATICS: No significant cervical, or supraclavicular nodes.  RESP: normal percussion, good air flow throughout.  No crackles. No rhonchi. No wheezes.  CV: Normal S1, S2, regular rhythm, normal rate. No murmur.  No rub. No gallop. No LE edema.   ABDOMEN:  Bowel sounds normal, soft, nontender, no HSM or masses.   MS: extremities normal. No clubbing. No cyanosis.  SKIN: no rash on limited exam  NEURO: Mentation intact, speech normal, normal strength and tone, normal gait and stance, no significant tremor at rest  PSYCH: mentation appears normal. and affect normal/bright         Data:     Results:   Recent Results (from the past week)   Phosphorus    Collection Time: 07/11/25 10:59 AM   Result Value Ref Range    Phosphorus 3.3 2.5 - 4.5 mg/dL   Magnesium    Collection Time: 07/11/25 10:59 AM   Result Value Ref Range    Magnesium 1.5 (L) 1.7 - 2.3 mg/dL   CMV Quantitative, PCR    Collection Time: 07/11/25 10:59 AM    Specimen: Arm, Left; Blood   Result Value Ref Range    CMV DNA IU/mL Not Detected Not Detected IU/mL    CMV Quantitative PCR Specimen Type Blood    Comprehensive metabolic panel    Collection Time: 07/11/25 10:59 AM   Result Value Ref Range    Sodium 139 135 - 145 mmol/L    Potassium 4.2 3.4 - 5.3 mmol/L    Carbon Dioxide (CO2) 24 22 - 29 mmol/L    Anion Gap 13 7 - 15 mmol/L    Urea Nitrogen 21.3 8.0 - 23.0 mg/dL    Creatinine 1.11 0.67 - 1.17 mg/dL    GFR Estimate 75  >60 mL/min/1.73m2    Calcium 10.4 8.8 - 10.4 mg/dL    Chloride 102 98 - 107 mmol/L    Glucose 139 (H) 70 - 99 mg/dL    Alkaline Phosphatase 61 40 - 150 U/L    AST 28 0 - 45 U/L    ALT 40 0 - 70 U/L    Protein Total 7.0 6.4 - 8.3 g/dL    Albumin 4.7 3.5 - 5.2 g/dL    Bilirubin Total 0.8 <=1.2 mg/dL   IgG    Collection Time: 07/11/25 10:59 AM   Result Value Ref Range    Immunoglobulin G 511 (L) 610 - 1,616 mg/dL   Zoltan Barr Virus Quantitative PCR, Plasma    Collection Time: 07/11/25 10:59 AM    Specimen: Arm, Left; Blood   Result Value Ref Range    EBV DNA IU/mL Not Detected Not Detected IU/mL   CBC with platelets and differential    Collection Time: 07/11/25 10:59 AM   Result Value Ref Range    WBC Count 4.3 4.0 - 11.0 10e3/uL    RBC Count 5.33 4.40 - 5.90 10e6/uL    Hemoglobin 15.0 13.3 - 17.7 g/dL    Hematocrit 46.8 40.0 - 53.0 %    MCV 88 78 - 100 fL    MCH 28.1 26.5 - 33.0 pg    MCHC 32.1 31.5 - 36.5 g/dL    RDW 12.9 10.0 - 15.0 %    Platelet Count 236 150 - 450 10e3/uL    % Neutrophils 47 %    % Lymphocytes 37 %    % Monocytes 14 %    % Eosinophils 1 %    % Basophils 1 %    % Immature Granulocytes 0 %    NRBCs per 100 WBC 0 <1 /100    Absolute Neutrophils 2.0 1.6 - 8.3 10e3/uL    Absolute Lymphocytes 1.6 0.8 - 5.3 10e3/uL    Absolute Monocytes 0.6 0.0 - 1.3 10e3/uL    Absolute Eosinophils 0.1 0.0 - 0.7 10e3/uL    Absolute Basophils 0.0 0.0 - 0.2 10e3/uL    Absolute Immature Granulocytes 0.0 <=0.4 10e3/uL    Absolute NRBCs 0.0 10e3/uL   General PFT Lab (Please always keep checked)    Collection Time: 07/11/25 11:22 AM   Result Value Ref Range    FVC-Pred 4.45 L    FEV1SVC-Pred 67 L    FEV1FEV6-Pred 79 %    FEV1FVC-Pred 77 %    FEFMax-Pred 9.26 L/sec    FEF2575-Pred 2.76 L/sec    FVC-Pre 3.61 L    FVC-%Pred-Pre 81 %    FEV1-Pre 3.01 L    FEV1-%Pred-Pre 87 %    FEV1FVC-Pre 83 %    CKG6TCS-%Pred-Pre 107 %    FEFMax-Pre 8.10 L/sec    FEFMax-%Pred-Pre 87 %    FEF2575-Pre 3.53 L/sec    ESQ2726-%Pred-Pre 127 %     FIFMax-Pre 4.90 L/sec    ExpTime-Pre 5.22 sec       Jul 11, 2025    Spirometry interpretation:  The spirometry is normal.  When compared to 6/2025, the FEV1 and FVC have decreased  The testing meets ATS criteria.  Still establishing baseline  Just slightly below best since transplant    CXR 7/11/25: Personally reviewed, stable from prior, no infiltrates or effusions         Assessment and Plan:     Transplants:    Travon Cartwright is a 60 year old who underwent BSLTx transplant on 8/15/2024 (Lung), currently postoperative day:  331, course complicated by delirium, stenotrophomonas, ACR (A1B0 with elevated prospera in 10/2024). Other history notable for diabetes, hepatic steatosis, and essential tremor.     PULMONARY    Transplant:       Allograft Function: Overnight oximetry done 5/14/25 with SPO2 <88% for 11 minutes. Qualifies for 2L NC nocturnal oxygen but reports did not tolerate wearing. Did have to perform jaw thrust to maintain airflow during last bronch and will have visit this month with Sleep. Discussed that if has sleep apnea would be good to know though if did not like overnight oximetry may not tolerate CPAP. , can also consider PSG to evaluate for LEONILA, he trialed oxygen overnight and slept worse and will not wear. Spirometry just slightly down from last visit at which time had post-transplant best PFTs. No significant symptoms and remains very physically active.   Imaging: CXR stable from prior, CT at last visit 6/2025, no infiltrates, well defined LLL nodule, stable air trapping   TOMAS: Noted on imaging, s/p solumedrol pulse and taper with CT 12/17/24 demonstrating air trapping with A1B0 and elevated prospera in 10/2024. He felt better post steroids.   CLAD therapy: Azithro, advair, and montelukast  AW: R anastomosis stenosis which appeared normal on last bronch 4/17/25.   Prospera: 4/16/25 0.70, 0.89 on 5/27/25, 1.02 on 6/20/25, pending from today   Bronch: Bronch done in 4/17/25, A0B0C0, will  schedule 1 year bronch for next month       Immunosuppression:  - Tacrolimus goal 8-12  - Myfortic 900 mg bid   - Prednisone 5 mg daily     Prophylaxis:   PJP:  Bactrim (also for nocardia prophy)  CMV: D-/R+ off valcyte, Negative 6/20/25  EBV: D-/R+ Negative 6/20/25    LLL Nodule: 8x8 mm nodule in LLL on CT 9/24/24. Repeat CT 6/17/25 with nodule read as stable.   - Will continue to monitor on future imaging    Hypogammaglobulinemia: IgG 361 on 5/27/25 s/p replacement 6/4.  - Level today 511, no replacement, continue to monitor    Non HLA+ Domingo: cfDNA came back within normal limits, but nonHLA domingo endothelial cell and AT1R were positive from 2/2025 but also positive even prior to lung transplant (8/14/24). Given the normal prospera elected to do the following:  - Reassess nonHLA (rechecked with immunology it was not sent so resent 6/19/25), sooner if PFTs start to fall or prospera goes up and at that time may consider toci/IVIG, 5/27 returned with declining AT1R down to 7 U/mL compared to previous collection.    PsA: On 4/17/25 bronch. S/p Facundo nebs.     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   - Continue AFB cultures on bronch washes  - Subsequent AFBs have been negative    Hypertension:   - Carvedilol 12.5 mg bid   - amlodipine 5 mg at bedtime,will watch blood pressures and can increase dose if needed    CATHERINE: Baseline Cr around 0.9, better with increased hydration today 1.11. Does get busy with work and does not always drink enough.     Tremor: stable/improved.      Hepatic Steatosis  - Following LFTs    DM II: Hgb A1c 8.8 4/2025. Following up with Sara Álvarez with improving glucoses  - On Trulicity and Jardiance    HLD  - Statin      Maintenance:  Derm Exam: Due Annually, needs to see, appt scheduled 11/17/25  Colon Ca Screening: due in 2026  DEXA:   Imms: RSV needed      CHANGES TODAY  - Follow up Prospera, IgG  - Watch blood pressures, can adjust amlodipine if needed  - Work on  increasing water intake  - Upcoming visit with Sleep  - 1 year bronch will be scheduled in August     Follow up as scheduled 8/19/25 with annual studies    Jing De La Garza MD PhD  [facility]   Pulmonary Transplant     I personally spent 35 minutes on the date of the encounter in documentation, the interview and exam, and review of the chart/labs/imaging not including time spent interpreting spirometry.     The longitudinal plan of care for lung transplant and complications of high risk medication management was addressed during this visit. Due to the added complexity in care, I will continue to support this patient in the subsequent management of this condition(s) and with the ongoing continuity of care of this condition.    Approximately 30 minutes of non face-to-face time were spent in review of the patient's medical record on 7/10/25.  This included review of previous: clinic visits, hospital records, lab results, imaging studies, and procedural documentation.  The findings from this review are summarized in the above note.

## 2025-07-11 ENCOUNTER — OFFICE VISIT (OUTPATIENT)
Dept: TRANSPLANT | Facility: CLINIC | Age: 60
End: 2025-07-11
Attending: INTERNAL MEDICINE
Payer: COMMERCIAL

## 2025-07-11 ENCOUNTER — ANCILLARY PROCEDURE (OUTPATIENT)
Dept: GENERAL RADIOLOGY | Facility: CLINIC | Age: 60
End: 2025-07-11
Attending: INTERNAL MEDICINE
Payer: COMMERCIAL

## 2025-07-11 VITALS
OXYGEN SATURATION: 97 % | BODY MASS INDEX: 26.06 KG/M2 | WEIGHT: 181.6 LBS | SYSTOLIC BLOOD PRESSURE: 121 MMHG | DIASTOLIC BLOOD PRESSURE: 80 MMHG | HEART RATE: 94 BPM

## 2025-07-11 DIAGNOSIS — D84.9 IMMUNOSUPPRESSION: ICD-10-CM

## 2025-07-11 DIAGNOSIS — Z94.2 S/P LUNG TRANSPLANT (H): ICD-10-CM

## 2025-07-11 PROCEDURE — 71046 X-RAY EXAM CHEST 2 VIEWS: CPT | Mod: GC | Performed by: RADIOLOGY

## 2025-07-11 PROCEDURE — 99214 OFFICE O/P EST MOD 30 MIN: CPT | Mod: 25 | Performed by: STUDENT IN AN ORGANIZED HEALTH CARE EDUCATION/TRAINING PROGRAM

## 2025-07-11 PROCEDURE — 99213 OFFICE O/P EST LOW 20 MIN: CPT | Performed by: STUDENT IN AN ORGANIZED HEALTH CARE EDUCATION/TRAINING PROGRAM

## 2025-07-11 PROCEDURE — 99000 SPECIMEN HANDLING OFFICE-LAB: CPT | Performed by: PATHOLOGY

## 2025-07-11 PROCEDURE — 87799 DETECT AGENT NOS DNA QUANT: CPT | Performed by: INTERNAL MEDICINE

## 2025-07-11 PROCEDURE — 82784 ASSAY IGA/IGD/IGG/IGM EACH: CPT | Performed by: INTERNAL MEDICINE

## 2025-07-11 NOTE — PATIENT INSTRUCTIONS
Patient Instructions  1. Continue to hydrate with 60-70 oz fluids daily.  2. Continue to exercise daily or most days of the week.  3. Follow up with your primary care provider for annual gender health maintenance procedures.  4. Follow up with colonoscopy schedule.  5. Follow up with annual dermatology visits.  6. It doesn't seem like the COVID vaccine is working well in lung transplant patients. A number of lung transplant patients have gotten sick with COVID even after receiving the vaccines. Based on our recent experience, it can be life-threatening to get COVID even after being vaccinated. Please continue to act like you did not get the COVID vaccine - social distancing, wearing a mask, good hand hygiene, etc. If the people around you are vaccinated, it will help reduce the risk of you getting COVID. All members of your household should be vaccinated.  7. Let Joi know if BP is running consistently >140/90s.    Next transplant clinic appointment: 8/19 with CXR, labs and PFTs before appt   Next lab draw: pending tacrolimus level, otherwise per Joi

## 2025-07-11 NOTE — LETTER
7/11/2025      Travon Cartwright  [address]  [facility] [address]      Dear Colleague,    Thank you for referring your patient, Travon Cartwright, to the [facility]. Please see a copy of my visit note below.    Transplant Coordinator Note     Reason for visit: Post lung transplant follow up visit   Coordinator: Present   Caregiver:  Sister Blount     Health concerns addressed today:  1. Respiratory:      Activity/rehab: Up ad jasmyne, walking 2-3 miles a few times/day.   Oxygen needs: Room air, NU suggest 2L of O2 needed  Pain management/RX: N/a  Diabetic management: Managed by endocrine.    Next Bronch due: April 2025  CMV status: D-/R+  Valcyte stopped: through POD #90  EBV status: D-/R+, monitor monthly  DVT/PE: N/A  Post op AFIB/follow up with EP: N/A  AC/asa: none  PJP prophylactic: Bactrim    COVID:  COVID-19 infection (yes/no, date of most recent positive test):   Status/instructions given about COVID-19 vaccine:     Pt Education: medications (use/dose/side effects), how/when to call coordinator, frequency of labs, s/s of infection/rejection, call prior to starting any new medications, lab/vital sign book    Health Maintenance:   Last colonoscopy:   Next colonoscopy due:   Dermatology:  Vaccinations this visit:      Labs, CXR, PFTs reviewed with patient  Medication record reviewed and reconciled  Questions and concerns addressed     Patient Instructions  1. Continue to hydrate with 60-70 oz fluids daily.  2. Continue to exercise daily or most days of the week.  3. Follow up with your primary care provider for annual gender health maintenance procedures.  4. Follow up with colonoscopy schedule.  5. Follow up with annual dermatology visits.  6. It doesn't seem like the COVID vaccine is working well in lung transplant patients. A number of lung transplant patients have gotten sick with COVID even after receiving the vaccines. Based on our recent experience, it can be  life-threatening to get COVID even after being vaccinated. Please continue to act like you did not get the COVID vaccine - social distancing, wearing a mask, good hand hygiene, etc. If the people around you are vaccinated, it will help reduce the risk of you getting COVID. All members of your household should be vaccinated.  7. Let Joi know if BP is running consistently >140/90s.    Next transplant clinic appointment: 8/19 with CXR, labs and PFTs before appt   Next lab draw: pending tacrolimus level, otherwise per Joi CAMACHO printed at time of check out    [facility]  Pulmonary Transplant Follow Up Visit  Jul 11, 2025    Reason for Visit  Travon Cartwright is a 60 year old who is being seen for lung transplant follow up         Lung Tx Summary:     Transplants:  8/15/2024 (Lung), Postoperative day:  331    Travon Cartwright is a 60 year old who underwent BSLTx transplant on 8/15/2024 (Lung) for IPF, currently postoperative day:  331, course complicated by delirium, stenotrophomonas, ACR (A1B0 with elevated prospera in 10/2024). Other history notable for diabetes, hepatic steatosis, and essential tremor.        Interval Histories:   Jul 11, 2025  Breathing stable, no cough or chest pain. Could not sleep when had the nasal cannula oxygen, too much noise. No sinus issues. No fevers, chills, night sweats.   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 BPs were 130-140s, now 120-140s. Tremor is better than used to be. Blood sugars better on Trulicity. About 70% in range. Water intake is, 24 oz x2. Appetite is good, don't eat enough. No nausea, vomiting, diarrhea, or constipation. Occasional reflux depends on what eats.       6/20/25  BPs still elevated in 140s/90s. Still gets a little breathless with the resort hill.   Not using the oxygen overnight, he did try it for a week and felt like he was worse. Now at goal with BGs around 70%  of the time compared to 40% of the time. Sleeping much better overall, and not urinating as well. Taking in a lot of water, at least 100 oz of water a day. No n/v/d or heartburn. No fevers/chills/sweats, no constipation. No wheezing, chest tightness or cough. Still has one large loose stool in the morning. Wondering if he would go to [place], [place] at end of November and usually goes for 4 months but is willing to come back. Tremors are improved. Shots are still hard to pour (tends bar). No alcohol intake. Fatigue is better overall. Weight has been stable/maintained. [place] needs to be reordered in [place]. 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     A complete ROS was otherwise negative except as noted in the HPI.         Medications:   Meds were reviewed during this visit and updated below    Outpatient Encounter Medications as of 7/11/2025   Medication Sig Dispense Refill     albuterol (PROVENTIL) (2.5 MG/3ML) 0.083% neb solution Take 1 vial (2.5 mg) by nebulization two times daily. Use prior to dora nebs 168 mL 0     amLODIPine (NORVASC) 5 MG tablet Take 1 tablet (5 mg) by mouth daily. 60 tablet 11     azithromycin (ZITHROMAX) 250 MG tablet Take 1 tablet (250 mg) by mouth daily. 30 tablet 11     calcium carbonate-vitamin D (CALTRATE) 600-10 MG-MCG per tablet Take 1 tablet by mouth or Feeding Tube 2 times daily. 60 tablet 0     carvedilol (COREG) 6.25 MG tablet Take 2 tablets (12.5 mg) by mouth 2 times daily (with meals). 120 tablet 11     Continuous Glucose Sensor (DEXCOM G7 SENSOR) MISC Change every 10 days. 1 each 11     dulaglutide (TRULICITY) 0.75 MG/0.5ML pen Inject 0.75 mg subcutaneously every 7 days. 2 mL 2     empagliflozin (JARDIANCE) 10 MG TABS tablet Take 1 tablet (10 mg) by mouth daily. 90 tablet 1     fluticasone-salmeterol (ADVAIR) 250-50 MCG/ACT inhaler Inhale 1 puff into the lungs 2 times daily. 60 each 11     insulin aspart (NOVOLOG PEN)  100 UNIT/ML pen Take 13 units prior to meals, and add sliding scale per instructions.  Up to 60 units daily 60 mL 0     insulin glargine (BASAGLAR PEN) 100 UNIT/ML pen Inject 46 Units subcutaneously every morning. 45 mL 0     Insulin Lispro-aabc, 1 U Dial, (SAQIBUMJEMARCELL KWIKPEN) 100 UNIT/ML SOPN Inject 50 Units subcutaneously daily. Use UP TO 50 units daily. Take as directed with sliding scale. 15 mL 3     insulin pen needle (32G X 4 MM) 32G X 4 MM miscellaneous Use pen needles daily or as directed. 100 each 3     magnesium (HIGH ABSORPTION MAGNESIUM) 100 MG TABS Take 400 mg by mouth 3 times daily. 360 tablet 11     montelukast (SINGULAIR) 10 MG tablet Take 1 tablet (10 mg) by mouth at bedtime. 30 tablet 11     multivitamin, therapeutic (THERA-VIT) TABS tablet Take 1 tablet by mouth daily. 30 tablet 0     mycophenolic acid (GENERIC EQUIVALENT) 180 MG EC tablet Take 5 tablets (900 mg) by mouth 2 times daily. 300 tablet 11     predniSONE (DELTASONE) 5 MG tablet Take 1 tablet (5 mg) by mouth daily. 30 tablet 11     rosuvastatin (CRESTOR) 10 MG tablet Take 1 tablet (10 mg) by mouth daily. 30 tablet 11     SEMGLEE, YFGN, 100 UNIT/ML SOPN        sodium zirconium cyclosilicate (LOKELMA) 10 g PACK packet Take 1 packet (10 g) by mouth daily as needed. (Patient not taking: Reported on 5/9/2025) 10 packet 1     sulfamethoxazole-trimethoprim (BACTRIM) 400-80 MG tablet Take 1 tablet by mouth or NG Tube daily. 30 tablet 11     tacrolimus (GENERIC EQUIVALENT) 1 MG capsule Take 2 capsules (2 mg) by mouth 2 times daily. 360 capsule 3     tobramycin, PF, (DORA) 300 MG/5ML neb solution Take 5 mLs (300 mg) by nebulization 2 times daily. Use albuterol neb prior to dora neb 280 mL 0     [DISCONTINUED] insulin aspart (NOVOLOG PEN) 100 UNIT/ML pen Take 13 units prior to meals, and add sliding scale per instructions.  Up to 60 units daily (Patient not taking: Reported on 5/9/2025) 60 mL 0     Facility-Administered Encounter Medications as of  7/11/2025   Medication Dose Route Frequency Provider Last Rate Last Admin     sodium chloride 0.9% BOLUS 1,000 mL  1,000 mL Intravenous Once Flower Medina PA-C         sodium chloride bacteriostatic 0.9 % flush 9 mL  9 mL Intravenous Once NICOLA Pearce MD               Allergies:     No Known Allergies         Past Medical and Past Surgical History:     Past Medical History:   Diagnosis Date     Administration of long-term prophylactic antibiotics 08/26/2024     Diabetes (H)      Hepatic steatosis 2017    Noted on ultrasound     Hypomagnesemia 08/26/2024     Immunosuppression 08/26/2024     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     S/P lung transplant (H) 08/15/2024     Tremor 05/23/2024     Past Surgical History:   Procedure Laterality Date     BRONCHOSCOPY  08/16/2024     BRONCHOSCOPY (RIGID OR FLEXIBLE), DIAGNOSTIC N/A 9/26/2024    Procedure: BRONCHOSCOPY, WITH BRONCHOALVEOLAR LAVAGE AND BIOPSIES;  Surgeon: Oneida Silver MD;  Location:  GI     BRONCHOSCOPY (RIGID OR FLEXIBLE), DIAGNOSTIC N/A 11/5/2024    Procedure: BRONCHOSCOPY, WITH BRONCHOALVEOLAR LAVAGE AND BIOPSIES;  Surgeon: Tera Jiménez MD;  [facility]     BRONCHOSCOPY (RIGID OR FLEXIBLE), DIAGNOSTIC N/A 1/16/2025    Procedure: BRONCHOSCOPY, WITH BRONCHOALVEOLAR LAVAGE AND BIOPSIES;  Surgeon: Florian Steinberg MD;  [facility]     BRONCHOSCOPY (RIGID OR FLEXIBLE), DIAGNOSTIC N/A 4/17/2025    Procedure: BRONCHOSCOPY, WITH BRONCHOALVEOLAR LAVAGE and biopsy;  Surgeon: Jing De La Garza MD;  [facility]     BRONCHOSCOPY FLEXIBLE AND RIGID N/A 8/27/2024    Procedure: Bronchoscopy Inspection;  Surgeon: Oneida Silver MD;  [facility]     COLONOSCOPY       CV CORONARY ANGIOGRAM N/A 06/21/2024    Procedure: Coronary Angiogram;  Surgeon: Gabriel Julian MD;  Location:  HEART CARDIAC CATH LAB     CV RIGHT HEART CATH MEASUREMENTS RECORDED N/A 06/21/2024    Procedure: Right Heart Catheterization;  Surgeon:  Gabriel Julian MD;  Location:  HEART CARDIAC CATH LAB     PICC DOUBLE LUMEN PLACEMENT Right 08/20/2024    47-3cm, Basilic     TRANSPLANT LUNG RECIPIENT SINGLE X2 Bilateral 08/15/2024    Procedure: Clamshell Incision, On Central Venoarterial Extracorporeal Membranous Oxygenation, Bilateral Lung Transplant Recipient, Left atrial appendage ligation, Intraoperative Flexible Bronchoscopy by Anesthesia;  Surgeon: Mulvihill, Michael, MD;  Location:  OR         Social History:   Social Updates:        Rejection and Infection History     Rejection Hx    DATE INDICATION  PATH BAL/MICRO TREATMENT            Non HLA Amparo:  5/27/25: AT1R 7U/mL Decreasing from previous       Exam:     /80   Pulse 94   Wt 82.4 kg (181 lb 9.6 oz)   SpO2 97%   BMI 26.06 kg/m    Body mass index is 26.06 kg/m .      GENERAL APPEARANCE: Well developed, well nourished, alert, and in no apparent distress.  EYES: PERRL, EOMI  HENT: Nasal mucosa with no edema and no hyperemia. No nasal polyps.  EARS: Canals clear, TMs normal  MOUTH: Oral mucosa is moist, without any lesions, no tonsillar enlargement, no oropharyngeal exudate.  NECK: supple, no masses, no thyromegaly.  LYMPHATICS: No significant cervical, or supraclavicular nodes.  RESP: normal percussion, good air flow throughout.  No crackles. No rhonchi. No wheezes.  CV: Normal S1, S2, regular rhythm, normal rate. No murmur.  No rub. No gallop. No LE edema.   ABDOMEN:  Bowel sounds normal, soft, nontender, no HSM or masses.   MS: extremities normal. No clubbing. No cyanosis.  SKIN: no rash on limited exam  NEURO: Mentation intact, speech normal, normal strength and tone, normal gait and stance, no significant tremor at rest  PSYCH: mentation appears normal. and affect normal/bright         Data:     Results:   Recent Results (from the past week)   Phosphorus    Collection Time: 07/11/25 10:59 AM   Result Value Ref Range    Phosphorus 3.3 2.5 - 4.5 mg/dL   Magnesium    Collection Time:  07/11/25 10:59 AM   Result Value Ref Range    Magnesium 1.5 (L) 1.7 - 2.3 mg/dL   CMV Quantitative, PCR    Collection Time: 07/11/25 10:59 AM    Specimen: Arm, Left; Blood   Result Value Ref Range    CMV DNA IU/mL Not Detected Not Detected IU/mL    CMV Quantitative PCR Specimen Type Blood    Comprehensive metabolic panel    Collection Time: 07/11/25 10:59 AM   Result Value Ref Range    Sodium 139 135 - 145 mmol/L    Potassium 4.2 3.4 - 5.3 mmol/L    Carbon Dioxide (CO2) 24 22 - 29 mmol/L    Anion Gap 13 7 - 15 mmol/L    Urea Nitrogen 21.3 8.0 - 23.0 mg/dL    Creatinine 1.11 0.67 - 1.17 mg/dL    GFR Estimate 75 >60 mL/min/1.73m2    Calcium 10.4 8.8 - 10.4 mg/dL    Chloride 102 98 - 107 mmol/L    Glucose 139 (H) 70 - 99 mg/dL    Alkaline Phosphatase 61 40 - 150 U/L    AST 28 0 - 45 U/L    ALT 40 0 - 70 U/L    Protein Total 7.0 6.4 - 8.3 g/dL    Albumin 4.7 3.5 - 5.2 g/dL    Bilirubin Total 0.8 <=1.2 mg/dL   IgG    Collection Time: 07/11/25 10:59 AM   Result Value Ref Range    Immunoglobulin G 511 (L) 610 - 1,616 mg/dL   Zoltan Barr Virus Quantitative PCR, Plasma    Collection Time: 07/11/25 10:59 AM    Specimen: Arm, Left; Blood   Result Value Ref Range    EBV DNA IU/mL Not Detected Not Detected IU/mL   CBC with platelets and differential    Collection Time: 07/11/25 10:59 AM   Result Value Ref Range    WBC Count 4.3 4.0 - 11.0 10e3/uL    RBC Count 5.33 4.40 - 5.90 10e6/uL    Hemoglobin 15.0 13.3 - 17.7 g/dL    Hematocrit 46.8 40.0 - 53.0 %    MCV 88 78 - 100 fL    MCH 28.1 26.5 - 33.0 pg    MCHC 32.1 31.5 - 36.5 g/dL    RDW 12.9 10.0 - 15.0 %    Platelet Count 236 150 - 450 10e3/uL    % Neutrophils 47 %    % Lymphocytes 37 %    % Monocytes 14 %    % Eosinophils 1 %    % Basophils 1 %    % Immature Granulocytes 0 %    NRBCs per 100 WBC 0 <1 /100    Absolute Neutrophils 2.0 1.6 - 8.3 10e3/uL    Absolute Lymphocytes 1.6 0.8 - 5.3 10e3/uL    Absolute Monocytes 0.6 0.0 - 1.3 10e3/uL    Absolute Eosinophils 0.1 0.0 - 0.7  10e3/uL    Absolute Basophils 0.0 0.0 - 0.2 10e3/uL    Absolute Immature Granulocytes 0.0 <=0.4 10e3/uL    Absolute NRBCs 0.0 10e3/uL   General PFT Lab (Please always keep checked)    Collection Time: 07/11/25 11:22 AM   Result Value Ref Range    FVC-Pred 4.45 L    FEV1SVC-Pred 67 L    FEV1FEV6-Pred 79 %    FEV1FVC-Pred 77 %    FEFMax-Pred 9.26 L/sec    FEF2575-Pred 2.76 L/sec    FVC-Pre 3.61 L    FVC-%Pred-Pre 81 %    FEV1-Pre 3.01 L    FEV1-%Pred-Pre 87 %    FEV1FVC-Pre 83 %    NEP7RYQ-%Pred-Pre 107 %    FEFMax-Pre 8.10 L/sec    FEFMax-%Pred-Pre 87 %    FEF2575-Pre 3.53 L/sec    JSW5328-%Pred-Pre 127 %    FIFMax-Pre 4.90 L/sec    ExpTime-Pre 5.22 sec       Jul 11, 2025    Spirometry interpretation:  The spirometry is normal.  When compared to 6/2025, the FEV1 and FVC have decreased  The testing meets ATS criteria.  Still establishing baseline  Just slightly below best since transplant    CXR 7/11/25: Personally reviewed, stable from prior, no infiltrates or effusions         Assessment and Plan:     Transplants:    Travon Cartwright is a 60 year old who underwent BSLTx transplant on 8/15/2024 (Lung), currently postoperative day:  331, course complicated by delirium, stenotrophomonas, ACR (A1B0 with elevated prospera in 10/2024). Other history notable for diabetes, hepatic steatosis, and essential tremor.     PULMONARY    Transplant:       Allograft Function: Overnight oximetry done 5/14/25 with SPO2 <88% for 11 minutes. Qualifies for 2L NC nocturnal oxygen but reports did not tolerate wearing. Did have to perform jaw thrust to maintain airflow during last bronch and will have visit this month with Sleep. Discussed that if has sleep apnea would be good to know though if did not like overnight oximetry may not tolerate CPAP. , can also consider PSG to evaluate for LEONILA, he trialed oxygen overnight and slept worse and will not wear. Spirometry just slightly down from last visit at which time had post-transplant best  PFTs. No significant symptoms and remains very physically active.   Imaging: CXR stable from prior, CT at last visit 6/2025, no infiltrates, well defined LLL nodule, stable air trapping   TOMAS: Noted on imaging, s/p solumedrol pulse and taper with CT 12/17/24 demonstrating air trapping with A1B0 and elevated prospera in 10/2024. He felt better post steroids.   CLAD therapy: Azithro, advair, and montelukast  AW: R anastomosis stenosis which appeared normal on last bronch 4/17/25.   Prospera: 4/16/25 0.70, 0.89 on 5/27/25, 1.02 on 6/20/25, pending from today   Bronch: Bronch done in 4/17/25, A0B0C0, will schedule 1 year bronch for next month       Immunosuppression:  - Tacrolimus goal 8-12  - Myfortic 900 mg bid   - Prednisone 5 mg daily     Prophylaxis:   PJP:  Bactrim (also for nocardia prophy)  CMV: D-/R+ off valcyte, Negative 6/20/25  EBV: D-/R+ Negative 6/20/25    LLL Nodule: 8x8 mm nodule in LLL on CT 9/24/24. Repeat CT 6/17/25 with nodule read as stable.   - Will continue to monitor on future imaging    Hypogammaglobulinemia: IgG 361 on 5/27/25 s/p replacement 6/4.  - Level today 511, no replacement, continue to monitor    Non HLA+ Domingo: cfDNA came back within normal limits, but nonHLA domingo endothelial cell and AT1R were positive from 2/2025 but also positive even prior to lung transplant (8/14/24). Given the normal prospera elected to do the following:  - Reassess nonHLA (rechecked with immunology it was not sent so resent 6/19/25), sooner if PFTs start to fall or prospera goes up and at that time may consider toci/IVIG, 5/27 returned with declining AT1R down to 7 U/mL compared to previous collection.    PsA: On 4/17/25 bronch. S/p Facundo nebs.     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   - Continue AFB cultures on bronch washes  - Subsequent AFBs have been negative    Hypertension:   - Carvedilol 12.5 mg bid   - amlodipine 5 mg at bedtime,will watch blood pressures and can increase dose if  needed    CATHERINE: Baseline Cr around 0.9, better with increased hydration today 1.11. Does get busy with work and does not always drink enough.     Tremor: stable/improved.      Hepatic Steatosis  - Following LFTs    DM II: Hgb A1c 8.8 4/2025. Following up with Sara Álvarez with improving glucoses  - On Trulicity and Jardiance    HLD  - Statin      Maintenance:  Derm Exam: Due Annually, needs to see, appt scheduled 11/17/25  Colon Ca Screening: due in 2026  DEXA:   Imms: RSV needed      CHANGES TODAY  - Follow up Prospera, IgG  - Watch blood pressures, can adjust amlodipine if needed  - Work on increasing water intake  - Upcoming visit with Sleep  - 1 year bronch will be scheduled in August     Follow up as scheduled 8/19/25 with annual studies    Jing De La Garza MD PhD  [facility]  [facility]   Pulmonary Transplant     I personally spent 35 minutes on the date of the encounter in documentation, the interview and exam, and review of the chart/labs/imaging not including time spent interpreting spirometry.     The longitudinal plan of care for lung transplant and complications of high risk medication management was addressed during this visit. Due to the added complexity in care, I will continue to support this patient in the subsequent management of this condition(s) and with the ongoing continuity of care of this condition.    Approximately 30 minutes of non face-to-face time were spent in review of the patient's medical record on 7/10/25.  This included review of previous: clinic visits, hospital records, lab results, imaging studies, and procedural documentation.  The findings from this review are summarized in the above note.        Again, thank you for allowing me to participate in the care of your patient.        Sincerely,        Jing De La Garza MD    Electronically signed

## 2025-07-20 ENCOUNTER — HEALTH MAINTENANCE LETTER (OUTPATIENT)
Age: 60
End: 2025-07-20

## 2025-08-04 DIAGNOSIS — Z79.4 TYPE 2 DIABETES MELLITUS WITHOUT COMPLICATION, WITH LONG-TERM CURRENT USE OF INSULIN (H): ICD-10-CM

## 2025-08-04 DIAGNOSIS — E11.9 TYPE 2 DIABETES MELLITUS WITHOUT COMPLICATION, WITH LONG-TERM CURRENT USE OF INSULIN (H): ICD-10-CM

## 2025-08-04 RX ORDER — INSULIN LISPRO-AABC 100 [IU]/ML
INJECTION, SOLUTION SUBCUTANEOUS
Qty: 15 ML | Refills: 3 | OUTPATIENT
Start: 2025-08-04

## 2025-08-05 RX ORDER — INSULIN LISPRO-AABC 100 [IU]/ML
50 INJECTION, SOLUTION SUBCUTANEOUS DAILY
Qty: 15 ML | Refills: 3 | Status: SHIPPED | OUTPATIENT
Start: 2025-08-05

## 2025-08-10 ENCOUNTER — HEALTH MAINTENANCE LETTER (OUTPATIENT)
Age: 60
End: 2025-08-10

## 2025-08-15 DIAGNOSIS — Z94.2 S/P LUNG TRANSPLANT (H): ICD-10-CM

## 2025-08-19 ENCOUNTER — OFFICE VISIT (OUTPATIENT)
Dept: PULMONOLOGY | Facility: CLINIC | Age: 60
End: 2025-08-19
Attending: INTERNAL MEDICINE
Payer: COMMERCIAL

## 2025-08-19 ENCOUNTER — LAB (OUTPATIENT)
Dept: LAB | Facility: CLINIC | Age: 60
End: 2025-08-19
Attending: INTERNAL MEDICINE
Payer: COMMERCIAL

## 2025-08-19 ENCOUNTER — ANCILLARY PROCEDURE (OUTPATIENT)
Dept: CARDIOLOGY | Facility: CLINIC | Age: 60
End: 2025-08-19
Attending: INTERNAL MEDICINE
Payer: COMMERCIAL

## 2025-08-19 ENCOUNTER — TELEPHONE (OUTPATIENT)
Dept: DERMATOLOGY | Facility: CLINIC | Age: 60
End: 2025-08-19

## 2025-08-19 ENCOUNTER — ANCILLARY PROCEDURE (OUTPATIENT)
Dept: BONE DENSITY | Facility: CLINIC | Age: 60
End: 2025-08-19
Attending: INTERNAL MEDICINE
Payer: COMMERCIAL

## 2025-08-19 ENCOUNTER — ANCILLARY PROCEDURE (OUTPATIENT)
Dept: GENERAL RADIOLOGY | Facility: CLINIC | Age: 60
End: 2025-08-19
Attending: INTERNAL MEDICINE
Payer: COMMERCIAL

## 2025-08-19 VITALS
HEART RATE: 90 BPM | WEIGHT: 180 LBS | DIASTOLIC BLOOD PRESSURE: 83 MMHG | HEIGHT: 70 IN | SYSTOLIC BLOOD PRESSURE: 134 MMHG | BODY MASS INDEX: 25.77 KG/M2 | OXYGEN SATURATION: 98 %

## 2025-08-19 DIAGNOSIS — Z79.4 TYPE 2 DIABETES MELLITUS WITHOUT COMPLICATION, WITH LONG-TERM CURRENT USE OF INSULIN (H): ICD-10-CM

## 2025-08-19 DIAGNOSIS — Z94.2 S/P LUNG TRANSPLANT (H): ICD-10-CM

## 2025-08-19 DIAGNOSIS — D84.9 IMMUNOSUPPRESSION: ICD-10-CM

## 2025-08-19 DIAGNOSIS — E11.9 TYPE 2 DIABETES MELLITUS WITHOUT COMPLICATION, WITH LONG-TERM CURRENT USE OF INSULIN (H): ICD-10-CM

## 2025-08-19 LAB
6 MIN WALK (FT): 1515 FT
6 MIN WALK (M): 462 M
ALBUMIN SERPL BCG-MCNC: 4.4 G/DL (ref 3.5–5.2)
ALP SERPL-CCNC: 60 U/L (ref 40–150)
ALT SERPL W P-5'-P-CCNC: 32 U/L (ref 0–70)
ANION GAP SERPL CALCULATED.3IONS-SCNC: 15 MMOL/L (ref 7–15)
AST SERPL W P-5'-P-CCNC: 25 U/L (ref 0–45)
BILIRUB SERPL-MCNC: 0.5 MG/DL
BUN SERPL-MCNC: 17.6 MG/DL (ref 8–23)
CALCIUM SERPL-MCNC: 9.8 MG/DL (ref 8.8–10.4)
CHLORIDE SERPL-SCNC: 102 MMOL/L (ref 98–107)
CHOLEST SERPL-MCNC: 148 MG/DL
CMV DNA SPEC NAA+PROBE-ACNC: NOT DETECTED IU/ML
CREAT SERPL-MCNC: 1.02 MG/DL (ref 0.67–1.17)
DLCOCOR-%PRED-PRE: 84 %
DLCOCOR-PRE: 22.83 ML/MIN/MMHG
DLCOUNC-%PRED-PRE: 85 %
DLCOUNC-PRE: 23.33 ML/MIN/MMHG
DLCOUNC-PRED: 27.14 ML/MIN/MMHG
EBV DNA SERPL NAA+PROBE-ACNC: NOT DETECTED IU/ML
EGFRCR SERPLBLD CKD-EPI 2021: 83 ML/MIN/1.73M2
ERV-%PRED-PRE: 81 %
ERV-PRE: 1.07 L
ERV-PRED: 1.31 L
ERYTHROCYTE [DISTWIDTH] IN BLOOD BY AUTOMATED COUNT: 12.7 % (ref 10–15)
EST. AVERAGE GLUCOSE BLD GHB EST-MCNC: 140 MG/DL
EXPTIME-PRE: 7.86 SEC
FASTING STATUS PATIENT QL REPORTED: NO
FASTING STATUS PATIENT QL REPORTED: NO
FEF2575-%PRED-PRE: 120 %
FEF2575-PRE: 3.24 L/SEC
FEF2575-PRED: 2.69 L/SEC
FEFMAX-%PRED-PRE: 89 %
FEFMAX-PRE: 8.1 L/SEC
FEFMAX-PRED: 9.02 L/SEC
FEV1-%PRED-PRE: 91 %
FEV1-PRE: 3.03 L
FEV1FEV6-PRE: 82 %
FEV1FEV6-PRED: 79 %
FEV1FVC-PRE: 80 %
FEV1FVC-PRED: 78 %
FEV1SVC-PRE: 82 L
FEV1SVC-PRED: 66 L
FIFMAX-PRE: 6.05 L/SEC
FRCPLETH-%PRED-PRE: 72 %
FRCPLETH-PRE: 2.67 L
FRCPLETH-PRED: 3.7 L
FVC-%PRED-PRE: 88 %
FVC-PRE: 3.77 L
FVC-PRED: 4.27 L
GAW-PRED: 1.03 L/S/CMH2O
GLUCOSE SERPL-MCNC: 204 MG/DL (ref 70–99)
HBA1C MFR BLD: 6.5 %
HCO3 SERPL-SCNC: 22 MMOL/L (ref 22–29)
HCT VFR BLD AUTO: 46.7 % (ref 40–53)
HDLC SERPL-MCNC: 66 MG/DL
HGB BLD-MCNC: 15.4 G/DL (ref 13.3–17.7)
IC-%PRED-PRE: 70 %
IC-PRE: 2.52 L
IC-PRED: 3.59 L
IGG SERPL-MCNC: 408 MG/DL (ref 610–1616)
INR PPP: 1.05 (ref 0.85–1.15)
LDLC SERPL CALC-MCNC: 32 MG/DL
LVEF ECHO: NORMAL
Lab: 103 %
MAGNESIUM SERPL-MCNC: 1.6 MG/DL (ref 1.7–2.3)
MCH RBC QN AUTO: 28.6 PG (ref 26.5–33)
MCHC RBC AUTO-ENTMCNC: 33 G/DL (ref 31.5–36.5)
MCV RBC AUTO: 86.6 FL (ref 78–100)
NONHDLC SERPL-MCNC: 82 MG/DL
PHOSPHATE SERPL-MCNC: 2.8 MG/DL (ref 2.5–4.5)
PLATELET # BLD AUTO: 234 10E3/UL (ref 150–450)
POTASSIUM SERPL-SCNC: 4.1 MMOL/L (ref 3.4–5.3)
PROT SERPL-MCNC: 6.7 G/DL (ref 6.4–8.3)
PROTHROMBIN TIME: 13.9 SECONDS (ref 11.8–14.8)
RBC # BLD AUTO: 5.39 10E6/UL (ref 4.4–5.9)
RVPLETH-%PRED-PRE: 64 %
RVPLETH-PRE: 1.48 L
RVPLETH-PRED: 2.3 L
SGAW-PRED: 0.2 1/CMH2O*S
SODIUM SERPL-SCNC: 139 MMOL/L (ref 135–145)
SPECIMEN TYPE: NORMAL
SRAW-PRED: < 4.76 CMH2O*S
TACROLIMUS BLD-MCNC: 10.1 UG/L (ref 5–15)
TLCPLETH-%PRED-PRE: 72 %
TLCPLETH-PRE: 5.19 L
TLCPLETH-PRED: 7.2 L
TME LAST DOSE: NORMAL H
TME LAST DOSE: NORMAL H
TRIGL SERPL-MCNC: 248 MG/DL
TSH SERPL DL<=0.005 MIU/L-ACNC: 1.52 UIU/ML (ref 0.3–4.2)
VA-%PRED-PRE: 78 %
VA-PRE: 5.09 L
VC-%PRED-PRE: 74 %
VC-PRE: 3.71 L
VC-PRED: 4.97 L
VIT D+METAB SERPL-MCNC: 39 NG/ML (ref 20–50)
WBC # BLD AUTO: 3.46 10E3/UL (ref 4–11)

## 2025-08-19 PROCEDURE — 82784 ASSAY IGA/IGD/IGG/IGM EACH: CPT | Performed by: INTERNAL MEDICINE

## 2025-08-19 PROCEDURE — 99213 OFFICE O/P EST LOW 20 MIN: CPT | Performed by: PHYSICIAN ASSISTANT

## 2025-08-19 PROCEDURE — 94375 RESPIRATORY FLOW VOLUME LOOP: CPT | Performed by: PHYSICIAN ASSISTANT

## 2025-08-19 PROCEDURE — 94726 PLETHYSMOGRAPHY LUNG VOLUMES: CPT | Performed by: PHYSICIAN ASSISTANT

## 2025-08-19 PROCEDURE — 94729 DIFFUSING CAPACITY: CPT | Performed by: PHYSICIAN ASSISTANT

## 2025-08-19 PROCEDURE — 84403 ASSAY OF TOTAL TESTOSTERONE: CPT | Performed by: INTERNAL MEDICINE

## 2025-08-19 PROCEDURE — 85610 PROTHROMBIN TIME: CPT | Performed by: PATHOLOGY

## 2025-08-19 PROCEDURE — 84443 ASSAY THYROID STIM HORMONE: CPT | Performed by: PATHOLOGY

## 2025-08-19 PROCEDURE — 80197 ASSAY OF TACROLIMUS: CPT | Performed by: INTERNAL MEDICINE

## 2025-08-19 PROCEDURE — 36415 COLL VENOUS BLD VENIPUNCTURE: CPT | Performed by: PATHOLOGY

## 2025-08-19 PROCEDURE — 71046 X-RAY EXAM CHEST 2 VIEWS: CPT | Mod: GC | Performed by: RADIOLOGY

## 2025-08-19 PROCEDURE — 94618 PULMONARY STRESS TESTING: CPT | Performed by: PHYSICIAN ASSISTANT

## 2025-08-19 PROCEDURE — 99214 OFFICE O/P EST MOD 30 MIN: CPT | Mod: 25 | Performed by: PHYSICIAN ASSISTANT

## 2025-08-19 PROCEDURE — 84100 ASSAY OF PHOSPHORUS: CPT | Performed by: PATHOLOGY

## 2025-08-19 PROCEDURE — 80053 COMPREHEN METABOLIC PANEL: CPT | Performed by: PATHOLOGY

## 2025-08-19 PROCEDURE — 77085 DXA BONE DENSITY AXL VRT FX: CPT | Performed by: INTERNAL MEDICINE

## 2025-08-19 PROCEDURE — 83036 HEMOGLOBIN GLYCOSYLATED A1C: CPT | Performed by: NURSE PRACTITIONER

## 2025-08-19 PROCEDURE — 94150 VITAL CAPACITY TEST: CPT | Performed by: PHYSICIAN ASSISTANT

## 2025-08-19 PROCEDURE — 83735 ASSAY OF MAGNESIUM: CPT | Performed by: PATHOLOGY

## 2025-08-19 PROCEDURE — 93306 TTE W/DOPPLER COMPLETE: CPT | Performed by: STUDENT IN AN ORGANIZED HEALTH CARE EDUCATION/TRAINING PROGRAM

## 2025-08-19 PROCEDURE — 87799 DETECT AGENT NOS DNA QUANT: CPT | Performed by: INTERNAL MEDICINE

## 2025-08-19 PROCEDURE — 85027 COMPLETE CBC AUTOMATED: CPT | Performed by: PATHOLOGY

## 2025-08-19 PROCEDURE — 80061 LIPID PANEL: CPT | Performed by: PATHOLOGY

## 2025-08-19 PROCEDURE — 82306 VITAMIN D 25 HYDROXY: CPT | Performed by: INTERNAL MEDICINE

## 2025-08-19 PROCEDURE — 99000 SPECIMEN HANDLING OFFICE-LAB: CPT | Performed by: PATHOLOGY

## 2025-08-19 ASSESSMENT — PAIN SCALES - GENERAL: PAINLEVEL_OUTOF10: NO PAIN (0)

## 2025-08-20 ENCOUNTER — HOSPITAL ENCOUNTER (OUTPATIENT)
Facility: CLINIC | Age: 60
Discharge: HOME | End: 2025-08-20
Attending: INTERNAL MEDICINE | Admitting: INTERNAL MEDICINE
Payer: COMMERCIAL

## 2025-08-20 VITALS
RESPIRATION RATE: 16 BRPM | SYSTOLIC BLOOD PRESSURE: 122 MMHG | DIASTOLIC BLOOD PRESSURE: 92 MMHG | OXYGEN SATURATION: 95 % | HEART RATE: 80 BPM

## 2025-08-20 DIAGNOSIS — Z94.2 S/P LUNG TRANSPLANT (H): ICD-10-CM

## 2025-08-20 LAB
APPEARANCE FLD: CLEAR
BACTERIA SPEC CULT: NORMAL
COLOR FLD: COLORLESS
GLUCOSE BLDC GLUCOMTR-MCNC: 120 MG/DL (ref 70–99)
GRAM STAIN RESULT: NORMAL
GRAM STAIN RESULT: NORMAL
LYMPHOCYTES NFR FLD MANUAL: 5 %
MONOS+MACROS NFR FLD MANUAL: 91 %
NEUTS BAND NFR FLD MANUAL: 4 %
PATH REPORT.COMMENTS IMP SPEC: ABNORMAL
PATH REPORT.COMMENTS IMP SPEC: ABNORMAL
PATH REPORT.COMMENTS IMP SPEC: YES
PATH REPORT.FINAL DX SPEC: ABNORMAL
PATH REPORT.GROSS SPEC: ABNORMAL
PATH REPORT.MICROSCOPIC SPEC OTHER STN: ABNORMAL
PATH REPORT.RELEVANT HX SPEC: ABNORMAL
SPECIMEN SOURCE FLD: NORMAL
WBC # FLD AUTO: 39 /UL

## 2025-08-20 PROCEDURE — 87102 FUNGUS ISOLATION CULTURE: CPT | Performed by: INTERNAL MEDICINE

## 2025-08-20 PROCEDURE — 31628 BRONCHOSCOPY/LUNG BX EACH: CPT

## 2025-08-20 PROCEDURE — 87205 SMEAR GRAM STAIN: CPT | Performed by: INTERNAL MEDICINE

## 2025-08-20 PROCEDURE — 88312 SPECIAL STAINS GROUP 1: CPT | Mod: 26 | Performed by: PATHOLOGY

## 2025-08-20 PROCEDURE — 99153 MOD SED SAME PHYS/QHP EA: CPT | Performed by: INTERNAL MEDICINE

## 2025-08-20 PROCEDURE — 88108 CYTOPATH CONCENTRATE TECH: CPT | Mod: TC | Performed by: INTERNAL MEDICINE

## 2025-08-20 PROCEDURE — 31632 BRONCHOSCOPY/LUNG BX ADDL: CPT

## 2025-08-20 PROCEDURE — 250N000011 HC RX IP 250 OP 636: Performed by: INTERNAL MEDICINE

## 2025-08-20 PROCEDURE — 250N000009 HC RX 250: Performed by: INTERNAL MEDICINE

## 2025-08-20 PROCEDURE — 87070 CULTURE OTHR SPECIMN AEROBIC: CPT | Performed by: INTERNAL MEDICINE

## 2025-08-20 PROCEDURE — 99152 MOD SED SAME PHYS/QHP 5/>YRS: CPT | Mod: GC | Performed by: INTERNAL MEDICINE

## 2025-08-20 PROCEDURE — 31624 DX BRONCHOSCOPE/LAVAGE: CPT | Performed by: INTERNAL MEDICINE

## 2025-08-20 PROCEDURE — 87206 SMEAR FLUORESCENT/ACID STAI: CPT | Performed by: INTERNAL MEDICINE

## 2025-08-20 PROCEDURE — 88305 TISSUE EXAM BY PATHOLOGIST: CPT | Mod: 26 | Performed by: STUDENT IN AN ORGANIZED HEALTH CARE EDUCATION/TRAINING PROGRAM

## 2025-08-20 PROCEDURE — 31628 BRONCHOSCOPY/LUNG BX EACH: CPT | Mod: GC | Performed by: INTERNAL MEDICINE

## 2025-08-20 PROCEDURE — G0500 MOD SEDAT ENDO SERVICE >5YRS: HCPCS | Performed by: INTERNAL MEDICINE

## 2025-08-20 PROCEDURE — 82962 GLUCOSE BLOOD TEST: CPT

## 2025-08-20 PROCEDURE — 31624 DX BRONCHOSCOPE/LAVAGE: CPT | Mod: GC | Performed by: INTERNAL MEDICINE

## 2025-08-20 PROCEDURE — 31632 BRONCHOSCOPY/LUNG BX ADDL: CPT | Mod: GC | Performed by: INTERNAL MEDICINE

## 2025-08-20 PROCEDURE — 88305 TISSUE EXAM BY PATHOLOGIST: CPT | Mod: TC | Performed by: INTERNAL MEDICINE

## 2025-08-20 PROCEDURE — 88108 CYTOPATH CONCENTRATE TECH: CPT | Mod: 26 | Performed by: PATHOLOGY

## 2025-08-20 PROCEDURE — 87305 ASPERGILLUS AG IA: CPT | Performed by: INTERNAL MEDICINE

## 2025-08-20 PROCEDURE — 89050 BODY FLUID CELL COUNT: CPT | Performed by: INTERNAL MEDICINE

## 2025-08-20 RX ORDER — FENTANYL CITRATE 50 UG/ML
INJECTION, SOLUTION INTRAMUSCULAR; INTRAVENOUS PRN
Status: DISCONTINUED | OUTPATIENT
Start: 2025-08-20 | End: 2025-08-20 | Stop reason: HOSPADM

## 2025-08-20 RX ORDER — LIDOCAINE 40 MG/G
CREAM TOPICAL
Status: CANCELLED | OUTPATIENT
Start: 2025-08-20

## 2025-08-20 RX ORDER — LIDOCAINE HYDROCHLORIDE 40 MG/ML
INJECTION, SOLUTION RETROBULBAR PRN
Status: DISCONTINUED | OUTPATIENT
Start: 2025-08-20 | End: 2025-08-20 | Stop reason: HOSPADM

## 2025-08-20 RX ORDER — LIDOCAINE HYDROCHLORIDE 10 MG/ML
INJECTION, SOLUTION INFILTRATION; PERINEURAL PRN
Status: DISCONTINUED | OUTPATIENT
Start: 2025-08-20 | End: 2025-08-20 | Stop reason: HOSPADM

## 2025-08-20 RX ORDER — MAGNESIUM HYDROXIDE 1200 MG/15ML
LIQUID ORAL PRN
Status: DISCONTINUED | OUTPATIENT
Start: 2025-08-20 | End: 2025-08-20 | Stop reason: HOSPADM

## 2025-08-20 RX ORDER — LIDOCAINE HYDROCHLORIDE AND EPINEPHRINE 10; 10 MG/ML; UG/ML
INJECTION, SOLUTION INFILTRATION; PERINEURAL PRN
Status: DISCONTINUED | OUTPATIENT
Start: 2025-08-20 | End: 2025-08-20 | Stop reason: HOSPADM

## 2025-08-20 ASSESSMENT — ACTIVITIES OF DAILY LIVING (ADL)
ADLS_ACUITY_SCORE: 54

## 2025-08-21 DIAGNOSIS — Z94.2 S/P LUNG TRANSPLANT (H): ICD-10-CM

## 2025-08-21 LAB
BACTERIA BRONCH: NORMAL
BACTERIA BRONCH: NORMAL
BRONCHOSCOPY: NORMAL
GALACTOMANNAN AG BAL QL: NEGATIVE
GALACTOMANNAN AG SPEC IA-ACNC: 0.06
PATH REPORT.COMMENTS IMP SPEC: NORMAL
PATH REPORT.COMMENTS IMP SPEC: NORMAL
PATH REPORT.FINAL DX SPEC: NORMAL
PATH REPORT.GROSS SPEC: NORMAL
PATH REPORT.MICROSCOPIC SPEC OTHER STN: NORMAL
PATH REPORT.RELEVANT HX SPEC: NORMAL
PHOTO IMAGE: NORMAL
TESTOST SERPL-MCNC: 477 NG/DL (ref 240–950)

## 2025-08-21 RX ORDER — PREDNISONE 5 MG/1
7.5 TABLET ORAL DAILY
Qty: 45 TABLET | Refills: 11 | Status: SHIPPED | OUTPATIENT
Start: 2025-08-21

## 2025-08-22 ENCOUNTER — VIRTUAL VISIT (OUTPATIENT)
Dept: ENDOCRINOLOGY | Facility: CLINIC | Age: 60
End: 2025-08-22
Payer: COMMERCIAL

## 2025-08-22 DIAGNOSIS — E11.9 TYPE 2 DIABETES MELLITUS WITHOUT COMPLICATION, WITH LONG-TERM CURRENT USE OF INSULIN (H): Primary | ICD-10-CM

## 2025-08-22 DIAGNOSIS — M85.80 STEROID-INDUCED OSTEOPENIA: ICD-10-CM

## 2025-08-22 DIAGNOSIS — T38.0X5A STEROID-INDUCED OSTEOPENIA: ICD-10-CM

## 2025-08-22 DIAGNOSIS — Z79.4 TYPE 2 DIABETES MELLITUS WITHOUT COMPLICATION, WITH LONG-TERM CURRENT USE OF INSULIN (H): Primary | ICD-10-CM

## 2025-08-22 PROCEDURE — 98005 SYNCH AUDIO-VIDEO EST LOW 20: CPT | Performed by: NURSE PRACTITIONER

## 2025-08-22 RX ORDER — VORICONAZOLE 200 MG/1
200 TABLET, FILM COATED ORAL 2 TIMES DAILY
COMMUNITY
Start: 2025-06-08

## 2025-08-23 LAB
NTRA CURRENT TEST RESULT: ABNORMAL
NTRA PATIENT TEST HISTORY: ABNORMAL

## 2025-08-26 PROBLEM — M85.80 STEROID-INDUCED OSTEOPENIA: Status: ACTIVE | Noted: 2025-08-26

## 2025-08-26 PROBLEM — T38.0X5A STEROID-INDUCED OSTEOPENIA: Status: ACTIVE | Noted: 2025-08-26

## 2025-08-28 DIAGNOSIS — Z94.2 S/P LUNG TRANSPLANT (H): ICD-10-CM

## 2025-08-28 LAB
BACTERIA BRONCH: NORMAL
DONOR IDENTIFICATION: NORMAL
DSA COMMENTS: NORMAL
DSA PRESENT: NO
DSA TEST METHOD: NORMAL
ORGAN: NORMAL
SA 1  COMMENTS: NORMAL
SA 1 CELL: NORMAL
SA 1 TEST METHOD: NORMAL
SA 2 CELL: NORMAL
SA 2 COMMENTS: NORMAL
SA 2 TEST METHOD: NORMAL
SA1 HI RISK ABY: NORMAL
SA1 MOD RISK ABY: NORMAL
SA2 HI RISK ABY: NORMAL
SA2 MOD RISK ABY: NORMAL
UNACCEPTABLE ANTIGENS: NORMAL
UNOS CPRA: 0

## 2025-09-04 LAB — BACTERIA BRONCH: NORMAL

## (undated) DEVICE — ESU BIPOLAR SEALER AQUAMANTYS 6MM 23-112-1

## (undated) DEVICE — LINEN TOWEL PACK X30 5481

## (undated) DEVICE — ESU LIGASURE IMPACT OPEN SEALER/DVDR CVD LG JAW LF4418

## (undated) DEVICE — WIPES FOLEY CARE SURESTEP PROVON DFC100

## (undated) DEVICE — PATCH SURGICAL EVARREST FIBRIN SEALANT 4X2IN EVT5024

## (undated) DEVICE — SOL NACL 0.9% IRRIG 1000ML BOTTLE 2F7124

## (undated) DEVICE — CATH ANGIO SUPERTORQUE PLUS JL4 6FRX100CM 533620

## (undated) DEVICE — KIT HAND CONTROL ACIST 016795

## (undated) DEVICE — SHTH INTRO 0.021IN ID 6FR DIA

## (undated) DEVICE — ESU PENCIL SMOKE EVAC W/ROCKER SWITCH 0703-047-000

## (undated) DEVICE — CATH TRAY FOLEY 16FR BARDEX W/TEMP PRB URINE METER 319416AM

## (undated) DEVICE — DRSG ADAPTIC 3X16"  6114

## (undated) DEVICE — LUBRICANT INST KIT ENDO-LUBE 220-90

## (undated) DEVICE — ESU ELEC BLADE E-SEP INSULATED NEPTUNE 70MM 0703-070-002

## (undated) DEVICE — SPONGE LAP 18X18" X8435

## (undated) DEVICE — SUCTION CATH AIRLIFE TRI-FLO W/CONTROL PORT 14FR  T60C

## (undated) DEVICE — DRSG WOUND VAC GRANUFOAM LG BLACK 26X15CM M8275053/5

## (undated) DEVICE — DRAIN CHEST TUBE 28FR STR 8028

## (undated) DEVICE — PACK HEART LEFT CUSTOM

## (undated) DEVICE — BASIN SET SINGLE STERILE 13752-624

## (undated) DEVICE — STPL POWERED ECHELON VASC 35MM PVE35A

## (undated) DEVICE — CATH ANGIO SUPERTORQUE PLUS JR4 6FRX100CM 533621

## (undated) DEVICE — Device

## (undated) DEVICE — SU PDS II 0 CTX 36" Z370T

## (undated) DEVICE — CLIP HORIZON MED BLUE 002200

## (undated) DEVICE — RX VISTASEAL FIBRIN SEALANT W/THROMBIN 10ML VST10

## (undated) DEVICE — DRAPE FLUID WARMING 52 X 60" ORS-321

## (undated) DEVICE — SU VICRYL+ 3-0 FS1 27IN UND VCP442H

## (undated) DEVICE — SPECIMEN TRAP MUCOUS 40ML LUKI C30200A

## (undated) DEVICE — SU PDS II 2-0 CT-1 27" Z339H

## (undated) DEVICE — TUBING SUCTION DRAINAGE PLEURAL DUAL 8884714200

## (undated) DEVICE — PREP CHLORAPREP 26ML TINTED HI-LITE ORANGE 930815

## (undated) DEVICE — SU PROLENE 4-0 SHDA 36" 8521H

## (undated) DEVICE — PROTECTOR ARM ONE-STEP TRENDELENBURG 40418

## (undated) DEVICE — SU STEEL 6 CCS 4X18" M654G

## (undated) DEVICE — RX SURGIFLO HEMOSTATIC MATRIX W/THROMBIN 8ML 2994

## (undated) DEVICE — SUCTION MANIFOLD NEPTUNE 2 SYS 4 PORT 0702-020-000

## (undated) DEVICE — LINEN TOWEL PACK X6 WHITE 5487

## (undated) DEVICE — VESSEL LOOPS YELLOW MAXI 31145694

## (undated) DEVICE — POSITIONER ASSIST ESSTECH 3S T401210S

## (undated) DEVICE — ESU GROUND PAD ADULT W/CORD E7507

## (undated) DEVICE — SU ETHIBOND 0 CT-1 CR 8X18" CX21D

## (undated) DEVICE — MANIFOLD KIT ANGIO AUTOMATED 014613

## (undated) DEVICE — STPL SKIN PROXIMATE 35 WIDE PMW35

## (undated) DEVICE — DRAPE IOBAN INCISE 23X17" 6650EZ

## (undated) DEVICE — TUBING SUCTION 10'X3/16" N510

## (undated) DEVICE — SU SILK 0 TIE 6X30" A306H

## (undated) DEVICE — BLADE SAW OFFSET FAN STRK 30X30X0.38MM 5400-134-279

## (undated) DEVICE — SOL WATER IRRIG 1000ML BOTTLE 2F7114

## (undated) DEVICE — INTRO SHEATH 7FRX10CM PINNACLE RSS702

## (undated) DEVICE — ENDO VALVE BX EVIS MAJ-210

## (undated) DEVICE — ESU ELEC BLADE E-SEP INSULATED NEPTUNE 165MM 0703-165-002

## (undated) DEVICE — SU PLEDGET SOFT TFE 3/8"X3/26"X1/16" PCP40

## (undated) DEVICE — KIT ENDO FIRST STEP DISINFECTANT 200ML W/POUCH EP-4

## (undated) DEVICE — SU ETHIBOND 5 LRDA 30" B499T

## (undated) DEVICE — STPL RELOAD REG TISSUE ECHELON 45 X 3.6MM BLUE GST45B

## (undated) DEVICE — FASTENER CATH BALLOON CLAMPX2 STATLOCK 0684-00-493

## (undated) DEVICE — SU ETHIBOND 2-0 SHDA 30" X563H

## (undated) DEVICE — SOL NACL 0.9% IRRIG 3000ML BAG 2B7477

## (undated) DEVICE — BLADE KNIFE SURG 10 371110

## (undated) DEVICE — SU PROLENE 4-0 RB-1DA 36" 8557H

## (undated) DEVICE — DEFIB PRO-PADZ LVP LQD GEL ADULT 8900-2105-01

## (undated) DEVICE — PACK ADULT HEART UMMC PV15CG92D

## (undated) DEVICE — TUBING PRESSURE 30"

## (undated) DEVICE — CANISTER WOUND VAC W/GEL 1000ML M8275093/5

## (undated) DEVICE — SUCTION DRY CHEST DRAIN OASIS 3600-100

## (undated) DEVICE — SUCTION TIP YANKAUER STR K87

## (undated) DEVICE — SU DERMABOND ADVANCED .7ML DNX12

## (undated) DEVICE — SOL NACL 0.9% 10ML VIAL 0409-4888-02

## (undated) DEVICE — CONNECTOR BLAKE DRAIN SGL BCC1

## (undated) DEVICE — DRAIN CHEST TUBE RIGHT ANGLED 28FR 8128

## (undated) DEVICE — STPL POWERED ECHELON 45MM PSEE45A

## (undated) DEVICE — SLEEVE TR BAND RADIAL COMPRESSION DEVICE 24CM TRB24-REG

## (undated) DEVICE — SU ETHIBOND 3-0 BBDA 36" X588H

## (undated) DEVICE — CLIP HORIZON LG ORANGE 004200

## (undated) DEVICE — SURGICEL HEMOSTAT 4X8" 1952

## (undated) DEVICE — SU PROLENE 5-0 RB-1DA 36"  8556H

## (undated) DEVICE — TIES BANDING T50R

## (undated) RX ORDER — LIDOCAINE HYDROCHLORIDE 40 MG/ML
SOLUTION TOPICAL
Status: DISPENSED
Start: 2025-01-16

## (undated) RX ORDER — LIDOCAINE HYDROCHLORIDE 10 MG/ML
INJECTION, SOLUTION EPIDURAL; INFILTRATION; INTRACAUDAL; PERINEURAL
Status: DISPENSED
Start: 2025-08-20

## (undated) RX ORDER — FENTANYL CITRATE 50 UG/ML
INJECTION, SOLUTION INTRAMUSCULAR; INTRAVENOUS
Status: DISPENSED
Start: 2024-11-05

## (undated) RX ORDER — HEPARIN SODIUM 1000 [USP'U]/ML
INJECTION, SOLUTION INTRAVENOUS; SUBCUTANEOUS
Status: DISPENSED
Start: 2024-08-15

## (undated) RX ORDER — HEPARIN SODIUM 1000 [USP'U]/ML
INJECTION, SOLUTION INTRAVENOUS; SUBCUTANEOUS
Status: DISPENSED
Start: 2024-06-21

## (undated) RX ORDER — LIDOCAINE HYDROCHLORIDE 40 MG/ML
SOLUTION TOPICAL
Status: DISPENSED
Start: 2024-08-27

## (undated) RX ORDER — LIDOCAINE HYDROCHLORIDE 20 MG/ML
SOLUTION OROPHARYNGEAL
Status: DISPENSED
Start: 2024-08-27

## (undated) RX ORDER — FENTANYL CITRATE 50 UG/ML
INJECTION, SOLUTION INTRAMUSCULAR; INTRAVENOUS
Status: DISPENSED
Start: 2025-01-16

## (undated) RX ORDER — FENTANYL CITRATE 50 UG/ML
INJECTION, SOLUTION INTRAMUSCULAR; INTRAVENOUS
Status: DISPENSED
Start: 2024-08-15

## (undated) RX ORDER — LIDOCAINE HYDROCHLORIDE 10 MG/ML
INJECTION, SOLUTION EPIDURAL; INFILTRATION; INTRACAUDAL; PERINEURAL
Status: DISPENSED
Start: 2025-04-17

## (undated) RX ORDER — LIDOCAINE HYDROCHLORIDE 40 MG/ML
SOLUTION TOPICAL
Status: DISPENSED
Start: 2024-09-26

## (undated) RX ORDER — FENTANYL CITRATE 50 UG/ML
INJECTION, SOLUTION INTRAMUSCULAR; INTRAVENOUS
Status: DISPENSED
Start: 2025-08-20

## (undated) RX ORDER — LIDOCAINE HYDROCHLORIDE 20 MG/ML
SOLUTION OROPHARYNGEAL
Status: DISPENSED
Start: 2024-11-05

## (undated) RX ORDER — CALCIUM CHLORIDE 100 MG/ML
INJECTION INTRAVENOUS; INTRAVENTRICULAR
Status: DISPENSED
Start: 2024-08-15

## (undated) RX ORDER — FENTANYL CITRATE 50 UG/ML
INJECTION, SOLUTION INTRAMUSCULAR; INTRAVENOUS
Status: DISPENSED
Start: 2025-04-17

## (undated) RX ORDER — NITROGLYCERIN 5 MG/ML
VIAL (ML) INTRAVENOUS
Status: DISPENSED
Start: 2024-06-21

## (undated) RX ORDER — LIDOCAINE HYDROCHLORIDE 40 MG/ML
SOLUTION TOPICAL
Status: DISPENSED
Start: 2024-11-05

## (undated) RX ORDER — FENTANYL CITRATE-0.9 % NACL/PF 10 MCG/ML
PLASTIC BAG, INJECTION (ML) INTRAVENOUS
Status: DISPENSED
Start: 2024-08-15

## (undated) RX ORDER — LIDOCAINE HYDROCHLORIDE AND EPINEPHRINE 10; 10 MG/ML; UG/ML
INJECTION, SOLUTION INFILTRATION; PERINEURAL
Status: DISPENSED
Start: 2025-01-16

## (undated) RX ORDER — HYDROMORPHONE HYDROCHLORIDE 1 MG/ML
INJECTION, SOLUTION INTRAMUSCULAR; INTRAVENOUS; SUBCUTANEOUS
Status: DISPENSED
Start: 2024-08-15

## (undated) RX ORDER — LIDOCAINE HYDROCHLORIDE AND EPINEPHRINE 10; 10 MG/ML; UG/ML
INJECTION, SOLUTION INFILTRATION; PERINEURAL
Status: DISPENSED
Start: 2024-11-05

## (undated) RX ORDER — FENTANYL CITRATE 50 UG/ML
INJECTION, SOLUTION INTRAMUSCULAR; INTRAVENOUS
Status: DISPENSED
Start: 2024-08-27

## (undated) RX ORDER — FENTANYL CITRATE 50 UG/ML
INJECTION, SOLUTION INTRAMUSCULAR; INTRAVENOUS
Status: DISPENSED
Start: 2024-06-21

## (undated) RX ORDER — LIDOCAINE HYDROCHLORIDE 40 MG/ML
SOLUTION TOPICAL
Status: DISPENSED
Start: 2025-08-20

## (undated) RX ORDER — LIDOCAINE HYDROCHLORIDE AND EPINEPHRINE 10; 10 MG/ML; UG/ML
INJECTION, SOLUTION INFILTRATION; PERINEURAL
Status: DISPENSED
Start: 2024-09-26

## (undated) RX ORDER — PROPOFOL 10 MG/ML
INJECTION, EMULSION INTRAVENOUS
Status: DISPENSED
Start: 2024-08-15

## (undated) RX ORDER — SODIUM CHLORIDE 9 MG/ML
INJECTION, SOLUTION INTRAVENOUS
Status: DISPENSED
Start: 2024-06-21

## (undated) RX ORDER — LIDOCAINE HYDROCHLORIDE AND EPINEPHRINE 10; 10 MG/ML; UG/ML
INJECTION, SOLUTION INFILTRATION; PERINEURAL
Status: DISPENSED
Start: 2025-08-20

## (undated) RX ORDER — LIDOCAINE HYDROCHLORIDE 10 MG/ML
INJECTION, SOLUTION EPIDURAL; INFILTRATION; INTRACAUDAL; PERINEURAL
Status: DISPENSED
Start: 2024-11-05

## (undated) RX ORDER — LIDOCAINE HYDROCHLORIDE 10 MG/ML
INJECTION, SOLUTION EPIDURAL; INFILTRATION; INTRACAUDAL; PERINEURAL
Status: DISPENSED
Start: 2025-01-16

## (undated) RX ORDER — NICARDIPINE HCL-0.9% SOD CHLOR 1 MG/10 ML
SYRINGE (ML) INTRAVENOUS
Status: DISPENSED
Start: 2024-06-21

## (undated) RX ORDER — FENTANYL CITRATE 50 UG/ML
INJECTION, SOLUTION INTRAMUSCULAR; INTRAVENOUS
Status: DISPENSED
Start: 2024-09-26

## (undated) RX ORDER — LIDOCAINE HYDROCHLORIDE 20 MG/ML
JELLY TOPICAL
Status: DISPENSED
Start: 2024-07-09

## (undated) RX ORDER — LIDOCAINE HYDROCHLORIDE 10 MG/ML
INJECTION, SOLUTION EPIDURAL; INFILTRATION; INTRACAUDAL; PERINEURAL
Status: DISPENSED
Start: 2024-08-27

## (undated) RX ORDER — LIDOCAINE HYDROCHLORIDE 10 MG/ML
INJECTION, SOLUTION EPIDURAL; INFILTRATION; INTRACAUDAL; PERINEURAL
Status: DISPENSED
Start: 2024-09-26

## (undated) RX ORDER — CEFAZOLIN SODIUM 1 G/3ML
INJECTION, POWDER, FOR SOLUTION INTRAMUSCULAR; INTRAVENOUS
Status: DISPENSED
Start: 2024-08-15